# Patient Record
Sex: FEMALE | Race: BLACK OR AFRICAN AMERICAN | NOT HISPANIC OR LATINO | Employment: OTHER | URBAN - METROPOLITAN AREA
[De-identification: names, ages, dates, MRNs, and addresses within clinical notes are randomized per-mention and may not be internally consistent; named-entity substitution may affect disease eponyms.]

---

## 2017-01-03 ENCOUNTER — LAB VISIT (OUTPATIENT)
Dept: LAB | Facility: HOSPITAL | Age: 42
End: 2017-01-03
Attending: INTERNAL MEDICINE
Payer: COMMERCIAL

## 2017-01-03 ENCOUNTER — HOSPITAL ENCOUNTER (OUTPATIENT)
Dept: CARDIOLOGY | Facility: CLINIC | Age: 42
Discharge: HOME | End: 2017-01-03
Attending: INTERNAL MEDICINE
Payer: COMMERCIAL

## 2017-01-03 ENCOUNTER — OFFICE VISIT (OUTPATIENT)
Dept: TRANSPLANT | Facility: CLINIC | Age: 42
End: 2017-01-03
Payer: COMMERCIAL

## 2017-01-03 VITALS
WEIGHT: 239.44 LBS | SYSTOLIC BLOOD PRESSURE: 112 MMHG | HEIGHT: 69 IN | HEART RATE: 86 BPM | DIASTOLIC BLOOD PRESSURE: 59 MMHG | BODY MASS INDEX: 35.46 KG/M2

## 2017-01-03 DIAGNOSIS — D64.9 ANEMIA: ICD-10-CM

## 2017-01-03 DIAGNOSIS — M62.838 MUSCLE SPASM OF BOTH LOWER LEGS: ICD-10-CM

## 2017-01-03 DIAGNOSIS — G89.29 CHRONIC BACK PAIN: ICD-10-CM

## 2017-01-03 DIAGNOSIS — R00.2 PALPITATIONS: ICD-10-CM

## 2017-01-03 DIAGNOSIS — I34.0 SEVERE MITRAL REGURGITATION: Chronic | ICD-10-CM

## 2017-01-03 DIAGNOSIS — D50.9 MICROCYTIC ANEMIA: Primary | Chronic | ICD-10-CM

## 2017-01-03 DIAGNOSIS — I50.9 CONGESTIVE HEART FAILURE, UNSPECIFIED CONGESTIVE HEART FAILURE CHRONICITY, UNSPECIFIED CONGESTIVE HEART FAILURE TYPE: ICD-10-CM

## 2017-01-03 DIAGNOSIS — D50.9 MICROCYTIC ANEMIA: ICD-10-CM

## 2017-01-03 DIAGNOSIS — M54.9 CHRONIC BACK PAIN: ICD-10-CM

## 2017-01-03 DIAGNOSIS — I51.7 CARDIOMEGALY: ICD-10-CM

## 2017-01-03 DIAGNOSIS — I50.22 CHRONIC SYSTOLIC HEART FAILURE: Chronic | ICD-10-CM

## 2017-01-03 LAB
ANION GAP SERPL CALC-SCNC: 7 MMOL/L
BASOPHILS # BLD AUTO: 0.02 K/UL
BASOPHILS NFR BLD: 0.3 %
BNP SERPL-MCNC: 345 PG/ML
BUN SERPL-MCNC: 15 MG/DL
CALCIUM SERPL-MCNC: 8.7 MG/DL
CHLORIDE SERPL-SCNC: 108 MMOL/L
CO2 SERPL-SCNC: 24 MMOL/L
CREAT SERPL-MCNC: 0.7 MG/DL
DIASTOLIC DYSFUNCTION: NO
DIFFERENTIAL METHOD: ABNORMAL
EOSINOPHIL # BLD AUTO: 0.4 K/UL
EOSINOPHIL NFR BLD: 5.5 %
ERYTHROCYTE [DISTWIDTH] IN BLOOD BY AUTOMATED COUNT: 17.6 %
EST. GFR  (AFRICAN AMERICAN): >60 ML/MIN/1.73 M^2
EST. GFR  (NON AFRICAN AMERICAN): >60 ML/MIN/1.73 M^2
GLUCOSE SERPL-MCNC: 96 MG/DL
HCT VFR BLD AUTO: 31.9 %
HGB BLD-MCNC: 9.7 G/DL
LYMPHOCYTES # BLD AUTO: 1.6 K/UL
LYMPHOCYTES NFR BLD: 25.3 %
MCH RBC QN AUTO: 23.7 PG
MCHC RBC AUTO-ENTMCNC: 30.4 %
MCV RBC AUTO: 78 FL
MONOCYTES # BLD AUTO: 0.4 K/UL
MONOCYTES NFR BLD: 5.5 %
NEUTROPHILS # BLD AUTO: 4.1 K/UL
NEUTROPHILS NFR BLD: 63.2 %
PLATELET # BLD AUTO: 300 K/UL
PMV BLD AUTO: 10.7 FL
POTASSIUM SERPL-SCNC: 4 MMOL/L
RBC # BLD AUTO: 4.09 M/UL
SODIUM SERPL-SCNC: 139 MMOL/L
WBC # BLD AUTO: 6.49 K/UL

## 2017-01-03 PROCEDURE — 80048 BASIC METABOLIC PNL TOTAL CA: CPT

## 2017-01-03 PROCEDURE — 83880 ASSAY OF NATRIURETIC PEPTIDE: CPT

## 2017-01-03 PROCEDURE — 1159F MED LIST DOCD IN RCRD: CPT | Mod: S$GLB,,, | Performed by: INTERNAL MEDICINE

## 2017-01-03 PROCEDURE — 94621 CARDIOPULM EXERCISE TESTING: CPT | Mod: S$GLB,,, | Performed by: INTERNAL MEDICINE

## 2017-01-03 PROCEDURE — 85025 COMPLETE CBC W/AUTO DIFF WBC: CPT

## 2017-01-03 PROCEDURE — 36415 COLL VENOUS BLD VENIPUNCTURE: CPT

## 2017-01-03 PROCEDURE — 99999 PR PBB SHADOW E&M-EST. PATIENT-LVL III: CPT | Mod: PBBFAC,,, | Performed by: INTERNAL MEDICINE

## 2017-01-03 PROCEDURE — 99215 OFFICE O/P EST HI 40 MIN: CPT | Mod: S$GLB,,, | Performed by: INTERNAL MEDICINE

## 2017-01-03 RX ORDER — ASPIRIN 81 MG/1
81 TABLET ORAL DAILY
Qty: 30 TABLET | Refills: 12 | Status: SHIPPED | OUTPATIENT
Start: 2017-01-03 | End: 2019-03-22

## 2017-01-03 RX ORDER — DIGOXIN 125 MCG
125 TABLET ORAL DAILY
Qty: 30 TABLET | Refills: 11 | Status: ON HOLD | OUTPATIENT
Start: 2017-01-03 | End: 2017-02-13 | Stop reason: HOSPADM

## 2017-01-03 NOTE — MR AVS SNAPSHOT
[facility]  [address]  [place] [address]  Phone: [phone]                  Mario Mahajan   1/3/2017 10:30 AM   Office Visit    Description:  Female : 1975   Provider:  Nereida Hatch MD   Department:  [facility]           Reason for Visit     Heart Transplant Pre-evaluation           Diagnoses this Visit        Comments    Microcytic anemia    -  Primary     Cardiomegaly         Muscle spasm of both lower legs         Severe mitral regurgitation                To Do List           Future Appointments        Provider Department Dept Phone    3/21/2017 8:30 AM EKG, APPT [facility] - [phone]    3/21/2017 9:00 AM PACEMAKER, ICD [facility] - Arrhythmia [phone]    3/21/2017 9:40 AM Victorino Tay MD [facility] Arrhythmia [phone]      Goals (5 Years of Data)     None      Follow-Up and Disposition     Return in about 2 months (around 3/3/2017).       These Medications        Disp Refills Start End    aspirin (ECOTRIN) 81 MG EC tablet 30 tablet 12 1/3/2017     Take 1 tablet (81 mg total) by mouth once daily. - Oral    Pharmacy: [facility] [address] [place] [address] [address] [facility] Ph #: [phone]       digoxin (LANOXIN) 125 mcg tablet 30 tablet 11 1/3/2017 1/3/2018    Take 1 tablet (125 mcg total) by mouth once daily. - Oral    Pharmacy: [facility] [address] [place] [address] [address] [facility] Ph #: [phone]         [facility]     [facility] -  Assistance  Registered nurses in the [facility] provide clinical advisement, health education, appointment booking, and other advisory services.  Call for this free service at [phone].             Medications           Message regarding Medications     Verify the changes and/or additions to your medication regime listed below are the same as discussed with your  "clinician today.  If any of these changes or additions are incorrect, please notify your healthcare provider.        START taking these NEW medications        Refills    aspirin (ECOTRIN) 81 MG EC tablet 12    Sig: Take 1 tablet (81 mg total) by mouth once daily.    Class: Normal    Route: Oral      STOP taking these medications     MONONESSA, 28, 0.25-35 mg-mcg per tablet Take 1 tablet by mouth once daily.    aspirin 325 MG tablet Take 81 mg by mouth once daily.    aspirin 81 MG Chew Take 1 tablet (81 mg total) by mouth once daily.           Verify that the below list of medications is an accurate representation of the medications you are currently taking.  If none reported, the list may be blank. If incorrect, please contact your healthcare provider. Carry this list with you in case of emergency.           Current Medications     albuterol 90 mcg/actuation inhaler Inhale 1-2 puffs into the lungs every 6 (six) hours as needed for Wheezing.    aspirin (ECOTRIN) 81 MG EC tablet Take 1 tablet (81 mg total) by mouth once daily.    carvedilol (COREG) 25 MG tablet Take 1 tablet (25 mg total) by mouth 2 (two) times daily with meals.    digoxin (LANOXIN) 125 mcg tablet Take 1 tablet (125 mcg total) by mouth once daily.    ferrous sulfate 325 mg (65 mg iron) Tab tablet Take 1 tablet (325 mg total) by mouth 2 (two) times daily.    furosemide (LASIX) 40 MG tablet Take 1 tablet (40 mg total) by mouth once daily.    lorazepam (ATIVAN) 1 MG tablet Take 1 tablet (1 mg total) by mouth 3 (three) times a week.    spironolactone (ALDACTONE) 25 MG tablet Take 1 tablet (25 mg total) by mouth once daily.    valsartan (DIOVAN) 80 MG tablet Take 1 tablet (80 mg total) by mouth after lunch.           Clinical Reference Information           Vital Signs - Last Recorded  Most recent update: 1/3/2017 10:30 AM by Shanice Matrinez MA    BP Pulse Ht Wt BMI    (!) 112/59 86 5' 9" (1.753 m) 108.6 kg (239 lb 6.7 oz) 35.36 kg/m2      Blood Pressure  "         Most Recent Value    BP  (!)  112/59      Allergies as of 1/3/2017     No Known Allergies      Immunizations Administered on Date of Encounter - 1/3/2017     None      Orders Placed During Today's Visit      Normal Orders This Visit    Ambulatory consult to Obstetrics / Gynecology     Future Labs/Procedures Expected by Expires    Basic metabolic panel  3/3/2017 (Approximate) 1/3/2018      Maintenance Dialysis History     Patient has no recorded history of maintenance dialysis.      Transplant Information        Txp Date Organ Coordinator Care Team     Heart Marsha Ruiz RN Referring Physician:  Juanjose Nuñez MD   Corresponding Physician:  Juanjose Nuñez MD         Instructions      Sacubitril, Valsartan Oral tablet  What is this medicine?  SACUBITRIL; VALSARTAN (sak UE bi tril; katia GI tan) is a combination of 2 drugs used to reduce the risk of death and hospitalizations in people with long-lasting heart failure. It is usually used with other medicines to treat heart failure.  This medicine may be used for other purposes; ask your health care provider or pharmacist if you have questions.  What should I tell my health care provider before I take this medicine?  They need to know if you have any of these conditions:  · diabetes and take a medicine that contains aliskiren  · kidney disease  · liver disease  · an unusual or allergic reaction to sacubitril; valsartan, drugs called angiotensin converting enzyme (ACE) inhibitors, angiotensin II receptor blockers (ARBs), other medicines, foods, dyes, or preservatives  · pregnant or trying to get pregnant  · breast-feeding  How should I use this medicine?  Take this medicine by mouth with a glass of water. Follow the directions on the prescription label. You can take it with or  without food. If it upsets your stomach, take it with food. Take your medicine at regular intervals. Do not take it more  often than directed. Do not stop taking except on your doctor's  advice.  Talk to your pediatrician regarding the use of this medicine in children. Special care may be needed.  Overdosage: If you think you have taken too much of this medicine contact a poison control center or emergency room at once.  NOTE: This medicine is only for you. Do not share this medicine with others.  What if I miss a dose?  If you miss a dose, take it as soon as you can. If it is almost time for next dose, take only that dose. Do not take double or extra doses.  What may interact with this medicine?  Do not take this medicine with any of the following medicines:  · aliskiren if you have diabetes  · angiotensin-converting enzyme (ACE) inhibitors, like benazepril, captopril, enalapril, fosinopril, lisinopril, or ramipril  This medicine may also interact with the following medicines:  · angiotensin II receptor blockers (ARBs) like azilsartan, candesartan, eprosartan, irbesartan, losartan, olmesartan, telmisartan, or valsartan  · lithium  · NSAIDS, medicines for pain and inflammation, like ibuprofen or naproxen  · potassium-sparing diuretics like amiloride, spironolactone, and triamterene  · potassium supplements  This list may not describe all possible interactions. Give your health care provider a list of all the medicines, herbs, non-prescription drugs, or dietary supplements you use. Also tell them if you smoke, drink alcohol, or use illegal drugs. Some items may interact with your medicine.  What should I watch for while using this medicine?  Tell your doctor or healthcare professional if your symptoms do not start to get better or if they get worse.  Do not become pregnant while taking this medicine. Women should inform their doctor if they wish to become pregnant or think they might be pregnant. There is a potential for serious side effects to an unborn child. Talk to your health care professional or pharmacist for more information.  You may get dizzy. Do not drive, use machinery, or do anything  that needs mental alertness until you know how this medicine affects you. Do not stand or sit up quickly, especially if you are an older patient. This reduces the risk of dizzy or fainting spells. Avoid alcoholic drinks; they can make you more dizzy.  What side effects may I notice from receiving this medicine?  Side effects that you should report to your doctor or health care professional as soon as possible:  · allergic reactions like skin rash, itching or hives, swelling of the face, lips, or tongue  · signs and symptoms of increased potassium like muscle weakness; chest pain; or fast, irregular heartbeat  · signs and symptoms of kidney injury like trouble passing urine or change in the amount of urine  · signs and symptoms of low blood pressure like feeling dizzy or lightheaded, or if you develop extreme fatigue.  Side effects that usually do not require medical attention (Report these to your doctor or health care professional if they continue or are bothersome.):  · cough  This list may not describe all possible side effects. Call your doctor for medical advice about side effects. You may report side effects to FDA at 0-829-FDA-8447.  Where should I keep my medicine?  Keep out of the reach of children.  Store at room temperature between 15 and 30 degrees C (59 and 86 degrees F). Throw away any unused medicine after the expiration date.  NOTE: This sheet is a summary. It may not cover all possible information. If you have questions about this medicine, talk to your doctor, pharmacist, or health care provider.  NOTE:This sheet is a summary. It may not cover all possible information. If you have questions about this medicine, talk to your doctor, pharmacist, or health care provider. Copyright© 2016 [employer]

## 2017-01-03 NOTE — PATIENT INSTRUCTIONS
Sacubitril, Valsartan Oral tablet  What is this medicine?  SACUBITRIL; VALSARTAN (sak UE bi tril; katia GI tan) is a combination of 2 drugs used to reduce the risk of death and hospitalizations in people with long-lasting heart failure. It is usually used with other medicines to treat heart failure.  This medicine may be used for other purposes; ask your health care provider or pharmacist if you have questions.  What should I tell my health care provider before I take this medicine?  They need to know if you have any of these conditions:  · diabetes and take a medicine that contains aliskiren  · kidney disease  · liver disease  · an unusual or allergic reaction to sacubitril; valsartan, drugs called angiotensin converting enzyme (ACE) inhibitors, angiotensin II receptor blockers (ARBs), other medicines, foods, dyes, or preservatives  · pregnant or trying to get pregnant  · breast-feeding  How should I use this medicine?  Take this medicine by mouth with a glass of water. Follow the directions on the prescription label. You can take it with or  without food. If it upsets your stomach, take it with food. Take your medicine at regular intervals. Do not take it more  often than directed. Do not stop taking except on your doctor's advice.  Talk to your pediatrician regarding the use of this medicine in children. Special care may be needed.  Overdosage: If you think you have taken too much of this medicine contact a poison control center or emergency room at once.  NOTE: This medicine is only for you. Do not share this medicine with others.  What if I miss a dose?  If you miss a dose, take it as soon as you can. If it is almost time for next dose, take only that dose. Do not take double or extra doses.  What may interact with this medicine?  Do not take this medicine with any of the following medicines:  · aliskiren if you have diabetes  · angiotensin-converting enzyme (ACE) inhibitors, like benazepril, captopril,  enalapril, fosinopril, lisinopril, or ramipril  This medicine may also interact with the following medicines:  · angiotensin II receptor blockers (ARBs) like azilsartan, candesartan, eprosartan, irbesartan, losartan, olmesartan, telmisartan, or valsartan  · lithium  · NSAIDS, medicines for pain and inflammation, like ibuprofen or naproxen  · potassium-sparing diuretics like amiloride, spironolactone, and triamterene  · potassium supplements  This list may not describe all possible interactions. Give your health care provider a list of all the medicines, herbs, non-prescription drugs, or dietary supplements you use. Also tell them if you smoke, drink alcohol, or use illegal drugs. Some items may interact with your medicine.  What should I watch for while using this medicine?  Tell your doctor or healthcare professional if your symptoms do not start to get better or if they get worse.  Do not become pregnant while taking this medicine. Women should inform their doctor if they wish to become pregnant or think they might be pregnant. There is a potential for serious side effects to an unborn child. Talk to your health care professional or pharmacist for more information.  You may get dizzy. Do not drive, use machinery, or do anything that needs mental alertness until you know how this medicine affects you. Do not stand or sit up quickly, especially if you are an older patient. This reduces the risk of dizzy or fainting spells. Avoid alcoholic drinks; they can make you more dizzy.  What side effects may I notice from receiving this medicine?  Side effects that you should report to your doctor or health care professional as soon as possible:  · allergic reactions like skin rash, itching or hives, swelling of the face, lips, or tongue  · signs and symptoms of increased potassium like muscle weakness; chest pain; or fast, irregular heartbeat  · signs and symptoms of kidney injury like trouble passing urine or change in the  amount of urine  · signs and symptoms of low blood pressure like feeling dizzy or lightheaded, or if you develop extreme fatigue.  Side effects that usually do not require medical attention (Report these to your doctor or health care professional if they continue or are bothersome.):  · cough  This list may not describe all possible side effects. Call your doctor for medical advice about side effects. You may report side effects to FDA at 9-176-BHE-6514.  Where should I keep my medicine?  Keep out of the reach of children.  Store at room temperature between 15 and 30 degrees C (59 and 86 degrees F). Throw away any unused medicine after the expiration date.  NOTE: This sheet is a summary. It may not cover all possible information. If you have questions about this medicine, talk to your doctor, pharmacist, or health care provider.  NOTE:This sheet is a summary. It may not cover all possible information. If you have questions about this medicine, talk to your doctor, pharmacist, or health care provider. Copyright© 2016 [employer] Standard

## 2017-01-03 NOTE — Clinical Note
Thanks for seeing her next week for heavy menstraul bleeding Seems ok to start with birth control pills if u think that will help

## 2017-01-03 NOTE — PROGRESS NOTES
Subjective: class 2 to 3    Transplant status: active    HPI:  Ms. Mahajan is a 41 y.o. year old Black or  female who has presented to be evaluated as a potential heart transplant recipient.   Nonischemic CHF (LVEF 25-30%, LVEDD 7.3cm,  Moderate to severe MR) who presents for clinic visit.  At her last visit, I asked her to spread out her CHF meds to see if her daytime fatigue would improve.  Today, her fatigue is unchanged.  Can walk two blocks if she takes her time.   Gets sharp nonexertional chest pain at night every two weeks which is relieved by taking coreg early.  Feels better overall when she takes her coreg as she believes it has helped decrease the frequency of her palpations.  Has yet to see GYN for menorrhagia. As noted below, peak v02 (functional capacity) improved compared to prior.  Last admit in 2015 looked like CHF along with anemia requiring transfusion of two units  CPX 2017 The PkVO2 was 14.1 ml/kg/min which is 42% of predicted equating to a functional capacity of 4.0 METS indicating severe functional impairment (2015 PKvo2 12.6 )    Past Medical History   Diagnosis Date    Anemia     Asthma     CHF (congestive heart failure)     Encounter for blood transfusion     Mitral valve regurgitation     Obesity      Past Surgical History   Procedure Laterality Date    Tubal ligation       section      Cardiac defibrillator placement  2015     OB History     No data available        Review of Systems   Constitution: Negative for decreased appetite, weight gain and weight loss.   Cardiovascular: Positive for dyspnea on exertion. Negative for chest pain, leg swelling, near-syncope, orthopnea and palpitations (once a week at night ).   Respiratory: Negative for cough and shortness of breath.    Musculoskeletal: Negative for myalgias.   Gastrointestinal: Negative for jaundice.       Objective:   Physical Exam   Constitutional: She appears well-developed and  "well-nourished. No distress.   BP (!) 112/59  Pulse 86  Ht 5' 9" (1.753 m)  Wt 108.6 kg (239 lb 6.7 oz)  BMI 35.36 kg/m2     HENT:   Head: Normocephalic and atraumatic. Head is without abrasion and without contusion.   Right Ear: External ear normal.   Left Ear: External ear normal.   Nose: Nose normal. No epistaxis.   Mouth/Throat: Oropharynx is clear and moist. Mucous membranes are not cyanotic.   Eyes: Conjunctivae and EOM are normal. Pupils are equal, round, and reactive to light.   Neck: Normal range of motion. Neck supple. No tracheal deviation present.   Cardiovascular: Normal rate, regular rhythm and normal pulses.  Exam reveals gallop and S4.    No murmur heard.  Pulmonary/Chest: Effort normal and breath sounds normal. No stridor. No respiratory distress. She has no wheezes.   Abdominal: Soft. Normal appearance, normal aorta and bowel sounds are normal. She exhibits no distension. There is no tenderness.   Musculoskeletal: She exhibits no edema or tenderness.   Neurological: She is alert. She has normal strength and normal reflexes. She exhibits normal muscle tone.   Skin: Skin is warm. No rash noted. No erythema.   Psychiatric: She has a normal mood and affect. Her speech is normal and behavior is normal. Judgment and thought content normal. Cognition and memory are normal.       Labs:    Chemistry        Component Value Date/Time     01/03/2017 0910    K 4.0 01/03/2017 0910     01/03/2017 0910    CO2 24 01/03/2017 0910    BUN 15 01/03/2017 0910    BUN 12 06/19/2015 0950    CREATININE 0.7 01/03/2017 0910    GLU 96 01/03/2017 0910        Component Value Date/Time    CALCIUM 8.7 01/03/2017 0910    ALKPHOS 50 06/25/2016 2152    AST 17 06/25/2016 2152    ALT 20 06/25/2016 2152    BILITOT 1.0 06/25/2016 2152          Magnesium   Date Value Ref Range Status   04/13/2016 1.9 1.6 - 2.6 mg/dL Final     Lab Results   Component Value Date    WBC 6.49 01/03/2017    HGB 9.7 (L) 01/03/2017    HCT 31.9 (L) " 01/03/2017     01/03/2017     BNP   Date Value Ref Range Status   01/03/2017 345 (H) 0 - 99 pg/mL Final     Comment:     Values of less than 100 pg/ml are consistent with non-CHF populations.   06/02/2016 96 0 - 99 pg/mL Final     Comment:     Values of less than 100 pg/ml are consistent with non-CHF populations.   05/04/2016 132 (H) 0 - 99 pg/mL Final     Comment:     Values of less than 100 pg/ml are consistent with non-CHF populations.       Assessment:      1. Microcytic anemia    2. Cardiomegaly    3. Muscle spasm of both lower legs    4. Severe mitral regurgitation        Plan:   1.  CHF despite large LV, has made process with meds  May try to substitute entresto for valsartan. Alternatively could try to increase coreg / add corlander  2.  OHT / LVAD would like her to see GYN to see if we can help with anemia If symptoms persist once anemia resolves (or if anemia can not be improved) will start discuss for OHT / LVAD  3.  Followup in two months with BMP  4.  Risk of GETA for GYN procedures with reasonable function capacity / stable CHF would consider mild to moderate risk for reasonable urgent required sugery  Last PET stress in June 2015 showed no evidence of a discrete hemodynamically significant coronary stenosis  Patient is now NYHA III  Recommend 2 gram sodium restriction and 1500cc fluid restriction.  Encourage physical activity with graded exercise program.  Requested patient to weigh themselves daily, and to notify us if their weight increases by more than 3 lbs in 1 day or 5 lbs in 1 week.     Listed for transplant: No        Patient did not met with pre-transplant coordinator at the end of this visit.

## 2017-01-03 NOTE — LETTER
January 3, 2017        Juanjose Nuñez  [address]  [facility] [address]  Phone: [phone]  Fax: [phone]             [facility] [address]  [facility] [address]  Phone: [phone]   Patient: Mario Mahajan   MR Number: [id number]   YOB: 1975   Date of Visit: 1/3/2017       Dear Dr. Juanjose Nuñez    Thank you for referring Mario Mahajna to me for evaluation. Attached you will find relevant portions of my assessment and plan of care.    If you have questions, please do not hesitate to call me. I look forward to following Mario Mahajan along with you.    Sincerely,    Nereida Hatch MD    Enclosure    If you would like to receive this communication electronically, please contact [email or web address] or [phone] to request [employer] access.    [employer] is a tool which provides read-only access to select patient information with whom you have a relationship. Its easy to use and provides real time access to review your patients record including encounter summaries, notes, results, and demographic information.    If you feel you have received this communication in error or would no longer like to receive these types of communications, please e-mail [email or web address]

## 2017-01-12 ENCOUNTER — OFFICE VISIT (OUTPATIENT)
Dept: OBSTETRICS AND GYNECOLOGY | Facility: CLINIC | Age: 42
End: 2017-01-12
Payer: COMMERCIAL

## 2017-01-12 VITALS
SYSTOLIC BLOOD PRESSURE: 110 MMHG | HEIGHT: 69 IN | DIASTOLIC BLOOD PRESSURE: 62 MMHG | BODY MASS INDEX: 34.8 KG/M2 | WEIGHT: 235 LBS

## 2017-01-12 DIAGNOSIS — N92.4 EXCESSIVE BLEEDING IN PREMENOPAUSAL PERIOD: ICD-10-CM

## 2017-01-12 DIAGNOSIS — N92.4 EXCESSIVE BLEEDING IN PREMENOPAUSAL PERIOD: Primary | ICD-10-CM

## 2017-01-12 PROCEDURE — 99203 OFFICE O/P NEW LOW 30 MIN: CPT | Mod: 25,S$GLB,, | Performed by: OBSTETRICS & GYNECOLOGY

## 2017-01-12 PROCEDURE — 76830 TRANSVAGINAL US NON-OB: CPT | Mod: S$GLB,,, | Performed by: OBSTETRICS & GYNECOLOGY

## 2017-01-12 PROCEDURE — 99999 PR PBB SHADOW E&M-EST. PATIENT-LVL IV: CPT | Mod: PBBFAC,,, | Performed by: OBSTETRICS & GYNECOLOGY

## 2017-01-12 PROCEDURE — 1159F MED LIST DOCD IN RCRD: CPT | Mod: S$GLB,,, | Performed by: OBSTETRICS & GYNECOLOGY

## 2017-01-12 NOTE — PROGRESS NOTES
Patient presents to the office after referral from her cardiologist.  Patient has menorrhagia to the point of anemia.  She has an enlarged heart with leaky valve and surgery is anticipated for this problem.  This cannot be done until the patient has her bleeding problems under control.  Cardiology suggested endometrial ablation or hysterectomy.  The patient states that she does not wish to proceed with ablation because of the potential for failure or inadequate results to prevent future anemia or bleeding problems.  Patient also has significant cramping and dyspareunia particularly on the right lower quadrant.  She has had previous  sections but no other pelvic surgery.  She is a low transverse scar right above the pubic bone which is mildly retracted.  Bimanual examination shows a normal cervix with mildly hypertrophied uterus, there is tenderness of the uterine fundus on bimanual compression.  There are no significant adnexal masses palpable but there is tenderness particularly in the right lower quadrant between the incision and the vaginal wall.  Patient has had negative Paps on her life and a recent one less than a year ago.  Ultrasound be done to further assess the pelvis.  A case request was placed for abdominal hysterectomy.  Consideration for management of the ovaries at surgery will be addressed at a future visit.  As soon as the patient's hysterectomy was done she can return to cardiology and move forward with her cardiac procedures (valve replacement).

## 2017-01-12 NOTE — MR AVS SNAPSHOT
[facility]  [address]  [address], [address]  [place] [address]  Phone: [phone]                  Mario Mahajan   2017 2:30 PM   Office Visit    Description:  Female : 1975   Provider:  Victorino Rose MD   Department:  [place] [facility]           Reason for Visit     Gynecologic Exam                To Do List           Future Appointments        Provider Department Dept Phone    2017 2:30 PM MD [facility] - [phone]    3/3/2017 12:00 PM LAB, APPOINTMENT [place] [facility] [phone]    3/3/2017 2:00 PM Nereida Hatch MD [facility] [phone]    3/21/2017 8:30 AM EKG, [facility] - [phone]    3/21/2017 9:00 AM PACEMAKER, [facility] - Arrhythmia [phone]      Goals (5 Years of Data)     None      [facility] On Call     [facility] Care Line -  Assistance  Registered nurses in the [facility] provide clinical advisement, health education, appointment booking, and other advisory services.  Call for this free service at [phone].             Medications           Message regarding Medications     Verify the changes and/or additions to your medication regime listed below are the same as discussed with your clinician today.  If any of these changes or additions are incorrect, please notify your healthcare provider.        STOP taking these medications     lorazepam (ATIVAN) 1 MG tablet Take 1 tablet (1 mg total) by mouth 3 (three) times a week.           Verify that the below list of medications is an accurate representation of the medications you are currently taking.  If none reported, the list may be blank. If incorrect, please contact your healthcare provider. Carry this list with you in case of emergency.           Current Medications     aspirin (ECOTRIN) 81 MG EC tablet Take 1 tablet (81 mg total) by mouth once daily.    carvedilol (COREG) 25 MG  "tablet Take 1 tablet (25 mg total) by mouth 2 (two) times daily with meals.    digoxin (LANOXIN) 125 mcg tablet Take 1 tablet (125 mcg total) by mouth once daily.    ferrous sulfate 325 mg (65 mg iron) Tab tablet Take 1 tablet (325 mg total) by mouth 2 (two) times daily.    furosemide (LASIX) 40 MG tablet Take 1 tablet (40 mg total) by mouth once daily.    spironolactone (ALDACTONE) 25 MG tablet Take 1 tablet (25 mg total) by mouth once daily.    valsartan (DIOVAN) 80 MG tablet Take 1 tablet (80 mg total) by mouth after lunch.    albuterol 90 mcg/actuation inhaler Inhale 1-2 puffs into the lungs every 6 (six) hours as needed for Wheezing.           Clinical Reference Information           Vital Signs - Last Recorded  Most recent update: 1/12/2017  2:24 PM by Emani Cazares MA    BP Ht Wt LMP BMI    110/62 5' 9" (1.753 m) 106.6 kg (235 lb 0.2 oz) 12/28/2016 34.71 kg/m2      Blood Pressure          Most Recent Value    BP  110/62      Allergies as of 1/12/2017     No Known Allergies      Immunizations Administered on Date of Encounter - 1/12/2017     None      Maintenance Dialysis History     Patient has no recorded history of maintenance dialysis.      Transplant Information        Txp Date Organ Coordinator Care Team     Heart Marsha Ruiz RN Referring Physician:  Juanjose Nuñez MD   Corresponding Physician:  Juanjose Nuñez MD         [facility] Sign-Up     Activating your [facility] account is as easy as 1-2-3!     1) Visit [email or web address], select Sign Up Now, enter this activation code and your date of birth, then select Next.  Activation code not generated  Current Patient Portal Status: Account disabled      2) Create a username and password to use when you visit [facility] in the future and select a security question in case you lose your password and select Next.    3) Enter your e-mail address and click Sign Up!    Additional Information  If you have questions, please e-mail [email or web address] or call " [phone] to talk to our [facility] staff. Remember, [facility] is NOT to be used for urgent needs. For medical emergencies, dial 911.

## 2017-01-12 NOTE — LETTER
January 12, 2017      Nereida Hatch MD  [address]  [place] [facility] [address]           [place] [facility]  [address]  [address] [facility], [address]  [place] [address]  Phone: [phone]          Patient: Mario Mahajan   MR Number: [id number]   YOB: 1975   Date of Visit: 1/12/2017       Dear Dr. Nereida Hatch:    Thank you for referring Mario Mahajan to me for evaluation. Attached you will find relevant portions of my assessment and plan of care.    If you have questions, please do not hesitate to call me. I look forward to following Mario Mahajan along with you.    Sincerely,    Victorino Rose MD    Enclosure  CC:  No Recipients    If you would like to receive this communication electronically, please contact [email or web address] or [phone] to request more information on [facility] access.    For providers and/or their staff who would like to refer a patient to [facility], please contact us through our one-stop-shop provider referral line, [facility], at [phone].    If you feel you have received this communication in error or would no longer like to receive these types of communications, please e-mail [email or web address]

## 2017-01-20 ENCOUNTER — PATIENT MESSAGE (OUTPATIENT)
Dept: OBSTETRICS AND GYNECOLOGY | Facility: CLINIC | Age: 42
End: 2017-01-20

## 2017-01-20 ENCOUNTER — PATIENT MESSAGE (OUTPATIENT)
Dept: TRANSPLANT | Facility: CLINIC | Age: 42
End: 2017-01-20

## 2017-01-20 NOTE — TELEPHONE ENCOUNTER
Forwarded from patient via [employer]:    Hi , I know my surgery is set for Feb 8, But I was wondering if  could I do it on the next surgery date , Perhaps after the 15th.   Please let me know if that is at all possible

## 2017-01-23 ENCOUNTER — TELEPHONE (OUTPATIENT)
Dept: OBSTETRICS AND GYNECOLOGY | Facility: CLINIC | Age: 42
End: 2017-01-23

## 2017-01-23 ENCOUNTER — PATIENT MESSAGE (OUTPATIENT)
Dept: OBSTETRICS AND GYNECOLOGY | Facility: CLINIC | Age: 42
End: 2017-01-23

## 2017-01-23 NOTE — TELEPHONE ENCOUNTER
----- Message from Caitlin Thao sent at 1/20/2017  3:16 PM CST -----  Contact: self [phone]  Pt awaiting a call back regarding a surgery/Please advise.

## 2017-01-27 ENCOUNTER — PATIENT MESSAGE (OUTPATIENT)
Dept: OBSTETRICS AND GYNECOLOGY | Facility: CLINIC | Age: 42
End: 2017-01-27

## 2017-01-27 NOTE — TELEPHONE ENCOUNTER
Forwarded from patient via [employer]:    Hello , My apologies , I'm sorry for the inconvenience , But my daughter has a procedure on the day of my surgery ,I just realized it , So I was wondering if its ok , Could I just keep my original appt. Which I think  was Feb 8th .2017.Again I apologize for inconvenience  please let me know if this is ok

## 2017-01-30 ENCOUNTER — TELEPHONE (OUTPATIENT)
Dept: TRANSPLANT | Facility: CLINIC | Age: 42
End: 2017-01-30

## 2017-01-30 ENCOUNTER — PATIENT MESSAGE (OUTPATIENT)
Dept: TRANSPLANT | Facility: CLINIC | Age: 42
End: 2017-01-30

## 2017-01-30 NOTE — TELEPHONE ENCOUNTER
Patient called back stating feeling dizziness and HA after taking Carvedilol. Patient has been taking since 8/2016, symptoms just started 2 weeks ago. Patient stated maybe from anemia, scheduled for hysterectomy on 3/1/2017. Instructed Dr Hatch is on vacation and will be back tomorrow to discuss, patient ok with plan. Will call patient back tomorrow with Dr Hatch's advice.

## 2017-01-30 NOTE — TELEPHONE ENCOUNTER
Attempted to reach patient for c/o dizziness and HA when taking Carvedilol, message left to call coord, contact number provided. Will await call back from patient to discuss.

## 2017-01-31 ENCOUNTER — PATIENT MESSAGE (OUTPATIENT)
Dept: TRANSPLANT | Facility: CLINIC | Age: 42
End: 2017-01-31

## 2017-01-31 NOTE — TELEPHONE ENCOUNTER
"Attempted to contact patient to advise per Dr Hatch regarding Carvedilol, voice message left to call back, contact number provided.     Message sent to patient in [facility]:  " I discussed your concern with taking the medication Carvedilol with Dr Hatch. Dr Hatch would like you to continue taking the medication until your surgery, as your symptoms may be related to anemia. If you feel you can not wait until then, please give me a call at [phone]."  "

## 2017-02-01 ENCOUNTER — TELEPHONE (OUTPATIENT)
Dept: TRANSPLANT | Facility: CLINIC | Age: 42
End: 2017-02-01

## 2017-02-01 NOTE — TELEPHONE ENCOUNTER
"Patient called back, stated she will try to continue Carvedilol as prescribed, if still feels "bad" will contact coordinator for further recommendation from HTS provider.   "

## 2017-02-09 ENCOUNTER — HOSPITAL ENCOUNTER (OUTPATIENT)
Facility: HOSPITAL | Age: 42
Discharge: HOME | End: 2017-02-10
Attending: EMERGENCY MEDICINE | Admitting: HOSPITALIST
Payer: COMMERCIAL

## 2017-02-09 DIAGNOSIS — R55 SYNCOPE AND COLLAPSE: ICD-10-CM

## 2017-02-09 DIAGNOSIS — I50.42 CHRONIC COMBINED SYSTOLIC AND DIASTOLIC CONGESTIVE HEART FAILURE: Primary | Chronic | ICD-10-CM

## 2017-02-09 DIAGNOSIS — R55 SYNCOPE: ICD-10-CM

## 2017-02-09 DIAGNOSIS — I50.9 ACUTE ON CHRONIC CONGESTIVE HEART FAILURE, UNSPECIFIED CONGESTIVE HEART FAILURE TYPE: ICD-10-CM

## 2017-02-09 DIAGNOSIS — I50.20 SYSTOLIC CONGESTIVE HEART FAILURE: ICD-10-CM

## 2017-02-09 LAB
ALBUMIN SERPL BCP-MCNC: 4 G/DL
ALP SERPL-CCNC: 41 IU/L
ALT SERPL W/O P-5'-P-CCNC: 21 IU/L
ANION GAP SERPL CALC-SCNC: 10 MMOL/L
AST SERPL-CCNC: 28 IU/L
B-HCG UR QL: NEGATIVE
BASOPHILS # BLD AUTO: 0.03 K/UL
BASOPHILS NFR BLD: 0.3 %
BILIRUB SERPL-MCNC: 0.8 MG/DL
BUN SERPL-MCNC: 15 MG/DL
CALCIUM SERPL-MCNC: 9.1 MG/DL
CHLORIDE SERPL-SCNC: 106 MMOL/L
CO2 SERPL-SCNC: 25 MMOL/L
CREAT SERPL-MCNC: 0.73 MG/DL
DIFFERENTIAL METHOD: ABNORMAL
EOSINOPHIL # BLD AUTO: 0.5 K/UL
EOSINOPHIL NFR BLD: 5.2 %
ERYTHROCYTE [DISTWIDTH] IN BLOOD BY AUTOMATED COUNT: 16.5 %
EST. GFR  (AFRICAN AMERICAN): >60 ML/MIN/1.73 M^2
EST. GFR  (NON AFRICAN AMERICAN): >60 ML/MIN/1.73 M^2
GLUCOSE SERPL-MCNC: 94 MG/DL
HCT VFR BLD AUTO: 31 %
HGB BLD-MCNC: 9.6 G/DL
LYMPHOCYTES # BLD AUTO: 2.6 K/UL
LYMPHOCYTES NFR BLD: 28.2 %
MCH RBC QN AUTO: 24.9 PG
MCHC RBC AUTO-ENTMCNC: 31 %
MCV RBC AUTO: 80 FL
MONOCYTES # BLD AUTO: 0.7 K/UL
MONOCYTES NFR BLD: 7.4 %
NEUTROPHILS # BLD AUTO: 5.4 K/UL
NEUTROPHILS NFR BLD: 58.8 %
PLATELET # BLD AUTO: 238 K/UL
PMV BLD AUTO: 11.8 FL
POTASSIUM SERPL-SCNC: 4.6 MMOL/L
PROT SERPL-MCNC: 7.6 G/DL
RBC # BLD AUTO: 3.86 M/UL
SODIUM SERPL-SCNC: 141 MMOL/L
TROPONIN I SERPL DL<=0.01 NG/ML-MCNC: <0.012 NG/ML
WBC # BLD AUTO: 9.18 K/UL

## 2017-02-09 PROCEDURE — 80053 COMPREHEN METABOLIC PANEL: CPT

## 2017-02-09 PROCEDURE — 99285 EMERGENCY DEPT VISIT HI MDM: CPT | Mod: 25

## 2017-02-09 PROCEDURE — 81025 URINE PREGNANCY TEST: CPT

## 2017-02-09 PROCEDURE — 85025 COMPLETE CBC W/AUTO DIFF WBC: CPT

## 2017-02-09 PROCEDURE — 93005 ELECTROCARDIOGRAM TRACING: CPT

## 2017-02-09 PROCEDURE — 84484 ASSAY OF TROPONIN QUANT: CPT

## 2017-02-09 NOTE — IP AVS SNAPSHOT
[facility]  [address]  [place] [address]  Phone: [phone]           Patient Discharge Instructions     Our goal is to set you up for success. This packet includes information on your condition, medications, and your home care. It will help you to care for yourself so you don't get sicker and need to go back to the hospital.     Please ask your nurse if you have any questions.        There are many details to remember when preparing to leave the hospital. Here is what you will need to do:    1. Take your medicine. If you are prescribed medications, review your Medication List in the following pages. You may have new medications to  at the pharmacy and others that you'll need to stop taking. Review the instructions for how and when to take your medications. Talk with your doctor or nurses if you are unsure of what to do.     2. Go to your follow-up appointments. Specific follow-up information is listed in the following pages. Your may be contacted by a transition nurse or clinical provider about future appointments. Be sure we have all of the phone numbers to reach you, if needed. Please contact your provider's office if you are unable to make an appointment.     3. Watch for warning signs. Your doctor or nurse will give you detailed warning signs to watch for and when to call for assistance. These instructions may also include educational information about your condition. If you experience any of warning signs to your health, call your doctor.               [facility]  Unless otherwise directed by your provider, please contact [facility], our nurse care line that is available for 24/7 assistance.     [phone] (toll-free)    Registered nurses in the [facility] provide clinical advisement, health education, appointment booking, and other advisory services.                    ** Verify the list of medication(s) below is accurate and up to date. Carry this  with you in case of emergency. If your medications have changed, please notify your healthcare provider.             Medication List      CHANGE how you take these medications        Additional Info                      ferrous sulfate 325 mg (65 mg iron) Tab tablet   Quantity:  60 tablet   Refills:  1   Dose:  325 mg   What changed:  when to take this    Instructions:  Take 1 tablet (325 mg total) by mouth 2 (two) times daily.     Begin Date    AM    Noon    PM    Bedtime       spironolactone 25 MG tablet   Commonly known as:  ALDACTONE   Quantity:  30 tablet   Refills:  11   Dose:  25 mg   What changed:  additional instructions    Instructions:  Take 1 tablet (25 mg total) by mouth once daily. HOLD THIS UNTIL YOU SEE YOUR CARDIOLOGIST     Begin Date    AM    Noon    PM    Bedtime       valsartan 80 MG tablet   Commonly known as:  DIOVAN   Quantity:  30 tablet   Refills:  12   Dose:  80 mg   What changed:    - when to take this  - additional instructions    Instructions:  Take 1 tablet (80 mg total) by mouth once daily. HOLD THIS UNTIL YOU SEE YOUR CARDIOLOGIST     Begin Date    AM    Noon    PM    Bedtime         CONTINUE taking these medications        Additional Info                      albuterol 90 mcg/actuation inhaler   Quantity:  1 Inhaler   Refills:  3   Dose:  1-2 puff    Instructions:  Inhale 1-2 puffs into the lungs every 6 (six) hours as needed for Wheezing.     Begin Date    AM    Noon    PM    Bedtime       aspirin 81 MG EC tablet   Commonly known as:  ECOTRIN   Quantity:  30 tablet   Refills:  12   Dose:  81 mg    Last time this was given:  81 mg on 2/10/2017  8:49 AM   Instructions:  Take 1 tablet (81 mg total) by mouth once daily.     Begin Date    AM    Noon    PM    Bedtime       carvedilol 25 MG tablet   Commonly known as:  COREG   Quantity:  60 tablet   Refills:  11   Dose:  25 mg    Last time this was given:  25 mg on 2/10/2017  8:49 AM   Instructions:  Take 1 tablet (25 mg total) by mouth 2  (two) times daily with meals.     Begin Date    AM    Noon    PM    Bedtime       digoxin 125 mcg tablet   Commonly known as:  LANOXIN   Quantity:  30 tablet   Refills:  11   Dose:  125 mcg    Last time this was given:  125 mcg on 2/10/2017  8:55 AM   Instructions:  Take 1 tablet (125 mcg total) by mouth once daily.     Begin Date    AM    Noon    PM    Bedtime       furosemide 40 MG tablet   Commonly known as:  LASIX   Quantity:  30 tablet   Refills:  11   Dose:  40 mg    Last time this was given:  40 mg on 2/10/2017  8:49 AM   Instructions:  Take 1 tablet (40 mg total) by mouth once daily.     Begin Date    AM    Noon    PM    Bedtime            Where to Get Your Medications      Information about where to get these medications is not yet available     ! Ask your nurse or doctor about these medications     spironolactone 25 MG tablet    valsartan 80 MG tablet                  Please bring to all follow up appointments:    1. A copy of your discharge instructions.  2. All medicines you are currently taking in their original bottles.  3. Identification and insurance card.    Please arrive 15 minutes ahead of scheduled appointment time.    Please call 24 hours in advance if you must reschedule your appointment and/or time.        Your Scheduled Appointments     Feb 23, 2017 10:00 AM CST   Established Patient Visit with Victorino Rose MD   [facility]    [address] [facility]  5th Floor [facility], [address]  [facility] [id number] [phone]            Feb 23, 2017 11:00 AM CST   Pre-Admit Testing Visit with PRE-ADMIT ONE[facility] ([facility])    [address] [facility] [address]   [phone]            Mar 03, 2017 12:00 PM CST   Non-Fasting Lab with LAB, APPOINTMENT [place] [facility] ([facility])    [address] [facility] [id number] [phone]            Mar 03, 2017  2:00 PM CST   Established  Patient Visit with Nereida Hatch MD   [facility] )    [address]  [facility] [address] [phone]            Mar 21, 2017  8:30 AM CDT   EKG with EKG, APPT   [facility] ([place] )    [address]  [facility] [address] [phone]              Your Future Surgeries/Procedures     Mar 01, 2017   Surgery with Victorino Rose MD   [facility]")    [address] [place] [id number] [phone]              Follow-up Information     Follow up with Nereida Hatch MD.    Specialties:  Cardiology, Transplant    Contact information:    [address]  [facility] [id number] [phone]          Discharge Instructions     Future Orders    Activity as tolerated     Call MD for:  difficulty breathing or increased cough     Call MD for:  persistent dizziness, light-headedness, or visual disturbances     Diet general     Questions:    Total calories:      Fat restriction, if any:      Protein restriction, if any:      Na restriction, if any:  2gNa    Fluid restriction:  Fluid - 1500mL    Additional restrictions:        Discharge References/Attachments     SYNCOPE, CAUSES OF (ENGLISH)    SYNCOPE, UNK CAUSE (ENGLISH)    HEART FAILURE, DISCHARGE INSTRUCTIONS FOR (ENGLISH)    HEART FAILURE, WHAT IS (ENGLISH)    HEART FAILURE: MAKING CHANGES TO YOUR DIET (ENGLISH)    HEART FAILURE: WARNING SIGNS OF A FLARE-UP (ENGLISH)    HEART FAILURE: TRACKING YOUR WEIGHT (ENGLISH)        Primary Diagnosis     Your primary diagnosis was:  Fainting      Admission Information     Date & Time Provider Department CSN    2/9/2017  8:20 PM Hammad Clinton MD [facility] [id number]      Care Providers     Provider Role Specialty Primary office phone    Hammad Clinton MD Attending Provider Hospitalist [phone]      Your Vitals Were     BP Pulse Temp Resp Height Weight    113/58 (BP Location: Right arm, Patient  "Position: Standing) 86 98.1 °F (36.7 °C) (Oral) 20 5' 9" (1.753 m) 104.5 kg (230 lb 6.1 oz)    Last Period SpO2 BMI          01/24/2017 (Approximate) 99% 34.02 kg/m2        Recent Lab Values     No lab values to display.      Allergies as of 2/10/2017     No Known Allergies      Advance Directives     An advance directive is a document which, in the event you are no longer able to make decisions for yourself, tells your healthcare team what kind of treatment you do or do not want to receive, or who you would like to make those decisions for you.  If you do not currently have an advance directive, [facility] encourages you to create one.  For more information call:  [phone] ([phone]), [phone] ([phone]),  or log on to [email or web address].        Language Assistance Services     ATTENTION: Language assistance services are available, free of charge. Please call [phone].      ATENCIÓN: Si habla español, tiene a peck disposición servicios gratuitos de asistencia lingüística. Llame al [phone].     [facility] Ý: N?u b?n nói Ti?ng Vi?t, có các d?ch v? h? tr? ngôn ng? mi?n phí dành cho b?n. G?i s? [phone].        Heart Failure Education       Heart Failure: Being Active  You have a condition called heart failure. Being active doesnt mean that you have to wear yourself out. Even a little movement each day helps to strengthen your heart. If you cant get out to exercise, you can do simple stretching and strengthening exercises at home. These are good ways to keep you well-conditioned and prevent you and your heart from becoming excessively weak.    Ideas to get you started  · Add a little movement to things you do now. Walk to mail letters. Park your car at the far end of the parking lot and walk to the store. Walk up a flight of stairs instead of taking the elevator.  · Choose activities you enjoy. You might walk, swim, or ride an exercise bike. Things like gardening and washing the car " count, too. Other possibilities include: washing dishes, walking the dog, walking around the mall, and doing aerobic activities with friends.  · Join a group exercise program at a [facility], a senior center, or a community center. Or look into a hospital cardiac rehabilitation program. Ask your doctor if you qualify.  Tips to keep you going  · Get up and get dressed each day. Go to a coffee shop and read a newspaper or go somewhere that you'll be in the presence of other active people. Youll feel more like being active.  · Make a plan. Choose one or more activities that you enjoy and that you can easily do. Then plan to do at least one each day. You might write your plan on a calendar.  · Go with a friend or a group if you like company. This can help you feel supported and stay motivated, too.  · Plan social events that you enjoy. This will keep you mentally engaged as well as physically motivated to do things you find pleasure in.  For your safety  · Talk with your healthcare provider before starting an exercise program.  · Exercise indoors when its too hot or too cold outside, or when the air quality is poor. Try walking at a shopping mall.  · Wear socks and sturdy shoes to maintain your balance and prevent falls.  · Start slowly. Do a few minutes several times a day at first. Increase your time and speed little by little.  · Stop and rest whenever you feel tired or get short of breath.  · Dont push yourself on days when you dont feel well.  Date Last Reviewed: 3/20/2016  © 6813-9706 [employer]. [address] [place] [address], [place] [address]. All rights reserved. This information is not intended as a substitute for professional medical care. Always follow your healthcare professional's instructions.              Heart Failure: Evaluating Your Heart  You have a condition called heart failure. To evaluate your condition, your doctor will examine you, ask questions, and do some tests. Along with  looking for signs of heart failure, the doctor looks for any other health problems that may have led to heart failure. The results of your evaluation will help your doctor form a treatment plan.  Health history and physical exam  Your visit will start with a health history. Tell the doctor about any symptoms youve noticed and about all medicines you take. Then youll have a physical exam. This includes listening to your heartbeat and breathing. Youll also be checked for swelling (edema) in your legs and neck. When you have fluid buildup or fluid in the lungs, it may be called congestive heart failure.  Diagnosing heart failure     During an echocardiogram, sound waves bounce off the heart. These are converted into a picture on the screen.   The following may be done to help your doctor form a diagnosis:  · X-rays show the size and shape of your heart. These pictures can also show fluid in your lungs.  · An electrocardiogram (ECG or EKG) shows the pattern of your heartbeat. Small pads (electrodes) are placed on your chest, arms, and legs. Wires connect the pads to the ECG machine, which records your hearts electrical signals. This can give the doctor information about heart function.  · An echocardiogram uses ultrasound waves to show the structure and movement of your heart muscle. This shows how well the heart pumps. It also shows the thickness of the heart walls, and if the heart is enlarged. It is one of the most useful, non-invasive tests as it provides information about the heart's general function. This helps your doctor make treatment decisions.  · Lab tests evaluate small amounts of blood or urine for signs of problems. A BNP lab test can help diagnose and evaluate heart failure. BNP stands for B-type natriuretic peptide. The ventricles secrete more BNP when heart failure worsens. Lab tests can also provide information about metabolic dysfunction or heart dysfunction.  Your treatment plan  Based on the  results of your evaluation and tests, your doctor will develop a treatment plan. This plan is designed to relieve some of your heart failure symptoms and help make you more comfortable. Your treatment plan may include:  · Medicine to help your heart work better and improve your quality of life  · Changes in what you eat and drink to help prevent fluid from backing up in your body  · Daily monitoring of your weight and heart failure symptoms to see how well your treatment plan is working  · Exercise to help you stay healthy  · Help with quitting smoking  · Emotional and psychological support to help adjust to the changes  · Referrals to other specialists to make sure you are being treated comprehensively  Date Last Reviewed: 3/21/2016  © 9131-5668 [employer]. [address] [place] [address], [place] [address]. All rights reserved. This information is not intended as a substitute for professional medical care. Always follow your healthcare professional's instructions.              Heart Failure: Making Changes to Your Diet  You have a condition called heart failure. When you have heart failure, excess fluid is more likely to build up in your body because your heart isn't working well. This makes the heart work harder to pump blood. Fluid buildup causes symptoms such as shortness of breath and swelling (edema). This is often referred to as congestive heart failure or CHF. Controlling the amount of salt (sodium) you eat may help stop fluid from building up. Your doctor may also tell you to reduce the amount of fluid you drink.  Reading food labels    Your healthcare provider will tell you how much sodium you can eat each day. Read food labels to keep track. Keep in mind that certain foods are high in salt. These include canned, frozen, and processed foods. Check the amount of sodium in each serving. Watch out for high-sodium ingredients. These include MSG (monosodium glutamate), baking soda, and sodium  phosphate.   Eating less salt  Give yourself time to get used to eating less salt. It may take a little while. Here are some tips to help:  · Take the saltshaker off the table. Replace it with salt-free herb mixes and spices.  · Eat fresh or plain frozen vegetables. These have much less salt than canned vegetables.  · Choose low-sodium snacks like sodium-free pretzels, crackers, or air-popped popcorn.  · Dont add salt to your food when youre cooking. Instead, season your foods with pepper, lemon, garlic, or onion.  · When you eat out, ask that your food be cooked without added salt.  · Avoid eating fried foods as these often have a great deal of salt.  If youre told to limit fluids  You may need to limit how much fluid you have to help prevent swelling. This includes anything that is liquid at room temperature, such as ice cream and soup. If your doctor tells you to limit fluid, try these tips:  · Measure drinks in a measuring cup before you drink them. This will help you meet daily goals.  · Chill drinks to make them more refreshing.  · Suck on frozen lemon wedges to quench thirst.  · Only drink when youre thirsty.  · Chew sugarless gum or suck on hard candy to keep your mouth moist.  · Weigh yourself daily to know if your body's fluid content is rising.  My sodium goal  Your healthcare provider may give you a sodium goal to meet each day. This includes sodium found in food as well as salt that you add. My goal is to eat no more than ___________ mg of sodium per day.     When to call your doctor  Call your doctor right away if you have any symptoms of worsening heart failure. These can include:  · Sudden weight gain  · Increased swelling of your legs or ankles  · Trouble breathing when youre resting or at night  · Increase in the number of pillows you have to sleep on  · Chest pain, pressure, discomfort, or pain in the jaw, neck, or back   Date Last Reviewed: 3/21/2016  © 5526-1653 [employer]  [address] [place] [address]. All rights reserved. This information is not intended as a substitute for professional medical care. Always follow your healthcare professional's instructions.              Heart Failure: Medicines to Help Your Heart    You have a condition called heart failure (also known as congestive heart failure, or CHF). Your doctor will likely prescribe medicines for heart failure and any underlying health problems you have. Most heart failure patients take one or more types of medicinen. Your healthcare provider will work to find the combination of medicines that works best for you.  Heart failure medicines  Here are the most common heart failure medicines:  · ACE inhibitors lower blood pressure and decrease strain on the heart. This makes it easier for the heart to pump. Angiotensin receptor blockers have similar effects. These are prescribed for some patients instead of ACE inhibitors.  · Beta-blockers relieve stress on the heart. They also improve symptoms. They may also improve the heart's pumping action over time.  · Diuretics (also called water pills) help rid your body of excess water. This can help rid your body of swelling (edema). Having less fluid to pump means your heart doesnt have to work as hard. Some diuretics make your body lose a mineral called potassium. Your doctor will tell you if you need to take supplements or eat more foods high in potassium.  · Digoxin helps your heart pump with more strength. This helps your heart pump more blood with each beat. So, more oxygen-rich blood travels to the rest of the body.  · Aldosterone antagonists help alter hormones and decrease strain on the heart.  · Hydralazine and nitrates are two separate medicines used together to treat heart failure. They may come in one combination pill. They lower blood pressure and decrease how hard the heart has to pump.  Medicines for related conditions  Controlling other heart problems  helps keep heart failure under control, too. Depending on other heart problems you have, medicines may be prescribed to:  · Lower blood pressure (antihypertensives).  · Lower cholesterol levels (statins).  · Prevent blood clots (anticoagulants or aspirin).  · Keep the heartbeat steady (antiarrhythmics).  Date Last Reviewed: 3/5/2016  © 6858-4746 [employer]. [address] [place] [address], [place] [address]. All rights reserved. This information is not intended as a substitute for professional medical care. Always follow your healthcare professional's instructions.              Heart Failure: Procedures That May Help    The heart is a muscle that pumps oxygen-rich blood to all parts of the body. When you have heart failure, the heart is not able to pump as well as it should. Blood and fluid may back up into the lungs (congestive heart failure), and some parts of the body dont get enough oxygen-rich blood to work normally. These problems lead to the symptoms of heart failure.     Certain procedures may help the heart pump better in some cases of heart failure. Some procedures are done to treat health problems that may have caused the heart failure such as coronary artery disease or heart rhythm problems. For more serious heart failure, other options are available.  Treating artery and valve problems  If you have coronary artery disease or valve disease, procedures may be done to improve blood flow. This helps the heart pump better, which can improve heart failure symptoms. First, your doctor may do a cardiac catheterization to help detect clogged blood vessels or valve damage. During this procedure, a  thin tube (catheter) in inserted into a blood vessel and guided to the heart. There a dye is injected and a special type of X-ray (angiogram) is taken of the blood vessels. Procedures to open a blocked artery or fix damaged valves can also be done using catheterization.  · Angioplasty uses a balloon-tipped  instrument at the end of the catheter. The balloon is inflated to widen the narrowed artery. In many cases, a stent is expanded to further support the narrowed artery. A stent is a metal mesh tube.  · Valve surgery repairs or replacement of faulty valves can also be done during catheterization so blood can flow properly through the chambers of the heart.  Bypass surgery is another option to help treat blocked arteries. It uses a healthy blood vessel from elsewhere in the body. The healthy blood vessel is attached above and below the blocked area so that blood can flow around the blocked artery.  Treating heart rhythm problems  A device may be placed in the chest to help a weak heart maintain a healthy, heartbeat so the heart can pump more effectively:  · Pacemaker. A pacemaker is an implanted device that regulates your heartbeat electronically. It monitors your heart's rhythm and generates a painless electric impulse that helps the heart beat in a regular rhythm. A pacemaker is programmed to meet your specific heart rhythm needs.  · Biventricular pacing/cardiac resynchronization therapy. A type of pacemaker that paces both pumping chambers of the heart at the same time to coordinate contractions and to improve the heart's function. Some people with heart failure are candidates for this therapy.  · Implantable cardioverter defibrillator. A device similar to a pacemaker that senses when the heart is beating too fast and delivers an electrical shock to convert the fast rhythm to a normal rhythm. This can be a life saving device.  In severe cases  In more serious cases of heart failure when other treatments no longer work, other options may include:  · Ventricular assist devices (VADs). These are mechanical devices used to take over the pumping function for one or both of the heart's ventricles, or pumping chambers. A VAD may be necessary when heart failure progresses to the point that medicines and other treatments no  longer help. In some cases, a VAD may be used as a bridge to transplant.  · Heart transplant. This is replacing the diseased heart with a healthy one from a donor. This is an option for a few people who are very sick. A heart transplant is very serious and not an option for all patients. Your doctor can tell you more.  Date Last Reviewed: 3/20/2016  © 8169-6973 [employer]. [address] [place] [address], [place] [address]. All rights reserved. This information is not intended as a substitute for professional medical care. Always follow your healthcare professional's instructions.              Heart Failure: Tracking Your Weight  You have a condition called heart failure. When you have heart failure, a sudden weight gain or a steady rise in weight is a warning sign that your body is retaining too much water and salt. This could mean your heart failure is getting worse. If left untreated, it can cause problems for your lungs and result in shortness of breath. Weighing yourself each day is the best way to know if youre retaining water. If your weight goes up quickly, call your doctor. You will be given instructions on how to get rid of the excess water. You will likely need medicines and to avoid salt. This will help your heart work better.  Call your doctor if you gain more than 2 pounds in 1 day, more than 5 pounds in 1 week, or whatever weight gain you were told to report by your doctor. This is often a sign of worsening heart failure and needs to be evaluated and treated. Your doctor will tell you what to do next.   Tips for weighing yourself    · Weigh yourself at the same time each morning, wearing the same clothes. Weigh yourself after urinating and before eating.  · Use the same scale each day. Make sure the numbers are easy to read. Put the scale on a flat, hard surface -- not on a rug or carpet.  · Do not stop weighing yourself. If you forget one day, weigh again the next morning.  How to use your  weight chart  · Keep your weight chart near the scale. Write your weight on the chart as soon as you get off the scale.  · Fill in the month and the start date on the chart. Then write down your weight each day. Your chart will look like this:    · If you miss a day, leave the space blank. Weigh yourself the next day and write your weight in the next space.  · Take your weight chart with you when you go to see your doctor.  Date Last Reviewed: 3/20/2016  © 6448-8635 [employer]. [address] [place] [address]. All rights reserved. This information is not intended as a substitute for professional medical care. Always follow your healthcare professional's instructions.              Heart Failure: Warning Signs of a Flare-Up  You have a condition called heart failure. Once you have heart failure, flare-ups can happen. Below are signs that can mean your heart failure is getting worse. If you notice any of these warning signs, call your healthcare provider.  Swelling    · Your feet, ankles, or lower legs get puffier.  · You notice skin changes on your lower legs.  · Your shoes feel too tight.  · Your clothes are tighter in the waist.  · You have trouble getting rings on or off your fingers.  Shortness of breath  · You have to breathe harder even when youre doing your normal activities or when youre resting.  · You are short of breath walking up stairs or even short distances.  · You wake up at night short of breath or coughing.  · You need to use more pillows or sit up to sleep.  · You wake up tired or restless.  Other warning signs  · You feel weaker, dizzy, or more tired.  · You have chest pain or changes in your heartbeat.  · You have a cough that wont go away.  · You cant remember things or dont feel like eating.  Tracking your weight  Gaining weight is often the first warning sign that heart failure is getting worse. Gaining even a few pounds can be a sign that your body is retaining  excess water and salt. Weighing yourself each day in the morning after you urinate and before you eat, is the best way to know if you're retaining water. Get a scale that is easy to read and make sure you wear the same clothes and use the same scale every time you weigh. Your healthcare provider will show you how to track your weight. Call your doctor if you gain more than 2 pounds in 1 day, 5 pounds in 1 week, or whatever weight gain you were told to report by your doctor. This is often a sign of worsening heart failure and needs to be evaluated and treated before it compromises your breathing. Your doctor will tell you what to do next.    Date Last Reviewed: 3/15/2016  © 7800-1272 [employer] [address] [place] [address], [place] [address]. All rights reserved. This information is not intended as a substitute for professional medical care. Always follow your healthcare professional's instructions.               [facility] complies with applicable Federal civil rights laws and does not discriminate on the basis of race, color, national origin, age, disability, or sex.

## 2017-02-10 VITALS
TEMPERATURE: 100 F | WEIGHT: 230.38 LBS | HEIGHT: 69 IN | RESPIRATION RATE: 20 BRPM | SYSTOLIC BLOOD PRESSURE: 103 MMHG | HEART RATE: 68 BPM | DIASTOLIC BLOOD PRESSURE: 55 MMHG | OXYGEN SATURATION: 99 % | BODY MASS INDEX: 34.12 KG/M2

## 2017-02-10 PROBLEM — N92.4 MENORRHAGIA, PREMENOPAUSAL: Chronic | Status: ACTIVE | Noted: 2017-02-10

## 2017-02-10 PROBLEM — I95.1 ORTHOSTATIC SYNCOPE: Status: RESOLVED | Noted: 2017-02-10 | Resolved: 2017-02-10

## 2017-02-10 PROBLEM — I95.1 ORTHOSTATIC SYNCOPE: Status: ACTIVE | Noted: 2017-02-10

## 2017-02-10 LAB
ANION GAP SERPL CALC-SCNC: 8 MMOL/L
BASOPHILS # BLD AUTO: 0.02 K/UL
BASOPHILS NFR BLD: 0.3 %
BNP SERPL-MCNC: 257 PG/ML
BUN SERPL-MCNC: 13 MG/DL
CALCIUM SERPL-MCNC: 8.6 MG/DL
CHLORIDE SERPL-SCNC: 109 MMOL/L
CK SERPL-CCNC: 110 U/L
CO2 SERPL-SCNC: 21 MMOL/L
CREAT SERPL-MCNC: 0.6 MG/DL
DIASTOLIC DYSFUNCTION: YES
DIFFERENTIAL METHOD: ABNORMAL
DIGOXIN SERPL-MCNC: <0.1 NG/ML
EOSINOPHIL # BLD AUTO: 0.4 K/UL
EOSINOPHIL NFR BLD: 4.9 %
ERYTHROCYTE [DISTWIDTH] IN BLOOD BY AUTOMATED COUNT: 16.4 %
EST. GFR  (AFRICAN AMERICAN): >60 ML/MIN/1.73 M^2
EST. GFR  (NON AFRICAN AMERICAN): >60 ML/MIN/1.73 M^2
ESTIMATED PA SYSTOLIC PRESSURE: 17.75
GLUCOSE SERPL-MCNC: 98 MG/DL
HCT VFR BLD AUTO: 31 %
HGB BLD-MCNC: 9.4 G/DL
LYMPHOCYTES # BLD AUTO: 2.2 K/UL
LYMPHOCYTES NFR BLD: 27.4 %
MCH RBC QN AUTO: 24 PG
MCHC RBC AUTO-ENTMCNC: 30.3 %
MCV RBC AUTO: 79 FL
MITRAL VALVE MOBILITY: NORMAL
MITRAL VALVE REGURGITATION: ABNORMAL
MONOCYTES # BLD AUTO: 0.6 K/UL
MONOCYTES NFR BLD: 7 %
NEUTROPHILS # BLD AUTO: 4.8 K/UL
NEUTROPHILS NFR BLD: 60.3 %
PLATELET # BLD AUTO: 232 K/UL
PMV BLD AUTO: 11.4 FL
POTASSIUM SERPL-SCNC: 3.5 MMOL/L
RBC # BLD AUTO: 3.92 M/UL
RETIRED EF AND QEF - SEE NOTES: 20 (ref 55–65)
SODIUM SERPL-SCNC: 138 MMOL/L
TRICUSPID VALVE REGURGITATION: ABNORMAL
TROPONIN I SERPL DL<=0.01 NG/ML-MCNC: 0.22 NG/ML
TROPONIN I SERPL DL<=0.01 NG/ML-MCNC: 0.23 NG/ML
WBC # BLD AUTO: 8 K/UL

## 2017-02-10 PROCEDURE — 36415 COLL VENOUS BLD VENIPUNCTURE: CPT

## 2017-02-10 PROCEDURE — 80048 BASIC METABOLIC PNL TOTAL CA: CPT

## 2017-02-10 PROCEDURE — 80162 ASSAY OF DIGOXIN TOTAL: CPT

## 2017-02-10 PROCEDURE — G0378 HOSPITAL OBSERVATION PER HR: HCPCS

## 2017-02-10 PROCEDURE — 93306 TTE W/DOPPLER COMPLETE: CPT | Mod: 26,,, | Performed by: INTERNAL MEDICINE

## 2017-02-10 PROCEDURE — 25000003 PHARM REV CODE 250: Performed by: NURSE PRACTITIONER

## 2017-02-10 PROCEDURE — 94761 N-INVAS EAR/PLS OXIMETRY MLT: CPT

## 2017-02-10 PROCEDURE — 93306 TTE W/DOPPLER COMPLETE: CPT

## 2017-02-10 PROCEDURE — 85025 COMPLETE CBC W/AUTO DIFF WBC: CPT

## 2017-02-10 PROCEDURE — 25000003 PHARM REV CODE 250: Performed by: HOSPITALIST

## 2017-02-10 PROCEDURE — 84484 ASSAY OF TROPONIN QUANT: CPT

## 2017-02-10 PROCEDURE — 83880 ASSAY OF NATRIURETIC PEPTIDE: CPT

## 2017-02-10 PROCEDURE — 82550 ASSAY OF CK (CPK): CPT

## 2017-02-10 RX ORDER — VALSARTAN 80 MG/1
80 TABLET ORAL DAILY
Qty: 30 TABLET | Refills: 12 | Status: ON HOLD
Start: 2017-02-10 | End: 2017-02-13 | Stop reason: HOSPADM

## 2017-02-10 RX ORDER — FUROSEMIDE 40 MG/1
40 TABLET ORAL DAILY
Status: DISCONTINUED | OUTPATIENT
Start: 2017-02-10 | End: 2017-02-10

## 2017-02-10 RX ORDER — SPIRONOLACTONE 25 MG/1
25 TABLET ORAL DAILY
Qty: 30 TABLET | Refills: 11 | Status: ON HOLD
Start: 2017-02-10 | End: 2017-02-13 | Stop reason: HOSPADM

## 2017-02-10 RX ORDER — CARVEDILOL 25 MG/1
25 TABLET ORAL 2 TIMES DAILY WITH MEALS
Status: DISCONTINUED | OUTPATIENT
Start: 2017-02-10 | End: 2017-02-10 | Stop reason: HOSPADM

## 2017-02-10 RX ORDER — ACETAMINOPHEN 325 MG/1
650 TABLET ORAL EVERY 6 HOURS PRN
Status: DISCONTINUED | OUTPATIENT
Start: 2017-02-10 | End: 2017-02-10 | Stop reason: HOSPADM

## 2017-02-10 RX ORDER — ONDANSETRON 8 MG/1
8 TABLET, ORALLY DISINTEGRATING ORAL EVERY 8 HOURS PRN
Status: DISCONTINUED | OUTPATIENT
Start: 2017-02-10 | End: 2017-02-10 | Stop reason: HOSPADM

## 2017-02-10 RX ORDER — DIGOXIN 125 MCG
125 TABLET ORAL DAILY
Status: DISCONTINUED | OUTPATIENT
Start: 2017-02-10 | End: 2017-02-10 | Stop reason: HOSPADM

## 2017-02-10 RX ORDER — ASPIRIN 81 MG/1
81 TABLET ORAL DAILY
Status: DISCONTINUED | OUTPATIENT
Start: 2017-02-10 | End: 2017-02-10 | Stop reason: HOSPADM

## 2017-02-10 RX ORDER — FERROUS SULFATE 325(65) MG
325 TABLET, DELAYED RELEASE (ENTERIC COATED) ORAL DAILY
Status: DISCONTINUED | OUTPATIENT
Start: 2017-02-10 | End: 2017-02-10 | Stop reason: HOSPADM

## 2017-02-10 RX ADMIN — FERROUS SULFATE TAB EC 325 MG (65 MG FE EQUIVALENT) 325 MG: 325 (65 FE) TABLET DELAYED RESPONSE at 08:02

## 2017-02-10 RX ADMIN — DIGOXIN 125 MCG: 0.12 TABLET ORAL at 08:02

## 2017-02-10 RX ADMIN — ASPIRIN 81 MG: 81 TABLET, COATED ORAL at 08:02

## 2017-02-10 RX ADMIN — CARVEDILOL 25 MG: 25 TABLET, FILM COATED ORAL at 08:02

## 2017-02-10 RX ADMIN — SODIUM CHLORIDE 500 ML: 0.9 INJECTION, SOLUTION INTRAVENOUS at 12:02

## 2017-02-10 RX ADMIN — FUROSEMIDE 40 MG: 40 TABLET ORAL at 08:02

## 2017-02-10 NOTE — ASSESSMENT & PLAN NOTE
Dilated cardiomyopathy  Severe mitral regurgitation  Implantable cardioverter-defibrillator  2D echo on 3/21/16 with 25% EF, severe MVR, diastolic dysfunction, and pulmonary hypertension.  Repeat 2D echo.  Daily weights, accurate I&Os. CXR with no signs of fluid overload.  Check BNP (per patient's request).  Resume home dose digoxin, carvedilol, and lasix.  Holding valsartan and aldactone due to orthostatic syncope.  Continue to monitor.  Continue to f/u with heart transplant physician as outpatient.

## 2017-02-10 NOTE — ED PROVIDER NOTES
Encounter Date: 2017       History     Chief Complaint   Patient presents with    Loss of Consciousness     pt reports syncopal episode in yard after feeling dizzy-denies chest pain or SOB     Review of patient's allergies indicates:  No Known Allergies  Patient is a 41 y.o. female presenting with the following complaint: neurologic complaint. The history is provided by the patient.   Neurologic Problem   The primary symptoms include syncope and loss of consciousness. The symptoms began 1 to 2 hours ago. Episode duration: Unknown duration. The symptoms are resolved. The neurological symptoms are diffuse.   The syncopal episode began 3 hours ago. There was loss of consciousness. Before the onset of the syncopal episode there was weakness.   The loss of consciousness was not witnessed.   Additional symptoms include weakness and pain. Additional symptoms do not include nystagmus or irritability. Medical issues do not include seizures.     Past Medical History   Diagnosis Date    Anemia     Asthma     CHF (congestive heart failure)     Encounter for blood transfusion     Mitral valve regurgitation     Obesity      Past Medical History Pertinent Negatives   Diagnosis Date Noted    Hypertension 2016     Past Surgical History   Procedure Laterality Date    Tubal ligation       section      Cardiac defibrillator placement  2015     Family History   Problem Relation Age of Onset    Diabetes Father      Social History   Substance Use Topics    Smoking status: Never Smoker    Smokeless tobacco: Never Used    Alcohol use No     Review of Systems   Constitutional: Negative for irritability.   Cardiovascular: Positive for syncope.   Neurological: Positive for loss of consciousness, syncope and weakness.   All other systems reviewed and are negative.      Physical Exam   Initial Vitals   BP Pulse Resp Temp SpO2   17   122/74 87  22 98.5 °F (36.9 °C) 100 %     Physical Exam    Nursing note and vitals reviewed.  Constitutional: She appears well-developed and well-nourished.   HENT:   Head: Normocephalic.       Eyes: EOM are normal.   Neck: Normal range of motion. Neck supple.   Cardiovascular: Normal rate, regular rhythm, normal heart sounds and intact distal pulses.   Pulmonary/Chest: Breath sounds normal.   Abdominal: Soft.   Musculoskeletal: Normal range of motion.   Neurological: She is alert and oriented to person, place, and time.   Skin: Skin is warm and dry.   Psychiatric: She has a normal mood and affect. Her behavior is normal. Judgment and thought content normal.         ED Course   Procedures  Labs Reviewed   CBC W/ AUTO DIFFERENTIAL - Abnormal; Notable for the following:        Result Value    RBC 3.86 (*)     Hemoglobin 9.6 (*)     Hematocrit 31.0 (*)     MCV 80 (*)     MCH 24.9 (*)     MCHC 31.0 (*)     RDW 16.5 (*)     All other components within normal limits   PREGNANCY TEST, URINE RAPID   COMPREHENSIVE METABOLIC PANEL   TROPONIN I   TROPONIN I   COMPREHENSIVE METABOLIC PANEL     EKG Readings: (Independently Interpreted)   Initial Reading: No STEMI. Rhythm: Normal Sinus Rhythm. Ectopy: No Ectopy. Conduction: Normal. ST Segments: Normal ST Segments.          Medical Decision Making:   Clinical Tests:   Lab Tests: Ordered and Reviewed  Radiological Study: Ordered and Reviewed  Medical Tests: Ordered and Reviewed  Other:   I have discussed this case with another health care provider.       <> Summary of the Discussion: Patient will be admitted to telemetry at [facility]                   ED Course     Clinical Impression:   Diagnoses of Syncope and Syncope and collapse were pertinent to this visit.    Disposition:   Disposition: Admitted  Condition: Stable       Lisset Thomason MD  02/09/17 [id number]

## 2017-02-10 NOTE — PLAN OF CARE
Problem: Patient Care Overview  Goal: Plan of Care Review  Outcome: Ongoing (interventions implemented as appropriate)  Pt's SpO2 100% on RA. No respiratory distress noted. Will continue to monitor SpO2.

## 2017-02-10 NOTE — PLAN OF CARE
Problem: Patient Care Overview  Goal: Plan of Care Review  Outcome: Ongoing (interventions implemented as appropriate)  Plan of care reviewed with patient. Instructed to call for assistance due to syncope episode. Verbalizes understanding. Refused bed alarm since family member in bed. Telemetry sinus. Does have aicd placement. No complaints of chest pains or distress. Side rails up 2.

## 2017-02-10 NOTE — SUBJECTIVE & OBJECTIVE
Past Medical History   Diagnosis Date    Anemia     Asthma     CHF (congestive heart failure)     Encounter for blood transfusion     Mitral valve regurgitation     Obesity        Past Surgical History   Procedure Laterality Date    Tubal ligation       section      Cardiac defibrillator placement  2015       Review of patient's allergies indicates:  No Known Allergies    No current facility-administered medications on file prior to encounter.      Current Outpatient Prescriptions on File Prior to Encounter   Medication Sig    albuterol 90 mcg/actuation inhaler Inhale 1-2 puffs into the lungs every 6 (six) hours as needed for Wheezing.    aspirin (ECOTRIN) 81 MG EC tablet Take 1 tablet (81 mg total) by mouth once daily.    carvedilol (COREG) 25 MG tablet Take 1 tablet (25 mg total) by mouth 2 (two) times daily with meals.    digoxin (LANOXIN) 125 mcg tablet Take 1 tablet (125 mcg total) by mouth once daily.    ferrous sulfate 325 mg (65 mg iron) Tab tablet Take 1 tablet (325 mg total) by mouth 2 (two) times daily. (Patient taking differently: Take 325 mg by mouth once daily. )    furosemide (LASIX) 40 MG tablet Take 1 tablet (40 mg total) by mouth once daily.    spironolactone (ALDACTONE) 25 MG tablet Take 1 tablet (25 mg total) by mouth once daily.    valsartan (DIOVAN) 80 MG tablet Take 1 tablet (80 mg total) by mouth after lunch. (Patient taking differently: Take 80 mg by mouth once daily. )     Family History     Problem Relation (Age of Onset)    Diabetes Father        Social History Main Topics    Smoking status: Never Smoker    Smokeless tobacco: Never Used    Alcohol use No    Drug use: No    Sexual activity: Yes     Partners: Male     Review of Systems   Constitutional: Positive for fatigue. Negative for chills and fever.   HENT: Negative for congestion and sore throat.    Eyes: Negative for photophobia and visual disturbance.   Respiratory: Negative for cough and  shortness of breath.    Cardiovascular: Negative for chest pain and palpitations.   Gastrointestinal: Negative for abdominal pain, nausea and vomiting.   Endocrine: Negative for cold intolerance and heat intolerance.   Genitourinary: Negative for dysuria, frequency and urgency.   Musculoskeletal: Positive for myalgias. Negative for arthralgias.   Skin: Negative for color change and pallor.   Allergic/Immunologic: Negative for immunocompromised state.   Neurological: Positive for dizziness, syncope, weakness and light-headedness. Negative for numbness.   Hematological: Does not bruise/bleed easily.   Psychiatric/Behavioral: Negative for agitation and confusion. The patient is not nervous/anxious.      Objective:     Vital Signs (Most Recent):  Temp: 98.7 °F (37.1 °C) (02/10/17 0213)  Pulse: 71 (02/10/17 0213)  Resp: 18 (02/10/17 0213)  BP: (!) 96/50 (02/10/17 0213)  SpO2: 98 % (02/10/17 0339) Vital Signs (24h Range):  Temp:  [98.5 °F (36.9 °C)-98.7 °F (37.1 °C)] 98.7 °F (37.1 °C)  Pulse:  [71-92] 71  Resp:  [18-22] 18  SpO2:  [96 %-100 %] 98 %  BP: ()/(50-76) 96/50     Weight: 104.5 kg (230 lb 6.1 oz)  Body mass index is 34.02 kg/(m^2).    Physical Exam   Constitutional: She is oriented to person, place, and time. She appears well-developed and well-nourished. No distress.   HENT:   Head: Normocephalic and atraumatic.   Mouth/Throat: Oropharynx is clear and moist.   Eyes: EOM are normal. Pupils are equal, round, and reactive to light. No scleral icterus.   Neck: Normal range of motion. Neck supple.   Cardiovascular: Normal rate, regular rhythm and intact distal pulses.    Left chest AICD   Pulmonary/Chest: Effort normal and breath sounds normal.   Abdominal: Soft. Bowel sounds are normal. She exhibits no distension. There is no tenderness.   Musculoskeletal: She exhibits no edema or tenderness.   Neurological: She is alert and oriented to person, place, and time.   Skin: Skin is warm and dry.   Psychiatric: She  has a normal mood and affect. Her behavior is normal.   Nursing note and vitals reviewed.       Significant Labs:   CBC:   Recent Labs  Lab 02/09/17  2042   WBC 9.18   HGB 9.6*   HCT 31.0*        CMP:   Recent Labs  Lab 02/09/17  2210      K 4.6      CO2 25   GLU 94   BUN 15   CREATININE 0.73   CALCIUM 9.1   PROT 7.6   ALBUMIN 4.0   BILITOT 0.8   ALKPHOS 41   AST 28   ALT 21   ANIONGAP 10   EGFRNONAA >60.0     Troponin:   Recent Labs  Lab 02/09/17  2210   TROPONINI <0.012     All pertinent labs within the past 24 hours have been reviewed.    Significant Imaging: I have reviewed all pertinent imaging results/findings within the past 24 hours.     EKG:   Sinus rhythm with Premature supraventricular complexes  LVH with QRS widening  Abnormal ECG  When compared with ECG of 03-JAN-2017 09:30,    X-Ray Chest PA And Lateral: Findings comparison is 6/25/2016.  No interval change.  Stable cardiomegaly.  Left chest wall AICD with intact lead.  No congestion.  Clear lungs.  Mild dextroscoliosis apex thoracolumbar junction.    CT Head Without Contrast:   - Normal head CT.    - All CT scans at this facility use dose modulation, iterative reconstruction, and/or weight based dosing when appropriate to reduce radiation dose to as low as reasonably achievable.

## 2017-02-10 NOTE — H&P
[facility] Hospital Medicine  [facility]    Patient Name: Mario Mahajan  MRN: [id number]  Admission Date: 2/9/2017  Attending Physician: Hammad Clinton MD   Primary Care Provider: Primary Doctor No         Patient information was obtained from patient, past medical records and ER records.     Subjective:     Principal Problem:Orthostatic syncope    Chief Complaint:   Chief Complaint   Patient presents with    Loss of Consciousness     pt reports syncopal episode in yard after feeling dizzy-denies chest pain or SOB        HPI: Mario Mahajan is a 41 y.o.  female with asthma, obesity, anemia due to menorrhagia (scheduled for hysterectomy on 3/1/17), dilated cardiomyopathy with LVEF 25% (3/21/16) and ICD in place, severe MR, and pulmonary hypertension. 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  She does not have a PCP, she receives the majority of her care from cardiologist Dr. Nereida Hatch (heart transplant).  She is followed by GYN Dr. Victorino Rose.    Presented to [facility] after syncope and collapse of unknown duration.  Patient reports that she went grocery shopping, states that she was feeling her normal self.  Arrived home from grocery store at approximately 6 or 7 pm.  States that she got out of car and walked up 3 steps to front door when she began to feel weak and dizzy.  Patient awakened unknown amount of time later on the ground near steps (she was no longer on her porch); complained of headache and myalgias; could not remember what happened.  Denies chest pain, palpitations, shortness of breath, paresthesias, nausea, vomiting.    Of note: patient reports that she often feels dizzy when changing positions.    Upon arrival to ED patient with troponin < 0.012, stable CXR, BUN/creatinine 15/0.73, CT head negative for acute intracranial abnormalities.    Patient accepted by Hospital Medicine service for observation of syncope; [facility]          Past Medical History   Diagnosis Date    Anemia     Asthma     CHF (congestive heart failure)     Encounter for blood transfusion     Mitral valve regurgitation     Obesity        Past Surgical History   Procedure Laterality Date    Tubal ligation       section      Cardiac defibrillator placement  2015       Review of patient's allergies indicates:  No Known Allergies    No current facility-administered medications on file prior to encounter.      Current Outpatient Prescriptions on File Prior to Encounter   Medication Sig    albuterol 90 mcg/actuation inhaler Inhale 1-2 puffs into the lungs every 6 (six) hours as needed for Wheezing.    aspirin (ECOTRIN) 81 MG EC tablet Take 1 tablet (81 mg total) by mouth once daily.    carvedilol (COREG) 25 MG tablet Take 1 tablet (25 mg total) by mouth 2 (two) times daily with meals.    digoxin (LANOXIN) 125 mcg tablet Take 1 tablet (125 mcg total) by mouth once daily.    ferrous sulfate 325 mg (65 mg iron) Tab tablet Take 1 tablet (325 mg total) by mouth 2 (two) times daily. (Patient taking differently: Take 325 mg by mouth once daily. )    furosemide (LASIX) 40 MG tablet Take 1 tablet (40 mg total) by mouth once daily.    spironolactone (ALDACTONE) 25 MG tablet Take 1 tablet (25 mg total) by mouth once daily.    valsartan (DIOVAN) 80 MG tablet Take 1 tablet (80 mg total) by mouth after lunch. (Patient taking differently: Take 80 mg by mouth once daily. )     Family History     Problem Relation (Age of Onset)    Diabetes Father        Social History Main Topics    Smoking status: Never Smoker    Smokeless tobacco: Never Used    Alcohol use No    Drug use: No    Sexual activity: Yes     Partners: Male     Review of Systems   Constitutional: Positive for fatigue. Negative for chills and fever.   HENT: Negative for congestion and sore throat.    Eyes: Negative for photophobia and visual disturbance.   Respiratory: Negative  for cough and shortness of breath.    Cardiovascular: Negative for chest pain and palpitations.   Gastrointestinal: Negative for abdominal pain, nausea and vomiting.   Endocrine: Negative for cold intolerance and heat intolerance.   Genitourinary: Negative for dysuria, frequency and urgency.   Musculoskeletal: Positive for myalgias. Negative for arthralgias.   Skin: Negative for color change and pallor.   Allergic/Immunologic: Negative for immunocompromised state.   Neurological: Positive for dizziness, syncope, weakness and light-headedness. Negative for numbness.   Hematological: Does not bruise/bleed easily.   Psychiatric/Behavioral: Negative for agitation and confusion. The patient is not nervous/anxious.      Objective:     Vital Signs (Most Recent):  Temp: 98.7 °F (37.1 °C) (02/10/17 0213)  Pulse: 71 (02/10/17 0213)  Resp: 18 (02/10/17 0213)  BP: (!) 96/50 (02/10/17 0213)  SpO2: 98 % (02/10/17 0339) Vital Signs (24h Range):  Temp:  [98.5 °F (36.9 °C)-98.7 °F (37.1 °C)] 98.7 °F (37.1 °C)  Pulse:  [71-92] 71  Resp:  [18-22] 18  SpO2:  [96 %-100 %] 98 %  BP: ()/(50-76) 96/50     Weight: 104.5 kg (230 lb 6.1 oz)  Body mass index is 34.02 kg/(m^2).    Physical Exam   Constitutional: She is oriented to person, place, and time. She appears well-developed and well-nourished. No distress.   HENT:   Head: Normocephalic and atraumatic.   Mouth/Throat: Oropharynx is clear and moist.   Eyes: EOM are normal. Pupils are equal, round, and reactive to light. No scleral icterus.   Neck: Normal range of motion. Neck supple.   Cardiovascular: Normal rate, regular rhythm and intact distal pulses.    Left chest AICD   Pulmonary/Chest: Effort normal and breath sounds normal.   Abdominal: Soft. Bowel sounds are normal. She exhibits no distension. There is no tenderness.   Musculoskeletal: She exhibits no edema or tenderness.   Neurological: She is alert and oriented to person, place, and time.   Skin: Skin is warm and dry.    Psychiatric: She has a normal mood and affect. Her behavior is normal.   Nursing note and vitals reviewed.       Significant Labs:   CBC:   Recent Labs  Lab 02/09/17  2042   WBC 9.18   HGB 9.6*   HCT 31.0*        CMP:   Recent Labs  Lab 02/09/17  2210      K 4.6      CO2 25   GLU 94   BUN 15   CREATININE 0.73   CALCIUM 9.1   PROT 7.6   ALBUMIN 4.0   BILITOT 0.8   ALKPHOS 41   AST 28   ALT 21   ANIONGAP 10   EGFRNONAA >60.0     Troponin:   Recent Labs  Lab 02/09/17  2210   TROPONINI <0.012     All pertinent labs within the past 24 hours have been reviewed.    Significant Imaging: I have reviewed all pertinent imaging results/findings within the past 24 hours.     EKG:   Sinus rhythm with Premature supraventricular complexes  LVH with QRS widening  Abnormal ECG  When compared with ECG of 03-JAN-2017 09:30,    X-Ray Chest PA And Lateral: Findings comparison is 6/25/2016.  No interval change.  Stable cardiomegaly.  Left chest wall AICD with intact lead.  No congestion.  Clear lungs.  Mild dextroscoliosis apex thoracolumbar junction.    CT Head Without Contrast:   - Normal head CT.    - All CT scans at this facility use dose modulation, iterative reconstruction, and/or weight based dosing when appropriate to reduce radiation dose to as low as reasonably achievable.    Assessment/Plan:     * Orthostatic syncope  Patient with complaints of frequent dizziness with changing positions.  Head CT with no acute abnormalities.  Troponin negative,  Monitor on telemetry.  Repeat 2D echo.  Serial troponin.  Orthostatic BP q.shift.  Holding valsartan and aldactone.  Continue to monitor.      Chronic combined systolic and diastolic congestive heart failure  Dilated cardiomyopathy  Severe mitral regurgitation  Implantable cardioverter-defibrillator  2D echo on 3/21/16 with 25% EF, severe MVR, diastolic dysfunction, and pulmonary hypertension.  Repeat 2D echo.  Daily weights, accurate I&Os. CXR with no signs of  fluid overload.  Check BNP (per patient's request).  Resume home dose digoxin, carvedilol, and lasix.  Holding valsartan and aldactone due to orthostatic syncope.  Continue to monitor.  Continue to f/u with heart transplant physician as outpatient.      ICD (implantable cardioverter-defibrillator) in place 12/01/15  ICD recently remotely interrogated on 12/12/16, no issues.      Menorrhagia, premenopausal  Microcytic Anemia  Stable, Hgb/Hct 9.6/31. Resume iron supplement.  Patient scheduled for hysterectomy with GYN on 3/1/17.  Continue to F/u with GYN.      VTE Risk Mitigation         Ordered     Medium Risk of VTE  Once      02/10/17 0200     Place sequential compression device  Until discontinued      02/10/17 0200     Place SHAMAR hose  Until discontinued      02/10/17 0200        Robb Vila NP  Department of Hospital Medicine   [facility]

## 2017-02-10 NOTE — ASSESSMENT & PLAN NOTE
Microcytic Anemia  Stable, Hgb/Hct 9.6/31. Resume iron supplement.  Patient scheduled for hysterectomy with GYN on 3/1/17.  Continue to F/u with GYN.

## 2017-02-10 NOTE — PLAN OF CARE
Patient discharge instructions given and reviewed. Med rec reviewed with Patient. Patient verbalized understanding. Education provided on new medication and diagnosis and follow-up appointments. PIV d/kate tip intact. Pt tolerated well.  Awaiting transportation home.

## 2017-02-10 NOTE — ASSESSMENT & PLAN NOTE
Patient with complaints of frequent dizziness with changing positions.  Head CT with no acute abnormalities.  Troponin negative,  Monitor on telemetry.  Repeat 2D echo.  Serial troponin.  Orthostatic BP q.shift.  Holding valsartan and aldactone.  Continue to monitor.

## 2017-02-10 NOTE — ED TRIAGE NOTES
"Pt presents to ED c/o a syncopal episode that occurred just pta. Pt was walking into house and remember feeling weak/dizzy at the door steps. States she passed out. Unsure how long she was out for but then remembers waking up and screaming for her daughter who came help her inside. Pt has small welp/bruising under left eye with redness. Pt c/o left elbow pain. Pt has small abrasion and mild swelling to left elbow. Pt has a defibrillator to left chest wall x 2 years. She denies chest pain or shortness of breath. Pt has flat affect. States she is just "tired" feeling. Pt is aao at present. States this has happened to her before but she usually does not fully black out. Hx of anemia. Last blood transfusion approx 2 years ago.  "

## 2017-02-10 NOTE — DISCHARGE SUMMARY
[facility]  Discharge Summary      Patient Name: Mario Mahajan  MRN: [id number]  Admission Date: 2/9/2017  Hospital Length of Stay: 0 days  Discharge Date and Time: 2/10/2017  5:50 PM  Attending Physician: Hammad Clinton MD   Discharging Provider: Hammad Clinton MD  Primary Care Provider: Primary Doctor No      HPI:   Mario Mahajan is a 41 y.o.  female with asthma, obesity, anemia due to menorrhagia (scheduled for hysterectomy on 3/01/17), nonischemic dilated cardiomyopathy with LVEF 25% (3/21/16) NYHA class III, ICD placed 12/01/15, mitral regurgitation, and pulmonary hypertension. She works a desk job as a  and has 2 kids. She lives in [place], [place]. She does not have a primary care physician but receives the majority of her care from her cardiologist Dr. Nereida Hatch at [facility]. She is followed by gynecologist Dr. Victorino Rose.    She presented to [facility] ED on 2/09/17 after syncope and collapse of unknown duration. She had been having intermittent lightheadedness for months, mostly with position changes. She went grocery shopping and was feeling her normal self. She arrived home from the grocery store at approximately 6 or 7 pm. She got out of car and walked up 3 steps to front door when she began to feel weak and lightheaded. She could not sit down in time. She awakened an unknown amount of time later on the ground near her steps (she was no longer on her porch). She had headache and myalgias and could not remember what happened. She denied chest pain, palpitations, shortness of breath, paresthesias, nausea, vomiting. She was found to have troponin < 0.012, stable chest x-ray, BUN/creatinine 15/0.73, and CT head negative for acute intracranial abnormalities. Hemoglobin and hematocrit were better than they were in 2016. She was orthostatic. Blood pressure was as low as  96/50. She takes furosemide 40 mg, spironolactone 25 mg, carvedilol 25 mg BID, and valsartan 80 mg daily for her cardiomyopathy. She reported that despite her symptoms, her cardiologist had advised her to continue these due to the severity of her CHF.          Indwelling Lines/Drains at time of discharge: None    Hospital Course:   Valsartan and spironolactone were held. She continued to feel lightheaded the next morning even while supine. Troponin increased to 0.219 then remained stable with a third troponin of 0.229. Echocardiogram showed stable EF and actually mild rather than moderate mitral regurgitation shown before. She was given a 500 mL bolus of saline. Afterward, she was still relatively hypotensive (105/53) supine but blood pressure vicki when standing (115/58), which was an improvement. Heart rate increased from 75 to 86. She was advised that she should decrease her medications to avoid hypotension. She agreed to hold all medications for a day after discharge home, then resume carvedilol and furosemide only until she receives further recommendations from her cardiologist.     Consults: None    Significant Diagnostic Studies:   2D echo with color flow doppler 2/10/17:     1 - Severely depressed left ventricular systolic function (EF 20-25%).     2 - Severe left ventricular enlargement.     3 - Severe left atrial enlargement.     4 - Left ventricular diastolic dysfunction.     5 - The estimated PA systolic pressure is 18 mmHg.     6 - Mild mitral regurgitation.     Final Active Diagnoses:    Diagnosis Date Noted POA    Menorrhagia, premenopausal [N92.4] 02/10/2017 Yes     Chronic    ICD (implantable cardioverter-defibrillator) in place 12/01/15 [Z95.810] 03/03/2016 Yes     Chronic    Nonischemic dilated cardiomyopathy [I42.9] 12/01/2015 Yes     Chronic    Chronic combined systolic and diastolic congestive heart failure [I50.42] 04/28/2015 Yes     Chronic    Mitral regurgitation [I34.0] 03/05/2015 Yes      Chronic    Microcytic anemia [D50.9] 02/09/2015 Yes     Chronic      Problems Resolved During this Admission:    Diagnosis Date Noted Date Resolved POA    PRINCIPAL PROBLEM:  Orthostatic syncope [I95.1] 02/10/2017 02/10/2017 Yes        Discharged Condition: good    Disposition: Home or Self Care    Follow Up:  Follow-up Information     Follow up with Nereida Hatch MD.    Specialties:  Cardiology, Transplant    Contact information:    [address]  [facility] [address] [phone]          Patient Instructions:     Diet general   Order Specific Question Answer Comments   Na restriction, if any: 2gNa    Fluid restriction: Fluid - 1500mL      Activity as tolerated     Call MD for:  difficulty breathing or increased cough     Call MD for:  persistent dizziness, light-headedness, or visual disturbances       Medications:  Reconciled Home Medications:   Current Discharge Medication List      CONTINUE these medications which have CHANGED    Details   spironolactone (ALDACTONE) 25 MG tablet Take 1 tablet (25 mg total) by mouth once daily. HOLD THIS UNTIL YOU SEE YOUR CARDIOLOGIST  Qty: 30 tablet, Refills: 11    Associated Diagnoses: Acute on chronic congestive heart failure, unspecified congestive heart failure type      valsartan (DIOVAN) 80 MG tablet Take 1 tablet (80 mg total) by mouth once daily. HOLD THIS UNTIL YOU SEE YOUR CARDIOLOGIST  Qty: 30 tablet, Refills: 12         CONTINUE these medications which have NOT CHANGED    Details   albuterol 90 mcg/actuation inhaler Inhale 1-2 puffs into the lungs every 6 (six) hours as needed for Wheezing.  Qty: 1 Inhaler, Refills: 3      aspirin (ECOTRIN) 81 MG EC tablet Take 1 tablet (81 mg total) by mouth once daily.  Qty: 30 tablet, Refills: 12      carvedilol (COREG) 25 MG tablet Take 1 tablet (25 mg total) by mouth 2 (two) times daily with meals.  Qty: 60 tablet, Refills: 11    Associated Diagnoses: Chronic systolic congestive heart failure      digoxin  (LANOXIN) 125 mcg tablet Take 1 tablet (125 mcg total) by mouth once daily.  Qty: 30 tablet, Refills: 11    Associated Diagnoses: Cardiomegaly; Muscle spasm of both lower legs; Microcytic anemia      ferrous sulfate 325 mg (65 mg iron) Tab tablet Take 1 tablet (325 mg total) by mouth 2 (two) times daily.  Qty: 60 tablet, Refills: 1      furosemide (LASIX) 40 MG tablet Take 1 tablet (40 mg total) by mouth once daily.  Qty: 30 tablet, Refills: 11    Associated Diagnoses: Chronic systolic congestive heart failure           Time spent on the discharge of patient: 35 minutes    Hammad Clinton MD  Department of Hospital Medicine  [facility]

## 2017-02-10 NOTE — PLAN OF CARE
Re:  Mario Mahajan, WORK / SCHOOL EXCUSE    Date: 02/10/2017           [facility]       Justin Serna MD, [facility]       MD Robb Beltran FNP Lauren Holmes, PA-C Lauren Johns, PA-C Renee Melancon, PA-C  [address] [place] [address]  Office: [phone]  Fax: [phone]     To whom it may concern:    Ms. Mario Mahajan has been hospitalized at the [facility] since 2/9/2017.  Please excuse the patient from duties.  Patient may return on 2/11/17.  No restrictions.     Please contact me if you have any questions.                __________________________  Hammad Clinton MD

## 2017-02-10 NOTE — ED NOTES
Pt has been accepted to telemetry floor in [place] by [facility]. Awaiting bed assignment.

## 2017-02-11 ENCOUNTER — HOSPITAL ENCOUNTER (OUTPATIENT)
Facility: HOSPITAL | Age: 42
Discharge: HOME | End: 2017-02-13
Attending: EMERGENCY MEDICINE | Admitting: INTERNAL MEDICINE
Payer: COMMERCIAL

## 2017-02-11 DIAGNOSIS — Z45.02 ICD (IMPLANTABLE CARDIOVERTER-DEFIBRILLATOR) DISCHARGE: ICD-10-CM

## 2017-02-11 DIAGNOSIS — I34.0 NON-RHEUMATIC MITRAL REGURGITATION: ICD-10-CM

## 2017-02-11 DIAGNOSIS — I50.9 ACUTE ON CHRONIC CONGESTIVE HEART FAILURE, UNSPECIFIED CONGESTIVE HEART FAILURE TYPE: ICD-10-CM

## 2017-02-11 DIAGNOSIS — I34.0 MITRAL VALVE INSUFFICIENCY, UNSPECIFIED ETIOLOGY: Chronic | ICD-10-CM

## 2017-02-11 DIAGNOSIS — I50.42 CHRONIC COMBINED SYSTOLIC AND DIASTOLIC CONGESTIVE HEART FAILURE: ICD-10-CM

## 2017-02-11 DIAGNOSIS — Z95.810 ICD (IMPLANTABLE CARDIOVERTER-DEFIBRILLATOR) IN PLACE: Chronic | ICD-10-CM

## 2017-02-11 DIAGNOSIS — I47.29 VENTRICULAR TACHYCARDIA, POLYMORPHIC: ICD-10-CM

## 2017-02-11 DIAGNOSIS — D50.9 MICROCYTIC ANEMIA: Primary | Chronic | ICD-10-CM

## 2017-02-11 DIAGNOSIS — R53.1 WEAKNESS: ICD-10-CM

## 2017-02-11 DIAGNOSIS — I42.0 NONISCHEMIC DILATED CARDIOMYOPATHY: Chronic | ICD-10-CM

## 2017-02-11 DIAGNOSIS — N92.4 MENORRHAGIA, PREMENOPAUSAL: Chronic | ICD-10-CM

## 2017-02-11 DIAGNOSIS — I50.22 CHRONIC SYSTOLIC CONGESTIVE HEART FAILURE: ICD-10-CM

## 2017-02-11 LAB
ALBUMIN SERPL BCP-MCNC: 3.7 G/DL
ALP SERPL-CCNC: 41 U/L
ALT SERPL W/O P-5'-P-CCNC: 8 U/L
ANION GAP SERPL CALC-SCNC: 7 MMOL/L
AST SERPL-CCNC: 13 U/L
BACTERIA #/AREA URNS AUTO: NORMAL /HPF
BASOPHILS # BLD AUTO: 0.02 K/UL
BASOPHILS NFR BLD: 0.2 %
BILIRUB SERPL-MCNC: 1.1 MG/DL
BILIRUB UR QL STRIP: NEGATIVE
BNP SERPL-MCNC: 158 PG/ML
BUN SERPL-MCNC: 12 MG/DL
CALCIUM SERPL-MCNC: 8.9 MG/DL
CHLORIDE SERPL-SCNC: 107 MMOL/L
CLARITY UR REFRACT.AUTO: ABNORMAL
CO2 SERPL-SCNC: 22 MMOL/L
COLOR UR AUTO: YELLOW
CREAT SERPL-MCNC: 0.9 MG/DL
DIFFERENTIAL METHOD: ABNORMAL
EOSINOPHIL # BLD AUTO: 0.5 K/UL
EOSINOPHIL NFR BLD: 6.2 %
ERYTHROCYTE [DISTWIDTH] IN BLOOD BY AUTOMATED COUNT: 16.3 %
EST. GFR  (AFRICAN AMERICAN): >60 ML/MIN/1.73 M^2
EST. GFR  (NON AFRICAN AMERICAN): >60 ML/MIN/1.73 M^2
GLUCOSE SERPL-MCNC: 93 MG/DL
GLUCOSE UR QL STRIP: NEGATIVE
HCT VFR BLD AUTO: 33.7 %
HGB BLD-MCNC: 10.5 G/DL
HGB UR QL STRIP: NEGATIVE
HYALINE CASTS UR QL AUTO: 0 /LPF
INR PPP: 1
KETONES UR QL STRIP: ABNORMAL
LEUKOCYTE ESTERASE UR QL STRIP: ABNORMAL
LYMPHOCYTES # BLD AUTO: 2.8 K/UL
LYMPHOCYTES NFR BLD: 32.6 %
MAGNESIUM SERPL-MCNC: 1.9 MG/DL
MCH RBC QN AUTO: 24.4 PG
MCHC RBC AUTO-ENTMCNC: 31.2 %
MCV RBC AUTO: 78 FL
MICROSCOPIC COMMENT: NORMAL
MONOCYTES # BLD AUTO: 0.5 K/UL
MONOCYTES NFR BLD: 6 %
NEUTROPHILS # BLD AUTO: 4.8 K/UL
NEUTROPHILS NFR BLD: 54.9 %
NITRITE UR QL STRIP: NEGATIVE
PH UR STRIP: 6 [PH] (ref 5–8)
PLATELET # BLD AUTO: 289 K/UL
PMV BLD AUTO: 11.5 FL
POTASSIUM SERPL-SCNC: 3.7 MMOL/L
PROT SERPL-MCNC: 7.9 G/DL
PROT UR QL STRIP: ABNORMAL
PROTHROMBIN TIME: 10.4 SEC
RBC # BLD AUTO: 4.3 M/UL
RBC #/AREA URNS AUTO: 1 /HPF (ref 0–4)
SODIUM SERPL-SCNC: 136 MMOL/L
SP GR UR STRIP: >=1.03 (ref 1–1.03)
SQUAMOUS #/AREA URNS AUTO: 16 /HPF
TROPONIN I SERPL DL<=0.01 NG/ML-MCNC: 0.23 NG/ML
TSH SERPL DL<=0.005 MIU/L-ACNC: 1.12 UIU/ML
URN SPEC COLLECT METH UR: ABNORMAL
UROBILINOGEN UR STRIP-ACNC: 2 EU/DL
WBC # BLD AUTO: 8.66 K/UL
WBC #/AREA URNS AUTO: 3 /HPF (ref 0–5)

## 2017-02-11 PROCEDURE — 83880 ASSAY OF NATRIURETIC PEPTIDE: CPT

## 2017-02-11 PROCEDURE — 84484 ASSAY OF TROPONIN QUANT: CPT

## 2017-02-11 PROCEDURE — 85025 COMPLETE CBC W/AUTO DIFF WBC: CPT

## 2017-02-11 PROCEDURE — 93005 ELECTROCARDIOGRAM TRACING: CPT

## 2017-02-11 PROCEDURE — G0378 HOSPITAL OBSERVATION PER HR: HCPCS

## 2017-02-11 PROCEDURE — 99285 EMERGENCY DEPT VISIT HI MDM: CPT | Mod: ,,, | Performed by: EMERGENCY MEDICINE

## 2017-02-11 PROCEDURE — 81001 URINALYSIS AUTO W/SCOPE: CPT

## 2017-02-11 PROCEDURE — 93010 ELECTROCARDIOGRAM REPORT: CPT | Mod: ,,, | Performed by: INTERNAL MEDICINE

## 2017-02-11 PROCEDURE — 85610 PROTHROMBIN TIME: CPT

## 2017-02-11 PROCEDURE — 84443 ASSAY THYROID STIM HORMONE: CPT

## 2017-02-11 PROCEDURE — 83735 ASSAY OF MAGNESIUM: CPT

## 2017-02-11 PROCEDURE — 99285 EMERGENCY DEPT VISIT HI MDM: CPT | Mod: 25

## 2017-02-11 PROCEDURE — 80053 COMPREHEN METABOLIC PANEL: CPT

## 2017-02-11 NOTE — IP AVS SNAPSHOT
[facility]  [address]  [facility] [address]  Phone: [phone]           Patient Discharge Instructions     Our goal is to set you up for success. This packet includes information on your condition, medications, and your home care. It will help you to care for yourself so you don't get sicker and need to go back to the hospital.     Please ask your nurse if you have any questions.        There are many details to remember when preparing to leave the hospital. Here is what you will need to do:    1. Take your medicine. If you are prescribed medications, review your Medication List in the following pages. You may have new medications to  at the pharmacy and others that you'll need to stop taking. Review the instructions for how and when to take your medications. Talk with your doctor or nurses if you are unsure of what to do.     2. Go to your follow-up appointments. Specific follow-up information is listed in the following pages. Your may be contacted by a transition nurse or clinical provider about future appointments. Be sure we have all of the phone numbers to reach you, if needed. Please contact your provider's office if you are unable to make an appointment.     3. Watch for warning signs. Your doctor or nurse will give you detailed warning signs to watch for and when to call for assistance. These instructions may also include educational information about your condition. If you experience any of warning signs to your health, call your doctor.               [facility]  Unless otherwise directed by your provider, please contact [facility], our nurse care line that is available for 24/7 assistance.     [phone] (toll-free)    Registered nurses in the [facility] provide clinical advisement, health education, appointment booking, and other advisory services.                    ** Verify the list of medication(s) below is accurate and up  to date. Carry this with you in case of emergency. If your medications have changed, please notify your healthcare provider.             Medication List      START taking these medications        Additional Info                      * amiodarone 400 MG tablet   Commonly known as:  PACERONE   Quantity:  12 tablet   Refills:  0    Last time this was given:  400 mg on 2/13/2017  8:26 AM   Instructions:  Take 1 tablet (400 mg) by mouth two times daily for 2 weeks. Last dose on 2/25/17     Begin Date    AM    Noon    PM    Bedtime       * amiodarone 200 MG Tab   Commonly known as:  PACERONE   Quantity:  30 tablet   Refills:  3    Start Date:  2/26/2017   Last time this was given:  400 mg on 2/13/2017  8:26 AM   Instructions:  Take 1 tablet (200 mg total) by mouth daily. Begin taking this medication after finishing the 400 mg twice daily dose of amiodarone (2/26/17).     Begin Date    AM    Noon    PM    Bedtime       ascorbic acid (vitamin C) 250 MG tablet   Commonly known as:  VITAMIN C   Quantity:  60 tablet   Refills:  3    Last time this was given:  250 mg on 2/13/2017  8:26 AM   Instructions:  Take 1 tablet (250 mg) by mouth twice daily. Take with the iron tablets.     Begin Date    AM    Noon    PM    Bedtime       candesartan 4 MG tablet   Commonly known as:  ATACAND   Quantity:  30 tablet   Refills:  3   Dose:  4 mg    Last time this was given:  4 mg on 2/13/2017  8:26 AM   Instructions:  Take 1 tablet (4 mg total) by mouth once daily.     Begin Date    AM    Noon    PM    Bedtime       ferrous sulfate 325 (65 FE) MG EC tablet   Quantity:  60 tablet   Refills:  3   Replaces:  ferrous sulfate 325 mg (65 mg iron) Tab tablet    Last time this was given:  325 mg on 2/13/2017  8:26 AM   Instructions:  Take 1 tablet (325 mg total) by mouth 2 (two) times daily. Take with vitamin C.     Begin Date    AM    Noon    PM    Bedtime       * Notice:  This list has 2 medication(s) that are the same as other medications  prescribed for you. Read the directions carefully, and ask your doctor or other care provider to review them with you.      CHANGE how you take these medications        Additional Info                      carvedilol 3.125 MG tablet   Commonly known as:  COREG   Quantity:  60 tablet   Refills:  3   Dose:  3.125 mg   What changed:    - medication strength  - how much to take  - when to take this    Last time this was given:  3.125 mg on 2/13/2017  8:26 AM   Instructions:  Take 1 tablet (3.125 mg total) by mouth 2 (two) times daily.     Begin Date    AM    Noon    PM    Bedtime       furosemide 40 MG tablet   Commonly known as:  LASIX   Quantity:  30 tablet   Refills:  3   Dose:  40 mg   What changed:    - when to take this  - reasons to take this    Instructions:  Take 1 tablet (40 mg total) by mouth daily as needed (Leg swelling or weight gain).     Begin Date    AM    Noon    PM    Bedtime       spironolactone 25 MG tablet   Commonly known as:  ALDACTONE   Quantity:  15 tablet   Refills:  3   Dose:  12.5 mg   What changed:    - how much to take  - additional instructions    Last time this was given:  12.5 mg on 2/13/2017  8:26 AM   Instructions:  Take 0.5 tablets (12.5 mg total) by mouth once daily.     Begin Date    AM    Noon    PM    Bedtime         CONTINUE taking these medications        Additional Info                      albuterol 90 mcg/actuation inhaler   Refills:  0   Dose:  1-2 puff    Instructions:  Inhale 1-2 puffs into the lungs every 6 (six) hours as needed for Wheezing. Rescue     Begin Date    AM    Noon    PM    Bedtime       aspirin 81 MG EC tablet   Commonly known as:  ECOTRIN   Quantity:  30 tablet   Refills:  12   Dose:  81 mg    Last time this was given:  81 mg on 2/13/2017  8:26 AM   Instructions:  Take 1 tablet (81 mg total) by mouth once daily.     Begin Date    AM    Noon    PM    Bedtime         STOP taking these medications     digoxin 125 mcg tablet   Commonly known as:  LANOXIN        ferrous sulfate 325 mg (65 mg iron) Tab tablet   Replaced by:  ferrous sulfate 325 (65 FE) MG EC tablet       valsartan 80 MG tablet   Commonly known as:  DIOVAN            Where to Get Your Medications      These medications were sent to [id number] - [place], [place] - [address] [facility] AT [facility] [address]  [address] [facility] [address]    Hours:  24-hours Phone:  [phone]     amiodarone 400 MG tablet    candesartan 4 MG tablet    carvedilol 3.125 MG tablet    furosemide 40 MG tablet    spironolactone 25 MG tablet         You can get these medications from any pharmacy     Bring a paper prescription for each of these medications     amiodarone 200 MG Tab       You don't need a prescription for these medications     ascorbic acid (vitamin C) 250 MG tablet    ferrous sulfate 325 (65 FE) MG EC tablet                  Please bring to all follow up appointments:    1. A copy of your discharge instructions.  2. All medicines you are currently taking in their original bottles.  3. Identification and insurance card.    Please arrive 15 minutes ahead of scheduled appointment time.    Please call 24 hours in advance if you must reschedule your appointment and/or time.        Your Scheduled Appointments     Feb 20, 2017  3:00 PM CST   Hospital Follow Up with PHYSICIAN[facility]   [facility] ([facility])    [address]  [facility] [address]   [phone]            Feb 23, 2017 10:00 AM CST   Established Patient Visit with MD Javier Kennedy - OB/GYN ([place])    [address]  [address], [address]  [place] [id number] [phone]            Feb 23, 2017 11:00 AM CST   Pre-Admit Testing Visit with PRE-ADMIT ONE[facility] ([facility])    [address]  [place] [address]   [phone]            Mar 03, 2017 12:00 PM CST   Non-Fasting Lab with  LAB, APPOINTMENT [place] [facility] ([facility])    [address] [facility] [address] [phone]            Mar 03, 2017  2:00 PM CST   Established Patient Visit with Nereida Hatch MD   [facility]    [address]  [place] [address] [phone]              Your Future Surgeries/Procedures     Mar 01, 2017   Surgery with Victorino Rose MD   [facility])    [address]  [place] [address] [phone]              Follow-up Information     Follow up with [facility]. Go in 1 week.    Specialty:  [facility]    Contact information:    [address] [facility] [address] [phone]    Additional information:    [facility]        Follow up with [facility].    Specialty:  Emergency Medicine    Why:  As needed, If symptoms worsen    Contact information:    [address] [facility] [address] [phone]        Discharge Instructions     Future Orders    Activity as tolerated     Call MD for:  difficulty breathing or increased cough     Call MD for:  persistent dizziness, light-headedness, or visual disturbances     Diet Cardiac         Primary Diagnosis     Your primary diagnosis was:  Heart Failure      Admission Information     Date & Time Provider Department CSN    2/11/2017  8:12 PM Chay Holland MD [facility] [id number]      Care Providers     Provider Role Specialty Primary office phone    Chay Holland MD Attending Provider Hospitalist [phone]    Maria Elena Farley MD Team Attending  Hospitalist [phone]    Torin Jones MD Team Attending  Cardiology [phone]    Victorino Tay MD Team Attending  Electrophysiology [phone]    Isrrael Lilly MD Team Attending  Electrophysiology [phone]     "Brad Gutierrez MD Team Attending  Cardiology [phone]    Dandre Nath MD Team Attending  Electrophysiology [phone]      Your Vitals Were     BP Pulse Temp Resp Height Weight    121/57 (BP Location: Left arm, Patient Position: Lying, BP Method: Automatic) 67 98.4 °F (36.9 °C) (Oral) 17 5' 9" (1.753 m) 99.8 kg (220 lb)    Last Period SpO2 BMI          01/23/2017 97% 32.49 kg/m2        Recent Lab Values     No lab values to display.      Allergies as of 2/13/2017        Reactions    Ace Inhibitors     Cough      Advance Directives     An advance directive is a document which, in the event you are no longer able to make decisions for yourself, tells your healthcare team what kind of treatment you do or do not want to receive, or who you would like to make those decisions for you.  If you do not currently have an advance directive, [facility] encourages you to create one.  For more information call:  [phone]), [phone] ([phone]),  or log on to [email or web address].        Language Assistance Services     ATTENTION: Language assistance services are available, free of charge. Please call [phone].      ATENCIÓN: Si habla español, tiene a peck disposición servicios gratuitos de asistencia lingüística. Llame al [phone].     CHÚ Ý: N?u b?n nói Ti?ng Vi?t, có các d?ch v? h? tr? ngôn ng? mi?n phí dành cho b?n. G?i s? [phone].        Heart Failure Education       Heart Failure: Being Active  You have a condition called heart failure. Being active doesnt mean that you have to wear yourself out. Even a little movement each day helps to strengthen your heart. If you cant get out to exercise, you can do simple stretching and strengthening exercises at home. These are good ways to keep you well-conditioned and prevent you and your heart from becoming excessively weak.    Ideas to get you started  · Add a little movement to things you do now. Walk to mail letters. Park " your car at the far end of the parking lot and walk to the store. Walk up a flight of stairs instead of taking the elevator.  · Choose activities you enjoy. You might walk, swim, or ride an exercise bike. Things like gardening and washing the car count, too. Other possibilities include: washing dishes, walking the dog, walking around the mall, and doing aerobic activities with friends.  · Join a group exercise program at a [facility], a senior center, or a community center. Or look into a hospital cardiac rehabilitation program. Ask your doctor if you qualify.  Tips to keep you going  · Get up and get dressed each day. Go to a coffee shop and read a newspaper or go somewhere that you'll be in the presence of other active people. Youll feel more like being active.  · Make a plan. Choose one or more activities that you enjoy and that you can easily do. Then plan to do at least one each day. You might write your plan on a calendar.  · Go with a friend or a group if you like company. This can help you feel supported and stay motivated, too.  · Plan social events that you enjoy. This will keep you mentally engaged as well as physically motivated to do things you find pleasure in.  For your safety  · Talk with your healthcare provider before starting an exercise program.  · Exercise indoors when its too hot or too cold outside, or when the air quality is poor. Try walking at a shopping mall.  · Wear socks and sturdy shoes to maintain your balance and prevent falls.  · Start slowly. Do a few minutes several times a day at first. Increase your time and speed little by little.  · Stop and rest whenever you feel tired or get short of breath.  · Dont push yourself on days when you dont feel well.  Date Last Reviewed: 3/20/2016  © 8222-8998 [employer]. [address] [place] [address], [place] [address]. All rights reserved. This information is not intended as a substitute for professional medical care. Always follow  your healthcare professional's instructions.              Heart Failure: Evaluating Your Heart  You have a condition called heart failure. To evaluate your condition, your doctor will examine you, ask questions, and do some tests. Along with looking for signs of heart failure, the doctor looks for any other health problems that may have led to heart failure. The results of your evaluation will help your doctor form a treatment plan.  Health history and physical exam  Your visit will start with a health history. Tell the doctor about any symptoms youve noticed and about all medicines you take. Then youll have a physical exam. This includes listening to your heartbeat and breathing. Youll also be checked for swelling (edema) in your legs and neck. When you have fluid buildup or fluid in the lungs, it may be called congestive heart failure.  Diagnosing heart failure     During an echocardiogram, sound waves bounce off the heart. These are converted into a picture on the screen.   The following may be done to help your doctor form a diagnosis:  · X-rays show the size and shape of your heart. These pictures can also show fluid in your lungs.  · An electrocardiogram (ECG or EKG) shows the pattern of your heartbeat. Small pads (electrodes) are placed on your chest, arms, and legs. Wires connect the pads to the ECG machine, which records your hearts electrical signals. This can give the doctor information about heart function.  · An echocardiogram uses ultrasound waves to show the structure and movement of your heart muscle. This shows how well the heart pumps. It also shows the thickness of the heart walls, and if the heart is enlarged. It is one of the most useful, non-invasive tests as it provides information about the heart's general function. This helps your doctor make treatment decisions.  · Lab tests evaluate small amounts of blood or urine for signs of problems. A BNP lab test can help diagnose and evaluate  heart failure. BNP stands for B-type natriuretic peptide. The ventricles secrete more BNP when heart failure worsens. Lab tests can also provide information about metabolic dysfunction or heart dysfunction.  Your treatment plan  Based on the results of your evaluation and tests, your doctor will develop a treatment plan. This plan is designed to relieve some of your heart failure symptoms and help make you more comfortable. Your treatment plan may include:  · Medicine to help your heart work better and improve your quality of life  · Changes in what you eat and drink to help prevent fluid from backing up in your body  · Daily monitoring of your weight and heart failure symptoms to see how well your treatment plan is working  · Exercise to help you stay healthy  · Help with quitting smoking  · Emotional and psychological support to help adjust to the changes  · Referrals to other specialists to make sure you are being treated comprehensively  Date Last Reviewed: 3/21/2016  © 7410-7542 [employer] [address] [place] [address]. All rights reserved. This information is not intended as a substitute for professional medical care. Always follow your healthcare professional's instructions.              Heart Failure: Making Changes to Your Diet  You have a condition called heart failure. When you have heart failure, excess fluid is more likely to build up in your body because your heart isn't working well. This makes the heart work harder to pump blood. Fluid buildup causes symptoms such as shortness of breath and swelling (edema). This is often referred to as congestive heart failure or CHF. Controlling the amount of salt (sodium) you eat may help stop fluid from building up. Your doctor may also tell you to reduce the amount of fluid you drink.  Reading food labels    Your healthcare provider will tell you how much sodium you can eat each day. Read food labels to keep track. Keep in mind that  certain foods are high in salt. These include canned, frozen, and processed foods. Check the amount of sodium in each serving. Watch out for high-sodium ingredients. These include MSG (monosodium glutamate), baking soda, and sodium phosphate.   Eating less salt  Give yourself time to get used to eating less salt. It may take a little while. Here are some tips to help:  · Take the saltshaker off the table. Replace it with salt-free herb mixes and spices.  · Eat fresh or plain frozen vegetables. These have much less salt than canned vegetables.  · Choose low-sodium snacks like sodium-free pretzels, crackers, or air-popped popcorn.  · Dont add salt to your food when youre cooking. Instead, season your foods with pepper, lemon, garlic, or onion.  · When you eat out, ask that your food be cooked without added salt.  · Avoid eating fried foods as these often have a great deal of salt.  If youre told to limit fluids  You may need to limit how much fluid you have to help prevent swelling. This includes anything that is liquid at room temperature, such as ice cream and soup. If your doctor tells you to limit fluid, try these tips:  · Measure drinks in a measuring cup before you drink them. This will help you meet daily goals.  · Chill drinks to make them more refreshing.  · Suck on frozen lemon wedges to quench thirst.  · Only drink when youre thirsty.  · Chew sugarless gum or suck on hard candy to keep your mouth moist.  · Weigh yourself daily to know if your body's fluid content is rising.  My sodium goal  Your healthcare provider may give you a sodium goal to meet each day. This includes sodium found in food as well as salt that you add. My goal is to eat no more than ___________ mg of sodium per day.     When to call your doctor  Call your doctor right away if you have any symptoms of worsening heart failure. These can include:  · Sudden weight gain  · Increased swelling of your legs or ankles  · Trouble breathing  when youre resting or at night  · Increase in the number of pillows you have to sleep on  · Chest pain, pressure, discomfort, or pain in the jaw, neck, or back   Date Last Reviewed: 3/21/2016  © 9153-6254 [employer]. [address] [place] [address]. All rights reserved. This information is not intended as a substitute for professional medical care. Always follow your healthcare professional's instructions.              Heart Failure: Medicines to Help Your Heart    You have a condition called heart failure (also known as congestive heart failure, or CHF). Your doctor will likely prescribe medicines for heart failure and any underlying health problems you have. Most heart failure patients take one or more types of medicinen. Your healthcare provider will work to find the combination of medicines that works best for you.  Heart failure medicines  Here are the most common heart failure medicines:  · ACE inhibitors lower blood pressure and decrease strain on the heart. This makes it easier for the heart to pump. Angiotensin receptor blockers have similar effects. These are prescribed for some patients instead of ACE inhibitors.  · Beta-blockers relieve stress on the heart. They also improve symptoms. They may also improve the heart's pumping action over time.  · Diuretics (also called water pills) help rid your body of excess water. This can help rid your body of swelling (edema). Having less fluid to pump means your heart doesnt have to work as hard. Some diuretics make your body lose a mineral called potassium. Your doctor will tell you if you need to take supplements or eat more foods high in potassium.  · Digoxin helps your heart pump with more strength. This helps your heart pump more blood with each beat. So, more oxygen-rich blood travels to the rest of the body.  · Aldosterone antagonists help alter hormones and decrease strain on the heart.  · Hydralazine and nitrates are two  separate medicines used together to treat heart failure. They may come in one combination pill. They lower blood pressure and decrease how hard the heart has to pump.  Medicines for related conditions  Controlling other heart problems helps keep heart failure under control, too. Depending on other heart problems you have, medicines may be prescribed to:  · Lower blood pressure (antihypertensives).  · Lower cholesterol levels (statins).  · Prevent blood clots (anticoagulants or aspirin).  · Keep the heartbeat steady (antiarrhythmics).  Date Last Reviewed: 3/5/2016  © 2600-6991 [employer]. [address] [place] [address]. All rights reserved. This information is not intended as a substitute for professional medical care. Always follow your healthcare professional's instructions.              Heart Failure: Procedures That May Help    The heart is a muscle that pumps oxygen-rich blood to all parts of the body. When you have heart failure, the heart is not able to pump as well as it should. Blood and fluid may back up into the lungs (congestive heart failure), and some parts of the body dont get enough oxygen-rich blood to work normally. These problems lead to the symptoms of heart failure.     Certain procedures may help the heart pump better in some cases of heart failure. Some procedures are done to treat health problems that may have caused the heart failure such as coronary artery disease or heart rhythm problems. For more serious heart failure, other options are available.  Treating artery and valve problems  If you have coronary artery disease or valve disease, procedures may be done to improve blood flow. This helps the heart pump better, which can improve heart failure symptoms. First, your doctor may do a cardiac catheterization to help detect clogged blood vessels or valve damage. During this procedure, a  thin tube (catheter) in inserted into a blood vessel and guided to the  heart. There a dye is injected and a special type of X-ray (angiogram) is taken of the blood vessels. Procedures to open a blocked artery or fix damaged valves can also be done using catheterization.  · Angioplasty uses a balloon-tipped instrument at the end of the catheter. The balloon is inflated to widen the narrowed artery. In many cases, a stent is expanded to further support the narrowed artery. A stent is a metal mesh tube.  · Valve surgery repairs or replacement of faulty valves can also be done during catheterization so blood can flow properly through the chambers of the heart.  Bypass surgery is another option to help treat blocked arteries. It uses a healthy blood vessel from elsewhere in the body. The healthy blood vessel is attached above and below the blocked area so that blood can flow around the blocked artery.  Treating heart rhythm problems  A device may be placed in the chest to help a weak heart maintain a healthy, heartbeat so the heart can pump more effectively:  · Pacemaker. A pacemaker is an implanted device that regulates your heartbeat electronically. It monitors your heart's rhythm and generates a painless electric impulse that helps the heart beat in a regular rhythm. A pacemaker is programmed to meet your specific heart rhythm needs.  · Biventricular pacing/cardiac resynchronization therapy. A type of pacemaker that paces both pumping chambers of the heart at the same time to coordinate contractions and to improve the heart's function. Some people with heart failure are candidates for this therapy.  · Implantable cardioverter defibrillator. A device similar to a pacemaker that senses when the heart is beating too fast and delivers an electrical shock to convert the fast rhythm to a normal rhythm. This can be a life saving device.  In severe cases  In more serious cases of heart failure when other treatments no longer work, other options may include:  · Ventricular assist devices (VADs).  These are mechanical devices used to take over the pumping function for one or both of the heart's ventricles, or pumping chambers. A VAD may be necessary when heart failure progresses to the point that medicines and other treatments no longer help. In some cases, a VAD may be used as a bridge to transplant.  · Heart transplant. This is replacing the diseased heart with a healthy one from a donor. This is an option for a few people who are very sick. A heart transplant is very serious and not an option for all patients. Your doctor can tell you more.  Date Last Reviewed: 3/20/2016  © 2797-6059 [employer]. [address] [place] [address], [place] [address]. All rights reserved. This information is not intended as a substitute for professional medical care. Always follow your healthcare professional's instructions.              Heart Failure: Tracking Your Weight  You have a condition called heart failure. When you have heart failure, a sudden weight gain or a steady rise in weight is a warning sign that your body is retaining too much water and salt. This could mean your heart failure is getting worse. If left untreated, it can cause problems for your lungs and result in shortness of breath. Weighing yourself each day is the best way to know if youre retaining water. If your weight goes up quickly, call your doctor. You will be given instructions on how to get rid of the excess water. You will likely need medicines and to avoid salt. This will help your heart work better.  Call your doctor if you gain more than 2 pounds in 1 day, more than 5 pounds in 1 week, or whatever weight gain you were told to report by your doctor. This is often a sign of worsening heart failure and needs to be evaluated and treated. Your doctor will tell you what to do next.   Tips for weighing yourself    · Weigh yourself at the same time each morning, wearing the same clothes. Weigh yourself after urinating and before eating.  · Use  the same scale each day. Make sure the numbers are easy to read. Put the scale on a flat, hard surface -- not on a rug or carpet.  · Do not stop weighing yourself. If you forget one day, weigh again the next morning.  How to use your weight chart  · Keep your weight chart near the scale. Write your weight on the chart as soon as you get off the scale.  · Fill in the month and the start date on the chart. Then write down your weight each day. Your chart will look like this:    · If you miss a day, leave the space blank. Weigh yourself the next day and write your weight in the next space.  · Take your weight chart with you when you go to see your doctor.  Date Last Reviewed: 3/20/2016  © 2798-3652 [employer]. [address] [place] [address], [place] [address]. All rights reserved. This information is not intended as a substitute for professional medical care. Always follow your healthcare professional's instructions.              Heart Failure: Warning Signs of a Flare-Up  You have a condition called heart failure. Once you have heart failure, flare-ups can happen. Below are signs that can mean your heart failure is getting worse. If you notice any of these warning signs, call your healthcare provider.  Swelling    · Your feet, ankles, or lower legs get puffier.  · You notice skin changes on your lower legs.  · Your shoes feel too tight.  · Your clothes are tighter in the waist.  · You have trouble getting rings on or off your fingers.  Shortness of breath  · You have to breathe harder even when youre doing your normal activities or when youre resting.  · You are short of breath walking up stairs or even short distances.  · You wake up at night short of breath or coughing.  · You need to use more pillows or sit up to sleep.  · You wake up tired or restless.  Other warning signs  · You feel weaker, dizzy, or more tired.  · You have chest pain or changes in your heartbeat.  · You have a cough that wont go  away.  · You cant remember things or dont feel like eating.  Tracking your weight  Gaining weight is often the first warning sign that heart failure is getting worse. Gaining even a few pounds can be a sign that your body is retaining excess water and salt. Weighing yourself each day in the morning after you urinate and before you eat, is the best way to know if you're retaining water. Get a scale that is easy to read and make sure you wear the same clothes and use the same scale every time you weigh. Your healthcare provider will show you how to track your weight. Call your doctor if you gain more than 2 pounds in 1 day, 5 pounds in 1 week, or whatever weight gain you were told to report by your doctor. This is often a sign of worsening heart failure and needs to be evaluated and treated before it compromises your breathing. Your doctor will tell you what to do next.    Date Last Reviewed: 3/15/2016  © 7490-3782 [employer]. [address] [place] [address], [place] [address]. All rights reserved. This information is not intended as a substitute for professional medical care. Always follow your healthcare professional's instructions.               [facility] complies with applicable Federal civil rights laws and does not discriminate on the basis of race, color, national origin, age, disability, or sex.

## 2017-02-12 PROBLEM — R55 SYNCOPE AND COLLAPSE: Status: RESOLVED | Noted: 2017-02-09 | Resolved: 2017-02-12

## 2017-02-12 LAB
FERRITIN SERPL-MCNC: 9 NG/ML
IRON SERPL-MCNC: 24 UG/DL
SATURATED IRON: 6 %
TOTAL IRON BINDING CAPACITY: 417 UG/DL
TRANSFERRIN SERPL-MCNC: 282 MG/DL

## 2017-02-12 PROCEDURE — G0378 HOSPITAL OBSERVATION PER HR: HCPCS

## 2017-02-12 PROCEDURE — 25000003 PHARM REV CODE 250: Performed by: STUDENT IN AN ORGANIZED HEALTH CARE EDUCATION/TRAINING PROGRAM

## 2017-02-12 PROCEDURE — 93282 PRGRMG EVAL IMPLANTABLE DFB: CPT | Mod: 26,,, | Performed by: INTERNAL MEDICINE

## 2017-02-12 PROCEDURE — 36415 COLL VENOUS BLD VENIPUNCTURE: CPT

## 2017-02-12 PROCEDURE — 25000003 PHARM REV CODE 250: Performed by: HOSPITALIST

## 2017-02-12 PROCEDURE — 25000003 PHARM REV CODE 250: Performed by: INTERNAL MEDICINE

## 2017-02-12 PROCEDURE — 99243 OFF/OP CNSLTJ NEW/EST LOW 30: CPT | Mod: ,,, | Performed by: INTERNAL MEDICINE

## 2017-02-12 PROCEDURE — 93282 PRGRMG EVAL IMPLANTABLE DFB: CPT

## 2017-02-12 PROCEDURE — 83540 ASSAY OF IRON: CPT

## 2017-02-12 PROCEDURE — 82728 ASSAY OF FERRITIN: CPT

## 2017-02-12 PROCEDURE — 99220 PR INITIAL OBSERVATION CARE,LEVL III: CPT | Mod: ,,, | Performed by: INTERNAL MEDICINE

## 2017-02-12 RX ORDER — ASPIRIN 81 MG/1
81 TABLET ORAL DAILY
Status: DISCONTINUED | OUTPATIENT
Start: 2017-02-12 | End: 2017-02-13 | Stop reason: HOSPADM

## 2017-02-12 RX ORDER — DIGOXIN 125 MCG
125 TABLET ORAL DAILY
Status: DISCONTINUED | OUTPATIENT
Start: 2017-02-12 | End: 2017-02-12

## 2017-02-12 RX ORDER — ENOXAPARIN SODIUM 100 MG/ML
40 INJECTION SUBCUTANEOUS EVERY 24 HOURS
Status: DISCONTINUED | OUTPATIENT
Start: 2017-02-12 | End: 2017-02-13 | Stop reason: HOSPADM

## 2017-02-12 RX ORDER — IBUPROFEN 200 MG
24 TABLET ORAL
Status: DISCONTINUED | OUTPATIENT
Start: 2017-02-12 | End: 2017-02-13 | Stop reason: HOSPADM

## 2017-02-12 RX ORDER — IBUPROFEN 200 MG
16 TABLET ORAL
Status: DISCONTINUED | OUTPATIENT
Start: 2017-02-12 | End: 2017-02-13 | Stop reason: HOSPADM

## 2017-02-12 RX ORDER — HEPARIN SODIUM 5000 [USP'U]/ML
5000 INJECTION, SOLUTION INTRAVENOUS; SUBCUTANEOUS EVERY 8 HOURS
Status: DISCONTINUED | OUTPATIENT
Start: 2017-02-12 | End: 2017-02-12

## 2017-02-12 RX ORDER — LISINOPRIL 2.5 MG/1
2.5 TABLET ORAL DAILY
Status: DISCONTINUED | OUTPATIENT
Start: 2017-02-12 | End: 2017-02-12

## 2017-02-12 RX ORDER — CARVEDILOL 12.5 MG/1
25 TABLET ORAL 2 TIMES DAILY WITH MEALS
Status: DISCONTINUED | OUTPATIENT
Start: 2017-02-12 | End: 2017-02-12

## 2017-02-12 RX ORDER — ALBUTEROL SULFATE 90 UG/1
1-2 AEROSOL, METERED RESPIRATORY (INHALATION) EVERY 6 HOURS PRN
COMMUNITY
End: 2017-04-17 | Stop reason: SDUPTHER

## 2017-02-12 RX ORDER — CANDESARTAN 4 MG/1
4 TABLET ORAL DAILY
Status: DISCONTINUED | OUTPATIENT
Start: 2017-02-12 | End: 2017-02-13 | Stop reason: HOSPADM

## 2017-02-12 RX ORDER — IPRATROPIUM BROMIDE AND ALBUTEROL SULFATE 2.5; .5 MG/3ML; MG/3ML
3 SOLUTION RESPIRATORY (INHALATION) EVERY 6 HOURS PRN
Status: DISCONTINUED | OUTPATIENT
Start: 2017-02-12 | End: 2017-02-13 | Stop reason: HOSPADM

## 2017-02-12 RX ORDER — FERROUS SULFATE 325(65) MG
325 TABLET, DELAYED RELEASE (ENTERIC COATED) ORAL 2 TIMES DAILY
Status: DISCONTINUED | OUTPATIENT
Start: 2017-02-12 | End: 2017-02-13 | Stop reason: HOSPADM

## 2017-02-12 RX ORDER — CARVEDILOL 3.12 MG/1
3.12 TABLET ORAL 2 TIMES DAILY
Status: DISCONTINUED | OUTPATIENT
Start: 2017-02-13 | End: 2017-02-13 | Stop reason: HOSPADM

## 2017-02-12 RX ORDER — AMIODARONE HYDROCHLORIDE 200 MG/1
400 TABLET ORAL 2 TIMES DAILY
Status: DISCONTINUED | OUTPATIENT
Start: 2017-02-12 | End: 2017-02-13 | Stop reason: HOSPADM

## 2017-02-12 RX ORDER — ASCORBIC ACID 250 MG
250 TABLET ORAL 2 TIMES DAILY
Status: DISCONTINUED | OUTPATIENT
Start: 2017-02-12 | End: 2017-02-13 | Stop reason: HOSPADM

## 2017-02-12 RX ORDER — GLUCAGON 1 MG
1 KIT INJECTION
Status: DISCONTINUED | OUTPATIENT
Start: 2017-02-12 | End: 2017-02-13 | Stop reason: HOSPADM

## 2017-02-12 RX ORDER — CARVEDILOL 25 MG/1
25 TABLET ORAL 2 TIMES DAILY WITH MEALS
Status: DISCONTINUED | OUTPATIENT
Start: 2017-02-12 | End: 2017-02-12

## 2017-02-12 RX ADMIN — AMIODARONE HYDROCHLORIDE 400 MG: 200 TABLET ORAL at 12:02

## 2017-02-12 RX ADMIN — ASPIRIN 81 MG: 81 TABLET, COATED ORAL at 09:02

## 2017-02-12 RX ADMIN — CANDESARTAN CILEXETIL 4 MG: 4 TABLET ORAL at 11:02

## 2017-02-12 RX ADMIN — AMIODARONE HYDROCHLORIDE 400 MG: 200 TABLET ORAL at 09:02

## 2017-02-12 RX ADMIN — FERROUS SULFATE TAB EC 325 MG (65 MG FE EQUIVALENT) 325 MG: 325 (65 FE) TABLET DELAYED RESPONSE at 09:02

## 2017-02-12 RX ADMIN — ASCORBIC ACID TAB 250 MG 250 MG: 250 TAB at 09:02

## 2017-02-12 RX ADMIN — SPIRONOLACTONE 12.5 MG: 25 TABLET, FILM COATED ORAL at 11:02

## 2017-02-12 NOTE — CONSULTS
Electrophysiology Consult Note  Attending Physician: Chay Holland MD  Reason for Consult: AICD shock, VF     HPI:   41 y.o. woman with PMH of HFrEF 2/2 DCM s/p AICD (SJM single lead implanted 12/1/2015), severe MR, pulmonary HTN, asthma, obesity, who was admitted to [facility] on 2/11/2017 for a recent syncopal event. She had a syncopal event on 2/9/2017 in the evening, she was getting into her house and had LOC, woke up on the ground with bruises on there right side and face. She went to [facility] that evening but was discharged with a diagnosis of OH.    Interrogation of her device this morning showed 3 polymorphic VF episodes over the past week. First was on 2/5/2017, second on 2/10/2017 which required no shocks. She had a VF episode on 2/9/2017 at 7:37 PM requiring a VF shock. This coincided with her syncopal event. Otherwise has had several NSVT episodes since her last interrogation on 12/12/2016 (15 total), as well as a sustained VT episode at 164  bpm on 12/17/2016 requiring no shocks that lasted 117 seconds.    This morning, pateint feeling fine, denies any chest pain, shortness of breath, or palpitations.    ROS:    Constitution: Negative for fever, chills, weight loss or gain.   HENT: Negative for sore throat, rhinorrhea, or headache.  Eyes: Negative for blurred or double vision.   Cardiovascular: See above  Pulmonary: Negative for SOB   Gastrointestinal: Negative for abdominal pain, nausea, vomiting, or diarrhea.   : Negative for dysuria.   Neurological: Negative for focal weakness or sensory changes.  PMH:     Past Medical History   Diagnosis Date    Asthma     Chronic back pain 7/1/2014    Chronic combined systolic and diastolic congestive heart failure 4/28/2015      2-10-17   1 - Severely depressed left ventricular systolic function (EF 20-25%).    2 - Severe left ventricular enlargement.    3 - Severe left atrial enlargement.    4 - Left ventricular diastolic dysfunction.    5 - The estimated PA  systolic pressure is 18 mmHg.    6 - Mild mitral regurgitation.     Encounter for blood transfusion     ICD (implantable cardioverter-defibrillator) in place 12/01/15 3/3/2016    Menorrhagia, premenopausal 2/10/2017    Microcytic anemia 2015    Mitral regurgitation 3/5/2015    Mitral valve regurgitation     Muscle spasm of both lower legs 2014    Nonischemic dilated cardiomyopathy 2015    Obesity     Syncope and collapse 2017     Past Surgical History   Procedure Laterality Date    Tubal ligation       section      Cardiac defibrillator placement  2015     Allergies:   Review of patient's allergies indicates:  No Known Allergies  Medications:     No current facility-administered medications on file prior to encounter.      Current Outpatient Prescriptions on File Prior to Encounter   Medication Sig Dispense Refill    aspirin (ECOTRIN) 81 MG EC tablet Take 1 tablet (81 mg total) by mouth once daily. 30 tablet 12    carvedilol (COREG) 25 MG tablet Take 1 tablet (25 mg total) by mouth 2 (two) times daily with meals. 60 tablet 11    digoxin (LANOXIN) 125 mcg tablet Take 1 tablet (125 mcg total) by mouth once daily. 30 tablet 11    ferrous sulfate 325 mg (65 mg iron) Tab tablet Take 1 tablet (325 mg total) by mouth 2 (two) times daily. (Patient taking differently: Take 325 mg by mouth once daily. ) 60 tablet 1    furosemide (LASIX) 40 MG tablet Take 1 tablet (40 mg total) by mouth once daily. 30 tablet 11    spironolactone (ALDACTONE) 25 MG tablet Take 1 tablet (25 mg total) by mouth once daily. HOLD THIS UNTIL YOU SEE YOUR CARDIOLOGIST 30 tablet 11    valsartan (DIOVAN) 80 MG tablet Take 1 tablet (80 mg total) by mouth once daily. HOLD THIS UNTIL YOU SEE YOUR CARDIOLOGIST 30 tablet 12    albuterol 90 mcg/actuation inhaler Inhale 1-2 puffs into the lungs every 6 (six) hours as needed for Wheezing. 1 Inhaler 3       Inpatient Medications   Continuous Infusions:    Scheduled Meds:   amiodarone  400 mg Oral BID    ascorbic acid (vitamin C)  250 mg Oral BID    aspirin  81 mg Oral Daily    candesartan  4 mg Oral Daily    enoxaparin  40 mg Subcutaneous Daily    ferrous sulfate  325 mg Oral BID    spironolactone  12.5 mg Oral Daily     PRN Meds:albuterol-ipratropium 2.5mg-0.5mg/3mL, dextrose 50%, dextrose 50%, glucagon (human recombinant), glucose, glucose     Social History:     Social History   Substance Use Topics    Smoking status: Never Smoker    Smokeless tobacco: Never Used    Alcohol use Yes      Comment: 1 glass per month     Family History:     Family History   Problem Relation Age of Onset    Diabetes Father      Physical Exam:     Vitals:  Temp:  [97.7 °F (36.5 °C)-98.2 °F (36.8 °C)]   Pulse:  [70-96]   Resp:  [16-18]   BP: (117-131)/(55-71)   SpO2:  [95 %-100 %]  I/O's:    Intake/Output Summary (Last 24 hours) at 02/12/17 1051  Last data filed at 02/12/17 0602   Gross per 24 hour   Intake              240 ml   Output                0 ml   Net              240 ml        Constitutional: NAD, conversant  HEENT: Sclera anicteric, PERRLA, EOMI  Neck: No JVD, no carotid bruits  CV: RRR, no murmur, normal S1/S2  Pulm: CTAB, no wheezes, rales, or ronchi  GI: Abdomen soft, NTND, +BS  Extremities: No LE edema, warm and well perfused  Skin: No ecchymosis, erythema, or ulcers  Psych: AOx3, appropriate affect  Neuro: CNII-XII intact, no focal deficits    Labs:       Recent Labs  Lab 02/09/17  2210 02/10/17  0602 02/11/17 2138    138 136   K 4.6 3.5 3.7    109 107   CO2 25 21* 22*   BUN 15 13 12   CREATININE 0.73 0.6 0.9   ANIONGAP 10 8 7*       Recent Labs  Lab 02/09/17 2210 02/11/17  2138   AST 28 13   ALT 21 8*   ALKPHOS 41 41*   BILITOT 0.8 1.1*   ALBUMIN 4.0 3.7       Recent Labs  Lab 02/10/17  0602 02/10/17  0603 02/10/17  1207 02/11/17  2138   TROPONINI 0.219*  --  0.229* 0.226*   BNP  --  257*  --  158*      Recent Labs  Lab 02/09/17 2042  02/10/17  0602 02/11/17  2138   WBC 9.18 8.00 8.66   HGB 9.6* 9.4* 10.5*   HCT 31.0* 31.0* 33.7*    232 289   GRAN 58.8  5.4 60.3  4.8 54.9  4.8       Recent Labs  Lab 02/11/17 2138   INR 1.0     Lab Results   Component Value Date    CHOL 103 (L) 04/13/2016    HDL 39 (L) 04/13/2016    LDLCALC 54.2 (L) 04/13/2016    TRIG 49 04/13/2016     No results found for: HGBA1C     Micro:  Blood Cultures  No results found for: LABBLOO  Urine Cultures  No results found for: LABURIN    Imaging:   TTE (2/10/2017)  Technical Quality: This is a technically adequate study.     Aorta: The aortic root is normal in size, measuring 2.5 cm at sinotubular junction.     Left Atrium: The left atrial volume index is severely enlarged, measuring 80.20 cc/m2.     Left Ventricle: The left ventricle is severely enlarged, with an end-diastolic diameter of 7.2 cm, and an end-systolic diameter of 6.8 cm. LV wall thickness is normal, with the septum measuring 0.8 cm and the posterior wall measuring 1.2 cm across. Relative wall thickness was normal at 0.33, and the LV mass index was increased at 186.3 g/m2 consistent with eccentric left ventricular hypertrophy. Global left ventricular systolic function appears severely depressed. Visually estimated ejection fraction is 20-25%. The LV Doppler derived stroke volume equals 31.0 ccs. The E/e'(lat) is 21, consistent with significant diastolic dysfunction.     Right Atrium: The right atrium is normal in size, measuring 4.7 cm in length in the apical view.     Right Ventricle: The right ventricle is normal in size measuring 2.1 cm at the base in the apical right ventricle-focused view. Global right ventricular systolic function appears normal. The estimated PA systolic pressure is 18 mmHg.     Aortic Valve:  The aortic valve is normal in structure with normal leaflet mobility. The aortic valve is tri-leaflet in structure.     Mitral Valve:  The mitral valve is normal in structure with normal  leaflet mobility. There is mild mitral regurgitation.     Tricuspid Valve:  The tricuspid valve is normal in structure with normal leaflet mobility. There is trivial tricuspid regurgitation.     Pulmonary Valve:  The pulmonic valve is not well seen.     IVC: IVC is normal in size and collapses > 50% with a sniff, suggesting normal right atrial pressure of 3 mmHg.     Intracavitary: There is no evidence of pericardial effusion, intracavity mass, thrombi, or vegetation.     CONCLUSIONS     1 - Severely depressed left ventricular systolic function (EF 20-25%).     2 - Severe left ventricular enlargement.     3 - Severe left atrial enlargement.     4 - Left ventricular diastolic dysfunction.     5 - The estimated PA systolic pressure is 18 mmHg.     6 - Mild mitral regurgitation.    EF   Date Value Ref Range Status   02/10/2017 20 (A) 55 - 65    03/21/2016 25 (A) 55 - 65    09/30/2015 20 (A) 55 - 65    06/10/2015 20 (A) 55 - 65    02/10/2015 20 (A) 55 - 65        EKG: NSR, LVH, TWI's in lateral leads    Telemetry: normal sinus rhythm, no blocks or conduction defects, no ischemic changes    Assessment:   41 y.o. woman with PMH of HFrEF 2/2 DCM s/p AICD (SJM single lead implanted 12/1/2015), severe MR, pulmonary HTN, asthma, obesity, who was admitted to [facility] on 2/11/2017 for a recent syncopal event.    Interrogation of her device showed 3 polymorphic VF episodes over the past week.   Episode 1 on 2/5/2017 not requiring shock.  Episode 2 on 2/9/2017 at 7:37 PM requiring a VF DF shock.  Episode 3 on 2/10/2017 not requiring shock.  Several NSVT episodes since her last interrogation on 12/12/2016 (15 total).  Episode of SVT at 164  bpm on 12/17/2016 requiring no shocks that lasted 117 seconds.    Syncopal event on 2/9/2017 at 7:37 PM coincides with a VF episode and DF shock.    Plan:   - Will load with amiodarone 400 mg PO BID x 2 weeks, then 200mg PO Daily after  - Patient euvolemic on exam today, restrat Lasix when  necessary  - Would also restart Coreg when B/P allows, as well as digoxin    Patient seen and examined this morning. Thank you for the opportunity to participate in the care of this interesting patient. Discussed with Dr. Nath.    Signed:  Markell Asher MD  Cardiology Fellow, PGY-4  Pager: [phone]  2/12/2017 10:51 AM

## 2017-02-12 NOTE — PROGRESS NOTES
Pt. Slept until recently . Ambulated to Bathroom with staff standing next to her. Complaint of feelihng light headed . bp was checked before and after remain WNl with relatively no change. Mother has remain with patient since she arrived to floor

## 2017-02-12 NOTE — ASSESSMENT & PLAN NOTE
-- Patient does not exhibit orthostatic changes in BP   -- No new symptoms other than palpitations and generalized weakness  -- Will consult cards for ICD interrogation  -- Neurochecks   -- TSH levels WNL  -- CT recent does not have significant findings  -- Will admit to obs for further monitoring overnight   -- Will hold off lasix and aldactone

## 2017-02-12 NOTE — SUBJECTIVE & OBJECTIVE
Past Medical History   Diagnosis Date    Anemia     Asthma     CHF (congestive heart failure)     Encounter for blood transfusion     Mitral valve regurgitation     Obesity        Past Surgical History   Procedure Laterality Date    Tubal ligation       section      Cardiac defibrillator placement  2015       Review of patient's allergies indicates:  No Known Allergies    No current facility-administered medications on file prior to encounter.      Current Outpatient Prescriptions on File Prior to Encounter   Medication Sig    aspirin (ECOTRIN) 81 MG EC tablet Take 1 tablet (81 mg total) by mouth once daily.    carvedilol (COREG) 25 MG tablet Take 1 tablet (25 mg total) by mouth 2 (two) times daily with meals.    digoxin (LANOXIN) 125 mcg tablet Take 1 tablet (125 mcg total) by mouth once daily.    ferrous sulfate 325 mg (65 mg iron) Tab tablet Take 1 tablet (325 mg total) by mouth 2 (two) times daily. (Patient taking differently: Take 325 mg by mouth once daily. )    furosemide (LASIX) 40 MG tablet Take 1 tablet (40 mg total) by mouth once daily.    spironolactone (ALDACTONE) 25 MG tablet Take 1 tablet (25 mg total) by mouth once daily. HOLD THIS UNTIL YOU SEE YOUR CARDIOLOGIST    valsartan (DIOVAN) 80 MG tablet Take 1 tablet (80 mg total) by mouth once daily. HOLD THIS UNTIL YOU SEE YOUR CARDIOLOGIST    albuterol 90 mcg/actuation inhaler Inhale 1-2 puffs into the lungs every 6 (six) hours as needed for Wheezing.     Family History     Problem Relation (Age of Onset)    Diabetes Father        Social History Main Topics    Smoking status: Never Smoker    Smokeless tobacco: Never Used    Alcohol use Yes      Comment: 1 glass per month    Drug use: No    Sexual activity: Yes     Partners: Male     Review of Systems   Constitutional: Positive for fatigue. Negative for chills, diaphoresis, fever and unexpected weight change.   HENT: Negative for congestion, nosebleeds, postnasal drip,  rhinorrhea and sinus pressure.    Eyes: Negative for photophobia, pain and redness.   Respiratory: Positive for cough. Negative for choking, chest tightness, shortness of breath, wheezing and stridor.    Cardiovascular: Negative for chest pain, palpitations and leg swelling.   Gastrointestinal: Negative for abdominal distention, abdominal pain, blood in stool, diarrhea and nausea.   Genitourinary: Negative for dysuria and pelvic pain.   Musculoskeletal: Negative for arthralgias, back pain and gait problem.   Neurological: Negative for seizures, light-headedness, numbness and headaches.   Psychiatric/Behavioral: Negative for agitation, behavioral problems and confusion.     Objective:     Vital Signs (Most Recent):  Temp: 98 °F (36.7 °C) (02/12/17 0024)  Pulse: 79 (02/12/17 0024)  Resp: 16 (02/12/17 0024)  BP: 118/71 (02/12/17 0024)  SpO2: 99 % (02/12/17 0024) Vital Signs (24h Range):  Temp:  [97.7 °F (36.5 °C)-98 °F (36.7 °C)] 98 °F (36.7 °C)  Pulse:  [70-89] 79  Resp:  [16-18] 16  SpO2:  [99 %-100 %] 99 %  BP: (118-131)/(60-71) 118/71     Weight: 99.8 kg (220 lb)  Body mass index is 32.49 kg/(m^2).    Physical Exam   Constitutional: She is oriented to person, place, and time. She appears well-developed and well-nourished.   HENT:   Head: Normocephalic and atraumatic.   Eyes: Conjunctivae and EOM are normal. Pupils are equal, round, and reactive to light.   Neck: Normal range of motion. Neck supple.   Cardiovascular: Normal rate and regular rhythm.    Pulmonary/Chest: Effort normal and breath sounds normal.   Abdominal: Soft. Bowel sounds are normal.   Musculoskeletal: Normal range of motion.   Neurological: She is alert and oriented to person, place, and time. She has normal reflexes.   Skin: Skin is warm and dry.        Significant Labs: All pertinent labs within the past 24 hours have been reviewed.    Significant Imaging: I have reviewed and interpreted all pertinent imaging results/findings within the past 24  hours.

## 2017-02-12 NOTE — EKG INTERPRETATIONS - EMERGENCY DEPT.
ED EKG Interpretations:   (Independently Interpreted by the ED Physician)  Initial Reading: No STEMI.

## 2017-02-12 NOTE — ED TRIAGE NOTES
Patient states she feels weak, light headed and dizzy.  She states she was admitted in [place] for 1 night.  She states test revealed no abnormalities.

## 2017-02-12 NOTE — CONSULTS
Cardiology Consult Note  Attending Physician: Chay Holland MD  Reason for Consult: Weakness    HPI:   41 y.o. woman with history of asthma, obesity, anemia, dilated CM (LVEF 25% on 3/21/16), mitral regurgitation, pulmonary HTN, and ICD placement who presented to [facility] ED after recent discharge from [facility] on Friday. Patient was admitted for orthostatic hypotension at that time. She received IVFs and her medications were changed as it was thought that they were contributing to hypotension. ECHO was performed which showed no changes from past ECHOs. Since discharge patient has noticed continued weakness, lethargy, and then palpitations yesterday morning. Patient denies having chest pain, SOB, or LOC since discharge.     ROS:    Constitution: negative for - fever, chills, weight loss or weight gain  HENT: negative for - sore throat, rhinorrhea, or headache  Eyes: negative for - blurred or double vision  Cardiovascular: positive for - dyspnea on exertion, orthopnea, paroxysmal nocturnal dyspnea and shortness of breath  Pulmonary: positive for - shortness of breath  Gastrointestinal: negative for - abdominal pain, nausea, vomiting, or diarrhea  : negative for - dysuria  Neurological: negative for - focal weakness or sensory changes  PMH:     Past Medical History   Diagnosis Date    Asthma     Chronic back pain 7/1/2014    Chronic combined systolic and diastolic congestive heart failure 4/28/2015      2-10-17   1 - Severely depressed left ventricular systolic function (EF 20-25%).    2 - Severe left ventricular enlargement.    3 - Severe left atrial enlargement.    4 - Left ventricular diastolic dysfunction.    5 - The estimated PA systolic pressure is 18 mmHg.    6 - Mild mitral regurgitation.     Encounter for blood transfusion     ICD (implantable cardioverter-defibrillator) in place 12/01/15 3/3/2016    Menorrhagia, premenopausal 2/10/2017    Microcytic anemia 2/9/2015    Mitral regurgitation  3/5/2015    Mitral valve regurgitation     Muscle spasm of both lower legs 2014    Nonischemic dilated cardiomyopathy 2015    Obesity     Syncope and collapse 2017     Past Surgical History   Procedure Laterality Date    Tubal ligation       section      Cardiac defibrillator placement  2015     Allergies:   Review of patient's allergies indicates:  No Known Allergies  Medications:     No current facility-administered medications on file prior to encounter.      Current Outpatient Prescriptions on File Prior to Encounter   Medication Sig Dispense Refill    aspirin (ECOTRIN) 81 MG EC tablet Take 1 tablet (81 mg total) by mouth once daily. 30 tablet 12    carvedilol (COREG) 25 MG tablet Take 1 tablet (25 mg total) by mouth 2 (two) times daily with meals. 60 tablet 11    digoxin (LANOXIN) 125 mcg tablet Take 1 tablet (125 mcg total) by mouth once daily. 30 tablet 11    ferrous sulfate 325 mg (65 mg iron) Tab tablet Take 1 tablet (325 mg total) by mouth 2 (two) times daily. (Patient taking differently: Take 325 mg by mouth once daily. ) 60 tablet 1    furosemide (LASIX) 40 MG tablet Take 1 tablet (40 mg total) by mouth once daily. 30 tablet 11    spironolactone (ALDACTONE) 25 MG tablet Take 1 tablet (25 mg total) by mouth once daily. HOLD THIS UNTIL YOU SEE YOUR CARDIOLOGIST 30 tablet 11    valsartan (DIOVAN) 80 MG tablet Take 1 tablet (80 mg total) by mouth once daily. HOLD THIS UNTIL YOU SEE YOUR CARDIOLOGIST 30 tablet 12    albuterol 90 mcg/actuation inhaler Inhale 1-2 puffs into the lungs every 6 (six) hours as needed for Wheezing. 1 Inhaler 3       Inpatient Medications   Continuous Infusions:   Scheduled Meds:   ascorbic acid (vitamin C)  250 mg Oral BID    aspirin  81 mg Oral Daily    enoxaparin  40 mg Subcutaneous Daily    ferrous sulfate  325 mg Oral BID    INV lisinopril  2.5 mg Oral Daily    spironolactone  12.5 mg Oral Daily     PRN Meds:albuterol-ipratropium  2.5mg-0.5mg/3mL, dextrose 50%, dextrose 50%, glucagon (human recombinant), glucose, glucose     Social History:     Social History   Substance Use Topics    Smoking status: Never Smoker    Smokeless tobacco: Never Used    Alcohol use Yes      Comment: 1 glass per month     Family History:     Family History   Problem Relation Age of Onset    Diabetes Father      Physical Exam:     Vitals:  Temp:  [97.7 °F (36.5 °C)-98 °F (36.7 °C)]   Pulse:  [70-89]   Resp:  [16-18]   BP: (117-131)/(55-71)   SpO2:  [95 %-100 %]  on RA I/O's:    Intake/Output Summary (Last 24 hours) at 02/12/17 0751  Last data filed at 02/12/17 0602   Gross per 24 hour   Intake              240 ml   Output                0 ml   Net              240 ml        Constitutional: NAD, conversant  HEENT: Sclera anicteric, PERRLA, EOMI  Neck: No JVD, no carotid bruits  CV: RRR, no murmur, normal S1/S2  Pulm: CTAB, no wheezes, rales, or ronchi  GI: Abdomen soft, NTND, +BS  Extremities: 2+ LE edema, warm and well perfused  Skin: No ecchymosis, erythema, or ulcers  Psych: AOx3, appropriate affect  Neuro: CNII-XII intact, no focal deficits    Labs:       Recent Labs  Lab 02/09/17  2210 02/10/17  0602 02/11/17 2138    138 136   K 4.6 3.5 3.7    109 107   CO2 25 21* 22*   BUN 15 13 12   CREATININE 0.73 0.6 0.9   GLU 94 98 93   ANIONGAP 10 8 7*       Recent Labs  Lab 02/10/17  0602 02/10/17  0603 02/10/17  1207 02/11/17  2138   TROPONINI 0.219*  --  0.229* 0.226*   BNP  --  257*  --  158*      Recent Labs  Lab 02/09/17 2042 02/10/17  0602 02/11/17 2138   WBC 9.18 8.00 8.66   HGB 9.6* 9.4* 10.5*   HCT 31.0* 31.0* 33.7*    232 289       Recent Labs  Lab 02/09/17 2210 02/11/17  2138   AST 28 13   ALT 21 8*   ALKPHOS 41 41*   BILITOT 0.8 1.1*   ALBUMIN 4.0 3.7        Imaging:     EF   Date Value Ref Range Status   02/10/2017 20 (A) 55 - 65    03/21/2016 25 (A) 55 - 65    09/30/2015 20 (A) 55 - 65    06/10/2015 20 (A) 55 - 65    02/10/2015 20  (A) 55 - 65          EKG: normal sinus rhythm, no blocks or conduction defects, no ischemic changes   LVH with QRS widening and possible LAE      Assessment:   Ms. Mahajan is a 42 yo woman with hx of dilated CM (EF 20%) that presents with episodes of syncope and weakness. We were consulted for ICD interrogation and evaluation of this patient's syncopal episodes. On ICD interrogation it was found that run of  Ventricular fibrillation occured at the same time as that patient has recent syncopal episode. She was shocked but pt does not remember this.     Plan:   Recommendations:  - EP consult for evaluation of patient's Vfib  - Can begin amiodarone for Vfib prevention  - HTS consult - can be done tomorrow  - Would recommend Entresto instead of candesartan for BP control  - can restart pt's home coreg      Signed:  Brad Jo MD PGY-1

## 2017-02-12 NOTE — ASSESSMENT & PLAN NOTE
-- Was on ASA, Carvedilol, Digoxin, Lasix, Aldactone and Valsartan before initial admission to [facility]  -- As per discharge summary from 2/10, pt agreed to hold all medications for a day after discharge home, then resume carvedilol and furosemide only until she receives further recommendations from her cardiologist  -- Will hold of carvedilol and lasix until Cardiology service sees her in the AM  -- No physical exam findings to support CHF exacb  -- trops were slightly elevated, will f/u with another level in the AM  -- Recent ECHO without significant changes from previous ones

## 2017-02-12 NOTE — H&P
[facility] Hospital Medicine  History & Physical    Patient Name: Mario Mahajan  MRN: [id number]  Admission Date: 2017  Attending Physician: Chay Holland MD   Primary Care Provider: Primary Doctor [facility] Medicine Team: [facility] MED 5 DALE Drummond     Patient information was obtained from patient and ER records.     Subjective:     Principal Problem:Weakness    Chief Complaint:   Chief Complaint   Patient presents with    Weakness     hx of CHF, patient states that she was recently discharged from the hospital         HPI: 41 y.o.  female with asthma, obesity, anemia due to menorrhagia (scheduled for hysterectomy on 3/1/17), dilated cardiomyopathy with LVEF 25% (3/21/16) and ICD in place, severe MR, and pulmonary hypertension who presents to the ED because she wasn't feeling well after being discharged from [facility] [place] on Friday after she was admitted for orthostatic hypotension. The patient was admitted on  and discharged the next day. During her stay she received IVF and except for lasix and carvedilol, all other meds were stopped as they were thought to be contributing to her symptoms. She also had an ECHO which was stable. Patient reports that ever since being discharged, she continued to feel unwell and noticed some palpitation this morning. She denies having any chest pain or SOB currently but had some chest discomfort intermittently in the last few days. Patient denies LOC or syncopal attack since discharge from [facility].     Past Medical History   Diagnosis Date    Anemia     Asthma     CHF (congestive heart failure)     Encounter for blood transfusion     Mitral valve regurgitation     Obesity        Past Surgical History   Procedure Laterality Date    Tubal ligation       section      Cardiac defibrillator placement  2015       Review of patient's allergies indicates:  No Known Allergies    No current facility-administered  medications on file prior to encounter.      Current Outpatient Prescriptions on File Prior to Encounter   Medication Sig    aspirin (ECOTRIN) 81 MG EC tablet Take 1 tablet (81 mg total) by mouth once daily.    carvedilol (COREG) 25 MG tablet Take 1 tablet (25 mg total) by mouth 2 (two) times daily with meals.    digoxin (LANOXIN) 125 mcg tablet Take 1 tablet (125 mcg total) by mouth once daily.    ferrous sulfate 325 mg (65 mg iron) Tab tablet Take 1 tablet (325 mg total) by mouth 2 (two) times daily. (Patient taking differently: Take 325 mg by mouth once daily. )    furosemide (LASIX) 40 MG tablet Take 1 tablet (40 mg total) by mouth once daily.    spironolactone (ALDACTONE) 25 MG tablet Take 1 tablet (25 mg total) by mouth once daily. HOLD THIS UNTIL YOU SEE YOUR CARDIOLOGIST    valsartan (DIOVAN) 80 MG tablet Take 1 tablet (80 mg total) by mouth once daily. HOLD THIS UNTIL YOU SEE YOUR CARDIOLOGIST    albuterol 90 mcg/actuation inhaler Inhale 1-2 puffs into the lungs every 6 (six) hours as needed for Wheezing.     Family History     Problem Relation (Age of Onset)    Diabetes Father        Social History Main Topics    Smoking status: Never Smoker    Smokeless tobacco: Never Used    Alcohol use Yes      Comment: 1 glass per month    Drug use: No    Sexual activity: Yes     Partners: Male     Review of Systems   Constitutional: Positive for fatigue. Negative for chills, diaphoresis, fever and unexpected weight change.   HENT: Negative for congestion, nosebleeds, postnasal drip, rhinorrhea and sinus pressure.    Eyes: Negative for photophobia, pain and redness.   Respiratory: Positive for cough. Negative for choking, chest tightness, shortness of breath, wheezing and stridor.    Cardiovascular: Negative for chest pain, palpitations and leg swelling.   Gastrointestinal: Negative for abdominal distention, abdominal pain, blood in stool, diarrhea and nausea.   Genitourinary: Negative for dysuria and  pelvic pain.   Musculoskeletal: Negative for arthralgias, back pain and gait problem.   Neurological: Negative for seizures, light-headedness, numbness and headaches.   Psychiatric/Behavioral: Negative for agitation, behavioral problems and confusion.     Objective:     Vital Signs (Most Recent):  Temp: 98 °F (36.7 °C) (02/12/17 0024)  Pulse: 79 (02/12/17 0024)  Resp: 16 (02/12/17 0024)  BP: 118/71 (02/12/17 0024)  SpO2: 99 % (02/12/17 0024) Vital Signs (24h Range):  Temp:  [97.7 °F (36.5 °C)-98 °F (36.7 °C)] 98 °F (36.7 °C)  Pulse:  [70-89] 79  Resp:  [16-18] 16  SpO2:  [99 %-100 %] 99 %  BP: (118-131)/(60-71) 118/71     Weight: 99.8 kg (220 lb)  Body mass index is 32.49 kg/(m^2).    Physical Exam   Constitutional: She is oriented to person, place, and time. She appears well-developed and well-nourished.   HENT:   Head: Normocephalic and atraumatic.   Eyes: Conjunctivae and EOM are normal. Pupils are equal, round, and reactive to light.   Neck: Normal range of motion. Neck supple.   Cardiovascular: Normal rate and regular rhythm.    Pulmonary/Chest: Effort normal and breath sounds normal.   Abdominal: Soft. Bowel sounds are normal.   Musculoskeletal: Normal range of motion.   Neurological: She is alert and oriented to person, place, and time. She has normal reflexes.   Skin: Skin is warm and dry.        Significant Labs: All pertinent labs within the past 24 hours have been reviewed.    Significant Imaging: I have reviewed and interpreted all pertinent imaging results/findings within the past 24 hours.    Assessment/Plan:     * Weakness  -- Patient does not exhibit orthostatic changes in BP   -- No new symptoms other than palpitations and generalized weakness  -- Will consult cards for ICD interrogation  -- Neurochecks   -- TSH levels WNL  -- CT recent does not have significant findings  -- Will admit to obs for further monitoring overnight   -- Will hold off lasix and aldactone     Microcytic anemia  No active  bleeding  Will check H/H to check for possible changes from previous hospitalization    Chronic combined systolic and diastolic congestive heart failure  Mitral regurgitation  Nonischemic dilated cardiomyopathy  -- Was on ASA, Carvedilol, Digoxin, Lasix, Aldactone and Valsartan before initial admission to [facility]  -- As per discharge summary from 2/10, pt agreed to hold all medications for a day after discharge home, then resume carvedilol and furosemide only until she receives further recommendations from her cardiologist  -- Will hold of carvedilol and lasix until Cardiology service sees her in the AM  -- No physical exam findings to support CHF exacb  -- trops were slightly elevated, will f/u with another level in the AM  -- Recent ECHO without significant changes from previous ones      VTE Risk Mitigation         Ordered     heparin (porcine) injection 5,000 Units  Every 8 hours     Route:  Subcutaneous        02/12/17 0054     Medium Risk of VTE  Once      02/12/17 0054        DALE Drummond  Department of Hospital Medicine   [facility]

## 2017-02-13 VITALS
HEIGHT: 69 IN | TEMPERATURE: 98 F | DIASTOLIC BLOOD PRESSURE: 57 MMHG | WEIGHT: 220 LBS | BODY MASS INDEX: 32.58 KG/M2 | SYSTOLIC BLOOD PRESSURE: 121 MMHG | OXYGEN SATURATION: 97 % | RESPIRATION RATE: 17 BRPM | HEART RATE: 67 BPM

## 2017-02-13 PROCEDURE — 25000003 PHARM REV CODE 250: Performed by: STUDENT IN AN ORGANIZED HEALTH CARE EDUCATION/TRAINING PROGRAM

## 2017-02-13 PROCEDURE — 25000003 PHARM REV CODE 250: Performed by: INTERNAL MEDICINE

## 2017-02-13 PROCEDURE — 99217 PR OBSERVATION CARE DISCHARGE: CPT | Mod: ,,, | Performed by: INTERNAL MEDICINE

## 2017-02-13 PROCEDURE — 99214 OFFICE O/P EST MOD 30 MIN: CPT | Mod: ,,, | Performed by: INTERNAL MEDICINE

## 2017-02-13 PROCEDURE — 25000003 PHARM REV CODE 250: Performed by: HOSPITALIST

## 2017-02-13 PROCEDURE — 63600175 PHARM REV CODE 636 W HCPCS: Performed by: STUDENT IN AN ORGANIZED HEALTH CARE EDUCATION/TRAINING PROGRAM

## 2017-02-13 PROCEDURE — G0378 HOSPITAL OBSERVATION PER HR: HCPCS

## 2017-02-13 RX ORDER — AMIODARONE HYDROCHLORIDE 400 MG/1
TABLET ORAL
Qty: 12 TABLET | Refills: 0 | Status: SHIPPED | OUTPATIENT
Start: 2017-02-13 | End: 2017-02-13

## 2017-02-13 RX ORDER — FERROUS SULFATE 325(65) MG
TABLET, DELAYED RELEASE (ENTERIC COATED) ORAL
Qty: 60 TABLET | Refills: 3 | COMMUNITY
Start: 2017-02-13 | End: 2023-08-21

## 2017-02-13 RX ORDER — FUROSEMIDE 40 MG/1
40 TABLET ORAL DAILY PRN
Qty: 30 TABLET | Refills: 3 | Status: SHIPPED | OUTPATIENT
Start: 2017-02-13 | End: 2017-02-20 | Stop reason: SDUPTHER

## 2017-02-13 RX ORDER — CANDESARTAN 4 MG/1
4 TABLET ORAL DAILY
Qty: 30 TABLET | Refills: 3 | Status: SHIPPED | OUTPATIENT
Start: 2017-02-13 | End: 2017-02-20 | Stop reason: SDUPTHER

## 2017-02-13 RX ORDER — AMIODARONE HYDROCHLORIDE 200 MG/1
TABLET ORAL
Qty: 30 TABLET | Refills: 3 | OUTPATIENT
Start: 2017-02-26 | End: 2017-02-13

## 2017-02-13 RX ORDER — CARVEDILOL 3.12 MG/1
3.12 TABLET ORAL 2 TIMES DAILY
Qty: 60 TABLET | Refills: 3 | Status: SHIPPED | OUTPATIENT
Start: 2017-02-13 | End: 2017-02-20 | Stop reason: SDUPTHER

## 2017-02-13 RX ORDER — AMIODARONE HYDROCHLORIDE 400 MG/1
TABLET ORAL
Qty: 12 TABLET | Refills: 0 | Status: SHIPPED | OUTPATIENT
Start: 2017-02-13 | End: 2017-02-20 | Stop reason: SDUPTHER

## 2017-02-13 RX ORDER — GUAIFENESIN 100 MG/5ML
200 SOLUTION ORAL ONCE
Status: COMPLETED | OUTPATIENT
Start: 2017-02-13 | End: 2017-02-13

## 2017-02-13 RX ORDER — SPIRONOLACTONE 25 MG/1
12.5 TABLET ORAL DAILY
Qty: 15 TABLET | Refills: 3 | Status: SHIPPED | OUTPATIENT
Start: 2017-02-13 | End: 2017-02-20 | Stop reason: SDUPTHER

## 2017-02-13 RX ORDER — ONDANSETRON 4 MG/1
4 TABLET, FILM COATED ORAL ONCE
Status: COMPLETED | OUTPATIENT
Start: 2017-02-13 | End: 2017-02-13

## 2017-02-13 RX ORDER — AMIODARONE HYDROCHLORIDE 200 MG/1
TABLET ORAL
Qty: 30 TABLET | Refills: 3 | OUTPATIENT
Start: 2017-02-26 | End: 2017-02-20 | Stop reason: SDUPTHER

## 2017-02-13 RX ORDER — ASCORBIC ACID 250 MG
TABLET ORAL
Qty: 60 TABLET | Refills: 3 | COMMUNITY
Start: 2017-02-13 | End: 2023-08-21

## 2017-02-13 RX ADMIN — ONDANSETRON 4 MG: 4 TABLET, FILM COATED ORAL at 12:02

## 2017-02-13 RX ADMIN — ASCORBIC ACID TAB 250 MG 250 MG: 250 TAB at 08:02

## 2017-02-13 RX ADMIN — CANDESARTAN CILEXETIL 4 MG: 4 TABLET ORAL at 08:02

## 2017-02-13 RX ADMIN — ASPIRIN 81 MG: 81 TABLET, COATED ORAL at 08:02

## 2017-02-13 RX ADMIN — GUAIFENESIN 200 MG: 100 SOLUTION ORAL at 12:02

## 2017-02-13 RX ADMIN — CARVEDILOL 3.12 MG: 3.12 TABLET, FILM COATED ORAL at 08:02

## 2017-02-13 RX ADMIN — SODIUM FERRIC GLUCONATE COMPLEX 125 MG: 12.5 INJECTION INTRAVENOUS at 12:02

## 2017-02-13 RX ADMIN — AMIODARONE HYDROCHLORIDE 400 MG: 200 TABLET ORAL at 08:02

## 2017-02-13 RX ADMIN — FERROUS SULFATE TAB EC 325 MG (65 MG FE EQUIVALENT) 325 MG: 325 (65 FE) TABLET DELAYED RESPONSE at 08:02

## 2017-02-13 RX ADMIN — SPIRONOLACTONE 12.5 MG: 25 TABLET, FILM COATED ORAL at 08:02

## 2017-02-13 NOTE — PROGRESS NOTES
[facility] Hospital Medicine  Progress Note    Patient Name: Mario Mahajan  MRN: [id number]  Patient Class: OP- Observation   Admission Date: 2/11/2017  Length of Stay: 0 days  Attending Physician: Chay Holland MD  Primary Care Provider: Primary Doctor [facility] Medicine Team: [facility] HOSP MED 5 Dixon Harris MD    Subjective:     Principal Problem:Chronic combined systolic and diastolic congestive heart failure    HPI:  41 y.o.  female with asthma, obesity, anemia due to menorrhagia (scheduled for hysterectomy on 3/1/17), dilated cardiomyopathy with LVEF 25% (3/21/16) and ICD in place, severe MR, and pulmonary hypertension who presents to the ED because she wasn't feeling well after being discharged from [facility] on Friday after she was admitted for orthostatic hypotension. The patient was admitted on 2/9 and discharged the next day. During her stay she received IVF and except for lasix and carvedilol, all other meds were stopped as they were thought to be contributing to her symptoms. She also had an ECHO which was stable. Patient reports that ever since being discharged, she continued to feel unwell and noticed some palpitation this morning. She denies having any chest pain or SOB currently but had some chest discomfort intermittently in the last few days. Patient denies LOC or syncopal attack since discharge from [facility].     Hospital Course:  Patient had medications held on presentation due to concern they may be contributing to symptoms. Cardiology consulted and they evaluated AICD, discovering patient had some runs of VF and Vtach over the last few days. Amiodarone started for rhythm control. Medications reintroduced slowly at lower dosages.    Interval History: Patient feels somewhat better today. States that she has had improvement in the lightheadedness though she still has stretches where she feels like she will pass out. Denies fevers, chills, chest pain,  vomiting, abdominal pain, or diarrhea. No other complaints.    Review of Systems   Constitutional: Positive for fatigue. Negative for chills, diaphoresis, fever and unexpected weight change.   HENT: Negative for congestion, nosebleeds, postnasal drip, rhinorrhea and sinus pressure.    Eyes: Negative for photophobia, pain and redness.   Respiratory: Positive for cough. Negative for choking, chest tightness, shortness of breath, wheezing and stridor.    Cardiovascular: Negative for chest pain, palpitations and leg swelling.   Gastrointestinal: Negative for abdominal distention, abdominal pain, blood in stool, diarrhea and nausea.   Genitourinary: Negative for dysuria and pelvic pain.   Musculoskeletal: Negative for arthralgias, back pain and gait problem.   Neurological: Negative for seizures, light-headedness, numbness and headaches.   Psychiatric/Behavioral: Negative for agitation, behavioral problems and confusion.     Objective:     Vital Signs (Most Recent):  Temp: 98.4 °F (36.9 °C) (02/13/17 0732)  Pulse: 67 (02/13/17 0732)  Resp: 17 (02/13/17 0732)  BP: (!) 121/57 (02/13/17 0732)  SpO2: 97 % (02/13/17 0732) Vital Signs (24h Range):  Temp:  [97.4 °F (36.3 °C)-98.9 °F (37.2 °C)] 98.4 °F (36.9 °C)  Pulse:  [67-90] 67  Resp:  [16-18] 17  SpO2:  [96 %-100 %] 97 %  BP: (112-129)/(53-67) 121/57     Weight: 99.8 kg (220 lb)  Body mass index is 32.49 kg/(m^2).  No intake or output data in the 24 hours ending 02/13/17 0912   Physical Exam   Constitutional: She is oriented to person, place, and time. She appears well-developed and well-nourished.   HENT:   Head: Normocephalic and atraumatic.   Eyes: Conjunctivae and EOM are normal. Pupils are equal, round, and reactive to light.   Neck: Normal range of motion. Neck supple.   Cardiovascular: Normal rate and regular rhythm.    Pulmonary/Chest: Effort normal and breath sounds normal.   Abdominal: Soft. Bowel sounds are normal.   Musculoskeletal: Normal range of motion.    Neurological: She is alert and oriented to person, place, and time. She has normal reflexes.   Skin: Skin is warm and dry.       Significant Labs:   No results found for this or any previous visit (from the past 24 hour(s)).     Significant Imaging:   Imaging Results         X-Ray Chest AP Portable (Final result) Result time:  02/11/17 21:28:02    Final result by Per Zaragoza MD (02/11/17 21:28:02)    Impression:        Grossly stable chest as above without radiographic acute intrathoracic process seen on this single view.      Electronically signed by: PER ZARAGOZA MD, MD  Date:     02/11/17  Time:    21:28     Narrative:    COMPARISON: Chest regressed 2/9/17    FINDINGS: AP portable view of the chest. Left upper chest single lead cardiac device is stable. Cardiac silhouette remains enlarged similar to prior, without evidence of failure. Mediastinal contours are within normal limits. The lungs are symmetrically normally inflated and clear. No pleural effusion or pneumothorax. No acute osseous process seen.                Assessment/Plan:      * Chronic combined systolic and diastolic congestive heart failure  -- Was on ASA, Carvedilol, Digoxin, Lasix, Aldactone and Valsartan before initial admission to [facility]  -- As per discharge summary from 2/10, pt agreed to hold all medications for a day after discharge home, then resume carvedilol and furosemide only until she receives further recommendations from her cardiologist  -- Will hold of carvedilol and lasix until Cardiology service sees her in the AM  -- No physical exam findings to support CHF exacb  -- trops were slightly elevated, will f/u with another level in the AM  -- Recent ECHO without significant changes from previous ones  -- Cardiology consulted, recommended restarting carvedilol at a lower dose and amiodarone for arrhythmia control    Microcytic anemia  - No active bleeding  - Will check H/H to check for possible changes from previous  hospitalization      Mitral regurgitation  - Documented on most recent ECHO  - Cardiology currently consulted, appreciate recs  - Continue to monitor symptoms       Nonischemic dilated cardiomyopathy  - Cardiology consulted, recommend patient be placed back on home meds as symptoms improve, particularly carvedilol  - Also recommended amiodarone 400 mg bid for 2 weeks, followed by amiodarone 200 mg qd     Menorrhagia, premenopausal  - Scheduled for hysterectomy on 3/1/17  - Outpatient follow-up with OB/Gyn  - Iron supplementation (ferrous gluconate 325 mg bid, vitamin C 250 mg bid)  - One dose of IV iron today      Weakness  -- Patient does not exhibit orthostatic changes in BP   -- No new symptoms other than palpitations and generalized weakness  -- TSH levels WNL  -- CT recent does not have significant findings  -- Cardiology interrogated ICD, saw some evidence of recent V. Fib and V tach, patient started on amiodarone     Ventricular tachycardia, polymorphic  -- Amiodarone per cardiology      VTE Risk Mitigation         Ordered     enoxaparin injection 40 mg  Daily     Route:  Subcutaneous        02/12/17 0648     Medium Risk of VTE  Once      02/12/17 1133     Place SHAMAR hose  Until discontinued      02/12/17 1133     Place sequential compression device  Until discontinued      02/12/17 1133          Dixon Harris MD  Department of Hospital Medicine   [facility]

## 2017-02-13 NOTE — PROGRESS NOTES
Electrophysiology Progress Note  Attending Physician: Chay Holland MD  Hospital Day: 3    Subjective:   Interval History: No events overnight. Denies any chest pain, shortness of breath, or palpitations.    Telemetry with NSR, rates 80's to 90's.    Medications:   Continuous Infusions:     Scheduled Meds:   amiodarone  400 mg Oral BID    ascorbic acid (vitamin C)  250 mg Oral BID    aspirin  81 mg Oral Daily    candesartan  4 mg Oral Daily    carvedilol  3.125 mg Oral BID    enoxaparin  40 mg Subcutaneous Daily    ferrous sulfate  325 mg Oral BID    spironolactone  12.5 mg Oral Daily     PRN Meds:albuterol-ipratropium 2.5mg-0.5mg/3mL, dextrose 50%, dextrose 50%, glucagon (human recombinant), glucose, glucose  Objective:     Vitals:  Temp:  [97.4 °F (36.3 °C)-98.9 °F (37.2 °C)]   Pulse:  [67-96]   Resp:  [16-18]   BP: (112-129)/(53-67)   SpO2:  [96 %-100 %]  I/O's:  No intake or output data in the 24 hours ending 02/13/17 0847     Constitutional: NAD, conversant  HEENT: Sclera anicteric, PERRLA, EOMI  Neck: No JVD, no carotid bruits  CV: RRR, no murmur, normal S1/S2  Pulm: CTAB, no wheezes, rales, or ronchi  GI: Abdomen soft, NTND, +BS  Extremities: No LE edema, warm and well perfused  Skin: No ecchymosis, erythema, or ulcers  Psych: AOx3, appropriate affect  Neuro: CNII-XII intact, no focal deficits    Labs:       Recent Labs  Lab 02/09/17  2210 02/10/17  0602 02/11/17  2138    138 136   K 4.6 3.5 3.7    109 107   CO2 25 21* 22*   BUN 15 13 12   CREATININE 0.73 0.6 0.9   GLU 94 98 93   ANIONGAP 10 8 7*       Recent Labs  Lab 02/09/17 2210 02/11/17  2138   AST 28 13   ALT 21 8*   ALKPHOS 41 41*   BILITOT 0.8 1.1*   ALBUMIN 4.0 3.7        Recent Labs  Lab 02/09/17  2042 02/10/17  0602 02/11/17  2138   WBC 9.18 8.00 8.66   HGB 9.6* 9.4* 10.5*   HCT 31.0* 31.0* 33.7*    232 289   GRAN 58.8  5.4 60.3  4.8 54.9  4.8       Recent Labs  Lab 02/11/17  2138   INR 1.0       Recent Labs  Lab  02/10/17  0602 02/10/17  0603 02/10/17  1207 02/11/17  2138   TROPONINI 0.219*  --  0.229* 0.226*   BNP  --  257*  --  158*      Micro:   Blood Cultures  No results found for: LABBLOO  Urine Cultures  No results found for: LABURIN    Imaging:   TTE (2/10/2017)  Technical Quality: This is a technically adequate study.     Aorta: The aortic root is normal in size, measuring 2.5 cm at sinotubular junction.     Left Atrium: The left atrial volume index is severely enlarged, measuring 80.20 cc/m2.     Left Ventricle: The left ventricle is severely enlarged, with an end-diastolic diameter of 7.2 cm, and an end-systolic diameter of 6.8 cm. LV wall thickness is normal, with the septum measuring 0.8 cm and the posterior wall measuring 1.2 cm across. Relative wall thickness was normal at 0.33, and the LV mass index was increased at 186.3 g/m2 consistent with eccentric left ventricular hypertrophy. Global left ventricular systolic function appears severely depressed. Visually estimated ejection fraction is 20-25%. The LV Doppler derived stroke volume equals 31.0 ccs. The E/e'(lat) is 21, consistent with significant diastolic dysfunction.     Right Atrium: The right atrium is normal in size, measuring 4.7 cm in length in the apical view.     Right Ventricle: The right ventricle is normal in size measuring 2.1 cm at the base in the apical right ventricle-focused view. Global right ventricular systolic function appears normal. The estimated PA systolic pressure is 18 mmHg.     Aortic Valve:  The aortic valve is normal in structure with normal leaflet mobility. The aortic valve is tri-leaflet in structure.     Mitral Valve:  The mitral valve is normal in structure with normal leaflet mobility. There is mild mitral regurgitation.     Tricuspid Valve:  The tricuspid valve is normal in structure with normal leaflet mobility. There is trivial tricuspid regurgitation.     Pulmonary Valve:  The pulmonic valve is not well seen.     IVC:  IVC is normal in size and collapses > 50% with a sniff, suggesting normal right atrial pressure of 3 mmHg.     Intracavitary: There is no evidence of pericardial effusion, intracavity mass, thrombi, or vegetation.     CONCLUSIONS     1 - Severely depressed left ventricular systolic function (EF 20-25%).     2 - Severe left ventricular enlargement.     3 - Severe left atrial enlargement.     4 - Left ventricular diastolic dysfunction.     5 - The estimated PA systolic pressure is 18 mmHg.     6 - Mild mitral regurgitation.    EF   Date Value Ref Range Status   02/10/2017 20 (A) 55 - 65    03/21/2016 25 (A) 55 - 65    09/30/2015 20 (A) 55 - 65    06/10/2015 20 (A) 55 - 65    02/10/2015 20 (A) 55 - 65      EKG: NSR, LVH, TWI's in lateral leads     Telemetry: normal sinus rhythm, no blocks or conduction defects, no ischemic changes    Assessment:   41 y.o. woman with PMH of HFrEF 2/2 DCM s/p AICD (SJM single lead implanted 12/1/2015), severe MR, pulmonary HTN, asthma, obesity, who was admitted to [facility] on 2/11/2017 for a recent syncopal event.     Interrogation of her device showed 3 polymorphic VF episodes over the past week.   Episode 1 on 2/5/2017 not requiring shock.  Episode 2 on 2/9/2017 at 7:37 PM requiring a VF DF shock.  Episode 3 on 2/10/2017 not requiring shock.  Several NSVT episodes since her last interrogation on 12/12/2016 (15 total).  Episode of SVT at 164 bpm on 12/17/2016 requiring no shocks that lasted 117 seconds.     Syncopal event on 2/9/2017 at 7:37 PM coincides with a VF episode and DF shock.    Plan:   - Continue to load with amiodarone 400 mg PO BID x 2 weeks, then 200mg PO Daily after  - Patient euvolemic on exam today  - Would also restart Coreg when B/P allows, as well as digoxin  - Discussed with patient, no driving for 6 months     Patient seen and examined this morning. Thank you for the opportunity to participate in the care of this interesting patient. Discussed with   Jasvir.    Signed:  Markell Asher MD  Cardiology Fellow - PGY4  Pager: [phone]  2/13/2017 8:47 AM

## 2017-02-13 NOTE — PROGRESS NOTES
Dr Ivey came and assess patient .after listening to patient state concerns about how she felt. Md explain to patient right now there appear to be no changes.but will give medicine for nausea and will let her day shift team know about this in the morning.Dr Ivey looked at current telemetry stripts ans well as old one and stated no changes was noted

## 2017-02-13 NOTE — SUBJECTIVE & OBJECTIVE
Interval History: Patient feels somewhat better today. States that she has had improvement in the lightheadedness though she still has stretches where she feels like she will pass out. Denies fevers, chills, chest pain, vomiting, abdominal pain, or diarrhea. No other complaints.    Review of Systems   Constitutional: Positive for fatigue. Negative for chills, diaphoresis, fever and unexpected weight change.   HENT: Negative for congestion, nosebleeds, postnasal drip, rhinorrhea and sinus pressure.    Eyes: Negative for photophobia, pain and redness.   Respiratory: Positive for cough. Negative for choking, chest tightness, shortness of breath, wheezing and stridor.    Cardiovascular: Negative for chest pain, palpitations and leg swelling.   Gastrointestinal: Negative for abdominal distention, abdominal pain, blood in stool, diarrhea and nausea.   Genitourinary: Negative for dysuria and pelvic pain.   Musculoskeletal: Negative for arthralgias, back pain and gait problem.   Neurological: Negative for seizures, light-headedness, numbness and headaches.   Psychiatric/Behavioral: Negative for agitation, behavioral problems and confusion.     Objective:     Vital Signs (Most Recent):  Temp: 98.4 °F (36.9 °C) (02/13/17 0732)  Pulse: 67 (02/13/17 0732)  Resp: 17 (02/13/17 0732)  BP: (!) 121/57 (02/13/17 0732)  SpO2: 97 % (02/13/17 0732) Vital Signs (24h Range):  Temp:  [97.4 °F (36.3 °C)-98.9 °F (37.2 °C)] 98.4 °F (36.9 °C)  Pulse:  [67-90] 67  Resp:  [16-18] 17  SpO2:  [96 %-100 %] 97 %  BP: (112-129)/(53-67) 121/57     Weight: 99.8 kg (220 lb)  Body mass index is 32.49 kg/(m^2).  No intake or output data in the 24 hours ending 02/13/17 0912   Physical Exam   Constitutional: She is oriented to person, place, and time. She appears well-developed and well-nourished.   HENT:   Head: Normocephalic and atraumatic.   Eyes: Conjunctivae and EOM are normal. Pupils are equal, round, and reactive to light.   Neck: Normal range of  motion. Neck supple.   Cardiovascular: Normal rate and regular rhythm.    Pulmonary/Chest: Effort normal and breath sounds normal.   Abdominal: Soft. Bowel sounds are normal.   Musculoskeletal: Normal range of motion.   Neurological: She is alert and oriented to person, place, and time. She has normal reflexes.   Skin: Skin is warm and dry.       Significant Labs:   No results found for this or any previous visit (from the past 24 hour(s)).     Significant Imaging:   Imaging Results         X-Ray Chest AP Portable (Final result) Result time:  02/11/17 21:28:02    Final result by Per Zaragoza MD (02/11/17 21:28:02)    Impression:        Grossly stable chest as above without radiographic acute intrathoracic process seen on this single view.      Electronically signed by: PER ZARAGOZA MD, MD  Date:     02/11/17  Time:    21:28     Narrative:    COMPARISON: Chest regressed 2/9/17    FINDINGS: AP portable view of the chest. Left upper chest single lead cardiac device is stable. Cardiac silhouette remains enlarged similar to prior, without evidence of failure. Mediastinal contours are within normal limits. The lungs are symmetrically normally inflated and clear. No pleural effusion or pneumothorax. No acute osseous process seen.

## 2017-02-13 NOTE — ASSESSMENT & PLAN NOTE
- Cardiology consulted, recommend patient be placed back on home meds as symptoms improve, particularly carvedilol  - Also recommended amiodarone 400 mg bid for 2 weeks, followed by amiodarone 200 mg qd

## 2017-02-13 NOTE — CONSULTS
Admission Medication Reconciliation - Pharmacy Consult Note    The home medication history was taken by Angelina Wisdom.  Based on information gathered and subsequent review by the clinical pharmacist, the items below may need attention.     Potential issues to be addressed PRIOR TO DISCHARGE  Cardiology has suggested patient may benefit from Entresto. Based on her [facility] coverage, the patient will likely have a quanity limit. Would suggest sending Rx downstairs to run on insurance and ok price with patient/clarify the quanity limit, before changing regimen.     Potentially problematic discrepancies with current MAR  o Patient IS taking the following which was not ordered upon admit  o Digoxin 125mcg once daily - held on admission  o Furosemide 40mg once daily - held on admission  o Coreg 25mg twice daily - held on admission   o Patient is taking a DIFFERENT DRUG than that ordered upon admit  o Valsartan 80mg once daily - Patient receiving candesartan currently  o Patient is taking a drug DIFFERENTLY than how ordered upon admit  o Aldactone 25mg once daily - New dose, patient indicates never picked up yet.      Karla Tompkins, PharmD.   [id number]

## 2017-02-13 NOTE — PLAN OF CARE
02/13/17 0935   Discharge Assessment   Assessment Type Discharge Planning Assessment   Confirmed/corrected address and phone number on facesheet? Yes   Assessment information obtained from? Patient   Expected Length of Stay (days) 2   Communicated expected length of stay with patient/caregiver yes   Type of Healthcare Directive Received Durable power of  for health care   Prior to hospitilization cognitive status: Alert/Oriented   Prior to hospitalization functional status: Independent   Current cognitive status: Alert/Oriented   Current Functional Status: Independent   Arrived From home or self-care   Lives With child(jason), dependent   Able to Return to Prior Arrangements yes   Is patient able to care for self after discharge? Yes   How many people do you have in your home that can help with your care after discharge? 1   Who are your caregiver(s) and their phone number(s)? (Mario Wade, mother, [phone])   Patient's perception of discharge disposition home or selfcare   Readmission Within The Last 30 Days no previous admission in last 30 days   Patient currently being followed by outpatient case management? No   Patient currently receives home health services? No   Does the patient currently use HME? No   Patient currently receives private duty nursing? N/A   Patient currently receives any other outside agency services? No   Equipment Currently Used at Home none   Do you have any problems affording any of your prescribed medications? No   Is the patient taking medications as prescribed? yes   Do you have any financial concerns preventing you from receiving the healthcare you need? No   Does the patient have transportation to healthcare appointments? Yes   Transportation Available car;family or friend will provide   On Dialysis? No   Does the patient receive services at the Coumadin Clinic? No   Are there any open cases? No   Discharge Plan A Home   Discharge Plan B Home;Home with family    Patient/Family In Agreement With Plan yes     Primary Doctor No      [id number] - [place] - [address] AT [facility] [address]  [address]  [place] [address]  Phone: [phone] Fax: [phone]      Payor: BLUE CROSS BLUE [facility] / Plan: [place] PPO / Product Type: PPO /

## 2017-02-13 NOTE — PHARMACY MED REC
"MedMined Medication Reconciliation  Template    Patient was admitted on 2/9/2017 for Orthostatic syncope.      Patient's prior to admission medication regimen was as follows:  Prescriptions Prior to Admission   Medication Sig Dispense Refill Last Dose    albuterol 90 mcg/actuation inhaler Inhale 1-2 puffs into the lungs every 6 (six) hours as needed for Wheezing. Rescue       aspirin (ECOTRIN) 81 MG EC tablet Take 1 tablet (81 mg total) by mouth once daily. 30 tablet 12 2/11/2017    [DISCONTINUED] carvedilol (COREG) 25 MG tablet Take 1 tablet (25 mg total) by mouth 2 (two) times daily with meals. 60 tablet 11 2/11/2017    [DISCONTINUED] digoxin (LANOXIN) 125 mcg tablet Take 1 tablet (125 mcg total) by mouth once daily. 30 tablet 11 2/10/2017    [DISCONTINUED] ferrous sulfate 325 mg (65 mg iron) Tab tablet Take 1 tablet (325 mg total) by mouth 2 (two) times daily. (Patient taking differently: Take 325 mg by mouth once daily. ) 60 tablet 1 2/11/2017    [DISCONTINUED] furosemide (LASIX) 40 MG tablet Take 1 tablet (40 mg total) by mouth once daily. 30 tablet 11 2/11/2017    [DISCONTINUED] valsartan (DIOVAN) 80 MG tablet Take 1 tablet (80 mg total) by mouth once daily. HOLD THIS UNTIL YOU SEE YOUR CARDIOLOGIST 30 tablet 12 2/10/2017    [DISCONTINUED] spironolactone (ALDACTONE) 25 MG tablet Take 1 tablet (25 mg total) by mouth once daily. HOLD THIS UNTIL YOU SEE YOUR CARDIOLOGIST 30 tablet 11 Unknown at Unknown time         Please add appropriate    SmartPhrase below:      Admission Medication Reconciliation - Pharmacy Consult Note    The home medication history was taken by Leonor Reynoso CPHT.  Based on information gathered and subsequent review by the clinical pharmacist, the items below may need attention.    You may go to "Admission" then "Reconcile Home Medications" tabs to review and/or act upon these items.        No issues noted with the medication reconciliation.        Potential issues to be " addressed PRIOR TO DISCHARGE  None  Please address this information as you see fit.  Feel free to contact us if you have any questions or require assistance.    Jozef Restrepo  [phone]

## 2017-02-13 NOTE — PLAN OF CARE
02/13/17 1128   Final Note   Assessment Type Final Discharge Note   Discharge Disposition Home   Discharge planning education complete? Yes   Hospital Follow Up  Appt(s) scheduled? Yes  (Ambulatory Referral in for heart failure clinic followup)   Discharge plans and expectations educations in teach back method with documentation complete? Yes   Offered [facility] -- Bedside Delivery? n/a   Schedule Hospital follow up within 4-7 days   Discharge/Hospital Encounter Summary to (non-[facility]) PCP n/a   Referral to Outpatient Case Management complete? n/a   Referral to / orders for Home Health Complete? n/a   30 day supply of medicines given at discharge, if documented non-compliance / non-adherence? n/a   Any social issues identified prior to discharge? No   Did you assess the readiness or willingness of the family or caregiver to support self management of care? Yes     Future Appointments  Date Time Provider Department Center   2/20/2017 3:00 PM PHYSICIAN, PRIORITY CLINIC [facility] IMPRICL [facility]   2/23/2017 10:00 AM Victorino Rose MD [facility] OBGYN [place] [facility]   2/23/2017 11:00 AM PRE-ADMIT [facility] PREADMT [facility]   3/3/2017 12:00 PM LAB, APPOINTMENT [facility] LAB VNP [facility]   3/3/2017 2:00 PM Nereida Hatch MD [facility] HEARTTX [facility]   3/21/2017 8:30 AM EKG, APPT [facility] EKG [facility]   3/21/2017 9:00 AM PACEMAKER, ICD [facility] ARRHYTH [facility]   3/21/2017 9:40 AM Victorino Tay MD [facility] ARRHYTH [facility]

## 2017-02-13 NOTE — ASSESSMENT & PLAN NOTE
-- Was on ASA, Carvedilol, Digoxin, Lasix, Aldactone and Valsartan before initial admission to [facility]  -- As per discharge summary from 2/10, pt agreed to hold all medications for a day after discharge home, then resume carvedilol and furosemide only until she receives further recommendations from her cardiologist  -- Will hold of carvedilol and lasix until Cardiology service sees her in the AM  -- No physical exam findings to support CHF exacb  -- trops were slightly elevated, will f/u with another level in the AM  -- Recent ECHO without significant changes from previous ones  -- Cardiology consulted, recommended restarting carvedilol at a lower dose and amiodarone for arrhythmia control

## 2017-02-13 NOTE — PROGRESS NOTES
Pt slept the rest of shift  after complain earlier of chest tightness. VSS. No more complaint voiced

## 2017-02-13 NOTE — ASSESSMENT & PLAN NOTE
- No active bleeding  - Will check H/H to check for possible changes from previous hospitalization

## 2017-02-13 NOTE — ASSESSMENT & PLAN NOTE
- Documented on most recent ECHO  - Cardiology currently consulted, appreciate recs  - Continue to monitor symptoms

## 2017-02-13 NOTE — ASSESSMENT & PLAN NOTE
-- Was on ASA, Carvedilol, Digoxin, Lasix, Aldactone and Valsartan before initial admission to [facility]  -- As per discharge summary from 2/10, pt agreed to hold all medications for a day after discharge home, then resume carvedilol and furosemide only until she receives further recommendations from her cardiologist  -- Will hold of carvedilol and lasix until Cardiology service sees her in the AM  -- No physical exam findings to support CHF exacb  -- trops were slightly elevated, will f/u with another level in the AM  -- Recent ECHO without significant changes from previous ones  -- Cardiology consulted, recommended restarting carvedilol at a lower dose and amiodarone for arrhythmia control

## 2017-02-13 NOTE — PLAN OF CARE
CM in to see pt this am AAOx4, pleasant.  Dr. Tay, Cardiology, also in to see pt at this time discussing future plan to follow with Heart Transplant/Heart Failure service for possible transplant/LVAD.  Pt tearful but understands future with her medical condition.  Pt with transportation and ambulatory referrals for followup.  Will continue to follow.

## 2017-02-13 NOTE — PROGRESS NOTES
Patient request to have her nurse come to room . Upon entering room pt complain of feeling nauseated, tight chest,shortness of breath. Heart palpitations. SR noted on the monitor rate 77.Audible heart rythym reg noted .VItal signs obtained WNL.She request to see a doctor. After Vital signs was taken immediately call on call MD. I spoke with Dr Ivey who stated he will be here soon to see the patient..

## 2017-02-13 NOTE — DISCHARGE SUMMARY
[facility] Hospital Medicine  Discharge Summary      Patient Name: Mario Mahajan  MRN: [id number]  Admission Date: 2/11/2017  Hospital Length of Stay: 0 days  Discharge Date and Time:  02/13/2017 11:29 AM  Attending Physician: Chay Holland MD   Discharging Provider: Dixon Harris MD  Primary Care Provider: [facility] Medicine Team: [facility] HOSP MED 5 Dixon Harris MD    HPI:   41 y.o.  female with asthma, obesity, anemia due to menorrhagia (scheduled for hysterectomy on 3/1/17), dilated cardiomyopathy with LVEF 25% (3/21/16) and ICD in place, severe MR, and pulmonary hypertension who presents to the ED because she wasn't feeling well after being discharged from [facility] on Friday after she was admitted for orthostatic hypotension. The patient was admitted on 2/9 and discharged the next day. During her stay she received IVF and except for lasix and carvedilol, all other meds were stopped as they were thought to be contributing to her symptoms. She also had an ECHO which was stable. Patient reports that ever since being discharged, she continued to feel unwell and noticed some palpitation this morning. She denies having any chest pain or SOB currently but had some chest discomfort intermittently in the last few days. Patient denies LOC or syncopal attack since discharge from [facility].     * No surgery found *      Indwelling Lines/Drains at time of discharge:   Lines/Drains/Airways          No matching active lines, drains, or airways        Hospital Course:   Patient had medications held on presentation due to concern they may be contributing to symptoms. Cardiology consulted and they evaluated AICD, discovering patient had some runs of VF and Vtach over the last few days. Amiodarone started for rhythm control. Medications reintroduced slowly at lower dosages.     Consults:   Consults         Status Ordering Provider     Inpatient consult to Cardiology  Once      Provider:  (Not yet assigned)    Final result JOMAR WOODALL     Inpatient consult to Electrophysiology  Once     Provider:  (Not yet assigned)    Final result JOMAR FLYNN     Inpatient consult to Heart Transplant  Once     Provider:  (Not yet assigned)    Acknowledged EMELYN CARRIZALES          Significant Diagnostic Studies: Labs:   CMP   Recent Labs  Lab 02/11/17 2138      K 3.7      CO2 22*   GLU 93   BUN 12   CREATININE 0.9   CALCIUM 8.9   PROT 7.9   ALBUMIN 3.7   BILITOT 1.1*   ALKPHOS 41*   AST 13   ALT 8*   ANIONGAP 7*   ESTGFRAFRICA >60.0   EGFRNONAA >60.0    and CBC   Recent Labs  Lab 02/11/17 2138   WBC 8.66   HGB 10.5*   HCT 33.7*          Pending Diagnostic Studies:     None        Final Active Diagnoses:    Diagnosis Date Noted POA    PRINCIPAL PROBLEM:  Chronic combined systolic and diastolic congestive heart failure [I50.42] 04/28/2015 Yes    ICD (implantable cardioverter-defibrillator) discharge [Z45.02]  Not Applicable    Ventricular tachycardia, polymorphic [I47.2]  Yes    Weakness [R53.1] 02/11/2017 Yes    Menorrhagia, premenopausal [N92.4] 02/10/2017 Yes     Chronic    ICD (implantable cardioverter-defibrillator) in place 12/01/15 [Z95.810] 03/03/2016 Yes     Chronic    Nonischemic dilated cardiomyopathy [I42.9] 12/01/2015 Yes     Chronic    Mitral regurgitation [I34.0] 03/05/2015 Yes     Chronic    Microcytic anemia [D50.9] 02/09/2015 Yes     Chronic      Problems Resolved During this Admission:    Diagnosis Date Noted Date Resolved POA      * Chronic combined systolic and diastolic congestive heart failure  -- Was on ASA, Carvedilol, Digoxin, Lasix, Aldactone and Valsartan before initial admission to [facility]  -- As per discharge summary from 2/10, pt agreed to hold all medications for a day after discharge home, then resume carvedilol and furosemide only until she receives further recommendations from her cardiologist  -- Will hold of carvedilol and lasix  until Cardiology service sees her in the AM  -- No physical exam findings to support CHF exacb  -- trops were slightly elevated, will f/u with another level in the AM  -- Recent ECHO without significant changes from previous ones  -- Cardiology consulted, recommended restarting carvedilol at a lower dose and amiodarone for arrhythmia control    Microcytic anemia  - No active bleeding  - Will check H/H to check for possible changes from previous hospitalization  - 1 time dose of IV iron  - Ferrous gluconate 325 mg bid  - Vitamin C 250 mg bid with iron      Mitral regurgitation  - Documented on most recent ECHO  - Cardiology currently consulted, appreciate recs  - Continue to monitor symptoms       Nonischemic dilated cardiomyopathy  - Cardiology consulted, recommend patient be placed back on home meds as symptoms improve, particularly carvedilol  - Also recommended amiodarone 400 mg bid for 2 weeks, followed by amiodarone 200 mg qd     Menorrhagia, premenopausal  - Scheduled for hysterectomy on 3/1/17  - Outpatient follow-up with OB/Gyn  - Iron supplementation (ferrous gluconate 325 mg bid, vitamin C 250 mg bid)  - One dose of IV iron today      Weakness  -- Patient does not exhibit orthostatic changes in BP   -- No new symptoms other than palpitations and generalized weakness  -- TSH levels WNL  -- CT recent does not have significant findings  -- Cardiology interrogated ICD, saw some evidence of recent V. Fib and V tach, patient started on amiodarone     Ventricular tachycardia, polymorphic  -- Amiodarone per cardiology        Discharged Condition: stable    Disposition: Home or Self Care    Follow Up:  Follow-up Information     Follow up with [facility]. Go in 1 week.    Specialty:  Priority Care    Contact information:    [address]  [facility] [address] [phone]    Additional information:    [facility]        [facility]    Specialty:  Emergency Medicine    Why:  As needed, If symptoms worsen    Contact information:    [address]  [facility] [address] [phone]        Patient Instructions:     Diet Cardiac     Activity as tolerated     Call MD for:  persistent dizziness, light-headedness, or visual disturbances     Call MD for:  difficulty breathing or increased cough       Medications:  Reconciled Home Medications:   Current Discharge Medication List      START taking these medications    Details   !! amiodarone (PACERONE) 200 MG Tab Take 1 tablet (325 mg total) by mouth 2 (two) times daily. Begin taking this medication after finishing the 400 mg twice daily dose of amiodarone (2/26/17).  Qty: 30 tablet, Refills: 3      !! amiodarone (PACERONE) 400 MG tablet Take 1 tablet (400 mg) by mouth two times daily for 2 weeks. Last dose on 2/25/17  Qty: 12 tablet, Refills: 0      ascorbic acid, vitamin C, (VITAMIN C) 250 MG tablet Take 1 tablet (250 mg) by mouth twice daily. Take with the iron tablets.  Qty: 60 tablet, Refills: 3      candesartan (ATACAND) 4 MG tablet Take 1 tablet (4 mg total) by mouth once daily.  Qty: 30 tablet, Refills: 3      ferrous sulfate 325 (65 FE) MG EC tablet Take 1 tablet (325 mg total) by mouth 2 (two) times daily. Take with vitamin C.  Qty: 60 tablet, Refills: 3       !! - Potential duplicate medications found. Please discuss with provider.      CONTINUE these medications which have CHANGED    Details   carvedilol (COREG) 3.125 MG tablet Take 1 tablet (3.125 mg total) by mouth 2 (two) times daily.  Qty: 60 tablet, Refills: 3      furosemide (LASIX) 40 MG tablet Take 1 tablet (40 mg total) by mouth daily as needed (Leg swelling or weight gain).  Qty: 30 tablet, Refills: 3    Associated Diagnoses: Chronic systolic congestive heart failure      spironolactone (ALDACTONE) 25 MG tablet Take 0.5 tablets (12.5 mg total) by mouth once daily.  Qty: 15  tablet, Refills: 3         CONTINUE these medications which have NOT CHANGED    Details   albuterol 90 mcg/actuation inhaler Inhale 1-2 puffs into the lungs every 6 (six) hours as needed for Wheezing. Rescue      aspirin (ECOTRIN) 81 MG EC tablet Take 1 tablet (81 mg total) by mouth once daily.  Qty: 30 tablet, Refills: 12         STOP taking these medications       digoxin (LANOXIN) 125 mcg tablet Comments:   Reason for Stopping:         ferrous sulfate 325 mg (65 mg iron) Tab tablet Comments:   Reason for Stopping:         valsartan (DIOVAN) 80 MG tablet Comments:   Reason for Stopping:             Time spent on the discharge of patient: 30 minutes    Dixon Harris MD  Department of Hospital Medicine  [facility]

## 2017-02-13 NOTE — ASSESSMENT & PLAN NOTE
- Scheduled for hysterectomy on 3/1/17  - Outpatient follow-up with OB/Gyn  - Iron supplementation (ferrous gluconate 325 mg bid, vitamin C 250 mg bid)  - One dose of IV iron today

## 2017-02-13 NOTE — PROGRESS NOTES
Patient asleep. Resting quietly in bed. After cardiac complaint earlier and taking zofran and cough med that Dr Ivey ordered

## 2017-02-13 NOTE — ASSESSMENT & PLAN NOTE
- No active bleeding  - Will check H/H to check for possible changes from previous hospitalization  - 1 time dose of IV iron  - Ferrous gluconate 325 mg bid  - Vitamin C 250 mg bid with iron

## 2017-02-13 NOTE — ASSESSMENT & PLAN NOTE
-- Patient does not exhibit orthostatic changes in BP   -- No new symptoms other than palpitations and generalized weakness  -- TSH levels WNL  -- CT recent does not have significant findings  -- Cardiology interrogated ICD, saw some evidence of recent V. Fib and V tach, patient started on amiodarone

## 2017-02-14 ENCOUNTER — TELEPHONE (OUTPATIENT)
Dept: TRANSPLANT | Facility: CLINIC | Age: 42
End: 2017-02-14

## 2017-02-14 NOTE — TELEPHONE ENCOUNTER
Attempted to contact patient for FU call on recent admission to [facility]. Voice message left to call back, contact number provided.

## 2017-02-16 ENCOUNTER — TELEPHONE (OUTPATIENT)
Dept: TRANSPLANT | Facility: CLINIC | Age: 42
End: 2017-02-16

## 2017-02-16 NOTE — TELEPHONE ENCOUNTER
"FU call received, patient states still feels weak but better, also stated when admitted to [facility] changed "heart medicines," patient stated she is presently not taking Valsartan. Patient agreed to have FU clinic appt with Dr Hatch with labs on 2/23/17.   "

## 2017-02-16 NOTE — TELEPHONE ENCOUNTER
Attempted second time to contact patient for FU regarding recent admission to [place]. Voice message left with contact number to call back.

## 2017-02-19 ENCOUNTER — PATIENT MESSAGE (OUTPATIENT)
Dept: TRANSPLANT | Facility: CLINIC | Age: 42
End: 2017-02-19

## 2017-02-20 ENCOUNTER — OFFICE VISIT (OUTPATIENT)
Dept: PRIMARY CARE CLINIC | Facility: CLINIC | Age: 42
End: 2017-02-20
Payer: COMMERCIAL

## 2017-02-20 VITALS
SYSTOLIC BLOOD PRESSURE: 100 MMHG | DIASTOLIC BLOOD PRESSURE: 68 MMHG | WEIGHT: 233 LBS | OXYGEN SATURATION: 98 % | HEART RATE: 75 BPM | HEIGHT: 69 IN | BODY MASS INDEX: 34.51 KG/M2

## 2017-02-20 DIAGNOSIS — I50.42 CHRONIC COMBINED SYSTOLIC AND DIASTOLIC CONGESTIVE HEART FAILURE: Primary | ICD-10-CM

## 2017-02-20 DIAGNOSIS — Z95.810 ICD (IMPLANTABLE CARDIOVERTER-DEFIBRILLATOR) IN PLACE: Chronic | ICD-10-CM

## 2017-02-20 DIAGNOSIS — I34.0 NON-RHEUMATIC MITRAL REGURGITATION: Chronic | ICD-10-CM

## 2017-02-20 DIAGNOSIS — N92.4 MENORRHAGIA, PREMENOPAUSAL: Chronic | ICD-10-CM

## 2017-02-20 DIAGNOSIS — I42.0 NONISCHEMIC DILATED CARDIOMYOPATHY: Chronic | ICD-10-CM

## 2017-02-20 DIAGNOSIS — I47.29 VENTRICULAR TACHYCARDIA, POLYMORPHIC: ICD-10-CM

## 2017-02-20 DIAGNOSIS — D50.9 MICROCYTIC ANEMIA: Chronic | ICD-10-CM

## 2017-02-20 PROBLEM — R53.1 WEAKNESS: Status: RESOLVED | Noted: 2017-02-11 | Resolved: 2017-02-20

## 2017-02-20 PROCEDURE — 99215 OFFICE O/P EST HI 40 MIN: CPT | Mod: S$GLB,,, | Performed by: INTERNAL MEDICINE

## 2017-02-20 PROCEDURE — 99999 PR PBB SHADOW E&M-EST. PATIENT-LVL III: CPT | Mod: PBBFAC,,,

## 2017-02-20 RX ORDER — AMIODARONE HYDROCHLORIDE 200 MG/1
200 TABLET ORAL DAILY
Qty: 90 TABLET | Refills: 4 | Status: SHIPPED | OUTPATIENT
Start: 2017-02-20 | End: 2017-02-23 | Stop reason: DRUGHIGH

## 2017-02-20 RX ORDER — CARVEDILOL 3.12 MG/1
3.12 TABLET ORAL 2 TIMES DAILY
Qty: 180 TABLET | Refills: 4 | Status: SHIPPED | OUTPATIENT
Start: 2017-02-20 | End: 2018-04-16 | Stop reason: SDUPTHER

## 2017-02-20 RX ORDER — CANDESARTAN 4 MG/1
4 TABLET ORAL DAILY
Qty: 90 TABLET | Refills: 4 | Status: SHIPPED | OUTPATIENT
Start: 2017-02-20 | End: 2017-10-16 | Stop reason: ALTCHOICE

## 2017-02-20 RX ORDER — SPIRONOLACTONE 25 MG/1
12.5 TABLET ORAL DAILY
Qty: 60 TABLET | Refills: 4 | Status: SHIPPED | OUTPATIENT
Start: 2017-02-20 | End: 2017-08-28 | Stop reason: SDUPTHER

## 2017-02-20 RX ORDER — FUROSEMIDE 40 MG/1
40 TABLET ORAL DAILY PRN
Qty: 90 TABLET | Refills: 4 | Status: SHIPPED | OUTPATIENT
Start: 2017-02-20 | End: 2018-04-09 | Stop reason: SDUPTHER

## 2017-02-20 RX ORDER — AMIODARONE HYDROCHLORIDE 400 MG/1
400 TABLET ORAL 2 TIMES DAILY
Qty: 14 TABLET | Refills: 0 | Status: SHIPPED | OUTPATIENT
Start: 2017-02-20 | End: 2017-03-21

## 2017-02-20 NOTE — MR AVS SNAPSHOT
[facility]  [address]  [facility] [address]  Phone: [phone]                  Mario Mahajan   2017 3:00 PM   Office Visit    Description:  Female : 1975   Provider:  [facility]YSICIAN, [facility]   Department:  [facility]           Reason for Visit     Hospital Follow Up           Diagnoses this Visit        Comments    Chronic combined systolic and diastolic congestive heart failure    -  Primary     Nonischemic dilated cardiomyopathy         Menorrhagia, premenopausal         Microcytic anemia         Non-rheumatic mitral regurgitation         ICD (implantable cardioverter-defibrillator) in place         Ventricular tachycardia, polymorphic                To Do List           Future Appointments        Provider Department Dept Phone    2017 9:30 AM SAME DAY LAB, [facility] [phone]    2017 10:00 AM Victorino Rose MD [facility] - [phone]    2017 11:00 AM PRE-ADMIT ONE, [facility] [phone]    2017 4:30 PM Nereida Hatch MD [facility] [phone]    3/21/2017 8:30 AM EKG, APPT [facility] - [phone]      Your Future Surgeries/Procedures     Mar 01, 2017   Surgery with Victorino Rose MD   [facility] ([facility])    [address] [place] [id number] [phone]              Goals (5 Years of Data)     None       These Medications        Disp Refills Start End    amiodarone (PACERONE) 400 MG tablet 14 tablet 0 2017    Take 1 tablet (400 mg total) by mouth 2 (two) times daily. - Oral    Pharmacy: [facility] [address] [facility] [address] [facility] Ph #: [phone]       amiodarone (PACERONE) 200 MG Tab 90 tablet 4 2017     Take 1 tablet (200 mg total) by mouth once daily. - Oral    Pharmacy: [facility]  [address] [place] [address] AT [facility] Ph #: [phone]       candesartan (ATACAND) 4 MG tablet 90 tablet 4 2/20/2017 2/20/2018    Take 1 tablet (4 mg total) by mouth once daily. - Oral    Pharmacy: [address] AT [facility] Ph #: [phone]       carvedilol (COREG) 3.125 MG tablet 180 tablet 4 2/20/2017 2/20/2018    Take 1 tablet (3.125 mg total) by mouth 2 (two) times daily. - Oral    Pharmacy: [address] AT [facility] Ph #: [phone]       spironolactone (ALDACTONE) 25 MG tablet 60 tablet 4 2/20/2017     Take 0.5 tablets (12.5 mg total) by mouth once daily. - Oral    Pharmacy: [address] AT [facility] Ph #: [phone]       furosemide (LASIX) 40 MG tablet 90 tablet 4 2/20/2017 2/20/2018    Take 1 tablet (40 mg total) by mouth daily as needed (Leg swelling or weight gain). - Oral    Pharmacy: [address] AT [facility] Ph #: [phone]         [facility]     [facility] On Call Nurse Care Line - 24/7 Assistance  Registered nurses in the [facility] provide clinical advisement, health education, appointment booking, and other advisory services.  Call for this free service at [phone].             Medications           Message regarding Medications     Verify the changes and/or additions to your medication regime listed below are the same as discussed with your clinician today.  If any of these changes or additions are incorrect, please notify your healthcare provider.        CHANGE how you are taking these medications     Start Taking Instead of    amiodarone (PACERONE) 400 MG tablet amiodarone (PACERONE) 400 MG tablet    Dosage:  Take 1 tablet (400 mg total) by mouth 2 (two)  times daily. Dosage:  Take 1 tablet (400 mg) by mouth two times daily for 2 weeks. Last dose on 2/25/17    Reason for Change:  Reorder     amiodarone (PACERONE) 200 MG Tab amiodarone (PACERONE) 200 MG Tab    Dosage:  Take 1 tablet (200 mg total) by mouth once daily. Dosage:  Take 1 tablet (200 mg total) by mouth daily. Begin taking this medication after finishing the 400 mg twice daily dose of amiodarone (2/26/17).    Reason for Change:  Reorder            Verify that the below list of medications is an accurate representation of the medications you are currently taking.  If none reported, the list may be blank. If incorrect, please contact your healthcare provider. Carry this list with you in case of emergency.           Current Medications     albuterol 90 mcg/actuation inhaler Inhale 1-2 puffs into the lungs every 6 (six) hours as needed for Wheezing. Rescue    amiodarone (PACERONE) 200 MG Tab Take 1 tablet (200 mg total) by mouth once daily.    amiodarone (PACERONE) 400 MG tablet Take 1 tablet (400 mg total) by mouth 2 (two) times daily.    ascorbic acid, vitamin C, (VITAMIN C) 250 MG tablet Take 1 tablet (250 mg) by mouth twice daily. Take with the iron tablets.    aspirin (ECOTRIN) 81 MG EC tablet Take 1 tablet (81 mg total) by mouth once daily.    candesartan (ATACAND) 4 MG tablet Take 1 tablet (4 mg total) by mouth once daily.    carvedilol (COREG) 3.125 MG tablet Take 1 tablet (3.125 mg total) by mouth 2 (two) times daily.    ferrous sulfate 325 (65 FE) MG EC tablet Take 1 tablet (325 mg total) by mouth 2 (two) times daily. Take with vitamin C.    furosemide (LASIX) 40 MG tablet Take 1 tablet (40 mg total) by mouth daily as needed (Leg swelling or weight gain).    spironolactone (ALDACTONE) 25 MG tablet Take 0.5 tablets (12.5 mg total) by mouth once daily.           Clinical Reference Information           Your Vitals Were     BP Pulse Height Weight Last Period SpO2    100/68 (BP Location: Right arm,  "Patient Position: Sitting) 75 5' 9" (1.753 m) 105.7 kg (233 lb 0.4 oz) 01/23/2017 98%    BMI                34.41 kg/m2          Blood Pressure          Most Recent Value    BP  100/68      Allergies as of 2/20/2017     Ace Inhibitors      Immunizations Administered on Date of Encounter - 2/20/2017     None      Maintenance Dialysis History     Patient has no recorded history of maintenance dialysis.      Transplant Information        Txp Date Organ Coordinator Care Team     Heart Marsha Ruiz RN Referring Physician:  Juanjose Nuñez MD   Corresponding Physician:  Juanjose Nuñez MD         Language Assistance Services     ATTENTION: Language assistance services are available, free of charge. Please call [phone].      ATENCIÓN: Si habla español, tiene a peck disposición servicios gratuitos de asistencia lingüística. Llame al [phone].     CHÚ Ý: N?u b?n nói Ti?ng Vi?t, có các d?ch v? h? tr? ngôn ng? mi?n phí dành cho b?n. G?i s? [phone].         [facility] complies with applicable Federal civil rights laws and does not discriminate on the basis of race, color, national origin, age, disability, or sex.        "

## 2017-02-20 NOTE — PROGRESS NOTES
[facility]  New Visit Progress Note   Recent Hospital Discharge     PRESENTING HISTORY     Chief Complaint/Reason for Visit:  Follow up Hospital Discharge   Chief Complaint   Patient presents with    Hospital Follow Up     PCP: Gabriella primary care provider on file.    History of Present Illness: Ms. Mario Mahajan is a 41 y.o. female who was recently admitted to the hospital.    Admission Date: 2/11/2017  Discharge Date and Time:  02/13/2017 11:29 AM  Attending Physician: Chay Holland MD   Discharging Provider: Dixon Harris MD  Primary Care Provider: Primary Doctor [facility] Medicine Team: [facility] MED 5 Dixon Harris MD     HPI:   41 y.o.  female with asthma, obesity, anemia due to menorrhagia (scheduled for hysterectomy on 3/1/17), dilated cardiomyopathy with LVEF 25% (3/21/16) and ICD in place, severe MR, and pulmonary hypertension who presents to the ED because she wasn't feeling well after being discharged from [facility] on Friday after she was admitted for orthostatic hypotension. The patient was admitted on 2/9 and discharged the next day. During her stay she received IVF and except for lasix and carvedilol, all other meds were stopped as they were thought to be contributing to her symptoms. She also had an ECHO which was stable. Patient reports that ever since being discharged, she continued to feel unwell and noticed some palpitation this morning. She denies having any chest pain or SOB currently but had some chest discomfort intermittently in the last few days. Patient denies LOC or syncopal attack since discharge from [facility] [place].      Hospital Course:   Patient had medications held on presentation due to concern they may be contributing to symptoms. Cardiology consulted and they evaluated AICD, discovering patient had some runs of VF and Vtach over the last few days. Amiodarone started for rhythm control. Medications reintroduced slowly at lower dosages.      Consults:   Consults            Status Ordering Provider       Inpatient consult to Cardiology Once    Provider: (Not yet assigned)     Final result JOMAR WOODALL       Inpatient consult to Electrophysiology Once    Provider: (Not yet assigned)     Final result JOMAR FLYNN       Inpatient consult to Heart Transplant Once    Provider: (Not yet assigned)     Acknowledged EMELYN CARRIZALES                 Final Active Diagnoses:     Diagnosis Date Noted POA    PRINCIPAL PROBLEM: Chronic combined systolic and diastolic congestive heart failure [I50.42] 04/28/2015 Yes    ICD (implantable cardioverter-defibrillator) discharge [Z45.02]   Not Applicable    Ventricular tachycardia, polymorphic [I47.2]   Yes    Weakness [R53.1] 02/11/2017 Yes    Menorrhagia, premenopausal [N92.4] 02/10/2017 Yes       Chronic    ICD (implantable cardioverter-defibrillator) in place 12/01/15 [Z95.810] 03/03/2016 Yes       Chronic    Nonischemic dilated cardiomyopathy [I42.9] 12/01/2015 Yes       Chronic    Mitral regurgitation [I34.0] 03/05/2015 Yes       Chronic    Microcytic anemia [D50.9] 02/09/2015 Yes       Chronic       Problems Resolved During this Admission:     Diagnosis Date Noted Date Resolved POA      * Chronic combined systolic and diastolic congestive heart failure  -- Was on ASA, Carvedilol, Digoxin, Lasix, Aldactone and Valsartan before initial admission to [facility]  -- As per discharge summary from 2/10, pt agreed to hold all medications for a day after discharge home, then resume carvedilol and furosemide only until she receives further recommendations from her cardiologist  -- No physical exam findings to support CHF exacb  -- trops were slightly elevated, will f/u with another level in the AM  -- Recent ECHO without significant changes from previous ones  -- Cardiology consulted, recommended restarting carvedilol at a lower dose and amiodarone for arrhythmia control     Microcytic anemia  - No active bleeding  - Will check H/H to  check for possible changes from previous hospitalization  - 1 time dose of IV iron  - Ferrous gluconate 325 mg bid  - Vitamin C 250 mg bid with iron     Mitral regurgitation  - Documented on most recent ECHO  - Cardiology currently consulted, appreciate recs  - Continue to monitor symptoms      Nonischemic dilated cardiomyopathy  - Cardiology consulted, recommend patient be placed back on home meds as symptoms improve, particularly carvedilol  - Also recommended amiodarone 400 mg bid for 2 weeks, followed by amiodarone 200 mg qd      Menorrhagia, premenopausal  - Scheduled for hysterectomy on 3/1/17  - Outpatient follow-up with OB/Gyn  - Iron supplementation (ferrous gluconate 325 mg bid, vitamin C 250 mg bid)  - One dose of IV iron today     Weakness  -- Patient does not exhibit orthostatic changes in BP   -- No new symptoms other than palpitations and generalized weakness  -- TSH levels WNL  -- CT recent does not have significant findings  -- Cardiology interrogated ICD, saw some evidence of recent V. Fib and V tach, patient started on amiodarone      Ventricular tachycardia, polymorphic  -- Amiodarone per cardiology    ___________________________________________________________________    Today:  Still some weakness but improved.   No chest pain.   No weight gain.    Review of Systems:  Review of Systems   Constitutional: Negative for chills and fever.   Respiratory: Negative for cough.    Cardiovascular: Negative for chest pain, palpitations and leg swelling.   Gastrointestinal: Negative for nausea and vomiting.   Skin: Negative for rash.   Neurological: Positive for dizziness. Negative for focal weakness and loss of consciousness.       PAST HISTORY:     Past Medical History   Diagnosis Date    Asthma     Chronic back pain 7/1/2014    Chronic combined systolic and diastolic congestive heart failure 4/28/2015      2-10-17   1 - Severely depressed left ventricular systolic function (EF 20-25%).    2 - Severe  left ventricular enlargement.    3 - Severe left atrial enlargement.    4 - Left ventricular diastolic dysfunction.    5 - The estimated PA systolic pressure is 18 mmHg.    6 - Mild mitral regurgitation.     Encounter for blood transfusion     ICD (implantable cardioverter-defibrillator) in place 12/01/15 3/3/2016    Menorrhagia, premenopausal 2/10/2017    Microcytic anemia 2015    Non-rheumatic mitral regurgitation 3/5/2015    Nonischemic dilated cardiomyopathy 2015    Syncope and collapse 2017    Ventricular tachycardia, polymorphic        Past Surgical History   Procedure Laterality Date    Tubal ligation       section      Cardiac defibrillator placement  2015       Family History   Problem Relation Age of Onset    Diabetes Father        Social History     Social History    Marital status: Single     Spouse name: N/A    Number of children: 2    Years of education: N/A     Occupational History     [employer]     Social History Main Topics    Smoking status: Never Smoker    Smokeless tobacco: Never Used    Alcohol use Yes      Comment: 1 glass per month    Drug use: No    Sexual activity: Yes     Partners: Male     Other Topics Concern    Not on file     Social History Narrative       MEDICATIONS & ALLERGIES:     Current Outpatient Prescriptions on File Prior to Visit   Medication Sig Dispense Refill    albuterol 90 mcg/actuation inhaler Inhale 1-2 puffs into the lungs every 6 (six) hours as needed for Wheezing. Rescue      ascorbic acid, vitamin C, (VITAMIN C) 250 MG tablet Take 1 tablet (250 mg) by mouth twice daily. Take with the iron tablets. 60 tablet 3    aspirin (ECOTRIN) 81 MG EC tablet Take 1 tablet (81 mg total) by mouth once daily. 30 tablet 12    ferrous sulfate 325 (65 FE) MG EC tablet Take 1 tablet (325 mg total) by mouth 2 (two) times daily. Take with vitamin C. 60 tablet 3    amiodarone (PACERONE) 200 MG Tab Take 1 tablet (200 mg total)  by mouth daily. Begin taking this medication after finishing the 400 mg twice daily dose of amiodarone (2/26/17). 30 tablet 3    amiodarone (PACERONE) 400 MG tablet Take 1 tablet (400 mg) by mouth two times daily for 2 weeks.  12 tablet 0    candesartan (ATACAND) 4 MG tablet Take 1 tablet (4 mg total) by mouth once daily. 30 tablet 3     carvedilol (COREG) 3.125 MG tablet Take 1 tablet (3.125 mg total) by mouth 2 (two) times daily. 60 tablet 3     furosemide (LASIX) 40 MG tablet Take 1 tablet (40 mg total) by mouth daily as needed (Leg swelling or weight gain). 30 tablet 3    spironolactone (ALDACTONE) 25 MG tablet Take 0.5 tablets (12.5 mg total) by mouth once daily. 15 tablet 3     No current facility-administered medications on file prior to visit.         Review of patient's allergies indicates:   Allergen Reactions    Ace inhibitors      Cough       OBJECTIVE:     Vital Signs:  Vitals:    02/20/17 1539   BP: 100/68   Pulse: 75     Wt Readings from Last 1 Encounters:   02/20/17 1539 105.7 kg (233 lb 0.4 oz)     Body mass index is 34.41 kg/(m^2).     Physical Exam:  General: Well developed, well nourished. No distress.  HEENT: Head is normocephalic, atraumatic; ears are normal.    Eyes: Clear conjunctiva.  Neck: Supple, symmetrical neck; trachea midline.  Lungs: Clear to auscultation bilaterally and normal respiratory effort.  Cardiovascular: Heart with regular rate and rhythm.    Extremities: No LE edema.    Abdomen: Abdomen is soft, non-tender non-distended with normal bowel sounds.  Skin: Skin color, texture, turgor normal. No rashes.  Musculoskeletal: Normal gait.   Psychiatric: Not depressed.      Laboratory  Lab Results   Component Value Date    WBC 8.66 02/11/2017    HGB 10.5 (L) 02/11/2017    HCT 33.7 (L) 02/11/2017    MCV 78 (L) 02/11/2017     02/11/2017     BMP  Lab Results   Component Value Date     02/11/2017    K 3.7 02/11/2017     02/11/2017    CO2 22 (L) 02/11/2017    BUN  12 02/11/2017    CREATININE 0.9 02/11/2017    CALCIUM 8.9 02/11/2017    ANIONGAP 7 (L) 02/11/2017    ESTGFRAFRICA >60.0 02/11/2017    EGFRNONAA >60.0 02/11/2017     Lab Results   Component Value Date    ALT 8 (L) 02/11/2017    AST 13 02/11/2017    ALKPHOS 41 (L) 02/11/2017    BILITOT 1.1 (H) 02/11/2017     Lab Results   Component Value Date    INR 1.0 02/11/2017    INR 1.0 01/05/2016    INR 1.0 11/27/2015       TRANSITION OF CARE:     Transition of Care Visit:     I have reviewed and updated the history and problem list.  I have reconciled the medication list.  I have discussed the hospitalization and current medical issues, prognosis and plans with the patient/family.  I  spent more than 50% of time discussing the care with the patient/family.  Total Encounter in the [facility]: 60 minutes.    Medications Reconciliation:   I have reconciled the patient's home medications and discharge medications with the patient/family. I have updated all changes.  Refer to After-Visit Medication List.    ASSESSMENT & PLAN:     Chronic combined systolic and diastolic congestive heart failure  Nonischemic dilated cardiomyopathy  Non-rheumatic mitral regurgitation  ICD (implantable cardioverter-defibrillator) in place 12/01/15  Ventricular tachycardia, polymorphic  - Compensated currently. Euvolemic. BP in welcome.    Will finish 14 days of Amiodarone at 400 mg twice daily then 200 mg daily.    Continue Coreg, Atacand and Aldactone at current dose.  -     amiodarone (PACERONE) 400 MG tablet; Take 1 tablet (400 mg total) by mouth 2 (two) times daily.  Dispense: 14 tablet; Refill: 0  -     amiodarone (PACERONE) 200 MG Tab; Take 1 tablet (200 mg total) by mouth once daily.  Dispense: 90 tablet; Refill: 4  -     candesartan (ATACAND) 4 MG tablet; Take 1 tablet (4 mg total) by mouth once daily.  Dispense: 90 tablet; Refill: 4  -     carvedilol (COREG) 3.125 MG tablet; Take 1 tablet (3.125 mg total) by mouth 2 (two) times daily.   Dispense: 180 tablet; Refill: 4  -     spironolactone (ALDACTONE) 25 MG tablet; Take 0.5 tablets (12.5 mg total) by mouth once daily.  Dispense: 60 tablet; Refill: 4  -     furosemide (LASIX) 40 MG tablet; Take 1 tablet (40 mg total) by mouth daily as needed (Leg swelling or weight gain).  Dispense: 90 tablet; Refill: 4    Menorrhagia, premenopausal  Microcytic anemia  - On iron supplement. Will have surgery next month.    Scheduled Follow-up :    To establish PCP with Dr. Loera in April.    Future Appointments  Date Time Provider Department Center   2/23/2017 9:30 AM SAME DAY LAB, [facility] LAB [facility]   2/23/2017 10:00 AM Victorino Rose MD [facility] OBGYN [facility]   2/23/2017 11:00 AM PRE-ADMIT ONE, [facility] PREADMT [facility]   2/23/2017 4:30 PM Nereida Hatch MD [facility] HEARTTX [facility]   3/21/2017 8:30 AM EKG, APPT [facility] EKG [facility]   3/21/2017 9:00 AM PACEMAKER, ICD [facility] ARRHYTH [facility]   3/21/2017 9:40 AM Victorino Tay MD [facility] ARRHYTH [facility]   4/20/2017 10:20 AM Daxa Loera MD [facility] IM [facility] PCW     After Visit Medication List :     Medication List          This list is accurate as of: 2/20/17  4:10 PM.  Always use your most recent med list.                     albuterol 90 mcg/actuation inhaler       * amiodarone 400 MG tablet   Commonly known as:  PACERONE   Take 1 tablet (400 mg total) by mouth 2 (two) times daily.       * amiodarone 200 MG Tab   Commonly known as:  PACERONE   Take 1 tablet (200 mg total) by mouth once daily.       ascorbic acid (vitamin C) 250 MG tablet   Commonly known as:  VITAMIN C   Take 1 tablet (250 mg) by mouth twice daily. Take with the iron tablets.       aspirin 81 MG EC tablet   Commonly known as:  ECOTRIN   Take 1 tablet (81 mg total) by mouth once daily.       candesartan 4 MG tablet   Commonly known as:  ATACAND   Take 1 tablet (4 mg total) by mouth once daily.       carvedilol 3.125 MG tablet   Commonly known as:  COREG   Take 1  tablet (3.125 mg total) by mouth 2 (two) times daily.       ferrous sulfate 325 (65 FE) MG EC tablet   Take 1 tablet (325 mg total) by mouth 2 (two) times daily. Take with vitamin C.       furosemide 40 MG tablet   Commonly known as:  LASIX   Take 1 tablet (40 mg total) by mouth daily as needed (Leg swelling or weight gain).       spironolactone 25 MG tablet   Commonly known as:  ALDACTONE   Take 0.5 tablets (12.5 mg total) by mouth once daily.       * Notice:  This list has 2 medication(s) that are the same as other medications prescribed for you. Read the directions carefully, and ask your doctor or other care provider to review them with you.         Where to Get Your Medications      These medications were sent to [facility] [address] [facility] [address]  [address] [facility] [address]    Hours:  24-hours Phone:  [phone]     amiodarone 200 MG Tab    amiodarone 400 MG tablet    candesartan 4 MG tablet    carvedilol 3.125 MG tablet    furosemide 40 MG tablet    spironolactone 25 MG tablet               Signing Physician:  Chay Holland MD

## 2017-02-21 ENCOUNTER — PATIENT MESSAGE (OUTPATIENT)
Dept: TRANSPLANT | Facility: CLINIC | Age: 42
End: 2017-02-21

## 2017-02-22 ENCOUNTER — TELEPHONE (OUTPATIENT)
Dept: TRANSPLANT | Facility: CLINIC | Age: 42
End: 2017-02-22

## 2017-02-22 ENCOUNTER — TELEPHONE (OUTPATIENT)
Dept: ADMINISTRATIVE | Facility: OTHER | Age: 42
End: 2017-02-22

## 2017-02-22 ENCOUNTER — PATIENT MESSAGE (OUTPATIENT)
Dept: TRANSPLANT | Facility: CLINIC | Age: 42
End: 2017-02-22

## 2017-02-22 NOTE — TELEPHONE ENCOUNTER
----- Message from Victorino Rose MD sent at 2/22/2017 11:55 AM CST -----  We will not go to OR until cardiology clearance. Currently on schedule for 3/1/2017 but we can postpone until April if no further VT  Thanks  ----- Message -----     From: Nereida Hatch MD     Sent: 2/22/2017   5:00 AM       To: Salty Hatch MD, Victorino Rose MD, #    Thanks for seeing her and scheduling her for surgery   Wanted to update u that she has an three episodes of polymorphic VT in early Feb 2017 (see list below)  Interrogation of her device showed 3 polymorphic VF episodes  Episode 1 on 2/5/2017 not requiring shock.  Episode 2 on 2/9/2017 at 7:37 PM requiring a VF DF shock.  Episode 3 on 2/10/2017 not requiring shock.    I would like to wait for six weeks after last shock to consider surgery if ok with   Also needs either a stress test or cath (my preference is the later with Willi Hatch) I will talk to her this week  thanks

## 2017-02-22 NOTE — TELEPHONE ENCOUNTER
Called patient back per request on my chart, voice message left with contact number.    4:00:  Patient called back, stated surgery was canceled by GYN on advice of Dr Hatch. Advised patient to keep appt with HTS so Dr Hatch can discuss plan of care following past admission to [facility]. Voiced understanding.

## 2017-02-23 ENCOUNTER — DOCUMENTATION ONLY (OUTPATIENT)
Dept: TRANSPLANT | Facility: CLINIC | Age: 42
End: 2017-02-23

## 2017-02-23 ENCOUNTER — PATIENT MESSAGE (OUTPATIENT)
Dept: TRANSPLANT | Facility: CLINIC | Age: 42
End: 2017-02-23

## 2017-02-23 ENCOUNTER — OFFICE VISIT (OUTPATIENT)
Dept: TRANSPLANT | Facility: CLINIC | Age: 42
End: 2017-02-23
Payer: COMMERCIAL

## 2017-02-23 VITALS
DIASTOLIC BLOOD PRESSURE: 64 MMHG | WEIGHT: 237 LBS | SYSTOLIC BLOOD PRESSURE: 125 MMHG | BODY MASS INDEX: 35.1 KG/M2 | HEIGHT: 69 IN | HEART RATE: 76 BPM

## 2017-02-23 DIAGNOSIS — D50.9 MICROCYTIC ANEMIA: Chronic | ICD-10-CM

## 2017-02-23 DIAGNOSIS — Z95.810 ICD (IMPLANTABLE CARDIOVERTER-DEFIBRILLATOR) IN PLACE: Chronic | ICD-10-CM

## 2017-02-23 DIAGNOSIS — I42.0 NONISCHEMIC DILATED CARDIOMYOPATHY: Primary | Chronic | ICD-10-CM

## 2017-02-23 DIAGNOSIS — N92.4 MENORRHAGIA, PREMENOPAUSAL: Chronic | ICD-10-CM

## 2017-02-23 DIAGNOSIS — I47.29 VENTRICULAR TACHYCARDIA, POLYMORPHIC: ICD-10-CM

## 2017-02-23 PROCEDURE — 99215 OFFICE O/P EST HI 40 MIN: CPT | Mod: S$GLB,,, | Performed by: INTERNAL MEDICINE

## 2017-02-23 PROCEDURE — 99999 PR PBB SHADOW E&M-EST. PATIENT-LVL III: CPT | Mod: PBBFAC,,, | Performed by: INTERNAL MEDICINE

## 2017-02-23 PROCEDURE — 1160F RVW MEDS BY RX/DR IN RCRD: CPT | Mod: S$GLB,,, | Performed by: INTERNAL MEDICINE

## 2017-02-23 NOTE — MR AVS SNAPSHOT
[facility]  [address]  [facility] [address]  Phone: [phone]                  Mario Mahajan   2017 2:00 PM   Office Visit    Description:  Female : 1975   Provider:  Nereida Hatch MD   Department:  [facility]           Reason for Visit     Heart Transplant Pre-evaluation           Diagnoses this Visit        Comments    Nonischemic dilated cardiomyopathy    -  Primary            To Do List           Future Appointments        Provider Department Dept Phone    3/21/2017 8:30 AM EKG, APPT [facility] [phone]    3/21/2017 9:00 AM PACEMAKER, ICD [facility] Arrhythmia [phone]    3/21/2017 9:40 AM Victorino Tay MD [facility] Arrhythmia [phone]    2017 10:20 AM Daxa Loera MD [facility] - Internal Medicine [phone]      Goals (5 Years of Data)     None      Follow-Up and Disposition     Return in about 6 weeks (around 2017).      [facility]     [facility] -  Assistance  Registered nurses in the [facility] provide clinical advisement, health education, appointment booking, and other advisory services.  Call for this free service at [phone].             Medications           Message regarding Medications     Verify the changes and/or additions to your medication regime listed below are the same as discussed with your clinician today.  If any of these changes or additions are incorrect, please notify your healthcare provider.             Verify that the below list of medications is an accurate representation of the medications you are currently taking.  If none reported, the list may be blank. If incorrect, please contact your healthcare provider. Carry this list with you in case of emergency.           Current Medications     albuterol 90 mcg/actuation inhaler Inhale 1-2 puffs into the lungs every 6 (six) hours as needed for Wheezing. Rescue    amiodarone (PACERONE) 400  "MG tablet Take 1 tablet (400 mg total) by mouth 2 (two) times daily.    ascorbic acid, vitamin C, (VITAMIN C) 250 MG tablet Take 1 tablet (250 mg) by mouth twice daily. Take with the iron tablets.    aspirin (ECOTRIN) 81 MG EC tablet Take 1 tablet (81 mg total) by mouth once daily.    candesartan (ATACAND) 4 MG tablet Take 1 tablet (4 mg total) by mouth once daily.    carvedilol (COREG) 3.125 MG tablet Take 1 tablet (3.125 mg total) by mouth 2 (two) times daily.    ferrous sulfate 325 (65 FE) MG EC tablet Take 1 tablet (325 mg total) by mouth 2 (two) times daily. Take with vitamin C.    furosemide (LASIX) 40 MG tablet Take 1 tablet (40 mg total) by mouth daily as needed (Leg swelling or weight gain).    spironolactone (ALDACTONE) 25 MG tablet Take 0.5 tablets (12.5 mg total) by mouth once daily.           Clinical Reference Information           Your Vitals Were     BP Pulse Height Weight Last Period BMI    125/64 76 5' 9" (1.753 m) 107.5 kg (236 lb 15.9 oz) 01/23/2017 35 kg/m2      Blood Pressure          Most Recent Value    BP  125/64      Allergies as of 2/23/2017     Ace Inhibitors      Immunizations Administered on Date of Encounter - 2/23/2017     None      Orders Placed During Today's Visit      Normal Orders This Visit    Ambulatory Referral to Interventional Cardiology     Future Labs/Procedures Expected by Expires    Comprehensive metabolic panel  4/6/2017 (Approximate) 4/24/2018      Maintenance Dialysis History     Patient has no recorded history of maintenance dialysis.      Transplant Information        Txp Date Organ Coordinator Care Team     Heart Marsha Ruiz RN Referring Physician:  Juanjose Nuñez MD   Corresponding Physician:  Juanjose Nuñez MD         Language Assistance Services     ATTENTION: Language assistance services are available, free of charge. Please call [phone].      ATENCIÓN: Si habla martha, tiene a peck disposición servicios gratuitos de asistencia lingüística. Llame al " [phone].     SAMREEN Ý: N?u b?n nói Ti?ng Vi?t, có các d?ch v? h? tr? ngôn ng? mi?n phí dành cho b?n. G?i s? [phone].         [facility] complies with applicable Federal civil rights laws and does not discriminate on the basis of race, color, national origin, age, disability, or sex.

## 2017-02-23 NOTE — PROGRESS NOTES
Subjective:   Initial evaluation of heart transplant candidacy.    HPI:  Ms. Mahajan is a 41 y.o. year old Black or  female who has presents to be considered for advanced surgical options (LVAD/OHT).    Nonischemic CHF (LVEF 20%, LVEDD 7.2 cm mild MR Feb 2017) who had three episodes of VF in early Feb 2017, one of which required defibrillation from ICD.   Since that time, she continues to have fatigue / dizziness despite decreasing her coreg to 3.125 BID (was 25 BID) and atacand 4 QD (valsartan 80 qd)  Previously I had related her symptoms of fatigue to her iron deficiency anemia for which she was going to have hysterectomy followed by evaluation for advanced options if her symptoms pesisted.  No edema today with no change in clinic weight.       CPX (Jan 2017)  1) The PkVO2 was 14.1 ml/kg/min which is 42% of predicted equating to a functional capacity of 4.0 METS indicating severe functional impairment.   Compared to Sept 2016 Vo2 of 12.6, this is an improvement.   2 ) The VE/VCO2 decreased by -25% from rest to AT. The VE/VCO2 [place] was 28.3.  The Resting PetCO2 was 30.8.    C Sept 2015  AOPRES: 136/74 (95)  AOSAT: 100  FICKCI: 1.99  FICKCO: 4.35  PAPRES: 47/21 (32)  PASAT: 61  PVR: 2.3  PWPRES: 22/23 (21)  RAPRES: 14/14 (12)  RVPRES: 48/5, 13    Past Medical History   Diagnosis Date    Asthma     Chronic back pain 7/1/2014    Chronic combined systolic and diastolic congestive heart failure 4/28/2015      2-10-17   1 - Severely depressed left ventricular systolic function (EF 20-25%).    2 - Severe left ventricular enlargement.    3 - Severe left atrial enlargement.    4 - Left ventricular diastolic dysfunction.    5 - The estimated PA systolic pressure is 18 mmHg.    6 - Mild mitral regurgitation.     Encounter for blood transfusion     ICD (implantable cardioverter-defibrillator) in place 12/01/15 3/3/2016    Menorrhagia, premenopausal 2/10/2017    Microcytic anemia 2/9/2015     "Non-rheumatic mitral regurgitation 3/5/2015    Nonischemic dilated cardiomyopathy 2015    Syncope and collapse 2017    Ventricular tachycardia, polymorphic      Past Surgical History   Procedure Laterality Date    Tubal ligation       section      Cardiac defibrillator placement  2015       Family History   Problem Relation Age of Onset    Diabetes Father        Review of Systems   Constitution: Negative for decreased appetite, weight gain and weight loss.   Cardiovascular: Negative for chest pain, dyspnea on exertion, leg swelling, near-syncope, orthopnea and palpitations.   Respiratory: Negative for cough and shortness of breath.    Musculoskeletal: Negative for myalgias.   Gastrointestinal: Negative for jaundice.       Objective:     Physical Exam   Constitutional: She appears well-developed and well-nourished. No distress.   /64  Pulse 76  Ht 5' 9" (1.753 m)  Wt 107.5 kg (236 lb 15.9 oz)  LMP 2017  BMI 35 kg/m2     HENT:   Head: Normocephalic and atraumatic. Head is without abrasion and without contusion.   Right Ear: External ear normal.   Left Ear: External ear normal.   Nose: Nose normal. No epistaxis.   Mouth/Throat: Oropharynx is clear and moist. Mucous membranes are not cyanotic.   Eyes: Conjunctivae and EOM are normal. Pupils are equal, round, and reactive to light.   Neck: Normal range of motion. Neck supple. No tracheal deviation present.   Cardiovascular: Normal rate, regular rhythm, normal heart sounds and normal pulses.  Exam reveals no gallop.    No murmur heard.  No MR murmur to my ear   Pulmonary/Chest: Effort normal and breath sounds normal. No stridor. No respiratory distress. She has no wheezes.   Abdominal: Soft. Normal appearance, normal aorta and bowel sounds are normal. She exhibits no distension. There is no tenderness.   Musculoskeletal: She exhibits no edema or tenderness.   Neurological: She is alert. She has normal strength and normal reflexes. " She exhibits normal muscle tone.   Skin: Skin is warm. No rash noted. No erythema.   Psychiatric: She has a normal mood and affect. Her speech is normal and behavior is normal. Judgment and thought content normal. Cognition and memory are normal.       Labs:      Chemistry        Component Value Date/Time     02/11/2017 2138    K 3.7 02/11/2017 2138     02/11/2017 2138    CO2 22 (L) 02/11/2017 2138    BUN 12 02/11/2017 2138    BUN 12 06/19/2015 0950    CREATININE 0.9 02/11/2017 2138    GLU 93 02/11/2017 2138        Component Value Date/Time    CALCIUM 8.9 02/11/2017 2138    ALKPHOS 41 (L) 02/11/2017 2138    AST 13 02/11/2017 2138    ALT 8 (L) 02/11/2017 2138    BILITOT 1.1 (H) 02/11/2017 2138          Magnesium   Date Value Ref Range Status   02/11/2017 1.9 1.6 - 2.6 mg/dL Final     Lab Results   Component Value Date    WBC 8.66 02/11/2017    HGB 10.5 (L) 02/11/2017    HCT 33.7 (L) 02/11/2017    MCV 78 (L) 02/11/2017     02/11/2017     BNP   Date Value Ref Range Status   02/11/2017 158 (H) 0 - 99 pg/mL Final     Comment:     Values of less than 100 pg/ml are consistent with non-CHF populations.   02/10/2017 257 (H) 0 - 99 pg/mL Final     Comment:     Values of less than 100 pg/ml are consistent with non-CHF populations.   01/03/2017 345 (H) 0 - 99 pg/mL Final     Comment:     Values of less than 100 pg/ml are consistent with non-CHF populations.       Assessment:      1. Nonischemic dilated cardiomyopathy        Plan:   1. With recent VF, would like left / right heart cath  Had negative PET stress June 2015   2. Followup in six weeks with CMP to see how far she is along with workup (see below) Would like to consider LVAD as BTT but worried about anemia / menorrhagia  Patient is now NYHA IV ACC stage D  Recommend 2 gram sodium restriction and 1500cc fluid restriction.  Encourage physical activity with graded exercise program.  Requested patient to weigh themselves daily, and to notify us if their  weight increases by more than 3 lbs in 1 day or 5 lbs in 1 week.     Transplant Candidacy: Patient is a 41 y.o. year old female with heart failure is being seen for possible LVAD and OHT. In my opinion, she is  a suitable LVAD and OHT candidate. Patient did meet with  pre-transplant coordinator at the end of this visit for workup. she is scheduled for LVAD OHT work up once she sings consent   Hope that MCS meets next time as she seem overwhelmed by process today    Nereida Hatch MD

## 2017-02-23 NOTE — PROGRESS NOTES
Met with pt and mom in clinic along with Dr Hatch.  Pt tearful when discussion advanced options. Pt is agreeable to reading educational packet and meeting with coordinator same day as Interventional Cardiology appt to review packet but is not yet agreeable to full evaluation.    PRE-EDUCATION BOOKLET NOTE:    Met with Mario Mahajan and had a brief discussion on the heart transplant evaluation process.    Heart Transplant Educational Booklet given to patient, which included the following handouts:  · Cardiomyopathy and Heart Transplantation  · Pre-transplant Evaluation Process  · Ventricular Assist Devices  · Wellness Contract  · Heart Transplant Information Outline  · Recipient Informed Consent  · Discharge Instructions for Patients with Heart Failure  · Advanced Directives  · Suggested web sites  · Multiple listing protocol and [facility] toll free numbers    Contact information provided.  All questions answered to patient's satisfaction.  Educational session to be arranged per .  Patient instructed to bring heart transplant educational booklet to the education session.

## 2017-02-23 NOTE — LETTER
February 23, 2017        Juanjose Nuñez  [address]  [facility] [address]  Phone: [phone]  Fax: [phone]             [facility] [address]  [facility] [address]  Phone: [phone]   Patient: Mario Mahajan   MR Number: [id number]   YOB: 1975   Date of Visit: 2/23/2017       Dear Dr. Juanjose Nuñez    Thank you for referring Mario Mahajan to me for evaluation. Attached you will find relevant portions of my assessment and plan of care.    If you have questions, please do not hesitate to call me. I look forward to following Mario Mahajan along with you.    Sincerely,    Nereida Hatch MD    Enclosure    If you would like to receive this communication electronically, please contact [email or web address] or [phone] to request [employer] access.    [employer] is a tool which provides read-only access to select patient information with whom you have a relationship. Its easy to use and provides real time access to review your patients record including encounter summaries, notes, results, and demographic information.    If you feel you have received this communication in error or would no longer like to receive these types of communications, please e-mail [email or web address]

## 2017-02-24 ENCOUNTER — TELEPHONE (OUTPATIENT)
Dept: TRANSPLANT | Facility: CLINIC | Age: 42
End: 2017-02-24

## 2017-02-24 NOTE — TELEPHONE ENCOUNTER
Confirmed follow up appt on 3/2/17 with Pre heart Transplant Coord to discuss eval process. Instructed to bring eval folder, voiced understanding.

## 2017-03-02 ENCOUNTER — DOCUMENTATION ONLY (OUTPATIENT)
Dept: CARDIOLOGY | Facility: CLINIC | Age: 42
End: 2017-03-02

## 2017-03-02 ENCOUNTER — INITIAL CONSULT (OUTPATIENT)
Dept: CARDIOLOGY | Facility: CLINIC | Age: 42
End: 2017-03-02
Payer: COMMERCIAL

## 2017-03-02 ENCOUNTER — EDUCATION (OUTPATIENT)
Dept: TRANSPLANT | Facility: CLINIC | Age: 42
End: 2017-03-02

## 2017-03-02 ENCOUNTER — CLINICAL SUPPORT (OUTPATIENT)
Dept: TRANSPLANT | Facility: CLINIC | Age: 42
End: 2017-03-02
Payer: COMMERCIAL

## 2017-03-02 VITALS
HEIGHT: 69 IN | WEIGHT: 242.31 LBS | SYSTOLIC BLOOD PRESSURE: 125 MMHG | DIASTOLIC BLOOD PRESSURE: 57 MMHG | BODY MASS INDEX: 35.89 KG/M2 | HEART RATE: 65 BPM

## 2017-03-02 DIAGNOSIS — I42.0 DCM (DILATED CARDIOMYOPATHY): ICD-10-CM

## 2017-03-02 DIAGNOSIS — I50.42 CHRONIC COMBINED SYSTOLIC AND DIASTOLIC CONGESTIVE HEART FAILURE: ICD-10-CM

## 2017-03-02 DIAGNOSIS — Z95.810 ICD (IMPLANTABLE CARDIOVERTER-DEFIBRILLATOR) IN PLACE: ICD-10-CM

## 2017-03-02 DIAGNOSIS — I47.29 POLYMORPHIC VENTRICULAR TACHYCARDIA: Primary | ICD-10-CM

## 2017-03-02 DIAGNOSIS — I47.29 VENTRICULAR TACHYCARDIA, POLYMORPHIC: ICD-10-CM

## 2017-03-02 DIAGNOSIS — I42.0 NONISCHEMIC DILATED CARDIOMYOPATHY: Chronic | ICD-10-CM

## 2017-03-02 PROCEDURE — 99999 PR PBB SHADOW E&M-EST. PATIENT-LVL III: CPT | Mod: PBBFAC,,, | Performed by: INTERNAL MEDICINE

## 2017-03-02 PROCEDURE — 99203 OFFICE O/P NEW LOW 30 MIN: CPT | Mod: S$GLB,,, | Performed by: INTERNAL MEDICINE

## 2017-03-02 PROCEDURE — 1160F RVW MEDS BY RX/DR IN RCRD: CPT | Mod: S$GLB,,, | Performed by: INTERNAL MEDICINE

## 2017-03-02 PROCEDURE — 99999 PR PBB SHADOW E&M-EST. PATIENT-LVL I: CPT | Mod: PBBFAC,,,

## 2017-03-02 RX ORDER — SODIUM CHLORIDE 9 MG/ML
3 INJECTION, SOLUTION INTRAVENOUS CONTINUOUS
Status: CANCELLED | OUTPATIENT
Start: 2017-03-02 | End: 2017-03-02

## 2017-03-02 RX ORDER — DIPHENHYDRAMINE HCL 25 MG
50 CAPSULE ORAL ONCE
Status: CANCELLED | OUTPATIENT
Start: 2017-03-02 | End: 2017-03-02

## 2017-03-02 NOTE — PROGRESS NOTES
OUTPATIENT CATHETERIZATION INSTRUCTIONS    You have been scheduled for a procedure in the catheterization lab on Wednesday March 8, 2017.     Please report to the Cardiology Waiting Area on the Third floor of the hospital and check in at 8 AM.   You will then be taken to the SSCU (Short Stay Cardiac Unit) and prepared for your procedure. Please be aware that this is not the time of your procedure but the time you are to arrive. The procedures are scheduled on an hourly basis; however, emergency cases take precedence over all other cases.       You may not have anything to eat or drink after midnight the night before your test. You may take your regular morning medications with water. If there are any medications that you should not take you will be instructed to hold them that morning. If you are diabetic and on Metformin (Glucophage) do not take it the day before, the day of, and for 2 days after your procedure.      The procedure will take 1-2 hours to perform. After the procedure, you will return to SSCU on the third floor of the hospital. You will need to lie still (or keep your arm still) for the next 4 to 6 hours to minimize bleeding from the puncture site. Your family may remain in the room with you during this time.       You may be able to be discharged home that same afternoon if there is someone to drive you home and there were no complications. If you have one of the balloon, stent, or device procedures you may spend the night in the hospital. Your doctor will determine, based on your progress, the date and time of your discharge. The results of your procedure will be discussed with you before you are discharged. Any further testing or procedures will be scheduled for you either before you leave or you will be called with these appointments.       If you should have any questions, concerns, or need to change the date of your procedure, please call  ARCHANA David @ [phone]    Special  Instructions:  Drink plenty of water the day before procedure        THE ABOVE INSTRUCTIONS WERE GIVEN TO THE PATIENT VERBALLY AND THEY VERBALIZED UNDERSTANDING.  THEY DO NOT REQUIRE ANY SPECIAL NEEDS AND DO NOT HAVE ANY LEARNING BARRIERS.          Directions for Reporting to Cardiology Waiting Area in the Hospital  If you park in the Parking Garage:  Take elevators to the1st floor of the parking garage.  Continue past the gift shop, coffee shop, and piano.  Take a right and go to the gold elevators. (Elevator B)  Take the elevator to the 3rd floor.  Follow the arrow on the sign on the wall that says Cath Lab Registration/EP Lab Registration.  Follow the long hallway all the way around until you come to a big open area.  This is the registration area.  Check in at Reception Desk.    OR    If family is dropping you off:  Have them drop you off at the front of the Hospital under the green overhang.  Enter through the doors and take a right.  Take the E elevators to the 3rd floor Cardiology Waiting Area.  Check in at the Reception Desk in the waiting room.

## 2017-03-02 NOTE — PROGRESS NOTES
EDUCATION NOTE:    Mario Mahajan was seen today for pre-heart transplant education.  Patient signed wellness contract and informed consent to undergo heart transplant evaluation work-up.  Thorough pre-transplant education conducted.      Information presented included:  · Evaluation process  · Members of the transplant team  · Selection committee members and role of the committee  · Listing process for transplant  · Different listing designations, including status 7  · 1-year graft survival statistics  · LVAD as bridge to transplant or DT  · Need to reach patient within 15 minutes of donor offer  · CDC high risk donors  · Blood transfusions  · Process for matching donor with recipient  · Need for weight loss and how it relates to the wait time  · Post-transplant immunosuppression for life with need to be able to afford post-transplant medications  · Need for a caregiver to be with them at all times beginning with discharge from ICU, through at least the first 6 weeks post-transplant  · Need to find local housing for the first 6 weeks post-transplant  · How to reach team members at any time  · [employer] website with written instructions regarding how to look up information specific to [facility] transplant program    Patient was accompanied by mother.  Patient asked pertinent questions, which were answered to their satisfaction.  Patient was also given a copy of the wellness contract and informed consent to undergo evaluation work-up.

## 2017-03-02 NOTE — PROGRESS NOTES
Cardiology Clinic Note  Reason for Visit: PMVT  Referring MD: Dr Nereida Hatch    HPI:   Ms. Mario Mahajan is a 41 y.o. year old lady who is referred for evaluation of polymorphic VT by Dr Nereida Hatch.    She has a h/o of dilated CMP with chronic systolic and diastolic HF and is being evalu for advanced surgical options (LVAD/OHT). (LVEF 20%, LVEDD 7.2 cm mild MR Feb 2017). She had three episodes of VF in early Feb 2017, one of which required defibrillation from ICD. Since that time, she continues to have fatigue / dizziness despite decreasing her coreg to 3.125 BID (was 25 BID) and atacand 4 QD (valsartan 80 qd).      CPX (Jan 2017)  1) The PkVO2 was 14.1 ml/kg/min which is 42% of predicted equating to a functional capacity of 4.0 METS indicating severe functional impairment. Compared to Sept 2016 Vo2 of 12.6, this is an improvement.   2 ) The VE/VCO2 decreased by -25% from rest to AT. The VE/VCO2 [place] was 28.3.  The Resting PetCO2 was 30.8.     [facility] Sept 2015  AOPRES: 136/74 (95)  AOSAT: 100  FICKCI: 1.99  FICKCO: 4.35  PAPRES: 47/21 (32)  PASAT: 61  PVR: 2.3  PWPRES: 22/23 (21)  RAPRES: 14/14 (12)  RVPRES: 48/5, 13    She reports 10-20 min episodes of palpitations once or twice every 2 weeks    She reports she can walk 1/4-1/2 mile on a good day before stopping for dyspnea and fatigue  +2 pillow orthopnea and occasional PND  No leg swelling - recent syncopal event in the setting of PMVT with ICD discharge  No CP    ROS:    Constitution: Negative for fever or chills. Negative for weight loss or gain.   HENT: Negative for sore throat or headaches. Negative for rhinorrhea.  Eyes: Negative for blurred or double vision.   Cardiovascular: See above  Pulmonary: Negative for SOB. Negative for cough.   Gastrointestinal: Negative for abdominal pain. Negative for nausea/ vomiting. Negative for diarrhea.   : Negative for dysuria.   Neurological: Negative for focal weakness or sensory changes.  PMH:     Past Medical History:    Diagnosis Date    Asthma     Chronic back pain 2014    Chronic combined systolic and diastolic congestive heart failure 2015-10-17   1 - Severely depressed left ventricular systolic function (EF 20-25%).    2 - Severe left ventricular enlargement.    3 - Severe left atrial enlargement.    4 - Left ventricular diastolic dysfunction.    5 - The estimated PA systolic pressure is 18 mmHg.    6 - Mild mitral regurgitation.     Encounter for blood transfusion     ICD (implantable cardioverter-defibrillator) in place 12/01/15 3/3/2016    Menorrhagia, premenopausal 2/10/2017    Microcytic anemia 2015    Non-rheumatic mitral regurgitation 3/5/2015    Nonischemic dilated cardiomyopathy 2015    Syncope and collapse 2017    Ventricular tachycardia, polymorphic      Past Surgical History:   Procedure Laterality Date    CARDIAC DEFIBRILLATOR PLACEMENT  2015     SECTION      TUBAL LIGATION       Allergies:     Review of patient's allergies indicates:   Allergen Reactions    Ace inhibitors      Cough     Medications:     Current Outpatient Prescriptions on File Prior to Visit   Medication Sig Dispense Refill    albuterol 90 mcg/actuation inhaler Inhale 1-2 puffs into the lungs every 6 (six) hours as needed for Wheezing. Rescue      amiodarone (PACERONE) 400 MG tablet Take 1 tablet (400 mg total) by mouth 2 (two) times daily. (Patient taking differently: Take 200 mg by mouth 2 (two) times daily. ) 14 tablet 0    ascorbic acid, vitamin C, (VITAMIN C) 250 MG tablet Take 1 tablet (250 mg) by mouth twice daily. Take with the iron tablets. 60 tablet 3    aspirin (ECOTRIN) 81 MG EC tablet Take 1 tablet (81 mg total) by mouth once daily. 30 tablet 12    candesartan (ATACAND) 4 MG tablet Take 1 tablet (4 mg total) by mouth once daily. 90 tablet 4    carvedilol (COREG) 3.125 MG tablet Take 1 tablet (3.125 mg total) by mouth 2 (two) times daily. 180 tablet 4    ferrous sulfate  "325 (65 FE) MG EC tablet Take 1 tablet (325 mg total) by mouth 2 (two) times daily. Take with vitamin C. (Patient taking differently: 325 mg once daily. Take 1 tablet (325 mg total) by mouth 2 (two) times daily. Take with vitamin C.) 60 tablet 3    furosemide (LASIX) 40 MG tablet Take 1 tablet (40 mg total) by mouth daily as needed (Leg swelling or weight gain). 90 tablet 4    spironolactone (ALDACTONE) 25 MG tablet Take 0.5 tablets (12.5 mg total) by mouth once daily. 60 tablet 4     No current facility-administered medications on file prior to visit.      Social History:     Social History   Substance Use Topics    Smoking status: Never Smoker    Smokeless tobacco: Never Used    Alcohol use Yes      Comment: 1 glass per month     Family History:     Family History   Problem Relation Age of Onset    Diabetes Father      Physical Exam:   BP (!) 125/57 Comment: LT  Pulse 65  Ht 5' 9" (1.753 m)  Wt 109.9 kg (242 lb 4.6 oz)  LMP 01/23/2017  BMI 35.78 kg/m2     Constitutional: No acute distress, conversant  HEENT: Sclera anicteric, Pupils equal, round and reactive to light, extraocular motions intact, Oropharynx clear  Neck: JVP 8 cms, no carotid bruits  Cardiovascular: regular rate and rhythm, no murmur, rubs or gallops, normal S1/S2  Pulmonary: Clear to auscultation bilaterally  Abdominal: Abdomen soft, nontender, nondistended, positive bowel sounds  Extremities: No lower extremity edema,   Pulses: 2+ BL Radial, 2+ BL carotid, 2+ BL DP, 2+ BL PT, normal Allens Test BL  Skin: No ecchymosis, erythema, or ulcers  Psych: Alert and oriented x 3, appropriate affect  Neuro: CNII-XII intact, no focal deficits    Labs:     Lab Results   Component Value Date     02/11/2017    K 3.7 02/11/2017     02/11/2017    CO2 22 (L) 02/11/2017    BUN 12 02/11/2017    BUN 12 06/19/2015    CREATININE 0.9 02/11/2017    ANIONGAP 7 (L) 02/11/2017     Lab Results   Component Value Date    AST 13 02/11/2017    ALT 8 (L) " 02/11/2017    ALKPHOS 41 (L) 02/11/2017    BILITOT 1.1 (H) 02/11/2017    ALBUMIN 3.7 02/11/2017     Lab Results   Component Value Date    CALCIUM 8.9 02/11/2017    MG 1.9 02/11/2017    PHOS 3.5 04/13/2016     Lab Results   Component Value Date     (H) 02/11/2017     (H) 02/10/2017     (H) 01/03/2017    Lab Results   Component Value Date    WBC 8.66 02/11/2017    HGB 10.5 (L) 02/11/2017    HCT 33.7 (L) 02/11/2017     02/11/2017    GRAN 4.8 02/11/2017    GRAN 54.9 02/11/2017     Lab Results   Component Value Date    INR 1.0 02/11/2017     Lab Results   Component Value Date    CHOL 103 (L) 04/13/2016    HDL 39 (L) 04/13/2016    LDLCALC 54.2 (L) 04/13/2016    TRIG 49 04/13/2016     No results found for: HGBA1C  Lab Results   Component Value Date    TSH 1.118 02/11/2017    [id number] 5.5 08/31/2015          Imaging:       EF   Date Value Ref Range Status   02/10/2017 20 (A) 55 - 65    03/21/2016 25 (A) 55 - 65    09/30/2015 20 (A) 55 - 65          Assessment:     Plan:     40 yo lady with niCMP with recent PMVT with ICD discharge    1) PMVT in the setting of CMP  RHC to elevate for right and left filling pressures  coronary angiogram to assess for ischemic etiology for PMVT  Continue asa - no load with plavix  S/p ICD    2) iCMP with chr systolic and diastolic HF  - clinically euvolemic  - no additional diuresis for now  - RHC as above  - continue coreg and atacand    The risks, benefits, and alternatives of coronary vascular angiography and possible intervention were discussed with pt. All questions were answered and informed consent was obtained. I had a detailed discussion with the patient regarding risk of stroke, MI, bleeding access site complications, renal failure, emergent need for heart surgery, acute limb complications including ischemia and loss, contrast allergy and death. Pt understands the risks and benefits of the procedure and wishes to proceed. If stents are needed and there is  preference for STEW, pt understands that would necessitate aspirin for life with plavix for at least 1 year. Additionally, pt is aware that non compliance is likely to result in stent clotting with heart attack, heart failure, and/or death.    RRA ( 5F sheath)and R ACV access (7 F sheath)    3) iron def anemia  - increase iron supplementation to twice daily      Signed:  Elizabeth Salazar MD  Interventional Cardiology Fellow  [phone]  3/2/2017 3:06 PM    I have personally taken the history and examined this patient and agree with the resident's note as stated above.  All of the patient's questions were answered.

## 2017-03-02 NOTE — LETTER
March 4, 2017      Nereida Hatch MD  [address]  [facility] [address]           [facility]  [address]  [facility] [address]  Phone: [phone]          Patient: Mario Mahajan   MR Number: [id number]   YOB: 1975   Date of Visit: 3/2/2017       Dear Dr. Nereida Hatch:    Thank you for referring Mario Mahajan to me for evaluation. Attached you will find relevant portions of my assessment and plan of care.    If you have questions, please do not hesitate to call me. I look forward to following Mario Mahajan along with you.    Sincerely,    Salty Hatch MD    Enclosure  CC:  No Recipients    If you would like to receive this communication electronically, please contact [email or web address] or [phone] to request more information on [employer] access.    For providers and/or their staff who would like to refer a patient to [facility], please contact us through our one-stop-shop provider referral line, [facility], at [phone].    If you feel you have received this communication in error or would no longer like to receive these types of communications, please e-mail [email or web address]

## 2017-03-03 ENCOUNTER — TELEPHONE (OUTPATIENT)
Dept: TRANSPLANT | Facility: CLINIC | Age: 42
End: 2017-03-03

## 2017-03-03 NOTE — TELEPHONE ENCOUNTER
Financial clearance for outpatient eval requested.  Letter of medical necessity and clinicals given to .

## 2017-03-03 NOTE — LETTER
March 3, 2017    RE: Mario Mahajan         MRN [id number]           79             To Whom It May Concern:    Ms. Mario Mahajan is a 41 y.o. year-old female patient at [facility] and was seen for consideration for cardiac transplantation.  She has a diagnosis of Nonischemic Cardiomyopathy with three recent episodes of VF.  She is impaired in her exercise tolerance with a NYHA Functional Class IV  and a Karnofsky score of 40. Disabled; requires special care and assistance, an ejection fraction of 20%, and PkVO2 14.1 ml/kg/min.    We feel that her functional impairment and lack of advanced options make her quality of life poor.  We feel that her only option at this time, after thorough review of all of her findings, is cardiac transplantation.  Therefore, we request you review of this case and approval for a cardiac transplant evaluation.    If we can be of any further assistance, please do not hesitate to contact us at the above address.    Sincerely,          Nereida Hatch MD, [facility]  Medical Director, Pre-Transplant Program

## 2017-03-06 ENCOUNTER — TELEPHONE (OUTPATIENT)
Dept: TRANSPLANT | Facility: CLINIC | Age: 42
End: 2017-03-06

## 2017-03-06 ENCOUNTER — DOCUMENTATION ONLY (OUTPATIENT)
Dept: TRANSPLANT | Facility: CLINIC | Age: 42
End: 2017-03-06

## 2017-03-06 DIAGNOSIS — I27.89 OTHER CHRONIC PULMONARY HEART DISEASES: ICD-10-CM

## 2017-03-06 DIAGNOSIS — R06.02 SHORTNESS OF BREATH: ICD-10-CM

## 2017-03-06 DIAGNOSIS — Z79.899 ENCOUNTER FOR LONG-TERM (CURRENT) USE OF OTHER MEDICATIONS: ICD-10-CM

## 2017-03-06 DIAGNOSIS — N94.9 FEMALE GENITAL SYMPTOMS: ICD-10-CM

## 2017-03-06 DIAGNOSIS — Z72.0 NICOTINE ABUSE: ICD-10-CM

## 2017-03-06 DIAGNOSIS — I73.9 PERIPHERAL VASCULAR DISEASE: ICD-10-CM

## 2017-03-06 DIAGNOSIS — Z76.82 ORGAN TRANSPLANT CANDIDATE: Primary | ICD-10-CM

## 2017-03-06 DIAGNOSIS — I50.9 CONGESTIVE HEART FAILURE, UNSPECIFIED CONGESTIVE HEART FAILURE CHRONICITY, UNSPECIFIED CONGESTIVE HEART FAILURE TYPE: ICD-10-CM

## 2017-03-06 DIAGNOSIS — Z12.31 OTHER SCREENING MAMMOGRAM: ICD-10-CM

## 2017-03-06 DIAGNOSIS — I50.22 CHRONIC SYSTOLIC HEART FAILURE: ICD-10-CM

## 2017-03-06 NOTE — TELEPHONE ENCOUNTER
Patient notified financial clearance for OP eval received and eval testing to be scheduled. Voiced understanding.

## 2017-03-08 ENCOUNTER — HOSPITAL ENCOUNTER (OUTPATIENT)
Facility: HOSPITAL | Age: 42
Discharge: HOME | End: 2017-03-08
Attending: INTERNAL MEDICINE | Admitting: INTERNAL MEDICINE
Payer: COMMERCIAL

## 2017-03-08 VITALS
RESPIRATION RATE: 18 BRPM | SYSTOLIC BLOOD PRESSURE: 131 MMHG | WEIGHT: 232 LBS | HEART RATE: 78 BPM | BODY MASS INDEX: 34.36 KG/M2 | HEIGHT: 69 IN | DIASTOLIC BLOOD PRESSURE: 57 MMHG | OXYGEN SATURATION: 99 % | TEMPERATURE: 98 F

## 2017-03-08 DIAGNOSIS — I47.29 POLYMORPHIC VENTRICULAR TACHYCARDIA: ICD-10-CM

## 2017-03-08 DIAGNOSIS — D50.9 IRON DEFICIENCY ANEMIA: ICD-10-CM

## 2017-03-08 LAB
ABO + RH BLD: NORMAL
ANION GAP SERPL CALC-SCNC: 6 MMOL/L
BASOPHILS # BLD AUTO: 0.01 K/UL
BASOPHILS NFR BLD: 0.1 %
BLD GP AB SCN CELLS X3 SERPL QL: NORMAL
BUN SERPL-MCNC: 13 MG/DL
CALCIUM SERPL-MCNC: 8.8 MG/DL
CHLORIDE SERPL-SCNC: 107 MMOL/L
CO2 SERPL-SCNC: 23 MMOL/L
CREAT SERPL-MCNC: 0.8 MG/DL
DIFFERENTIAL METHOD: ABNORMAL
EOSINOPHIL # BLD AUTO: 0.4 K/UL
EOSINOPHIL NFR BLD: 5.1 %
ERYTHROCYTE [DISTWIDTH] IN BLOOD BY AUTOMATED COUNT: 20.4 %
EST. GFR  (AFRICAN AMERICAN): >60 ML/MIN/1.73 M^2
EST. GFR  (NON AFRICAN AMERICAN): >60 ML/MIN/1.73 M^2
GLUCOSE SERPL-MCNC: 84 MG/DL
HCT VFR BLD AUTO: 35.6 %
HGB BLD-MCNC: 11.2 G/DL
LYMPHOCYTES # BLD AUTO: 1.9 K/UL
LYMPHOCYTES NFR BLD: 27 %
MCH RBC QN AUTO: 27.2 PG
MCHC RBC AUTO-ENTMCNC: 31.5 %
MCV RBC AUTO: 86 FL
MONOCYTES # BLD AUTO: 0.4 K/UL
MONOCYTES NFR BLD: 5.7 %
NEUTROPHILS # BLD AUTO: 4.3 K/UL
NEUTROPHILS NFR BLD: 61.8 %
PLATELET # BLD AUTO: 320 K/UL
PMV BLD AUTO: 11.8 FL
POTASSIUM SERPL-SCNC: 4.5 MMOL/L
RBC # BLD AUTO: 4.12 M/UL
SODIUM SERPL-SCNC: 136 MMOL/L
WBC # BLD AUTO: 7.03 K/UL

## 2017-03-08 PROCEDURE — 25000003 PHARM REV CODE 250: Performed by: INTERNAL MEDICINE

## 2017-03-08 PROCEDURE — 99152 MOD SED SAME PHYS/QHP 5/>YRS: CPT | Mod: ,,, | Performed by: INTERNAL MEDICINE

## 2017-03-08 PROCEDURE — 25000003 PHARM REV CODE 250

## 2017-03-08 PROCEDURE — 85025 COMPLETE CBC W/AUTO DIFF WBC: CPT

## 2017-03-08 PROCEDURE — 86900 BLOOD TYPING SEROLOGIC ABO: CPT

## 2017-03-08 PROCEDURE — 93010 ELECTROCARDIOGRAM REPORT: CPT | Mod: ,,, | Performed by: INTERNAL MEDICINE

## 2017-03-08 PROCEDURE — 86850 RBC ANTIBODY SCREEN: CPT

## 2017-03-08 PROCEDURE — 80048 BASIC METABOLIC PNL TOTAL CA: CPT

## 2017-03-08 PROCEDURE — C1894 INTRO/SHEATH, NON-LASER: HCPCS

## 2017-03-08 PROCEDURE — 93005 ELECTROCARDIOGRAM TRACING: CPT

## 2017-03-08 PROCEDURE — 63600175 PHARM REV CODE 636 W HCPCS

## 2017-03-08 PROCEDURE — 93460 R&L HRT ART/VENTRICLE ANGIO: CPT | Mod: 26,,, | Performed by: INTERNAL MEDICINE

## 2017-03-08 RX ORDER — ALBUTEROL SULFATE 90 UG/1
2 AEROSOL, METERED RESPIRATORY (INHALATION) EVERY 6 HOURS PRN
Status: DISCONTINUED | OUTPATIENT
Start: 2017-03-08 | End: 2017-03-09 | Stop reason: HOSPADM

## 2017-03-08 RX ORDER — SODIUM CHLORIDE 9 MG/ML
3 INJECTION, SOLUTION INTRAVENOUS CONTINUOUS
Status: ACTIVE | OUTPATIENT
Start: 2017-03-08 | End: 2017-03-08

## 2017-03-08 RX ORDER — DIPHENHYDRAMINE HCL 50 MG
50 CAPSULE ORAL ONCE
Status: COMPLETED | OUTPATIENT
Start: 2017-03-08 | End: 2017-03-08

## 2017-03-08 RX ORDER — AMIODARONE HYDROCHLORIDE 200 MG/1
400 TABLET ORAL 2 TIMES DAILY
Status: DISCONTINUED | OUTPATIENT
Start: 2017-03-08 | End: 2017-03-09 | Stop reason: HOSPADM

## 2017-03-08 RX ORDER — CANDESARTAN 4 MG/1
4 TABLET ORAL DAILY
Status: DISCONTINUED | OUTPATIENT
Start: 2017-03-09 | End: 2017-03-09 | Stop reason: HOSPADM

## 2017-03-08 RX ORDER — CARVEDILOL 3.12 MG/1
3.12 TABLET ORAL 2 TIMES DAILY
Status: DISCONTINUED | OUTPATIENT
Start: 2017-03-08 | End: 2017-03-09 | Stop reason: HOSPADM

## 2017-03-08 RX ORDER — ASPIRIN 81 MG/1
81 TABLET ORAL DAILY
Status: DISCONTINUED | OUTPATIENT
Start: 2017-03-09 | End: 2017-03-09 | Stop reason: HOSPADM

## 2017-03-08 RX ADMIN — SODIUM CHLORIDE 3 ML/KG/HR: 0.9 INJECTION, SOLUTION INTRAVENOUS at 11:03

## 2017-03-08 RX ADMIN — DIPHENHYDRAMINE HYDROCHLORIDE 50 MG: 50 CAPSULE ORAL at 11:03

## 2017-03-08 NOTE — DISCHARGE SUMMARY
Cardiology Discharge Summary      Admit Date: 3/8/2017    Discharge Date:  3/8/2017    Attending Physician: Salty Hatch MD    Discharge Physician:   Dr. Erick Poole    Principal Diagnoses:   PMVT    Indication for Admission:   LHC/RHC    Discharged Condition:   Good    Hospital Course:   Ms Mahajan presented for LHC/RHC to evaluate etiology of PMVT and dyspnea on exertion. LHC shows normal coronary arteries. RHC shows RA 14, PW 25, mPA 37.     Outpatient Plan:  Follow up with Dr Nereida Hatch    Notable Studies:  LHC/RHC     Disposition:   Home or Self Care    Discharge Medications:      Medication List      ASK your doctor about these medications          albuterol 90 mcg/actuation inhaler       amiodarone 400 MG tablet   Commonly known as:  PACERONE   Take 1 tablet (400 mg total) by mouth 2 (two) times daily.       ascorbic acid (vitamin C) 250 MG tablet   Commonly known as:  VITAMIN C   Take 1 tablet (250 mg) by mouth twice daily. Take with the iron tablets.       aspirin 81 MG EC tablet   Commonly known as:  ECOTRIN   Take 1 tablet (81 mg total) by mouth once daily.       candesartan 4 MG tablet   Commonly known as:  ATACAND   Take 1 tablet (4 mg total) by mouth once daily.       carvedilol 3.125 MG tablet   Commonly known as:  COREG   Take 1 tablet (3.125 mg total) by mouth 2 (two) times daily.       ferrous sulfate 325 (65 FE) MG EC tablet   Take 1 tablet (325 mg total) by mouth 2 (two) times daily. Take with vitamin C.       furosemide 40 MG tablet   Commonly known as:  LASIX   Take 1 tablet (40 mg total) by mouth daily as needed (Leg swelling or weight gain).       spironolactone 25 MG tablet   Commonly known as:  ALDACTONE   Take 0.5 tablets (12.5 mg total) by mouth once daily.           Diet: heart healthy  Follow-up with Dr. KOBE Hatch in 1 week

## 2017-03-08 NOTE — PLAN OF CARE
Admit assessment done. Labs sent. IV placed x 2. Plan of care and safety prec initiated. Will continue to monitor.

## 2017-03-08 NOTE — PROGRESS NOTES
I  N  T  E  R  V  E  N  T  I  O  N  A  L       C  A  R  D  I  O  L  O  G  Y    POST PROCEDURE NOTE    03/08/2017 4:41 PM   41 y.o. female with HFrEF s/p ICD, who presents for RHC/LHC to evaluate dyspnea on exertion and PMVT.    Procedure:  RHC/LHC  Referring MD:  Dr Hatch    Indication:  PMVT   Access:  R AC vein for RHC, unsuccessfully attempted R radial artery then changed to R femoral artery for LHC    Findings:  Normal coronary arteries  mPA 37, PW 25  RV 61/5 (13)  RA 14  Ao 117/51 (77)  LVEDP 24  LVEDP:  24mmHg    Intervention:  None. Mynx used for closure.    Patient tolerated the procedure well, no complications    Post Cath Exam:  Vitals:    03/08/17 1122   BP: 123/60   Pulse:    Resp:    Temp:      No unusual pain, hematoma or thrill at vascular access site.    Distal pulse present without signs of ischemia.    Recommendation:  - discontinue fluids with PW 25  - bedrest for 4 hours following procedure  - f/u with Dr Nereida Poole MD  Cardiology Fellow, PGY-5  Pager: [phone]  3/8/2017 4:41 PM

## 2017-03-08 NOTE — H&P (VIEW-ONLY)
Cardiology Clinic Note  Reason for Visit: PMVT  Referring MD: Dr Nereida Hatch    HPI:   Ms. Mario Mahajan is a 41 y.o. year old lady who is referred for evaluation of polymorphic VT by Dr Nereida Hatch.    She has a h/o of dilated CMP with chronic systolic and diastolic HF and is being evalu for advanced surgical options (LVAD/OHT). (LVEF 20%, LVEDD 7.2 cm mild MR Feb 2017). She had three episodes of VF in early Feb 2017, one of which required defibrillation from ICD. Since that time, she continues to have fatigue / dizziness despite decreasing her coreg to 3.125 BID (was 25 BID) and atacand 4 QD (valsartan 80 qd).      CPX (Jan 2017)  1) The PkVO2 was 14.1 ml/kg/min which is 42% of predicted equating to a functional capacity of 4.0 METS indicating severe functional impairment. Compared to Sept 2016 Vo2 of 12.6, this is an improvement.   2 ) The VE/VCO2 decreased by -25% from rest to AT. The VE/VCO2 Piatt was 28.3.  The Resting PetCO2 was 30.8.     [facility] Sept 2015  AOPRES: 136/74 (95)  AOSAT: 100  FICKCI: 1.99  FICKCO: 4.35  PAPRES: 47/21 (32)  PASAT: 61  PVR: 2.3  PWPRES: 22/23 (21)  RAPRES: 14/14 (12)  RVPRES: 48/5, 13    She reports 10-20 min episodes of palpitations once or twice every 2 weeks    She reports she can walk 1/4-1/2 mile on a good day before stopping for dyspnea and fatigue  +2 pillow orthopnea and occasional PND  No leg swelling - recent syncopal event in the setting of PMVT with ICD discharge  No CP    ROS:    Constitution: Negative for fever or chills. Negative for weight loss or gain.   HENT: Negative for sore throat or headaches. Negative for rhinorrhea.  Eyes: Negative for blurred or double vision.   Cardiovascular: See above  Pulmonary: Negative for SOB. Negative for cough.   Gastrointestinal: Negative for abdominal pain. Negative for nausea/ vomiting. Negative for diarrhea.   : Negative for dysuria.   Neurological: Negative for focal weakness or sensory changes.  PMH:     Past Medical History:    Diagnosis Date    Asthma     Chronic back pain 2014    Chronic combined systolic and diastolic congestive heart failure 2015-10-17   1 - Severely depressed left ventricular systolic function (EF 20-25%).    2 - Severe left ventricular enlargement.    3 - Severe left atrial enlargement.    4 - Left ventricular diastolic dysfunction.    5 - The estimated PA systolic pressure is 18 mmHg.    6 - Mild mitral regurgitation.     Encounter for blood transfusion     ICD (implantable cardioverter-defibrillator) in place 12/01/15 3/3/2016    Menorrhagia, premenopausal 2/10/2017    Microcytic anemia 2015    Non-rheumatic mitral regurgitation 3/5/2015    Nonischemic dilated cardiomyopathy 2015    Syncope and collapse 2017    Ventricular tachycardia, polymorphic      Past Surgical History:   Procedure Laterality Date    CARDIAC DEFIBRILLATOR PLACEMENT  2015     SECTION      TUBAL LIGATION       Allergies:     Review of patient's allergies indicates:   Allergen Reactions    Ace inhibitors      Cough     Medications:     Current Outpatient Prescriptions on File Prior to Visit   Medication Sig Dispense Refill    albuterol 90 mcg/actuation inhaler Inhale 1-2 puffs into the lungs every 6 (six) hours as needed for Wheezing. Rescue      amiodarone (PACERONE) 400 MG tablet Take 1 tablet (400 mg total) by mouth 2 (two) times daily. (Patient taking differently: Take 200 mg by mouth 2 (two) times daily. ) 14 tablet 0    ascorbic acid, vitamin C, (VITAMIN C) 250 MG tablet Take 1 tablet (250 mg) by mouth twice daily. Take with the iron tablets. 60 tablet 3    aspirin (ECOTRIN) 81 MG EC tablet Take 1 tablet (81 mg total) by mouth once daily. 30 tablet 12    candesartan (ATACAND) 4 MG tablet Take 1 tablet (4 mg total) by mouth once daily. 90 tablet 4    carvedilol (COREG) 3.125 MG tablet Take 1 tablet (3.125 mg total) by mouth 2 (two) times daily. 180 tablet 4    ferrous sulfate  "325 (65 FE) MG EC tablet Take 1 tablet (325 mg total) by mouth 2 (two) times daily. Take with vitamin C. (Patient taking differently: 325 mg once daily. Take 1 tablet (325 mg total) by mouth 2 (two) times daily. Take with vitamin C.) 60 tablet 3    furosemide (LASIX) 40 MG tablet Take 1 tablet (40 mg total) by mouth daily as needed (Leg swelling or weight gain). 90 tablet 4    spironolactone (ALDACTONE) 25 MG tablet Take 0.5 tablets (12.5 mg total) by mouth once daily. 60 tablet 4     No current facility-administered medications on file prior to visit.      Social History:     Social History   Substance Use Topics    Smoking status: Never Smoker    Smokeless tobacco: Never Used    Alcohol use Yes      Comment: 1 glass per month     Family History:     Family History   Problem Relation Age of Onset    Diabetes Father      Physical Exam:   BP (!) 125/57 Comment: LT  Pulse 65  Ht 5' 9" (1.753 m)  Wt 109.9 kg (242 lb 4.6 oz)  LMP 01/23/2017  BMI 35.78 kg/m2     Constitutional: No acute distress, conversant  HEENT: Sclera anicteric, Pupils equal, round and reactive to light, extraocular motions intact, Oropharynx clear  Neck: JVP 8 cms, no carotid bruits  Cardiovascular: regular rate and rhythm, no murmur, rubs or gallops, normal S1/S2  Pulmonary: Clear to auscultation bilaterally  Abdominal: Abdomen soft, nontender, nondistended, positive bowel sounds  Extremities: No lower extremity edema,   Pulses: 2+ BL Radial, 2+ BL carotid, 2+ BL DP, 2+ BL PT, normal Allens Test BL  Skin: No ecchymosis, erythema, or ulcers  Psych: Alert and oriented x 3, appropriate affect  Neuro: CNII-XII intact, no focal deficits    Labs:     Lab Results   Component Value Date     02/11/2017    K 3.7 02/11/2017     02/11/2017    CO2 22 (L) 02/11/2017    BUN 12 02/11/2017    BUN 12 06/19/2015    CREATININE 0.9 02/11/2017    ANIONGAP 7 (L) 02/11/2017     Lab Results   Component Value Date    AST 13 02/11/2017    ALT 8 (L) " 02/11/2017    ALKPHOS 41 (L) 02/11/2017    BILITOT 1.1 (H) 02/11/2017    ALBUMIN 3.7 02/11/2017     Lab Results   Component Value Date    CALCIUM 8.9 02/11/2017    MG 1.9 02/11/2017    PHOS 3.5 04/13/2016     Lab Results   Component Value Date     (H) 02/11/2017     (H) 02/10/2017     (H) 01/03/2017    Lab Results   Component Value Date    WBC 8.66 02/11/2017    HGB 10.5 (L) 02/11/2017    HCT 33.7 (L) 02/11/2017     02/11/2017    GRAN 4.8 02/11/2017    GRAN 54.9 02/11/2017     Lab Results   Component Value Date    INR 1.0 02/11/2017     Lab Results   Component Value Date    CHOL 103 (L) 04/13/2016    HDL 39 (L) 04/13/2016    LDLCALC 54.2 (L) 04/13/2016    TRIG 49 04/13/2016     No results found for: HGBA1C  Lab Results   Component Value Date    TSH 1.118 02/11/2017    A3ZKOON 5.5 08/31/2015          Imaging:       EF   Date Value Ref Range Status   02/10/2017 20 (A) 55 - 65    03/21/2016 25 (A) 55 - 65    09/30/2015 20 (A) 55 - 65          Assessment:     Plan:     42 yo lady with niCMP with recent PMVT with ICD discharge    1) PMVT in the setting of CMP  RHC to elevate for right and left filling pressures  coronary angiogram to assess for ischemic etiology for PMVT  Continue asa - no load with plavix  S/p ICD    2) iCMP with chr systolic and diastolic HF  - clinically euvolemic  - no additional diuresis for now  - RHC as above  - continue coreg and atacand    The risks, benefits, and alternatives of coronary vascular angiography and possible intervention were discussed with pt. All questions were answered and informed consent was obtained. I had a detailed discussion with the patient regarding risk of stroke, MI, bleeding access site complications, renal failure, emergent need for heart surgery, acute limb complications including ischemia and loss, contrast allergy and death. Pt understands the risks and benefits of the procedure and wishes to proceed. If stents are needed and there is  preference for STEW, pt understands that would necessitate aspirin for life with plavix for at least 1 year. Additionally, pt is aware that non compliance is likely to result in stent clotting with heart attack, heart failure, and/or death.    RRA ( 5F sheath)and R ACV access (7 F sheath)    3) iron def anemia  - increase iron supplementation to twice daily      Signed:  Elizabeth Salazar MD  Interventional Cardiology Fellow  [phone]  3/2/2017 3:06 PM    I have personally taken the history and examined this patient and agree with the resident's note as stated above.  All of the patient's questions were answered.

## 2017-03-08 NOTE — IP AVS SNAPSHOT
[facility]  [address]  [facility] [address]  Phone: [phone]           Patient Discharge Instructions     Our goal is to set you up for success. This packet includes information on your condition, medications, and your home care. It will help you to care for yourself so you don't get sicker and need to go back to the hospital.     Please ask your nurse if you have any questions.        There are many details to remember when preparing to leave the hospital. Here is what you will need to do:    1. Take your medicine. If you are prescribed medications, review your Medication List in the following pages. You may have new medications to  at the pharmacy and others that you'll need to stop taking. Review the instructions for how and when to take your medications. Talk with your doctor or nurses if you are unsure of what to do.     2. Go to your follow-up appointments. Specific follow-up information is listed in the following pages. Your may be contacted by a transition nurse or clinical provider about future appointments. Be sure we have all of the phone numbers to reach you, if needed. Please contact your provider's office if you are unable to make an appointment.     3. Watch for warning signs. Your doctor or nurse will give you detailed warning signs to watch for and when to call for assistance. These instructions may also include educational information about your condition. If you experience any of warning signs to your health, call your doctor.               [facility]  Unless otherwise directed by your provider, please contact [facility], our nurse care line that is available for 24/7 assistance.     [phone] (toll-free)    Registered nurses in the [facility] provide clinical advisement, health education, appointment booking, and other advisory services.                    ** Verify the list of medication(s) below is accurate and up  to date. Carry this with you in case of emergency. If your medications have changed, please notify your healthcare provider.             Medication List      CHANGE how you take these medications        Additional Info                      amiodarone 400 MG tablet   Commonly known as:  PACERONE   Quantity:  14 tablet   Refills:  0   Dose:  400 mg   What changed:  how much to take    Instructions:  Take 1 tablet (400 mg total) by mouth 2 (two) times daily.     Begin Date    AM    Noon    PM    Bedtime       ferrous sulfate 325 (65 FE) MG EC tablet   Quantity:  60 tablet   Refills:  3   What changed:    - how much to take  - when to take this  - additional instructions    Instructions:  Take 1 tablet (325 mg total) by mouth 2 (two) times daily. Take with vitamin C.     Begin Date    AM    Noon    PM    Bedtime         CONTINUE taking these medications        Additional Info                      albuterol 90 mcg/actuation inhaler   Refills:  0   Dose:  1-2 puff    Instructions:  Inhale 1-2 puffs into the lungs every 6 (six) hours as needed for Wheezing. Rescue     Begin Date    AM    Noon    PM    Bedtime       ascorbic acid (vitamin C) 250 MG tablet   Commonly known as:  VITAMIN C   Quantity:  60 tablet   Refills:  3    Instructions:  Take 1 tablet (250 mg) by mouth twice daily. Take with the iron tablets.     Begin Date    AM    Noon    PM    Bedtime       aspirin 81 MG EC tablet   Commonly known as:  ECOTRIN   Quantity:  30 tablet   Refills:  12   Dose:  81 mg    Instructions:  Take 1 tablet (81 mg total) by mouth once daily.     Begin Date    AM    Noon    PM    Bedtime       candesartan 4 MG tablet   Commonly known as:  ATACAND   Quantity:  90 tablet   Refills:  4   Dose:  4 mg    Instructions:  Take 1 tablet (4 mg total) by mouth once daily.     Begin Date    AM    Noon    PM    Bedtime       carvedilol 3.125 MG tablet   Commonly known as:  COREG   Quantity:  180 tablet   Refills:  4   Dose:  3.125 mg     Instructions:  Take 1 tablet (3.125 mg total) by mouth 2 (two) times daily.     Begin Date    AM    Noon    PM    Bedtime       furosemide 40 MG tablet   Commonly known as:  LASIX   Quantity:  90 tablet   Refills:  4   Dose:  40 mg    Instructions:  Take 1 tablet (40 mg total) by mouth daily as needed (Leg swelling or weight gain).     Begin Date    AM    Noon    PM    Bedtime       spironolactone 25 MG tablet   Commonly known as:  ALDACTONE   Quantity:  60 tablet   Refills:  4   Dose:  12.5 mg    Instructions:  Take 0.5 tablets (12.5 mg total) by mouth once daily.     Begin Date    AM    Noon    PM    Bedtime                  Please bring to all follow up appointments:    1. A copy of your discharge instructions.  2. All medicines you are currently taking in their original bottles.  3. Identification and insurance card.    Please arrive 15 minutes ahead of scheduled appointment time.    Please call 24 hours in advance if you must reschedule your appointment and/or time.        Your Scheduled Appointments     Mar 21, 2017  7:30 AM CDT   Fasting Lab with LAB, APPOINT[place] [facility] ([facility])    [address] [facility] [id number] [phone]            Mar 21, 2017  7:40 AM CDT   TB Gold with LAB, APPOINTMENT [place] [facility] ([facility])    [address] [facility] [address]   [phone]            Mar 21, 2017  8:30 AM CDT   EKG with EKG, APPT   [facility] - EKG ([facility] )    [address]  [place] [address]   [phone]            Mar 21, 2017  9:00 AM CDT   Pacemaker Tune Up with PACEMAKER, ICD   [facility] )    [address]  [place] [id number] [phone]            Mar 21, 2017  9:40 AM CDT   Established Patient Visit with MD Myles Rodriguez - Arrhythmia ([facility] )    [address] [facility]  "[id number] [phone]              Follow-up Information     Follow up with Nereida Hatch MD In 2 weeks.    Specialties:  Cardiology, Transplant    Contact information:    [address]  [place] [address]  [phone]            Admission Information     Date & Time Provider Department CSN    3/8/2017 10:48 AM Salty Hatch MD [facility] [id number]      Care Providers     Provider Role Specialty Primary office phone    Salty Hatch MD Attending Provider Cardiology [phone]      Your Vitals Were     BP Pulse Temp Resp Height Weight    123/58 66 98.4 °F (36.9 °C) (Oral) 18 5' 9" (1.753 m) 105.2 kg (232 lb)    Last Period SpO2 BMI          02/22/2017 97% 34.26 kg/m2        Recent Lab Values     No lab values to display.      Allergies as of 3/8/2017        Reactions    Ace Inhibitors     Cough      Advance Directives     An advance directive is a document which, in the event you are no longer able to make decisions for yourself, tells your healthcare team what kind of treatment you do or do not want to receive, or who you would like to make those decisions for you.  If you do not currently have an advance directive, [facility] encourages you to create one.  For more information call:  [phone]), [phone] ([phone]),  or log on to [email or web address].        Language Assistance Services     ATTENTION: Language assistance services are available, free of charge. Please call [phone].      ATENCIÓN: Si habla español, tiene a peck disposición servicios gratuitos de asistencia lingüística. Llame al [phone].     CHÚ Ý: N?u b?n nói Ti?ng Vi?t, có các d?ch v? h? tr? ngôn ng? mi?n phí dành cho b?n. G?i s? [phone].        Heart Failure Education       Heart Failure: Being Active  You have a condition called heart failure. Being active doesnt mean that you have to wear yourself out. Even a little movement each day helps to " strengthen your heart. If you cant get out to exercise, you can do simple stretching and strengthening exercises at home. These are good ways to keep you well-conditioned and prevent you and your heart from becoming excessively weak.    Ideas to get you started  · Add a little movement to things you do now. Walk to mail letters. Park your car at the far end of the parking lot and walk to the store. Walk up a flight of stairs instead of taking the elevator.  · Choose activities you enjoy. You might walk, swim, or ride an exercise bike. Things like gardening and washing the car count, too. Other possibilities include: washing dishes, walking the dog, walking around the mall, and doing aerobic activities with friends.  · Join a group exercise program at a [facility] or [facility], a senior center, or a community center. Or look into a hospital cardiac rehabilitation program. Ask your doctor if you qualify.  Tips to keep you going  · Get up and get dressed each day. Go to a coffee shop and read a newspaper or go somewhere that you'll be in the presence of other active people. Youll feel more like being active.  · Make a plan. Choose one or more activities that you enjoy and that you can easily do. Then plan to do at least one each day. You might write your plan on a calendar.  · Go with a friend or a group if you like company. This can help you feel supported and stay motivated, too.  · Plan social events that you enjoy. This will keep you mentally engaged as well as physically motivated to do things you find pleasure in.  For your safety  · Talk with your healthcare provider before starting an exercise program.  · Exercise indoors when its too hot or too cold outside, or when the air quality is poor. Try walking at a shopping mall.  · Wear socks and sturdy shoes to maintain your balance and prevent falls.  · Start slowly. Do a few minutes several times a day at first. Increase your time and speed little by little.  · Stop and  rest whenever you feel tired or get short of breath.  · Dont push yourself on days when you dont feel well.  Date Last Reviewed: 3/20/2016  © 2781-0619 [employer]. [address] [place] [address], [place] [address]. All rights reserved. This information is not intended as a substitute for professional medical care. Always follow your healthcare professional's instructions.              Heart Failure: Evaluating Your Heart  You have a condition called heart failure. To evaluate your condition, your doctor will examine you, ask questions, and do some tests. Along with looking for signs of heart failure, the doctor looks for any other health problems that may have led to heart failure. The results of your evaluation will help your doctor form a treatment plan.  Health history and physical exam  Your visit will start with a health history. Tell the doctor about any symptoms youve noticed and about all medicines you take. Then youll have a physical exam. This includes listening to your heartbeat and breathing. Youll also be checked for swelling (edema) in your legs and neck. When you have fluid buildup or fluid in the lungs, it may be called congestive heart failure.  Diagnosing heart failure     During an echocardiogram, sound waves bounce off the heart. These are converted into a picture on the screen.   The following may be done to help your doctor form a diagnosis:  · X-rays show the size and shape of your heart. These pictures can also show fluid in your lungs.  · An electrocardiogram (ECG or EKG) shows the pattern of your heartbeat. Small pads (electrodes) are placed on your chest, arms, and legs. Wires connect the pads to the ECG machine, which records your hearts electrical signals. This can give the doctor information about heart function.  · An echocardiogram uses ultrasound waves to show the structure and movement of your heart muscle. This shows how well the heart pumps. It also shows the  thickness of the heart walls, and if the heart is enlarged. It is one of the most useful, non-invasive tests as it provides information about the heart's general function. This helps your doctor make treatment decisions.  · Lab tests evaluate small amounts of blood or urine for signs of problems. A BNP lab test can help diagnose and evaluate heart failure. BNP stands for B-type natriuretic peptide. The ventricles secrete more BNP when heart failure worsens. Lab tests can also provide information about metabolic dysfunction or heart dysfunction.  Your treatment plan  Based on the results of your evaluation and tests, your doctor will develop a treatment plan. This plan is designed to relieve some of your heart failure symptoms and help make you more comfortable. Your treatment plan may include:  · Medicine to help your heart work better and improve your quality of life  · Changes in what you eat and drink to help prevent fluid from backing up in your body  · Daily monitoring of your weight and heart failure symptoms to see how well your treatment plan is working  · Exercise to help you stay healthy  · Help with quitting smoking  · Emotional and psychological support to help adjust to the changes  · Referrals to other specialists to make sure you are being treated comprehensively  Date Last Reviewed: 3/21/2016  © 6203-5507 [employer]. [address] [place] [address], [place] [address]. All rights reserved. This information is not intended as a substitute for professional medical care. Always follow your healthcare professional's instructions.              Heart Failure: Making Changes to Your Diet  You have a condition called heart failure. When you have heart failure, excess fluid is more likely to build up in your body because your heart isn't working well. This makes the heart work harder to pump blood. Fluid buildup causes symptoms such as shortness of breath and swelling (edema). This is often referred to  as congestive heart failure or CHF. Controlling the amount of salt (sodium) you eat may help stop fluid from building up. Your doctor may also tell you to reduce the amount of fluid you drink.  Reading food labels    Your healthcare provider will tell you how much sodium you can eat each day. Read food labels to keep track. Keep in mind that certain foods are high in salt. These include canned, frozen, and processed foods. Check the amount of sodium in each serving. Watch out for high-sodium ingredients. These include MSG (monosodium glutamate), baking soda, and sodium phosphate.   Eating less salt  Give yourself time to get used to eating less salt. It may take a little while. Here are some tips to help:  · Take the saltshaker off the table. Replace it with salt-free herb mixes and spices.  · Eat fresh or plain frozen vegetables. These have much less salt than canned vegetables.  · Choose low-sodium snacks like sodium-free pretzels, crackers, or air-popped popcorn.  · Dont add salt to your food when youre cooking. Instead, season your foods with pepper, lemon, garlic, or onion.  · When you eat out, ask that your food be cooked without added salt.  · Avoid eating fried foods as these often have a great deal of salt.  If youre told to limit fluids  You may need to limit how much fluid you have to help prevent swelling. This includes anything that is liquid at room temperature, such as ice cream and soup. If your doctor tells you to limit fluid, try these tips:  · Measure drinks in a measuring cup before you drink them. This will help you meet daily goals.  · Chill drinks to make them more refreshing.  · Suck on frozen lemon wedges to quench thirst.  · Only drink when youre thirsty.  · Chew sugarless gum or suck on hard candy to keep your mouth moist.  · Weigh yourself daily to know if your body's fluid content is rising.  My sodium goal  Your healthcare provider may give you a sodium goal to meet each day. This  includes sodium found in food as well as salt that you add. My goal is to eat no more than ___________ mg of sodium per day.     When to call your doctor  Call your doctor right away if you have any symptoms of worsening heart failure. These can include:  · Sudden weight gain  · Increased swelling of your legs or ankles  · Trouble breathing when youre resting or at night  · Increase in the number of pillows you have to sleep on  · Chest pain, pressure, discomfort, or pain in the jaw, neck, or back   Date Last Reviewed: 3/21/2016  © 0324-1215 [employer]. [address] [place] [address], [place] [address]. All rights reserved. This information is not intended as a substitute for professional medical care. Always follow your healthcare professional's instructions.              Heart Failure: Medicines to Help Your Heart    You have a condition called heart failure (also known as congestive heart failure, or CHF). Your doctor will likely prescribe medicines for heart failure and any underlying health problems you have. Most heart failure patients take one or more types of medicinen. Your healthcare provider will work to find the combination of medicines that works best for you.  Heart failure medicines  Here are the most common heart failure medicines:  · ACE inhibitors lower blood pressure and decrease strain on the heart. This makes it easier for the heart to pump. Angiotensin receptor blockers have similar effects. These are prescribed for some patients instead of ACE inhibitors.  · Beta-blockers relieve stress on the heart. They also improve symptoms. They may also improve the heart's pumping action over time.  · Diuretics (also called water pills) help rid your body of excess water. This can help rid your body of swelling (edema). Having less fluid to pump means your heart doesnt have to work as hard. Some diuretics make your body lose a mineral called potassium. Your doctor will tell you if you need to  take supplements or eat more foods high in potassium.  · Digoxin helps your heart pump with more strength. This helps your heart pump more blood with each beat. So, more oxygen-rich blood travels to the rest of the body.  · Aldosterone antagonists help alter hormones and decrease strain on the heart.  · Hydralazine and nitrates are two separate medicines used together to treat heart failure. They may come in one combination pill. They lower blood pressure and decrease how hard the heart has to pump.  Medicines for related conditions  Controlling other heart problems helps keep heart failure under control, too. Depending on other heart problems you have, medicines may be prescribed to:  · Lower blood pressure (antihypertensives).  · Lower cholesterol levels (statins).  · Prevent blood clots (anticoagulants or aspirin).  · Keep the heartbeat steady (antiarrhythmics).  Date Last Reviewed: 3/5/2016  © 8930-1278 [employer]. [address] [place] [address]. All rights reserved. This information is not intended as a substitute for professional medical care. Always follow your healthcare professional's instructions.              Heart Failure: Procedures That May Help    The heart is a muscle that pumps oxygen-rich blood to all parts of the body. When you have heart failure, the heart is not able to pump as well as it should. Blood and fluid may back up into the lungs (congestive heart failure), and some parts of the body dont get enough oxygen-rich blood to work normally. These problems lead to the symptoms of heart failure.     Certain procedures may help the heart pump better in some cases of heart failure. Some procedures are done to treat health problems that may have caused the heart failure such as coronary artery disease or heart rhythm problems. For more serious heart failure, other options are available.  Treating artery and valve problems  If you have coronary artery disease or valve  disease, procedures may be done to improve blood flow. This helps the heart pump better, which can improve heart failure symptoms. First, your doctor may do a cardiac catheterization to help detect clogged blood vessels or valve damage. During this procedure, a  thin tube (catheter) in inserted into a blood vessel and guided to the heart. There a dye is injected and a special type of X-ray (angiogram) is taken of the blood vessels. Procedures to open a blocked artery or fix damaged valves can also be done using catheterization.  · Angioplasty uses a balloon-tipped instrument at the end of the catheter. The balloon is inflated to widen the narrowed artery. In many cases, a stent is expanded to further support the narrowed artery. A stent is a metal mesh tube.  · Valve surgery repairs or replacement of faulty valves can also be done during catheterization so blood can flow properly through the chambers of the heart.  Bypass surgery is another option to help treat blocked arteries. It uses a healthy blood vessel from elsewhere in the body. The healthy blood vessel is attached above and below the blocked area so that blood can flow around the blocked artery.  Treating heart rhythm problems  A device may be placed in the chest to help a weak heart maintain a healthy, heartbeat so the heart can pump more effectively:  · Pacemaker. A pacemaker is an implanted device that regulates your heartbeat electronically. It monitors your heart's rhythm and generates a painless electric impulse that helps the heart beat in a regular rhythm. A pacemaker is programmed to meet your specific heart rhythm needs.  · Biventricular pacing/cardiac resynchronization therapy. A type of pacemaker that paces both pumping chambers of the heart at the same time to coordinate contractions and to improve the heart's function. Some people with heart failure are candidates for this therapy.  · Implantable cardioverter defibrillator. A device similar to  a pacemaker that senses when the heart is beating too fast and delivers an electrical shock to convert the fast rhythm to a normal rhythm. This can be a life saving device.  In severe cases  In more serious cases of heart failure when other treatments no longer work, other options may include:  · Ventricular assist devices (VADs). These are mechanical devices used to take over the pumping function for one or both of the heart's ventricles, or pumping chambers. A VAD may be necessary when heart failure progresses to the point that medicines and other treatments no longer help. In some cases, a VAD may be used as a bridge to transplant.  · Heart transplant. This is replacing the diseased heart with a healthy one from a donor. This is an option for a few people who are very sick. A heart transplant is very serious and not an option for all patients. Your doctor can tell you more.  Date Last Reviewed: 3/20/2016  © 3615-5230 [employer]. [address] [place] [address]. All rights reserved. This information is not intended as a substitute for professional medical care. Always follow your healthcare professional's instructions.              Heart Failure: Tracking Your Weight  You have a condition called heart failure. When you have heart failure, a sudden weight gain or a steady rise in weight is a warning sign that your body is retaining too much water and salt. This could mean your heart failure is getting worse. If left untreated, it can cause problems for your lungs and result in shortness of breath. Weighing yourself each day is the best way to know if youre retaining water. If your weight goes up quickly, call your doctor. You will be given instructions on how to get rid of the excess water. You will likely need medicines and to avoid salt. This will help your heart work better.  Call your doctor if you gain more than 2 pounds in 1 day, more than 5 pounds in 1 week, or whatever weight gain  you were told to report by your doctor. This is often a sign of worsening heart failure and needs to be evaluated and treated. Your doctor will tell you what to do next.   Tips for weighing yourself    · Weigh yourself at the same time each morning, wearing the same clothes. Weigh yourself after urinating and before eating.  · Use the same scale each day. Make sure the numbers are easy to read. Put the scale on a flat, hard surface -- not on a rug or carpet.  · Do not stop weighing yourself. If you forget one day, weigh again the next morning.  How to use your weight chart  · Keep your weight chart near the scale. Write your weight on the chart as soon as you get off the scale.  · Fill in the month and the start date on the chart. Then write down your weight each day. Your chart will look like this:    · If you miss a day, leave the space blank. Weigh yourself the next day and write your weight in the next space.  · Take your weight chart with you when you go to see your doctor.  Date Last Reviewed: 3/20/2016  © 0323-1769 [employer]. [address] [place] [address]. All rights reserved. This information is not intended as a substitute for professional medical care. Always follow your healthcare professional's instructions.              Heart Failure: Warning Signs of a Flare-Up  You have a condition called heart failure. Once you have heart failure, flare-ups can happen. Below are signs that can mean your heart failure is getting worse. If you notice any of these warning signs, call your healthcare provider.  Swelling    · Your feet, ankles, or lower legs get puffier.  · You notice skin changes on your lower legs.  · Your shoes feel too tight.  · Your clothes are tighter in the waist.  · You have trouble getting rings on or off your fingers.  Shortness of breath  · You have to breathe harder even when youre doing your normal activities or when youre resting.  · You are short of breath  walking up stairs or even short distances.  · You wake up at night short of breath or coughing.  · You need to use more pillows or sit up to sleep.  · You wake up tired or restless.  Other warning signs  · You feel weaker, dizzy, or more tired.  · You have chest pain or changes in your heartbeat.  · You have a cough that wont go away.  · You cant remember things or dont feel like eating.  Tracking your weight  Gaining weight is often the first warning sign that heart failure is getting worse. Gaining even a few pounds can be a sign that your body is retaining excess water and salt. Weighing yourself each day in the morning after you urinate and before you eat, is the best way to know if you're retaining water. Get a scale that is easy to read and make sure you wear the same clothes and use the same scale every time you weigh. Your healthcare provider will show you how to track your weight. Call your doctor if you gain more than 2 pounds in 1 day, 5 pounds in 1 week, or whatever weight gain you were told to report by your doctor. This is often a sign of worsening heart failure and needs to be evaluated and treated before it compromises your breathing. Your doctor will tell you what to do next.    Date Last Reviewed: 3/15/2016  © 4424-7105 [employer] [address] [place] [address], [place] [address]. All rights reserved. This information is not intended as a substitute for professional medical care. Always follow your healthcare professional's instructions.               [facility] complies with applicable Federal civil rights laws and does not discriminate on the basis of race, color, national origin, age, disability, or sex.

## 2017-03-08 NOTE — INTERVAL H&P NOTE
The patient has been examined and the H&P has been reviewed:    I concur with the findings and no changes have occurred since H&P was written.    Anesthesia/Surgery risks, benefits and alternative options discussed and understood by patient/family.    Ms Mahajan presents for LHC/RHC to evaluate dyspnea on exertion and recurrent PMVT episodes.     Plan for LHC via R radial artery access with 5 Fr sheath and RHC via R AC vein with 7 Fr sheath.      Active Hospital Problems    Diagnosis  POA    Polymorphic ventricular tachycardia [I47.2]  Yes      Resolved Hospital Problems    Diagnosis Date Resolved POA   No resolved problems to display.

## 2017-03-09 NOTE — NURSING
Discharge instructions and medlist given and patient verbalized understanding.  Mom will drive patient home.  Instructed patient not to drive or operate heavy machinery for 24 hours.

## 2017-03-09 NOTE — PROGRESS NOTES
Off unit via wheelchair with JIMENA Nesbitt to front of hospital where mother is waiting with car.

## 2017-03-13 ENCOUNTER — HOSPITAL ENCOUNTER (EMERGENCY)
Facility: HOSPITAL | Age: 42
Discharge: HOME | End: 2017-03-13
Attending: EMERGENCY MEDICINE
Payer: COMMERCIAL

## 2017-03-13 ENCOUNTER — TELEPHONE (OUTPATIENT)
Dept: ADMINISTRATIVE | Facility: HOSPITAL | Age: 42
End: 2017-03-13

## 2017-03-13 VITALS
RESPIRATION RATE: 14 BRPM | HEIGHT: 69 IN | TEMPERATURE: 98 F | HEART RATE: 70 BPM | OXYGEN SATURATION: 99 % | BODY MASS INDEX: 34.07 KG/M2 | WEIGHT: 230 LBS | SYSTOLIC BLOOD PRESSURE: 121 MMHG | DIASTOLIC BLOOD PRESSURE: 60 MMHG

## 2017-03-13 DIAGNOSIS — R07.9 CHEST PAIN: ICD-10-CM

## 2017-03-13 DIAGNOSIS — R07.9 CHEST PAIN, UNSPECIFIED TYPE: Primary | ICD-10-CM

## 2017-03-13 LAB
ALBUMIN SERPL BCP-MCNC: 4.2 G/DL
ALP SERPL-CCNC: 46 IU/L
ALT SERPL W/O P-5'-P-CCNC: 20 IU/L
ANION GAP SERPL CALC-SCNC: 11 MMOL/L
APTT BLDCRRT: 26.4 SEC
AST SERPL-CCNC: 16 IU/L
BASOPHILS # BLD AUTO: 0.05 K/UL
BASOPHILS NFR BLD: 0.5 %
BILIRUB SERPL-MCNC: 0.8 MG/DL
BUN SERPL-MCNC: 16 MG/DL
CALCIUM SERPL-MCNC: 9.3 MG/DL
CHLORIDE SERPL-SCNC: 104 MMOL/L
CO2 SERPL-SCNC: 27 MMOL/L
CREAT SERPL-MCNC: 0.83 MG/DL
DIFFERENTIAL METHOD: ABNORMAL
EOSINOPHIL # BLD AUTO: 0.6 K/UL
EOSINOPHIL NFR BLD: 6.7 %
ERYTHROCYTE [DISTWIDTH] IN BLOOD BY AUTOMATED COUNT: 20.3 %
EST. GFR  (AFRICAN AMERICAN): >60 ML/MIN/1.73 M^2
EST. GFR  (NON AFRICAN AMERICAN): >60 ML/MIN/1.73 M^2
GLUCOSE SERPL-MCNC: 104 MG/DL
HCT VFR BLD AUTO: 36.4 %
HGB BLD-MCNC: 11.7 G/DL
INR PPP: 1.2
LYMPHOCYTES # BLD AUTO: 2.5 K/UL
LYMPHOCYTES NFR BLD: 26.8 %
MCH RBC QN AUTO: 28.1 PG
MCHC RBC AUTO-ENTMCNC: 32.1 %
MCV RBC AUTO: 88 FL
MONOCYTES # BLD AUTO: 0.8 K/UL
MONOCYTES NFR BLD: 8.1 %
NEUTROPHILS # BLD AUTO: 5.4 K/UL
NEUTROPHILS NFR BLD: 57.7 %
NT-PROBNP: 217 PG/ML
PLATELET # BLD AUTO: 276 K/UL
PMV BLD AUTO: 11.3 FL
POTASSIUM SERPL-SCNC: 3.8 MMOL/L
PROT SERPL-MCNC: 8.2 G/DL
PROTHROMBIN TIME: 13.8 SEC
RBC # BLD AUTO: 4.16 M/UL
SODIUM SERPL-SCNC: 142 MMOL/L
TROPONIN I SERPL DL<=0.01 NG/ML-MCNC: <0.012 NG/ML
TROPONIN I SERPL DL<=0.01 NG/ML-MCNC: <0.012 NG/ML
WBC # BLD AUTO: 9.35 K/UL

## 2017-03-13 PROCEDURE — 85025 COMPLETE CBC W/AUTO DIFF WBC: CPT

## 2017-03-13 PROCEDURE — 99284 EMERGENCY DEPT VISIT MOD MDM: CPT | Mod: 25

## 2017-03-13 PROCEDURE — 83880 ASSAY OF NATRIURETIC PEPTIDE: CPT

## 2017-03-13 PROCEDURE — 93005 ELECTROCARDIOGRAM TRACING: CPT

## 2017-03-13 PROCEDURE — 80053 COMPREHEN METABOLIC PANEL: CPT

## 2017-03-13 PROCEDURE — 63600175 PHARM REV CODE 636 W HCPCS: Performed by: EMERGENCY MEDICINE

## 2017-03-13 PROCEDURE — 96374 THER/PROPH/DIAG INJ IV PUSH: CPT

## 2017-03-13 PROCEDURE — 85610 PROTHROMBIN TIME: CPT

## 2017-03-13 PROCEDURE — 84484 ASSAY OF TROPONIN QUANT: CPT

## 2017-03-13 PROCEDURE — 94640 AIRWAY INHALATION TREATMENT: CPT

## 2017-03-13 PROCEDURE — 25000242 PHARM REV CODE 250 ALT 637 W/ HCPCS: Performed by: EMERGENCY MEDICINE

## 2017-03-13 PROCEDURE — 85730 THROMBOPLASTIN TIME PARTIAL: CPT

## 2017-03-13 RX ORDER — IPRATROPIUM BROMIDE AND ALBUTEROL SULFATE 2.5; .5 MG/3ML; MG/3ML
3 SOLUTION RESPIRATORY (INHALATION)
Status: COMPLETED | OUTPATIENT
Start: 2017-03-13 | End: 2017-03-13

## 2017-03-13 RX ORDER — MORPHINE SULFATE 2 MG/ML
2 INJECTION, SOLUTION INTRAMUSCULAR; INTRAVENOUS
Status: COMPLETED | OUTPATIENT
Start: 2017-03-13 | End: 2017-03-13

## 2017-03-13 RX ADMIN — IPRATROPIUM BROMIDE AND ALBUTEROL SULFATE 3 ML: .5; 3 SOLUTION RESPIRATORY (INHALATION) at 01:03

## 2017-03-13 RX ADMIN — MORPHINE SULFATE 2 MG: 2 INJECTION, SOLUTION INTRAMUSCULAR; INTRAVENOUS at 04:03

## 2017-03-13 NOTE — ED NOTES
2nd Troponin collected and sent to lab.  Patient remains pain free, no other c/o at this time.  CM showing SR no ectopy, NAD noted, respirations even/unlabored.

## 2017-03-13 NOTE — DISCHARGE INSTRUCTIONS

## 2017-03-13 NOTE — ED NOTES
Patient c/o sudden onset sharp, stabbing mid sternal non radiating chest pain 7/10.  CM showing SR no ectopy, 2nd troponin resulted as negative.  Dr. Thomason notified, order for Morphine 2 mg IVP given.

## 2017-03-13 NOTE — ED AVS SNAPSHOT
[facility]  [address]  [place] [address]               Mario Mahajan   3/13/2017 12:06 AM   ED    Description:  Female : 1975   Department:  [facility]           Your Care was Coordinated By:     Provider Role From To    Lisset Thomason MD Attending Provider 17 0005 --      Reason for Visit     Chest Pain           Diagnoses this Visit        Comments    Chest pain, unspecified type    -  Primary     Chest pain           ED Disposition     ED Disposition Condition Comment    Discharge             To Do List           Follow-up Information     Follow up with Primary Doctor No In 1 week(s).      [facility]     [facility] -  Assistance  Registered nurses in the [facility] provide clinical advisement, health education, appointment booking, and other advisory services.  Call for this free service at [phone].             Medications           Message regarding Medications     Verify the changes and/or additions to your medication regime listed below are the same as discussed with your clinician today.  If any of these changes or additions are incorrect, please notify your healthcare provider.        These medications were administered today        Dose Freq    albuterol-ipratropium 2.5mg-0.5mg/3mL nebulizer solution 3 mL 3 mL ED 1 Time    Sig: Take 3 mLs by nebulization ED 1 Time.    Class: Normal    Route: Nebulization    morphine injection 2 mg 2 mg ED 1 Time    Sig: Inject 1 mL (2 mg total) into the vein ED 1 Time.    Class: Normal    Route: Intravenous           Verify that the below list of medications is an accurate representation of the medications you are currently taking.  If none reported, the list may be blank. If incorrect, please contact your healthcare provider. Carry this list with you in case of emergency.           Current Medications     ascorbic acid, vitamin C, (VITAMIN C) 250  "MG tablet Take 1 tablet (250 mg) by mouth twice daily. Take with the iron tablets.    aspirin (ECOTRIN) 81 MG EC tablet Take 1 tablet (81 mg total) by mouth once daily.    candesartan (ATACAND) 4 MG tablet Take 1 tablet (4 mg total) by mouth once daily.    carvedilol (COREG) 3.125 MG tablet Take 1 tablet (3.125 mg total) by mouth 2 (two) times daily.    ferrous sulfate 325 (65 FE) MG EC tablet Take 1 tablet (325 mg total) by mouth 2 (two) times daily. Take with vitamin C.    furosemide (LASIX) 40 MG tablet Take 1 tablet (40 mg total) by mouth daily as needed (Leg swelling or weight gain).    spironolactone (ALDACTONE) 25 MG tablet Take 0.5 tablets (12.5 mg total) by mouth once daily.    albuterol 90 mcg/actuation inhaler Inhale 1-2 puffs into the lungs every 6 (six) hours as needed for Wheezing. Rescue    amiodarone (PACERONE) 400 MG tablet Take 1 tablet (400 mg total) by mouth 2 (two) times daily.           Clinical Reference Information           Your Vitals Were     BP Pulse Temp Resp Height Weight    113/57 (BP Location: Right arm, Patient Position: Sitting, BP Method: Automatic) 75 97.9 °F (36.6 °C) (Oral) 18 5' 9" (1.753 m) 104.3 kg (230 lb)    Last Period SpO2 BMI          02/22/2017 99% 33.97 kg/m2        Allergies as of 3/13/2017        Reactions    Ace Inhibitors     Cough      Immunizations Administered on Date of Encounter - 3/13/2017     None      ED Micro, Lab, POCT     Start Ordered       Status Ordering Provider    03/13/17 0330 03/13/17 0220  Troponin I  Once      Final result     03/13/17 0155 03/13/17 0154  Brain natriuretic peptide  Add-on      Completed     03/13/17 0023 03/13/17 0022  CBC auto differential  STAT      Final result     03/13/17 0023 03/13/17 0022  Comprehensive metabolic panel  STAT      Final result     03/13/17 0023 03/13/17 0022  Troponin I  STAT      Final result     03/13/17 0023 03/13/17 0022  APTT  Once      Final result     03/13/17 0023 03/13/17 0022  Protime-INR  STAT   "    Final result     03/13/17 0022 03/13/17 0022  NT-Pro Natriuretic Peptide  Once      Final result       ED Imaging Orders     Start Ordered       Status Ordering Provider    03/13/17 0120 03/13/17 0120  X-Ray Chest PA And Lateral  1 time imaging      In process         Discharge Instructions           Uncertain Causes of Chest Pain    Chest pain can happen for a number of reasons. Sometimes the cause can't be determined. If your condition does not seem serious, and your pain does not appear to be coming from your heart, your healthcare provider may recommend watching it closely. Sometimes the signs of a serious problem take more time to appear. Many problems not related to your heart can cause chest pain.These include:  · Musculoskeletal. Costochondritis, an inflammation of the tissues around the ribs that can occur from trauma or overuse injuries  · Respiratory. Pneumonia, pneumothorax, or pneumonitis (inflammation of the lining of the chest and lungs)  · Gastrointestinal. Esophageal reflux, heartburn, or gallbladder disease  · Anxiety and panic disorders  · Nerve compression and neuritis  · Miscellaneous problems such as aortic aneurysm or pulmonary embolism (a blood clot in the lungs)  Home care  After your visit, follow these recommendations:  · Rest today and avoid strenuous activity.  · Take any prescribed medicine as directed.  · Be aware of any recurrent chest pain and notice any changes  Follow-up care  Follow up with your healthcare provider if you do not start to feel better within 24 hours, or as advised.  Call 911  Call 911 if any of these occur:  · A change in the type of pain: if it feels different, becomes more severe, lasts longer, or begins to spread into your shoulder, arm, neck, jaw or back  · Shortness of breath or increased pain with breathing  · Weakness, dizziness, or fainting  · Rapid heart beat  · Crushing sensation in your chest  When to seek medical advice  Call your healthcare provider  right away if any of the following occur:  · Cough with dark colored sputum (phlegm) or blood  · Fever of 100.4ºF (38ºC) or higher, or as directed by your healthcare provider  · Swelling, pain or redness in one leg  · Shortness of breath  Date Last Reviewed: 12/30/2015  © 3005-2763 [employer] [address] [place] [address]. All rights reserved. This information is not intended as a substitute for professional medical care. Always follow your healthcare professional's instructions.          Your Scheduled Appointments     Mar 21, 2017  7:30 AM CDT   Fasting Lab with LAB, APPOINTMENT [place] [facility] ([facility])    [address] [facility] [address]   [phone]            Mar 21, 2017  7:40 AM CDT   TB Gold with LAB, APPOINTMENT [place] [facility] ([facility])    [address] [facility] [address]   [phone]            Mar 21, 2017  8:30 AM CDT   EKG with EKG, APPT   [facility] EKG ([facility] )    [address]  [place] [address]   [phone]            Mar 21, 2017  9:00 AM CDT   Pacemaker Tune Up with PACEMAKER, ICD   [facility] - Arrhythmia ([facility] )    [address]  [place] [address]   [phone]            Mar 21, 2017  9:40 AM CDT   Established Patient Visit with Victorino Tay MD   [facility] Arrhythmia ([facility] )    [address]  [place] [address]   [phone]               [facility] complies with applicable Federal civil rights laws and does not discriminate on the basis of race, color, national origin, age, disability, or sex.        Language Assistance Services     ATTENTION: Language assistance services are available, free of charge. Please call [phone].      ATENCIÓN: Si habla español, tiene a peck disposición servicios gratuitos de asistencia lingüística. Llame al  [phone].     SAMREEN Ý: N?u b?n nói Ti?ng Vi?t, có các d?ch v? h? tr? ngôn ng? mi?n phí dành cho b?n. G?i s? [phone].

## 2017-03-13 NOTE — ED NOTES
Patient states that she is feeling much better, now chest pain free.  Given d/c instructions and scripts, patient stated understanding.  D/C'ed home with friend as , all questions answered.  Gait steady to exit.

## 2017-03-13 NOTE — ED NOTES
Patient remains pain free at this time, CM showing SR< no ectopy noted.  Nebulizer treatments have finished, patient stating she feels better.  Awaiting further orders, will continue to closely monitor.

## 2017-03-13 NOTE — ED PROVIDER NOTES
Encounter Date: 3/13/2017       History     Chief Complaint   Patient presents with    Chest Pain     Sharp, non radiating left sided chest pain x20 minutes PTA; no SOB or BYRON, states feeling slightly lightheaded.  Patient released [facility] one week ago after having syncopal episode with defibrillator firing x1.       Review of patient's allergies indicates:   Allergen Reactions    Ace inhibitors      Cough     Patient is a 41 y.o. female presenting with the following complaint: chest pain. The history is provided by the patient.   Chest Pain   The current episode started several hours ago. Duration of episode(s) is 6 hours. Chest pain occurs intermittently. The chest pain is unchanged. At its most intense, the chest pain is at 6/10. The chest pain is currently at 0/10. The quality of the pain is described as sharp. The pain does not radiate. Chest pain is worsened by deep breathing. Pertinent negatives for primary symptoms include no fever, no fatigue, no syncope, no shortness of breath, no cough, no wheezing, no palpitations, no abdominal pain, no nausea, no vomiting, no dizziness and no altered mental status.   Pertinent negatives for associated symptoms include no claudication, no diaphoresis and no near-syncope. She tried nothing for the symptoms. Risk factors include obesity and sedentary lifestyle.   Her past medical history is significant for arrhythmia, CHF, hypertension and pacemaker.   Pertinent negatives for past medical history include no CAD, no cancer, no COPD, no diabetes, no hyperlipidemia, no Marfan's syndrome, no MI and no mitral valve prolapse.     Past Medical History:   Diagnosis Date    Asthma     Chronic back pain 7/1/2014    Chronic combined systolic and diastolic congestive heart failure 4/28/2015     2-10-17   1 - Severely depressed left ventricular systolic function (EF 20-25%).    2 - Severe left ventricular enlargement.    3 - Severe left atrial enlargement.    4 - Left  ventricular diastolic dysfunction.    5 - The estimated PA systolic pressure is 18 mmHg.    6 - Mild mitral regurgitation.     Encounter for blood transfusion     ICD (implantable cardioverter-defibrillator) in place 12/01/15 3/3/2016    Menorrhagia, premenopausal 2/10/2017    Microcytic anemia 2015    Non-rheumatic mitral regurgitation 3/5/2015    Nonischemic dilated cardiomyopathy 2015    Syncope and collapse 2017    Ventricular tachycardia, polymorphic      Past Surgical History:   Procedure Laterality Date    CARDIAC DEFIBRILLATOR PLACEMENT  2015     SECTION      TUBAL LIGATION       Family History   Problem Relation Age of Onset    Diabetes Father      Social History   Substance Use Topics    Smoking status: Never Smoker    Smokeless tobacco: Never Used    Alcohol use Yes      Comment: 1 glass per month     Review of Systems   Constitutional: Negative for diaphoresis, fatigue and fever.   Respiratory: Negative for cough, shortness of breath and wheezing.    Cardiovascular: Positive for chest pain. Negative for palpitations, claudication, syncope and near-syncope.   Gastrointestinal: Negative for abdominal pain, nausea and vomiting.   Neurological: Negative for dizziness.       Physical Exam   Initial Vitals   BP Pulse Resp Temp SpO2   17 0015 17 0015 17 0015 17 0015 --   149/84 79 20 97.9 °F (36.6 °C)      Physical Exam    ED Course   Procedures  Labs Reviewed   CBC W/ AUTO DIFFERENTIAL - Abnormal; Notable for the following:        Result Value    Hemoglobin 11.7 (*)     Hematocrit 36.4 (*)     RDW 20.3 (*)     Eos # 0.6 (*)     All other components within normal limits   PROTIME-INR - Abnormal; Notable for the following:     Prothrombin Time 13.8 (*)     All other components within normal limits   COMPREHENSIVE METABOLIC PANEL   TROPONIN I   APTT     EKG Readings: (Independently Interpreted)   Initial Reading: No STEMI. Rhythm: Normal Sinus  Rhythm. Heart Rate: 77. Ectopy: No Ectopy. Conduction: Normal. ST Segments: Normal ST Segments. T Waves: Normal.       X-Rays:   Independently Interpreted Readings:   Chest X-Ray: Normal heart size.  No infiltrates.  No acute abnormalities.     Medical Decision Making:   Clinical Tests:   Lab Tests: Ordered and Reviewed  Radiological Study: Ordered and Reviewed  Medical Tests: Ordered and Reviewed                   ED Course     Clinical Impression:   The primary encounter diagnosis was Chest pain, unspecified type. A diagnosis of Chest pain was also pertinent to this visit.    Disposition:   Disposition: Discharged  Condition: Stable       Lisset Thomason MD  03/13/17 0427

## 2017-03-14 ENCOUNTER — LAB VISIT (OUTPATIENT)
Dept: LAB | Facility: HOSPITAL | Age: 42
End: 2017-03-14
Attending: INTERNAL MEDICINE
Payer: COMMERCIAL

## 2017-03-14 ENCOUNTER — TELEPHONE (OUTPATIENT)
Dept: TRANSPLANT | Facility: CLINIC | Age: 42
End: 2017-03-14

## 2017-03-14 DIAGNOSIS — I51.7 CARDIOMEGALY: ICD-10-CM

## 2017-03-14 DIAGNOSIS — D50.9 MICROCYTIC ANEMIA: ICD-10-CM

## 2017-03-14 DIAGNOSIS — G89.29 CHRONIC BACK PAIN: ICD-10-CM

## 2017-03-14 DIAGNOSIS — D64.9 ANEMIA: ICD-10-CM

## 2017-03-14 DIAGNOSIS — M62.838 MUSCLE SPASM OF BOTH LOWER LEGS: ICD-10-CM

## 2017-03-14 DIAGNOSIS — M54.9 CHRONIC BACK PAIN: ICD-10-CM

## 2017-03-14 DIAGNOSIS — I50.22 CHRONIC SYSTOLIC CONGESTIVE HEART FAILURE: ICD-10-CM

## 2017-03-14 LAB
ANION GAP SERPL CALC-SCNC: 8 MMOL/L
BUN SERPL-MCNC: 13 MG/DL
CALCIUM SERPL-MCNC: 8.9 MG/DL
CHLORIDE SERPL-SCNC: 107 MMOL/L
CO2 SERPL-SCNC: 24 MMOL/L
CREAT SERPL-MCNC: 0.8 MG/DL
EST. GFR  (AFRICAN AMERICAN): >60 ML/MIN/1.73 M^2
EST. GFR  (NON AFRICAN AMERICAN): >60 ML/MIN/1.73 M^2
GLUCOSE SERPL-MCNC: 75 MG/DL
POTASSIUM SERPL-SCNC: 3.9 MMOL/L
SODIUM SERPL-SCNC: 139 MMOL/L

## 2017-03-14 PROCEDURE — 36415 COLL VENOUS BLD VENIPUNCTURE: CPT

## 2017-03-14 PROCEDURE — 80048 BASIC METABOLIC PNL TOTAL CA: CPT

## 2017-03-14 NOTE — TELEPHONE ENCOUNTER
Discussed with Dr. KOBE Hatch who advised that she is ok to f/u at previously scheduled appt but would like lab today to f/u [facility] w/dye on 3/8.  Lab scheduled for this afternoon as she advised that she was on her way here already.  Understanding verbalized. Discussed with primary coordinator.

## 2017-03-14 NOTE — TELEPHONE ENCOUNTER
----- Message from Margi Umana RN sent at 3/14/2017  9:33 AM CDT -----      ----- Message -----     From: Diana Huang RN     Sent: 3/9/2017   8:25 AM       To: Margi Umana, RN, Jazmin Rivera LPN, #    Please see below. We cathed her yesterday. Please let me know when this is set up. Thanks.   ----- Message -----     From: Salty Hatch MD     Sent: 3/8/2017  11:40 PM       To: Diana Huang, RN    Please arrange follow-up with this patient with Dr. KOBE Hatch in 1 week

## 2017-03-15 ENCOUNTER — DOCUMENTATION ONLY (OUTPATIENT)
Dept: TRANSPLANT | Facility: CLINIC | Age: 42
End: 2017-03-15

## 2017-03-17 ENCOUNTER — OFFICE VISIT (OUTPATIENT)
Dept: TRANSPLANT | Facility: CLINIC | Age: 42
End: 2017-03-17
Payer: COMMERCIAL

## 2017-03-17 ENCOUNTER — HOSPITAL ENCOUNTER (OUTPATIENT)
Dept: RADIOLOGY | Facility: HOSPITAL | Age: 42
Discharge: HOME | End: 2017-03-17
Attending: INTERNAL MEDICINE
Payer: COMMERCIAL

## 2017-03-17 ENCOUNTER — TELEPHONE (OUTPATIENT)
Dept: TRANSPLANT | Facility: CLINIC | Age: 42
End: 2017-03-17

## 2017-03-17 ENCOUNTER — CLINICAL SUPPORT (OUTPATIENT)
Dept: ELECTROPHYSIOLOGY | Facility: CLINIC | Age: 42
End: 2017-03-17
Payer: COMMERCIAL

## 2017-03-17 ENCOUNTER — SOCIAL WORK (OUTPATIENT)
Dept: TRANSPLANT | Facility: CLINIC | Age: 42
End: 2017-03-17
Payer: COMMERCIAL

## 2017-03-17 VITALS
DIASTOLIC BLOOD PRESSURE: 55 MMHG | HEART RATE: 56 BPM | HEIGHT: 69 IN | WEIGHT: 241.19 LBS | SYSTOLIC BLOOD PRESSURE: 116 MMHG | BODY MASS INDEX: 35.72 KG/M2

## 2017-03-17 DIAGNOSIS — I50.42 CHRONIC COMBINED SYSTOLIC AND DIASTOLIC CONGESTIVE HEART FAILURE: ICD-10-CM

## 2017-03-17 DIAGNOSIS — I50.9 CONGESTIVE HEART FAILURE, UNSPECIFIED CONGESTIVE HEART FAILURE CHRONICITY, UNSPECIFIED CONGESTIVE HEART FAILURE TYPE: ICD-10-CM

## 2017-03-17 DIAGNOSIS — I34.0 NON-RHEUMATIC MITRAL REGURGITATION: Chronic | ICD-10-CM

## 2017-03-17 DIAGNOSIS — I47.29 VENTRICULAR TACHYCARDIA, POLYMORPHIC: ICD-10-CM

## 2017-03-17 DIAGNOSIS — I50.22 CHRONIC SYSTOLIC HEART FAILURE: ICD-10-CM

## 2017-03-17 DIAGNOSIS — Z76.82 ORGAN TRANSPLANT CANDIDATE: ICD-10-CM

## 2017-03-17 DIAGNOSIS — I42.0 NONISCHEMIC DILATED CARDIOMYOPATHY: Chronic | ICD-10-CM

## 2017-03-17 DIAGNOSIS — R42 DIZZINESS: ICD-10-CM

## 2017-03-17 DIAGNOSIS — Z76.82 ORGAN TRANSPLANT CANDIDATE: Primary | ICD-10-CM

## 2017-03-17 DIAGNOSIS — Z95.810 ICD (IMPLANTABLE CARDIOVERTER-DEFIBRILLATOR) IN PLACE: Chronic | ICD-10-CM

## 2017-03-17 DIAGNOSIS — I47.29 VENTRICULAR TACHYCARDIA, POLYMORPHIC: Primary | ICD-10-CM

## 2017-03-17 PROCEDURE — 74176 CT ABD & PELVIS W/O CONTRAST: CPT | Mod: TC

## 2017-03-17 PROCEDURE — 71250 CT THORAX DX C-: CPT | Mod: 26,,, | Performed by: RADIOLOGY

## 2017-03-17 PROCEDURE — 70450 CT HEAD/BRAIN W/O DYE: CPT | Mod: 26,,, | Performed by: RADIOLOGY

## 2017-03-17 PROCEDURE — 70450 CT HEAD/BRAIN W/O DYE: CPT | Mod: TC

## 2017-03-17 PROCEDURE — 1160F RVW MEDS BY RX/DR IN RCRD: CPT | Mod: S$GLB,,, | Performed by: INTERNAL MEDICINE

## 2017-03-17 PROCEDURE — 71250 CT THORAX DX C-: CPT | Mod: TC

## 2017-03-17 PROCEDURE — 99999 PR PBB SHADOW E&M-EST. PATIENT-LVL III: CPT | Mod: PBBFAC,,, | Performed by: INTERNAL MEDICINE

## 2017-03-17 PROCEDURE — 74176 CT ABD & PELVIS W/O CONTRAST: CPT | Mod: 26,,, | Performed by: RADIOLOGY

## 2017-03-17 PROCEDURE — 99214 OFFICE O/P EST MOD 30 MIN: CPT | Mod: S$GLB,,, | Performed by: INTERNAL MEDICINE

## 2017-03-17 PROCEDURE — 93282 PRGRMG EVAL IMPLANTABLE DFB: CPT | Mod: S$GLB,,, | Performed by: INTERNAL MEDICINE

## 2017-03-17 NOTE — PROGRESS NOTES
Left Ventricular Assist Device (LVAD) and Transplant Recipient Adult Psychosocial Assessment    Mario Mahajan  [address] [facility] [address]    Telephone Information:   Mobile [phone]   Home  No landline  Work  There is no work phone number on file.  E-mail  [email or web address]    Sex: female  YOB: 1975  Age: 41 y.o.    Encounter Date: 3/17/2017  [place] Citizen: yes  Primary Language: English   Needed: no    Emergency Contact:  Name: Mario Wade   Relationship: mother  Address: [place], [place]  Phone Numbers:  [phone] (mobile)    Family/Social Support:   Number of dependents/: Pt reports 2 children, Lesa age 13 who still lives with pt and Michael age 22.  Marital history: Pt reports one previous marriage and  for 2 years. Pt reports currently in a serious relationship.  Other family dynamics: Pt reports father is . Pt reports good relationship with mother. Pt reprots 2 brothers, one twin and one older. Pt reports older brother can assist with dtr, Lesa, when pt is in hospital.     Household Composition:  Name: Lesa  Age: 13  Relationship: daughter  Does person drive? no    Do you and your caregivers have access to reliable transportation? yes.   PRIMARY CAREGIVER: Mario Wade, pt's mother, will be primary caregiver, phone number [phone].      provided in-depth information to Patient and Caregiver regarding  regarding pre- and post-LVAD and pre- and post-transplant caregiver role.   strongly encourages Patient and Caregiver to have concrete plan regarding post-transplant care giving, including back-up caregiver(s) to ensure care giving needs are met as needed.    Patient and Caregiver states understanding all aspects of caregiver role/commitment and is able/willing/committed to being caregiver to the fullest extent necessary. .      Patient and Caregiver verbalizes understanding of the education provided today and caregiver  "responsibilities.       remains available. Patient and Caregiver agree to contact  in a timely manner if concerns arise.      Able to take time off work without financial concerns: yes. Pt's mother reports working 3 days a week and taking time off of work will be "no problem."    Additional Significant Others who will Assist with LVAD/Transplant: Pt reports unable to identify backup caregiver at thi time. Pt reports will discuss with family and notify SW when back up caregiver is identified.    Living Will: no  Healthcare Power of : yes. Pt reports mother is HCPA.  Advance Directives on file: <<no information> per medical record.  Verbally reviewed LW/HCPA information.   provided patient with copy of LW/HCPA documents and provided education on completion of forms    Highest Education Level: High School (9-12) or GED Pt reports completed 11th grade.  Reading Ability: 12th grade  Reports difficulty with: N/A  Learns Best By:  Multiple methods     Status: no  VA Benefits: no     Working for Income: No  If no, reason not working: Demands of Treatment. Pt reports stopped working as a  last month.   Spouse/Significant Other Employment: N/A    Disabled: no. Pt reports stopped working last month. Pt reports applied for STD at work and is waiting on claim to be processed. Pt reports not sure if she has LTD benefits. RALF advised pt to follow up with human resources department at work to find out about LTD benefits. RALF also provided information on local  office to apply for disability.     Monthly Income:  Pt reports as of last month income is $0. Pt reports in the process of applying for food stamps.   Able to afford all costs now and if transplanted or receives LVAD, including medications: no.   Pt reports secure power source? yes  Pt reports ability to afford monthly electric bill? Pt reports was able to afford electric bill until last month when pt " "stopped working. Pt reports finances were "tight" prior to losing income, and pt is not sure of how she will afford bills at this time.    Pt reports ability to afford LVAD dressing supplies? no. Pt reports cannot afford any extra expenses at this time.  Patient and Caregiver verbalizes understanding of personal responsibilities related to LVAD and transplant costs and the importance of having a financial plan to ensure that patients LVAD and transplant costs are fully covered.       provided fundraising information/education.  Patient and Caregiver verbalizes understanding.   remains available.    Insurance:   Payor/Plan Subscr  Sex Relation Sub. Ins. ID Effective Group Num   1. BLUE CROSS BL* ERICA WALLS ALL* 1975 Female  [id number]                                   [address] [address]     Primary Insurance (for UNOS reporting): Private Insurance  Secondary Insurance (for UNOS reporting): None     Pt still needs to meet with  to determine insurance coverage and costs associated with medical care.    Patient and Caregiver verbalizes clear understanding that patient may experience difficulty obtaining and/or be denied insurance coverage post-surgery. This includes and is not limited to disability insurance, life insurance, health insurance, burial insurance, long term care insurance, and other insurances.      Patient and Caregiver also reports understanding that future health concerns related to or unrelated to LVAD or transplantation may not be covered by patient's insurance.  Resources and information provided and reviewed.      Patient and Caregiver provides verbal permission to release any necessary information to outside resources for patient care and discharge planning.  Resources and information provided are reviewed.      Infusion Service: patient utilizing? no  Home Health: patient utilizing? no  DME: no  Pulmonary/Cardiac Rehab: no "   ADLS:  Pt reports independent with ADLs. Pt reports stopped driving in February due to severity of medical condition.    Adherence:   Pt reports high level of adherence to health care regimen.  Adherence education and counseling provided.     Per History Section:  Past Medical History:   Diagnosis Date    Asthma     Chronic back pain 7/1/2014    Chronic combined systolic and diastolic congestive heart failure 4/28/2015     2-10-17   1 - Severely depressed left ventricular systolic function (EF 20-25%).    2 - Severe left ventricular enlargement.    3 - Severe left atrial enlargement.    4 - Left ventricular diastolic dysfunction.    5 - The estimated PA systolic pressure is 18 mmHg.    6 - Mild mitral regurgitation.     Encounter for blood transfusion     ICD (implantable cardioverter-defibrillator) in place 12/01/15 3/3/2016    Menorrhagia, premenopausal 2/10/2017    Microcytic anemia 2/9/2015    Non-rheumatic mitral regurgitation 3/5/2015    Nonischemic dilated cardiomyopathy 12/1/2015    Syncope and collapse 2/9/2017    Ventricular tachycardia, polymorphic      Social History   Substance Use Topics    Smoking status: Never Smoker    Smokeless tobacco: Never Used    Alcohol use Yes      Comment: 1 glass per month     History   Drug Use No     History   Sexual Activity    Sexual activity: Yes    Partners: Male       Per Today's Psychosocial:  Tobacco: none, patient denies any use.  Alcohol: Pt reports drinking approximately 1 glass of wine per month.  Illicit Drugs/Non-prescribed Medications: none, patient denies any use.    Patient and Caregiver states clear understanding of the potential impact of substance use as it relates to LVAD and transplant candidacy and is aware of possible random substance screening.  Substance abstinence/cessation counseling, education and resources provided and reviewed.     Arrests/DWI/Treatment/Rehab: patient denies    Psychiatric History:    Mental Health:  depression and anxiety  Psychiatrist/Counselor: pt denies. Pt reports interest in visiting with psychiatrist for medication managemnet and is also open to meeting with a counselor. Pt reports concern about being able to afford mental health care. Pt asked SW to assist in making an appointment with [facility] department.   Medications:  Pt reports taking Atavan for approximately 1 month last year.  Suicide/Homicide Issues: pt denies  Safety at home: pt reprots safe at home.    Knowledge: Patient and Caregiver states having clear understanding and realistic expectations regarding the potential risks and potential benefits LVAD implantation and organ transplantation and organ donation and agrees to discuss with health care team members and support system members, as well as to utilize available resources and express questions and/or concerns in order to further facilitate the pt informed decision-making.  Resources and information provided and reviewed.     Patient and Caregiver is aware of [facility]'s affiliation and/or partnership with agencies in home health care, LTAC, SNF, [facility] and other hospitals and clinics.    Understanding: Patient and Caregiver reports having a clear understanding of the many lifetime commitments involved with being an LVAD and transplant recipient, including costs, compliance, medications, lab work, procedures, appointments, concrete and financial planning, preparedness, timely and appropriate communication of concerns, abstinence (ETOH, tobacco, illicit non-prescribed drugs), adherence to all health care team recommendations, support system and caregiver involvement, appropriate and timely resource utilization and follow-through, mental health counseling as needed/recommended, and patient and caregiver responsibilities.  Social Service Handbook, resources and detailed educational information provided and reviewed.  Educational information provided.    Patient and Caregiver also  reports current and expected compliance with health care regime and states having a clear understanding of the importance of compliance.       Patient and Caregiver reports a clear understanding that risks and benefits may be involved with LVAD heart failure treatment and organ transplantation and with organ donation.     Patient and Caregiver also reports clear understanding that psychosocial risk factors may affect patient, and include but are not limited to feelings of depression, generalized anxiety, anxiety regarding dependence on others, post traumatic stress disorder, feelings of guilt and other emotional and/or mental concerns, and/or exacerbation of existing mental health concerns.  Detailed resources provided and discussed.      Patient and Caregiver agrees to access appropriate resources in a timely manner as needed and/or as recommended, and to communicate concerns appropriately.     Patient and Caregiver also reports a clear understanding of treatment options available.      Feelings or Concerns: Pt reports concerns about having either LVAD surgery or transplant.     Coping: Pt reports some difficulty coping at this time. Pt reports feelings of anxiety and depression. Pt reports interest in mental health follow up at [facility]. SW made referral to [facility] psychiat[facility] and is awaiting confirmation of appointment. SW providing extensive counseling and emotional support.     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      Interview Behavior: Patient and Caregiver presents as alert and oriented x 4, pleasant, well groomed, asking and answering questions appropriately, and tearful at times.           Transplant Social Work - Candidacy  Assessment/Plan:     Psychosocial Suitability: Patient presents as NOT  a suitable candidate for LVAD or heart transplant at this time. Based on psychosocial risk factors, patient presents as high risk, due to no income and no back up caregiver.      Recommendations/Additional Comments: Pt recently stopped working and does not have any disability payments at this time. Pt reports unsure of how she will pay her bills, and reports inability to afford any new expenses at this time. SW recommending pt apply for disability at the  office (information provided). SW also recommending pt speak to HR department at work to determine if she qualifies  for LTD or STD payments. SW also recommending pt begin fundraising, fundraising information provided. SW recommending pt follow up with [facility],  to determine costs associated with transplant. SW also recommending pt identify a back up caregiver, care giving information provided to pt and mother.     Tabitha Garner, JANETT

## 2017-03-17 NOTE — LETTER
March 21, 2017        Juanjose Nuñez  [address]  [facility] [address]  Phone: [phone]  Fax: [phone]             [facility] [address]  [facility] [address]  Phone: [phone]   Patient: Mario Mahajan   MR Number: [id number]   YOB: 1975   Date of Visit: 3/17/2017       Dear Dr. Juanjose Nuñez    Thank you for referring Mario Mahajan to me for evaluation. Attached you will find relevant portions of my assessment and plan of care.    If you have questions, please do not hesitate to call me. I look forward to following Mario Mahajan along with you.    Sincerely,    Jeimy Srivastava MD    Enclosure    If you would like to receive this communication electronically, please contact [email or web address] or [phone] to request [employer] Link access.    [employer] Link is a tool which provides read-only access to select patient information with whom you have a relationship. Its easy to use and provides real time access to review your patients record including encounter summaries, notes, results, and demographic information.    If you feel you have received this communication in error or would no longer like to receive these types of communications, please e-mail [email or web address]

## 2017-03-20 ENCOUNTER — TELEPHONE (OUTPATIENT)
Dept: TRANSPLANT | Facility: CLINIC | Age: 42
End: 2017-03-20

## 2017-03-20 DIAGNOSIS — Z76.82 ORGAN TRANSPLANT CANDIDATE: Primary | ICD-10-CM

## 2017-03-21 ENCOUNTER — OFFICE VISIT (OUTPATIENT)
Dept: ELECTROPHYSIOLOGY | Facility: CLINIC | Age: 42
End: 2017-03-21
Payer: COMMERCIAL

## 2017-03-21 ENCOUNTER — HOSPITAL ENCOUNTER (OUTPATIENT)
Dept: CARDIOLOGY | Facility: CLINIC | Age: 42
Discharge: HOME | End: 2017-03-21
Payer: COMMERCIAL

## 2017-03-21 ENCOUNTER — CLINICAL SUPPORT (OUTPATIENT)
Dept: ELECTROPHYSIOLOGY | Facility: CLINIC | Age: 42
End: 2017-03-21
Payer: COMMERCIAL

## 2017-03-21 ENCOUNTER — HOSPITAL ENCOUNTER (OUTPATIENT)
Dept: PULMONOLOGY | Facility: CLINIC | Age: 42
Discharge: HOME | End: 2017-03-21
Payer: COMMERCIAL

## 2017-03-21 ENCOUNTER — CLINICAL SUPPORT (OUTPATIENT)
Dept: CARDIOLOGY | Facility: CLINIC | Age: 42
End: 2017-03-21
Payer: COMMERCIAL

## 2017-03-21 ENCOUNTER — HOSPITAL ENCOUNTER (OUTPATIENT)
Dept: RADIOLOGY | Facility: HOSPITAL | Age: 42
Discharge: HOME | End: 2017-03-21
Attending: INTERNAL MEDICINE
Payer: COMMERCIAL

## 2017-03-21 VITALS
DIASTOLIC BLOOD PRESSURE: 63 MMHG | HEIGHT: 69 IN | WEIGHT: 239.63 LBS | HEART RATE: 58 BPM | BODY MASS INDEX: 35.49 KG/M2 | SYSTOLIC BLOOD PRESSURE: 108 MMHG

## 2017-03-21 VITALS — WEIGHT: 240.31 LBS | HEIGHT: 66 IN | BODY MASS INDEX: 38.62 KG/M2

## 2017-03-21 DIAGNOSIS — R42 DIZZINESS: ICD-10-CM

## 2017-03-21 DIAGNOSIS — I42.0 DCM (DILATED CARDIOMYOPATHY): ICD-10-CM

## 2017-03-21 DIAGNOSIS — I42.9 CARDIOMYOPATHY, UNSPECIFIED TYPE: ICD-10-CM

## 2017-03-21 DIAGNOSIS — Z76.82 ORGAN TRANSPLANT CANDIDATE: ICD-10-CM

## 2017-03-21 DIAGNOSIS — I50.22 CHRONIC SYSTOLIC HEART FAILURE: ICD-10-CM

## 2017-03-21 DIAGNOSIS — I42.9 CARDIOMYOPATHY, UNSPECIFIED TYPE: Primary | ICD-10-CM

## 2017-03-21 DIAGNOSIS — Z95.810 ICD (IMPLANTABLE CARDIOVERTER-DEFIBRILLATOR) IN PLACE: ICD-10-CM

## 2017-03-21 DIAGNOSIS — I42.0 NONISCHEMIC DILATED CARDIOMYOPATHY: Chronic | ICD-10-CM

## 2017-03-21 DIAGNOSIS — Z95.810 ICD (IMPLANTABLE CARDIOVERTER-DEFIBRILLATOR) IN PLACE: Chronic | ICD-10-CM

## 2017-03-21 DIAGNOSIS — I50.42 CHRONIC COMBINED SYSTOLIC AND DIASTOLIC CONGESTIVE HEART FAILURE: Primary | ICD-10-CM

## 2017-03-21 DIAGNOSIS — I47.29 POLYMORPHIC VENTRICULAR TACHYCARDIA: ICD-10-CM

## 2017-03-21 DIAGNOSIS — I50.9 CONGESTIVE HEART FAILURE, UNSPECIFIED CONGESTIVE HEART FAILURE CHRONICITY, UNSPECIFIED CONGESTIVE HEART FAILURE TYPE: ICD-10-CM

## 2017-03-21 LAB
INTERNAL CAROTID STENOSIS: ABNORMAL
PRE FEV1 FVC: 71
PRE FEV1: 1.95
PRE FVC: 2.75
PREDICTED FEV1 FVC: 83
PREDICTED FEV1: 3
PREDICTED FVC: 3.6
VASCULAR ANKLE BRACHIAL INDEX (ABI) LEFT: 1.1 (ref 0.9–1.2)

## 2017-03-21 PROCEDURE — 94010 BREATHING CAPACITY TEST: CPT | Mod: 59,S$GLB,, | Performed by: INTERNAL MEDICINE

## 2017-03-21 PROCEDURE — 1160F RVW MEDS BY RX/DR IN RCRD: CPT | Mod: S$GLB,,, | Performed by: INTERNAL MEDICINE

## 2017-03-21 PROCEDURE — 99999 PR PBB SHADOW E&M-EST. PATIENT-LVL III: CPT | Mod: PBBFAC,,, | Performed by: INTERNAL MEDICINE

## 2017-03-21 PROCEDURE — 76536 US EXAM OF HEAD AND NECK: CPT | Mod: TC

## 2017-03-21 PROCEDURE — 94620 PR PULMONARY STRESS TESTING,SIMPLE: CPT | Mod: S$GLB,,, | Performed by: INTERNAL MEDICINE

## 2017-03-21 PROCEDURE — 36600 WITHDRAWAL OF ARTERIAL BLOOD: CPT | Mod: 59,S$GLB,, | Performed by: INTERNAL MEDICINE

## 2017-03-21 PROCEDURE — 82803 BLOOD GASES ANY COMBINATION: CPT | Mod: S$GLB,,, | Performed by: INTERNAL MEDICINE

## 2017-03-21 PROCEDURE — 76536 US EXAM OF HEAD AND NECK: CPT | Mod: 26,,, | Performed by: RADIOLOGY

## 2017-03-21 PROCEDURE — 93224 XTRNL ECG REC UP TO 48 HRS: CPT | Mod: S$GLB,,, | Performed by: INTERNAL MEDICINE

## 2017-03-21 PROCEDURE — 93922 UPR/L XTREMITY ART 2 LEVELS: CPT | Mod: S$GLB,,, | Performed by: INTERNAL MEDICINE

## 2017-03-21 PROCEDURE — 94727 GAS DIL/WSHOT DETER LNG VOL: CPT | Mod: S$GLB,,, | Performed by: INTERNAL MEDICINE

## 2017-03-21 PROCEDURE — 93000 ELECTROCARDIOGRAM COMPLETE: CPT | Mod: S$GLB,,, | Performed by: INTERNAL MEDICINE

## 2017-03-21 PROCEDURE — 94729 DIFFUSING CAPACITY: CPT | Mod: S$GLB,,, | Performed by: INTERNAL MEDICINE

## 2017-03-21 PROCEDURE — 99214 OFFICE O/P EST MOD 30 MIN: CPT | Mod: S$GLB,,, | Performed by: INTERNAL MEDICINE

## 2017-03-21 PROCEDURE — 93880 EXTRACRANIAL BILAT STUDY: CPT | Mod: S$GLB,,, | Performed by: INTERNAL MEDICINE

## 2017-03-21 NOTE — Clinical Note
Jeimy,  No arrhythmias since we started amiodarone. She has significant PTSD from the shocks. Do you have any particular anti-anxiety agent that prefer for HF patients or would you defer to psychiatry.   Thanks, Cuate

## 2017-03-21 NOTE — PROGRESS NOTES
Subjective:    Patient ID:  Mario Mahajan is a 41 y.o. female who presents for follow-up of ICD      HPI Comments: 41 yoF NICM, EF 20%, NYHA Class II-III here for ICD consideration. She was diagnosed with NICM early 2015 and was started on goal directed medical therapy. .She had persistent LV dysfunction on echo last month despite her medical treatment. A PET stress test 6/15  revealed no ischemia. Her EF was 20% on today's echo. She is here for ICD consideration. She has a brother and father with history of ICD implantation. No known SCD in the family. She denies history of syncope, near syncope, palpitations. She has dyspnea on exertion.      Cardiologist: Dr Srivastava    3/16: ICD implanted 12/1/15. Course complicated by inappropriate shock due to lead displacement. She underwent lead revision 1/5/16. Per her request, tachytherapies were turned off until 6 weeks post revision. Last week, 2/24/16, her tachytherapies were turned on. Her device interrogation showed normal SC ICD function, no arrhythmias. She feels fatigued and is sluggish at times. She has developed cough that has been chronic.     Interval history: She presented 2/17 with syncope and was admitted for further evaluation after VT/VF events were detected on her ICD. First was on 2/5/2017, second on 2/10/2017 which required no shocks. She had a VF episode on 2/9/2017 at 7:37 PM requiring a VF shock. This coincided with her syncopal event. Otherwise has had several NSVT episodes since her last interrogation on 12/12/2016 (15 total), as well as a sustained VT episode at 164  bpm on 12/17/2016 requiring no shocks that lasted 117 seconds. Amiodarone 400 mg once a day was added. Amiodarone dropped to 200 mg once a day. She still has palpitations, no syncope, near syncope. ICD check 3/17/17 showed no VT, VF or NSVT events. She has dizziness and lightheadedness but no recurrent syncope.       Echo 2015:  CONCLUSIONS   1 - Severe globular left ventricular  enlargement.   2 - Severely depressed left ventricular systolic function (EF 20-25%).   3 - Normal right ventricular systolic function .   4 - Biatrial enlargement.   5 - Severe mitral regurgitation.   6 - Mildly elevated central venous pressure.     Past Medical History:  No date: Asthma  2014: Chronic back pain  2015: Chronic combined systolic and diastolic conges*      Comment:  2-10-17   1 - Severely depressed left                ventricular systolic function (EF 20-25%).    2               - Severe left ventricular enlargement.    3 -                Severe left atrial enlargement.    4 - Left                ventricular diastolic dysfunction.    5 - The                estimated PA systolic pressure is 18 mmHg.    6               - Mild mitral regurgitation.   No date: Encounter for blood transfusion  3/3/2016: ICD (implantable cardioverter-defibrillator) i*  2/10/2017: Menorrhagia, premenopausal  2015: Microcytic anemia  3/5/2015: Non-rheumatic mitral regurgitation  2015: Nonischemic dilated cardiomyopathy  2017: Syncope and collapse  No date: Ventricular tachycardia, polymorphic    Past Surgical History:  2015: CARDIAC DEFIBRILLATOR PLACEMENT  No date:  SECTION  No date: TUBAL LIGATION    Social History    Marital status: Single              Spouse name:                       Years of education:                 Number of children: 2             Occupational History  Occupation          Employer            Comment                                   [employer]*     Social History Main Topics    Smoking status: Never Smoker                                                                Smokeless status: Never Used                        Alcohol use: Yes                Comment: 1 glass per month    Drug use: No              Sexual activity: Yes               Partners with: Male    Other Topics            Concern    None on file    Social History Narrative    None on  file    Review of patient's family history indicates:    Diabetes                       Father                        Review of Systems   Constitution: Positive for weakness and malaise/fatigue.   HENT: Negative.    Eyes: Negative.    Cardiovascular: Positive for dyspnea on exertion. Negative for chest pain, irregular heartbeat, near-syncope, palpitations and syncope.   Respiratory: Negative.    Endocrine: Negative.    Hematologic/Lymphatic: Negative.    Skin: Negative.    Musculoskeletal: Negative.    Gastrointestinal: Negative.    Genitourinary: Negative.    Psychiatric/Behavioral: Negative.    Allergic/Immunologic: Negative.         Objective:    Physical Exam   Constitutional: She is oriented to person, place, and time. She appears well-developed and well-nourished. No distress.   HENT:   Head: Normocephalic and atraumatic.   Mouth/Throat: No oropharyngeal exudate.   Eyes: Conjunctivae and EOM are normal. Pupils are equal, round, and reactive to light. Right eye exhibits no discharge. Left eye exhibits no discharge.   Neck: Normal range of motion. Neck supple. No JVD present. No thyromegaly present.   Cardiovascular: Normal rate, regular rhythm and normal heart sounds.    No murmur heard.  Pulmonary/Chest: Effort normal and breath sounds normal. No respiratory distress. She has no wheezes.   Abdominal: Soft. Bowel sounds are normal. She exhibits no distension. There is no tenderness.   Musculoskeletal: Normal range of motion. She exhibits no edema.   Neurological: She is alert and oriented to person, place, and time. No cranial nerve deficit.   Skin: Skin is warm and dry. No rash noted. She is not diaphoretic. No erythema.   Psychiatric: She has a normal mood and affect. Her behavior is normal. Judgment and thought content normal.   Vitals reviewed.    ECG: NSR nl MN, QRS, QTc        Assessment:       1. Chronic combined systolic and diastolic congestive heart failure    2. ICD (implantable  cardioverter-defibrillator) in place 12/01/15    3. Nonischemic dilated cardiomyopathy    4. Polymorphic ventricular tachycardia         Plan:       41 yoF NICM, s/p ICD, PMVT with appropriate shock here for post hospitalization visit. No recurrent events on amiodarone 200 mg qd. Will continue current therapy. RTC 3m with ICD check. She had many questions regarding LVAD and transplant. Also asked for anxiety medications. Will discuss with Dr Srivastava.

## 2017-03-21 NOTE — MR AVS SNAPSHOT
[facility]  [address]  [facility] [address]  Phone: [phone]  Fax: [phone]                  Mario Mahajan   3/21/2017 9:40 AM   Office Visit    Description:  Female : 1975   Provider:  Victorino Tay MD   Department:  [facility]           Reason for Visit     ICD           Diagnoses this Visit        Comments    Chronic combined systolic and diastolic congestive heart failure    -  Primary     ICD (implantable cardioverter-defibrillator) in place         Nonischemic dilated cardiomyopathy         Polymorphic ventricular tachycardia                To Do List           Future Appointments        Provider Department Dept Phone    3/21/2017 11:00 AM VASCULAR, CARDIOLOGY [facility] Vascular Cardiology [phone]    3/21/2017 1:00 PM VASCULAR, CARDIOLOGY [facility] Vascular Cardiology [phone]    3/21/2017 2:30 PM PULMONARY FUNCTION [facility] Pulmonary Lab [phone]    3/21/2017 3:40 PM SIX, MINUTE WALK [facility] Pulmonary Lab [phone]    3/21/2017 4:15 PM [facility] 11 ALL [facility] [phone]      Goals (5 Years of Data)     None      Follow-Up and Disposition     Return in about 3 months (around 2017).      [facility]     [facility] Care [facility] - 24/7 Assistance  Registered nurses in the [facility] provide clinical advisement, health education, appointment booking, and other advisory services.  Call for this free service at [phone].             Medications           Message regarding Medications     Verify the changes and/or additions to your medication regime listed below are the same as discussed with your clinician today.  If any of these changes or additions are incorrect, please notify your healthcare provider.             Verify that the below list of medications is an accurate representation of the medications you are currently taking.  If none reported, the list may be  "blank. If incorrect, please contact your healthcare provider. Carry this list with you in case of emergency.           Current Medications     albuterol 90 mcg/actuation inhaler Inhale 1-2 puffs into the lungs every 6 (six) hours as needed for Wheezing. Rescue    amiodarone (PACERONE) 400 MG tablet Take 1 tablet (400 mg total) by mouth 2 (two) times daily.    ascorbic acid, vitamin C, (VITAMIN C) 250 MG tablet Take 1 tablet (250 mg) by mouth twice daily. Take with the iron tablets.    aspirin (ECOTRIN) 81 MG EC tablet Take 1 tablet (81 mg total) by mouth once daily.    candesartan (ATACAND) 4 MG tablet Take 1 tablet (4 mg total) by mouth once daily.    carvedilol (COREG) 3.125 MG tablet Take 1 tablet (3.125 mg total) by mouth 2 (two) times daily.    ferrous sulfate 325 (65 FE) MG EC tablet Take 1 tablet (325 mg total) by mouth 2 (two) times daily. Take with vitamin C.    furosemide (LASIX) 40 MG tablet Take 1 tablet (40 mg total) by mouth daily as needed (Leg swelling or weight gain).    spironolactone (ALDACTONE) 25 MG tablet Take 0.5 tablets (12.5 mg total) by mouth once daily.           Clinical Reference Information           Your Vitals Were     BP Pulse Height Weight Last Period BMI    108/63 58 5' 9" (1.753 m) 108.7 kg (239 lb 10.2 oz) 02/22/2017 35.39 kg/m2      Blood Pressure          Most Recent Value    BP  108/63      Allergies as of 3/21/2017     Ace Inhibitors      Immunizations Administered on Date of Encounter - 3/21/2017     None      Maintenance Dialysis History     Patient has no recorded history of maintenance dialysis.      Transplant Information        Txp Date Organ Coordinator Care Team     Heart Marsha Ruiz RN Referring Physician:  Juanjose Nuñez MD   Corresponding Physician:  Juanjose Nuñez MD         Language Assistance Services     ATTENTION: Language assistance services are available, free of charge. Please call [phone].      ATENCIÓN: Si enzo stroud a peck disposición " servicios gratuitos de asistencia lingüística. Siobhan monsivais [phone].     SAMREEN Ý: N?u b?n nói Ti?ng Vi?t, có các d?ch v? h? tr? ngôn ng? mi?n phí dành cho b?n. G?i s? [phone].         [facility] complies with applicable Federal civil rights laws and does not discriminate on the basis of race, color, national origin, age, disability, or sex.

## 2017-03-22 ENCOUNTER — HOSPITAL ENCOUNTER (OUTPATIENT)
Dept: RADIOLOGY | Facility: HOSPITAL | Age: 42
Discharge: HOME | End: 2017-03-22
Attending: INTERNAL MEDICINE
Payer: COMMERCIAL

## 2017-03-22 ENCOUNTER — NUTRITION (OUTPATIENT)
Dept: NUTRITION | Facility: CLINIC | Age: 42
End: 2017-03-22
Payer: COMMERCIAL

## 2017-03-22 VITALS — HEIGHT: 66 IN | WEIGHT: 239.44 LBS | BODY MASS INDEX: 38.48 KG/M2

## 2017-03-22 DIAGNOSIS — I50.22 CHRONIC SYSTOLIC HEART FAILURE: ICD-10-CM

## 2017-03-22 DIAGNOSIS — Z76.82 AWAITING ORGAN TRANSPLANT STATUS: ICD-10-CM

## 2017-03-22 DIAGNOSIS — Z76.82 ORGAN TRANSPLANT CANDIDATE: ICD-10-CM

## 2017-03-22 DIAGNOSIS — I50.22 CHRONIC SYSTOLIC HEART FAILURE: Primary | ICD-10-CM

## 2017-03-22 DIAGNOSIS — E66.9 OBESITY (BMI 30-39.9): ICD-10-CM

## 2017-03-22 DIAGNOSIS — Z00.8 NUTRITIONAL ASSESSMENT: ICD-10-CM

## 2017-03-22 PROCEDURE — 77067 SCR MAMMO BI INCL CAD: CPT | Mod: TC

## 2017-03-22 PROCEDURE — 99999 PR PBB SHADOW E&M-EST. PATIENT-LVL III: CPT | Mod: PBBFAC,,,

## 2017-03-22 PROCEDURE — 77067 SCR MAMMO BI INCL CAD: CPT | Mod: 26,,, | Performed by: RADIOLOGY

## 2017-03-22 PROCEDURE — 97802 MEDICAL NUTRITION INDIV IN: CPT | Mod: S$GLB,,, | Performed by: DIETITIAN, REGISTERED

## 2017-03-22 NOTE — PROGRESS NOTES
Subjective:   Initial evaluation of heart transplant candidacy.    HPI:  Ms. Mahajan is a 41 y.o. year old Black or  female who has presents to be considered for advanced surgical options (LVAD/OHT).    Nonischemic CHF (LVEF 20%, LVEDD 7.2 cm mild MR Feb 2017) who had three episodes of VF in early Feb 2017, one of which required defibrillation from ICD. Since then, she continues to be symptomatic from heart failure standpoint with significant fatigue, dizziness/lightheadedness despite dropping coreg dose, and atacand dose. Dr. Hatch had previously talked to her about her iron deficiency anemia being an etiology of her symptoms, and per the note states she was going to have a hysterectomy followed by eval for advanced options if her symptoms persisted. I have not seen this patient in clinic in over a year, but it appears she has declined functionally quite a bit since my last visit with her. Has had most recently an admission for VF 02/17 as mentioned earlier, with VF 02/09/17, coinciding with a syncopal event. Since then, she has had significant difficulty sleeping, worried she will get shocked or worse die in her sleep, worried about how she is doing and what the next steps are, admits to PTSD from the ICD shocks (had inappropriate shock back in 2016 soon after implant, requiring lead revision). Very tearful in clinic, difficult for her to get through the visit. When asked about proceeding with advanced options, she was noncomittal about proceeding, then admitted she was ready to proceed with evaluation. Is also interested in seeing psych or psychology.     CPX (Jan 2017)  1) The PkVO2 was 14.1 ml/kg/min which is 42% of predicted equating to a functional capacity of 4.0 METS indicating severe functional impairment.   Compared to Sept 2016 Vo2 of 12.6, this is an improvement.   2 ) The VE/VCO2 decreased by -25% from rest to AT. The VE/VCO2 [place] was 28.3.  The Resting PetCO2 was 30.8.    LHC/RHC  17:  Normal coronary arteries  LVEDP (Pre): 25 mmHg  PA: 54/30 (40)  PW:  (25)  RV: 54  RA:  (17)  PA_SAT: 66  AO_SAT: 92  AO: 117/51 (77)  LVEDP: 25    CONDITION 1:  FICKCI: 3.3600  FICKCO: 7.3900    Past Medical History:   Diagnosis Date    Asthma     Chronic back pain 2014    Chronic combined systolic and diastolic congestive heart failure 2015     2-10-17   1 - Severely depressed left ventricular systolic function (EF 20-25%).    2 - Severe left ventricular enlargement.    3 - Severe left atrial enlargement.    4 - Left ventricular diastolic dysfunction.    5 - The estimated PA systolic pressure is 18 mmHg.    6 - Mild mitral regurgitation.     Encounter for blood transfusion     ICD (implantable cardioverter-defibrillator) in place 12/01/15 3/3/2016    Menorrhagia, premenopausal 2/10/2017    Microcytic anemia 2015    Non-rheumatic mitral regurgitation 3/5/2015    Nonischemic dilated cardiomyopathy 2015    Syncope and collapse 2017    Ventricular tachycardia, polymorphic      Past Surgical History:   Procedure Laterality Date    CARDIAC DEFIBRILLATOR PLACEMENT  2015     SECTION      TUBAL LIGATION         Family History   Problem Relation Age of Onset    Diabetes Father        Review of Systems   Constitution: Positive for weight gain. Negative for chills, decreased appetite, diaphoresis, fever, weakness and weight loss.   HENT: Negative for headaches.    Eyes: Negative for visual disturbance.   Cardiovascular: Positive for dyspnea on exertion. Negative for chest pain, cyanosis, irregular heartbeat, leg swelling, near-syncope, orthopnea, palpitations, paroxysmal nocturnal dyspnea and syncope.   Respiratory: Negative for cough, shortness of breath, sleep disturbances due to breathing, snoring, sputum production and wheezing.    Hematologic/Lymphatic: Negative for adenopathy and bleeding problem. Does not bruise/bleed easily.   Skin: Negative for color  "change, poor wound healing, rash, skin cancer and suspicious lesions.   Musculoskeletal: Negative for back pain, falls, gout, joint pain, muscle weakness and myalgias.   Gastrointestinal: Negative for bloating, abdominal pain, anorexia, constipation, diarrhea, heartburn, hematemesis, hematochezia, hemorrhoids, jaundice, melena, nausea and vomiting.   Genitourinary: Negative for nocturia.   Neurological: Negative for excessive daytime sleepiness, dizziness, focal weakness, light-headedness, paresthesias and tremors.   Psychiatric/Behavioral: Positive for depression. Negative for memory loss. The patient does not have insomnia and is not nervous/anxious.        Objective:     Physical Exam   Constitutional: She appears well-developed and well-nourished. No distress.   /64  Pulse 76  Ht 5' 9" (1.753 m)  Wt 107.5 kg (236 lb 15.9 oz)  LMP 01/23/2017  BMI 35 kg/m2     HENT:   Head: Normocephalic and atraumatic. Head is without abrasion and without contusion.   Right Ear: External ear normal.   Left Ear: External ear normal.   Nose: Nose normal. No epistaxis.   Mouth/Throat: Oropharynx is clear and moist. Mucous membranes are not cyanotic.   Eyes: Conjunctivae and EOM are normal. Pupils are equal, round, and reactive to light.   Neck: Normal range of motion. Neck supple. No tracheal deviation present.   Cardiovascular: Normal rate, regular rhythm, normal heart sounds and normal pulses.  Exam reveals no gallop.    No murmur heard.  Pulmonary/Chest: Effort normal and breath sounds normal. No stridor. No respiratory distress. She has no wheezes.   Abdominal: Soft. Normal appearance, normal aorta and bowel sounds are normal. She exhibits no distension. There is no tenderness.   Musculoskeletal: She exhibits no edema or tenderness.   Neurological: She is alert. She has normal strength and normal reflexes. She exhibits normal muscle tone.   Skin: Skin is warm. No rash noted. No erythema.   Psychiatric: She has a normal " mood and affect. Her speech is normal and behavior is normal. Judgment and thought content normal. Cognition and memory are normal.       Labs:      Chemistry        Component Value Date/Time     03/14/2017 1410    K 3.9 03/14/2017 1410     03/14/2017 1410    CO2 24 03/14/2017 1410    BUN 13 03/14/2017 1410    BUN 12 06/19/2015 0950    CREATININE 0.8 03/14/2017 1410    GLU 75 03/14/2017 1410        Component Value Date/Time    CALCIUM 8.9 03/14/2017 1410    ALKPHOS 46 03/13/2017 0037    AST 16 03/13/2017 0037    ALT 20 03/13/2017 0037    BILITOT 1.0 03/21/2017 0820          Magnesium   Date Value Ref Range Status   02/11/2017 1.9 1.6 - 2.6 mg/dL Final     Lab Results   Component Value Date    WBC 9.35 03/13/2017    HGB 11.7 (L) 03/13/2017    HCT 36.4 (L) 03/13/2017    MCV 88 03/13/2017     03/13/2017     BNP   Date Value Ref Range Status   02/11/2017 158 (H) 0 - 99 pg/mL Final     Comment:     Values of less than 100 pg/ml are consistent with non-CHF populations.   02/10/2017 257 (H) 0 - 99 pg/mL Final     Comment:     Values of less than 100 pg/ml are consistent with non-CHF populations.   01/03/2017 345 (H) 0 - 99 pg/mL Final     Comment:     Values of less than 100 pg/ml are consistent with non-CHF populations.       Assessment:      1. Ventricular tachycardia, polymorphic    2. ICD (implantable cardioverter-defibrillator) in place 12/01/15    3. Chronic combined systolic and diastolic congestive heart failure    4. Nonischemic dilated cardiomyopathy    5. Non-rheumatic mitral regurgitation        Plan:   Very concerned about her VF shock (patient describes more than what we have recorded I think) and how it comes into play with MCS.   Would like to send over for pacemaker interrogation since she feels her palpitations more often, additionally would like to place Holter if nothing seen on device.  Had transplant coordinator meet with her to discuss beginning evaluation, which needs to start  soon. Patient is ready to go ahead with it. Please ask for financial clearance as an outpatient.   Discussed at length her difficulty sleeping and essentially PTSD from her device shocks, would like to refer her to Dr. Stanton, but also will ask our transplant team or defer to Dr. Stanton about preference of drug for PTSD/anxiety before starting.   Patient is now NYHA IV ACC stage D  Recommend 2 gram sodium restriction and 1500cc fluid restriction.  Encourage physical activity with graded exercise program.  Requested patient to weigh themselves daily, and to notify us if their weight increases by more than 3 lbs in 1 day or 5 lbs in 1 week.     Transplant Candidacy: Patient is a 41 y.o. year old female with heart failure is being seen for possible LVAD and OHT. In my opinion, she is  a suitable LVAD and OHT candidate. Patient did meet with  pre-transplant coordinator at the end of this visit for workup. she is scheduled for LVAD OHT work up once she sings consent   Hope that MCS meets next time as she seem overwhelmed by process today    Jeimy Srivastava MD

## 2017-03-22 NOTE — PROGRESS NOTES
"Referring Physician:Nereida Hatch MD     Reason for visit:  Chief Complaint   Patient presents with    Heart Transplant Pre-evaluation    Obesity    Congestive Heart Failure    Nutrition Counseling        :1975     Allergies Reviewed  Meds Reviewed    Anthropometrics  Weight:108.6 kg (239 lb 6.7 oz)  Height:5' 6" (1.676 m)  BMI:Body mass index is 38.64 kg/(m^2).   IBW:   59.0 kg    Meds:  Outpatient Medications Prior to Visit   Medication Sig Dispense Refill    albuterol 90 mcg/actuation inhaler Inhale 1-2 puffs into the lungs every 6 (six) hours as needed for Wheezing. Rescue      amiodarone (PACERONE) 400 MG tablet Take 1 tablet (400 mg total) by mouth 2 (two) times daily. (Patient taking differently: Take 200 mg by mouth 2 (two) times daily. ) 14 tablet 0    ascorbic acid, vitamin C, (VITAMIN C) 250 MG tablet Take 1 tablet (250 mg) by mouth twice daily. Take with the iron tablets. 60 tablet 3    aspirin (ECOTRIN) 81 MG EC tablet Take 1 tablet (81 mg total) by mouth once daily. 30 tablet 12    candesartan (ATACAND) 4 MG tablet Take 1 tablet (4 mg total) by mouth once daily. 90 tablet 4    carvedilol (COREG) 3.125 MG tablet Take 1 tablet (3.125 mg total) by mouth 2 (two) times daily. 180 tablet 4    ferrous sulfate 325 (65 FE) MG EC tablet Take 1 tablet (325 mg total) by mouth 2 (two) times daily. Take with vitamin C. (Patient taking differently: 325 mg once daily. Take 1 tablet (325 mg total) by mouth 2 (two) times daily. Take with vitamin C.) 60 tablet 3    furosemide (LASIX) 40 MG tablet Take 1 tablet (40 mg total) by mouth daily as needed (Leg swelling or weight gain). 90 tablet 4    spironolactone (ALDACTONE) 25 MG tablet Take 0.5 tablets (12.5 mg total) by mouth once daily. 60 tablet 4     No facility-administered medications prior to visit.          Labs:   Prealb  23   Chol  121   HDL  45   LDL Chol  66.8   TG  46     Estimated Nutrition Needs:   1770 Kcals/day( 30 kcal/kg IBW),    " 47 g protein( 0.8 g/kg IBW)     Diet Hx:   Pt follows low sodium diet; is not presently on fluid restriction.  Does her own grocery shopping and cooking; reads food labels.  Snacks:  Microwave popcorn; fruit; chocolate     Breakfast:   Turkey bishop and eggs or cereal.  Water.  Lunch:   Leftovers or sandwich:  Turkey or chicken with light bird.  May also have fruit or Light-Salt Pringles.  Sweet green tea/grape juice.  Dinner:   Last night went to the [employer], and had movie popcorn for dinner.    Assessment:   Pt attentive and asked relevant questions about weight management diet regimen; foods recommended & to avoid; salt-free seasonings; reading food labels.  All questions answered, and pt verbalized understanding of information.  Continued diet adherence expected.    Nutrition Diagnosis:   None at this time.    Recommendations:   2 gram sodium diet.  Handouts provided & reviewed:  Heart Failure Nutrition Therapy; Heart Healthy Cooking Tips; Heart Healthy Label Reading Tips; Heart Healthy Shopping Tips; Sodium:  Making Sense of Salt; Acceptable Salt-Free Seasoning Blends; Fluid Restriction & You; Low Sodium Recipes; My Plate Planner    NOTE:  Pt not on coumadin; these handouts not provided today: Diet and Warfarin (Coumadin) Therapy - [facility];  Vitamin K and Medications; Vitamin K Content of Foods; List of Foods High in Vitamin K         Consultation Time:20 minutes.    Follow Up: Pt provided with dietitian contact number and advised to call with questions or make future appointment if further intervention needed.  Pt will be followed according to heart transplant protocol.

## 2017-03-22 NOTE — PROCEDURES
Mario Mahajan is a 41 y.o.  female patient, who presents for a 6 minute walk test ordered by MD Holden.  The diagnosis is Congestive Heart Failure.  The patient's BMI is 38.9 kg/m2.  Predicted distance (lower limit of normal) is 407.89 meters.      Test Results:    The test was completed without stopping.   The total time walked was 360 seconds.  During walking, the patient reported:  Lightheadedness. The patient used no assistive devices during testing.     03/21/2017---------Distance: 426.72 meters (1400 feet)     O2 Sat % Supplemental Oxygen Heart Rate Blood Pressure Carolyn Scale   Pre-exercise  (Resting) 97 % Room Air 71 bpm 121/61 mmHg 3   During Exercise 98 % Room Air 94 bpm 139/70 mmHg 3   Post-exercise  (Recovery) 98 % Room Air  83 bpm   mmHg       Recovery Time: 44 seconds    Performing nurse/tech: MALCOLM Magana      PREVIOUS STUDY:   The patient had a previous study.  09/30/2016---------Distance: 441.96 meters (1450 feet)       O2 Sat % Supplemental Oxygen Heart Rate Blood Pressure Carolyn Scale   Pre-exercise  (Resting) 100 % Room Air 84 bpm 111/58 mmHg 1   During Exercise 97 % Room Air 98 bpm 129/60 mmHg 3   Post-exercise  (Recovery) 99 % Room Air  91 bpm             CLINICAL INTERPRETATION:  Six minute walk distance is 426.72 meters (1400 feet) with moderate dyspnea.  During exercise, there was no significant desaturation while breathing room air.  Both blood pressure and heart rate remained stable with walking.  The patient reported non-pulmonary symptoms during exercise.  Since the previous study in September 2016, exercise capacity is unchanged.  Based upon age and body mass index, exercise capacity is normal.

## 2017-03-23 ENCOUNTER — TELEPHONE (OUTPATIENT)
Dept: TRANSPLANT | Facility: CLINIC | Age: 42
End: 2017-03-23

## 2017-03-23 DIAGNOSIS — Z76.82 ORGAN TRANSPLANT CANDIDATE: Primary | ICD-10-CM

## 2017-03-23 NOTE — TELEPHONE ENCOUNTER
Requested patient obtain latest PAP smear from outside OB/GYN and have office FAX to [facility] coord. Advised patient to see [facility] coord at next clinic visit on 4/4/17. Confirmed appt for ID consult, patient voiced understanding. Patient stated will check [facility] for future appts.

## 2017-03-24 ENCOUNTER — PATIENT MESSAGE (OUTPATIENT)
Dept: TRANSPLANT | Facility: CLINIC | Age: 42
End: 2017-03-24

## 2017-03-27 ENCOUNTER — PATIENT MESSAGE (OUTPATIENT)
Dept: TRANSPLANT | Facility: CLINIC | Age: 42
End: 2017-03-27

## 2017-03-27 ENCOUNTER — TELEPHONE (OUTPATIENT)
Dept: TRANSPLANT | Facility: CLINIC | Age: 42
End: 2017-03-27

## 2017-03-27 NOTE — TELEPHONE ENCOUNTER
Contacted patient for message stating she was having palpitations, stated she feels much better now, pt thought it may be related to anxiety because she had just received a phone call from her employer that they had laid her off. Stated is working on getting her disability and medicaid. Asked patient if she would like to speak to the [facility], stated she was okay for now. Pt has future appt with [facility] in April. Encouraged to call coord for any questions or concerns, voiced understanding.

## 2017-03-28 ENCOUNTER — CONFERENCE (OUTPATIENT)
Dept: TRANSPLANT | Facility: CLINIC | Age: 42
End: 2017-03-28
Payer: COMMERCIAL

## 2017-03-28 ENCOUNTER — TELEPHONE (OUTPATIENT)
Dept: TRANSPLANT | Facility: CLINIC | Age: 42
End: 2017-03-28

## 2017-03-28 RX ORDER — SERTRALINE HYDROCHLORIDE 50 MG/1
50 TABLET, FILM COATED ORAL DAILY
Qty: 30 TABLET | Refills: 11 | Status: SHIPPED | OUTPATIENT
Start: 2017-03-28 | End: 2017-05-22

## 2017-03-28 NOTE — TELEPHONE ENCOUNTER
Discussed starting an SSRI for patient's significant anxiety and what seems almost like PTSD from her multiple ICD shocks, would like to start with zoloft 50mg po daily and see how she does with this.   She will be seeing Dr. Stanton in clinic beginning of April.

## 2017-03-28 NOTE — TELEPHONE ENCOUNTER
Attempted to contact patient to notify her of new prescription for Zoloft, voice message left with contact number.     Patient called back, notified of new prescription per Dr Srivastava: Zoloft 50 mg daily, e-scripted to [facility] in [place]. Patient stated will  today.    Patient also asking for assistance with disability, recommended she speak to , patient transferred to social workers office, instructed to leave message if no answer. Voiced understanding.

## 2017-03-29 ENCOUNTER — SOCIAL WORK (OUTPATIENT)
Dept: TRANSPLANT | Facility: CLINIC | Age: 42
End: 2017-03-29

## 2017-03-29 NOTE — PROGRESS NOTES
"Phone Call/ Psychosocial Follw up:    SW received call from pt. Pt aaox4, calm, and pleasant. Pt reports was "let go" from her job. Pt reports applied for short term disability and unemployment. Pt reports also applied for food stamps and medicaid. Pt reports unsure of amount of unemployment and short term disability she will receive. Pt reports should still be able to afford bills. Pt reports owns her home and does not have a mortgage. Pt reports aware of need to identify back up caregiver, and has not been able to identify one yet. Pt reports will notify SW when backup caregiver is identified. Pt reports no other questions or concerns at this time. SW providing psychosocial counseling and support, education, assistance, and resources as indicated. SW remains available.     "

## 2017-04-04 ENCOUNTER — TELEPHONE (OUTPATIENT)
Dept: PSYCHIATRY | Facility: CLINIC | Age: 42
End: 2017-04-04

## 2017-04-04 NOTE — TELEPHONE ENCOUNTER
The patient no-showed for her initial intake evaluation with this provider on 04/04/2017.  A voicemail message was left for the patient asking whether she would like to reschedule her appointment.

## 2017-04-17 ENCOUNTER — TELEPHONE (OUTPATIENT)
Dept: TRANSPLANT | Facility: CLINIC | Age: 42
End: 2017-04-17

## 2017-04-17 ENCOUNTER — EDUCATION (OUTPATIENT)
Dept: TRANSPLANT | Facility: CLINIC | Age: 42
End: 2017-04-17

## 2017-04-17 ENCOUNTER — OFFICE VISIT (OUTPATIENT)
Dept: CARDIOTHORACIC SURGERY | Facility: CLINIC | Age: 42
End: 2017-04-17
Payer: MEDICAID

## 2017-04-17 ENCOUNTER — OFFICE VISIT (OUTPATIENT)
Dept: TRANSPLANT | Facility: CLINIC | Age: 42
End: 2017-04-17
Payer: MEDICAID

## 2017-04-17 VITALS
DIASTOLIC BLOOD PRESSURE: 75 MMHG | OXYGEN SATURATION: 100 % | WEIGHT: 245.38 LBS | TEMPERATURE: 98 F | BODY MASS INDEX: 36.34 KG/M2 | HEART RATE: 64 BPM | SYSTOLIC BLOOD PRESSURE: 141 MMHG | HEIGHT: 69 IN

## 2017-04-17 VITALS
DIASTOLIC BLOOD PRESSURE: 75 MMHG | HEART RATE: 61 BPM | HEIGHT: 69 IN | SYSTOLIC BLOOD PRESSURE: 130 MMHG | BODY MASS INDEX: 36.37 KG/M2 | WEIGHT: 245.56 LBS

## 2017-04-17 DIAGNOSIS — I47.29 VENTRICULAR TACHYCARDIA, POLYMORPHIC: ICD-10-CM

## 2017-04-17 DIAGNOSIS — Z95.810 ICD (IMPLANTABLE CARDIOVERTER-DEFIBRILLATOR) IN PLACE: Chronic | ICD-10-CM

## 2017-04-17 DIAGNOSIS — I42.0 NONISCHEMIC DILATED CARDIOMYOPATHY: Primary | Chronic | ICD-10-CM

## 2017-04-17 DIAGNOSIS — I50.42 CHRONIC COMBINED SYSTOLIC AND DIASTOLIC CONGESTIVE HEART FAILURE: Primary | ICD-10-CM

## 2017-04-17 DIAGNOSIS — Z76.82 ORGAN TRANSPLANT CANDIDATE: ICD-10-CM

## 2017-04-17 DIAGNOSIS — I34.0 NON-RHEUMATIC MITRAL REGURGITATION: Chronic | ICD-10-CM

## 2017-04-17 PROCEDURE — 1160F RVW MEDS BY RX/DR IN RCRD: CPT | Mod: S$GLB,TXP,, | Performed by: THORACIC SURGERY (CARDIOTHORACIC VASCULAR SURGERY)

## 2017-04-17 PROCEDURE — 99215 OFFICE O/P EST HI 40 MIN: CPT | Mod: S$PBB,TXP,, | Performed by: INTERNAL MEDICINE

## 2017-04-17 PROCEDURE — 99205 OFFICE O/P NEW HI 60 MIN: CPT | Mod: S$GLB,TXP,, | Performed by: THORACIC SURGERY (CARDIOTHORACIC VASCULAR SURGERY)

## 2017-04-17 PROCEDURE — 99999 PR PBB SHADOW E&M-EST. PATIENT-LVL III: CPT | Mod: PBBFAC,TXP,, | Performed by: THORACIC SURGERY (CARDIOTHORACIC VASCULAR SURGERY)

## 2017-04-17 PROCEDURE — 99213 OFFICE O/P EST LOW 20 MIN: CPT | Mod: PBBFAC,27,TXP | Performed by: THORACIC SURGERY (CARDIOTHORACIC VASCULAR SURGERY)

## 2017-04-17 PROCEDURE — 99999 PR PBB SHADOW E&M-EST. PATIENT-LVL III: CPT | Mod: PBBFAC,TXP,, | Performed by: INTERNAL MEDICINE

## 2017-04-17 RX ORDER — ALBUTEROL SULFATE 90 UG/1
1-2 AEROSOL, METERED RESPIRATORY (INHALATION) EVERY 6 HOURS PRN
Qty: 18 G | Refills: 3 | Status: SHIPPED | OUTPATIENT
Start: 2017-04-17 | End: 2017-09-05 | Stop reason: SDUPTHER

## 2017-04-17 RX ORDER — AMIODARONE HYDROCHLORIDE 200 MG/1
TABLET ORAL
Refills: 2 | COMMUNITY
Start: 2017-03-26 | End: 2017-06-28 | Stop reason: SDUPTHER

## 2017-04-17 RX ORDER — FLUOROMETHOLONE 1 MG/ML
SUSPENSION/ DROPS OPHTHALMIC
Refills: 0 | COMMUNITY
Start: 2017-04-11 | End: 2017-05-22

## 2017-04-17 NOTE — TELEPHONE ENCOUNTER
Attemted to contact pt to discuss needed appts for completion of OHT eval, message left with contact number.    5:15  Noted patient scheduled appt with psyc. Will await return call to discuss outstanding appts and importance to complete.

## 2017-04-17 NOTE — MR AVS SNAPSHOT
[facility]  [address]  [facility] [address]  Phone: [phone]                  Mario Mahajan   2017 8:00 AM   Appointment    Description:  Female : 1975   Provider:  Oliver De Jesus MD   Department:  [facility]                To Do List           Future Appointments        Provider Department Dept Phone    2017 8:00 AM Oliver De Jesus MD [facility] [phone]    2017 10:00 AM MD Myles Ramesh [facility] - Cardiovascular Surg [phone]    2017 9:00 AM DAVID,  [facility] [phone]    2017 10:30 AM Kirill Christensen [facility] [phone]    2017 11:00 AM LISA [facility] - Infectious Diseases [phone]      Goals (5 Years of Data)     None      [facility]     [facility] Nurse Care Line -  Assistance  Unless otherwise directed by your provider, please contact [facility], our nurse care line that is available for  assistance.     Registered nurses in the [facility] provide: appointment scheduling, clinical advisement, health education, and other advisory services.  Call: [phone] (toll free)               Medications           Message regarding Medications     Verify the changes and/or additions to your medication regime listed below are the same as discussed with your clinician today.  If any of these changes or additions are incorrect, please notify your healthcare provider.             Verify that the below list of medications is an accurate representation of the medications you are currently taking.  If none reported, the list may be blank. If incorrect, please contact your healthcare provider. Carry this list with you in case of emergency.           Current Medications     albuterol 90 mcg/actuation inhaler Inhale 1-2 puffs into the lungs every 6 (six) hours as needed for Wheezing. Rescue    amiodarone  (PACERONE) 400 MG tablet Take 1 tablet (400 mg total) by mouth 2 (two) times daily.    ascorbic acid, vitamin C, (VITAMIN C) 250 MG tablet Take 1 tablet (250 mg) by mouth twice daily. Take with the iron tablets.    aspirin (ECOTRIN) 81 MG EC tablet Take 1 tablet (81 mg total) by mouth once daily.    candesartan (ATACAND) 4 MG tablet Take 1 tablet (4 mg total) by mouth once daily.    carvedilol (COREG) 3.125 MG tablet Take 1 tablet (3.125 mg total) by mouth 2 (two) times daily.    ferrous sulfate 325 (65 FE) MG EC tablet Take 1 tablet (325 mg total) by mouth 2 (two) times daily. Take with vitamin C.    furosemide (LASIX) 40 MG tablet Take 1 tablet (40 mg total) by mouth daily as needed (Leg swelling or weight gain).    sertraline (ZOLOFT) 50 MG tablet Take 1 tablet (50 mg total) by mouth once daily.    spironolactone (ALDACTONE) 25 MG tablet Take 0.5 tablets (12.5 mg total) by mouth once daily.           Clinical Reference Information           Allergies as of 4/17/2017     Ace Inhibitors      Immunizations Administered on Date of Encounter - 4/17/2017     None      Maintenance Dialysis History     Patient has no recorded history of maintenance dialysis.      Transplant Information        Txp Date Organ Coordinator Care Team     Heart Marsha Ruiz RN Referring Physician:  Juanjose Nuñez MD   Corresponding Physician:  Juanjose Nuñez MD         Language Assistance Services     ATTENTION: Language assistance services are available, free of charge. Please call [phone].      ATENCIÓN: Si margueritela martha, tiene a peck disposición servicios gratuitos de asistencia lingüística. Llame al [phone].     CHÚ Ý: N?u b?n nói Ti?ng Vi?t, có các d?ch v? h? tr? ngôn ng? mi?n phí dành cho b?n. G?i s? [phone].         [facility] complies with applicable Federal civil rights laws and does not discriminate on the basis of race, color, national origin, age, disability, or sex.

## 2017-04-17 NOTE — PROGRESS NOTES
"Met with patient this am at the conclusion of her clinic appt with Dr. De Jesus.  Reviewed outstanding components of AHF options evaluation.  Pt advised that she did not see the psychiatrist as scheduled on 4/4/17 and has requested the phone number so she can reschedule.  Pt advised to call asap as there is possiblitiy that evaluation will not be considered complete until this has taken place; phone number provided. She advised, "I didn't realize that this was part of it."  Reiterated importance of rescheduling and completing appt to assure completeness of eval in a timely fashion; understanding verbalized.  Primary coordinator notified.  "

## 2017-04-17 NOTE — LETTER
April 17, 2017        Juanjose Nuñez  [address]  [facility] [address]  Phone: [phone]  Fax: [phone]             [facility] [address]  [facility] [address]  Phone: [phone]   Patient: Mario Mahajan   MR Number: [id number]   YOB: 1975   Date of Visit: 4/17/2017       Dear Dr. Juanjose Nuñez    Thank you for referring Mario Mahajan to me for evaluation. Attached you will find relevant portions of my assessment and plan of care.    If you have questions, please do not hesitate to call me. I look forward to following Mario Mahajan along with you.    Sincerely,    Oliver De Jesus MD    Enclosure    If you would like to receive this communication electronically, please contact [email or web address] or [phone] to request [employer] access.    [employer] is a tool which provides read-only access to select patient information with whom you have a relationship. Its easy to use and provides real time access to review your patients record including encounter summaries, notes, results, and demographic information.    If you feel you have received this communication in error or would no longer like to receive these types of communications, please e-mail [email or web address]

## 2017-04-17 NOTE — PROGRESS NOTES
Subjective:   Initial evaluation of heart transplant candidacy.    HPI:  Ms. Mahajan is a 41 y.o. year old Black or  female who has presents to be considered for advanced surgical options (LVAD/OHT).    Nonischemic CHF (LVEF 20%, LVEDD 7.2 cm mild MR 2017) who had three episodes of VF in early 2017, one of which required defibrillation from ICD. Doing better she says. No further ICD shocks. PND in las few months.      CPX (2017)  1) The PkVO2 was 14.1 ml/kg/min which is 42% of predicted equating to a functional capacity of 4.0 METS indicating severe functional impairment.   Compared to 2016 Vo2 of 12.6, this is an improvement.   2 ) The VE/VCO2 decreased by -25% from rest to AT. The VE/VCO2 [place] was 28.3.  The Resting PetCO2 was 30.8.    LHC/RHC 17:  Normal coronary arteries  LVEDP (Pre): 25 mmHg  PA: 54/30 (40)  PW:  (25)  RV: 54  RA:  (17)  PA_SAT: 66  AO_SAT: 92  AO: 117/51 (77)  LVEDP: 25    CONDITION 1:  FICKCI: 3.3600  FICKCO: 7.3900    Past Medical History:   Diagnosis Date    Asthma     Chronic back pain 2014    Chronic combined systolic and diastolic congestive heart failure 2015     2-10-17   1 - Severely depressed left ventricular systolic function (EF 20-25%).    2 - Severe left ventricular enlargement.    3 - Severe left atrial enlargement.    4 - Left ventricular diastolic dysfunction.    5 - The estimated PA systolic pressure is 18 mmHg.    6 - Mild mitral regurgitation.     Encounter for blood transfusion     ICD (implantable cardioverter-defibrillator) in place 12/01/15 3/3/2016    Menorrhagia, premenopausal 2/10/2017    Microcytic anemia 2015    Non-rheumatic mitral regurgitation 3/5/2015    Nonischemic dilated cardiomyopathy 2015    Syncope and collapse 2017    Ventricular tachycardia, polymorphic      Past Surgical History:   Procedure Laterality Date    CARDIAC DEFIBRILLATOR PLACEMENT  2015     SECTION       TUBAL LIGATION         Family History   Problem Relation Age of Onset    Diabetes Father        Review of Systems   Constitution: Positive for weight gain. Negative for chills, decreased appetite, diaphoresis, fever, weakness and weight loss.   HENT: Negative for headaches.    Eyes: Negative for visual disturbance.   Cardiovascular: Positive for dyspnea on exertion. Negative for chest pain, cyanosis, irregular heartbeat, leg swelling, near-syncope, orthopnea, palpitations, paroxysmal nocturnal dyspnea and syncope.   Respiratory: Negative for cough, shortness of breath, sleep disturbances due to breathing, snoring, sputum production and wheezing.    Hematologic/Lymphatic: Negative for adenopathy and bleeding problem. Does not bruise/bleed easily.   Skin: Negative for color change, poor wound healing, rash, skin cancer and suspicious lesions.   Musculoskeletal: Negative for back pain, falls, gout, joint pain, muscle weakness and myalgias.   Gastrointestinal: Negative for bloating, abdominal pain, anorexia, constipation, diarrhea, heartburn, hematemesis, hematochezia, hemorrhoids, jaundice, melena, nausea and vomiting.   Genitourinary: Negative for nocturia.   Neurological: Negative for excessive daytime sleepiness, dizziness, focal weakness, light-headedness, paresthesias and tremors.   Psychiatric/Behavioral: Positive for depression. Negative for memory loss. The patient does not have insomnia and is not nervous/anxious.        Objective:     Physical Exam   Constitutional: She appears well-developed and well-nourished. No distress.        HENT:   Head: Normocephalic and atraumatic. Head is without abrasion and without contusion.   Right Ear: External ear normal.   Left Ear: External ear normal.   Nose: Nose normal. No epistaxis.   Mouth/Throat: Oropharynx is clear and moist. Mucous membranes are not cyanotic.   Eyes: Conjunctivae and EOM are normal. Pupils are equal, round, and reactive to light.   Neck: Normal  range of motion. Neck supple. Hepatojugular reflux and JVD (!5 cmH2O) present. No tracheal deviation present.   Cardiovascular: Normal rate, regular rhythm, normal heart sounds and normal pulses.  PMI is displaced.  Exam reveals no gallop.    No murmur heard.  Pulmonary/Chest: Effort normal and breath sounds normal. No stridor. No respiratory distress. She has no wheezes.   Abdominal: Soft. Normal appearance, normal aorta and bowel sounds are normal. She exhibits no distension. There is no tenderness.   Musculoskeletal: She exhibits edema (+2). She exhibits no tenderness.   Neurological: She is alert. She has normal strength and normal reflexes. She exhibits normal muscle tone.   Skin: Skin is warm. No rash noted. No erythema.   Psychiatric: She has a normal mood and affect. Her speech is normal and behavior is normal. Judgment and thought content normal. Cognition and memory are normal.       Labs:      Chemistry        Component Value Date/Time     03/22/2017 0947    K 3.9 03/22/2017 0947     03/22/2017 0947    CO2 22 (L) 03/22/2017 0947    BUN 11 03/22/2017 0947    BUN 12 06/19/2015 0950    CREATININE 0.8 03/22/2017 0947    GLU 96 03/22/2017 0947        Component Value Date/Time    CALCIUM 8.5 (L) 03/22/2017 0947    ALKPHOS 41 (L) 03/22/2017 0947    AST 13 03/22/2017 0947    ALT 9 (L) 03/22/2017 0947    BILITOT 0.9 03/22/2017 0947          Magnesium   Date Value Ref Range Status   02/11/2017 1.9 1.6 - 2.6 mg/dL Final     Lab Results   Component Value Date    WBC 9.35 03/13/2017    HGB 11.7 (L) 03/13/2017    HCT 36.4 (L) 03/13/2017    MCV 88 03/13/2017     03/13/2017     BNP   Date Value Ref Range Status   02/11/2017 158 (H) 0 - 99 pg/mL Final     Comment:     Values of less than 100 pg/ml are consistent with non-CHF populations.   02/10/2017 257 (H) 0 - 99 pg/mL Final     Comment:     Values of less than 100 pg/ml are consistent with non-CHF populations.   01/03/2017 345 (H) 0 - 99 pg/mL Final      Comment:     Values of less than 100 pg/ml are consistent with non-CHF populations.       Assessment:      1. Chronic combined systolic and diastolic congestive heart failure    2. Ventricular tachycardia, polymorphic    3. Non-rheumatic mitral regurgitation    4. Organ transplant candidate    5. ICD (implantable cardioverter-defibrillator) in place 12/01/15        Plan:     Lasix 40 mg daily  On work up. Stable. We will check how much longer is needed to finish work up    Patient is now NYHA IV ACC stage D  Recommend 2 gram sodium restriction and 1500cc fluid restriction.  Encourage physical activity with graded exercise program.  Requested patient to weigh themselves daily, and to notify us if their weight increases by more than 3 lbs in 1 day or 5 lbs in 1 week.     Transplant Candidacy: Patient is a 41 y.o. year old female with heart failure is being seen for possible LVAD and OHT. In my opinion, she is  a suitable LVAD and OHT candidate. Patient did meet with  pre-transplant coordinator at the end of this visit for workup. she is scheduled for LVAD OHT work up once she sings consent   Hope that MCS meets next time as she seem overwhelmed by process today    Oliver De Jesus MD

## 2017-04-17 NOTE — LETTER
April 19, 2017      Nereida Hatch MD  [address]  [facility] [address]           [facility]  [address]  [facility] [address]  Phone: [phone]          Patient: Mario Mahajan   MR Number: [id number]   YOB: 1975   Date of Visit: 4/17/2017       Dear Dr. Nereida Hatch:    Thank you for referring Mario Mahajan to me for evaluation. Attached you will find relevant portions of my assessment and plan of care.    If you have questions, please do not hesitate to call me. I look forward to following Mario Mahajan along with you.    Sincerely,    Meek Barajas MD    Enclosure  CC:  No Recipients    If you would like to receive this communication electronically, please contact [email or web address] or [phone] to request more information on [employer] Link access.    For providers and/or their staff who would like to refer a patient to [facility], please contact us through our one-stop-shop provider referral line, [facility], at [phone].    If you feel you have received this communication in error or would no longer like to receive these types of communications, please e-mail [email or web address]

## 2017-04-17 NOTE — MR AVS SNAPSHOT
[facility]  [address]  [facility] [address]  Phone: [phone]                  Mario Mahajan   2017 10:00 AM   Appointment    Description:  Female : 1975   Provider:  Meek Barajas MD   Department:  [facility] - Cardiovascular Surg                To Do List           Future Appointments        Provider Department Dept Phone    2017 7:30 AM LAB, APPOINTMENT [place] [facility] [phone]    2017 8:00 AM Oliver De Jesus MD [facility] [phone]    2017 10:00 AM MD [facility] [phone]    2017 9:00 AM DAVID,  [facility] [phone]    2017 10:30 AM Kirill Christensen [facility] [phone]      Goals (5 Years of Data)     None      [facility]     [facility] Nurse Care Line -  Assistance  Unless otherwise directed by your provider, please contact [facility], our nurse care line that is available for  assistance.     Registered nurses in the [facility] provide: appointment scheduling, clinical advisement, health education, and other advisory services.  Call: [phone] (toll free)               Medications           Message regarding Medications     Verify the changes and/or additions to your medication regime listed below are the same as discussed with your clinician today.  If any of these changes or additions are incorrect, please notify your healthcare provider.             Verify that the below list of medications is an accurate representation of the medications you are currently taking.  If none reported, the list may be blank. If incorrect, please contact your healthcare provider. Carry this list with you in case of emergency.           Current Medications     albuterol 90 mcg/actuation inhaler Inhale 1-2 puffs into the lungs every 6 (six) hours as needed for Wheezing. Rescue     amiodarone (PACERONE) 400 MG tablet Take 1 tablet (400 mg total) by mouth 2 (two) times daily.    ascorbic acid, vitamin C, (VITAMIN C) 250 MG tablet Take 1 tablet (250 mg) by mouth twice daily. Take with the iron tablets.    aspirin (ECOTRIN) 81 MG EC tablet Take 1 tablet (81 mg total) by mouth once daily.    candesartan (ATACAND) 4 MG tablet Take 1 tablet (4 mg total) by mouth once daily.    carvedilol (COREG) 3.125 MG tablet Take 1 tablet (3.125 mg total) by mouth 2 (two) times daily.    ferrous sulfate 325 (65 FE) MG EC tablet Take 1 tablet (325 mg total) by mouth 2 (two) times daily. Take with vitamin C.    furosemide (LASIX) 40 MG tablet Take 1 tablet (40 mg total) by mouth daily as needed (Leg swelling or weight gain).    sertraline (ZOLOFT) 50 MG tablet Take 1 tablet (50 mg total) by mouth once daily.    spironolactone (ALDACTONE) 25 MG tablet Take 0.5 tablets (12.5 mg total) by mouth once daily.           Clinical Reference Information           Allergies as of 4/17/2017     Ace Inhibitors      Immunizations Administered on Date of Encounter - 4/17/2017     None      Maintenance Dialysis History     Patient has no recorded history of maintenance dialysis.      Transplant Information        Txp Date Organ Coordinator Care Team     Heart Marsha Ruiz RN Referring Physician:  Juanjose Nuñez MD   Corresponding Physician:  Juanjose Nuñez MD         Language Assistance Services     ATTENTION: Language assistance services are available, free of charge. Please call [phone].      ATENCIÓN: Si katiuska thomas, tiene a peck disposición servicios gratuitos de asistencia lingüística. Llame al [phone].     [facility] Ý: N?u b?n nói Ti?ng Vi?t, có các d?ch v? h? tr? ngôn ng? mi?n phí dành cho b?n. G?i s? [phone].         [facility] complies with applicable Federal civil rights laws and does not discriminate on the basis of race, color, national origin, age, disability, or sex.

## 2017-04-17 NOTE — PROGRESS NOTES
History & Physical    SUBJECTIVE:     History of Present Illness:  Patient is a 41 y.o. female presents to be considered for advanced surgical options (LVAD/OHT).  Nonischemic CHF (LVEF 20%, LVEDD 7.2 cm mild MR Feb 2017) who had three episodes of VF in early Feb 2017, one of which required defibrillation from ICD. Doing better she says. No further ICD shocks. PND in las few months.      No chief complaint on file.      Review of patient's allergies indicates:   Allergen Reactions    Ace inhibitors      Cough       Current Outpatient Prescriptions   Medication Sig Dispense Refill    albuterol 90 mcg/actuation inhaler Inhale 1-2 puffs into the lungs every 6 (six) hours as needed for Wheezing. Rescue 18 g 3    amiodarone (PACERONE) 200 MG Tab TAKE 1 TABLET PO ONCE A DAY. START WHEN AMIODARONE 400 MG REGIMEN IS OVER. AS DIRECTED  2    ascorbic acid, vitamin C, (VITAMIN C) 250 MG tablet Take 1 tablet (250 mg) by mouth twice daily. Take with the iron tablets. (Patient taking differently: once daily. ) 60 tablet 3    aspirin (ECOTRIN) 81 MG EC tablet Take 1 tablet (81 mg total) by mouth once daily. 30 tablet 12    candesartan (ATACAND) 4 MG tablet Take 1 tablet (4 mg total) by mouth once daily. 90 tablet 4    carvedilol (COREG) 3.125 MG tablet Take 1 tablet (3.125 mg total) by mouth 2 (two) times daily. 180 tablet 4    ferrous sulfate 325 (65 FE) MG EC tablet Take 1 tablet (325 mg total) by mouth 2 (two) times daily. Take with vitamin C. (Patient taking differently: 325 mg once daily. Take 1 tablet (325 mg total) by mouth 2 (two) times daily. Take with vitamin C.) 60 tablet 3    fluorometholone 0.1% (FML) 0.1 % DrpS INT 1 GTT INTO OS QID  0    furosemide (LASIX) 40 MG tablet Take 1 tablet (40 mg total) by mouth daily as needed (Leg swelling or weight gain). 90 tablet 4    sertraline (ZOLOFT) 50 MG tablet Take 1 tablet (50 mg total) by mouth once daily. 30 tablet 11    spironolactone (ALDACTONE) 25 MG tablet  "Take 0.5 tablets (12.5 mg total) by mouth once daily. 60 tablet 4     No current facility-administered medications for this visit.        Past Medical History:   Diagnosis Date    Asthma     Chronic back pain 2014    Chronic combined systolic and diastolic congestive heart failure 2015     2-10-17   1 - Severely depressed left ventricular systolic function (EF 20-25%).    2 - Severe left ventricular enlargement.    3 - Severe left atrial enlargement.    4 - Left ventricular diastolic dysfunction.    5 - The estimated PA systolic pressure is 18 mmHg.    6 - Mild mitral regurgitation.     Encounter for blood transfusion     ICD (implantable cardioverter-defibrillator) in place 12/01/15 3/3/2016    Menorrhagia, premenopausal 2/10/2017    Microcytic anemia 2015    Non-rheumatic mitral regurgitation 3/5/2015    Nonischemic dilated cardiomyopathy 2015    Syncope and collapse 2017    Ventricular tachycardia, polymorphic      Past Surgical History:   Procedure Laterality Date    CARDIAC DEFIBRILLATOR PLACEMENT  2015     SECTION      TUBAL LIGATION       Family History   Problem Relation Age of Onset    Diabetes Father      Social History   Substance Use Topics    Smoking status: Never Smoker    Smokeless tobacco: Never Used    Alcohol use Yes      Comment: 1 glass per month        Review of Systems:  Review of Systems    OBJECTIVE:     Vital Signs (Most Recent)     5' 9" (1.753 m)  111.3 kg (245 lb 6.4 oz)     Physical Exam:  Physical Exam    Diagnostic Results:  RHC Hemodynamic Results     LVEDP (Pre): 25 mmHg  PA: 54/30 (40)  PW:  (25)  RV: 54  RA:  (17)  PA_SAT: 66  AO_SAT: 92  AO: 117/51 (77)  LVEDP: 25    CONDITION 1:  FICKCI: 3.3600  FICKCO: 7.3900    [facility] Angiographic Results Diagnostic:      - Left Main Coronary Artery:             The LM is normal. There is ZOEY 3 flow.     - Left Anterior Descending Artery:             The LAD is normal. There is ZOEY 3 flow.     " - D1:             The D1 is normal. There is ZOEY 3 flow.     - Left Circumflex Artery:             The LCX is normal. There is ZOEY 3 flow.     - OM1:             The OM1 is normal. There is ZOEY 3 flow.     - OM2:             The OM2 is normal. There is ZOEY 3 flow.     - OM3:             The OM3 is normal. There is ZOEY 3 flow.     - Right Coronary Artery:             The RCA is normal. There is ZOEY 3 flow.     - Posterior Lateral Branch:             The PLB is normal. There is ZOEY 3 flow.     - Posterior Descending Artery:             The PDA is normal. There is ZOEY 3 flow.    Blood group O+  ASSESSMENT/PLAN:   Patient is a 41 y.o. female presents to be considered for advanced surgical options (LVAD/OHT).  Nonischemic CHF (LVEF 20%, LVEDD 7.2 cm mild MR Feb 2017) who had three episodes of VF in early Feb 2017 with ICD placement. Pt has had 3 admission for HR since 2015.     PLAN: Continue with LVAD/OHT work up and CT does not preclude from VAD/OHT. Pt would be a good candidate for MCS due to blood type. Concerns with right sided function with higher PA pressure on RHC then ECHO also TPG is 15. Would repeat Echo RV dedicated. Pt would like to speak with others that have had VADs placed.

## 2017-04-18 ENCOUNTER — TELEPHONE (OUTPATIENT)
Dept: TRANSPLANT | Facility: CLINIC | Age: 42
End: 2017-04-18

## 2017-04-18 DIAGNOSIS — Z76.82 ORGAN TRANSPLANT CANDIDATE: Primary | ICD-10-CM

## 2017-04-18 DIAGNOSIS — I50.9 CONGESTIVE HEART FAILURE, UNSPECIFIED CONGESTIVE HEART FAILURE CHRONICITY, UNSPECIFIED CONGESTIVE HEART FAILURE TYPE: ICD-10-CM

## 2017-04-18 NOTE — TELEPHONE ENCOUNTER
Future appts to complete eval confirmed with patient, voiced understanding. Plan on presenting after sees psychiatry on 5/5/17.

## 2017-04-19 ENCOUNTER — SOCIAL WORK (OUTPATIENT)
Dept: TRANSPLANT | Facility: CLINIC | Age: 42
End: 2017-04-19
Payer: MEDICAID

## 2017-04-19 ENCOUNTER — OFFICE VISIT (OUTPATIENT)
Dept: INFECTIOUS DISEASES | Facility: CLINIC | Age: 42
End: 2017-04-19
Payer: MEDICAID

## 2017-04-19 VITALS
SYSTOLIC BLOOD PRESSURE: 128 MMHG | DIASTOLIC BLOOD PRESSURE: 70 MMHG | WEIGHT: 245 LBS | TEMPERATURE: 98 F | BODY MASS INDEX: 36.29 KG/M2 | HEART RATE: 65 BPM | HEIGHT: 69 IN

## 2017-04-19 DIAGNOSIS — I50.42 CHRONIC COMBINED SYSTOLIC AND DIASTOLIC CONGESTIVE HEART FAILURE: Primary | ICD-10-CM

## 2017-04-19 DIAGNOSIS — Z76.82 ORGAN TRANSPLANT CANDIDATE: ICD-10-CM

## 2017-04-19 DIAGNOSIS — Z01.818 ENCOUNTER FOR PRE-TRANSPLANT EVALUATION FOR HEART TRANSPLANT: ICD-10-CM

## 2017-04-19 PROCEDURE — 99214 OFFICE O/P EST MOD 30 MIN: CPT | Mod: PBBFAC,TXP | Performed by: PHYSICIAN ASSISTANT

## 2017-04-19 PROCEDURE — 90715 TDAP VACCINE 7 YRS/> IM: CPT | Mod: PBBFAC,TXP | Performed by: PHYSICIAN ASSISTANT

## 2017-04-19 PROCEDURE — 99999 PR PBB SHADOW E&M-EST. PATIENT-LVL IV: CPT | Mod: PBBFAC,TXP,, | Performed by: PHYSICIAN ASSISTANT

## 2017-04-19 PROCEDURE — 90632 HEPA VACCINE ADULT IM: CPT | Mod: PBBFAC,TXP | Performed by: PHYSICIAN ASSISTANT

## 2017-04-19 PROCEDURE — 90670 PCV13 VACCINE IM: CPT | Mod: PBBFAC,TXP | Performed by: PHYSICIAN ASSISTANT

## 2017-04-19 PROCEDURE — 99204 OFFICE O/P NEW MOD 45 MIN: CPT | Mod: S$PBB,TXP,, | Performed by: PHYSICIAN ASSISTANT

## 2017-04-19 PROCEDURE — 90472 IMMUNIZATION ADMIN EACH ADD: CPT | Mod: PBBFAC,TXP | Performed by: PHYSICIAN ASSISTANT

## 2017-04-19 NOTE — PROGRESS NOTES
Pre Transplant Infectious Diseases Consult  Heart Transplant Recipient Evaluation    Requesting Physician: Juanjose Nuñez MD    Reason for Visit:    Chief Complaint   Patient presents with    Heart Transplant Evaluation     History of Present Illness  Mario Mahajan is a 41 y.o. year old Black or  female with advanced Heart disease currently being evaluated for Heart transplant.  Patient denies any recent fever, chills, or infective illnesses.      1) Do you have a history of:   YES NO   Diabetes      [] [x]     Wound/ Foot Infection/Bone Infection   []        [x]     Surgical Removal of Spleen   []  [x]                  2) Have you had recurrent infections involving:             YES NO  Sinus infections  []         [x]   Sore Throat   []         [x]                 Prostate Infections  []         []              Bladder Infections  []         [x]                     Kidney Infections  []         [x]                               Intestinal Infections  []         [x]      Skin Infections   []         [x]       Reproductive Infections          []  [x]   Periodontal Disease  []         [x]        3)Have you ever had: YES     NO UNKNOWN      Chicken Pox   [x]         []  []   Shingles   []         [x]  []   Orolabial Herpes             []  [x]  []   Genital Herpes  []         [x]  []   Cytomegalovirus  []         [x]  []   Jennifer-Barr Virus  []         [x]  []   Genital Warts   []         [x]  []   Hepatitis A   []         [x]  []   Hepatitis B   []         [x]  []   Hepatitis C   []         [x]  []   Syphilis   []         [x]  []   Gonorrhea   []         [x]  []   Pelvic Inflammatory   Disease   []         [x]  []   Chlamydia Infection  []         [x]  []   Intestinal parasites   or worms   []         [x]  []   Fungal Infections  []         [x]  []   Blood Infections  []         [x]  []       Comment:       4) Have you ever been exposed   YES NO  To someone with tuberculosis?  []   [x]   If yes, what  treatment did you receive:     5) What states have you lived in? [place], [place]    6) What countries have you visited for more than 2 weeks? [place]                        YES NO  7) Did you have any associated infections?  []  [x]       8) Are you planning to travel outside the    [x]  []   [place] after your transplant?    9) Household                   YES NO  Do you have pets living in your house?    []         [x]   If yes, describe:     Do you spend time or live on a farm or     []         [x]   have livestock or other farm animals?  If yes, which ones:    Do you have a fish tank?          []  [x]       Do you have a litter box?      []         [x]     Do you fish or hunt?       []         [x]     Do you clean or skin fish or animals?    []         [x]     Do you consume raw or undercooked    []         [x]   meat, fish, or shellfish?      10) What occupations have you had?     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    []         [x]   13)Do you hike, camp, or spend     time in wooded areas?   []         [x]        14) The patient's immunization history was reviewed.    Have you ever received:  YES NO UNKNOWN DATES   Routine Childhood vaccines  [x]         []  []      Influenza vaccine   [x]  []  [] 2016/17   Pneumovax    []  [x]  [] 3-4 yrs ago    Tetanus-diptheria   []         [x]  []    Hepatitis A vaccine series       []  [x]  []    Hepatitis B vaccine series         []  [x]  []    Meningitis vaccine   []         [x]  []    Varicella vaccine   []         [x]  []        Based on the patients immunization history and serologies, immunizations were ordered:         Ordered  Not Ordered  Influenza Vaccine     []    [x]   Hepatitis A series at 0,  6 months   [x]    []   Hepatitis B seriesat 0, 1, and 6 months  [x]    []   Hepatitis B High Dose 0,1, and 6 months  []    [x]   Prevnar      [x]    []   Pneumovax      []    [x]     TDap       [x]    []    Zoster       []    [x]   Menactra      []    [x]            The patient was encouraged to contact us about any problems that may develop after immunization and possible side effects were reviewed.      Previous Transplant: no    Etiology of Heart Disease: CHF    Allergies: Ace inhibitors    There is no immunization history on file for this patient.  Past Medical History:   Diagnosis Date    Asthma     Chronic back pain 2014    Chronic combined systolic and diastolic congestive heart failure 2015     2-10-17   1 - Severely depressed left ventricular systolic function (EF 20-25%).    2 - Severe left ventricular enlargement.    3 - Severe left atrial enlargement.    4 - Left ventricular diastolic dysfunction.    5 - The estimated PA systolic pressure is 18 mmHg.    6 - Mild mitral regurgitation.     Encounter for blood transfusion     ICD (implantable cardioverter-defibrillator) in place 12/01/15 3/3/2016    Menorrhagia, premenopausal 2/10/2017    Microcytic anemia 2015    Non-rheumatic mitral regurgitation 3/5/2015    Nonischemic dilated cardiomyopathy 2015    Syncope and collapse 2017    Ventricular tachycardia, polymorphic      Past Surgical History:   Procedure Laterality Date    CARDIAC DEFIBRILLATOR PLACEMENT  2015     SECTION      TUBAL LIGATION        Social History     Social History    Marital status: Single     Spouse name: N/A    Number of children: 2    Years of education: N/A     Occupational History     [employer]     Social History Main Topics    Smoking status: Never Smoker    Smokeless tobacco: Never Used    Alcohol use Yes      Comment: 1 glass per month    Drug use: No    Sexual activity: Yes     Partners: Male     Other Topics Concern    Not on file     Social History Narrative       Review of Systems   Constitution: Negative for chills, decreased appetite, fever, weakness, malaise/fatigue, night sweats,  weight gain and weight loss.   HENT: Negative for congestion, ear pain, headaches, hearing loss, hoarse voice, sore throat and tinnitus.    Eyes: Negative for blurred vision, redness and visual disturbance.   Cardiovascular: Negative for chest pain, leg swelling and palpitations.   Respiratory: Negative for cough, hemoptysis, shortness of breath, sputum production and wheezing.    Endocrine: Negative for cold intolerance and heat intolerance.   Hematologic/Lymphatic: Negative for adenopathy. Does not bruise/bleed easily.   Skin: Negative for dry skin, itching, rash and suspicious lesions.   Musculoskeletal: Positive for back pain. Negative for joint pain, myalgias and neck pain.   Gastrointestinal: Negative for abdominal pain, constipation, diarrhea, heartburn, nausea and vomiting.   Genitourinary: Negative for dysuria, flank pain, frequency, hematuria, hesitancy and urgency.   Neurological: Positive for dizziness. Negative for numbness and paresthesias.   Psychiatric/Behavioral: Negative for depression and memory loss. The patient does not have insomnia and is not nervous/anxious.    Allergic/Immunologic: Negative for environmental allergies, HIV exposure, hives and persistent infections.     Physical Exam   Constitutional: She is oriented to person, place, and time. She appears well-developed and well-nourished. No distress.       HENT:   Head: Normocephalic and atraumatic.   Mouth/Throat: Uvula is midline, oropharynx is clear and moist and mucous membranes are normal. She does not have dentures. No oral lesions. Abnormal dentition (multiple missing teeth). Dental caries (multiple cavitary and eroded teeth to gumline) present. No dental abscesses or lacerations.   Eyes: Conjunctivae and EOM are normal. Pupils are equal, round, and reactive to light. No scleral icterus.   Neck: Normal range of motion.   Cardiovascular: Normal rate, regular rhythm and normal heart sounds.  Exam reveals no gallop and no friction rub.     No murmur heard.  Pulmonary/Chest: Effort normal and breath sounds normal. No respiratory distress. She has no wheezes. She has no rales.   Abdominal: Soft. Bowel sounds are normal. She exhibits no distension and no mass. There is no hepatosplenomegaly. There is no tenderness. There is no rebound and no guarding.   Musculoskeletal: She exhibits no edema.   Lymphadenopathy:        Head (right side): No submental, no submandibular, no tonsillar, no preauricular, no posterior auricular and no occipital adenopathy present.        Head (left side): No submental, no submandibular, no tonsillar, no preauricular, no posterior auricular and no occipital adenopathy present.     She has no cervical adenopathy.     She has no axillary adenopathy.        Right: No inguinal, no supraclavicular and no epitrochlear adenopathy present.        Left: No inguinal, no supraclavicular and no epitrochlear adenopathy present.   Neurological: She is alert and oriented to person, place, and time.   Skin: Skin is warm, dry and intact. No rash noted.   Psychiatric: She has a normal mood and affect.     Diagnostics:   RPR   Date Value Ref Range Status   03/21/2017 Non-reactive Non-reactive Final     No results found for: CMVANTIBODIE  No results found for: HIV1X2  No results found for: HTLVIIIANTIB  No results found for: HEPBSAG  Hep B Core Total Ab   Date Value Ref Range Status   03/21/2017 Negative  Final     Hepatitis C Ab   Date Value Ref Range Status   03/21/2017 Negative  Final     Toxoplasma gondii IGG   Date Value Ref Range Status   03/21/2017 <5.0 0.0 - 6.4 IU/mL Final     No components found for: TOXOIGGINTER  HSV 1 IgG   Date Value Ref Range Status   03/21/2017 Negative Negative Final     HSV 2 IgG   Date Value Ref Range Status   03/21/2017 Positive (A) Negative Final     Varicella IgG   Date Value Ref Range Status   03/21/2017 2.93 (H) 0.00 - 0.90 ISR Final     Varicella Interpretation   Date Value Ref Range Status   03/21/2017  Positive (A) Negative Final     Comment:     <or=0.90     Negative        No detectable IgG antibody to Varicella zoster  by the TAYO test. Such individuals are presumed to be   uninfected with Varicella zoster and to be susceptible to   primary infection.  0.91-1.09    Equivocal  >or=1.10     Positive        Indicates presence of detectable IgG antibody to Varicella   zoster by the TAYO test. Indicative of previous or current   infection.       No results found for: STRONGANTIGG  Jennifer-Barr Virus IgG (VCA)   Date Value Ref Range Status   2017 Positive (A) Negative Final     Hep B S Ab   Date Value Ref Range Status   2017 Negative  Final     No results found for: QUANTIFERON  No results found for: HEPAIGM  No results found for: PPD  No results found for this or any previous visit.     Ref. Range 3/21/2017 08:20   Hep B Core Total Ab Unknown Negative   Hep B S Ab Unknown Negative   Hepatitis C Ab Unknown Negative   Mitogen - Nil Latest Ref Range: See text IU/mL >10.00   NIL Latest Ref Range: See text IU/mL 0.21   TB Antigen Latest Ref Range: See text IU/mL 0.26   TB Antigen - Nil Latest Ref Range: See text IU/mL 0.05   TB Gold Unknown Negative   HIV 1/2 Ag/Ab Latest Ref Range: Negative  Negative   RPR Latest Ref Range: Non-reactive  Non-reactive   CMV IgG Interpretation Unknown Reactive (A)   Jennifer-Barr Virus IgG (VCA) Latest Ref Range: Negative  Positive (A)   HSV 1 IgG Latest Ref Range: Negative  Negative   HSV 2 IgG Latest Ref Range: Negative  Positive (A)   Interpretation Unknown This HPRA test ha...   Varicella IgG Latest Ref Range: 0.00 - 0.90 ISR 2.93 (H)   Varicella Interpretation Latest Ref Range: Negative  Positive (A)      Ref. Range 3/21/2017 08:20   Hepatitis A Antibody IgG Unknown Negative      Ref. Range 3/21/2017 08:20   Toxoplasma IGG Interpretation Unknown Non-reactive     ImaginD echo with color flow doppler   Status:  Final result   Visible to patient:  Yes (Patient Portal)  Next appt:  04/20/2017 at 10:20 AM in Internal Medicine (Daxa Loera MD) Dx:  Systolic congestive heart failure Order: [id number]           Ref Range & Units 2mo ago  (2/10/17) 3mo ago  (1/3/17) 1yr ago  (3/21/16) 1yr ago  (9/30/15)    EF 55 - 65 20 (A)  25 (A) 20 (A)    Mitral Valve Regurgitation  MILD  MODERATE TO SEVERE (A) MODERATE TO SEVERE (A)    Diastolic Dysfunction  Yes (A) No Yes (A) Yes (A)    Est. PA Systolic Pressure  17.75  44.22 (A) 22    Mitral Valve Mobility  NORMAL       Tricuspid Valve Regurgitation  TRIVIAL  TRIVIAL TO MILD TRIVIAL   Resulting Agency  [facility]   Narrative      Date of Procedure: 02/10/2017        TEST DESCRIPTION   Technical Quality: This is a technically adequate study.     Aorta: The aortic root is normal in size, measuring 2.5 cm at sinotubular junction.     Left Atrium: The left atrial volume index is severely enlarged, measuring 80.20 cc/m2.     Left Ventricle: The left ventricle is severely enlarged, with an end-diastolic diameter of 7.2 cm, and an end-systolic diameter of 6.8 cm. LV wall thickness is normal, with the septum measuring 0.8 cm and the posterior wall measuring 1.2 cm across.   Relative wall thickness was normal at 0.33, and the LV mass index was increased at 186.3 g/m2 consistent with eccentric left ventricular hypertrophy. Global left ventricular systolic function appears severely depressed. Visually estimated ejection   fraction is 20-25%. The LV Doppler derived stroke volume equals 31.0 ccs.   The E/e'(lat) is 21, consistent with significant diastolic dysfunction.     Right Atrium: The right atrium is normal in size, measuring 4.7 cm in length in the apical view.     Right Ventricle: The right ventricle is normal in size measuring 2.1 cm at the base in the apical right ventricle-focused view. Global right ventricular systolic function appears normal. The estimated PA systolic pressure is 18 mmHg.     Aortic Valve:  The aortic valve is normal  in structure with normal leaflet mobility. The aortic valve is tri-leaflet in structure.     Mitral Valve:  The mitral valve is normal in structure with normal leaflet mobility. There is mild mitral regurgitation.     Tricuspid Valve:  The tricuspid valve is normal in structure with normal leaflet mobility. There is trivial tricuspid regurgitation.     Pulmonary Valve:  The pulmonic valve is not well seen.     IVC: IVC is normal in size and collapses > 50% with a sniff, suggesting normal right atrial pressure of 3 mmHg.     Intracavitary: There is no evidence of pericardial effusion, intracavity mass, thrombi, or vegetation.         CONCLUSIONS     1 - Severely depressed left ventricular systolic function (EF 20-25%).     2 - Severe left ventricular enlargement.     3 - Severe left atrial enlargement.     4 - Left ventricular diastolic dysfunction.     5 - The estimated PA systolic pressure is 18 mmHg.     6 - Mild mitral regurgitation.                   CT Abdomen Pelvis  Without Contrast    Status: Final result         MyChart Results Release      MyChart Status: Active Results Release       Signed by      Signed Credentials Date/Time    Phone Pager     VALENTE DUNHAM MD 3/17/2017 14:58 [phone]       PACS Images      Show images for CT Abdomen Pelvis Without Contrast       Reviewed By Valentino Ruiz RN on 3/20/2017  9:49 AM     Nereida Hatch MD on 3/17/2017  3:32 PM       External Result Report      External Result Report       Narrative      Technique: CT guided the chest, abdomen, and pelvis was acquired from the thoracic inlet to the proximal femurs without the use of intravenous or oral contrast.  5 mm axial, coronal, and sagittal multiplanar reconstructions were provided.    Comparison: CTA chest non coronary 2/9/15.    Findings:    There is a 1.9 cm hypodense thyroid nodule within the left lower pole.  The structures at the base of the neck are otherwise  unremarkable.    There is a left-sided aortic arch with 3 branch vessels.  The thoracic aorta maintains normal caliber and course.  No significant coronary or aortic atherosclerosis is identified.    There is a left-sided cardiac pacing device within the anterior chest wall with single lead extending into the right ventricle.    The visualized heart is unremarkable.  No pericardial fluid is identified.    There is no axillary or mediastinal lymphadenopathy.  The hilar contours are unremarkable on this noncontrast examination.    The lungs are symmetrically expanded and clear without evidence of significant consolidation, mass, nodule, or pneumothorax.  No significant pleural fluid or pleural disease is identified.    The esophagus maintains a normal caliber and course.    The liver is mildly enlarged with elongated right hepatic lobe measuring up to 19.5 cm without focal abnormality.    The gallbladder demonstrates layering dependent hyperdense material which may represent sludge or small gallstones.  No evidence of cholecystitis.    The bile ducts, pancreas, spleen, adrenal glands, kidneys, ureters, urinary bladder, uterus, and adnexa are unremarkable.    The stomach and duodenum are within normal limits.  The small bowel normal in caliber without evidence of obstruction or inflammation.  A normal appendix is identified.  The colon and rectum are unremarkable.    There is no evidence of ascites.  No masses or lymphadenopathy are identified within the abdomen or pelvis.  There are no fluid collections.    The osseous structures demonstrate multilevel degenerative change of the thoracic spine with multilevel loss of disc space height and anterior osteophytosis most prominent within the lower thoracic spine.  No aggressive osseous lesions or displaced fractures are identified.    The superficial soft tissues demonstrate mild fat stranding within the right inguinal region and overlying the femoral vessels compatible  with postprocedural changes from angiogram procedure.  The superficial soft tissues are otherwise unremarkable.       Impression          1. No acute CT abnormality within the chest, abdomen, or pelvis.    2.  1.9 cm hypodense left thyroid nodule, for which followup with ultrasound is recommended.    3.  Mild hepatomegaly.      4.  Postprocedural changes consistent with angiogram procedure within the right inguinal region.    This report has been flagged in EPIC .          CT Head Without Contrast    Status: Final result         MyChart Results Release      MyChart Status: Active Results Release       Signed by      Signed Credentials Date/Time    Phone Pager     DENNIS SKINNER MD 3/17/2017 13:41 [phone]        PACS Images      Show images for CT Head Without Contrast       Reviewed By Valentino Hatch MD on 3/17/2017  3:32 PM     Marsha Ruiz RN on 3/17/2017  2:22 PM       External Result Report      External Result Report       Narrative      CT head without contrast    03/17/17 11:15:00    Accession# [id number]    CLINICAL INDICATION: 41 year old F with  pre heart transplant workup    TECHNIQUE: Axial CT images obtained throughout the region of the head without the use of intravenous contrast. Axial, sagittal and coronal reconstructions were performed.    COMPARISON: CT head 02/09/2017    FINDINGS:    The ventricles are normal in size without evidence of hydrocephalus.    The brain appears within normal limits. No parenchymal mass, hemorrhage, edema or major vascular distribution infarct.    No extra-axial blood or fluid collections.    The cranium appears intact. Mastoid air cells and paranasal sinuses are essentially clear.       Impression           Noncontrast CT demonstrates no significant intracranial abnormality.  ______________________________________     Electronically signed by resident: ABIGAIL JUNG MD  Date: 03/17/17  Time: 13:26                 Transplant Infectious Diseases -  Candidacy    Assessment/Plan:     Transplant Candidacy: Based on available information, there are no identified significant barriers to LVAD and transplantation from an infectious disease standpoint pending a negative Strongyloides IgG which needs to be ordered prior to transplant.  It is recommended the patient have their teeth treated prior to transplant.  Final determination of transplant candidacy will be made once evaluation is complete and reviewed by the Transplant Selection Committee.    LISA Lin  Vaccine Needs:  1. Hep A today and 6 months  2. Hep B (High Dose) today, 1 month and 6 months  3. Tdap  4. Prevnar-13       Counseling:   It is recommended the patient have their teeth treated prior to transplant.  Patient is HSV-2 positive.  She had a possbile break out many years ago but none since.  She was counseled on risk of breakouts, transmissability and shedding of virus, safe sex practise and possible increased risk of breakouts post transplant.  If, after transplant, she is having breakouts, she was counseled to notify primary team for suppressive therapy.    I discussed with WESLEYja the risk for increased susceptibility to infections following transplantation including increased risk for infection right after transplant and if rejection should occur.  The patients has been counseled on the importance of vaccinations including but not limited to a yearly flu vaccine.  Specific guidance has been provided to the patient regarding the patients occupation, hobbies and activities to avoid future infectious complications including but not limited to avoiding undercooked meats and seafood, proper hygiene, and contact with animals.

## 2017-04-19 NOTE — LETTER
April 19, 2017      Jeimy Srivastava MD  [address]  [facility] [address]           [facility]  [address]  [facility] [address]  Phone: [phone]  Fax: [phone]          Patient: Mario Mahajan   MR Number: [id number]   YOB: 1975   Date of Visit: 4/19/2017       Dear Dr. Jeimy Srivastava:    Thank you for referring Mario Mahajan to me for evaluation. Attached you will find relevant portions of my assessment and plan of care.    If you have questions, please do not hesitate to call me. I look forward to following Mario Mahajan along with you.    Sincerely,    Austen Lopez Jr., PA    Enclosure  CC:  No Recipients    If you would like to receive this communication electronically, please contact [email or web address] or [phone] to request more information on [employer] Link access.    For providers and/or their staff who would like to refer a patient to [facility], please contact us through our one-stop-shop provider referral line, [facility], at [phone].    If you feel you have received this communication in error or would no longer like to receive these types of communications, please e-mail [email or web address]

## 2017-04-19 NOTE — PROGRESS NOTES
Psychosocial Follow up:    SW and Kirill Christensen, , met with pt for follow up to psychosocial completed on 3/17/17. Pt aaox4, calm, and pleasant. Pt did follow up on all of SW's recommendations.  Pt reports coping better at this time and feeling less depression than last visit with SW. Pt reports sister in law, Kathryn Wade ([phone]) will be backup caregiver for transplant and LVAD. Pt reports Luiza cotto, lives in [place], and is 44 years old. Pt gave SW verbal permission to call Luiza with Caregiver education. SW called Luiza and left voicemail, awaiting reply. Pt reports applied for Medicaid and received confirmation of Medicaid benefits beginning on  March 28. Pt reports is receiving short term disability in the amount of $575 biweekly. Pt reports does have long term disability benefits as well, and was told by representative in HR department pt can apply for Long Term Disability once Short Term Disability ends. Pt reports does not know stop date of Short Term Disability at this time. SW requesting pt contact HR to clarify stop date of Short Term Disability. Pt reports applied for disability through the  office at the beginning of march and is awaiting a reply. AZALIA Christensen explained possibility of losing Medicaid if approved for disability and payment is too high. AZALIA Christensen explained process for shopping for an insurance plan on the healthcare marketplace if pt loses medicaid. AZALIA Christensen requested pt contact her if approved for disability and pt voiced understanding. Pt reports now receiving $300 a month in food stamps. Pt reports also completed an application for unemployment, and is awaiting a decision. Pt reports missed counseling appointment with LATESHA Stanton due to not feeling well on the day of the appointment, and has rescheduled for May 3. Pt reports no other questions or concerns at this time. SW providing ongoing psychosocial counseling and support, education, assistance,  resources, and discharge planning as indicated. SW continuing to follow and remains available.       Transplant Social Work - Candidacy  Assessment/Plan:      Psychosocial Suitability: Patient presents as a suitable candidate for LVAD or heart transplant at this time, pending confirmation of backup caregiver. Based on psychosocial risk factors, patient presents as high risk, due to low income, expected changes in income and insurance, mental health difficulty, and limited social support.     Recommendations/Additional Comments: SW left voicemail for backup caregiver Luiza Wade and is awaiting response. Caregiver education needs to be provided, and Luiza will need to commit to backup caregiver role prior to pt being listed for transplant or receiving LVAD.

## 2017-04-21 ENCOUNTER — SOCIAL WORK (OUTPATIENT)
Dept: TRANSPLANT | Facility: CLINIC | Age: 42
End: 2017-04-21

## 2017-04-21 ENCOUNTER — OFFICE VISIT (OUTPATIENT)
Dept: INTERNAL MEDICINE | Facility: CLINIC | Age: 42
End: 2017-04-21
Payer: MEDICAID

## 2017-04-21 VITALS
SYSTOLIC BLOOD PRESSURE: 136 MMHG | WEIGHT: 242.94 LBS | BODY MASS INDEX: 35.98 KG/M2 | OXYGEN SATURATION: 99 % | HEART RATE: 73 BPM | TEMPERATURE: 98 F | DIASTOLIC BLOOD PRESSURE: 74 MMHG | HEIGHT: 69 IN

## 2017-04-21 DIAGNOSIS — E66.9 OBESITY, UNSPECIFIED OBESITY SEVERITY, UNSPECIFIED OBESITY TYPE: ICD-10-CM

## 2017-04-21 DIAGNOSIS — G47.30 SLEEP APNEA, UNSPECIFIED TYPE: Primary | ICD-10-CM

## 2017-04-21 PROCEDURE — 99203 OFFICE O/P NEW LOW 30 MIN: CPT | Mod: S$PBB,,, | Performed by: HOSPITALIST

## 2017-04-21 PROCEDURE — 99999 PR PBB SHADOW E&M-EST. PATIENT-LVL III: CPT | Mod: PBBFAC,,, | Performed by: HOSPITALIST

## 2017-04-21 PROCEDURE — 99213 OFFICE O/P EST LOW 20 MIN: CPT | Mod: PBBFAC | Performed by: HOSPITALIST

## 2017-04-21 NOTE — PATIENT INSTRUCTIONS
We have ordered a sleep study for you.    Continue your efforts to maintain a healthy diet and exercise to help with your weight and breathing problems at night.    It would be important to address with your cardiologist and OB to determine when you can get your hysterectomy.

## 2017-04-21 NOTE — PROGRESS NOTES
"Psychosocial Follow up:      SW called pt's sister in law, Luiza Wade ([phone])  to provide caregiver education. Pt's sister in law reports understanding all aspects of caregiver role. Pt's sister in law reports willing and able to fulfill role of backup caregiver. Luiza reports, "I'll do anything for my sister in law, I love her to bits and pieces." Luiza also reports familiar with sterile technique and confident she will learn dressing change quickly.    Transplant Social Work - Candidacy  Assessment/Plan:       Psychosocial Suitability: Patient presents as a suitable candidate for LVAD or heart transplant at this time. Based on psychosocial risk factors, patient presents as medium risk, due to low income and reported difficulty coping.     Recommendations:    SW recommending pt follow up with counselor for anxiety, depression, and coping skills. Pt reports having a scheduled appointment with LATESHA Stanton on 5/3/17.     SW providing psychosocial counseling and support, education, assistance, and resources as indicated. SW remains available.               "

## 2017-04-21 NOTE — MR AVS SNAPSHOT
[facility]  [address]  [facility] [address]  Phone: [phone]  Fax: [phone]                  Mario Mahajan   2017 3:15 PM   Office Visit    Description:  Female : 1975   Provider:  Yessi Ramos MD   Department:  [facility] Internal Medicine           Reason for Visit     Annual Exam           Diagnoses this Visit        Comments    Sleep apnea, unspecified type    -  Primary     Obesity, unspecified obesity severity, unspecified obesity type                To Do List           Future Appointments        Provider Department Dept Phone    5/3/2017 2:30 PM ECHO, [facility] - Echo/Stress Lab [phone]    2017 11:00 AM INJECTION, INFECTIOUS DISEASES [facility] ID Injection Room [phone]    2017 8:00 AM HOME MONITOR DEVICE CHECK, [facility] - Arrhythmia [phone]    2017 12:30 PM EKG, [facility] - [phone]    2017 1:00 PM [facility] [phone]      Goals (5 Years of Data)     None      Follow-Up and Disposition     Return in about 1 year (around 2018).      [facility]     [facility] Care [facility] - 24/ Assistance  Unless otherwise directed by your provider, please contact [facility], our nurse care line that is available for / assistance.     Registered nurses in the [facility] provide: appointment scheduling, clinical advisement, health education, and other advisory services.  Call: [phone] (toll free)               Medications           Message regarding Medications     Verify the changes and/or additions to your medication regime listed below are the same as discussed with your clinician today.  If any of these changes or additions are incorrect, please notify your healthcare provider.             Verify that the below list of medications is an accurate representation of the medications you are currently taking.  If  "none reported, the list may be blank. If incorrect, please contact your healthcare provider. Carry this list with you in case of emergency.           Current Medications     albuterol 90 mcg/actuation inhaler Inhale 1-2 puffs into the lungs every 6 (six) hours as needed for Wheezing. Rescue    amiodarone (PACERONE) 200 MG Tab TAKE 1 TABLET PO ONCE A DAY. START WHEN AMIODARONE 400 MG REGIMEN IS OVER. AS DIRECTED    ascorbic acid, vitamin C, (VITAMIN C) 250 MG tablet Take 1 tablet (250 mg) by mouth twice daily. Take with the iron tablets.    aspirin (ECOTRIN) 81 MG EC tablet Take 1 tablet (81 mg total) by mouth once daily.    candesartan (ATACAND) 4 MG tablet Take 1 tablet (4 mg total) by mouth once daily.    carvedilol (COREG) 3.125 MG tablet Take 1 tablet (3.125 mg total) by mouth 2 (two) times daily.    ferrous sulfate 325 (65 FE) MG EC tablet Take 1 tablet (325 mg total) by mouth 2 (two) times daily. Take with vitamin C.    fluorometholone 0.1% (FML) 0.1 % DrpS INT 1 GTT INTO OS QID    furosemide (LASIX) 40 MG tablet Take 1 tablet (40 mg total) by mouth daily as needed (Leg swelling or weight gain).    sertraline (ZOLOFT) 50 MG tablet Take 1 tablet (50 mg total) by mouth once daily.    spironolactone (ALDACTONE) 25 MG tablet Take 0.5 tablets (12.5 mg total) by mouth once daily.           Clinical Reference Information           Your Vitals Were     BP Pulse Temp Height Weight Last Period    136/74 (BP Location: Left arm, Patient Position: Sitting, BP Method: Manual) 73 98.1 °F (36.7 °C) 5' 9" (1.753 m) 110.2 kg (242 lb 15.2 oz) (Within Weeks)    SpO2 BMI             99% 35.88 kg/m2         Blood Pressure          Most Recent Value    BP  136/74      Allergies as of 4/21/2017     Ace Inhibitors      Immunizations Administered on Date of Encounter - 4/21/2017     None      Orders Placed During Today's Visit     Future Labs/Procedures Expected by Expires    Polysomnography 4 or more parameters with CPAP  As directed " 4/21/2018      Maintenance Dialysis History     Patient has no recorded history of maintenance dialysis.      Transplant Information        Txp Date Organ Coordinator Care Team     Heart Marsha Ruiz RN Referring Physician:  Juanjose Nuñez MD   Corresponding Physician:  Juanjose Nuñez MD         Instructions    We have ordered a sleep study for you.    Continue your efforts to maintain a healthy diet and exercise to help with your weight and breathing problems at night.    It would be important to address with your cardiologist and OB to determine when you can get your hysterectomy.         Language Assistance Services     ATTENTION: Language assistance services are available, free of charge. Please call [phone].      ATENCIÓN: Si habla español, tiene a peck disposición servicios gratuitos de asistencia lingüística. Llame al [phone].     CHÚ Ý: N?u b?n nói Ti?ng Vi?t, có các d?ch v? h? tr? ngôn ng? mi?n phí dành cho b?n. G?i s? [phone].         [facility] complies with applicable Federal civil rights laws and does not discriminate on the basis of race, color, national origin, age, disability, or sex.

## 2017-04-21 NOTE — PROGRESS NOTES
Subjective:       Patient ID: Mario Mahajan is a 41 y.o. female.      Chief Complaint: establish care      HPI  Mario Mahajan is a 41 y.o. female with PMH of NICM, EF 20%, history of VF s/p ICD, being worked up for LVAD vs transplant by cardiology, asthma, glaucoma presents to establish care.        Has history of asthma.  Uses albuterol inhaler PRN.  Uses it about twice a week.  Has no wheezing or SOB.  Has gone to the ER for it about a year ago and subsequently went home after a breathing treatment.      She is concerned about her risk of pancreatic cancer given her dad had pancreatic cancer.  She has no abdominal pain, nausea, vomiting, diarrhea, weight loss, fatigue.  She is not a smoker, does not drink alcohol other than one drink every 3 months.    She was getting evaluated for a hysterectomy by Dr. Rose for menorrhagia.  Per Dr Rose's note they were going to schedule her for surgery.  However, patient says cardiology delayed the surgery as part of her work up for her LVAD vs transplant.  She reports her periods are still heavy but improved.  She last passed out in February after which she was admitted to the hospital.  She was told it was due to low blood pressure and did not require a blood transfusion.  She has not passed out since then.  She experiences some lightheadedness.  She denies chest pain and shortness of breath.      She wakes up from sleep because due to apnea.  She has never gotten a sleep study before.  She has been told before that she needs one but never underwent one.      Family history of cancer:  Dad had pancreatic cancer at 65.  MGM had lung cancer in her 60s - was a smoker  Tobacco: never smoker  Alcohol:  Drinks 1 drink every three months.   Last pap smear:  About a year and a half ago - was negative.  Exercise:  Walks every day for about 20 minutes.    Diet:  Eats carbs, eats salads, chicken, fish.  Doesn't eat fried foods.  She has seen a nutritionist.          Review of  "Systems   Constitutional: Negative for chills, fever and malaise/fatigue.   HENT: Negative for hearing loss and sore throat.    Eyes: Negative for blurred vision, pain and discharge.   Respiratory: Negative for cough, shortness of breath and wheezing.    Cardiovascular: Positive for palpitations. Negative for chest pain and leg swelling.   Gastrointestinal: Negative for abdominal pain, blood in stool, constipation, diarrhea, nausea and vomiting.   Genitourinary: Negative for dysuria, frequency and hematuria.   Musculoskeletal: Negative for back pain and myalgias.   Skin: Negative for itching and rash.   Neurological: Negative for dizziness, loss of consciousness, weakness and headaches.   Endo/Heme/Allergies: Negative for polydipsia.           Objective:     Vitals:    04/21/17 1457   BP: 136/74   Pulse: 73   Temp: 98.1 °F (36.7 °C)     Estimated body mass index is 35.88 kg/(m^2) as calculated from the following:    Height as of this encounter: 5' 9" (1.753 m).    Weight as of this encounter: 110.2 kg (242 lb 15.2 oz).    Physical Exam   Constitutional: She is oriented to person, place, and time and well-developed, well-nourished, and in no distress.   HENT:   Head: Normocephalic and atraumatic.   Eyes: Conjunctivae and EOM are normal. Pupils are equal, round, and reactive to light.   Neck: Neck supple. No tracheal deviation present. No thyromegaly present.   Cardiovascular: Normal rate, regular rhythm, normal heart sounds and intact distal pulses.    No murmur heard.  Pulmonary/Chest: Effort normal and breath sounds normal. She has no wheezes. She has no rales.   Abdominal: Soft. Bowel sounds are normal. She exhibits no distension. There is no tenderness.   Increased abdominal girth   Neurological: She is alert and oriented to person, place, and time. GCS score is 15.   Skin: Skin is warm and dry.         Assessment/Plan:       Health Maintenance  --Encouraged healthy diet, physical activity for at least 30 minutes " 3x/week  --no alcohol counseling, smoking/tobacco cessation counseling, illicit drug use cessation counseling needed at this time.  --Safety: Seatbelt use, sunscreen use, working smoke detectors at home. No fall risk. Feels safe at home.  --has tested negative for HIV in the past.   --needs to get a pap smear around a year from this appointment  --she wants to defer STD testing    Obesity  --discussed healthy diet and exercise  --control of weight will help with sleep apnea at night    Sleep apnea  --sleep study ordered  --sleep apnea often occurs in those with heart failure    Menorrhagia  --I have sent a message to cardiology to request they coordinate with OB about when she can get her hysterectomy done  --anemia and heart failure and not a good combination  --last Hemoglobin was 11.7 last month on 3/13      Medication List with Changes/Refills   Current Medications    ALBUTEROL 90 MCG/ACTUATION INHALER    Inhale 1-2 puffs into the lungs every 6 (six) hours as needed for Wheezing. Rescue    AMIODARONE (PACERONE) 200 MG TAB    TAKE 1 TABLET PO ONCE A DAY. START WHEN AMIODARONE 400 MG REGIMEN IS OVER. AS DIRECTED    ASCORBIC ACID, VITAMIN C, (VITAMIN C) 250 MG TABLET    Take 1 tablet (250 mg) by mouth twice daily. Take with the iron tablets.    ASPIRIN (ECOTRIN) 81 MG EC TABLET    Take 1 tablet (81 mg total) by mouth once daily.    CANDESARTAN (ATACAND) 4 MG TABLET    Take 1 tablet (4 mg total) by mouth once daily.    CARVEDILOL (COREG) 3.125 MG TABLET    Take 1 tablet (3.125 mg total) by mouth 2 (two) times daily.    FERROUS SULFATE 325 (65 FE) MG EC TABLET    Take 1 tablet (325 mg total) by mouth 2 (two) times daily. Take with vitamin C.    FLUOROMETHOLONE 0.1% (FML) 0.1 % DRPS    INT 1 GTT INTO OS QID    FUROSEMIDE (LASIX) 40 MG TABLET    Take 1 tablet (40 mg total) by mouth daily as needed (Leg swelling or weight gain).    SERTRALINE (ZOLOFT) 50 MG TABLET    Take 1 tablet (50 mg total) by mouth once daily.     SPIRONOLACTONE (ALDACTONE) 25 MG TABLET    Take 0.5 tablets (12.5 mg total) by mouth once daily.       RTC one year for yearly annual exam, she will likely need a repeat pap smear around that time.      This case was discussed with Dr. Radha Ramos, PGY-1

## 2017-04-28 DIAGNOSIS — G47.30 SLEEP APNEA, UNSPECIFIED TYPE: Primary | ICD-10-CM

## 2017-04-29 ENCOUNTER — PATIENT MESSAGE (OUTPATIENT)
Dept: TRANSPLANT | Facility: CLINIC | Age: 42
End: 2017-04-29

## 2017-05-02 ENCOUNTER — TELEPHONE (OUTPATIENT)
Dept: SLEEP MEDICINE | Facility: OTHER | Age: 42
End: 2017-05-02

## 2017-05-03 ENCOUNTER — HOSPITAL ENCOUNTER (OUTPATIENT)
Dept: CARDIOLOGY | Facility: CLINIC | Age: 42
Discharge: HOME | End: 2017-05-03
Payer: COMMERCIAL

## 2017-05-03 ENCOUNTER — OFFICE VISIT (OUTPATIENT)
Dept: PSYCHIATRY | Facility: CLINIC | Age: 42
End: 2017-05-03
Payer: COMMERCIAL

## 2017-05-03 DIAGNOSIS — Z76.82 ORGAN TRANSPLANT CANDIDATE: ICD-10-CM

## 2017-05-03 DIAGNOSIS — I50.9 CONGESTIVE HEART FAILURE, UNSPECIFIED CONGESTIVE HEART FAILURE CHRONICITY, UNSPECIFIED CONGESTIVE HEART FAILURE TYPE: ICD-10-CM

## 2017-05-03 DIAGNOSIS — F43.22 ADJUSTMENT DISORDER WITH ANXIETY: Primary | ICD-10-CM

## 2017-05-03 LAB
ESTIMATED PA SYSTOLIC PRESSURE: 44.24
GLOBAL PERICARDIAL EFFUSION: ABNORMAL
MITRAL VALVE MOBILITY: NORMAL
MITRAL VALVE REGURGITATION: ABNORMAL
RETIRED EF AND QEF - SEE NOTES: 15 (ref 55–65)
TRICUSPID VALVE REGURGITATION: ABNORMAL

## 2017-05-03 PROCEDURE — 90791 PSYCH DIAGNOSTIC EVALUATION: CPT | Mod: S$GLB,TXP,, | Performed by: PSYCHOLOGIST

## 2017-05-03 PROCEDURE — 93306 TTE W/DOPPLER COMPLETE: CPT | Mod: 26,S$PBB,TXP, | Performed by: INTERNAL MEDICINE

## 2017-05-03 NOTE — PROGRESS NOTES
Psychiatry Initial Visit (PhD/LCSW)    CPT Code: 35686    Patient Name:  Mario Mahajan    Date:  04/04/2017    Site:  [facility]    Referral source:  Tabitha Garner LCSW    Chief complaint/reason for encounter:  Psychological Evaluation    Clinical status of patient:  Outpatient    Met with:  Patient    History of present illness:  Ms. Mario Mahajan is a 41-year-old Black female who is pursuing psychotherapy to improve symptoms of anxiety.  She reports that she has been experiencing significant medical problems since passing out in February 2017.  When her psychiatric symptoms were worse, she endorsed crying spells, difficulty sleeping (with fear of not waking up), anxiety about future problems, and some withdrawal.  However, she notes that she is doing a lot better than I was.  She was prescribed Zoloft for anxiety from Dr. Srivastava; however, she only took it a few times and felt that it did not improve her symptoms.  She is nervous about a potential heart transplant due to the severity and risks associated with the surgery. She is worried about passing out again and has been unable to drive for six months.  She has been working on tolerating uncertainty and believes her kirill and acceptance have helped her.  She enjoys reading, walking, cooking, and doing things with her sister-in-law.  She is not interested in psychotherapy at this time, but would like to consider treatment in the future if she experiences issues with LVAD or heart transplant.    Ms. Mahajan denied past psychiatric treatment, hospitalizations, and psychotropic medications.    Pain scale:  4/10 (shoulder)    Symptoms:  Mood:  Depression:  She denied depressed mood, anhedonia, lack of motivation, lethargy, feelings of guilt or worthlessness, appetite changes, and suicidal ideation.    Cate/Hypomania:  Denied.  Anxiety:  She experiences racing thoughts and worries every now and then about twice per month.  During those times she  endorses physical hyperarousal including muscle tension and heart palpitations.  She occasionally worries about passing out again, having surgery, and paying for bills.  Her anxiety appears to be related to medical problems and stressors and do not currently meet criteria for an anxiety disorder.  Sleep:  Denied problems.  Suicidal ideation:  Denied.  Non-suicidal self-injury:  Denied.  Substance abuse: Denied.    Psychiatric history:  Denied.    Trauma history:  Denied.    Medical history:  Chronic systolic heart failure; Non-rheumatic mitral regurgitation; Nonischemic dilated cardiomyopathy; Chronic combined systolic and diastolic congestive heart failure    Family history of psychiatric illness:  None reported.    Social history (marriage, employment, etc.):  Ms. Mahajan was born and raised in [place], [place] by her biological mother and father along with a twin brother and an older brother.  She described fine relationships with her parents and siblings.  She described her childhood as fine.  She denied childhood trauma and abuse.  She did fine in school and earned her high school diploma.  She is currently not working.  She is on short-term disability from her job as a .  She is attempting to receive long-term disability.  Finances are sometimes tough for her.  She has been dating her boyfriend for four years.  She has a 22-year-old son and 13-year-old daughter.  She currently lives by herself and her daughter.  She reports that her children work on my nerves, like theyre supposed to do.  Pentecostalism is important to her and she identifies as non-Adventism.    Current psychosocial stressors:  Bills, Surgery, Possibly not able to go on birthday cruise in November    Report of coping skills:  [place], Talk to others    Support system:  Mother, Friend    Substance use:  Alcohol:  She consumes alcohol very seldom, like once a month.  Drugs:  Denied.  Tobacco:  Denied.  Caffeine:   Denied.    Strengths and Liabilities:  Strength: Patient is intelligent., Strength: Patient has positive support network., Strength: Patient has reasonable judgment., Strength: Patient is stable., Liability: Patient has poor health.    Mental Status Exam:  General appearance:  appears stated age, neatly dressed, well groomed  Speech:  normal rate, normal tone, normal pitch, normal volume  Level of cooperation:  cooperative  Thought processes:  logical, goal-directed  Mood:  euthymic  Thought content:  no illusions, no visual hallucinations, no auditory hallucinations, no delusions, no active or passive homicidal thoughts, no active or passive suicidal ideation, no obsessions, no compulsions, no violence  Affect:  appropriate  Orientation:  oriented to person, place, and date  Memory:  Recent memory:  3 of 3 objects after brief delay.    Remote memory - intact  Attention span and concentration:  spelled HOUSE forward and backwards  Fund of general knowledge: 3 of 4 recent presidents  Abstract reasoning:  similarities: abstract.  Proverbs: abstract.  Judgment and insight: fair  Language:  intact    Diagnostic impression:    ICD-10-CM ICD-9-CM   1. Adjustment disorder with anxiety F43.22 309.24       Plan:  Ms. Pepper will continue in psychotherapy PRN with Joslyn Stanton, Ph.D. to address issues related to anxiety.  She was provided with emotion regulation handouts today with the ABC PLEASE skills to reduce vulnerability to distress.      Length of Session:  40 minutes

## 2017-05-08 ENCOUNTER — TELEPHONE (OUTPATIENT)
Dept: SLEEP MEDICINE | Facility: OTHER | Age: 42
End: 2017-05-08

## 2017-05-09 ENCOUNTER — DOCUMENTATION ONLY (OUTPATIENT)
Dept: TRANSFUSION MEDICINE | Facility: HOSPITAL | Age: 42
End: 2017-05-09

## 2017-05-09 NOTE — PROGRESS NOTES
[facility] TRANSFUSION MEDICINE  Section of Transfusion Medicine and Histocompatibility  HLA Note    Case Details   Diagnosis:  No primary diagnosis found.  Blood Type: O POS  HLA Type:   Lab Results   Component Value Date    YJKP7AX 23 03/21/2017    ACBB3XJ 68 03/21/2017    AHMV8TS 72 03/21/2017    IGQG1XZ 45 03/21/2017    BFMAK5IV 2 03/21/2017    SXVOL4ZZ 16 03/21/2017     Lab Results   Component Value Date    PQFGGJ88PN 17 03/21/2017    BRYJRW92BK 7 03/21/2017    [id number] 52 03/21/2017    [id number] 53 03/21/2017     Lab Results   Component Value Date    RHYYX9GT 2 03/21/2017    TPNWA9GU XX 03/21/2017     Lab Results   Component Value Date    SVQSV6TO 6 03/21/2017    YKEKI6BL XX 03/21/2017     Recent Antibody Screen/ID Results:   Lab Results   Component Value Date    CIIAB DQA1*05:05,DQA1*06:01,DQA1*05:03 03/21/2017    ABCMT  03/21/2017     DQA1*05:05(8179), DQA1*06:01(7774), DQA1*05:03(6648)     Auto T Cell Crossmatch Results:  No results found for: XMTCELLRES  Auto B Cell Crossmatch Results:  No results found for: BCELLRES  Assessment     Interpretation: Interp limited by lack of pending autocrossmatch results. Class I antibody screen is negative. DQ6(DQA*01:03) is only reactive in 50% of beads and may represent an allele specific reactivity or a false positive reaction. Flow specificity to follow may clarify significance. We suggest the two following listing options be considered (we favor the second to maximize offers):    Option 1: List DQ7 and DQ6, virtual crossmatch will be negative  Option 2: List DQ7 and perform a prospective crossmatch ONLY if donor is DQ6 POSITIVE, a virtual crossmatch would be indicated (and negative) if the donor is DQ6 NEGATIVE      Please call the HLA Lab [phone] with any concerns or questions.    Anita Holbrook MD , PhD  ORLANDO Barclay MD, EDMUNDO  Section of Transfusion Medicine & Histocompatibility  Department of Pathology and Laboratory Medicine  [facility]  05/09/2017

## 2017-05-10 ENCOUNTER — TELEPHONE (OUTPATIENT)
Dept: TRANSPLANT | Facility: CLINIC | Age: 42
End: 2017-05-10

## 2017-05-10 ENCOUNTER — COMMITTEE REVIEW (OUTPATIENT)
Dept: TRANSPLANT | Facility: CLINIC | Age: 42
End: 2017-05-10

## 2017-05-10 ENCOUNTER — PATIENT MESSAGE (OUTPATIENT)
Dept: TRANSPLANT | Facility: CLINIC | Age: 42
End: 2017-05-10

## 2017-05-10 NOTE — TELEPHONE ENCOUNTER
Patient notified deferred for advanced options pending adequate insurance coverage. Patient stated she called Medicaid and should be covered, encouraged pt to speak to financial coord., call transferred to [facility], financial coord. Will continue to follow.

## 2017-05-10 NOTE — COMMITTEE REVIEW
Native Organ Dx: Nonischemic cardiomyopathy    Deferred for advanced options,Pending confirmation of adequate insurance coverage.     Note was written by Marsha Ruiz.    ==========================================================    I was present at the meeting and attest to the decision of the committee  Referring notfiied by mail

## 2017-05-11 NOTE — TELEPHONE ENCOUNTER
Notified per financial pa Roy., patient medicaid is in effect and should pose no problem to proceed with OHT listing pending financial approval. Dr Hatch notified - will discuss at next committee selection meeting on 5/17/17.

## 2017-05-16 ENCOUNTER — PATIENT MESSAGE (OUTPATIENT)
Dept: TRANSPLANT | Facility: CLINIC | Age: 42
End: 2017-05-16

## 2017-05-17 ENCOUNTER — DOCUMENTATION ONLY (OUTPATIENT)
Dept: PHARMACY | Facility: HOSPITAL | Age: 42
End: 2017-05-17

## 2017-05-17 ENCOUNTER — COMMITTEE REVIEW (OUTPATIENT)
Dept: TRANSPLANT | Facility: CLINIC | Age: 42
End: 2017-05-17

## 2017-05-17 ENCOUNTER — TELEPHONE (OUTPATIENT)
Dept: TRANSPLANT | Facility: CLINIC | Age: 42
End: 2017-05-17

## 2017-05-17 NOTE — COMMITTEE REVIEW
Native Organ Dx: Nonischemic Cardiomyopathy    Approved  Mario Mahajan's case was presented to the heart transplant selection committee on 5/17/17 .  Patient has been accepted as a candidate for heart transplantation due to Nonischemic Cardiomyopathy with an NYHA Functional Class 40% - Disabled: Requires special care and assistance.  Patient to be listed through [facility] pending financial clearance as a Status 2 at donor weight 20%.    Approved for Ventricular Assist Device.     Note was written by Marsha Ruiz.    ==========================================================    I was present at the meeting and attest to the decision of the committee  Referring notfiied by mail

## 2017-05-17 NOTE — PROGRESS NOTES
This patient's medication therapy was evaluated and any issues identified were discussed with the Selection Committee at the time the patient was presented as a candidate for heart transplantation.  Patient is an  female who is currently <40 years old, and had a history of prior pregnancy.  Therefore the patient qualifies for thymoglobulin induction.  This will have to be re-evaluated at the time of transplantation based on HLA mismatch, new age, latest cPRA, and crossmatch results with the donor.  Will continue to follow as part of the inpatient team.

## 2017-05-17 NOTE — TELEPHONE ENCOUNTER
Attempted to reach patient to notify decision of committee for listing, voice message left with contact number.

## 2017-05-22 ENCOUNTER — TELEPHONE (OUTPATIENT)
Dept: TRANSPLANT | Facility: CLINIC | Age: 42
End: 2017-05-22

## 2017-05-22 ENCOUNTER — OFFICE VISIT (OUTPATIENT)
Dept: OBSTETRICS AND GYNECOLOGY | Facility: CLINIC | Age: 42
End: 2017-05-22
Payer: MEDICAID

## 2017-05-22 VITALS
SYSTOLIC BLOOD PRESSURE: 108 MMHG | WEIGHT: 238.31 LBS | DIASTOLIC BLOOD PRESSURE: 70 MMHG | BODY MASS INDEX: 35.3 KG/M2 | HEIGHT: 69 IN

## 2017-05-22 DIAGNOSIS — N92.4 MENORRHAGIA, PREMENOPAUSAL: Chronic | ICD-10-CM

## 2017-05-22 DIAGNOSIS — N94.10 FEMALE DYSPAREUNIA: ICD-10-CM

## 2017-05-22 DIAGNOSIS — Z01.419 WELL WOMAN EXAM WITH ROUTINE GYNECOLOGICAL EXAM: Primary | ICD-10-CM

## 2017-05-22 PROCEDURE — 99213 OFFICE O/P EST LOW 20 MIN: CPT | Mod: PBBFAC,PO | Performed by: OBSTETRICS & GYNECOLOGY

## 2017-05-22 PROCEDURE — 88175 CYTOPATH C/V AUTO FLUID REDO: CPT

## 2017-05-22 PROCEDURE — 99999 PR PBB SHADOW E&M-EST. PATIENT-LVL III: CPT | Mod: PBBFAC,,, | Performed by: OBSTETRICS & GYNECOLOGY

## 2017-05-22 PROCEDURE — 99395 PREV VISIT EST AGE 18-39: CPT | Mod: S$PBB,,, | Performed by: OBSTETRICS & GYNECOLOGY

## 2017-05-22 RX ORDER — HYDROCODONE BITARTRATE AND ACETAMINOPHEN 5; 325 MG/1; MG/1
TABLET ORAL
Refills: 0 | COMMUNITY
Start: 2017-05-16 | End: 2018-01-12

## 2017-05-22 RX ORDER — AMOXICILLIN 500 MG/1
CAPSULE ORAL
Refills: 0 | COMMUNITY
Start: 2017-05-16 | End: 2017-06-28

## 2017-05-22 NOTE — PROGRESS NOTES
Subjective:       Patient ID: Mairo Mahajan is a 41 y.o. female.    Chief Complaint: Well Woman    Patient has history of menorrhagia and also has dyspareunia with right lower quadrant discomfort.  On previous ultrasound those diminished mobility of the cervix and uterus and there is tenderness on cervical movement on bimanual exam which re-creates the discomfort she complains of.  Patient has cardiac problems which has caused GYN surgery to be deferred until cardiology clearance.  Easiest procedure would be endometrial ablation for menorrhagia but this would not address her pelvic discomfort probably due to adhesions from previous  section.  An abdominal hysterectomy would be the most effective approach for both bleeding and discomfort but presents significantly greater risk to the patient.  Laparoscopic procedure is not the best option due to her previous pelvic surgeries and present findings on GYN exam.  Patient was counseled regarding these 2 GYN procedures and understands that cardiology clearance is required.  She states her anemia is a little better on iron therapy.  Another option presented to the patient is to have her GYN procedures done at [facility] which would keep her close to her cardiologist.  Transfer of GYN care can be easily arranged.     HPI  Review of Systems   Gastrointestinal: Negative for abdominal distention, abdominal pain, constipation and nausea.   Genitourinary: Negative for dyspareunia, dysuria, genital sores, pelvic pain, vaginal bleeding and vaginal discharge.       Objective:      Physical Exam   Constitutional: She appears well-developed and well-nourished.   Pulmonary/Chest: Right breast exhibits no mass, no nipple discharge, no skin change and no tenderness. Left breast exhibits no mass, no nipple discharge, no skin change and no tenderness.   Abdominal: Soft. Bowel sounds are normal. She exhibits no distension and no mass. There is no tenderness. There is no  rebound and no guarding. Hernia confirmed negative in the right inguinal area and confirmed negative in the left inguinal area.   Genitourinary: Rectum normal, vagina normal and uterus normal. No breast tenderness or discharge. There is no lesion on the right labia. There is no lesion on the left labia. Uterus is not fixed and not tender. Cervix exhibits no motion tenderness, no discharge and no friability. Right adnexum displays no mass, no tenderness and no fullness. Left adnexum displays no mass, no tenderness and no fullness. No tenderness in the vagina. No vaginal discharge found.   Lymphadenopathy:        Right: No inguinal adenopathy present.        Left: No inguinal adenopathy present.       Assessment:       1. Well woman exam with routine gynecological exam    2. Menorrhagia, premenopausal    3. Female dyspareunia        Plan:         annual GYN visit with Pap smear.  No GYN medications.  Consideration for surgical procedure for menorrhagia and pelvic pain pending cardiology clearance.  See note above.

## 2017-05-22 NOTE — TELEPHONE ENCOUNTER
Patient notified committee approval OHT/VAD pending final PAP smear result and financial approval. FU appt made 6/28/17. Will continue to follow.

## 2017-05-25 ENCOUNTER — HOSPITAL ENCOUNTER (OUTPATIENT)
Dept: SLEEP MEDICINE | Facility: HOSPITAL | Age: 42
Discharge: HOME | End: 2017-05-25
Attending: HOSPITALIST
Payer: MEDICAID

## 2017-05-25 DIAGNOSIS — G47.30 SLEEP APNEA, UNSPECIFIED TYPE: ICD-10-CM

## 2017-05-25 DIAGNOSIS — G47.20 SLEEP STAGE DYSFUNCTION: ICD-10-CM

## 2017-05-25 PROCEDURE — 95810 POLYSOM 6/> YRS 4/> PARAM: CPT | Mod: 26,NTX,, | Performed by: PSYCHIATRY & NEUROLOGY

## 2017-05-25 PROCEDURE — 95810 POLYSOM 6/> YRS 4/> PARAM: CPT | Mod: TXP

## 2017-05-25 PROCEDURE — 95810 PR POLYSOMNOGRAPHY, 4 OR MORE: ICD-10-PCS | Mod: 26,NTX,, | Performed by: PSYCHIATRY & NEUROLOGY

## 2017-05-26 ENCOUNTER — TELEPHONE (OUTPATIENT)
Dept: OBSTETRICS AND GYNECOLOGY | Facility: CLINIC | Age: 42
End: 2017-05-26

## 2017-05-26 ENCOUNTER — DOCUMENTATION ONLY (OUTPATIENT)
Dept: TRANSPLANT | Facility: CLINIC | Age: 42
End: 2017-05-26

## 2017-05-26 NOTE — PROGRESS NOTES
[facility] [place]s provided to financial coord this danni

## 2017-05-31 ENCOUNTER — TELEPHONE (OUTPATIENT)
Dept: ADMINISTRATIVE | Facility: HOSPITAL | Age: 42
End: 2017-05-31

## 2017-06-01 ENCOUNTER — TELEPHONE (OUTPATIENT)
Dept: TRANSPLANT | Facility: CLINIC | Age: 42
End: 2017-06-01

## 2017-06-01 ENCOUNTER — PATIENT MESSAGE (OUTPATIENT)
Dept: TRANSPLANT | Facility: CLINIC | Age: 42
End: 2017-06-01

## 2017-06-01 DIAGNOSIS — I50.9 CONGESTIVE HEART FAILURE, UNSPECIFIED CONGESTIVE HEART FAILURE CHRONICITY, UNSPECIFIED CONGESTIVE HEART FAILURE TYPE: ICD-10-CM

## 2017-06-01 DIAGNOSIS — Z76.82 ORGAN TRANSPLANT CANDIDATE: Primary | ICD-10-CM

## 2017-06-01 NOTE — TELEPHONE ENCOUNTER
"LISTING NOTE:    Mario Mahajan was presented to selection committee on   and the decision was made to list the patient as a Status 2 pending financial clearance.    Spoke to Edis Bardales, , and confirmed that patient was listed today as a Status 2.       Diagnosis: NICM  NYHA Class: 40% - Disabled: Requires special care and assistance  EF: 15%  PKV02: 14.1    ABO: O POS   CPRA: O%, does not need prospective crossmatch. Unacceptable antigens DQ6, DQ7.    Ht Readings from Last 1 Encounters:   05/22/17 5' 9" (1.753 m)     Wt Readings from Last 1 Encounters:   05/22/17 108.1 kg (238 lb 5.1 oz)     BMI: Estimated body mass index is 35.19 kg/m² as calculated from the following:    Height as of 5/22/17: 5' 9" (1.753 m).    Weight as of 5/22/17: 108.1 kg (238 lb 5.1 oz).    Donor weight: 20% down  RHC: 3/8/17  RA: 17  PA: 54/30   LEELA: 40  PCW: 25  CO: 7.4  CI: 3.4  LVAD: none    Inotropes: none    Patient contacted and informed that she is being listed today.  Contact information and Social Security Number verified.  Patient reminded that she should not travel further away than her home and if she does, she must call to let someone know (on-call MD or on-call transplant coordinator).  Patient also reminded that she must notify someone if she is hospitalized somewhere other than here or if she becomes ill.  Patient informed that she must be able to be reached by telephone within 15 minutes of a donor offer.  Patient instructed to be sure to keep her cell phone on and charged.  Also instructed patient to ask the same of her back-up family members and friends regarding their phones.  Patient instructed to call during regular office hours if she has non-urgent/non-symptom based questions regarding any part of being listed.     Patient verbalized understanding of all points discussed.  Patient expressed her satisfaction and relief regarding being listed.    ID acceptance criteria is verified and correct  "

## 2017-06-12 ENCOUNTER — PATIENT MESSAGE (OUTPATIENT)
Dept: TRANSPLANT | Facility: CLINIC | Age: 42
End: 2017-06-12

## 2017-06-13 ENCOUNTER — PATIENT MESSAGE (OUTPATIENT)
Dept: ELECTROPHYSIOLOGY | Facility: CLINIC | Age: 42
End: 2017-06-13

## 2017-06-17 ENCOUNTER — TELEPHONE (OUTPATIENT)
Dept: HEPATOLOGY | Facility: HOSPITAL | Age: 42
End: 2017-06-17

## 2017-06-17 NOTE — TELEPHONE ENCOUNTER
Spoke with Ms Mahajan to convey to her the results of her sleep study.  She does not have sleep apnea but may have upper airway resistance.  Also conveyed that she has significant limb movements during sleep.  I offered ENT referral vs sleep clinic referral vs trying methods such as side-sleeping, nasal saline sprays.  She opted for trying conservative measures first prior to considering referrals.  I asked her to please give us a call back if her sleep quality worsens or does not improve so that we may consider other options.  Answered all questions.

## 2017-06-20 ENCOUNTER — CLINICAL SUPPORT (OUTPATIENT)
Dept: ELECTROPHYSIOLOGY | Facility: CLINIC | Age: 42
End: 2017-06-20
Payer: MEDICAID

## 2017-06-20 ENCOUNTER — PATIENT MESSAGE (OUTPATIENT)
Dept: TRANSPLANT | Facility: CLINIC | Age: 42
End: 2017-06-20

## 2017-06-20 DIAGNOSIS — I42.0 DCM (DILATED CARDIOMYOPATHY): ICD-10-CM

## 2017-06-20 DIAGNOSIS — Z95.810 ICD (IMPLANTABLE CARDIOVERTER-DEFIBRILLATOR) IN PLACE: ICD-10-CM

## 2017-06-20 PROCEDURE — 93295 DEV INTERROG REMOTE 1/2/MLT: CPT | Mod: NTX,,, | Performed by: INTERNAL MEDICINE

## 2017-06-20 PROCEDURE — 93296 REM INTERROG EVL PM/IDS: CPT | Mod: PBBFAC,NTX | Performed by: INTERNAL MEDICINE

## 2017-06-23 DIAGNOSIS — Z95.810 AUTOMATIC IMPLANTABLE CARDIAC DEFIBRILLATOR IN SITU: Primary | ICD-10-CM

## 2017-06-23 DIAGNOSIS — I42.8 NICM (NONISCHEMIC CARDIOMYOPATHY): ICD-10-CM

## 2017-06-28 ENCOUNTER — OFFICE VISIT (OUTPATIENT)
Dept: ELECTROPHYSIOLOGY | Facility: CLINIC | Age: 42
End: 2017-06-28
Payer: MEDICAID

## 2017-06-28 ENCOUNTER — HOSPITAL ENCOUNTER (OUTPATIENT)
Dept: CARDIOLOGY | Facility: CLINIC | Age: 42
Discharge: HOME | End: 2017-06-28
Payer: COMMERCIAL

## 2017-06-28 ENCOUNTER — OFFICE VISIT (OUTPATIENT)
Dept: TRANSPLANT | Facility: CLINIC | Age: 42
End: 2017-06-28
Payer: MEDICAID

## 2017-06-28 VITALS
HEIGHT: 69 IN | WEIGHT: 240.5 LBS | DIASTOLIC BLOOD PRESSURE: 57 MMHG | SYSTOLIC BLOOD PRESSURE: 113 MMHG | HEIGHT: 69 IN | WEIGHT: 240.31 LBS | HEART RATE: 68 BPM | SYSTOLIC BLOOD PRESSURE: 108 MMHG | BODY MASS INDEX: 35.62 KG/M2 | DIASTOLIC BLOOD PRESSURE: 80 MMHG | HEART RATE: 58 BPM | BODY MASS INDEX: 35.59 KG/M2

## 2017-06-28 DIAGNOSIS — I50.42 CHRONIC COMBINED SYSTOLIC AND DIASTOLIC CONGESTIVE HEART FAILURE: Primary | ICD-10-CM

## 2017-06-28 DIAGNOSIS — I34.0 NON-RHEUMATIC MITRAL REGURGITATION: Chronic | ICD-10-CM

## 2017-06-28 DIAGNOSIS — Z79.899 ON AMIODARONE THERAPY: ICD-10-CM

## 2017-06-28 DIAGNOSIS — Z95.810 ICD (IMPLANTABLE CARDIOVERTER-DEFIBRILLATOR) IN PLACE: Chronic | ICD-10-CM

## 2017-06-28 DIAGNOSIS — I42.0 NONISCHEMIC DILATED CARDIOMYOPATHY: Primary | Chronic | ICD-10-CM

## 2017-06-28 DIAGNOSIS — I42.9 CARDIOMYOPATHY, UNSPECIFIED TYPE: ICD-10-CM

## 2017-06-28 DIAGNOSIS — I47.29 VENTRICULAR TACHYCARDIA, POLYMORPHIC: ICD-10-CM

## 2017-06-28 DIAGNOSIS — Z76.82 ORGAN TRANSPLANT CANDIDATE: ICD-10-CM

## 2017-06-28 DIAGNOSIS — I47.29 POLYMORPHIC VENTRICULAR TACHYCARDIA: ICD-10-CM

## 2017-06-28 DIAGNOSIS — I50.42 CHRONIC COMBINED SYSTOLIC AND DIASTOLIC CONGESTIVE HEART FAILURE: ICD-10-CM

## 2017-06-28 PROCEDURE — 99999 PR PBB SHADOW E&M-EST. PATIENT-LVL IV: CPT | Mod: PBBFAC,TXP,, | Performed by: PHYSICIAN ASSISTANT

## 2017-06-28 PROCEDURE — 99999 PR PBB SHADOW E&M-EST. PATIENT-LVL III: CPT | Mod: PBBFAC,TXP,, | Performed by: NURSE PRACTITIONER

## 2017-06-28 PROCEDURE — 99214 OFFICE O/P EST MOD 30 MIN: CPT | Mod: S$PBB,NTX,, | Performed by: NURSE PRACTITIONER

## 2017-06-28 PROCEDURE — 93000 ELECTROCARDIOGRAM COMPLETE: CPT | Mod: S$GLB,TXP,, | Performed by: INTERNAL MEDICINE

## 2017-06-28 PROCEDURE — 99214 OFFICE O/P EST MOD 30 MIN: CPT | Mod: S$PBB,TXP,, | Performed by: PHYSICIAN ASSISTANT

## 2017-06-28 RX ORDER — AMIODARONE HYDROCHLORIDE 200 MG/1
200 TABLET ORAL DAILY
Qty: 90 TABLET | Refills: 1 | Status: SHIPPED | OUTPATIENT
Start: 2017-06-28 | End: 2018-05-30 | Stop reason: SDUPTHER

## 2017-06-28 RX ORDER — LATANOPROST 50 UG/ML
SOLUTION/ DROPS OPHTHALMIC
Refills: 3 | COMMUNITY
Start: 2017-06-13 | End: 2020-04-24

## 2017-06-28 NOTE — PROGRESS NOTES
Subjective:    Patient ID:  Mario Mahajan is a 41 y.o. female who presents for follow-up of VT.     Mario Mahajan is a patient of Dr. Tay.   Primary Cardiologist: Dr. Srivastava    HPI     41 yoF NICM,  NYHA Class II-III here for ICD Check. She was diagnosed with NICM early 2015 and was started on goal-directed medical therapy. .She had persistent LV dysfunction despite her medical treatment. A PET Stress Test at the time (06/2015)  revealed no ischemia. Her EF at the time was 20%. At her initial [facility] EP office visit, Ms. Mahajan reported having a brother and father with history of ICD implantation; no known SCD in the family. She denied a history of syncope, near syncope, or palpitations. She reported a hx of DELGADO.    Ms. Mahajan underwent successful ICD placement (12/01/15). Her course was complicated however, by inappropriate shock 2/2 to subsequent lead displacement. She underwent a successful lead revision (01/05/16). Per her request, tachytherapies were turned off until 6-weeks post-revision. At the 6-week point (02/24/16), her tachytherapies were turned on. Her device interrogation at the time, revealed normal SC ICD function and no arrhythmias. She feels fatigued and is sluggish at times. She has developed cough that has been chronic.   In February of 2017 Ms Mahajan presented to [facility] ED following an episode of syncope and was admitted for further evaluation after VT/VF events had been detected on her ICD; two VT episodes 02/05/17 and 02/10/17, neither of which required shocks and a VF episode 02/09/17 corresponding with her syncopal event, and requiring a shock. Amiodarone 400 mg QD was initiated, and was later decreased to 200 mg once a day.     Since her last office visit, Ms. Mahajan reports feeling well with occasional palpitations (baseline); she denies chest pain, SOB/DELGADO, dizziness, or syncope.     Recent cardiac studies:  Device Interrogation (06/20/17) reveals stable lead and device  function. No ventricular arrhythmias or treated episodes noted. She paces 0% in the RV. Estimated battery longevity 7 years V.       Review of Systems   Constitution: Positive for malaise/fatigue. Negative for diaphoresis.   HENT: Negative for headaches and nosebleeds.    Eyes: Negative for double vision.   Cardiovascular: Positive for palpitations. Negative for chest pain, dyspnea on exertion, irregular heartbeat, near-syncope and syncope.   Respiratory: Negative for shortness of breath.    Skin: Negative.    Musculoskeletal: Negative.    Gastrointestinal: Negative for abdominal pain, hematemesis and hematochezia.   Genitourinary: Negative for hematuria.   Neurological: Negative for dizziness and light-headedness.   Psychiatric/Behavioral: Negative for altered mental status.        Objective:    Physical Exam   Constitutional: She is oriented to person, place, and time. She appears well-developed and well-nourished.   HENT:   Head: Normocephalic and atraumatic.   Eyes: Pupils are equal, round, and reactive to light.   Cardiovascular: Normal rate, regular rhythm, normal heart sounds and intact distal pulses.    Pulmonary/Chest: Effort normal and breath sounds normal.   Musculoskeletal: Normal range of motion.   Neurological: She is alert and oriented to person, place, and time.   Skin: She is not diaphoretic.   Vitals reviewed.        Assessment:       1. Nonischemic dilated cardiomyopathy    2. Chronic combined systolic and diastolic congestive heart failure    3. Polymorphic ventricular tachycardia    4. ICD (implantable cardioverter-defibrillator) in place 12/01/15    5. Non-rheumatic mitral regurgitation         Plan:       In summary, Ms. Mahajan is a 41 y.o. female with NICM, HFrEF (EF 15-20%), PMVT s/p SC ICD, and nonrheumatic MR. Ms. Mahajan is doing well from a device/rhythm perspective with stable lead and device function without ventricular arrhythmia noted.     Baseline PFTs given amiodarone use;  recent LFTs and TSH WNL.  Follow up in device clinic as scheduled.   Follow up in EP clinic in 6 months, sooner as needed.       Luisa Still, MN, APRN, FNP-C       (A copy of today's note was sent to Dr. Tay.)

## 2017-06-28 NOTE — LETTER
June 28, 2017        Juanjose Nuñez  [address]  [facility] [address]  Phone: [phone]  Fax: [phone]             [facility] [address]  [facility] [address]  Phone: [phone]   Patient: Mario Mahajan   MR Number: [id number]   YOB: 1975   Date of Visit: 6/28/2017       Dear Dr. Juanjose Nuñez    Thank you for referring Mario Mahajan to me for evaluation. Attached you will find relevant portions of my assessment and plan of care.    If you have questions, please do not hesitate to call me. I look forward to following Mario Mahajan along with you.    Sincerely,    Damaris Yeung PA-C    Enclosure    If you would like to receive this communication electronically, please contact [email or web address] or [phone] to request Mobile2Me Link access.    Mobile[employer]Me Link is a tool which provides read-only access to select patient information with whom you have a relationship. Its easy to use and provides real time access to review your patients record including encounter summaries, notes, results, and demographic information.    If you feel you have received this communication in error or would no longer like to receive these types of communications, please e-mail [email or web address]

## 2017-06-28 NOTE — PROGRESS NOTES
Subjective:     HPI:  Ms. Mahajan is a 41 y.o. year old Black or  female who has presents to be considered for advanced surgical options (LVAD/OHT).    Nonischemic CHF (LVEF 20%, LVEDD 7.2 cm mild MR Feb 2017) who had three episodes of VF in early Feb 2017, one of which required defibrillation from ICD. No further ICD shocks. PND in las few months.  States she feel good (better than last visit) w/ no orthopnea, DELGADO, SOB, chest pain.  Currently listed status two now that she has financial clearance. Had sleep study, negative for sleep apnea. Saw EP today, no recent ICD shocks (per ICD interrogations and patient none since February). Will send message to her OBGYN regarding ICD (he was hesitant to treat menorrhagia via hysterectomy due to prior shocks). Takes lasix PRN, states she rarely needs it.     CPX (Jan 2017)  1) The PkVO2 was 14.1 ml/kg/min which is 42% of predicted equating to a functional capacity of 4.0 METS indicating severe functional impairment.   Compared to Sept 2016 Vo2 of 12.6, this is an improvement.   2 ) The VE/VCO2 decreased by -25% from rest to AT. The VE/VCO2 Louisa was 28.3.  The Resting PetCO2 was 30.8.    LHC/RHC 03/08/17:  Normal coronary arteries  LVEDP (Pre): 25 mmHg  PA: 54/30 (40)  PW:  (25)  RV: 54  RA:  (17)  PA_SAT: 66  AO_SAT: 92  AO: 117/51 (77)  LVEDP: 25    CONDITION 1:  FICKCI: 3.3600  FICKCO: 7.3900    Past Medical History:   Diagnosis Date    Asthma     Chronic back pain 7/1/2014    Chronic combined systolic and diastolic congestive heart failure 4/28/2015     2-10-17   1 - Severely depressed left ventricular systolic function (EF 20-25%).    2 - Severe left ventricular enlargement.    3 - Severe left atrial enlargement.    4 - Left ventricular diastolic dysfunction.    5 - The estimated PA systolic pressure is 18 mmHg.    6 - Mild mitral regurgitation.     Encounter for blood transfusion     ICD (implantable cardioverter-defibrillator) in place 12/01/15  3/3/2016    Menorrhagia, premenopausal 2/10/2017    Microcytic anemia 2015    Non-rheumatic mitral regurgitation 3/5/2015    Nonischemic dilated cardiomyopathy 2015    Sleep apnea     Syncope and collapse 2017    Ventricular tachycardia, polymorphic      Past Surgical History:   Procedure Laterality Date    CARDIAC DEFIBRILLATOR PLACEMENT  2015     SECTION      TUBAL LIGATION       Family History   Problem Relation Age of Onset    Diabetes Father     Pancreatic cancer Father     Heart failure Father     Heart failure Brother     Lung cancer Paternal Grandmother      Review of Systems   Constitution: Negative for chills, decreased appetite, diaphoresis, weakness, weight gain and weight loss.   HENT: Negative for headaches.    Eyes: Negative for visual disturbance.   Cardiovascular: Negative for chest pain, dyspnea on exertion, irregular heartbeat, leg swelling, orthopnea, palpitations, paroxysmal nocturnal dyspnea and syncope.   Respiratory: Negative for cough, shortness of breath and wheezing.    Hematologic/Lymphatic: Negative for adenopathy and bleeding problem. Does not bruise/bleed easily.   Skin: Negative for rash.   Musculoskeletal: Negative for back pain, falls, gout, joint pain, muscle weakness and myalgias.   Gastrointestinal: Negative for abdominal pain, constipation, diarrhea, heartburn, nausea and vomiting.   Genitourinary: Negative for nocturia.   Neurological: Negative for excessive daytime sleepiness, dizziness, focal weakness, light-headedness, paresthesias and tremors.   Psychiatric/Behavioral: Positive for depression. Negative for memory loss. The patient does not have insomnia and is not nervous/anxious.      Objective:     Physical Exam   Constitutional: She appears well-developed and well-nourished. No distress.        HENT:   Head: Normocephalic and atraumatic. Head is without abrasion and without contusion.   Nose: Nose normal. No epistaxis.    Mouth/Throat: Oropharynx is clear and moist. Mucous membranes are not cyanotic.   Eyes: Conjunctivae and EOM are normal. Pupils are equal, round, and reactive to light.   Neck: Normal range of motion. Neck supple. Hepatojugular reflux present. No JVD (!5 cmH2O) present. No tracheal deviation present.   Cardiovascular: Normal rate, regular rhythm, normal heart sounds and normal pulses.  PMI is displaced.  Exam reveals no gallop.    No murmur heard.  Pulmonary/Chest: Effort normal and breath sounds normal. No stridor. No respiratory distress. She has no wheezes.   Abdominal: Soft. Normal appearance, normal aorta and bowel sounds are normal. She exhibits no distension. There is no tenderness.   Musculoskeletal: She exhibits no edema (+2) or tenderness.   Neurological: She is alert. She has normal strength and normal reflexes. She exhibits normal muscle tone.   Skin: Skin is warm. No rash noted. No erythema.   Psychiatric: She has a normal mood and affect. Her speech is normal and behavior is normal. Judgment and thought content normal. Cognition and memory are normal.     Labs:    Chemistry        Component Value Date/Time     06/28/2017 1209    K 4.1 06/28/2017 1209     06/28/2017 1209    CO2 22 (L) 06/28/2017 1209    BUN 10 06/28/2017 1209    BUN 12 06/19/2015 0950    CREATININE 0.8 06/28/2017 1209    GLU 97 06/28/2017 1209        Component Value Date/Time    CALCIUM 9.0 06/28/2017 1209    ALKPHOS 40 (L) 06/28/2017 1209    AST 13 06/28/2017 1209    ALT 12 06/28/2017 1209    BILITOT 1.5 (H) 06/28/2017 1209        Magnesium   Date Value Ref Range Status   02/11/2017 1.9 1.6 - 2.6 mg/dL Final     Lab Results   Component Value Date    WBC 9.35 03/13/2017    HGB 11.7 (L) 03/13/2017    HCT 36.4 (L) 03/13/2017    MCV 88 03/13/2017     03/13/2017     BNP   Date Value Ref Range Status   06/28/2017 124 (H) 0 - 99 pg/mL Final     Comment:     Values of less than 100 pg/ml are consistent with non-CHF  populations.   02/11/2017 158 (H) 0 - 99 pg/mL Final     Comment:     Values of less than 100 pg/ml are consistent with non-CHF populations.   02/10/2017 257 (H) 0 - 99 pg/mL Final     Comment:     Values of less than 100 pg/ml are consistent with non-CHF populations.     Assessment:      1. Chronic combined systolic and diastolic congestive heart failure    2. Ventricular tachycardia, polymorphic    3. Non-rheumatic mitral regurgitation    4. Organ transplant candidate    5. ICD (implantable cardioverter-defibrillator) in place 12/01/15      Plan:     - Continue current GDMT with carvedilol and aldactone  - T bili elevated in setting of downtrending BNP, normal Cr, and euvolemic, will repeat CMP next week @ lab in [facility]  - Follow up in two months, PRN with labs  Patient is now NYHA IV ACC stage D  Recommend 2 gram sodium restriction and 1500cc fluid restriction.  Encourage physical activity with graded exercise program.  Requested patient to weigh themselves daily, and to notify us if their weight increases by more than 3 lbs in 1 day or 5 lbs in 1 week.     Transplant Candidacy: Status 2    Damaris Yeung PA-C

## 2017-08-10 ENCOUNTER — PATIENT MESSAGE (OUTPATIENT)
Dept: OBSTETRICS AND GYNECOLOGY | Facility: CLINIC | Age: 42
End: 2017-08-10

## 2017-08-11 ENCOUNTER — PATIENT MESSAGE (OUTPATIENT)
Dept: OBSTETRICS AND GYNECOLOGY | Facility: CLINIC | Age: 42
End: 2017-08-11

## 2017-08-11 DIAGNOSIS — N92.1 MENORRHAGIA WITH IRREGULAR CYCLE: Primary | ICD-10-CM

## 2017-08-14 NOTE — TELEPHONE ENCOUNTER
Patient is complaining of prolong vaginal bleeding  She is not currently of birth control.  Please advise

## 2017-08-15 ENCOUNTER — TELEPHONE (OUTPATIENT)
Dept: OBSTETRICS AND GYNECOLOGY | Facility: CLINIC | Age: 42
End: 2017-08-15

## 2017-08-28 ENCOUNTER — PATIENT MESSAGE (OUTPATIENT)
Dept: TRANSPLANT | Facility: CLINIC | Age: 42
End: 2017-08-28

## 2017-08-28 ENCOUNTER — OFFICE VISIT (OUTPATIENT)
Dept: TRANSPLANT | Facility: CLINIC | Age: 42
End: 2017-08-28
Payer: MEDICAID

## 2017-08-28 ENCOUNTER — CLINICAL SUPPORT (OUTPATIENT)
Dept: TRANSPLANT | Facility: CLINIC | Age: 42
End: 2017-08-28
Payer: MEDICAID

## 2017-08-28 ENCOUNTER — LAB VISIT (OUTPATIENT)
Dept: LAB | Facility: HOSPITAL | Age: 42
End: 2017-08-28
Attending: INTERNAL MEDICINE
Payer: MEDICAID

## 2017-08-28 ENCOUNTER — DOCUMENTATION ONLY (OUTPATIENT)
Dept: TRANSPLANT | Facility: CLINIC | Age: 42
End: 2017-08-28

## 2017-08-28 VITALS
DIASTOLIC BLOOD PRESSURE: 72 MMHG | BODY MASS INDEX: 37 KG/M2 | HEIGHT: 69 IN | WEIGHT: 249.81 LBS | HEART RATE: 61 BPM | SYSTOLIC BLOOD PRESSURE: 134 MMHG

## 2017-08-28 DIAGNOSIS — I50.22 CHRONIC SYSTOLIC HEART FAILURE: ICD-10-CM

## 2017-08-28 DIAGNOSIS — I42.0 DCM (DILATED CARDIOMYOPATHY): ICD-10-CM

## 2017-08-28 DIAGNOSIS — I42.0 NONISCHEMIC DILATED CARDIOMYOPATHY: Chronic | ICD-10-CM

## 2017-08-28 DIAGNOSIS — I34.0 SEVERE MITRAL REGURGITATION: ICD-10-CM

## 2017-08-28 DIAGNOSIS — Z76.82 ORGAN TRANSPLANT CANDIDATE: ICD-10-CM

## 2017-08-28 DIAGNOSIS — I47.29 VENTRICULAR TACHYCARDIA, POLYMORPHIC: ICD-10-CM

## 2017-08-28 DIAGNOSIS — D50.9 MICROCYTIC ANEMIA: Chronic | ICD-10-CM

## 2017-08-28 DIAGNOSIS — I50.9 CONGESTIVE HEART FAILURE, UNSPECIFIED CONGESTIVE HEART FAILURE CHRONICITY, UNSPECIFIED CONGESTIVE HEART FAILURE TYPE: ICD-10-CM

## 2017-08-28 DIAGNOSIS — D64.9 ANEMIA: ICD-10-CM

## 2017-08-28 DIAGNOSIS — I50.42 CHRONIC COMBINED SYSTOLIC AND DIASTOLIC CONGESTIVE HEART FAILURE: Primary | ICD-10-CM

## 2017-08-28 DIAGNOSIS — I51.7 CARDIOMEGALY: ICD-10-CM

## 2017-08-28 LAB
ANION GAP SERPL CALC-SCNC: 8 MMOL/L
BNP SERPL-MCNC: 438 PG/ML
BUN SERPL-MCNC: 16 MG/DL
CALCIUM SERPL-MCNC: 8.6 MG/DL
CHLORIDE SERPL-SCNC: 112 MMOL/L
CO2 SERPL-SCNC: 24 MMOL/L
CREAT SERPL-MCNC: 0.8 MG/DL
EST. GFR  (AFRICAN AMERICAN): >60 ML/MIN/1.73 M^2
EST. GFR  (NON AFRICAN AMERICAN): >60 ML/MIN/1.73 M^2
GLUCOSE SERPL-MCNC: 61 MG/DL
POTASSIUM SERPL-SCNC: 4.1 MMOL/L
SODIUM SERPL-SCNC: 144 MMOL/L

## 2017-08-28 PROCEDURE — 99999 PR PBB SHADOW E&M-EST. PATIENT-LVL III: CPT | Mod: PBBFAC,TXP,, | Performed by: INTERNAL MEDICINE

## 2017-08-28 PROCEDURE — 99213 OFFICE O/P EST LOW 20 MIN: CPT | Mod: PBBFAC,TXP | Performed by: INTERNAL MEDICINE

## 2017-08-28 PROCEDURE — 86833 HLA CLASS II HIGH DEFIN QUAL: CPT | Mod: PO,TXP

## 2017-08-28 PROCEDURE — 99214 OFFICE O/P EST MOD 30 MIN: CPT | Mod: S$PBB,TXP,, | Performed by: INTERNAL MEDICINE

## 2017-08-28 PROCEDURE — 86977 RBC SERUM PRETX INCUBJ/INHIB: CPT | Mod: PO,TXP

## 2017-08-28 PROCEDURE — 3008F BODY MASS INDEX DOCD: CPT | Mod: TXP,,, | Performed by: INTERNAL MEDICINE

## 2017-08-28 PROCEDURE — 86832 HLA CLASS I HIGH DEFIN QUAL: CPT | Mod: PO,TXP

## 2017-08-28 RX ORDER — SPIRONOLACTONE 25 MG/1
25 TABLET ORAL DAILY
Qty: 60 TABLET | Refills: 4 | Status: SHIPPED | OUTPATIENT
Start: 2017-08-28 | End: 2018-04-04 | Stop reason: SDUPTHER

## 2017-08-28 RX ORDER — AMOXICILLIN 500 MG/1
500 CAPSULE ORAL DAILY
COMMUNITY
Start: 2017-08-25 | End: 2017-10-16 | Stop reason: ALTCHOICE

## 2017-08-28 NOTE — PROGRESS NOTES
Met with patient, reviewed status 2 listing. Patient appears happy and looking forward to cruise with brother in November, NAD noted. Will continue to follow.

## 2017-08-28 NOTE — PROGRESS NOTES
Subjective:   Ms. Mahajan is a 41 y.o. Black or  female who presents as follow up. She is currently listed for heart transplant status 2.      Chief Complaint: DELGADO with moderate exertion    HPI:  She is a 41 y.o. AAF w/Nonischemic CHF (LVEF 20%, LVEDD 7.2 cm mild MR Feb 2017) who had three episodes of VF in early Feb 2017, one of which required defibrillation from ICD. She has not had any ICD shocks since 2/2017. She has had a sleep study that was negative for sleep apnea. She was last seen 6/2017 when she was doing well.     Today, she states she feels good w/ no orthopnea, DELGADO, SOB, chest pain. She is walking 1-1.5miles daily without symptoms. She denies f/c, no recent hospitalizations; She does endorse poor diet compliance and wt gain as a result. No other complaints. Still takes lasix PRN, states she rarely needs it.     CPX (Jan 2017)  1) The PkVO2 was 14.1 ml/kg/min which is 42% of predicted equating to a functional capacity of 4.0 METS indicating severe functional impairment.   Compared to Sept 2016 Vo2 of 12.6, this is an improvement.   2 ) The VE/VCO2 decreased by -25% from rest to AT. The VE/VCO2 [place] was 28.3.  The Resting PetCO2 was 30.8.    C/RHC 03/08/17:  Normal coronary arteries  LVEDP (Pre): 25 mmHg  PA: 54/30 (40)  PW:  (25)  RV: 54  RA:  (17)  PA_SAT: 66  AO_SAT: 92  AO: 117/51 (77)  LVEDP: 25    CONDITION 1:  FICKCI: 3.3600  FICKCO: 7.3900    Echo 5/3/17:  1 - Left ventricular enlargement.     2 - Eccentric hypertrophy.     3 - Severely depressed left ventricular systolic function (EF 15-20%).     4 - Severe mitral regurgitation.     5 - The LV has marked globular geometry..     6 - Severe left atrial enlargement.     7 - Pulmonary hypertension. The estimated PA systolic pressure is 44 mmHg.     8 - Mild tricuspid regurgitation.     9 - Intermediate central venous pressure.     10 - Trivial pericardial effusion.    Past Medical History:   Diagnosis Date    Asthma     Chronic  back pain 2014    Chronic combined systolic and diastolic congestive heart failure 2015-10-17   1 - Severely depressed left ventricular systolic function (EF 20-25%).    2 - Severe left ventricular enlargement.    3 - Severe left atrial enlargement.    4 - Left ventricular diastolic dysfunction.    5 - The estimated PA systolic pressure is 18 mmHg.    6 - Mild mitral regurgitation.     Encounter for blood transfusion     ICD (implantable cardioverter-defibrillator) in place 12/01/15 3/3/2016    Menorrhagia, premenopausal 2/10/2017    Microcytic anemia 2015    Non-rheumatic mitral regurgitation 3/5/2015    Nonischemic dilated cardiomyopathy 2015    Sleep apnea     Syncope and collapse 2017    Ventricular tachycardia, polymorphic      Past Surgical History:   Procedure Laterality Date    CARDIAC DEFIBRILLATOR PLACEMENT  2015     SECTION      TUBAL LIGATION       Family History   Problem Relation Age of Onset    Diabetes Father     Pancreatic cancer Father     Heart failure Father     Heart failure Brother     Lung cancer Paternal Grandmother      Review of Systems   Constitution: Negative for chills, decreased appetite, diaphoresis, weakness, weight gain and weight loss.   HENT: Negative for headaches.    Eyes: Negative for visual disturbance.   Cardiovascular: Negative for chest pain, dyspnea on exertion, irregular heartbeat, leg swelling, orthopnea, palpitations, paroxysmal nocturnal dyspnea and syncope.   Respiratory: Negative for cough, shortness of breath and wheezing.    Hematologic/Lymphatic: Negative for adenopathy and bleeding problem. Does not bruise/bleed easily.   Skin: Negative for rash.   Musculoskeletal: Negative for back pain, falls, gout, joint pain, muscle weakness and myalgias.   Gastrointestinal: Negative for abdominal pain, constipation, diarrhea, heartburn, nausea and vomiting.   Genitourinary: Negative for nocturia.   Neurological:  "Negative for excessive daytime sleepiness, dizziness, focal weakness, light-headedness, paresthesias and tremors.   Psychiatric/Behavioral: Positive for depression. Negative for memory loss. The patient does not have insomnia and is not nervous/anxious.      Objective:   /72   Pulse 61   Ht 5' 9" (1.753 m)   Wt 113.3 kg (249 lb 12.5 oz)   BMI 36.89 kg/m²   Physical Exam   Constitutional: She appears well-developed and well-nourished. No distress.   HENT:   Head: Normocephalic and atraumatic. Head is without abrasion and without contusion.   Nose: Nose normal. No epistaxis.   Mouth/Throat: Oropharynx is clear and moist. Mucous membranes are not cyanotic.   Eyes: Conjunctivae and EOM are normal. Pupils are equal, round, and reactive to light.   Neck: Normal range of motion. Neck supple. Hepatojugular reflux present. No JVD (~5 cmH2O) present. No tracheal deviation present.   Cardiovascular: Normal rate, regular rhythm, normal heart sounds and normal pulses.  PMI is displaced.  Exam reveals no gallop.    No murmur heard.  Pulmonary/Chest: Effort normal and breath sounds normal. No stridor. No respiratory distress. She has no wheezes.   Abdominal: Soft. Normal appearance, normal aorta and bowel sounds are normal. She exhibits no distension. There is no tenderness.   Musculoskeletal: She exhibits no edema (+2) or tenderness.   Neurological: She is alert. She has normal strength and normal reflexes. She exhibits normal muscle tone.   Skin: Skin is warm. No rash noted. No erythema.   Psychiatric: She has a normal mood and affect. Her speech is normal and behavior is normal. Judgment and thought content normal. Cognition and memory are normal.     Labs:    Chemistry        Component Value Date/Time     08/28/2017 1005    K 4.1 08/28/2017 1005     (H) 08/28/2017 1005    CO2 24 08/28/2017 1005    BUN 16 08/28/2017 1005    BUN 12 06/19/2015 0950    CREATININE 0.8 08/28/2017 1005    GLU 61 (L) 08/28/2017 " 1005        Component Value Date/Time    CALCIUM 8.6 (L) 08/28/2017 1005    ALKPHOS 40 (L) 06/28/2017 1209    AST 13 06/28/2017 1209    ALT 12 06/28/2017 1209    BILITOT 1.5 (H) 06/28/2017 1209        Magnesium   Date Value Ref Range Status   02/11/2017 1.9 1.6 - 2.6 mg/dL Final     Lab Results   Component Value Date    WBC 9.35 03/13/2017    HGB 11.7 (L) 03/13/2017    HCT 36.4 (L) 03/13/2017    MCV 88 03/13/2017     03/13/2017     BNP   Date Value Ref Range Status   06/28/2017 124 (H) 0 - 99 pg/mL Final     Comment:     Values of less than 100 pg/ml are consistent with non-CHF populations.   02/11/2017 158 (H) 0 - 99 pg/mL Final     Comment:     Values of less than 100 pg/ml are consistent with non-CHF populations.   02/10/2017 257 (H) 0 - 99 pg/mL Final     Comment:     Values of less than 100 pg/ml are consistent with non-CHF populations.     Assessment:      1. Chronic combined systolic and diastolic congestive heart failure    2. Chronic systolic heart failure    3. Nonischemic dilated cardiomyopathy    4. Ventricular tachycardia, polymorphic    5. Organ transplant candidate      Plan:   - Doing well today and euvolemic on exam  - Continue current GDMT with carvedilol and aldactone but will increase to 25mg daily--BMP Friday   - Follow up in two months, with CPX and labs; VAD coordinator to meet with today    Patient is now NYHA III ACC stage D  Recommend 2 gram sodium restriction and 1500cc fluid restriction.  Encourage physical activity with graded exercise program.  Requested patient to weigh themselves daily, and to notify us if their weight increases by more than 3 lbs in 1 day or 5 lbs in 1 week.     Transplant Candidacy: Status 2    Giovani Fleming MD

## 2017-08-28 NOTE — LETTER
August 28, 2017        Juanjose Nuñez  [address]  [facility] [address]  Phone: [phone]  Fax: [phone]             [facility] [address]  [facility] [address]  Phone: [phone]   Patient: Mario Mahajan   MR Number: [id number]   YOB: 1975   Date of Visit: 8/28/2017       Dear Dr. Juanjose Nuñez    Thank you for referring Mario Mahajan to me for evaluation. Attached you will find relevant portions of my assessment and plan of care.    If you have questions, please do not hesitate to call me. I look forward to following Mario Mahajan along with you.    Sincerely,    Giovani Fleming MD    Enclosure    If you would like to receive this communication electronically, please contact [email or web address] or [phone] to request [employer] access.    [employer] is a tool which provides read-only access to select patient information with whom you have a relationship. Its easy to use and provides real time access to review your patients record including encounter summaries, notes, results, and demographic information.    If you feel you have received this communication in error or would no longer like to receive these types of communications, please e-mail [email or web address]

## 2017-08-28 NOTE — PROGRESS NOTES
Patient had previously received VAD information. Introduced self and reason for visit. Provided written VAD mini packet. Included in mini packet is the following: VAD education, wellness contract, acknowledgement of being evaluated for VAD, support group flier, and picture of VAD. Additionally, patient was provided with business cards for all VAD coordinators. Explained that we use 3 different types of pumps here and information on both pumps. I explained the work up process as well.     Explained to look over the entire contents and read the wellness contract, caregiver agreement and acknowledgement form.  Also explained they should bring this packet with them to all clinic visits and if they are admitted to the hospital so we can continue education as needed. Should there be any questions, please write them down and bring with you or feel free to call and we can talk on the phone. All questions answered to satisfaction as evidence by verbal acknowledgement.

## 2017-08-29 LAB — HPRA INTERPRETATION: NORMAL

## 2017-09-01 LAB
CLASS I ANTIBODIES - LUMINEX: NEGATIVE
CLASS II ANTIBODIES - LUMINEX: NORMAL
CLASS II ANTIBODY COMMENTS - LUMINEX: NORMAL
CPRA %: 0
SERUM COLLECTION DT - LUMINEX CLASS I: NORMAL
SERUM COLLECTION DT - LUMINEX CLASS II: NORMAL
SPCL1 TESTING DATE: NORMAL
SPCL2 TESTING DATE: NORMAL

## 2017-09-05 DIAGNOSIS — I50.9 CONGESTIVE HEART FAILURE, UNSPECIFIED CONGESTIVE HEART FAILURE CHRONICITY, UNSPECIFIED CONGESTIVE HEART FAILURE TYPE: Primary | ICD-10-CM

## 2017-09-05 RX ORDER — ALBUTEROL SULFATE 90 UG/1
1-2 AEROSOL, METERED RESPIRATORY (INHALATION) EVERY 6 HOURS PRN
Qty: 18 G | Refills: 3 | Status: SHIPPED | OUTPATIENT
Start: 2017-09-05 | End: 2017-09-28 | Stop reason: SDUPTHER

## 2017-09-08 ENCOUNTER — LAB VISIT (OUTPATIENT)
Dept: LAB | Facility: HOSPITAL | Age: 42
End: 2017-09-08
Attending: INTERNAL MEDICINE
Payer: MEDICAID

## 2017-09-08 DIAGNOSIS — I50.42 CHRONIC COMBINED SYSTOLIC AND DIASTOLIC CONGESTIVE HEART FAILURE: ICD-10-CM

## 2017-09-08 LAB
ANION GAP SERPL CALC-SCNC: 7 MMOL/L
BUN SERPL-MCNC: 15 MG/DL
CALCIUM SERPL-MCNC: 9.2 MG/DL
CHLORIDE SERPL-SCNC: 109 MMOL/L
CO2 SERPL-SCNC: 26 MMOL/L
CREAT SERPL-MCNC: 0.84 MG/DL
EST. GFR  (AFRICAN AMERICAN): >60 ML/MIN/1.73 M^2
EST. GFR  (NON AFRICAN AMERICAN): >60 ML/MIN/1.73 M^2
GLUCOSE SERPL-MCNC: 89 MG/DL
POTASSIUM SERPL-SCNC: 4.7 MMOL/L
SODIUM SERPL-SCNC: 142 MMOL/L

## 2017-09-08 PROCEDURE — 80048 BASIC METABOLIC PNL TOTAL CA: CPT | Mod: PO,TXP

## 2017-09-08 PROCEDURE — 36415 COLL VENOUS BLD VENIPUNCTURE: CPT | Mod: PO,TXP

## 2017-09-11 ENCOUNTER — PATIENT MESSAGE (OUTPATIENT)
Dept: ADMINISTRATIVE | Facility: OTHER | Age: 42
End: 2017-09-11

## 2017-09-19 ENCOUNTER — TELEPHONE (OUTPATIENT)
Dept: ELECTROPHYSIOLOGY | Facility: CLINIC | Age: 42
End: 2017-09-19

## 2017-09-19 NOTE — TELEPHONE ENCOUNTER
Contacted patient in relation to patient's remote ICD home monitor as to it is not connecting to [facility], no answer, left voicemail

## 2017-09-26 ENCOUNTER — PATIENT MESSAGE (OUTPATIENT)
Dept: ELECTROPHYSIOLOGY | Facility: CLINIC | Age: 42
End: 2017-09-26

## 2017-09-27 ENCOUNTER — PATIENT MESSAGE (OUTPATIENT)
Dept: TRANSPLANT | Facility: CLINIC | Age: 42
End: 2017-09-27

## 2017-09-28 DIAGNOSIS — I50.9 CONGESTIVE HEART FAILURE, UNSPECIFIED CONGESTIVE HEART FAILURE CHRONICITY, UNSPECIFIED CONGESTIVE HEART FAILURE TYPE: ICD-10-CM

## 2017-09-28 RX ORDER — ALBUTEROL SULFATE 90 UG/1
1-2 AEROSOL, METERED RESPIRATORY (INHALATION) EVERY 6 HOURS PRN
Qty: 18 G | Refills: 3 | Status: SHIPPED | OUTPATIENT
Start: 2017-09-28 | End: 2020-03-13

## 2017-10-10 ENCOUNTER — DOCUMENTATION ONLY (OUTPATIENT)
Dept: ELECTROPHYSIOLOGY | Facility: CLINIC | Age: 42
End: 2017-10-10

## 2017-10-10 NOTE — PROGRESS NOTES
Per patient request, released from [email or web address] as to she is followed by her local Cardiologist in [place].       This was entered in error.  This patient has been re-activated in [facility].  However, this patient has not responded to voice mails or message sent via patient portal in relation to her remote monitor being disconnected.  Will attempt to contact the patient again.

## 2017-10-11 ENCOUNTER — PATIENT MESSAGE (OUTPATIENT)
Dept: ELECTROPHYSIOLOGY | Facility: CLINIC | Age: 42
End: 2017-10-11

## 2017-10-13 ENCOUNTER — TELEPHONE (OUTPATIENT)
Dept: ELECTROPHYSIOLOGY | Facility: CLINIC | Age: 42
End: 2017-10-13

## 2017-10-13 NOTE — TELEPHONE ENCOUNTER
Spoke to pt regarding home monitor being disconnected and she states the cell adapter is remaining red in color. We tried to troubleshoot over the phone, but the light remained red. I had her call [facility] and ask them to trouble shoot the issue and also asked her to ask them to walk her through a manual transmission.

## 2017-10-16 ENCOUNTER — HOSPITAL ENCOUNTER (OUTPATIENT)
Dept: CARDIOLOGY | Facility: CLINIC | Age: 42
Discharge: HOME | End: 2017-10-16
Payer: MEDICAID

## 2017-10-16 ENCOUNTER — DOCUMENTATION ONLY (OUTPATIENT)
Dept: TRANSPLANT | Facility: CLINIC | Age: 42
End: 2017-10-16

## 2017-10-16 ENCOUNTER — OFFICE VISIT (OUTPATIENT)
Dept: TRANSPLANT | Facility: CLINIC | Age: 42
End: 2017-10-16
Payer: MEDICAID

## 2017-10-16 VITALS
HEIGHT: 69 IN | DIASTOLIC BLOOD PRESSURE: 54 MMHG | BODY MASS INDEX: 36.77 KG/M2 | SYSTOLIC BLOOD PRESSURE: 124 MMHG | WEIGHT: 248.25 LBS | HEART RATE: 64 BPM

## 2017-10-16 DIAGNOSIS — I50.42 CHRONIC COMBINED SYSTOLIC AND DIASTOLIC CONGESTIVE HEART FAILURE: ICD-10-CM

## 2017-10-16 DIAGNOSIS — I42.0 NONISCHEMIC DILATED CARDIOMYOPATHY: Chronic | ICD-10-CM

## 2017-10-16 DIAGNOSIS — I50.42 CHRONIC COMBINED SYSTOLIC AND DIASTOLIC CONGESTIVE HEART FAILURE: Primary | ICD-10-CM

## 2017-10-16 DIAGNOSIS — Z76.82 ORGAN TRANSPLANT CANDIDATE: ICD-10-CM

## 2017-10-16 DIAGNOSIS — I47.29 VENTRICULAR TACHYCARDIA, POLYMORPHIC: ICD-10-CM

## 2017-10-16 DIAGNOSIS — D50.8 OTHER IRON DEFICIENCY ANEMIA: ICD-10-CM

## 2017-10-16 LAB — DIASTOLIC DYSFUNCTION: NO

## 2017-10-16 PROCEDURE — 99213 OFFICE O/P EST LOW 20 MIN: CPT | Mod: PBBFAC,TXP | Performed by: INTERNAL MEDICINE

## 2017-10-16 PROCEDURE — 94621 CARDIOPULM EXERCISE TESTING: CPT | Mod: S$GLB,TXP,, | Performed by: INTERNAL MEDICINE

## 2017-10-16 PROCEDURE — 99999 PR PBB SHADOW E&M-EST. PATIENT-LVL III: CPT | Mod: PBBFAC,TXP,, | Performed by: INTERNAL MEDICINE

## 2017-10-16 PROCEDURE — 99214 OFFICE O/P EST MOD 30 MIN: CPT | Mod: S$GLB,TXP,, | Performed by: INTERNAL MEDICINE

## 2017-10-16 NOTE — LETTER
October 16, 2017        Juanjose Nuñez  [address]  [facility] [address]  Phone: [phone]  Fax: [phone]             [facility] [address]  [facility] [address]  Phone: [phone]   Patient: Mario Mahajan   MR Number: [id number]   YOB: 1975   Date of Visit: 10/16/2017       Dear Dr. Juanjoes Nuñez    Thank you for referring Mario Mahajan to me for evaluation. Attached you will find relevant portions of my assessment and plan of care.    If you have questions, please do not hesitate to call me. I look forward to following Mario Mahajan along with you.    Sincerely,    Giovani Fleming MD    Enclosure    If you would like to receive this communication electronically, please contact [email or web address] or [phone] to request [employer] access.    [employer] is a tool which provides read-only access to select patient information with whom you have a relationship. Its easy to use and provides real time access to review your patients record including encounter summaries, notes, results, and demographic information.    If you feel you have received this communication in error or would no longer like to receive these types of communications, please e-mail [email or web address]

## 2017-10-16 NOTE — PROGRESS NOTES
Subjective:   Ms. Mahajan is a 41 y.o. Black or  female who presents as follow up. She is currently listed for heart transplant status 2.      Chief Complaint: DELGADO with moderate exertion    HPI:  She is a 41 y.o. AAF w/Nonischemic CHF (LVEF 20%, LVEDD 7.2 cm mild MR Feb 2017) who had three episodes of VF in early Feb 2017, one of which required defibrillation from ICD. She has not had any ICD shocks since 2/2017. She has had a sleep study that was negative for sleep apnea. She was last seen 8/2017 when she was doing well and her spironolactone was increased to 25mg po daily.     Today, she states she has been doing well other than occasional fatigue. She denies orthopnea, SOB, chest pain; She does endorse DELGADO with moderate-severe exertion especially when hot. She is still walking 1-1.5miles daily without symptoms. She denies f/c, no recent hospitalizations; No other complaints. Still takes lasix PRN, states she rarely needs it.     CPX Today:  1)  Resting spirometry reveals an FVC = 2.4L which is 62% of predicted, an FEV1 of 1.8L, which is 55% of predicted and an FEV1/FVC ratio of 73%. The MVV = 81 L/min, which is 71% of predicted.  2) The respiratory exchange ratio (RER) was 1.07, suggesting an adequate effort.  3) The breathing reserve is calculated at 44%, which is normal. Oxygen saturation with exercise became reduced reaching a jake of 88% from a baseline value of 95%.   4) The PkVO2 was 12.3 ml/kg/min which is 37% of predicted equating to a functional capacity of 3.5 METS indicating severe functional impairment.   5) The anaerobic threshold (AT), which occurred at a heart rate of 112bpm, was 11.0 ml/kg/min, which is 33% of the predicted VO2 and is reduced.   6) The PkVO2 Lean was 17.30 ml/kg of lean body weight/min indicating a poor prognosis in heart failure.   7) The VE/VCO2 decreased by -41% from rest to AT. The VE/VCO2 [place] was 28.3.  The Resting PetCO2 was 35.9.      CONCLUSIONS    -Severe functional impairment associated with a normal breathing reserve, reduced oxygen saturation with exercise, an adequate effort, and a reduced AT. These findings are indicative of functional impairment secondary to circulatory insufficiency and   deconditioning.     LHC/RHC 17:  Normal coronary arteries  LVEDP (Pre): 25 mmHg  PA: 54/30 (40)  PW:  (25)  RV: 54  RA:  (17)  PA_SAT: 66  AO_SAT: 92  AO: 117/51 (77)  LVEDP: 25    CONDITION 1:  FICKCI: 3.3600  FICKCO: 7.3900    Echo 5/3/17:  1 - Left ventricular enlargement.     2 - Eccentric hypertrophy.     3 - Severely depressed left ventricular systolic function (EF 15-20%).     4 - Severe mitral regurgitation.     5 - The LV has marked globular geometry..     6 - Severe left atrial enlargement.     7 - Pulmonary hypertension. The estimated PA systolic pressure is 44 mmHg.     8 - Mild tricuspid regurgitation.     9 - Intermediate central venous pressure.     10 - Trivial pericardial effusion.    Past Medical History:   Diagnosis Date    Asthma     Chronic back pain 2014    Chronic combined systolic and diastolic congestive heart failure 2015     2-10-17   1 - Severely depressed left ventricular systolic function (EF 20-25%).    2 - Severe left ventricular enlargement.    3 - Severe left atrial enlargement.    4 - Left ventricular diastolic dysfunction.    5 - The estimated PA systolic pressure is 18 mmHg.    6 - Mild mitral regurgitation.     Encounter for blood transfusion     ICD (implantable cardioverter-defibrillator) in place 12/01/15 3/3/2016    Menorrhagia, premenopausal 2/10/2017    Microcytic anemia 2015    Non-rheumatic mitral regurgitation 3/5/2015    Nonischemic dilated cardiomyopathy 2015    Sleep apnea     Syncope and collapse 2017    Ventricular tachycardia, polymorphic      Past Surgical History:   Procedure Laterality Date    CARDIAC DEFIBRILLATOR PLACEMENT  2015     SECTION      TUBAL  "LIGATION       Family History   Problem Relation Age of Onset    Diabetes Father     Pancreatic cancer Father     Heart failure Father     Heart failure Brother     Lung cancer Paternal Grandmother      Review of Systems   Constitution: Negative for chills, decreased appetite, diaphoresis, weakness, weight gain and weight loss.   Eyes: Negative for visual disturbance.   Cardiovascular: Negative for chest pain, dyspnea on exertion, irregular heartbeat, leg swelling, orthopnea, palpitations, paroxysmal nocturnal dyspnea and syncope.   Respiratory: Negative for cough, shortness of breath and wheezing.    Hematologic/Lymphatic: Negative for adenopathy and bleeding problem. Does not bruise/bleed easily.   Skin: Negative for rash.   Musculoskeletal: Negative for back pain, falls, gout, joint pain, muscle weakness and myalgias.   Gastrointestinal: Negative for abdominal pain, constipation, diarrhea, heartburn, nausea and vomiting.   Genitourinary: Negative for nocturia.   Neurological: Negative for excessive daytime sleepiness, dizziness, focal weakness, headaches, light-headedness, paresthesias and tremors.   Psychiatric/Behavioral: Positive for depression. Negative for memory loss. The patient does not have insomnia and is not nervous/anxious.      Objective:   BP (!) 124/54   Pulse 64   Ht 5' 9" (1.753 m)   Wt 112.6 kg (248 lb 3.8 oz)   BMI 36.66 kg/m²   Physical Exam   Constitutional: She appears well-developed and well-nourished. No distress.   HENT:   Head: Normocephalic and atraumatic. Head is without abrasion and without contusion.   Nose: Nose normal. No epistaxis.   Mouth/Throat: Oropharynx is clear and moist. Mucous membranes are not cyanotic.   Eyes: Conjunctivae and EOM are normal. Pupils are equal, round, and reactive to light.   Neck: Normal range of motion. Neck supple. Hepatojugular reflux present. No JVD (~6 cmH2O) present. No tracheal deviation present.   Cardiovascular: Normal rate, regular " rhythm, normal heart sounds and normal pulses.  PMI is displaced.  Exam reveals no gallop.    No murmur heard.  Pulmonary/Chest: Effort normal and breath sounds normal. No stridor. No respiratory distress. She has no wheezes.   Abdominal: Soft. Normal appearance, normal aorta and bowel sounds are normal. She exhibits no distension. There is no tenderness.   Musculoskeletal: She exhibits no edema (+2) or tenderness.   Neurological: She is alert. She has normal strength and normal reflexes. She exhibits normal muscle tone.   Skin: Skin is warm. No rash noted. No erythema.   Psychiatric: She has a normal mood and affect. Her speech is normal and behavior is normal. Judgment and thought content normal. Cognition and memory are normal.     Labs:    Chemistry        Component Value Date/Time     10/16/2017 1145    K 4.1 10/16/2017 1145     10/16/2017 1145    CO2 26 10/16/2017 1145    BUN 12 10/16/2017 1145    BUN 12 06/19/2015 0950    CREATININE 0.8 10/16/2017 1145    GLU 89 10/16/2017 1145        Component Value Date/Time    CALCIUM 9.4 10/16/2017 1145    ALKPHOS 38 (L) 10/16/2017 1145    AST 14 10/16/2017 1145    ALT 11 10/16/2017 1145    BILITOT 1.8 (H) 10/16/2017 1145        Magnesium   Date Value Ref Range Status   02/11/2017 1.9 1.6 - 2.6 mg/dL Final     Lab Results   Component Value Date    WBC 6.65 10/16/2017    HGB 12.5 10/16/2017    HCT 37.6 10/16/2017    MCV 95 10/16/2017     10/16/2017     BNP   Date Value Ref Range Status   08/28/2017 438 (H) 0 - 99 pg/mL Final     Comment:     Values of less than 100 pg/ml are consistent with non-CHF populations.   06/28/2017 124 (H) 0 - 99 pg/mL Final     Comment:     Values of less than 100 pg/ml are consistent with non-CHF populations.   02/11/2017 158 (H) 0 - 99 pg/mL Final     Comment:     Values of less than 100 pg/ml are consistent with non-CHF populations.     Assessment:      1. Chronic combined systolic and diastolic congestive heart failure     2. Nonischemic dilated cardiomyopathy    3. Ventricular tachycardia, polymorphic    4. Organ transplant candidate    5. Other iron deficiency anemia      Plan:   - Doing well today and euvolemic on exam  - Continue current GDMT with carvedilol and aldactone but will change Candesartan to Entresto 24/25  - CMP in 1 week to ensure toleration and check T. Bili and direct bili; T bili elevated today but wt is down and she feels well; CPX slightly worse today but no change in symptoms today. Elevated bili may be from hemolysis (has mild G6PD by lab screen)  - Follow up in 5 weeks, with CPX and labs; she met again with Marsha Ruiz her pre-txp coordinator    Patient is now NYHA III ACC stage D  Recommend 2 gram sodium restriction and 1500cc fluid restriction.  Encourage physical activity with graded exercise program.  Requested patient to weigh themselves daily, and to notify us if their weight increases by more than 3 lbs in 1 day or 5 lbs in 1 week.     Transplant Candidacy: Status 2    Giovani Fleming MD

## 2017-10-17 NOTE — PROGRESS NOTES
Met with patient, states she is doing fine and looking forward to going on a cruise next month with family. Reminded of listing status 2, voiced understanding. Will call with exact dates of cruise and will notify coord when arrives back. Patient appears happy, voiced no complaints or needs. Will continue to follow.

## 2017-10-23 ENCOUNTER — LAB VISIT (OUTPATIENT)
Dept: LAB | Facility: HOSPITAL | Age: 42
End: 2017-10-23
Attending: INTERNAL MEDICINE
Payer: MEDICAID

## 2017-10-23 DIAGNOSIS — D50.8 OTHER IRON DEFICIENCY ANEMIA: ICD-10-CM

## 2017-10-23 DIAGNOSIS — I50.42 CHRONIC COMBINED SYSTOLIC AND DIASTOLIC CONGESTIVE HEART FAILURE: ICD-10-CM

## 2017-10-23 LAB
ALBUMIN SERPL BCP-MCNC: 3.9 G/DL
ALP SERPL-CCNC: 33 U/L
ALT SERPL W/O P-5'-P-CCNC: 20 U/L
ANION GAP SERPL CALC-SCNC: 10 MMOL/L
AST SERPL-CCNC: 18 U/L
BILIRUB SERPL-MCNC: 1.7 MG/DL
BUN SERPL-MCNC: 16 MG/DL
CALCIUM SERPL-MCNC: 8.9 MG/DL
CHLORIDE SERPL-SCNC: 104 MMOL/L
CO2 SERPL-SCNC: 26 MMOL/L
CREAT SERPL-MCNC: 0.86 MG/DL
EST. GFR  (AFRICAN AMERICAN): >60 ML/MIN/1.73 M^2
EST. GFR  (NON AFRICAN AMERICAN): >60 ML/MIN/1.73 M^2
GLUCOSE SERPL-MCNC: 58 MG/DL
POTASSIUM SERPL-SCNC: 3.7 MMOL/L
PROT SERPL-MCNC: 7.3 G/DL
SODIUM SERPL-SCNC: 140 MMOL/L

## 2017-10-23 PROCEDURE — 36415 COLL VENOUS BLD VENIPUNCTURE: CPT | Mod: PO,TXP

## 2017-10-23 PROCEDURE — 80053 COMPREHEN METABOLIC PANEL: CPT | Mod: PO,TXP

## 2017-10-24 ENCOUNTER — PATIENT MESSAGE (OUTPATIENT)
Dept: TRANSPLANT | Facility: CLINIC | Age: 42
End: 2017-10-24

## 2017-10-25 ENCOUNTER — TELEPHONE (OUTPATIENT)
Dept: TRANSPLANT | Facility: CLINIC | Age: 42
End: 2017-10-25

## 2017-10-25 DIAGNOSIS — I50.9 CONGESTIVE HEART FAILURE, UNSPECIFIED CONGESTIVE HEART FAILURE CHRONICITY, UNSPECIFIED CONGESTIVE HEART FAILURE TYPE: Primary | ICD-10-CM

## 2017-10-25 NOTE — TELEPHONE ENCOUNTER
Discussed with Dr Fleimng, pt only takes Lasix PRN. Order to instruct pt to take Lasix daily starting today.    Pt notified to take Lasix 40 mg p.o. daily as above and will repeat CMP on 11/8/17 before leaves for cruise, pt voiced understanding.

## 2017-11-08 ENCOUNTER — LAB VISIT (OUTPATIENT)
Dept: LAB | Facility: HOSPITAL | Age: 42
End: 2017-11-08
Attending: INTERNAL MEDICINE
Payer: MEDICAID

## 2017-11-08 DIAGNOSIS — I50.9 CONGESTIVE HEART FAILURE, UNSPECIFIED CONGESTIVE HEART FAILURE CHRONICITY, UNSPECIFIED CONGESTIVE HEART FAILURE TYPE: ICD-10-CM

## 2017-11-08 LAB
ALBUMIN SERPL BCP-MCNC: 3.9 G/DL
ALP SERPL-CCNC: 33 U/L
ALT SERPL W/O P-5'-P-CCNC: 22 U/L
ANION GAP SERPL CALC-SCNC: 9 MMOL/L
AST SERPL-CCNC: 20 U/L
BILIRUB SERPL-MCNC: 1.6 MG/DL
BUN SERPL-MCNC: 10 MG/DL
CALCIUM SERPL-MCNC: 8.8 MG/DL
CHLORIDE SERPL-SCNC: 106 MMOL/L
CO2 SERPL-SCNC: 27 MMOL/L
CREAT SERPL-MCNC: 0.83 MG/DL
EST. GFR  (AFRICAN AMERICAN): >60 ML/MIN/1.73 M^2
EST. GFR  (NON AFRICAN AMERICAN): >60 ML/MIN/1.73 M^2
GLUCOSE SERPL-MCNC: 93 MG/DL
NT-PROBNP: 352 PG/ML
POTASSIUM SERPL-SCNC: 4 MMOL/L
PROT SERPL-MCNC: 7.2 G/DL
SODIUM SERPL-SCNC: 142 MMOL/L

## 2017-11-08 PROCEDURE — 36415 COLL VENOUS BLD VENIPUNCTURE: CPT | Mod: PO,TXP

## 2017-11-08 PROCEDURE — 80053 COMPREHEN METABOLIC PANEL: CPT | Mod: PO,TXP

## 2017-11-08 PROCEDURE — 83880 ASSAY OF NATRIURETIC PEPTIDE: CPT | Mod: PO,TXP

## 2017-11-20 ENCOUNTER — OFFICE VISIT (OUTPATIENT)
Dept: TRANSPLANT | Facility: CLINIC | Age: 42
End: 2017-11-20
Payer: MEDICAID

## 2017-11-20 ENCOUNTER — LAB VISIT (OUTPATIENT)
Dept: LAB | Facility: HOSPITAL | Age: 42
End: 2017-11-20
Attending: INTERNAL MEDICINE
Payer: MEDICAID

## 2017-11-20 VITALS
SYSTOLIC BLOOD PRESSURE: 122 MMHG | WEIGHT: 252.19 LBS | BODY MASS INDEX: 37.35 KG/M2 | HEIGHT: 69 IN | HEART RATE: 60 BPM | DIASTOLIC BLOOD PRESSURE: 59 MMHG

## 2017-11-20 DIAGNOSIS — I50.42 CHRONIC COMBINED SYSTOLIC AND DIASTOLIC CONGESTIVE HEART FAILURE: Primary | ICD-10-CM

## 2017-11-20 DIAGNOSIS — D75.A G6PD DEFICIENCY: ICD-10-CM

## 2017-11-20 DIAGNOSIS — I50.42 CHRONIC COMBINED SYSTOLIC AND DIASTOLIC CONGESTIVE HEART FAILURE: ICD-10-CM

## 2017-11-20 DIAGNOSIS — Z76.82 ORGAN TRANSPLANT CANDIDATE: ICD-10-CM

## 2017-11-20 DIAGNOSIS — I50.9 CONGESTIVE HEART FAILURE, UNSPECIFIED CONGESTIVE HEART FAILURE CHRONICITY, UNSPECIFIED CONGESTIVE HEART FAILURE TYPE: ICD-10-CM

## 2017-11-20 DIAGNOSIS — I42.0 NONISCHEMIC DILATED CARDIOMYOPATHY: Chronic | ICD-10-CM

## 2017-11-20 DIAGNOSIS — D50.8 OTHER IRON DEFICIENCY ANEMIA: ICD-10-CM

## 2017-11-20 LAB
ALBUMIN SERPL BCP-MCNC: 3.2 G/DL
ALP SERPL-CCNC: 32 U/L
ALT SERPL W/O P-5'-P-CCNC: 17 U/L
ANION GAP SERPL CALC-SCNC: 7 MMOL/L
AST SERPL-CCNC: 18 U/L
BILIRUB DIRECT SERPL-MCNC: 0.6 MG/DL
BILIRUB SERPL-MCNC: 1.5 MG/DL
BNP SERPL-MCNC: 194 PG/ML
BUN SERPL-MCNC: 13 MG/DL
CALCIUM SERPL-MCNC: 8.8 MG/DL
CHLORIDE SERPL-SCNC: 108 MMOL/L
CO2 SERPL-SCNC: 24 MMOL/L
CREAT SERPL-MCNC: 0.8 MG/DL
EST. GFR  (AFRICAN AMERICAN): >60 ML/MIN/1.73 M^2
EST. GFR  (NON AFRICAN AMERICAN): >60 ML/MIN/1.73 M^2
FERRITIN SERPL-MCNC: 33 NG/ML
GLUCOSE SERPL-MCNC: 77 MG/DL
IRON SERPL-MCNC: 96 UG/DL
POTASSIUM SERPL-SCNC: 3.9 MMOL/L
PROT SERPL-MCNC: 6.7 G/DL
SATURATED IRON: 28 %
SODIUM SERPL-SCNC: 139 MMOL/L
TOTAL IRON BINDING CAPACITY: 349 UG/DL
TRANSFERRIN SERPL-MCNC: 236 MG/DL

## 2017-11-20 PROCEDURE — 83540 ASSAY OF IRON: CPT | Mod: TXP

## 2017-11-20 PROCEDURE — 99999 PR PBB SHADOW E&M-EST. PATIENT-LVL III: CPT | Mod: PBBFAC,TXP,, | Performed by: INTERNAL MEDICINE

## 2017-11-20 PROCEDURE — 36415 COLL VENOUS BLD VENIPUNCTURE: CPT | Mod: TXP

## 2017-11-20 PROCEDURE — 82728 ASSAY OF FERRITIN: CPT | Mod: TXP

## 2017-11-20 PROCEDURE — 86833 HLA CLASS II HIGH DEFIN QUAL: CPT | Mod: PO,TXP

## 2017-11-20 PROCEDURE — 83880 ASSAY OF NATRIURETIC PEPTIDE: CPT | Mod: TXP

## 2017-11-20 PROCEDURE — 86832 HLA CLASS I HIGH DEFIN QUAL: CPT | Mod: PO,TXP

## 2017-11-20 PROCEDURE — 86977 RBC SERUM PRETX INCUBJ/INHIB: CPT | Mod: PO,TXP

## 2017-11-20 PROCEDURE — 82248 BILIRUBIN DIRECT: CPT | Mod: TXP

## 2017-11-20 PROCEDURE — 99214 OFFICE O/P EST MOD 30 MIN: CPT | Mod: S$GLB,TXP,, | Performed by: INTERNAL MEDICINE

## 2017-11-20 PROCEDURE — 80053 COMPREHEN METABOLIC PANEL: CPT | Mod: TXP

## 2017-11-20 PROCEDURE — 99213 OFFICE O/P EST LOW 20 MIN: CPT | Mod: PBBFAC,TXP | Performed by: INTERNAL MEDICINE

## 2017-11-20 NOTE — LETTER
November 20, 2017        Juanjose Nuñez  [address]  [facility] [address]  Phone: [phone]  Fax: [phone]             [facility] [address]  [facility] [address]  Phone: [phone]   Patient: Mario Mahajan   MR Number: [id number]   YOB: 1975   Date of Visit: 11/20/2017       Dear Dr. Juanjose Nuñez    Thank you for referring Mario Mahajan to me for evaluation. Attached you will find relevant portions of my assessment and plan of care.    If you have questions, please do not hesitate to call me. I look forward to following Mario Mahajan along with you.    Sincerely,    Giovani Fleming MD    Enclosure    If you would like to receive this communication electronically, please contact [email or web address] or [phone] to request [employer] access.    [employer] is a tool which provides read-only access to select patient information with whom you have a relationship. Its easy to use and provides real time access to review your patients record including encounter summaries, notes, results, and demographic information.    If you feel you have received this communication in error or would no longer like to receive these types of communications, please e-mail [email or web address]

## 2017-11-20 NOTE — PROGRESS NOTES
Subjective:   Ms. Mahajan is a 42 y.o. Black or  female who presents as follow up. She is currently listed for heart transplant status 2.      Chief Complaint: DELGADO with moderate exertion    HPI:  She is a 42 y.o. AAF w/Nonischemic CHF (LVEF 20%, LVEDD 7.2 cm mild MR Feb 2017) who had three episodes of VF in early Feb 2017, one of which required defibrillation from ICD. She has not had any ICD shocks since 2/2017. She has had a sleep study that was negative for sleep apnea. She was last seen 10/16/2017 when she was doing well and I changed candesartan to Entresto 24/26.      Today, she states she has been doing well. She feels somewhat better than last visit since starting Entresto. She just recently went on a 5-day cruise and did well without any limitations or complaints. She was careful not to overdue it with activity or eating. She denies orthopnea, SOB, chest pain; She does endorse DELGADO with moderate-severe exertion especially when hot. She quit walking daily over last 2 weeks and she has gained ~3-4lbs since last visit. She denies f/c, no recent hospitalizations; No other complaints. Still takes lasix PRN, states she rarely needs it.     CPX 10/16/17:  1)  Resting spirometry reveals an FVC = 2.4L which is 62% of predicted, an FEV1 of 1.8L, which is 55% of predicted and an FEV1/FVC ratio of 73%. The MVV = 81 L/min, which is 71% of predicted.  2) The respiratory exchange ratio (RER) was 1.07, suggesting an adequate effort.  3) The breathing reserve is calculated at 44%, which is normal. Oxygen saturation with exercise became reduced reaching a jake of 88% from a baseline value of 95%.   4) The PkVO2 was 12.3 ml/kg/min which is 37% of predicted equating to a functional capacity of 3.5 METS indicating severe functional impairment.   5) The anaerobic threshold (AT), which occurred at a heart rate of 112bpm, was 11.0 ml/kg/min, which is 33% of the predicted VO2 and is reduced.   6) The PkVO2 Lean was  17.30 ml/kg of lean body weight/min indicating a poor prognosis in heart failure.   7) The VE/VCO2 decreased by -41% from rest to AT. The VE/VCO2 [place] was 28.3.  The Resting PetCO2 was 35.9.      CONCLUSIONS   -Severe functional impairment associated with a normal breathing reserve, reduced oxygen saturation with exercise, an adequate effort, and a reduced AT. These findings are indicative of functional impairment secondary to circulatory insufficiency and deconditioning.     LHC/RHC 03/08/17:  Normal coronary arteries  LVEDP (Pre): 25 mmHg  PA: 54/30 (40)  PW:  (25)  RV: 54  RA:  (17)  PA_SAT: 66  AO_SAT: 92  AO: 117/51 (77)  LVEDP: 25    CONDITION 1:  FICKCI: 3.3600  FICKCO: 7.3900    Echo 5/3/17:  1 - Left ventricular enlargement.     2 - Eccentric hypertrophy.     3 - Severely depressed left ventricular systolic function (EF 15-20%).     4 - Severe mitral regurgitation.     5 - The LV has marked globular geometry..     6 - Severe left atrial enlargement.     7 - Pulmonary hypertension. The estimated PA systolic pressure is 44 mmHg.     8 - Mild tricuspid regurgitation.     9 - Intermediate central venous pressure.     10 - Trivial pericardial effusion.    Review of Systems   Constitution: Negative for chills, decreased appetite, diaphoresis, weakness, weight gain and weight loss.   Eyes: Negative for visual disturbance.   Cardiovascular: Negative for chest pain, dyspnea on exertion, irregular heartbeat, leg swelling, orthopnea, palpitations, paroxysmal nocturnal dyspnea and syncope.   Respiratory: Negative for cough, shortness of breath and wheezing.    Hematologic/Lymphatic: Negative for adenopathy and bleeding problem. Does not bruise/bleed easily.   Skin: Negative for rash.   Musculoskeletal: Negative for back pain, falls, gout, joint pain, muscle weakness and myalgias.   Gastrointestinal: Negative for abdominal pain, constipation, diarrhea, heartburn, nausea and vomiting.   Genitourinary: Negative for  "nocturia.   Neurological: Negative for excessive daytime sleepiness, dizziness, focal weakness, headaches, light-headedness, paresthesias and tremors.   Psychiatric/Behavioral: Positive for depression. Negative for memory loss. The patient does not have insomnia and is not nervous/anxious.      Objective:   BP (!) 122/59   Pulse 60   Ht 5' 9" (1.753 m)   Wt 114.4 kg (252 lb 3.3 oz)   BMI 37.24 kg/m²   Physical Exam   Constitutional: She appears well-developed and well-nourished. No distress.   HENT:   Head: Normocephalic and atraumatic. Head is without abrasion and without contusion.   Nose: Nose normal. No epistaxis.   Mouth/Throat: Oropharynx is clear and moist. Mucous membranes are not cyanotic.   Eyes: Conjunctivae and EOM are normal. Pupils are equal, round, and reactive to light.   Neck: Normal range of motion. Neck supple. Hepatojugular reflux present. No JVD (~6 cmH2O) present. No tracheal deviation present.   Cardiovascular: Normal rate, regular rhythm, normal heart sounds and normal pulses.  PMI is displaced.  Exam reveals no gallop.    No murmur heard.  Pulmonary/Chest: Effort normal and breath sounds normal. No stridor. No respiratory distress. She has no wheezes.   Abdominal: Soft. Normal appearance, normal aorta and bowel sounds are normal. She exhibits no distension. There is no tenderness.   Musculoskeletal: She exhibits no edema (+2) or tenderness.   Neurological: She is alert. She has normal strength and normal reflexes. She exhibits normal muscle tone.   Skin: Skin is warm. No rash noted. No erythema.   Psychiatric: She has a normal mood and affect. Her speech is normal and behavior is normal. Judgment and thought content normal. Cognition and memory are normal.     Labs:    Chemistry        Component Value Date/Time     11/20/2017 1235    K 3.9 11/20/2017 1235     11/20/2017 1235    CO2 24 11/20/2017 1235    BUN 13 11/20/2017 1235    BUN 12 06/19/2015 0950    CREATININE 0.8 " 11/20/2017 1235    GLU 77 11/20/2017 1235        Component Value Date/Time    CALCIUM 8.8 11/20/2017 1235    ALKPHOS 32 (L) 11/20/2017 1235    AST 18 11/20/2017 1235    ALT 17 11/20/2017 1235    BILITOT 1.5 (H) 11/20/2017 1235        Magnesium   Date Value Ref Range Status   02/11/2017 1.9 1.6 - 2.6 mg/dL Final     Lab Results   Component Value Date    WBC 6.65 10/16/2017    HGB 12.5 10/16/2017    HCT 37.6 10/16/2017    MCV 95 10/16/2017     10/16/2017     BNP   Date Value Ref Range Status   08/28/2017 438 (H) 0 - 99 pg/mL Final     Comment:     Values of less than 100 pg/ml are consistent with non-CHF populations.   06/28/2017 124 (H) 0 - 99 pg/mL Final     Comment:     Values of less than 100 pg/ml are consistent with non-CHF populations.   02/11/2017 158 (H) 0 - 99 pg/mL Final     Comment:     Values of less than 100 pg/ml are consistent with non-CHF populations.     Assessment:      1. Chronic combined systolic and diastolic congestive heart failure    2. Nonischemic dilated cardiomyopathy    3. Organ transplant candidate      Plan:   Chronic combined systolic and diastolic congestive heart failure  - Doing well today and euvolemic on exam  - Continue current GDMT with carvedilol and aldactone; but will increase Entresto to medium dose--49/50 BID  - labs today stable/improving; T. Bili down to 1.5 with a direct of 0.6; will get LDH and Haptoglobin next visit to assess for hemolysis given elevated indirect bili and concern for mild G6PD; educated on foods to avoid  - had long talk with patient about diet/exercise and wt loss to remain eligible for transplant. She voiced understanding and said she would work on it    Patient is now NYHA III ACC stage D  Recommend 2 gram sodium restriction and 1500cc fluid restriction.  Encourage physical activity with graded exercise program.  Requested patient to weigh themselves daily, and to notify us if their weight increases by more than 3 lbs in 1 day or 5 lbs in 1  week.     Transplant Candidacy: Status 2    Giovani Fleming MD

## 2017-11-21 LAB
CLASS I ANTIBODY COMMENTS - LUMINEX: NORMAL
CLASS II ANTIBODIES - LUMINEX: NORMAL
CLASS II ANTIBODY COMMENTS - LUMINEX: NORMAL
CPRA %: 39
HPRA INTERPRETATION: NORMAL
SERUM COLLECTION DT - LUMINEX CLASS I: NORMAL
SERUM COLLECTION DT - LUMINEX CLASS II: NORMAL
SPCL1 TESTING DATE: NORMAL
SPCL2 TESTING DATE: NORMAL
SPLUA TESTING DATE: NORMAL

## 2017-11-24 ENCOUNTER — PATIENT MESSAGE (OUTPATIENT)
Dept: CARDIOTHORACIC SURGERY | Facility: CLINIC | Age: 42
End: 2017-11-24

## 2017-11-27 ENCOUNTER — PATIENT MESSAGE (OUTPATIENT)
Dept: INTERNAL MEDICINE | Facility: CLINIC | Age: 42
End: 2017-11-27

## 2017-11-30 ENCOUNTER — PATIENT MESSAGE (OUTPATIENT)
Dept: TRANSPLANT | Facility: CLINIC | Age: 42
End: 2017-11-30

## 2017-12-05 ENCOUNTER — TELEPHONE (OUTPATIENT)
Dept: TRANSPLANT | Facility: CLINIC | Age: 42
End: 2017-12-05

## 2017-12-05 NOTE — TELEPHONE ENCOUNTER
Prior auth for Entresto 24-26 mg (2 tabs) BID called, [phone]. Pt ID [id number]. Requested for approval. Reference # [id number]. Will receive FAX within 24 hours for approval/denial.

## 2017-12-06 ENCOUNTER — PATIENT MESSAGE (OUTPATIENT)
Dept: TRANSPLANT | Facility: CLINIC | Age: 42
End: 2017-12-06

## 2017-12-06 ENCOUNTER — DOCUMENTATION ONLY (OUTPATIENT)
Dept: TRANSPLANT | Facility: CLINIC | Age: 42
End: 2017-12-06

## 2017-12-06 NOTE — PROGRESS NOTES
Entresto PA denied, appeal filled out, signed per Dr Fleming. Faxed to [facility], [phone]. Contact number [phone]. Transmission confirmation received. Patient notified via Smartbill - [employer] Backofficener.

## 2017-12-08 DIAGNOSIS — I50.42 CHRONIC COMBINED SYSTOLIC AND DIASTOLIC CONGESTIVE HEART FAILURE: ICD-10-CM

## 2017-12-08 DIAGNOSIS — I42.0 NONISCHEMIC DILATED CARDIOMYOPATHY: Chronic | ICD-10-CM

## 2017-12-08 NOTE — TELEPHONE ENCOUNTER
[facility]  notified of approval for Entresto. Approval Faxed to [facility], [phone]. Transmission confirmation received.    Pt notified of the above. Reinforced dosage 24-26 mg 2 tbs po BID. Pt voiced understanding

## 2018-01-09 ENCOUNTER — TELEPHONE (OUTPATIENT)
Dept: ELECTROPHYSIOLOGY | Facility: CLINIC | Age: 43
End: 2018-01-09

## 2018-01-09 DIAGNOSIS — I42.8 NICM (NONISCHEMIC CARDIOMYOPATHY): ICD-10-CM

## 2018-01-09 DIAGNOSIS — Z95.810 ICD (IMPLANTABLE CARDIOVERTER-DEFIBRILLATOR) IN PLACE: Primary | ICD-10-CM

## 2018-01-09 PROCEDURE — 93295 DEV INTERROG REMOTE 1/2/MLT: CPT | Mod: NTX,,, | Performed by: INTERNAL MEDICINE

## 2018-01-09 NOTE — TELEPHONE ENCOUNTER
"Called pt to confirm appt for this afternoon (no showed her last scheduled visit).  Pt stated she was having "car trouble" and would not be able to make the appt today.  We worked together to reconnect her Merlin remote monitor.  After a manual transmission, the report was still not received, despite noting a transmitter update for today.  Discussed with PATRICK/Merlin technical assistance who stated the monitor merely did a reconnect and not an actual transmission, pt would have to press the white button again.  I returned a call to her home to inform of my findings but she was not home.  Pt was asked to conduct another manual send upon returning home.  Will check for the transmission in the am and get back to her if it is not successful.    "

## 2018-01-10 ENCOUNTER — CLINICAL SUPPORT (OUTPATIENT)
Dept: ELECTROPHYSIOLOGY | Facility: CLINIC | Age: 43
End: 2018-01-10
Payer: MEDICAID

## 2018-01-10 DIAGNOSIS — I42.8 NICM (NONISCHEMIC CARDIOMYOPATHY): ICD-10-CM

## 2018-01-10 DIAGNOSIS — Z95.810 ICD (IMPLANTABLE CARDIOVERTER-DEFIBRILLATOR) IN PLACE: ICD-10-CM

## 2018-01-10 PROCEDURE — 93296 REM INTERROG EVL PM/IDS: CPT | Mod: PBBFAC,NTX | Performed by: INTERNAL MEDICINE

## 2018-01-12 ENCOUNTER — OFFICE VISIT (OUTPATIENT)
Dept: TRANSPLANT | Facility: CLINIC | Age: 43
End: 2018-01-12
Payer: MEDICAID

## 2018-01-12 ENCOUNTER — LAB VISIT (OUTPATIENT)
Dept: LAB | Facility: HOSPITAL | Age: 43
End: 2018-01-12
Attending: INTERNAL MEDICINE
Payer: MEDICAID

## 2018-01-12 VITALS
DIASTOLIC BLOOD PRESSURE: 58 MMHG | WEIGHT: 250 LBS | HEIGHT: 69 IN | SYSTOLIC BLOOD PRESSURE: 122 MMHG | BODY MASS INDEX: 37.03 KG/M2 | HEART RATE: 64 BPM

## 2018-01-12 DIAGNOSIS — I50.42 CHRONIC COMBINED SYSTOLIC AND DIASTOLIC CONGESTIVE HEART FAILURE: ICD-10-CM

## 2018-01-12 DIAGNOSIS — Z76.82 ORGAN TRANSPLANT CANDIDATE: ICD-10-CM

## 2018-01-12 DIAGNOSIS — I50.9 CONGESTIVE HEART FAILURE, UNSPECIFIED CONGESTIVE HEART FAILURE CHRONICITY, UNSPECIFIED CONGESTIVE HEART FAILURE TYPE: ICD-10-CM

## 2018-01-12 DIAGNOSIS — Z95.810 ICD (IMPLANTABLE CARDIOVERTER-DEFIBRILLATOR) IN PLACE: Chronic | ICD-10-CM

## 2018-01-12 DIAGNOSIS — I50.42 CHRONIC COMBINED SYSTOLIC AND DIASTOLIC CONGESTIVE HEART FAILURE: Primary | ICD-10-CM

## 2018-01-12 DIAGNOSIS — D75.A G6PD DEFICIENCY: ICD-10-CM

## 2018-01-12 LAB
ALBUMIN SERPL BCP-MCNC: 3.5 G/DL
ALP SERPL-CCNC: 36 U/L
ALT SERPL W/O P-5'-P-CCNC: 9 U/L
ANION GAP SERPL CALC-SCNC: 8 MMOL/L
AST SERPL-CCNC: 13 U/L
BASOPHILS # BLD AUTO: 0.02 K/UL
BASOPHILS NFR BLD: 0.3 %
BILIRUB DIRECT SERPL-MCNC: 0.7 MG/DL
BILIRUB SERPL-MCNC: 1.6 MG/DL
BUN SERPL-MCNC: 12 MG/DL
CALCIUM SERPL-MCNC: 9.6 MG/DL
CHLORIDE SERPL-SCNC: 107 MMOL/L
CO2 SERPL-SCNC: 26 MMOL/L
CREAT SERPL-MCNC: 0.8 MG/DL
DIFFERENTIAL METHOD: ABNORMAL
EOSINOPHIL # BLD AUTO: 0.4 K/UL
EOSINOPHIL NFR BLD: 6.1 %
ERYTHROCYTE [DISTWIDTH] IN BLOOD BY AUTOMATED COUNT: 11.8 %
EST. GFR  (AFRICAN AMERICAN): >60 ML/MIN/1.73 M^2
EST. GFR  (NON AFRICAN AMERICAN): >60 ML/MIN/1.73 M^2
GLUCOSE SERPL-MCNC: 69 MG/DL
HAPTOGLOB SERPL-MCNC: 21 MG/DL
HCT VFR BLD AUTO: 39.6 %
HGB BLD-MCNC: 13 G/DL
IMM GRANULOCYTES # BLD AUTO: 0.01 K/UL
IMM GRANULOCYTES NFR BLD AUTO: 0.2 %
LDH SERPL L TO P-CCNC: 180 U/L
LYMPHOCYTES # BLD AUTO: 1.7 K/UL
LYMPHOCYTES NFR BLD: 25.9 %
MCH RBC QN AUTO: 31.5 PG
MCHC RBC AUTO-ENTMCNC: 32.8 G/DL
MCV RBC AUTO: 96 FL
MONOCYTES # BLD AUTO: 0.4 K/UL
MONOCYTES NFR BLD: 6.8 %
NEUTROPHILS # BLD AUTO: 3.9 K/UL
NEUTROPHILS NFR BLD: 60.7 %
NRBC BLD-RTO: 0 /100 WBC
PLATELET # BLD AUTO: 245 K/UL
PMV BLD AUTO: 12 FL
POTASSIUM SERPL-SCNC: 3.8 MMOL/L
PROT SERPL-MCNC: 7.5 G/DL
RBC # BLD AUTO: 4.13 M/UL
SODIUM SERPL-SCNC: 141 MMOL/L
WBC # BLD AUTO: 6.44 K/UL

## 2018-01-12 PROCEDURE — 36415 COLL VENOUS BLD VENIPUNCTURE: CPT | Mod: TXP

## 2018-01-12 PROCEDURE — 99214 OFFICE O/P EST MOD 30 MIN: CPT | Mod: S$GLB,,, | Performed by: INTERNAL MEDICINE

## 2018-01-12 PROCEDURE — 82248 BILIRUBIN DIRECT: CPT | Mod: TXP

## 2018-01-12 PROCEDURE — 86833 HLA CLASS II HIGH DEFIN QUAL: CPT | Mod: PO,TXP

## 2018-01-12 PROCEDURE — 99213 OFFICE O/P EST LOW 20 MIN: CPT | Mod: PBBFAC,25,TXP | Performed by: INTERNAL MEDICINE

## 2018-01-12 PROCEDURE — 80053 COMPREHEN METABOLIC PANEL: CPT | Mod: TXP

## 2018-01-12 PROCEDURE — 86977 RBC SERUM PRETX INCUBJ/INHIB: CPT | Mod: PO,TXP

## 2018-01-12 PROCEDURE — 86832 HLA CLASS I HIGH DEFIN QUAL: CPT | Mod: PO,TXP

## 2018-01-12 PROCEDURE — 85025 COMPLETE CBC W/AUTO DIFF WBC: CPT | Mod: TXP

## 2018-01-12 PROCEDURE — 83010 ASSAY OF HAPTOGLOBIN QUANT: CPT | Mod: TXP

## 2018-01-12 PROCEDURE — 83615 LACTATE (LD) (LDH) ENZYME: CPT | Mod: TXP

## 2018-01-12 PROCEDURE — 99999 PR PBB SHADOW E&M-EST. PATIENT-LVL III: CPT | Mod: PBBFAC,TXP,, | Performed by: INTERNAL MEDICINE

## 2018-01-12 NOTE — LETTER
January 17, 2018        Juanjose Nuñez  [address]  [facility] [address]  Phone: [phone]  Fax: [phone]             [facility] [address]  [facility] [address]  Phone: [phone]   Patient: Mario Mahajan   MR Number: [id number]   YOB: 1975   Date of Visit: 1/12/2018       Dear Dr. Juanjose Nuñez    Thank you for referring Mario Mahajan to me for evaluation. Attached you will find relevant portions of my assessment and plan of care.    If you have questions, please do not hesitate to call me. I look forward to following Mario Mahajan along with you.    Sincerely,    Jeimy Srivastava MD    Enclosure    If you would like to receive this communication electronically, please contact [email or web address] or [phone] to request [employer] access.    [employer] is a tool which provides read-only access to select patient information with whom you have a relationship. Its easy to use and provides real time access to review your patients record including encounter summaries, notes, results, and demographic information.    If you feel you have received this communication in error or would no longer like to receive these types of communications, please e-mail [email or web address]

## 2018-01-14 ENCOUNTER — PATIENT MESSAGE (OUTPATIENT)
Dept: TRANSPLANT | Facility: CLINIC | Age: 43
End: 2018-01-14

## 2018-01-15 DIAGNOSIS — I50.9 HEART FAILURE, UNSPECIFIED HEART FAILURE CHRONICITY, UNSPECIFIED HEART FAILURE TYPE: Primary | ICD-10-CM

## 2018-01-15 DIAGNOSIS — Z76.82 ORGAN TRANSPLANT CANDIDATE: ICD-10-CM

## 2018-01-15 LAB
CLASS I ANTIBODIES - LUMINEX: NEGATIVE
CLASS II ANTIBODIES - LUMINEX: NORMAL
CLASS II ANTIBODY COMMENTS - LUMINEX: NORMAL
CPRA %: 39
HPRA INTERPRETATION: NORMAL
SERUM COLLECTION DT - LUMINEX CLASS I: NORMAL
SERUM COLLECTION DT - LUMINEX CLASS II: NORMAL
SPCL1 TESTING DATE: NORMAL
SPCL2 TESTING DATE: NORMAL
SPLUA TESTING DATE: NORMAL

## 2018-01-16 ENCOUNTER — PATIENT MESSAGE (OUTPATIENT)
Dept: INTERNAL MEDICINE | Facility: CLINIC | Age: 43
End: 2018-01-16

## 2018-01-21 NOTE — ASSESSMENT & PLAN NOTE
Doing well, stable from volume standpoint.   Would like to increase entresto to next highest dose, however do not have labs back from clinic visit. If renal function and potassium okay, will call to increase and repeat labs in 1 week.   Listed for OHT, repeat CPX and echo to see where she is functionally.

## 2018-01-21 NOTE — PROGRESS NOTES
Subjective:   Ms. Mahajan is a 42 y.o. Black or  female who presents as follow up. She is currently listed for heart transplant status 2.      Chief Complaint: DELGADO with moderate exertion    HPI:  She is a 42 y.o. AAF w/Nonischemic CHF (LVEF 20%, LVEDD 7.2 cm mild MR Feb 2017) who had three episodes of VF in early Feb 2017, one of which required defibrillation from ICD. She has not had any ICD shocks since 2/2017. She has had a sleep study that was negative for sleep apnea.     Patient states she is doing well today, despite CPX from October not being great, feels she has improved with increased entresto dose. Denies N/V/F/C, lightheadedness, dizziness, PNd, orthopnea, LE edema. States she has gotten back to walking and can walk without significant DELGADO unless she is exerting significantly. Has not been able to lose weight,  But feels she is watching what she eats a little closer. States she is rarely using lasix anymore, hasn't taken it in a long time.     CPX 10/16/17:  1)  Resting spirometry reveals an FVC = 2.4L which is 62% of predicted, an FEV1 of 1.8L, which is 55% of predicted and an FEV1/FVC ratio of 73%. The MVV = 81 L/min, which is 71% of predicted.  2) The respiratory exchange ratio (RER) was 1.07, suggesting an adequate effort.  3) The breathing reserve is calculated at 44%, which is normal. Oxygen saturation with exercise became reduced reaching a jake of 88% from a baseline value of 95%.   4) The PkVO2 was 12.3 ml/kg/min which is 37% of predicted equating to a functional capacity of 3.5 METS indicating severe functional impairment.   5) The anaerobic threshold (AT), which occurred at a heart rate of 112bpm, was 11.0 ml/kg/min, which is 33% of the predicted VO2 and is reduced.   6) The PkVO2 Lean was 17.30 ml/kg of lean body weight/min indicating a poor prognosis in heart failure.   7) The VE/VCO2 decreased by -41% from rest to AT. The VE/VCO2 [place] was 28.3.  The Resting PetCO2 was  35.9.      CONCLUSIONS   -Severe functional impairment associated with a normal breathing reserve, reduced oxygen saturation with exercise, an adequate effort, and a reduced AT. These findings are indicative of functional impairment secondary to circulatory insufficiency and deconditioning.     LHC/RHC 03/08/17:  Normal coronary arteries  LVEDP (Pre): 25 mmHg  PA: 54/30 (40)  PW:  (25)  RV: 54  RA:  (17)  PA_SAT: 66  AO_SAT: 92  AO: 117/51 (77)  LVEDP: 25    CONDITION 1:  FICKCI: 3.3600  FICKCO: 7.3900    Echo 5/3/17:  1 - Left ventricular enlargement.     2 - Eccentric hypertrophy.     3 - Severely depressed left ventricular systolic function (EF 15-20%).     4 - Severe mitral regurgitation.     5 - The LV has marked globular geometry..     6 - Severe left atrial enlargement.     7 - Pulmonary hypertension. The estimated PA systolic pressure is 44 mmHg.     8 - Mild tricuspid regurgitation.     9 - Intermediate central venous pressure.     10 - Trivial pericardial effusion.    Review of Systems   Constitution: Positive for malaise/fatigue. Negative for chills, decreased appetite, diaphoresis, weakness, weight gain and weight loss.   Eyes: Negative for visual disturbance.   Cardiovascular: Negative for chest pain, dyspnea on exertion (with greater than moderate exertion), irregular heartbeat, leg swelling, orthopnea, palpitations, paroxysmal nocturnal dyspnea and syncope.   Respiratory: Negative for cough, shortness of breath and wheezing.    Hematologic/Lymphatic: Negative for adenopathy and bleeding problem. Does not bruise/bleed easily.   Skin: Negative for rash.   Musculoskeletal: Negative for back pain, falls, gout, joint pain, muscle weakness and myalgias.   Gastrointestinal: Negative for abdominal pain, constipation, diarrhea, heartburn, nausea and vomiting.   Genitourinary: Negative for nocturia.   Neurological: Negative for excessive daytime sleepiness, dizziness, focal weakness, headaches,  "light-headedness, paresthesias and tremors.   Psychiatric/Behavioral: Positive for depression. Negative for memory loss. The patient does not have insomnia and is not nervous/anxious.    All other systems reviewed and are negative.    Objective:   BP (!) 122/58 (BP Location: Right arm, Patient Position: Sitting, BP Method: Large (Automatic))   Pulse 64   Ht 5' 9" (1.753 m)   Wt 113.4 kg (250 lb)   BMI 36.92 kg/m²   Physical Exam   Constitutional: She is oriented to person, place, and time. She appears well-developed and well-nourished. No distress.   HENT:   Head: Normocephalic and atraumatic. Head is without abrasion and without contusion.   Nose: Nose normal. No epistaxis.   Mouth/Throat: Oropharynx is clear and moist. Mucous membranes are not cyanotic. No oropharyngeal exudate.   Eyes: Conjunctivae and EOM are normal. Pupils are equal, round, and reactive to light. No scleral icterus.   Neck: Normal range of motion. Neck supple. No hepatojugular reflux and no JVD (below clavicle at 90 degrees, no HJR) present. No tracheal deviation present.   Cardiovascular: Normal rate, regular rhythm, normal heart sounds, intact distal pulses and normal pulses.  PMI is displaced.  Exam reveals no gallop and no friction rub.    No murmur heard.  Pulmonary/Chest: Effort normal and breath sounds normal. No stridor. No respiratory distress. She has no wheezes. She has no rales. She exhibits no tenderness.   Abdominal: Soft. Normal appearance, normal aorta and bowel sounds are normal. She exhibits no distension and no mass. There is no tenderness. There is no rebound and no guarding.   Musculoskeletal: Normal range of motion. She exhibits no edema (+2) or tenderness.   Neurological: She is alert and oriented to person, place, and time. She has normal strength and normal reflexes. She exhibits normal muscle tone.   Skin: Skin is warm and dry. No rash noted. She is not diaphoretic. No erythema. No pallor.   Psychiatric: She has a " normal mood and affect. Her speech is normal and behavior is normal. Judgment and thought content normal. Cognition and memory are normal.   Nursing note and vitals reviewed.    Labs:    Chemistry        Component Value Date/Time     01/12/2018 1228    K 3.8 01/12/2018 1228     01/12/2018 1228    CO2 26 01/12/2018 1228    BUN 12 01/12/2018 1228    BUN 12 06/19/2015 0950    CREATININE 0.8 01/12/2018 1228    GLU 69 (L) 01/12/2018 1228        Component Value Date/Time    CALCIUM 9.6 01/12/2018 1228    ALKPHOS 36 (L) 01/12/2018 1228    AST 13 01/12/2018 1228    ALT 9 (L) 01/12/2018 1228    BILITOT 1.6 (H) 01/12/2018 1228        Magnesium   Date Value Ref Range Status   02/11/2017 1.9 1.6 - 2.6 mg/dL Final     Lab Results   Component Value Date    WBC 6.44 01/12/2018    HGB 13.0 01/12/2018    HCT 39.6 01/12/2018    MCV 96 01/12/2018     01/12/2018     BNP   Date Value Ref Range Status   11/20/2017 194 (H) 0 - 99 pg/mL Final     Comment:     Values of less than 100 pg/ml are consistent with non-CHF populations.   08/28/2017 438 (H) 0 - 99 pg/mL Final     Comment:     Values of less than 100 pg/ml are consistent with non-CHF populations.   06/28/2017 124 (H) 0 - 99 pg/mL Final     Comment:     Values of less than 100 pg/ml are consistent with non-CHF populations.     Assessment:      1. Chronic combined systolic and diastolic congestive heart failure    2. Organ transplant candidate    3. ICD (implantable cardioverter-defibrillator) in place 12/01/15      Plan:     Chronic combined systolic and diastolic congestive heart failure  Doing well, stable from volume standpoint.   Would like to increase entresto to next highest dose, however do not have labs back from clinic visit. If renal function and potassium okay, will call to increase and repeat labs in 1 week.   Listed for OHT, repeat CPX and echo to see where she is functionally.     Organ transplant candidate  Listed OHT status 2, stable. Will need  repeat RHC in the near future for listing purposes.     ICD (implantable cardioverter-defibrillator) in place 12/01/15  No ICd firings in a long time, interrogated as needed from EP.       Patient is now NYHA III ACC stage D  Recommend 2 gram sodium restriction and 1500cc fluid restriction.  Encourage physical activity with graded exercise program.  Requested patient to weigh themselves daily, and to notify us if their weight increases by more than 3 lbs in 1 day or 5 lbs in 1 week.     Patient currently listed status 2 for OHT.     Jeimy Srivastava MD

## 2018-01-23 ENCOUNTER — PATIENT MESSAGE (OUTPATIENT)
Dept: TRANSPLANT | Facility: CLINIC | Age: 43
End: 2018-01-23

## 2018-01-23 ENCOUNTER — OFFICE VISIT (OUTPATIENT)
Dept: INTERNAL MEDICINE | Facility: CLINIC | Age: 43
End: 2018-01-23
Payer: MEDICAID

## 2018-01-23 ENCOUNTER — HOSPITAL ENCOUNTER (OUTPATIENT)
Dept: RADIOLOGY | Facility: HOSPITAL | Age: 43
Discharge: HOME | End: 2018-01-23
Attending: STUDENT IN AN ORGANIZED HEALTH CARE EDUCATION/TRAINING PROGRAM
Payer: MEDICAID

## 2018-01-23 VITALS
HEIGHT: 69 IN | SYSTOLIC BLOOD PRESSURE: 120 MMHG | DIASTOLIC BLOOD PRESSURE: 70 MMHG | HEART RATE: 73 BPM | BODY MASS INDEX: 37.03 KG/M2 | WEIGHT: 250 LBS

## 2018-01-23 DIAGNOSIS — Z79.899 ON AMIODARONE THERAPY: ICD-10-CM

## 2018-01-23 DIAGNOSIS — E66.9 OBESITY (BMI 35.0-39.9 WITHOUT COMORBIDITY): ICD-10-CM

## 2018-01-23 DIAGNOSIS — J45.20 MILD INTERMITTENT ASTHMA WITHOUT COMPLICATION: ICD-10-CM

## 2018-01-23 DIAGNOSIS — M17.12 OSTEOARTHRITIS OF LEFT KNEE, UNSPECIFIED OSTEOARTHRITIS TYPE: ICD-10-CM

## 2018-01-23 DIAGNOSIS — M25.562 CHRONIC PAIN OF LEFT KNEE: ICD-10-CM

## 2018-01-23 DIAGNOSIS — Z00.8 ENCOUNTER FOR OTHER GENERAL EXAMINATION: Primary | ICD-10-CM

## 2018-01-23 DIAGNOSIS — I50.42 CHRONIC COMBINED SYSTOLIC AND DIASTOLIC CONGESTIVE HEART FAILURE: ICD-10-CM

## 2018-01-23 DIAGNOSIS — G89.29 CHRONIC PAIN OF LEFT KNEE: ICD-10-CM

## 2018-01-23 PROBLEM — M25.512 LEFT SHOULDER PAIN: Status: ACTIVE | Noted: 2018-01-23

## 2018-01-23 PROCEDURE — 99213 OFFICE O/P EST LOW 20 MIN: CPT | Mod: S$PBB,,, | Performed by: STUDENT IN AN ORGANIZED HEALTH CARE EDUCATION/TRAINING PROGRAM

## 2018-01-23 PROCEDURE — 99214 OFFICE O/P EST MOD 30 MIN: CPT | Mod: PBBFAC,25 | Performed by: STUDENT IN AN ORGANIZED HEALTH CARE EDUCATION/TRAINING PROGRAM

## 2018-01-23 PROCEDURE — 99999 PR PBB SHADOW E&M-EST. PATIENT-LVL IV: CPT | Mod: PBBFAC,,, | Performed by: STUDENT IN AN ORGANIZED HEALTH CARE EDUCATION/TRAINING PROGRAM

## 2018-01-23 PROCEDURE — 73560 X-RAY EXAM OF KNEE 1 OR 2: CPT | Mod: TC,50,NTX

## 2018-01-23 PROCEDURE — 73560 X-RAY EXAM OF KNEE 1 OR 2: CPT | Mod: 26,50,, | Performed by: RADIOLOGY

## 2018-01-23 NOTE — PROGRESS NOTES
"Subjective:       Patient ID: Mario Mahajan is a 42 y.o. female.    Chief Complaint: Establish Care and Knee Pain    Patient is a 42 year old woman with medical history significant for chronic combined systolic/diastolic CHF with severe MR and ICD in place, hx polymorphic vtach, obesity, and menorrhagia who presents to establish care. Patient states she does not have primary care provider and sees her heart specialists for most of her healthcare needs. She states they have told her she needs to have an established PCP in place for health maintenance needs. Patient's last visit in resident clinic was with Dr. Yessi Ramos 3/2017. Specifically, patient states that for the past 6 months she has endorsed pain in her left knee and feeling that it is "cracking" every time she bends it. The pain is worse at night. Otherwise, patient states she has chronic back pain and occasional tingling pain that shoots to her left arm.     In regards to her heart failure, she has not been told what is the cause. I specifically asked about arrhythmias, known heart failure during her pregnancy, and history of ischemic heart disease all of which she denies. She sees heart failure specialists regularly (last on 1/21 with Dr. Srivastava). She is an OHT Status 2 for transplant. Otherwise, she has done well from the heart failure standpoint, stating she can walk up to a mile before she gets winded. She states she walks a mile 3 times per week. She has furosemide 40 mg she takes by mouth when needed, estimating she only takes it twice per week for her dyspnea on exertion. Denies peripheral edema or ever suffering from it. She has not any episodes of syncope since February of last year (when she was shocked by her ICD for polymorphic vtach). Patient is tolerating all her medication.       Review of Systems   Constitutional: Negative for activity change, fatigue and fever.   HENT: Negative for congestion and sore throat.    Respiratory: Positive " "for shortness of breath. Negative for choking, chest tightness and wheezing.    Cardiovascular: Negative for chest pain, palpitations and leg swelling.   Gastrointestinal: Negative for abdominal pain, diarrhea, nausea and vomiting.   Genitourinary: Negative for dysuria and hematuria.   Musculoskeletal: Positive for arthralgias and back pain.   Neurological: Negative for dizziness and facial asymmetry.       Objective:       /70 (BP Location: Right arm, Patient Position: Sitting, BP Method: Medium (Manual))   Pulse 73   Ht 5' 9" (1.753 m)   Wt 113.4 kg (250 lb)   BMI 36.92 kg/m²     Physical Exam   Constitutional: She is oriented to person, place, and time. She appears well-developed and well-nourished. No distress.   Obese female, in no acute distress. AAOX4   HENT:   Head: Normocephalic and atraumatic.   Eyes: EOM are normal. Pupils are equal, round, and reactive to light.   Neck: Normal range of motion. Neck supple. JVD (Appreciated mid-neck) present. No thyromegaly present.   Cardiovascular: Normal rate, regular rhythm and normal heart sounds.    No murmur heard.  Pulmonary/Chest: Effort normal and breath sounds normal. No respiratory distress. She has no wheezes.   Abdominal: Soft. Bowel sounds are normal. She exhibits no distension. There is no tenderness. There is no guarding.   Musculoskeletal: Normal range of motion. She exhibits tenderness (Left patella, crepitus palpated on extension and flexion of knee. No effusion palpated. FROM).   Neurological: She is alert and oriented to person, place, and time.   Skin: She is not diaphoretic.   Vitals reviewed.      Assessment:       1. Encounter for other general examination    2. Obesity (BMI 35.0-39.9 without comorbidity)    3. Osteoarthritis of left knee, unspecified osteoarthritis type    4. On amiodarone therapy    5. Mild intermittent asthma without complication    6. Chronic combined systolic and diastolic congestive heart failure        Plan:     "   1. Encounter for other general examination  - Up to date with her health care screenings.    - STI screening on 3/2017 - all negative.   - Mammogram and Pap in 3/2017 and 5/2017 respectively.    - Sure she has received her influenza vaccination although not recorded; refuses today.    2. Obesity (BMI 35.0-39.9 without comorbidity)  - Emphasized calorie counting and diet modification with increased aerobic exercises.  - On salt restricted and fluid restricted diet per HF.    3. Osteoarthritis of left knee, unspecified osteoarthritis type  - X-ray AP Standing Knees with Left Lateral; Future   - Demonstrable for mild narrowing of the medial tibiofemoral joint spaces bilaterally  - Crepitus on physical examination consistent on degenerative disease.  - Again focused on weight loss.  - Ambulatory consult to Physical Therapy    4. On amiodarone therapy  - With history of polymorphic vtach.   - Monitor annual thyroid considering amiodarone use.   - TSH; Future    5. Mild intermittent asthma without complication  - Albuterol PRN (estimates only uses once or twice per month)    6. Chronic combined systolic and diastolic congestive heart failure  - Per HF clinicians. On heart transplant list.   - RHC 3/2017 with PA presure of 54/30 (40). Echo in 5/2017 with sever MR and EF of 15%.   - With ICD in place.   - On furosemide 40 mg PO daily prn and uses about twice per week.     Disposition: RTC 1 year.     Ulises Keys MD  PGY-2 Internal Medicine  [phone]

## 2018-01-23 NOTE — PATIENT INSTRUCTIONS
I have placed a referral to you for physical therapy for the pain in your left knee, which is likely arthritis considering the crepitus in the joint. I have also ordered an X-ray of that left knee as well.    Otherwise, please go down the hallway to get the X-ray and get your thyroid checked - a blood test (need routine testing considering your amiodarone therapy).

## 2018-01-24 ENCOUNTER — TELEPHONE (OUTPATIENT)
Dept: TRANSPLANT | Facility: CLINIC | Age: 43
End: 2018-01-24

## 2018-01-24 NOTE — TELEPHONE ENCOUNTER
Attempted to contact pt for additional fu testing and discuss Entresto dose, voice message left with contact number.     Schedulers talked to brother, pt unavailable as sibling passed away unexpectedly. Appt made for fu CPX, echo and RHC for 2/7/18.    Will await return call from pt to discuss Entresto.

## 2018-01-26 NOTE — PROGRESS NOTES
I have reviewed the notes, assessments, and/or procedures performed by Dr. Keys, I concur with his documentation of Mario Mahajan.

## 2018-01-30 ENCOUNTER — DOCUMENTATION ONLY (OUTPATIENT)
Dept: TRANSFUSION MEDICINE | Facility: HOSPITAL | Age: 43
End: 2018-01-30
Payer: MEDICAID

## 2018-01-30 DIAGNOSIS — I42.0 NONISCHEMIC DILATED CARDIOMYOPATHY: Primary | Chronic | ICD-10-CM

## 2018-01-30 PROCEDURE — 86077 PHYS BLOOD BANK SERV XMATCH: CPT | Mod: TXP,,, | Performed by: PATHOLOGY

## 2018-01-30 NOTE — CONSULTS
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]  Blood Type: O POS  HLA Type:   Class I:  Lab Results   Component Value Date    WGYK2HL 23 03/21/2017    RAVG9LJ 68 03/21/2017    JFZC3ZK 72 03/21/2017    ZVNL9NL 45 03/21/2017    ZIDJH2SJ 6 03/21/2017    LIEZH2MU XX 03/21/2017    QLKEX9XN 2 03/21/2017    RALTL5ST 16 03/21/2017     Class II:  Lab Results   Component Value Date    CRRDFI74CG 17 03/21/2017    ZYRDSP96BS 7 03/21/2017    OOBHIL878FJ 52 03/21/2017    SBYTXF2117 53 03/21/2017    WCCYV1OY 2 03/21/2017    EWOYL9FR XX 03/21/2017     Recent Antibody Screen/ID Results:   Lab Results   Component Value Date    SG9KLNZ Negative 01/12/2018    CIABCLM WEAK B82(9885) 11/20/2017    CIIAB DQ7 01/12/2018    ABCMT  01/12/2018     DQ7(4610), DQB1*06:01(3747)-- WEAK DQB1*06:09(2145)     Auto T Cell Crossmatch Results:  No results found for: XMTCELLRES  Auto B Cell Crossmatch Results:  No results found for: BCELLRES  Assessment     Interpretation: Given cut-off changes to 5K MFI, unacceptable antigens have been changed in UNOS as below.    Strongly Recommended Unacceptable Antigens: DQ7    Crossmatch Expectations: A virtual crossmatch is recommended.    Please call the HLA Lab [phone] with any concerns or questions.    Anita Holbrook MD , PhD  ORLANDO Barclay MD, EDMUNDO  Section of Transfusion Medicine & Histocompatibility  Department of Pathology and Laboratory Medicine  [facility]  01/30/2018

## 2018-02-01 DIAGNOSIS — Z76.82 ORGAN TRANSPLANT CANDIDATE: ICD-10-CM

## 2018-02-05 ENCOUNTER — PATIENT MESSAGE (OUTPATIENT)
Dept: ADMINISTRATIVE | Facility: OTHER | Age: 43
End: 2018-02-05

## 2018-02-06 ENCOUNTER — PATIENT MESSAGE (OUTPATIENT)
Dept: ADMINISTRATIVE | Facility: OTHER | Age: 43
End: 2018-02-06

## 2018-02-06 DIAGNOSIS — D50.9 IRON DEFICIENCY ANEMIA: ICD-10-CM

## 2018-02-06 DIAGNOSIS — I50.42 CHRONIC COMBINED SYSTOLIC AND DIASTOLIC CHF (CONGESTIVE HEART FAILURE): Primary | ICD-10-CM

## 2018-03-06 ENCOUNTER — PATIENT MESSAGE (OUTPATIENT)
Dept: ADMINISTRATIVE | Facility: OTHER | Age: 43
End: 2018-03-06

## 2018-03-09 ENCOUNTER — TELEPHONE (OUTPATIENT)
Dept: TRANSPLANT | Facility: CLINIC | Age: 43
End: 2018-03-09

## 2018-03-09 ENCOUNTER — PATIENT MESSAGE (OUTPATIENT)
Dept: TRANSPLANT | Facility: CLINIC | Age: 43
End: 2018-03-09

## 2018-03-09 ENCOUNTER — OFFICE VISIT (OUTPATIENT)
Dept: TRANSPLANT | Facility: CLINIC | Age: 43
End: 2018-03-09
Payer: MEDICAID

## 2018-03-09 ENCOUNTER — LAB VISIT (OUTPATIENT)
Dept: LAB | Facility: HOSPITAL | Age: 43
End: 2018-03-09
Attending: INTERNAL MEDICINE
Payer: MEDICAID

## 2018-03-09 VITALS
DIASTOLIC BLOOD PRESSURE: 60 MMHG | HEIGHT: 69 IN | SYSTOLIC BLOOD PRESSURE: 116 MMHG | WEIGHT: 252 LBS | BODY MASS INDEX: 37.33 KG/M2 | HEART RATE: 75 BPM

## 2018-03-09 DIAGNOSIS — I50.42 CHRONIC COMBINED SYSTOLIC AND DIASTOLIC CONGESTIVE HEART FAILURE: ICD-10-CM

## 2018-03-09 DIAGNOSIS — I50.9 CONGESTIVE HEART FAILURE, UNSPECIFIED CONGESTIVE HEART FAILURE CHRONICITY, UNSPECIFIED CONGESTIVE HEART FAILURE TYPE: ICD-10-CM

## 2018-03-09 DIAGNOSIS — I50.9 HEART FAILURE, UNSPECIFIED HEART FAILURE CHRONICITY, UNSPECIFIED HEART FAILURE TYPE: ICD-10-CM

## 2018-03-09 DIAGNOSIS — Z76.82 ORGAN TRANSPLANT CANDIDATE: ICD-10-CM

## 2018-03-09 DIAGNOSIS — Z76.82 ORGAN TRANSPLANT CANDIDATE: Primary | ICD-10-CM

## 2018-03-09 DIAGNOSIS — I42.0 NONISCHEMIC DILATED CARDIOMYOPATHY: Primary | Chronic | ICD-10-CM

## 2018-03-09 LAB
ALBUMIN SERPL BCP-MCNC: 3.9 G/DL
ALP SERPL-CCNC: 38 U/L
ALT SERPL W/O P-5'-P-CCNC: 11 U/L
ANION GAP SERPL CALC-SCNC: 9 MMOL/L
AST SERPL-CCNC: 15 U/L
BILIRUB SERPL-MCNC: 1.6 MG/DL
BNP SERPL-MCNC: 158 PG/ML
BUN SERPL-MCNC: 14 MG/DL
CALCIUM SERPL-MCNC: 9.3 MG/DL
CHLORIDE SERPL-SCNC: 104 MMOL/L
CO2 SERPL-SCNC: 26 MMOL/L
CREAT SERPL-MCNC: 0.9 MG/DL
EST. GFR  (AFRICAN AMERICAN): >60 ML/MIN/1.73 M^2
EST. GFR  (NON AFRICAN AMERICAN): >60 ML/MIN/1.73 M^2
GLUCOSE SERPL-MCNC: 87 MG/DL
POTASSIUM SERPL-SCNC: 4.1 MMOL/L
PROT SERPL-MCNC: 7.7 G/DL
SODIUM SERPL-SCNC: 139 MMOL/L
TSH SERPL DL<=0.005 MIU/L-ACNC: 1.69 UIU/ML

## 2018-03-09 PROCEDURE — 86977 RBC SERUM PRETX INCUBJ/INHIB: CPT | Mod: 91,PO,TXP

## 2018-03-09 PROCEDURE — 36415 COLL VENOUS BLD VENIPUNCTURE: CPT | Mod: TXP

## 2018-03-09 PROCEDURE — 99213 OFFICE O/P EST LOW 20 MIN: CPT | Mod: PBBFAC,25,TXP | Performed by: INTERNAL MEDICINE

## 2018-03-09 PROCEDURE — 99999 PR PBB SHADOW E&M-EST. PATIENT-LVL III: CPT | Mod: PBBFAC,TXP,, | Performed by: INTERNAL MEDICINE

## 2018-03-09 PROCEDURE — 80053 COMPREHEN METABOLIC PANEL: CPT | Mod: TXP

## 2018-03-09 PROCEDURE — 84443 ASSAY THYROID STIM HORMONE: CPT | Mod: TXP

## 2018-03-09 PROCEDURE — 86833 HLA CLASS II HIGH DEFIN QUAL: CPT | Mod: PO,TXP

## 2018-03-09 PROCEDURE — 99214 OFFICE O/P EST MOD 30 MIN: CPT | Mod: S$GLB,,, | Performed by: INTERNAL MEDICINE

## 2018-03-09 PROCEDURE — 83880 ASSAY OF NATRIURETIC PEPTIDE: CPT | Mod: TXP

## 2018-03-09 PROCEDURE — 86832 HLA CLASS I HIGH DEFIN QUAL: CPT | Mod: PO,TXP

## 2018-03-09 RX ORDER — IBUPROFEN 800 MG/1
1 TABLET ORAL 2 TIMES DAILY PRN
Refills: 1 | COMMUNITY
Start: 2017-12-28 | End: 2018-05-30

## 2018-03-09 NOTE — PROGRESS NOTES
Subjective:   Ms. Mahajan is a 42 y.o. Black or  female who presents as follow up. She is currently listed for heart transplant status 2.      HPI:  She is a 42 y.o. AAF w/Nonischemic CHF (LVEF 20%, LVEDD 7.2 cm mild MR Feb 2017) who had three episodes of VF in early Feb 2017, one of which required defibrillation from ICD. She has not had any ICD shocks since 2/2017. She has had a sleep study that was negative for sleep apnea.     Patient has been very emotional in clinic visit today, her brother passed away a few weeks ago, of heart failure, had other things related to his body status, however she states she has not been able to sleep well, very sad, scared that she could pass away in her sleep or suddenly as well. Doing well physically however, able to walk nearly 1 1/2 to 2 miles without any problems. Wants to work on losing weight, admits this has been difficult for her. Denies N/V/F/C, lightheadedness, dizziness, PNd, orthopnea, LE edema. Still not using lasix very much. NYHA FC II.     CPX 10/16/17:  1)  Resting spirometry reveals an FVC = 2.4L which is 62% of predicted, an FEV1 of 1.8L, which is 55% of predicted and an FEV1/FVC ratio of 73%. The MVV = 81 L/min, which is 71% of predicted.  2) The respiratory exchange ratio (RER) was 1.07, suggesting an adequate effort.  3) The breathing reserve is calculated at 44%, which is normal. Oxygen saturation with exercise became reduced reaching a jake of 88% from a baseline value of 95%.   4) The PkVO2 was 12.3 ml/kg/min which is 37% of predicted equating to a functional capacity of 3.5 METS indicating severe functional impairment.   5) The anaerobic threshold (AT), which occurred at a heart rate of 112bpm, was 11.0 ml/kg/min, which is 33% of the predicted VO2 and is reduced.   6) The PkVO2 Lean was 17.30 ml/kg of lean body weight/min indicating a poor prognosis in heart failure.   7) The VE/VCO2 decreased by -41% from rest to AT. The VE/VCO2 [place]  was 28.3.  The Resting PetCO2 was 35.9.      CONCLUSIONS   -Severe functional impairment associated with a normal breathing reserve, reduced oxygen saturation with exercise, an adequate effort, and a reduced AT. These findings are indicative of functional impairment secondary to circulatory insufficiency and deconditioning.     LHC/RHC 03/08/17:  Normal coronary arteries  LVEDP (Pre): 25 mmHg  PA: 54/30 (40)  PW:  (25)  RV: 54  RA:  (17)  PA_SAT: 66  AO_SAT: 92  AO: 117/51 (77)  LVEDP: 25    CONDITION 1:  FICKCI: 3.3600  FICKCO: 7.3900    Echo 5/3/17:  1 - Left ventricular enlargement.     2 - Eccentric hypertrophy.     3 - Severely depressed left ventricular systolic function (EF 15-20%).     4 - Severe mitral regurgitation.     5 - The LV has marked globular geometry..     6 - Severe left atrial enlargement.     7 - Pulmonary hypertension. The estimated PA systolic pressure is 44 mmHg.     8 - Mild tricuspid regurgitation.     9 - Intermediate central venous pressure.     10 - Trivial pericardial effusion.    Review of Systems   Constitution: Positive for malaise/fatigue. Negative for chills, decreased appetite, diaphoresis, weakness, weight gain and weight loss.   Eyes: Negative for visual disturbance.   Cardiovascular: Negative for chest pain, dyspnea on exertion (with greater than moderate exertion), irregular heartbeat, leg swelling, orthopnea, palpitations, paroxysmal nocturnal dyspnea and syncope.   Respiratory: Negative for cough, shortness of breath and wheezing.    Hematologic/Lymphatic: Negative for adenopathy and bleeding problem. Does not bruise/bleed easily.   Skin: Negative for rash.   Musculoskeletal: Negative for back pain, falls, gout, joint pain, muscle weakness and myalgias.   Gastrointestinal: Negative for abdominal pain, constipation, diarrhea, heartburn, nausea and vomiting.   Genitourinary: Negative for nocturia.   Neurological: Negative for excessive daytime sleepiness, dizziness, focal  "weakness, headaches, light-headedness, paresthesias and tremors.   Psychiatric/Behavioral: Positive for depression. Negative for memory loss. The patient does not have insomnia and is not nervous/anxious.    All other systems reviewed and are negative.    Objective:   /60   Pulse 75   Ht 5' 9" (1.753 m)   Wt 114.3 kg (251 lb 15.8 oz)   BMI 37.21 kg/m²   Physical Exam   Constitutional: She is oriented to person, place, and time. She appears well-developed and well-nourished. No distress.   HENT:   Head: Normocephalic and atraumatic. Head is without abrasion and without contusion.   Nose: Nose normal. No epistaxis.   Mouth/Throat: Oropharynx is clear and moist. Mucous membranes are not cyanotic. No oropharyngeal exudate.   Eyes: Conjunctivae and EOM are normal. Pupils are equal, round, and reactive to light. No scleral icterus.   Neck: Normal range of motion. Neck supple. No hepatojugular reflux and no JVD (below clavicle at 90 degrees, no HJR) present. No tracheal deviation present.   Cardiovascular: Normal rate, regular rhythm, normal heart sounds, intact distal pulses and normal pulses.  PMI is displaced.  Exam reveals no gallop and no friction rub.    No murmur heard.  Pulmonary/Chest: Effort normal and breath sounds normal. No stridor. No respiratory distress. She has no wheezes. She has no rales. She exhibits no tenderness.   Abdominal: Soft. Normal appearance, normal aorta and bowel sounds are normal. She exhibits no distension and no mass. There is no tenderness. There is no rebound and no guarding.   Musculoskeletal: Normal range of motion. She exhibits no edema (+2) or tenderness.   Neurological: She is alert and oriented to person, place, and time. She has normal strength and normal reflexes. She exhibits normal muscle tone.   Skin: Skin is warm and dry. No rash noted. She is not diaphoretic. No erythema. No pallor.   Psychiatric: She has a normal mood and affect. Her speech is normal and behavior " is normal. Judgment and thought content normal. Cognition and memory are normal.   Nursing note and vitals reviewed.    Labs:    Chemistry        Component Value Date/Time     03/09/2018 1050    K 4.1 03/09/2018 1050     03/09/2018 1050    CO2 26 03/09/2018 1050    BUN 14 03/09/2018 1050    BUN 12 06/19/2015 0950    CREATININE 0.9 03/09/2018 1050    GLU 87 03/09/2018 1050        Component Value Date/Time    CALCIUM 9.3 03/09/2018 1050    ALKPHOS 38 (L) 03/09/2018 1050    AST 15 03/09/2018 1050    ALT 11 03/09/2018 1050    BILITOT 1.6 (H) 03/09/2018 1050        Magnesium   Date Value Ref Range Status   02/11/2017 1.9 1.6 - 2.6 mg/dL Final     Lab Results   Component Value Date    WBC 6.44 01/12/2018    HGB 13.0 01/12/2018    HCT 39.6 01/12/2018    MCV 96 01/12/2018     01/12/2018     BNP   Date Value Ref Range Status   03/09/2018 158 (H) 0 - 99 pg/mL Final     Comment:     Values of less than 100 pg/ml are consistent with non-CHF populations.   11/20/2017 194 (H) 0 - 99 pg/mL Final     Comment:     Values of less than 100 pg/ml are consistent with non-CHF populations.   08/28/2017 438 (H) 0 - 99 pg/mL Final     Comment:     Values of less than 100 pg/ml are consistent with non-CHF populations.     Assessment:      1. Nonischemic dilated cardiomyopathy    2. Heart failure, unspecified heart failure chronicity, unspecified heart failure type    3. Organ transplant candidate    4. Chronic combined systolic and diastolic congestive heart failure      Plan:   Hemodynamically is doing very well however suffering from the recent death of her brother from what sounds like cardiogenic shock and multi-organ failure while in [place].   Repeat CPX and echo next month when she returns.   Have to wonder with her level of activity if remaining on the list as a status 2 is appropriate, will see what repeat testing demonstrates first.   Recommend 2 gram sodium restriction and 1500cc fluid restriction.  Encourage  physical activity with graded exercise program.  Requested patient to weigh themselves daily, and to notify us if their weight increases by more than 3 lbs in 1 day or 5 lbs in 1 week.    Patient is now NYHA FC IIACC stage C  Recommend 2 gram sodium restriction and 1500cc fluid restriction.  Encourage physical activity with graded exercise program.  Requested patient to weigh themselves daily, and to notify us if their weight increases by more than 3 lbs in 1 day or 5 lbs in 1 week.     UNOS Patient Status  Functional Status: Karnofsky Score: 90%  Physical Capacity: not limited  Working for Income: disability  Patient currently listed status 2 for OHT.     Jeimy Srivastava MD

## 2018-03-09 NOTE — LETTER
March 20, 2018        Juanjose Nuñez  [address]  [place] [address]  Phone: [phone]  Fax: [phone]             [facility] [address]  [facility] [address]  Phone: [phone]   Patient: Mario Mahajan   MR Number: [id number]   YOB: 1975   Date of Visit: 3/9/2018       Dear Dr. Juanjose Nuñez    Thank you for referring Mario Mahajan to me for evaluation. Attached you will find relevant portions of my assessment and plan of care.    If you have questions, please do not hesitate to call me. I look forward to following Mario Mahajan along with you.    Sincerely,    Jeimy Srivastava MD    Enclosure    If you would like to receive this communication electronically, please contact [email or web address] or [phone] to request [employer] Link access.    [employer] Link is a tool which provides read-only access to select patient information with whom you have a relationship. Its easy to use and provides real time access to review your patients record including encounter summaries, notes, results, and demographic information.    If you feel you have received this communication in error or would no longer like to receive these types of communications, please e-mail [email or web address]

## 2018-03-09 NOTE — TELEPHONE ENCOUNTER
Met with pt in clinic, tearful - discussed brother's unexpected death on 1/24/18. Empathy provided. Advised pt need to have RHC, CPX and echo as part of listing status, voiced understanding. Encouraged pt to call for any questions or concerns. Pt to have labs drawn when leaves clinic, will continue to monitor.

## 2018-03-12 LAB
CLASS I ANTIBODIES - LUMINEX: NEGATIVE
CLASS II ANTIBODY COMMENTS - LUMINEX: NORMAL
CPRA %: 0
HPRA INTERPRETATION: NORMAL
SERUM COLLECTION DT - LUMINEX CLASS I: NORMAL
SERUM COLLECTION DT - LUMINEX CLASS II: NORMAL
SPCL1 TESTING DATE: NORMAL
SPCL2 TESTING DATE: NORMAL
SPLUA TESTING DATE: NORMAL

## 2018-03-23 DIAGNOSIS — I50.42 CHRONIC COMBINED SYSTOLIC AND DIASTOLIC CHF (CONGESTIVE HEART FAILURE): Primary | ICD-10-CM

## 2018-03-26 ENCOUNTER — SURGERY (OUTPATIENT)
Age: 43
End: 2018-03-26

## 2018-03-26 ENCOUNTER — HOSPITAL ENCOUNTER (OUTPATIENT)
Facility: HOSPITAL | Age: 43
Discharge: HOME | End: 2018-03-26
Attending: INTERNAL MEDICINE | Admitting: INTERNAL MEDICINE
Payer: COMMERCIAL

## 2018-03-26 ENCOUNTER — DOCUMENTATION ONLY (OUTPATIENT)
Dept: TRANSPLANT | Facility: CLINIC | Age: 43
End: 2018-03-26

## 2018-03-26 ENCOUNTER — LAB VISIT (OUTPATIENT)
Dept: LAB | Facility: HOSPITAL | Age: 43
End: 2018-03-26
Attending: INTERNAL MEDICINE
Payer: MEDICAID

## 2018-03-26 DIAGNOSIS — I50.42 CHRONIC COMBINED SYSTOLIC AND DIASTOLIC CONGESTIVE HEART FAILURE: Primary | ICD-10-CM

## 2018-03-26 DIAGNOSIS — I50.9 HEART FAILURE, UNSPECIFIED HEART FAILURE CHRONICITY, UNSPECIFIED HEART FAILURE TYPE: ICD-10-CM

## 2018-03-26 DIAGNOSIS — Z76.82 ORGAN TRANSPLANT CANDIDATE: ICD-10-CM

## 2018-03-26 DIAGNOSIS — I50.9 CONGESTIVE HEART FAILURE, UNSPECIFIED CONGESTIVE HEART FAILURE CHRONICITY, UNSPECIFIED CONGESTIVE HEART FAILURE TYPE: ICD-10-CM

## 2018-03-26 DIAGNOSIS — I42.0 NONISCHEMIC DILATED CARDIOMYOPATHY: Chronic | ICD-10-CM

## 2018-03-26 LAB
ALBUMIN SERPL BCP-MCNC: 3.5 G/DL
ALP SERPL-CCNC: 32 U/L
ALT SERPL W/O P-5'-P-CCNC: 8 U/L
ANION GAP SERPL CALC-SCNC: 5 MMOL/L
AST SERPL-CCNC: 12 U/L
BASOPHILS # BLD AUTO: 0.02 K/UL
BASOPHILS NFR BLD: 0.4 %
BILIRUB SERPL-MCNC: 1.4 MG/DL
BNP SERPL-MCNC: 177 PG/ML
BUN SERPL-MCNC: 15 MG/DL
CALCIUM SERPL-MCNC: 8.8 MG/DL
CHLORIDE SERPL-SCNC: 109 MMOL/L
CO2 SERPL-SCNC: 26 MMOL/L
CREAT SERPL-MCNC: 0.9 MG/DL
DIFFERENTIAL METHOD: ABNORMAL
EOSINOPHIL # BLD AUTO: 0.3 K/UL
EOSINOPHIL NFR BLD: 6.1 %
ERYTHROCYTE [DISTWIDTH] IN BLOOD BY AUTOMATED COUNT: 11.8 %
EST. GFR  (AFRICAN AMERICAN): >60 ML/MIN/1.73 M^2
EST. GFR  (NON AFRICAN AMERICAN): >60 ML/MIN/1.73 M^2
GLUCOSE SERPL-MCNC: 93 MG/DL
HCT VFR BLD AUTO: 35.7 %
HGB BLD-MCNC: 11.6 G/DL
IMM GRANULOCYTES # BLD AUTO: 0.01 K/UL
IMM GRANULOCYTES NFR BLD AUTO: 0.2 %
LYMPHOCYTES # BLD AUTO: 1.3 K/UL
LYMPHOCYTES NFR BLD: 26.9 %
MCH RBC QN AUTO: 32 PG
MCHC RBC AUTO-ENTMCNC: 32.5 G/DL
MCV RBC AUTO: 98 FL
MONOCYTES # BLD AUTO: 0.4 K/UL
MONOCYTES NFR BLD: 7.1 %
NEUTROPHILS # BLD AUTO: 2.9 K/UL
NEUTROPHILS NFR BLD: 59.3 %
NRBC BLD-RTO: 0 /100 WBC
PLATELET # BLD AUTO: 210 K/UL
PMV BLD AUTO: 10.9 FL
POTASSIUM SERPL-SCNC: 4.1 MMOL/L
PROT SERPL-MCNC: 6.9 G/DL
RBC # BLD AUTO: 3.63 M/UL
SODIUM SERPL-SCNC: 140 MMOL/L
WBC # BLD AUTO: 4.91 K/UL

## 2018-03-26 PROCEDURE — 80053 COMPREHEN METABOLIC PANEL: CPT | Mod: TXP

## 2018-03-26 PROCEDURE — 36415 COLL VENOUS BLD VENIPUNCTURE: CPT | Mod: TXP

## 2018-03-26 PROCEDURE — 85025 COMPLETE CBC W/AUTO DIFF WBC: CPT | Mod: TXP

## 2018-03-26 PROCEDURE — C1894 INTRO/SHEATH, NON-LASER: HCPCS | Mod: TXP

## 2018-03-26 PROCEDURE — 93451 RIGHT HEART CATH: CPT | Mod: 26,TXP,, | Performed by: INTERNAL MEDICINE

## 2018-03-26 PROCEDURE — 25000003 PHARM REV CODE 250: Mod: TXP

## 2018-03-26 PROCEDURE — 83880 ASSAY OF NATRIURETIC PEPTIDE: CPT | Mod: TXP

## 2018-03-26 NOTE — DISCHARGE INSTRUCTIONS
STOP TAKING CURRENT DOSE OF ENTRESTO (49-51).    START NEW DOSE OF ENTRESTO AT . WE WILL REPEAT LABS IN 1 WEEK OR SO AFTER STARTING NEW ENTRESTO DOSE. WE WILL CHECK LABS AT [facility].      AFTER THE PROCEDURE:  -You may remove the bandage in 24 hours and wash with soap and water.  -You may shower, but do not soak in a tub for three days.   PRECAUTIONS FOR THE NEXT 24 HOURS:  -If you need to cough, sneeze, have a bowel movement, or bear down, hold pressure over your bandage.  -Do not  anything heavier than a gallon of milk(about 5 pounds)  -Avoid excessive bending over.  SYMPTOMS TO WATCH FOR AND REPORT TO YOUR DOCTOR:  -BLEEDING: hold pressure over the site until bleeding stops. Proceed to Emergency Room by ambulance (do not drive yourself) if unable to stop bleeding. Notify your doctor.  -HEMATOMA(hard bruise under the skin): Abelardo around the bruise if one develops. Call your doctor if it increases in size or if you have difficulty talking, swallowing, breathing or anything unusual.  SIGNS OF INFECTION:Fever (temperature over 100.5 F), pus or redness  -RASH  -CHEST PAIN OR SHORTNESS OF BREATH    You may call you coordinator in the Heart Failure/Heart Transplant/Pulmonary Hypertension Clinic at [phone] during normal business hours(Monday through Friday from 8 A.M. to 5 P.M.) After hours, call the Heart Transplant Service doctor on call at [phone].

## 2018-03-26 NOTE — H&P
HTS Pre-Procedure H&P   HTS Fellow         Subjective:        She is a 42 y.o. AAF w/Nonischemic CHF (LVEF 20%, LVEDD 7.2 cm mild MR 2017) who had three episodes of VF in early 2017, one of which required defibrillation from ICD here for risk stratifcationa. Past Medical History:   Diagnosis Date    Asthma     Chronic back pain 2014    Chronic combined systolic and diastolic congestive heart failure 2015     2-10-17   1 - Severely depressed left ventricular systolic function (EF 20-25%).    2 - Severe left ventricular enlargement.    3 - Severe left atrial enlargement.    4 - Left ventricular diastolic dysfunction.    5 - The estimated PA systolic pressure is 18 mmHg.    6 - Mild mitral regurgitation.     Encounter for blood transfusion     ICD (implantable cardioverter-defibrillator) in place 12/01/15 3/3/2016    Menorrhagia, premenopausal 2/10/2017    Microcytic anemia 2015    Non-rheumatic mitral regurgitation 3/5/2015    Nonischemic dilated cardiomyopathy 2015    Sleep apnea     Syncope and collapse 2017    Ventricular tachycardia, polymorphic      Past Surgical History:   Procedure Laterality Date    CARDIAC DEFIBRILLATOR PLACEMENT  2015     SECTION      TUBAL LIGATION       Social History     Social History    Marital status: Single     Spouse name: N/A    Number of children: 2    Years of education: N/A     Occupational History    Unemployed [employer]     Social History Main Topics    Smoking status: Never Smoker    Smokeless tobacco: Never Used    Alcohol use Yes      Comment: 1 glass per 3 months    Drug use: No    Sexual activity: Yes     Partners: Male      Comment: one male partner (boyfriend)     Other Topics Concern    Not on file     Social History Narrative    No narrative on file     Family History   Problem Relation Age of Onset    Diabetes Father     Pancreatic cancer Father     Heart failure Father     Heart  failure Brother     Lung cancer Paternal Grandmother            Other significant clinical information:  Review of patient's allergies indicates:   Allergen Reactions    Ace inhibitors      Cough     No current facility-administered medications on file prior to encounter.      Current Outpatient Prescriptions on File Prior to Encounter   Medication Sig    albuterol 90 mcg/actuation inhaler Inhale 1-2 puffs into the lungs every 6 (six) hours as needed for Wheezing. Rescue    amiodarone (PACERONE) 200 MG Tab Take 1 tablet (200 mg total) by mouth once daily.    ascorbic acid, vitamin C, (VITAMIN C) 250 MG tablet Take 1 tablet (250 mg) by mouth twice daily. Take with the iron tablets. (Patient taking differently: once daily. )    aspirin (ECOTRIN) 81 MG EC tablet Take 1 tablet (81 mg total) by mouth once daily.    ferrous sulfate 325 (65 FE) MG EC tablet Take 1 tablet (325 mg total) by mouth 2 (two) times daily. Take with vitamin C. (Patient taking differently: 325 mg once daily. Take 1 tablet (325 mg total) by mouth 2 (two) times daily. Take with vitamin C.)    sacubitril-valsartan (ENTRESTO) 49-51 mg per tablet Take 1 tablet by mouth 2 (two) times daily.    carvedilol (COREG) 3.125 MG tablet Take 1 tablet (3.125 mg total) by mouth 2 (two) times daily.    furosemide (LASIX) 40 MG tablet Take 1 tablet (40 mg total) by mouth daily as needed (Leg swelling or weight gain).    ibuprofen (ADVIL,MOTRIN) 800 MG tablet Take 1 tablet by mouth 2 (two) times daily as needed.    latanoprost 0.005 % ophthalmic solution INT 1 GTT INTO OU QHS    spironolactone (ALDACTONE) 25 MG tablet Take 1 tablet (25 mg total) by mouth once daily.            Objective:      Physical Exam  General : NAD   Chest : CTAB no w/r/r  Cardiac : RRR normal S1 and S2 no S3 or S4   Ext : warm and well perfused.       Lab Review   Lab Results   Component Value Date    WBC 4.91 03/26/2018    HGB 11.6 (L) 03/26/2018    HCT 35.7 (L) 03/26/2018    MCV  98 03/26/2018     03/26/2018     Lab Results   Component Value Date    INR 1.2 03/13/2017    INR 1.0 02/11/2017    INR 1.0 01/05/2016           Assessment:   She is a 42 y.o. AAF w/Nonischemic CHF (LVEF 20%, LVEDD 7.2 cm mild MR Feb 2017) who had three episodes of VF in early Feb 2017, one of which required defibrillation from ICD here for risk stratifcation    Plan:       I have explained the risks, benefits, and alternatives of the procedure in detail.  The patient expresses understanding and all questions have been answered.  The patient agrees to the proceed as planned.  RHC / echo biopsy via RIJ   Micropuncture access needle will be used to minimize bleeding risk.      Gomez Roman DO   HTS Fellow

## 2018-03-26 NOTE — PROGRESS NOTES
Per Dr. Srivastava, pt needs labs on Mon/Tues of next week due to increase of Entresto dose following RHC today.  Primary coordinator notified.

## 2018-03-26 NOTE — INTERVAL H&P NOTE
No significant interval change in H&P from 03/09/18. Patient doing well, presents for RHC for listing purposes. I have explained the risks, benefits, and alternatives of the procedure in detail.  The patient voices understanding and all questions have been answered.  The patient agrees to proceed as planned.

## 2018-03-26 NOTE — DISCHARGE SUMMARY
Patient tolerated the procedure well. Please see complete RHC procedure note for full details and results. Stable to return from cath lab to their home.  Procedure: Right heart catheterization   Procedure date: 03/26/18    Discharge date: 03/26/18  Estimated blood loss: less than 10 cc  Complications: none  Condition at discharge: stable  Discharge instructions: no heavy lifting greater than 5 lbs and no bearing down/coughing without holding pressure over incision site.  Activity: as tolerated after 24 hours.

## 2018-03-26 NOTE — H&P (VIEW-ONLY)
Subjective:   Ms. Mahajan is a 42 y.o. Black or  female who presents as follow up. She is currently listed for heart transplant status 2.      HPI:  She is a 42 y.o. AAF w/Nonischemic CHF (LVEF 20%, LVEDD 7.2 cm mild MR Feb 2017) who had three episodes of VF in early Feb 2017, one of which required defibrillation from ICD. She has not had any ICD shocks since 2/2017. She has had a sleep study that was negative for sleep apnea.     Patient has been very emotional in clinic visit today, her brother passed away a few weeks ago, of heart failure, had other things related to his body status, however she states she has not been able to sleep well, very sad, scared that she could pass away in her sleep or suddenly as well. Doing well physically however, able to walk nearly 1 1/2 to 2 miles without any problems. Wants to work on losing weight, admits this has been difficult for her. Denies N/V/F/C, lightheadedness, dizziness, PNd, orthopnea, LE edema. Still not using lasix very much. NYHA FC II.     CPX 10/16/17:  1)  Resting spirometry reveals an FVC = 2.4L which is 62% of predicted, an FEV1 of 1.8L, which is 55% of predicted and an FEV1/FVC ratio of 73%. The MVV = 81 L/min, which is 71% of predicted.  2) The respiratory exchange ratio (RER) was 1.07, suggesting an adequate effort.  3) The breathing reserve is calculated at 44%, which is normal. Oxygen saturation with exercise became reduced reaching a jake of 88% from a baseline value of 95%.   4) The PkVO2 was 12.3 ml/kg/min which is 37% of predicted equating to a functional capacity of 3.5 METS indicating severe functional impairment.   5) The anaerobic threshold (AT), which occurred at a heart rate of 112bpm, was 11.0 ml/kg/min, which is 33% of the predicted VO2 and is reduced.   6) The PkVO2 Lean was 17.30 ml/kg of lean body weight/min indicating a poor prognosis in heart failure.   7) The VE/VCO2 decreased by -41% from rest to AT. The VE/VCO2 [place]  was 28.3.  The Resting PetCO2 was 35.9.      CONCLUSIONS   -Severe functional impairment associated with a normal breathing reserve, reduced oxygen saturation with exercise, an adequate effort, and a reduced AT. These findings are indicative of functional impairment secondary to circulatory insufficiency and deconditioning.     LHC/RHC 03/08/17:  Normal coronary arteries  LVEDP (Pre): 25 mmHg  PA: 54/30 (40)  PW:  (25)  RV: 54  RA:  (17)  PA_SAT: 66  AO_SAT: 92  AO: 117/51 (77)  LVEDP: 25    CONDITION 1:  FICKCI: 3.3600  FICKCO: 7.3900    Echo 5/3/17:  1 - Left ventricular enlargement.     2 - Eccentric hypertrophy.     3 - Severely depressed left ventricular systolic function (EF 15-20%).     4 - Severe mitral regurgitation.     5 - The LV has marked globular geometry..     6 - Severe left atrial enlargement.     7 - Pulmonary hypertension. The estimated PA systolic pressure is 44 mmHg.     8 - Mild tricuspid regurgitation.     9 - Intermediate central venous pressure.     10 - Trivial pericardial effusion.    Review of Systems   Constitution: Positive for malaise/fatigue. Negative for chills, decreased appetite, diaphoresis, weakness, weight gain and weight loss.   Eyes: Negative for visual disturbance.   Cardiovascular: Negative for chest pain, dyspnea on exertion (with greater than moderate exertion), irregular heartbeat, leg swelling, orthopnea, palpitations, paroxysmal nocturnal dyspnea and syncope.   Respiratory: Negative for cough, shortness of breath and wheezing.    Hematologic/Lymphatic: Negative for adenopathy and bleeding problem. Does not bruise/bleed easily.   Skin: Negative for rash.   Musculoskeletal: Negative for back pain, falls, gout, joint pain, muscle weakness and myalgias.   Gastrointestinal: Negative for abdominal pain, constipation, diarrhea, heartburn, nausea and vomiting.   Genitourinary: Negative for nocturia.   Neurological: Negative for excessive daytime sleepiness, dizziness, focal  "weakness, headaches, light-headedness, paresthesias and tremors.   Psychiatric/Behavioral: Positive for depression. Negative for memory loss. The patient does not have insomnia and is not nervous/anxious.    All other systems reviewed and are negative.    Objective:   /60   Pulse 75   Ht 5' 9" (1.753 m)   Wt 114.3 kg (251 lb 15.8 oz)   BMI 37.21 kg/m²   Physical Exam   Constitutional: She is oriented to person, place, and time. She appears well-developed and well-nourished. No distress.   HENT:   Head: Normocephalic and atraumatic. Head is without abrasion and without contusion.   Nose: Nose normal. No epistaxis.   Mouth/Throat: Oropharynx is clear and moist. Mucous membranes are not cyanotic. No oropharyngeal exudate.   Eyes: Conjunctivae and EOM are normal. Pupils are equal, round, and reactive to light. No scleral icterus.   Neck: Normal range of motion. Neck supple. No hepatojugular reflux and no JVD (below clavicle at 90 degrees, no HJR) present. No tracheal deviation present.   Cardiovascular: Normal rate, regular rhythm, normal heart sounds, intact distal pulses and normal pulses.  PMI is displaced.  Exam reveals no gallop and no friction rub.    No murmur heard.  Pulmonary/Chest: Effort normal and breath sounds normal. No stridor. No respiratory distress. She has no wheezes. She has no rales. She exhibits no tenderness.   Abdominal: Soft. Normal appearance, normal aorta and bowel sounds are normal. She exhibits no distension and no mass. There is no tenderness. There is no rebound and no guarding.   Musculoskeletal: Normal range of motion. She exhibits no edema (+2) or tenderness.   Neurological: She is alert and oriented to person, place, and time. She has normal strength and normal reflexes. She exhibits normal muscle tone.   Skin: Skin is warm and dry. No rash noted. She is not diaphoretic. No erythema. No pallor.   Psychiatric: She has a normal mood and affect. Her speech is normal and behavior " is normal. Judgment and thought content normal. Cognition and memory are normal.   Nursing note and vitals reviewed.    Labs:    Chemistry        Component Value Date/Time     03/09/2018 1050    K 4.1 03/09/2018 1050     03/09/2018 1050    CO2 26 03/09/2018 1050    BUN 14 03/09/2018 1050    BUN 12 06/19/2015 0950    CREATININE 0.9 03/09/2018 1050    GLU 87 03/09/2018 1050        Component Value Date/Time    CALCIUM 9.3 03/09/2018 1050    ALKPHOS 38 (L) 03/09/2018 1050    AST 15 03/09/2018 1050    ALT 11 03/09/2018 1050    BILITOT 1.6 (H) 03/09/2018 1050        Magnesium   Date Value Ref Range Status   02/11/2017 1.9 1.6 - 2.6 mg/dL Final     Lab Results   Component Value Date    WBC 6.44 01/12/2018    HGB 13.0 01/12/2018    HCT 39.6 01/12/2018    MCV 96 01/12/2018     01/12/2018     BNP   Date Value Ref Range Status   03/09/2018 158 (H) 0 - 99 pg/mL Final     Comment:     Values of less than 100 pg/ml are consistent with non-CHF populations.   11/20/2017 194 (H) 0 - 99 pg/mL Final     Comment:     Values of less than 100 pg/ml are consistent with non-CHF populations.   08/28/2017 438 (H) 0 - 99 pg/mL Final     Comment:     Values of less than 100 pg/ml are consistent with non-CHF populations.     Assessment:      1. Nonischemic dilated cardiomyopathy    2. Heart failure, unspecified heart failure chronicity, unspecified heart failure type    3. Organ transplant candidate    4. Chronic combined systolic and diastolic congestive heart failure      Plan:   Hemodynamically is doing very well however suffering from the recent death of her brother from what sounds like cardiogenic shock and multi-organ failure while in [place].   Repeat CPX and echo next month when she returns.   Have to wonder with her level of activity if remaining on the list as a status 2 is appropriate, will see what repeat testing demonstrates first.   Recommend 2 gram sodium restriction and 1500cc fluid restriction.  Encourage  physical activity with graded exercise program.  Requested patient to weigh themselves daily, and to notify us if their weight increases by more than 3 lbs in 1 day or 5 lbs in 1 week.    Patient is now NYHA FC IIACC stage C  Recommend 2 gram sodium restriction and 1500cc fluid restriction.  Encourage physical activity with graded exercise program.  Requested patient to weigh themselves daily, and to notify us if their weight increases by more than 3 lbs in 1 day or 5 lbs in 1 week.     UNOS Patient Status  Functional Status: Karnofsky Score: 90%  Physical Capacity: not limited  Working for Income: disability  Patient currently listed status 2 for OHT.     Jeimy Srivastava MD

## 2018-03-26 NOTE — BRIEF OP NOTE
Patient tolerated the procedure well. Please see complete RHC procedure note for full details and results. Stable to return from cath lab to their home.  Procedure: Right heart catheterization   Procedure date: 03/26/18    Discharge date: 03/26/18  Estimated blood loss: less than 10 cc  Complications: none  Condition at discharge: stable  Discharge instructions: no heavy lifting greater than 5 lbs and no bearing down/coughing without holding pressure over incision site.  Activity: as tolerated after 24 hours  Cardiac diet.

## 2018-03-29 NOTE — BRIEF OP NOTE
Patient tolerated the procedure well. Please see complete RHC procedure note for full details and results. Stable to return from cath lab to their home.  Procedure: Right heart catheterization   Procedure date: 03/26/18    Discharge date: 03/26/18  Estimated blood loss: less than 10 cc  Complications: none  Condition at discharge: stable  Discharge instructions: no heavy lifting greater than 5 lbs and no bearing down/coughing without holding pressure over incision site.  Activity: as tolerated.

## 2018-04-02 ENCOUNTER — LAB VISIT (OUTPATIENT)
Dept: LAB | Facility: HOSPITAL | Age: 43
End: 2018-04-02
Attending: INTERNAL MEDICINE
Payer: MEDICAID

## 2018-04-02 ENCOUNTER — DOCUMENTATION ONLY (OUTPATIENT)
Dept: TRANSPLANT | Facility: CLINIC | Age: 43
End: 2018-04-02

## 2018-04-02 DIAGNOSIS — I42.0 NONISCHEMIC DILATED CARDIOMYOPATHY: Chronic | ICD-10-CM

## 2018-04-02 DIAGNOSIS — I50.42 CHRONIC COMBINED SYSTOLIC AND DIASTOLIC CONGESTIVE HEART FAILURE: ICD-10-CM

## 2018-04-02 LAB
ANION GAP SERPL CALC-SCNC: 9 MMOL/L
BUN SERPL-MCNC: 14 MG/DL
CALCIUM SERPL-MCNC: 8.9 MG/DL
CHLORIDE SERPL-SCNC: 107 MMOL/L
CO2 SERPL-SCNC: 27 MMOL/L
CREAT SERPL-MCNC: 0.83 MG/DL
EST. GFR  (AFRICAN AMERICAN): >60 ML/MIN/1.73 M^2
EST. GFR  (NON AFRICAN AMERICAN): >60 ML/MIN/1.73 M^2
GLUCOSE SERPL-MCNC: 88 MG/DL
POTASSIUM SERPL-SCNC: 4.4 MMOL/L
SODIUM SERPL-SCNC: 143 MMOL/L

## 2018-04-02 PROCEDURE — 80048 BASIC METABOLIC PNL TOTAL CA: CPT | Mod: PO,TXP

## 2018-04-02 PROCEDURE — 36415 COLL VENOUS BLD VENIPUNCTURE: CPT | Mod: PO,NTX

## 2018-04-04 DIAGNOSIS — I50.42 CHRONIC COMBINED SYSTOLIC AND DIASTOLIC CONGESTIVE HEART FAILURE: ICD-10-CM

## 2018-04-04 RX ORDER — SPIRONOLACTONE 25 MG/1
25 TABLET ORAL DAILY
Qty: 30 TABLET | Refills: 6 | Status: SHIPPED | OUTPATIENT
Start: 2018-04-04 | End: 2018-04-09 | Stop reason: SDUPTHER

## 2018-04-07 ENCOUNTER — PATIENT MESSAGE (OUTPATIENT)
Dept: PULMONOLOGY | Facility: CLINIC | Age: 43
End: 2018-04-07

## 2018-04-09 ENCOUNTER — PATIENT MESSAGE (OUTPATIENT)
Dept: TRANSPLANT | Facility: CLINIC | Age: 43
End: 2018-04-09

## 2018-04-09 ENCOUNTER — PATIENT MESSAGE (OUTPATIENT)
Dept: OBSTETRICS AND GYNECOLOGY | Facility: CLINIC | Age: 43
End: 2018-04-09

## 2018-04-09 ENCOUNTER — PATIENT MESSAGE (OUTPATIENT)
Dept: INFECTIOUS DISEASES | Facility: CLINIC | Age: 43
End: 2018-04-09

## 2018-04-09 DIAGNOSIS — I50.42 CHRONIC COMBINED SYSTOLIC AND DIASTOLIC CONGESTIVE HEART FAILURE: ICD-10-CM

## 2018-04-10 ENCOUNTER — HOSPITAL ENCOUNTER (OUTPATIENT)
Dept: CARDIOLOGY | Facility: CLINIC | Age: 43
Discharge: HOME | End: 2018-04-10
Attending: INTERNAL MEDICINE
Payer: COMMERCIAL

## 2018-04-10 ENCOUNTER — CLINICAL SUPPORT (OUTPATIENT)
Dept: ELECTROPHYSIOLOGY | Facility: CLINIC | Age: 43
End: 2018-04-10
Attending: INTERNAL MEDICINE
Payer: COMMERCIAL

## 2018-04-10 DIAGNOSIS — I42.8 NICM (NONISCHEMIC CARDIOMYOPATHY): ICD-10-CM

## 2018-04-10 DIAGNOSIS — Z95.810 IMPLANTABLE CARDIOVERTER-DEFIBRILLATOR (ICD) IN SITU: ICD-10-CM

## 2018-04-10 DIAGNOSIS — I50.42 CHRONIC COMBINED SYSTOLIC AND DIASTOLIC CONGESTIVE HEART FAILURE: ICD-10-CM

## 2018-04-10 LAB
DIASTOLIC DYSFUNCTION: NO
ESTIMATED PA SYSTOLIC PRESSURE: 25.66
MITRAL VALVE REGURGITATION: ABNORMAL
RETIRED EF AND QEF - SEE NOTES: 18 (ref 55–65)
TRICUSPID VALVE REGURGITATION: ABNORMAL

## 2018-04-10 PROCEDURE — 94621 CARDIOPULM EXERCISE TESTING: CPT | Mod: PBBFAC,NTX | Performed by: INTERNAL MEDICINE

## 2018-04-10 PROCEDURE — 93306 TTE W/DOPPLER COMPLETE: CPT | Mod: S$GLB,,, | Performed by: INTERNAL MEDICINE

## 2018-04-10 PROCEDURE — 93282 PRGRMG EVAL IMPLANTABLE DFB: CPT | Mod: PBBFAC,NTX | Performed by: INTERNAL MEDICINE

## 2018-04-10 RX ORDER — SPIRONOLACTONE 25 MG/1
25 TABLET ORAL DAILY
Qty: 30 TABLET | Refills: 6 | Status: SHIPPED | OUTPATIENT
Start: 2018-04-10 | End: 2019-05-09 | Stop reason: SDUPTHER

## 2018-04-10 RX ORDER — FUROSEMIDE 40 MG/1
40 TABLET ORAL DAILY PRN
Qty: 90 TABLET | Refills: 4 | Status: SHIPPED | OUTPATIENT
Start: 2018-04-10 | End: 2020-04-24 | Stop reason: SDUPTHER

## 2018-04-11 ENCOUNTER — TELEPHONE (OUTPATIENT)
Dept: TRANSPLANT | Facility: CLINIC | Age: 43
End: 2018-04-11

## 2018-04-11 NOTE — TELEPHONE ENCOUNTER
Contacted pt, stated rx were not ready for  last night, will  today, states she feels ok. Encouraged pt to  prescriptions ASAP, voiced understanding. Pt encouraged to call if has any questions or concerns.

## 2018-04-16 ENCOUNTER — PATIENT MESSAGE (OUTPATIENT)
Dept: TRANSPLANT | Facility: CLINIC | Age: 43
End: 2018-04-16

## 2018-04-16 DIAGNOSIS — I50.42 CHRONIC COMBINED SYSTOLIC AND DIASTOLIC CONGESTIVE HEART FAILURE: ICD-10-CM

## 2018-04-18 RX ORDER — CARVEDILOL 3.12 MG/1
3.12 TABLET ORAL 2 TIMES DAILY
Qty: 180 TABLET | Refills: 3 | Status: SHIPPED | OUTPATIENT
Start: 2018-04-18 | End: 2018-05-30 | Stop reason: SDUPTHER

## 2018-04-19 ENCOUNTER — OFFICE VISIT (OUTPATIENT)
Dept: OBSTETRICS AND GYNECOLOGY | Facility: CLINIC | Age: 43
End: 2018-04-19
Payer: MEDICAID

## 2018-04-19 VITALS
WEIGHT: 250.88 LBS | DIASTOLIC BLOOD PRESSURE: 66 MMHG | BODY MASS INDEX: 37.16 KG/M2 | HEIGHT: 69 IN | SYSTOLIC BLOOD PRESSURE: 110 MMHG

## 2018-04-19 DIAGNOSIS — N92.0 MENORRHAGIA WITH REGULAR CYCLE: Primary | ICD-10-CM

## 2018-04-19 DIAGNOSIS — R10.2 PELVIC PAIN IN FEMALE: ICD-10-CM

## 2018-04-19 PROCEDURE — 99213 OFFICE O/P EST LOW 20 MIN: CPT | Mod: S$PBB,,, | Performed by: OBSTETRICS & GYNECOLOGY

## 2018-04-19 PROCEDURE — 99999 PR PBB SHADOW E&M-EST. PATIENT-LVL III: CPT | Mod: PBBFAC,,, | Performed by: OBSTETRICS & GYNECOLOGY

## 2018-04-19 PROCEDURE — 99213 OFFICE O/P EST LOW 20 MIN: CPT | Mod: PBBFAC,PO | Performed by: OBSTETRICS & GYNECOLOGY

## 2018-04-25 ENCOUNTER — PROCEDURE VISIT (OUTPATIENT)
Dept: OBSTETRICS AND GYNECOLOGY | Facility: CLINIC | Age: 43
End: 2018-04-25
Payer: MEDICAID

## 2018-04-25 DIAGNOSIS — N92.0 MENORRHAGIA WITH REGULAR CYCLE: ICD-10-CM

## 2018-04-25 DIAGNOSIS — R10.2 PELVIC PAIN IN FEMALE: ICD-10-CM

## 2018-04-25 PROCEDURE — 76830 TRANSVAGINAL US NON-OB: CPT | Mod: PBBFAC,PO

## 2018-04-25 PROCEDURE — 76830 TRANSVAGINAL US NON-OB: CPT | Mod: 26,S$PBB,, | Performed by: OBSTETRICS & GYNECOLOGY

## 2018-04-29 ENCOUNTER — PATIENT MESSAGE (OUTPATIENT)
Dept: OBSTETRICS AND GYNECOLOGY | Facility: CLINIC | Age: 43
End: 2018-04-29

## 2018-05-29 ENCOUNTER — PATIENT MESSAGE (OUTPATIENT)
Dept: TRANSPLANT | Facility: CLINIC | Age: 43
End: 2018-05-29

## 2018-05-29 DIAGNOSIS — I47.29 POLYMORPHIC VENTRICULAR TACHYCARDIA: ICD-10-CM

## 2018-05-29 DIAGNOSIS — I42.0 NONISCHEMIC DILATED CARDIOMYOPATHY: Chronic | ICD-10-CM

## 2018-05-29 DIAGNOSIS — I50.42 CHRONIC COMBINED SYSTOLIC AND DIASTOLIC CONGESTIVE HEART FAILURE: ICD-10-CM

## 2018-05-29 RX ORDER — AMIODARONE HYDROCHLORIDE 200 MG/1
200 TABLET ORAL DAILY
Qty: 90 TABLET | Refills: 1 | Status: CANCELLED | OUTPATIENT
Start: 2018-05-29

## 2018-05-30 ENCOUNTER — LAB VISIT (OUTPATIENT)
Dept: LAB | Facility: HOSPITAL | Age: 43
End: 2018-05-30
Attending: INTERNAL MEDICINE
Payer: MEDICAID

## 2018-05-30 ENCOUNTER — DOCUMENTATION ONLY (OUTPATIENT)
Dept: TRANSPLANT | Facility: CLINIC | Age: 43
End: 2018-05-30

## 2018-05-30 ENCOUNTER — OFFICE VISIT (OUTPATIENT)
Dept: TRANSPLANT | Facility: CLINIC | Age: 43
End: 2018-05-30
Payer: MEDICAID

## 2018-05-30 VITALS
BODY MASS INDEX: 37.39 KG/M2 | DIASTOLIC BLOOD PRESSURE: 62 MMHG | HEIGHT: 69 IN | WEIGHT: 252.44 LBS | SYSTOLIC BLOOD PRESSURE: 117 MMHG | HEART RATE: 75 BPM

## 2018-05-30 DIAGNOSIS — Z76.82 ORGAN TRANSPLANT CANDIDATE: ICD-10-CM

## 2018-05-30 DIAGNOSIS — E66.9 OBESITY (BMI 35.0-39.9 WITHOUT COMORBIDITY): Primary | ICD-10-CM

## 2018-05-30 DIAGNOSIS — I47.29 POLYMORPHIC VENTRICULAR TACHYCARDIA: ICD-10-CM

## 2018-05-30 DIAGNOSIS — I50.42 CHRONIC COMBINED SYSTOLIC AND DIASTOLIC CONGESTIVE HEART FAILURE: ICD-10-CM

## 2018-05-30 DIAGNOSIS — I42.0 NONISCHEMIC DILATED CARDIOMYOPATHY: Chronic | ICD-10-CM

## 2018-05-30 DIAGNOSIS — I50.9 CONGESTIVE HEART FAILURE: ICD-10-CM

## 2018-05-30 DIAGNOSIS — N92.4 MENORRHAGIA, PREMENOPAUSAL: Chronic | ICD-10-CM

## 2018-05-30 DIAGNOSIS — Z79.899 ON AMIODARONE THERAPY: ICD-10-CM

## 2018-05-30 LAB
ALBUMIN SERPL BCP-MCNC: 3.4 G/DL
ALP SERPL-CCNC: 31 U/L
ALT SERPL W/O P-5'-P-CCNC: 9 U/L
ANION GAP SERPL CALC-SCNC: 4 MMOL/L
AST SERPL-CCNC: 12 U/L
BILIRUB SERPL-MCNC: 1.5 MG/DL
BNP SERPL-MCNC: 109 PG/ML
BUN SERPL-MCNC: 12 MG/DL
CALCIUM SERPL-MCNC: 8.9 MG/DL
CHLORIDE SERPL-SCNC: 108 MMOL/L
CO2 SERPL-SCNC: 26 MMOL/L
CREAT SERPL-MCNC: 0.8 MG/DL
EST. GFR  (AFRICAN AMERICAN): >60 ML/MIN/1.73 M^2
EST. GFR  (NON AFRICAN AMERICAN): >60 ML/MIN/1.73 M^2
GLUCOSE SERPL-MCNC: 87 MG/DL
POTASSIUM SERPL-SCNC: 3.9 MMOL/L
PROT SERPL-MCNC: 6.8 G/DL
SODIUM SERPL-SCNC: 138 MMOL/L

## 2018-05-30 PROCEDURE — 86833 HLA CLASS II HIGH DEFIN QUAL: CPT | Mod: PO,TXP

## 2018-05-30 PROCEDURE — 99214 OFFICE O/P EST MOD 30 MIN: CPT | Mod: S$PBB,TXP,, | Performed by: INTERNAL MEDICINE

## 2018-05-30 PROCEDURE — 86832 HLA CLASS I HIGH DEFIN QUAL: CPT | Mod: PO,TXP

## 2018-05-30 PROCEDURE — 86977 RBC SERUM PRETX INCUBJ/INHIB: CPT | Mod: PO,TXP

## 2018-05-30 PROCEDURE — 99999 PR PBB SHADOW E&M-EST. PATIENT-LVL III: CPT | Mod: PBBFAC,TXP,, | Performed by: INTERNAL MEDICINE

## 2018-05-30 PROCEDURE — 80053 COMPREHEN METABOLIC PANEL: CPT | Mod: TXP

## 2018-05-30 PROCEDURE — 99213 OFFICE O/P EST LOW 20 MIN: CPT | Mod: PBBFAC,TXP | Performed by: INTERNAL MEDICINE

## 2018-05-30 PROCEDURE — 83880 ASSAY OF NATRIURETIC PEPTIDE: CPT | Mod: TXP

## 2018-05-30 PROCEDURE — 36415 COLL VENOUS BLD VENIPUNCTURE: CPT | Mod: TXP

## 2018-05-30 RX ORDER — CARVEDILOL 6.25 MG/1
6.25 TABLET ORAL 2 TIMES DAILY
Qty: 180 TABLET | Refills: 3 | Status: SHIPPED | OUTPATIENT
Start: 2018-05-30 | End: 2018-10-08 | Stop reason: SDUPTHER

## 2018-05-30 RX ORDER — AMIODARONE HYDROCHLORIDE 200 MG/1
200 TABLET ORAL DAILY
Qty: 90 TABLET | Refills: 3 | Status: SHIPPED | OUTPATIENT
Start: 2018-05-30 | End: 2018-06-04 | Stop reason: SDUPTHER

## 2018-05-30 NOTE — PROGRESS NOTES
Met pt in clinic with Dr Hatch, pt doing well without episodes of VT. Discussed plan of care with pt to present to committee to delist for medical stability. Pt presently listed as status 2. Pt tearful, stated she is happy but fearful. Comfort provided to pt, encouraged pt that she is doing well, health care will continue to be monitored in HTS and if health begins to decline will re-look at OHT options if needed. Pt happy with plan and voiced understanding. Placed on agenda for discussion at selection meeting 6/6/18.

## 2018-05-30 NOTE — LETTER
May 31, 2018        Juanjose Nuñez  [address]  [facility] [address]  Phone: [phone]  Fax: [phone]             [facility] [address]  [facility] [address]  Phone: [phone]   Patient: Mario Mahajan   MR Number: [id number]   YOB: 1975   Date of Visit: 5/30/2018       Dear Dr. Juanjose Nuñez    Thank you for referring Mario Mahajan to me for evaluation. Attached you will find relevant portions of my assessment and plan of care.    If you have questions, please do not hesitate to call me. I look forward to following Mario Mahajan along with you.    Sincerely,    Nereida Hatch MD    Enclosure    If you would like to receive this communication electronically, please contact [email or web address] or [phone] to request [facility] access.    [facility] is a tool which provides read-only access to select patient information with whom you have a relationship. Its easy to use and provides real time access to review your patients record including encounter summaries, notes, results, and demographic information.    If you feel you have received this communication in error or would no longer like to receive these types of communications, please e-mail [email or web address]

## 2018-05-30 NOTE — PROGRESS NOTES
Subjective: class 3A - 2 B   Transplant status: active    HPI:  Ms. Mahajan is a 42 y.o. year old Black or  female who has presented to be evaluated as a potential heart transplant recipient.  Nonischemic CHF (LVEF 20%, LVEDD 7.2 cm mild MR 2017) who had three episodes of VF in early 2017, one of which required defibrillation from ICD. She has not had any ICD shocks since 2017. She has had a sleep study that was negative for sleep apnea.     Today, feels well Can walk more than two blocks  No edema with stable clinic weight.   Still dealing with lose of brother from what sounds like CHF.   CPX 2018  PkVO2 was 14.1 ml/kg/min which is 43% of predicted equating to a functional capacity of 4.0 METS   PkVO2 Lean was 19.58 ml/kg of lean body weight/min indicating a good prognosis in heart failure.  VE/VCO2 [place] was 28.4.   Vo2 improved compared to Oct 2017 result of 12.3     Past Medical History:   Diagnosis Date    Asthma     Chronic back pain 2014    Chronic combined systolic and diastolic congestive heart failure 2015-10-17   1 - Severely depressed left ventricular systolic function (EF 20-25%).    2 - Severe left ventricular enlargement.    3 - Severe left atrial enlargement.    4 - Left ventricular diastolic dysfunction.    5 - The estimated PA systolic pressure is 18 mmHg.    6 - Mild mitral regurgitation.     Encounter for blood transfusion     ICD (implantable cardioverter-defibrillator) in place 12/01/15 3/3/2016    Menorrhagia, premenopausal 2/10/2017    Microcytic anemia 2015    Non-rheumatic mitral regurgitation 3/5/2015    Nonischemic dilated cardiomyopathy 2015    Sleep apnea     Syncope and collapse 2017    Ventricular tachycardia, polymorphic      Past Surgical History:   Procedure Laterality Date    CARDIAC DEFIBRILLATOR PLACEMENT  2015     SECTION      TUBAL LIGATION       OB History      Para Term  AB  "Living    2 2 2     2    SAB TAB Ectopic Multiple Live Births            2        Review of Systems   Constitution: Negative for decreased appetite, weight gain and weight loss.   Cardiovascular: Negative for chest pain, dyspnea on exertion, leg swelling, near-syncope, orthopnea and palpitations.   Respiratory: Negative for cough and shortness of breath.    Musculoskeletal: Negative for myalgias.   Gastrointestinal: Negative for jaundice.       Objective:   Physical Exam   Constitutional: She appears well-developed and well-nourished. No distress.   /62 (BP Location: Left arm, Patient Position: Sitting, BP Method: Large (Automatic))   Pulse 75   Ht 5' 9" (1.753 m)   Wt 114.5 kg (252 lb 6.8 oz)   BMI 37.28 kg/m²      HENT:   Head: Normocephalic and atraumatic. Head is without abrasion and without contusion.   Right Ear: External ear normal.   Left Ear: External ear normal.   Nose: Nose normal. No epistaxis.   Mouth/Throat: Oropharynx is clear and moist. Mucous membranes are not cyanotic.   Eyes: Conjunctivae and EOM are normal. Pupils are equal, round, and reactive to light.   Neck: Normal range of motion. Neck supple. No tracheal deviation present.   Cardiovascular: Normal rate, regular rhythm, normal heart sounds and normal pulses.  Exam reveals no gallop.    No murmur heard.  Pulmonary/Chest: Effort normal and breath sounds normal. No stridor. No respiratory distress. She has no wheezes.   Abdominal: Soft. Normal appearance, normal aorta and bowel sounds are normal. She exhibits no distension. There is no tenderness.   Musculoskeletal: She exhibits no edema or tenderness.   Neurological: She is alert. She has normal strength and normal reflexes. She exhibits normal muscle tone.   Skin: Skin is warm. No rash noted. No erythema.   Psychiatric: She has a normal mood and affect. Her speech is normal and behavior is normal. Judgment and thought content normal. Cognition and memory are normal.       Labs:    " "Chemistry        Component Value Date/Time     05/30/2018 0931    K 3.9 05/30/2018 0931     05/30/2018 0931    CO2 26 05/30/2018 0931    BUN 12 05/30/2018 0931    BUN 12 06/19/2015 0950    CREATININE 0.8 05/30/2018 0931    GLU 87 05/30/2018 0931        Component Value Date/Time    CALCIUM 8.9 05/30/2018 0931    ALKPHOS 31 (L) 05/30/2018 0931    AST 12 05/30/2018 0931    ALT 9 (L) 05/30/2018 0931    BILITOT 1.5 (H) 05/30/2018 0931    ESTGFRAFRICA >60.0 05/30/2018 0931    EGFRNONAA >60.0 05/30/2018 0931          Magnesium   Date Value Ref Range Status   02/11/2017 1.9 1.6 - 2.6 mg/dL Final     Lab Results   Component Value Date    WBC 4.91 03/26/2018    HGB 11.6 (L) 03/26/2018    HCT 35.7 (L) 03/26/2018     03/26/2018     BNP   Date Value Ref Range Status   05/30/2018 109 (H) 0 - 99 pg/mL Final     Comment:     Values of less than 100 pg/ml are consistent with non-CHF populations.   03/26/2018 177 (H) 0 - 99 pg/mL Final     Comment:     Values of less than 100 pg/ml are consistent with non-CHF populations.   03/09/2018 158 (H) 0 - 99 pg/mL Final     Comment:     Values of less than 100 pg/ml are consistent with non-CHF populations.       Assessment:      1. Obesity (BMI 35.0-39.9 without comorbidity)    2. Nonischemic dilated cardiomyopathy    3. Chronic combined systolic and diastolic congestive heart failure    4. Polymorphic ventricular tachycardia    5. Organ transplant candidate    6. Menorrhagia, premenopausal    7. On amiodarone therapy        Plan:   Organ transplant candidate  Would discuss at next selection   I would recommend delisting as she is functionally doing well     Menorrhagia, premenopausal  Sounds like OB may need to consider surgery for above problem (see below for copy of plan from earlier this year) Would like to medical optimize in case this is needed   "at this time the best plan seems to be hysteroscopy with polypectomy, D&C, and endometrial ablation.  This would be the " "safest considering her medical problems.  If this does not take care of problems and a future hysterectomy would be considered,  but this would involve more risk for the patient."    On amiodarone therapy  Got PFT's ordered by EP in 2017 but not done  Will refill amiodarone with plans to get PFT's by next visit     Obesity (BMI 35.0-39.9 without comorbidity)  Will consult bariatric medicine for topamax      Patient is now NYHA II  Recommend 2 gram sodium restriction and 1500cc fluid restriction.  Encourage physical activity with graded exercise program.  Requested patient to weigh themselves daily, and to notify us if their weight increases by more than 3 lbs in 1 day or 5 lbs in 1 week.     Listed for transplant: Yes, 2    [facility] Patient Status  Functional Status: 80% - Normal activity with effort: some symptoms of disease  Physical Capacity: No Limitations  Working for Income: Unknown    Transplant candidacy:  Patient is a 42 y.o. year old female with heart failure is being seen for possible LVAD and OHT. she is scheduled for medical management only. The patient will follow up with pre-transplant for now.  Will transition to CHF side eventually if delisted      Patient met with pre-transplant coordinator at the end of this visit.        "

## 2018-05-31 ENCOUNTER — PATIENT MESSAGE (OUTPATIENT)
Dept: TRANSPLANT | Facility: CLINIC | Age: 43
End: 2018-05-31

## 2018-05-31 DIAGNOSIS — I50.9 CONGESTIVE HEART FAILURE, UNSPECIFIED HF CHRONICITY, UNSPECIFIED HEART FAILURE TYPE: Primary | ICD-10-CM

## 2018-05-31 NOTE — ASSESSMENT & PLAN NOTE
"Sounds like OB may need to consider surgery for above problem (see below for copy of plan from earlier this year) Would like to medical optimize in case this is needed   "at this time the best plan seems to be hysteroscopy with polypectomy, D&C, and endometrial ablation.  This would be the safest considering her medical problems.  If this does not take care of problems and a future hysterectomy would be considered,  but this would involve more risk for the patient."  "

## 2018-05-31 NOTE — ASSESSMENT & PLAN NOTE
Got PFT's ordered by EP in 2017 but not done  Will refill amiodarone with plans to get PFT's by next visit

## 2018-06-04 DIAGNOSIS — I42.0 NONISCHEMIC DILATED CARDIOMYOPATHY: Chronic | ICD-10-CM

## 2018-06-04 DIAGNOSIS — I50.42 CHRONIC COMBINED SYSTOLIC AND DIASTOLIC CONGESTIVE HEART FAILURE: ICD-10-CM

## 2018-06-04 DIAGNOSIS — I47.29 POLYMORPHIC VENTRICULAR TACHYCARDIA: ICD-10-CM

## 2018-06-04 RX ORDER — AMIODARONE HYDROCHLORIDE 200 MG/1
200 TABLET ORAL DAILY
Qty: 90 TABLET | Refills: 6 | Status: SHIPPED | OUTPATIENT
Start: 2018-06-04 | End: 2019-05-29 | Stop reason: SDUPTHER

## 2018-06-05 ENCOUNTER — CONFERENCE (OUTPATIENT)
Dept: TRANSPLANT | Facility: CLINIC | Age: 43
End: 2018-06-05

## 2018-06-05 NOTE — TELEPHONE ENCOUNTER
Discussed de-listing patient due to medical stability, agreement with Dr Hatch And Dr Srivastava to delist patient. Patient notified via phone, pt voiced understanding.     Removed OHT listing from [facility] per [employer], Procurement. Verified and correct.

## 2018-07-12 ENCOUNTER — CLINICAL SUPPORT (OUTPATIENT)
Dept: ELECTROPHYSIOLOGY | Facility: CLINIC | Age: 43
End: 2018-07-12
Attending: INTERNAL MEDICINE
Payer: MEDICAID

## 2018-07-12 DIAGNOSIS — I42.8 NICM (NONISCHEMIC CARDIOMYOPATHY): ICD-10-CM

## 2018-07-12 DIAGNOSIS — Z95.810 ICD (IMPLANTABLE CARDIOVERTER-DEFIBRILLATOR) IN PLACE: ICD-10-CM

## 2018-07-12 PROCEDURE — 93296 REM INTERROG EVL PM/IDS: CPT | Mod: PBBFAC | Performed by: INTERNAL MEDICINE

## 2018-07-12 PROCEDURE — 93295 DEV INTERROG REMOTE 1/2/MLT: CPT | Mod: ,,, | Performed by: INTERNAL MEDICINE

## 2018-07-26 NOTE — NURSING
Patient removed from VAD Potential membership list due to declined for stablility. VAD episode closed. Potential Folder Discarded and all signed paperwork scanned into EMR. Will initiate VAD education when or if indicated by provider team.

## 2018-08-13 ENCOUNTER — TELEPHONE (OUTPATIENT)
Dept: TRANSPLANT | Facility: CLINIC | Age: 43
End: 2018-08-13

## 2018-08-13 NOTE — TELEPHONE ENCOUNTER
Pt had a tire blowout this morning, called to have appts rescheduled. Appts rescheduled for 10/8/18.

## 2018-08-21 ENCOUNTER — PATIENT MESSAGE (OUTPATIENT)
Dept: TRANSPLANT | Facility: CLINIC | Age: 43
End: 2018-08-21

## 2018-09-04 ENCOUNTER — PATIENT MESSAGE (OUTPATIENT)
Dept: ELECTROPHYSIOLOGY | Facility: CLINIC | Age: 43
End: 2018-09-04

## 2018-09-04 ENCOUNTER — TELEPHONE (OUTPATIENT)
Dept: ELECTROPHYSIOLOGY | Facility: CLINIC | Age: 43
End: 2018-09-04

## 2018-09-04 NOTE — TELEPHONE ENCOUNTER
LM for Ms Mahajan. Advised they should be fine if a tens unit is not being used, or any MRI. LM on VM

## 2018-09-25 ENCOUNTER — TELEPHONE (OUTPATIENT)
Dept: OBSTETRICS AND GYNECOLOGY | Facility: CLINIC | Age: 43
End: 2018-09-25

## 2018-09-25 ENCOUNTER — PATIENT MESSAGE (OUTPATIENT)
Dept: OBSTETRICS AND GYNECOLOGY | Facility: CLINIC | Age: 43
End: 2018-09-25

## 2018-09-25 NOTE — TELEPHONE ENCOUNTER
Patient is having frequent urination and she can't hold her urine.  Her urine smells like ammonia.  Could you call something in for her or put in a culture for her.  Please advise.

## 2018-09-26 RX ORDER — NITROFURANTOIN 25; 75 MG/1; MG/1
100 CAPSULE ORAL 2 TIMES DAILY
Qty: 14 CAPSULE | Refills: 0 | Status: SHIPPED | OUTPATIENT
Start: 2018-09-26 | End: 2018-10-03

## 2018-10-08 ENCOUNTER — HOSPITAL ENCOUNTER (OUTPATIENT)
Dept: CARDIOLOGY | Facility: CLINIC | Age: 43
Discharge: HOME | End: 2018-10-08
Attending: INTERNAL MEDICINE
Payer: MEDICAID

## 2018-10-08 ENCOUNTER — OFFICE VISIT (OUTPATIENT)
Dept: TRANSPLANT | Facility: CLINIC | Age: 43
End: 2018-10-08
Payer: MEDICAID

## 2018-10-08 ENCOUNTER — HOSPITAL ENCOUNTER (OUTPATIENT)
Dept: PULMONOLOGY | Facility: CLINIC | Age: 43
Discharge: HOME | End: 2018-10-08
Payer: MEDICAID

## 2018-10-08 VITALS
HEART RATE: 79 BPM | BODY MASS INDEX: 37.33 KG/M2 | DIASTOLIC BLOOD PRESSURE: 61 MMHG | SYSTOLIC BLOOD PRESSURE: 130 MMHG | HEIGHT: 69 IN | WEIGHT: 252 LBS

## 2018-10-08 DIAGNOSIS — I42.0 NONISCHEMIC DILATED CARDIOMYOPATHY: Chronic | ICD-10-CM

## 2018-10-08 DIAGNOSIS — I34.0 NON-RHEUMATIC MITRAL REGURGITATION: Chronic | ICD-10-CM

## 2018-10-08 DIAGNOSIS — I50.9 CONGESTIVE HEART FAILURE, UNSPECIFIED HF CHRONICITY, UNSPECIFIED HEART FAILURE TYPE: ICD-10-CM

## 2018-10-08 DIAGNOSIS — Z95.810 ICD (IMPLANTABLE CARDIOVERTER-DEFIBRILLATOR) IN PLACE: Primary | Chronic | ICD-10-CM

## 2018-10-08 DIAGNOSIS — Z76.82 ORGAN TRANSPLANT CANDIDATE: ICD-10-CM

## 2018-10-08 DIAGNOSIS — I47.29 POLYMORPHIC VENTRICULAR TACHYCARDIA: ICD-10-CM

## 2018-10-08 DIAGNOSIS — I50.42 CHRONIC COMBINED SYSTOLIC AND DIASTOLIC CONGESTIVE HEART FAILURE: ICD-10-CM

## 2018-10-08 DIAGNOSIS — Z79.899 ON AMIODARONE THERAPY: ICD-10-CM

## 2018-10-08 DIAGNOSIS — E66.9 OBESITY (BMI 35.0-39.9 WITHOUT COMORBIDITY): ICD-10-CM

## 2018-10-08 LAB
DIASTOLIC DYSFUNCTION: NO
PRE FEV1 FVC: 73
PRE FEV1: 2.03
PRE FVC: 2.77
PREDICTED FEV1 FVC: 83
PREDICTED FEV1: 2.98
PREDICTED FVC: 3.58

## 2018-10-08 PROCEDURE — 99999 PR PBB SHADOW E&M-EST. PATIENT-LVL III: CPT | Mod: PBBFAC,,, | Performed by: INTERNAL MEDICINE

## 2018-10-08 PROCEDURE — 94010 BREATHING CAPACITY TEST: CPT | Mod: 26,S$PBB,, | Performed by: INTERNAL MEDICINE

## 2018-10-08 PROCEDURE — 94727 GAS DIL/WSHOT DETER LNG VOL: CPT | Mod: PBBFAC | Performed by: INTERNAL MEDICINE

## 2018-10-08 PROCEDURE — 99214 OFFICE O/P EST MOD 30 MIN: CPT | Mod: S$PBB,,, | Performed by: INTERNAL MEDICINE

## 2018-10-08 PROCEDURE — 94621 CARDIOPULM EXERCISE TESTING: CPT | Mod: PBBFAC | Performed by: INTERNAL MEDICINE

## 2018-10-08 PROCEDURE — 99213 OFFICE O/P EST LOW 20 MIN: CPT | Mod: PBBFAC,25 | Performed by: INTERNAL MEDICINE

## 2018-10-08 PROCEDURE — 94729 DIFFUSING CAPACITY: CPT | Mod: 26,S$PBB,, | Performed by: INTERNAL MEDICINE

## 2018-10-08 PROCEDURE — 94010 BREATHING CAPACITY TEST: CPT | Mod: PBBFAC | Performed by: INTERNAL MEDICINE

## 2018-10-08 PROCEDURE — 94760 N-INVAS EAR/PLS OXIMETRY 1: CPT | Mod: PBBFAC | Performed by: INTERNAL MEDICINE

## 2018-10-08 PROCEDURE — 94729 DIFFUSING CAPACITY: CPT | Mod: PBBFAC | Performed by: INTERNAL MEDICINE

## 2018-10-08 PROCEDURE — 94200 LUNG FUNCTION TEST (MBC/MVV): CPT | Mod: PBBFAC | Performed by: INTERNAL MEDICINE

## 2018-10-08 PROCEDURE — 94727 GAS DIL/WSHOT DETER LNG VOL: CPT | Mod: 26,S$PBB,, | Performed by: INTERNAL MEDICINE

## 2018-10-08 RX ORDER — CARVEDILOL 6.25 MG/1
6.25 TABLET ORAL 2 TIMES DAILY
Qty: 180 TABLET | Refills: 3 | Status: SHIPPED | OUTPATIENT
Start: 2018-10-08 | End: 2019-06-14

## 2018-10-08 NOTE — PROGRESS NOTES
Subjective: class 3A - 2 B   Transplant status: removed    HPI:  Ms. Mahajan is a 42 y.o. year old Black or  female who has presented to be evaluated as a potential heart transplant recipient.    Nonischemic CHF (LVEF 20%, LVEDD 7.2 cm mild MR Feb 2017) who had three episodes of VF in early Feb 2017, one of which required defibrillation from ICD. She has not had any ICD shocks since 2/2017. She has had a sleep study that was negative for sleep apnea.       Since last visit she is doing well walk more than two blocks  No edema with stable clinic weight. Upset that she can not lose weight        CPX April 2018    PkVO2 was 14.1 ml/kg/min which is 43% of predicted equating to a functional capacity of 4.0 METS   PkVO2 Lean was 19.58 ml/kg of lean body weight/min indicating a good prognosis in heart failure.  VE/VCO2 [place] was 28.4. Vo2 improved compared to Oct 2017 result of 12.3     Metabolic Findings:    1)  Resting spirometry reveals an FVC = 2.4L which is 62% of predicted, an FEV1 of 1.7L, which is 52% of predicted and an FEV1/FVC ratio of 69%. The MVV = 87 L/min, which is 77% of predicted.    2) The respiratory exchange ratio (RER) was 1.27, suggesting an excellent effort.    3) The breathing reserve is calculated at 29%, which is borderline reduced. Oxygen saturation with exercise remained normal.     4) The PkVO2 was 12.4 ml/kg/min which is 38% of predicted equating to a functional capacity of 3.5 METS indicating severe functional impairment.     5) The anaerobic threshold (AT), which occurred at a heart rate of 125bpm, was 11.0 ml/kg/min, which is 33% of the predicted VO2 and is reduced.     6) The PkVO2 Lean was 16.64 ml/kg of lean body weight/min indicating a poor prognosis in heart failure.     7) The VE/VCO2 decreased by -31% from rest to AT. The VE/VCO2 [place] was 30.8.  The Resting PetCO2 was 30.2.        Past Medical History:   Diagnosis Date    Asthma     Chronic back pain 7/1/2014     Chronic combined systolic and diastolic congestive heart failure 2015     2-10-17   1 - Severely depressed left ventricular systolic function (EF 20-25%).    2 - Severe left ventricular enlargement.    3 - Severe left atrial enlargement.    4 - Left ventricular diastolic dysfunction.    5 - The estimated PA systolic pressure is 18 mmHg.    6 - Mild mitral regurgitation.     Encounter for blood transfusion     ICD (implantable cardioverter-defibrillator) in place 12/01/15 3/3/2016    Menorrhagia, premenopausal 2/10/2017    Microcytic anemia 2015    Non-rheumatic mitral regurgitation 3/5/2015    Nonischemic dilated cardiomyopathy 2015    Sleep apnea     Syncope and collapse 2017    Ventricular tachycardia, polymorphic      Past Surgical History:   Procedure Laterality Date    CARDIAC DEFIBRILLATOR PLACEMENT  2015     SECTION      HEART CATH-RIGHT Right 3/26/2018    Performed by [facility]imy [facility]riv[facility]ava [facility] at [facility] CATH LAB    INSERTION-ICD-SINGLE N/A 2015    Performed by Victorino Tay [facility] at [facility] CATH LAB    REVISION-LEAD-ICD N/A 2016    Performed by [facility]ino [facility] at [facility] CATH LAB    TUBAL LIGATION       OB History      Para Term  AB Living    2 2 2     2    SAB TAB Ectopic Multiple Live Births            2        Review of Systems   Constitution: Negative for decreased appetite, weight gain and weight loss.   Cardiovascular: Negative for chest pain, dyspnea on exertion, leg swelling, near-syncope, orthopnea and palpitations.   Respiratory: Negative for cough and shortness of breath.    Musculoskeletal: Negative for myalgias.   Gastrointestinal: Negative for jaundice.       Objective:   Physical Exam   Constitutional: She appears well-developed and well-nourished. No distress.        HENT:   Head: Normocephalic and atraumatic. Head is without abrasion and without contusion.   Right Ear: External ear normal.   Left Ear: External ear normal.    Nose: Nose normal. No epistaxis.   Mouth/Throat: Oropharynx is clear and moist. Mucous membranes are not cyanotic.   Eyes: Conjunctivae and EOM are normal. Pupils are equal, round, and reactive to light.   Neck: Normal range of motion. Neck supple. No tracheal deviation present.   Cardiovascular: Normal rate, regular rhythm, normal heart sounds and normal pulses. Exam reveals no gallop.   No murmur heard.  Pulmonary/Chest: Effort normal and breath sounds normal. No stridor. No respiratory distress. She has no wheezes.   Abdominal: Soft. Normal appearance, normal aorta and bowel sounds are normal. She exhibits no distension. There is no tenderness.   Musculoskeletal: She exhibits no edema or tenderness.   Neurological: She is alert. She has normal strength and normal reflexes. She exhibits normal muscle tone.   Skin: Skin is warm. No rash noted. No erythema.   Psychiatric: She has a normal mood and affect. Her speech is normal and behavior is normal. Judgment and thought content normal. Cognition and memory are normal.       Labs:    Chemistry        Component Value Date/Time     05/30/2018 0931    K 3.9 05/30/2018 0931     05/30/2018 0931    CO2 26 05/30/2018 0931    BUN 12 05/30/2018 0931    BUN 12 06/19/2015 0950    CREATININE 0.8 05/30/2018 0931    GLU 87 05/30/2018 0931        Component Value Date/Time    CALCIUM 8.9 05/30/2018 0931    ALKPHOS 31 (L) 05/30/2018 0931    AST 12 05/30/2018 0931    ALT 9 (L) 05/30/2018 0931    BILITOT 1.5 (H) 05/30/2018 0931    ESTGFRAFRICA >60.0 05/30/2018 0931    EGFRNONAA >60.0 05/30/2018 0931          Magnesium   Date Value Ref Range Status   02/11/2017 1.9 1.6 - 2.6 mg/dL Final     Lab Results   Component Value Date    WBC 4.91 03/26/2018    HGB 11.6 (L) 03/26/2018    HCT 35.7 (L) 03/26/2018     03/26/2018     BNP   Date Value Ref Range Status   05/30/2018 109 (H) 0 - 99 pg/mL Final     Comment:     Values of less than 100 pg/ml are consistent with non-CHF  populations.   03/26/2018 177 (H) 0 - 99 pg/mL Final     Comment:     Values of less than 100 pg/ml are consistent with non-CHF populations.   03/09/2018 158 (H) 0 - 99 pg/mL Final     Comment:     Values of less than 100 pg/ml are consistent with non-CHF populations.       Assessment:      1. ICD (implantable cardioverter-defibrillator) in place 12/01/15    2. Chronic combined systolic and diastolic congestive heart failure    3. Nonischemic dilated cardiomyopathy    4. Non-rheumatic mitral regurgitation    5. On amiodarone therapy    6. Polymorphic ventricular tachycardia    7. Obesity (BMI 35.0-39.9 without comorbidity)    8. Organ transplant candidate        Plan:      Stable from heart failure standpoint. Peak VO2 12 again however does not correlate with symptoms (watch)    Consider to enroll in [facility]     Bariatric surgery??    Patient is now NYHA II  Recommend 2 gram sodium restriction and 1500cc fluid restriction.  Encourage physical activity with graded exercise program.  Requested patient to weigh themselves daily, and to notify us if their weight increases by more than 3 lbs in 1 day or 5 lbs in 1 week.     Listed for transplant: Yes, 2    [facility] Patient Status  Functional Status: 80% - Normal activity with effort: some symptoms of disease  Physical Capacity: No Limitations  Working for Income: Unknown    Transplant candidacy:  Patient is a 42 y.o. year old female with heart failure is being seen for possible LVAD and OHT. she is scheduled for medical management only. The patient will follow up with pre-transplant for now.         .

## 2018-10-08 NOTE — LETTER
October 8, 2018        Juanjose Nuñez  [address]  [facility] [address]  Phone: [phone]  Fax: [phone]             [facility] [address]  [facility] [address]  Phone: [phone]   Patient: Mario Mahajan   MR Number: [id number]   YOB: 1975   Date of Visit: 10/8/2018       Dear Dr. Juanjose Nuñez    Thank you for referring Mario Mahajan to me for evaluation. Attached you will find relevant portions of my assessment and plan of care.    If you have questions, please do not hesitate to call me. I look forward to following Mario Mahajan along with you.    Sincerely,    Oliver De Jesus MD    Enclosure    If you would like to receive this communication electronically, please contact [email or web address] or [phone] to request [facility] access.    [facility] is a tool which provides read-only access to select patient information with whom you have a relationship. Its easy to use and provides real time access to review your patients record including encounter summaries, notes, results, and demographic information.    If you feel you have received this communication in error or would no longer like to receive these types of communications, please e-mail [email or web address]

## 2018-10-17 ENCOUNTER — CLINICAL SUPPORT (OUTPATIENT)
Dept: ELECTROPHYSIOLOGY | Facility: CLINIC | Age: 43
End: 2018-10-17
Payer: MEDICAID

## 2018-10-17 DIAGNOSIS — Z95.810 ICD (IMPLANTABLE CARDIOVERTER-DEFIBRILLATOR) IN PLACE: ICD-10-CM

## 2018-10-17 DIAGNOSIS — I42.8 NICM (NONISCHEMIC CARDIOMYOPATHY): ICD-10-CM

## 2018-10-17 PROCEDURE — 93295 DEV INTERROG REMOTE 1/2/MLT: CPT | Mod: ,,, | Performed by: INTERNAL MEDICINE

## 2018-10-17 PROCEDURE — 93296 REM INTERROG EVL PM/IDS: CPT | Mod: PBBFAC | Performed by: INTERNAL MEDICINE

## 2018-10-18 DIAGNOSIS — Z95.810 CARDIAC DEFIBRILLATOR IN SITU: Primary | ICD-10-CM

## 2019-01-09 ENCOUNTER — PATIENT MESSAGE (OUTPATIENT)
Dept: OBSTETRICS AND GYNECOLOGY | Facility: CLINIC | Age: 44
End: 2019-01-09

## 2019-01-09 ENCOUNTER — TELEPHONE (OUTPATIENT)
Dept: OBSTETRICS AND GYNECOLOGY | Facility: CLINIC | Age: 44
End: 2019-01-09

## 2019-01-10 ENCOUNTER — TELEPHONE (OUTPATIENT)
Dept: OBSTETRICS AND GYNECOLOGY | Facility: CLINIC | Age: 44
End: 2019-01-10

## 2019-01-10 NOTE — TELEPHONE ENCOUNTER
----- Message from Milady Wetzel sent at 1/10/2019  9:38 AM CST -----  Contact: self, [phone] (M)  Patient called in returning your call. Please advise.

## 2019-01-15 ENCOUNTER — CLINICAL SUPPORT (OUTPATIENT)
Dept: CARDIOLOGY | Facility: HOSPITAL | Age: 44
End: 2019-01-15
Attending: INTERNAL MEDICINE
Payer: MEDICAID

## 2019-01-15 DIAGNOSIS — Z95.810 ICD (IMPLANTABLE CARDIOVERTER-DEFIBRILLATOR) IN PLACE: ICD-10-CM

## 2019-01-15 DIAGNOSIS — I42.8 NICM (NONISCHEMIC CARDIOMYOPATHY): ICD-10-CM

## 2019-01-15 PROCEDURE — 93296 REM INTERROG EVL PM/IDS: CPT

## 2019-01-22 DIAGNOSIS — I42.8 NONISCHEMIC CARDIOMYOPATHY: ICD-10-CM

## 2019-01-22 DIAGNOSIS — Z95.810 PRESENCE OF AUTOMATIC CARDIOVERTER/DEFIBRILLATOR (AICD): Primary | ICD-10-CM

## 2019-01-25 DIAGNOSIS — Z95.810 CARDIAC DEFIBRILLATOR IN PLACE: Primary | ICD-10-CM

## 2019-02-04 ENCOUNTER — OFFICE VISIT (OUTPATIENT)
Dept: OBSTETRICS AND GYNECOLOGY | Facility: CLINIC | Age: 44
End: 2019-02-04
Payer: MEDICAID

## 2019-02-04 VITALS
HEIGHT: 69 IN | WEIGHT: 258.19 LBS | DIASTOLIC BLOOD PRESSURE: 72 MMHG | BODY MASS INDEX: 38.24 KG/M2 | SYSTOLIC BLOOD PRESSURE: 120 MMHG

## 2019-02-04 DIAGNOSIS — N92.4 EXCESSIVE BLEEDING IN PREMENOPAUSAL PERIOD: Primary | ICD-10-CM

## 2019-02-04 DIAGNOSIS — N84.1 CERVICAL POLYP: ICD-10-CM

## 2019-02-04 DIAGNOSIS — N84.0 ENDOMETRIAL POLYP: ICD-10-CM

## 2019-02-04 PROCEDURE — 99999 PR PBB SHADOW E&M-EST. PATIENT-LVL V: ICD-10-PCS | Mod: PBBFAC,,, | Performed by: OBSTETRICS & GYNECOLOGY

## 2019-02-04 PROCEDURE — 99213 OFFICE O/P EST LOW 20 MIN: CPT | Mod: S$PBB,,, | Performed by: OBSTETRICS & GYNECOLOGY

## 2019-02-04 PROCEDURE — 99999 PR PBB SHADOW E&M-EST. PATIENT-LVL V: CPT | Mod: PBBFAC,,, | Performed by: OBSTETRICS & GYNECOLOGY

## 2019-02-04 PROCEDURE — 99213 PR OFFICE/OUTPT VISIT, EST, LEVL III, 20-29 MIN: ICD-10-PCS | Mod: S$PBB,,, | Performed by: OBSTETRICS & GYNECOLOGY

## 2019-02-04 PROCEDURE — 99215 OFFICE O/P EST HI 40 MIN: CPT | Mod: PBBFAC,PO | Performed by: OBSTETRICS & GYNECOLOGY

## 2019-02-04 RX ORDER — LORATADINE 10 MG/1
TABLET ORAL
Refills: 0 | COMMUNITY
Start: 2018-11-29 | End: 2020-04-24

## 2019-02-04 RX ORDER — DEXAMETHASONE 4 MG/1
TABLET ORAL
Refills: 2 | COMMUNITY
Start: 2019-01-29 | End: 2020-03-13 | Stop reason: SDUPTHER

## 2019-02-04 RX ORDER — SACUBITRIL AND VALSARTAN 49; 51 MG/1; MG/1
TABLET, FILM COATED ORAL
Refills: 6 | Status: ON HOLD | COMMUNITY
Start: 2018-12-09 | End: 2019-03-28 | Stop reason: HOSPADM

## 2019-02-04 RX ORDER — IBUPROFEN 600 MG/1
TABLET ORAL
Refills: 0 | Status: ON HOLD | COMMUNITY
Start: 2018-11-29 | End: 2019-03-28 | Stop reason: SDUPTHER

## 2019-02-04 RX ORDER — TOPIRAMATE 50 MG/1
TABLET, FILM COATED ORAL
Refills: 2 | COMMUNITY
Start: 2019-01-29 | End: 2020-03-13 | Stop reason: SDUPTHER

## 2019-02-04 NOTE — PROGRESS NOTES
Patient returns to the office for management plan for persistent menorrhagia.  Ultrasound shows endometrial and endocervical polyps.  The patient was given the management options of hysteroscopy with ablation and polypectomy or hysterectomy.  She has a significant cardiac history with a defibrillator in place.  She has received cardiac clearance for hysterectomy in the past and will let her cardiologist now that she is moving towards her surgical date.  Patient wishes to be amenorrheic.  She has had a previous  section.  The risks of this procedure primarily related to her cardiac status so she will receive an anesthesia and hospitalist preop visit.  Patient states she is also contemplating a gastric sleeve surgery and was given the option for proceeding to that 1st followed by the hysterectomy but she wants to proceed with hysterectomy 1st.  Case request has been placed and will begin to move towards definite date for her procedure.

## 2019-02-07 NOTE — ED PROVIDER NOTES
Encounter Date: 2017    SCRIBE #1 NOTE: I, Kathryn Diaz, am scribing for, and in the presence of, Dr. Dozier.       History     Chief Complaint   Patient presents with    Weakness     hx of CHF, patient states that she was recently discharged from the hospital      Review of patient's allergies indicates:  No Known Allergies  HPI Comments: Time seen by provider: 8:50 PM    This is a 41 y.o. female with a PMHx of severe CHF and pulmonary hypertension who presents with complaint of weakness. The patient was recently discharged from [facility] yesterday after admission for syncope which they determined was caused by blood pressure medications causing hypotension. The patient describes she is still having presyncopal episodes with dizziness when she gets up and sometimes when she is just laying down not doing anything. She also has generalized weakness, despite following Cardiology's instructions of not taking all of her blood pressure medications today (she held Diovan and aldactone). She also states that in addition to the presyncope she is having occasional chest pressure/ heaviness, cough, as well as shortness of breath which started today. She denies any chest pain or leg swelling or change in appetite. She states she has not had a change in her Lasix dose recently, no edema     The history is provided by the patient.     Past Medical History   Diagnosis Date    Anemia     Asthma     CHF (congestive heart failure)     Encounter for blood transfusion     Mitral valve regurgitation     Obesity      Past Medical History Pertinent Negatives   Diagnosis Date Noted    Hypertension 2016     Past Surgical History   Procedure Laterality Date    Tubal ligation       section      Cardiac defibrillator placement  2015     Family History   Problem Relation Age of Onset    Diabetes Father      Social History   Substance Use Topics    Smoking status: Never Smoker    Smokeless  tobacco: Never Used    Alcohol use Yes      Comment: 1 glass per month     Review of Systems   Constitutional: Positive for fatigue. Negative for appetite change and fever.   HENT: Negative for drooling, facial swelling and nosebleeds.    Eyes: Negative for visual disturbance.   Respiratory: Positive for cough and shortness of breath.    Cardiovascular: Negative for leg swelling.        Chest pressure/ heaviness   Gastrointestinal: Negative for abdominal distention, abdominal pain, constipation, diarrhea, nausea and vomiting.   Genitourinary: Negative for difficulty urinating, dysuria, frequency and hematuria.   Musculoskeletal: Negative for neck pain and neck stiffness.   Skin: Negative for rash.   Neurological: Positive for weakness and light-headedness.       Physical Exam   Initial Vitals   BP Pulse Resp Temp SpO2   02/11/17 1855 02/11/17 1855 02/11/17 1855 02/11/17 1855 02/11/17 1855   131/60 78 18 97.7 °F (36.5 °C) 100 %     Physical Exam    Nursing note and vitals reviewed.  Constitutional: No distress.   HENT:   Mouth/Throat: Oropharynx is clear and moist. No oropharyngeal exudate.   Neck: Normal range of motion. Neck supple.   Cardiovascular: Normal rate and regular rhythm. Exam reveals gallop and S3.    Murmur heard.   Systolic murmur is present with a grade of 2/6   Pulmonary/Chest: Breath sounds normal. She has no wheezes. She has no rhonchi. She has no rales.   Abdominal: Soft. There is no tenderness. There is no rebound and no guarding.   Musculoskeletal: She exhibits no edema.   Neurological: She is alert and oriented to person, place, and time. She has normal strength. No cranial nerve deficit.   Skin: No rash noted.         ED Course   Procedures  Labs Reviewed   CBC W/ AUTO DIFFERENTIAL - Abnormal; Notable for the following:        Result Value    Hemoglobin 10.5 (*)     Hematocrit 33.7 (*)     MCV 78 (*)     MCH 24.4 (*)     MCHC 31.2 (*)     RDW 16.3 (*)     All other components within normal  limits   COMPREHENSIVE METABOLIC PANEL - Abnormal; Notable for the following:     CO2 22 (*)     Total Bilirubin 1.1 (*)     Alkaline Phosphatase 41 (*)     ALT 8 (*)     Anion Gap 7 (*)     All other components within normal limits   B-TYPE NATRIURETIC PEPTIDE - Abnormal; Notable for the following:      (*)     All other components within normal limits   TROPONIN I - Abnormal; Notable for the following:     Troponin I 0.226 (*)     All other components within normal limits   URINALYSIS - Abnormal; Notable for the following:     Appearance, UA Hazy (*)     Specific Gravity, UA >=1.030 (*)     Protein, UA 1+ (*)     Ketones, UA Trace (*)     Leukocytes, UA 2+ (*)     All other components within normal limits   TSH   MAGNESIUM   PROTIME-INR   URINALYSIS MICROSCOPIC   COMPREHENSIVE METABOLIC PANEL   MAGNESIUM   PHOSPHORUS   CBC W/ AUTO DIFFERENTIAL   TROPONIN I          X-Rays:   Independently Interpreted Readings:   Chest X-Ray: Cardiomegaly present. No signs of CHF or pneumonia      Medical Decision Making:   History:   Old Medical Records: I decided to obtain old medical records.  Initial Assessment:       Patient with a history of severe CHF with recent EF of 20% and recent admission 2 days ago for syncope attributed to orthostatic hypotension presents with persistent generalized weakness and near syncope. She was advised to hold 2 blood pressure medications when discharged yesterday and did so today with no hypotension on arrival. Possible orthostatic component to near syncope, will check these vitals as well as basic labs. As per chart review she also had similar generalized weakness on last cardiology visit 1 month ago and was advised to space out medications throughout the day which did not improve symptoms either. She also c/o some cough and SOB, but no sign of CHF exacerbation on exam.    Update:  Workup with no acute findings including no signs of CHF, chronically elevated troponin and negative  orthostatics. Patient still symptomatic with generalized weakness and near syncope with ambulation. Given unclear etiology and risk factors, will admit to medicine to observation and cardiology evaluation.     Independently Interpreted Test(s):   I have ordered and independently interpreted X-rays - see prior notes.  Clinical Tests:   Lab Tests: Reviewed and Ordered  Radiological Study: Reviewed and Ordered  Medical Tests: Reviewed and Ordered  Other:   I have discussed this case with another health care provider.            Scribe Attestation:   Scribe #1: I performed the above scribed service and the documentation accurately describes the services I performed. I attest to the accuracy of the note.    Attending Attestation:           Physician Attestation for Scribe:  Physician Attestation Statement for Scribe #1: I, Dr. Dozier, reviewed documentation, as scribed by Kathryn Diaz in my presence, and it is both accurate and complete.                 ED Course     Clinical Impression:   The encounter diagnosis was Weakness.    Disposition:   Disposition: Placed in Observation       Magnus Dozier MD  02/12/17 0210     no

## 2019-02-28 ENCOUNTER — TELEPHONE (OUTPATIENT)
Dept: OBSTETRICS AND GYNECOLOGY | Facility: CLINIC | Age: 44
End: 2019-02-28

## 2019-02-28 NOTE — TELEPHONE ENCOUNTER
----- Message from Nilda Olson sent at 2/28/2019  8:54 AM CST -----  Contact: [facility] [phone]  Caller advised need to speak with the nurse about the clinical information received for the prior auth. Please call.

## 2019-02-28 NOTE — TELEPHONE ENCOUNTER
Spoke to Skyler Reno with [facility]. More information was given for patient's PA for surgery. All questions answered.

## 2019-03-03 DIAGNOSIS — I50.9 HEART FAILURE: ICD-10-CM

## 2019-03-03 DIAGNOSIS — Z76.82 ORGAN TRANSPLANT CANDIDATE: ICD-10-CM

## 2019-03-04 RX ORDER — SACUBITRIL AND VALSARTAN 49; 51 MG/1; MG/1
TABLET, FILM COATED ORAL
Qty: 60 TABLET | Refills: 0 | OUTPATIENT
Start: 2019-03-04

## 2019-03-21 ENCOUNTER — PATIENT MESSAGE (OUTPATIENT)
Dept: TRANSPLANT | Facility: CLINIC | Age: 44
End: 2019-03-21

## 2019-03-21 ENCOUNTER — OFFICE VISIT (OUTPATIENT)
Dept: OBSTETRICS AND GYNECOLOGY | Facility: CLINIC | Age: 44
End: 2019-03-21
Payer: MEDICAID

## 2019-03-21 ENCOUNTER — ANESTHESIA EVENT (OUTPATIENT)
Dept: SURGERY | Facility: HOSPITAL | Age: 44
DRG: 742 | End: 2019-03-21
Payer: MEDICAID

## 2019-03-21 ENCOUNTER — HOSPITAL ENCOUNTER (OUTPATIENT)
Dept: PREADMISSION TESTING | Facility: HOSPITAL | Age: 44
Discharge: HOME | End: 2019-03-21
Attending: OBSTETRICS & GYNECOLOGY
Payer: MEDICAID

## 2019-03-21 ENCOUNTER — TELEPHONE (OUTPATIENT)
Dept: TRANSPLANT | Facility: CLINIC | Age: 44
End: 2019-03-21

## 2019-03-21 VITALS
SYSTOLIC BLOOD PRESSURE: 118 MMHG | TEMPERATURE: 98 F | DIASTOLIC BLOOD PRESSURE: 61 MMHG | OXYGEN SATURATION: 98 % | HEART RATE: 66 BPM | RESPIRATION RATE: 18 BRPM

## 2019-03-21 VITALS
DIASTOLIC BLOOD PRESSURE: 66 MMHG | HEIGHT: 69 IN | WEIGHT: 257.94 LBS | SYSTOLIC BLOOD PRESSURE: 110 MMHG | BODY MASS INDEX: 38.2 KG/M2

## 2019-03-21 DIAGNOSIS — Z01.818 PREOPERATIVE EXAM FOR GYNECOLOGIC SURGERY: Primary | ICD-10-CM

## 2019-03-21 DIAGNOSIS — D50.9 MICROCYTIC ANEMIA: Primary | Chronic | ICD-10-CM

## 2019-03-21 DIAGNOSIS — I42.0 NONISCHEMIC DILATED CARDIOMYOPATHY: Primary | Chronic | ICD-10-CM

## 2019-03-21 PROCEDURE — 99499 UNLISTED E&M SERVICE: CPT | Mod: S$PBB,,, | Performed by: OBSTETRICS & GYNECOLOGY

## 2019-03-21 PROCEDURE — 99999 PR PBB SHADOW E&M-EST. PATIENT-LVL III: CPT | Mod: PBBFAC,,, | Performed by: OBSTETRICS & GYNECOLOGY

## 2019-03-21 PROCEDURE — 99213 OFFICE O/P EST LOW 20 MIN: CPT | Mod: PBBFAC,PO | Performed by: OBSTETRICS & GYNECOLOGY

## 2019-03-21 PROCEDURE — 99499 NO LOS: ICD-10-PCS | Mod: S$PBB,,, | Performed by: OBSTETRICS & GYNECOLOGY

## 2019-03-21 PROCEDURE — 99999 PR PBB SHADOW E&M-EST. PATIENT-LVL III: ICD-10-PCS | Mod: PBBFAC,,, | Performed by: OBSTETRICS & GYNECOLOGY

## 2019-03-21 RX ORDER — LIDOCAINE HYDROCHLORIDE 10 MG/ML
1 INJECTION, SOLUTION EPIDURAL; INFILTRATION; INTRACAUDAL; PERINEURAL ONCE
Status: CANCELLED | OUTPATIENT
Start: 2019-03-21 | End: 2019-03-21

## 2019-03-21 RX ORDER — SODIUM CHLORIDE, SODIUM LACTATE, POTASSIUM CHLORIDE, CALCIUM CHLORIDE 600; 310; 30; 20 MG/100ML; MG/100ML; MG/100ML; MG/100ML
INJECTION, SOLUTION INTRAVENOUS CONTINUOUS
Status: CANCELLED | OUTPATIENT
Start: 2019-03-21

## 2019-03-21 NOTE — PRE-PROCEDURE INSTRUCTIONS
- Mario - [phone]    Allergies, medical, surgical, family and psychosocial histories reviewed with patient. Periop plan of care reviewed. Preop instructions given, including medications to take and to hold. Hibiclens soap and instructions on use given. Time allotted for questions to be addressed.  Patient verbalized understanding.

## 2019-03-21 NOTE — DISCHARGE INSTRUCTIONS
Your surgery is scheduled for 3/26/19.    Please report to Outpatient Surgery Intake Office on the 2nd FLOOR at 11:00 a.m.          INSTRUCTIONS IMPORTANT!!!  ¨ Do not eat or drink after 12 midnight-including water. OK to brush teeth, no   gum, candy or mints!    ¨ Take only these medicines with a small swallow of water-morning of surgery: amioderone, carvedilol and Entresto        ____  Proceed to [facility] on 3/21/19 for additional blood test.        ____  Do not wear makeup, including mascara.  ____  No powder, lotions or creams to surgical area.  ____  Please remove all jewelry, including piercings and leave at home.  ____  No money or valuables needed. Please leave at home.  ____  Please bring any documents given by your doctor.  ____  If going home the same day, arrange for a ride home. You will not be able to             drive if Anesthesia was used.  ____  Wear loose fitting clothing. Allow for dressings, bandages.  ____  Stop Aspirin, Ibuprofen, Motrin and Aleve at least 3-5 days before surgery, unless otherwise instructed by your doctor, or the nurse.   You MAY use Tylenol/acetaminophen until day of surgery.  ____  Wash the surgical area with Hibiclens the night before surgery, and again the             morning of surgery.  Be sure to rinse hibiclens off completely (if instructed by   nurse).  ____  If you take diabetic medication, do not take am of surgery unless instructed by Doctor.  ____  Call MD for temperature above 101 degrees or any other signs of infection such as Urinary (bladder) infection, Upper respiratory infection, skin boils, etc.  ____ Stop taking any Fish Oil supplement or any Vitamins that contain Vitamin E at least 5 days prior to surgery.  ____ Do Not wear your contact lenses the day of your procedure.  You may wear your glasses.      ____Do not shave surgical site for 3 days prior to surgery.  ____ Practice Good hand washing before, during, and after procedure.      I  have read or had read and explained to me, and understand the above information.  Additional comments or instructions:  For additional questions call [phone]      ANESTHESIA SIDE EFFECTS  -For the first 24 hours after surgery:  Do not drive, use heavy equipment, make important decisions, or drink alcohol  -It is normal to feel sleepy for several hours.  Rest until you are more awake.  -Have someone stay with you, if needed.  They can watch for problems and help keep you safe.  -Some possible post anesthesia side effects include: nausea and vomiting, sore throat and hoarseness, sleepiness, and dizziness.        Pre-Op Bathing Instructions    Before surgery, you can play an important role in your own health.    Because skin is not sterile, we need to be sure that your skin is as free of germs as possible. By following the instructions below, you can reduce the number of germs on your skin before surgery.    IMPORTANT: You will need to shower with a special soap called Hibiclens*, available at any pharmacy.  If you are allergic to Chlorhexidine (the antiseptic in Hibiclens), use an antibacterial soap such as Dial Soap for your preoperative shower.  You will shower with Hibiclens both the night before your surgery and the morning of your surgery.  Do not use Hibiclens on the head, face or genitals to avoid injury to those areas.    STEP #1: THE NIGHT BEFORE YOUR SURGERY     1. Do not shave the area of your body where your surgery will be performed.  2. Shower and wash your hair and body as usual with your normal soap and shampoo.  3. Rinse your hair and body thoroughly after you shower to remove all soap residue.  4. With your hand, apply one packet of Hibiclens soap to the surgical site.   5. Wash the site gently for five (5) minutes. Do not scrub your skin too hard.   6. Do not wash with your regular soap after Hibiclens is used.  7. Rinse your body thoroughly.  8. Pat yourself dry with a clean, soft towel.  9. Do  not use lotion, cream, or powder.  10. Wear clean clothes.    STEP #2: THE MORNING OF YOUR SURGERY     1. Repeat Step #1.    * Not to be used by people allergic to Chlorhexidine.        [facility] Pre-Op Instructions (Hysterectomy)  Getting Ready for Surgery: What You Need to Know!  You are scheduled for a (surgical procedure) on (date).    Please arrive at (time)  The Day Before Surgery   Have someone drive you to the hospital or surgery center  o You cannot drive for at least 24 hours.  Please arrange for someone to drive you home after surgery.     Look over your medication with your doctor  o Certain medications such as Aspirin, blood thinners, and herbal medications, such as [place]s Wort must be stopped up to 7 days before surgery.  Make sure your doctors are aware of ALL medications that you take including over-the-counter and herbal medications.  Some medications will need to be taken the morning of surgery with a sip of water.     Remove nail polish and/or artificial nails before the surgery  o During surgery, we will need to track your vitals with a monitor that will be placed on your finger. The monitor will not work properly if you are wearing nail polish or artificial nails   Do not eat AFTER 9 pm the day before your surgery  o In general, you can eat your normal foods the day before surgery.  However, you should not eat or drink after 9 pm the day before your surgery unless otherwise instructed by your doctor.     Do your part to prevent a wound infection  o Do not shave the area of your body where surgery will be performed 5 DAYS prior to your surgery.  When you shave, tiny cuts can happen and allow bacteria to enter into the cuts  o Do shower the evening before and morning of procedure with antibacterial soap.  o Bring clean clothing to wear home after your surgery.  o Stop smoking.  Smoking cessation can prevent wound infections.  If you currently smoke and are interested in quitting, we can  provide resources for you.  The Morning of Surgery...  o Do not use lotions, creams or deodorant.  o Do not wear jewelry, makeup, hair clips or contact lenses.  Remember to remove all piercings.  If you wear glasses, dentures, or hearing aids, please bring a container to place them in during your surgery and give to a family member for safekeeping.    If you have questions prior to surgery, please contact your doctors office or the pre-op clinic at [phone].

## 2019-03-21 NOTE — PRE-PROCEDURE INSTRUCTIONS
Pt instructed to obtain current cardiac clearance and anesthesia recommendations by Andi, NP, pt verbalized understanding.  Imani at Dr. Rose's office notified of above

## 2019-03-21 NOTE — TELEPHONE ENCOUNTER
Received message from pt stating she is scheduled for CLARISSA on 3/26 and needs ASAP cardiac clearance letter for anesthesia.  Pt last seen by Dr De Jesus in 10/18 with cpx showing Pk VO2 12.4, slope 30.8.  Last echo was 4/18 w/ EF 15-20%.        Discussed with anesthesia and Dr Hatch.  Recommendation made for repeat echo and HTS clinic for clearance.   Urgent appts scheduled for tomorrow for clearance. Pt informed.  Anesthesia informed.

## 2019-03-21 NOTE — PROGRESS NOTES
.Patient presents today for pre-op consultation and consents. The planned procedure was discussed and risks, benefits, pre-op and post-op preparations reviewed.   Orders and instructions were given and consents reviewed, signed and witnessed.  Medical and surgical history and physical exam appropriate to the procedure were reviewed and previous records were reviewed.  Patient will call with any other questions or concerns, or any need to change the present plan.    [facility] 3/26/2019

## 2019-03-21 NOTE — ANESTHESIA PREPROCEDURE EVALUATION
"                                                                                                             2019  Mario Mahajan is a 43 y.o., female with history of nonischemic CHF (LVEF 28%) with ICD for history of VF scheduled for hysterectomy on 3/26/19.    Cardiac risk stratification in Epic, 3/22/19.  "With stable class 2 symptoms on good medical therapy seems moderate risk for low to moderate risk surgery.   Will take lasix prior to surgery."    ICD, Single  [employer] [id number] ICD FORTIFY DEXTER VR [id number]    Past Medical History:   Diagnosis Date    Asthma     Chronic back pain 2014    Chronic combined systolic and diastolic congestive heart failure 2015     2-10-17   1 - Severely depressed left ventricular systolic function (EF 20-25%).    2 - Severe left ventricular enlargement.    3 - Severe left atrial enlargement.    4 - Left ventricular diastolic dysfunction.    5 - The estimated PA systolic pressure is 18 mmHg.    6 - Mild mitral regurgitation.     Encounter for blood transfusion     ICD (implantable cardioverter-defibrillator) in place 12/01/15 3/3/2016    Menorrhagia, premenopausal 2/10/2017    Microcytic anemia 2015    Non-rheumatic mitral regurgitation 3/5/2015    Nonischemic dilated cardiomyopathy 2015    Sleep apnea     Syncope and collapse 2017    Ventricular tachycardia, polymorphic      Past Surgical History:   Procedure Laterality Date    CARDIAC DEFIBRILLATOR PLACEMENT  2015     SECTION      HEART CATH-RIGHT Right 3/26/2018    Per[facility] C[facility] LAB    INSERTION-ICD-SINGLE N/A 2015    Performed by Victorino Tay [facility] at [facility] CATH LAB    REVISION-LEAD-ICD N/A 2016    Performed by [facility] at [facility] CATH LAB    TUBAL LIGATION         Anesthesia Evaluation    I have reviewed the Patient Summary Reports.     I have reviewed the Medications.     Review " of Systems  Anesthesia Hx:  No problems with previous Anesthesia    Social:  Non-Smoker, Social Alcohol Use    Hematology/Oncology:  Hematology Normal        Cardiovascular:    Denies Angina.         ECG has been reviewed.  Functional Capacity able to walk 15 minutes on treadmill   Valvular Heart Disease: Mitral Regurgitation (MR), severe, Tricuspid Regurgitation (TR), moderate   Congestive Heart Failure (CHF) , LV Systolic HF, LV Diastolic HF Disorder of Cardiac Rhythm, Ventricular Fibrillation, controlled with ICD/Pacemaker  Other Cardiac Conditions, Pulmonary Hypertension   Pulmonary:  Asthma:   Inhaler use is rescue inhaler PRN. Current breathing status is optimal, free of wheezing.    Renal/:  Renal/ Normal     Hepatic/GI:  Hepatic/GI Normal  Denies GERD. Denies Liver Disease.    Neurological:  Neurology Normal Denies TIA.  Denies CVA. Denies Seizures.    Endocrine:  Endocrine Normal      Echo 3/22/19:  · Severely decreased left ventricular systolic function. The estimated ejection fraction is 20%, 28% by quantitiative LVEF.  · Severe left ventricular enlargement.  · Eccentric left ventricular hypertrophy.  · Grade II (moderate) left ventricular diastolic dysfunction consistent with pseudonormalization.  · Elevated left atrial pressure.  · Severe left atrial enlargement.  · Severe mitral regurgitation.  · Mild to moderate tricuspid regurgitation.  · Severe right atrial enlargement.  · Pulmonary hypertension present.  · The estimated PA systolic pressure is 42 mm Hg  · Intermediate central venous pressure (8 mm Hg).  · Normal right ventricular systolic function.      Physical Exam  General:  Obesity    Airway/Jaw/Neck:  Airway Findings: Mouth Opening: Normal Tongue: Normal  General Airway Assessment: Adult  Mallampati: II  TM Distance: Normal, at least 6 cm         Dental:  DENTAL FINDINGS: Normal   Chest/Lungs:  Chest/Lungs Findings: Clear to auscultation, Normal Respiratory Rate     Heart/Vascular:  Heart  Findings: Rate: Normal  Rhythm: Regular Rhythm  Sounds: Normal  Heart murmur: negative       Mental Status:  Mental Status Findings:  Cooperative, Alert and Oriented         Anesthesia Plan  Type of Anesthesia, risks & benefits discussed:  Anesthesia Type:  general  Patient's Preference:   Intra-op Monitoring Plan:   Intra-op Monitoring Plan Comments:   Post Op Pain Control Plan:   Post Op Pain Control Plan Comments:   Induction:   IV and Inhalation  Beta Blocker:         Informed Consent: Patient understands risks and agrees with Anesthesia plan.  Questions answered.   ASA Score: 4     Day of Surgery Review of History & Physical: I have interviewed and examined the patient. I have reviewed the patient's H&P dated:        Anesthesia Plan Notes: Anesthesia consent to be signed prior to procedure on 3/26/19  Cardiac risk stratification in [facility], 3/22/19.        Ready For Surgery From Anesthesia Perspective.

## 2019-03-22 ENCOUNTER — HOSPITAL ENCOUNTER (OUTPATIENT)
Dept: CARDIOLOGY | Facility: CLINIC | Age: 44
Discharge: HOME | End: 2019-03-22
Attending: INTERNAL MEDICINE
Payer: MEDICAID

## 2019-03-22 ENCOUNTER — OFFICE VISIT (OUTPATIENT)
Dept: TRANSPLANT | Facility: CLINIC | Age: 44
End: 2019-03-22
Payer: MEDICAID

## 2019-03-22 ENCOUNTER — HOSPITAL ENCOUNTER (OUTPATIENT)
Dept: PULMONOLOGY | Facility: CLINIC | Age: 44
Discharge: HOME | End: 2019-03-22
Payer: MEDICAID

## 2019-03-22 VITALS
HEIGHT: 69 IN | BODY MASS INDEX: 38.59 KG/M2 | WEIGHT: 260.56 LBS | SYSTOLIC BLOOD PRESSURE: 122 MMHG | HEART RATE: 88 BPM | BODY MASS INDEX: 35.55 KG/M2 | DIASTOLIC BLOOD PRESSURE: 61 MMHG | DIASTOLIC BLOOD PRESSURE: 70 MMHG | SYSTOLIC BLOOD PRESSURE: 112 MMHG | HEIGHT: 69 IN | HEART RATE: 71 BPM | WEIGHT: 240 LBS

## 2019-03-22 VITALS — WEIGHT: 257.94 LBS | BODY MASS INDEX: 38.2 KG/M2 | HEIGHT: 69 IN

## 2019-03-22 DIAGNOSIS — Z01.818 PRE-OP EVALUATION: ICD-10-CM

## 2019-03-22 DIAGNOSIS — I42.0 NONISCHEMIC DILATED CARDIOMYOPATHY: Primary | Chronic | ICD-10-CM

## 2019-03-22 DIAGNOSIS — I42.0 NONISCHEMIC DILATED CARDIOMYOPATHY: Chronic | ICD-10-CM

## 2019-03-22 DIAGNOSIS — I34.0 NON-RHEUMATIC MITRAL REGURGITATION: Chronic | ICD-10-CM

## 2019-03-22 DIAGNOSIS — I42.0 NONISCHEMIC DILATED CARDIOMYOPATHY: ICD-10-CM

## 2019-03-22 DIAGNOSIS — I50.42 CHRONIC COMBINED SYSTOLIC AND DIASTOLIC CONGESTIVE HEART FAILURE: Primary | ICD-10-CM

## 2019-03-22 DIAGNOSIS — Z76.82 ORGAN TRANSPLANT CANDIDATE: ICD-10-CM

## 2019-03-22 LAB
ASCENDING AORTA: 2.52 CM
AV INDEX (PROSTH): 0.45
AV MEAN GRADIENT: 5.51 MMHG
AV PEAK GRADIENT: 8.88 MMHG
AV VALVE AREA: 2.02 CM2
AV VELOCITY RATIO: 0.42
BSA FOR ECHO PROCEDURE: 2.3 M2
CV ECHO LV RWT: 0.2 CM
DOP CALC AO PEAK VEL: 1.49 M/S
DOP CALC AO VTI: 29.75 CM
DOP CALC LVOT AREA: 4.52 CM2
DOP CALC LVOT DIAMETER: 2.4 CM
DOP CALC LVOT PEAK VEL: 0.62 M/S
DOP CALC LVOT STROKE VOLUME: 60.18 CM3
DOP CALCLVOT PEAK VEL VTI: 13.31 CM
E WAVE DECELERATION TIME: 202.73 MSEC
E/A RATIO: 1.08
E/E' RATIO: 15.63
ECHO LV POSTERIOR WALL: 0.81 CM (ref 0.6–1.1)
FRACTIONAL SHORTENING: 9 % (ref 28–44)
INTERVENTRICULAR SEPTUM: 0.48 CM (ref 0.6–1.1)
IVRT: 0.1 MSEC
LA MAJOR: 6.44 CM
LA MINOR: 6.26 CM
LA WIDTH: 6.15 CM
LEFT ATRIUM SIZE: 5.42 CM
LEFT ATRIUM VOLUME INDEX: 77.8 ML/M2
LEFT ATRIUM VOLUME: 179.88 CM3
LEFT INTERNAL DIMENSION IN SYSTOLE: 7.3 CM (ref 2.1–4)
LEFT VENTRICLE DIASTOLIC VOLUME INDEX: 163.53 ML/M2
LEFT VENTRICLE DIASTOLIC VOLUME: 378 ML
LEFT VENTRICLE MASS INDEX: 104.6 G/M2
LEFT VENTRICLE SYSTOLIC VOLUME INDEX: 121.5 ML/M2
LEFT VENTRICLE SYSTOLIC VOLUME: 280.84 ML
LEFT VENTRICULAR INTERNAL DIMENSION IN DIASTOLE: 8 CM (ref 3.5–6)
LEFT VENTRICULAR MASS: 241.68 G
LV LATERAL E/E' RATIO: 12.5
LV SEPTAL E/E' RATIO: 20.83
MV PEAK A VEL: 1.16 M/S
MV PEAK E VEL: 1.25 M/S
PISA TR MAX VEL: 2.93 M/S
PULM VEIN S/D RATIO: 0.91
PV PEAK D VEL: 0.54 M/S
PV PEAK S VEL: 0.49 M/S
RA MAJOR: 4.46 CM
RA PRESSURE: 8 MMHG
RA WIDTH: 4.11 CM
RETIRED EF AND QEF - SEE NOTES: 28 %
RIGHT VENTRICULAR END-DIASTOLIC DIMENSION: 4.08 CM
RV TISSUE DOPPLER FREE WALL SYSTOLIC VELOCITY 1 (APICAL 4 CHAMBER VIEW): 13.01 M/S
SINUS: 2.61 CM
STJ: 2.32 CM
TDI LATERAL: 0.1
TDI SEPTAL: 0.06
TDI: 0.08
TR MAX PG: 34.34 MMHG
TV REST PULMONARY ARTERY PRESSURE: 42 MMHG

## 2019-03-22 PROCEDURE — 99999 PR PBB SHADOW E&M-EST. PATIENT-LVL III: CPT | Mod: PBBFAC,,, | Performed by: INTERNAL MEDICINE

## 2019-03-22 PROCEDURE — 99999 PR PBB SHADOW E&M-EST. PATIENT-LVL III: ICD-10-PCS | Mod: PBBFAC,,, | Performed by: INTERNAL MEDICINE

## 2019-03-22 PROCEDURE — 94618 PULMONARY STRESS TESTING: CPT | Mod: 26,S$PBB,, | Performed by: INTERNAL MEDICINE

## 2019-03-22 PROCEDURE — 94618 PULMONARY STRESS TESTING: ICD-10-PCS | Mod: 26,S$PBB,, | Performed by: INTERNAL MEDICINE

## 2019-03-22 PROCEDURE — 99213 OFFICE O/P EST LOW 20 MIN: CPT | Mod: PBBFAC,25 | Performed by: INTERNAL MEDICINE

## 2019-03-22 PROCEDURE — 94618 PULMONARY STRESS TESTING: CPT | Mod: PBBFAC | Performed by: INTERNAL MEDICINE

## 2019-03-22 PROCEDURE — 99214 PR OFFICE/OUTPT VISIT, EST, LEVL IV, 30-39 MIN: ICD-10-PCS | Mod: S$PBB,,, | Performed by: INTERNAL MEDICINE

## 2019-03-22 PROCEDURE — 99214 OFFICE O/P EST MOD 30 MIN: CPT | Mod: S$PBB,,, | Performed by: INTERNAL MEDICINE

## 2019-03-22 PROCEDURE — 93306 TRANSTHORACIC ECHO (TTE) COMPLETE (CUPID ONLY): ICD-10-PCS | Mod: 26,S$PBB,, | Performed by: INTERNAL MEDICINE

## 2019-03-22 PROCEDURE — 93306 TTE W/DOPPLER COMPLETE: CPT | Mod: PBBFAC | Performed by: INTERNAL MEDICINE

## 2019-03-22 NOTE — PATIENT INSTRUCTIONS
Take lasix 40 mg daily in addition to spirolactone til Monday   Go to CPX and valve clinic   OK to hold aspirin prior to surgery

## 2019-03-22 NOTE — PROGRESS NOTES
Message received from dr jackson requesting 6mwt today in addition to echo.  Order entered and scheduled. Pt informed.

## 2019-03-22 NOTE — PROGRESS NOTES
Subjective: class 2B     Patient ID:  Mario Mahajan is a 43 y.o. female who presents for follow-up of Heart Transplant Pre-evaluation      HPI Nonischemic CHF (LVEF 28%, LVEDD 8 cm severe MR March 2019) who had three episodes of VF in early Feb 2017, one of which required defibrillation from ICD. She has not had any ICD shocks since 2/2017 who needs to get risk assessment for hysterectomy / BSO in late March 2019.          Since last visit in Oct 2018 with DREW De Jesus, she has been stable Able to walk more than two blocks No edema despite four kg increase. No exertional chest pain or active CHF symptoms (see ROS)     Echo 3/22  · Severely decreased left ventricular systolic function. The estimated ejection fraction is 20%, 28% by quantitiative LVEF.  · Severe left ventricular enlargement.  · Eccentric left ventricular hypertrophy.  · Grade II (moderate) left ventricular diastolic dysfunction consistent with pseudonormalization.  · Elevated left atrial pressure.  · Severe left atrial enlargement.  · Severe mitral regurgitation.  · Mild to moderate tricuspid regurgitation.  · Severe right atrial enlargement.  · Pulmonary hypertension present.  · The estimated PA systolic pressure is 42 mm Hg  · Intermediate central venous pressure (8 mm Hg).  · Normal right ventricular systolic function.    Six minute walk 03/22/2019---------Distance: 396.24 meters (1300 feet) Since the previous study in March 2017, exercise capacity is   unchanged.    (Resting) 100 % Room Air 70 bpm 117/56 mmHg 0   During Exercise 99 % Room Air 120 bpm 133/61 mmHg 1   Post-exercise  (Recovery) 99 % Room Air  102 bpm       Review of Systems   Constitution: Negative for decreased appetite, weight gain and weight loss.   Cardiovascular: Negative for chest pain, dyspnea on exertion, leg swelling, near-syncope, orthopnea and palpitations.   Respiratory: Negative for cough and shortness of breath.    Musculoskeletal: Negative for myalgias.  "  Gastrointestinal: Negative for jaundice.        Objective:    Physical Exam   Constitutional: She appears well-developed and well-nourished. No distress.   /61 (BP Location: Right arm, Patient Position: Sitting, BP Method: Medium (Automatic))   Pulse 71   Ht 5' 9" (1.753 m)   Wt 118.2 kg (260 lb 9.3 oz)   LMP 03/01/2019   BMI 38.48 kg/m²      HENT:   Head: Normocephalic and atraumatic. Head is without abrasion and without contusion.   Right Ear: External ear normal.   Left Ear: External ear normal.   Nose: Nose normal. No epistaxis.   Mouth/Throat: Oropharynx is clear and moist. Mucous membranes are not cyanotic.   Eyes: Conjunctivae and EOM are normal. Pupils are equal, round, and reactive to light.   Neck: Normal range of motion. Neck supple. No tracheal deviation present.   Cardiovascular: Normal rate, regular rhythm, normal heart sounds and normal pulses. Exam reveals no gallop.   No murmur heard.  Pulmonary/Chest: Effort normal and breath sounds normal. No stridor. No respiratory distress. She has no wheezes.   Abdominal: Soft. Normal appearance, normal aorta and bowel sounds are normal. She exhibits no distension. There is no tenderness.   Musculoskeletal: She exhibits no edema or tenderness.   Neurological: She is alert. She has normal strength and normal reflexes. She exhibits normal muscle tone.   Skin: Skin is warm. No rash noted. No erythema.   Psychiatric: She has a normal mood and affect. Her speech is normal and behavior is normal. Judgment and thought content normal. Cognition and memory are normal.         Assessment:       1. Chronic combined systolic and diastolic congestive heart failure    2. Pre-op evaluation    3. Non-rheumatic mitral regurgitation    4. Nonischemic dilated cardiomyopathy    5. Organ transplant candidate         Plan:       Pre-op evaluation  With stable class 2 symptoms on good medical therapy seems moderate risk for low to moderate risk surgery  Will take lasix " prior to surgery   OK to stop aspirin (normal coronaries on march 2017 cath)     Non-rheumatic mitral regurgitation  Will refer to valve clinic for severe MR     Nonischemic dilated cardiomyopathy  Will increase entresto to 97 / 103 after hysterectomy (ordered labs one week after surgery)     Organ transplant candidate  Not currently listed - will follow in CHF clinic   Will get repeat CPX after gyn surgery       Follow-up in about 3 months (around 6/22/2019).    Orders Placed This Encounter   Procedures    Basic metabolic panel    Brain natriuretic peptide    Ambulatory Referral to Interventional Cardiology

## 2019-03-22 NOTE — Clinical Note
Ok to proceed with surgery Will inform nelli mirza that she is having surgery in case there are issues

## 2019-03-22 NOTE — ASSESSMENT & PLAN NOTE
With stable class 2 symptoms on good medical therapy seems moderate risk for low to moderate risk surgery  Will take lasix prior to surgery   OK to stop aspirin (normal coronaries on march 2017 cath)

## 2019-03-22 NOTE — PROCEDURES
Mario Mahajan is a 43 y.o.  female patient, who presents for a 6 minute walk test ordered by Nereida Hatch MD.  The diagnosis is Congestive Heart Failure; Cardiomyopathy.  The patient's BMI is 38.2 kg/m2.  Predicted distance (lower limit of normal) is 400.6 meters.      Test Results:    The test was completed without stopping.  The total time walked was 360 seconds.  During walking, the patient reported:  Chest pain, Dyspnea.  The patient used no assistive devices during testing.     03/22/2019---------Distance: 396.24 meters (1300 feet)     O2 Sat % Supplemental Oxygen Heart Rate Blood Pressure Carolyn Scale   Pre-exercise  (Resting) 100 % Room Air 70 bpm 117/56 mmHg 0   During Exercise 99 % Room Air 120 bpm 133/61 mmHg 1   Post-exercise  (Recovery) 99 % Room Air  102 bpm       Recovery Time:  82 seconds    Performing nurse/tech:  MARJORIE Magana RRT      PREVIOUS STUDY:   03/21/2017---------Distance: 426.72 meters (1400 feet)       O2 Sat % Supplemental Oxygen Heart Rate Blood Pressure Carolyn Scale   Pre-exercise  (Resting) 97 % Room Air 71 bpm 121/61 mmHg 3   During Exercise 98 % Room Air 94 bpm 139/70 mmHg 3   Post-exercise  (Recovery) 98 % Room Air  83 bpm   mmHg          CLINICAL INTERPRETATION:  Six minute walk distance is 396.24 meters (1300 feet) with very light dyspnea.  During exercise, there was no significant desaturation while breathing room air.  Blood pressure remained stable and Heart rate increased significantly with walking.  The patient reported non-pulmonary symptoms during exercise.  Since the previous study in March 2017, exercise capacity is unchanged.  Based upon age and body mass index, exercise capacity is less than predicted.

## 2019-03-25 ENCOUNTER — TELEPHONE (OUTPATIENT)
Dept: TRANSPLANT | Facility: CLINIC | Age: 44
End: 2019-03-25

## 2019-03-25 DIAGNOSIS — I42.0 NONISCHEMIC DILATED CARDIOMYOPATHY: Primary | ICD-10-CM

## 2019-03-25 NOTE — TELEPHONE ENCOUNTER
Care of patient is being transferred to CHF section from Preht section, per Dr. Hatch.    Dx: CHF  Reason: Transfer of Care/Medical Stability  Pt is/is not on home inotrope therapy.  Med/Dose: n/a  Infusion Company: n/a   agency: n/a  Phone: n/a  Fax: n/a  Lab Schedule: n/a  Labs ordered: n/a  Lab/phone/fax: n/a  Outstanding orders scheduled: CMP/BNP for 4/3/19 prior to up-titrating Entresto    Pt having gyn sx tomorrow.

## 2019-03-25 NOTE — TELEPHONE ENCOUNTER
Spoke with pt this morning at the request of Dr. KOBE Hatch. MD asked me to inform pt that the mitral valve clinic would not be able to see the pt until she has medicare. Pt verbalized understanding. Discussed with pt up-titirating Entresto dose to  following surgery. Pt verified that Md had spoke with her about this. MD requesting lab draw 1 week s/p gyn surgery prior to med change. Pt verbalized understanding and requested lab draw to be done at [facility]. Pt concerned about being released by MD for physical activity following gyn surg. Asked pt to call if the MD has restrictions placed on her limiting her ablilty to make the lab appointment. Pt verbalized understanding. Will make lab appt for next Wed in [place].

## 2019-03-26 ENCOUNTER — HOSPITAL ENCOUNTER (INPATIENT)
Facility: HOSPITAL | Age: 44
LOS: 2 days | Discharge: HOME | DRG: 742 | End: 2019-03-28
Attending: OBSTETRICS & GYNECOLOGY | Admitting: OBSTETRICS & GYNECOLOGY
Payer: MEDICAID

## 2019-03-26 ENCOUNTER — ANESTHESIA (OUTPATIENT)
Dept: SURGERY | Facility: HOSPITAL | Age: 44
DRG: 742 | End: 2019-03-26
Payer: MEDICAID

## 2019-03-26 DIAGNOSIS — Z01.818 PREOPERATIVE EXAM FOR GYNECOLOGIC SURGERY: ICD-10-CM

## 2019-03-26 LAB — POCT GLUCOSE: 99 MG/DL (ref 70–110)

## 2019-03-26 PROCEDURE — 88307 TISSUE EXAM BY PATHOLOGIST: CPT | Mod: 26,,, | Performed by: PATHOLOGY

## 2019-03-26 PROCEDURE — 63600175 PHARM REV CODE 636 W HCPCS: Performed by: FAMILY MEDICINE

## 2019-03-26 PROCEDURE — 37000009 HC ANESTHESIA EA ADD 15 MINS: Performed by: OBSTETRICS & GYNECOLOGY

## 2019-03-26 PROCEDURE — 36000709 HC OR TIME LEV III EA ADD 15 MIN: Performed by: OBSTETRICS & GYNECOLOGY

## 2019-03-26 PROCEDURE — 58150 TOTAL HYSTERECTOMY: CPT | Mod: ,,, | Performed by: OBSTETRICS & GYNECOLOGY

## 2019-03-26 PROCEDURE — 11000001 HC ACUTE MED/SURG PRIVATE ROOM

## 2019-03-26 PROCEDURE — 63600175 PHARM REV CODE 636 W HCPCS: Performed by: NURSE ANESTHETIST, CERTIFIED REGISTERED

## 2019-03-26 PROCEDURE — 58150 PR TOTAL ABDOM HYSTERECTOMY: ICD-10-PCS | Mod: ,,, | Performed by: OBSTETRICS & GYNECOLOGY

## 2019-03-26 PROCEDURE — 25000003 PHARM REV CODE 250: Performed by: OBSTETRICS & GYNECOLOGY

## 2019-03-26 PROCEDURE — 27200665 HC NERVE BLOCK NEEDLE/ CATHETER: Performed by: FAMILY MEDICINE

## 2019-03-26 PROCEDURE — 99232 PR SUBSEQUENT HOSPITAL CARE,LEVL II: ICD-10-PCS | Mod: ,,, | Performed by: NURSE PRACTITIONER

## 2019-03-26 PROCEDURE — 71000039 HC RECOVERY, EACH ADD'L HOUR: Performed by: OBSTETRICS & GYNECOLOGY

## 2019-03-26 PROCEDURE — 63600175 PHARM REV CODE 636 W HCPCS: Performed by: ANESTHESIOLOGY

## 2019-03-26 PROCEDURE — 25000003 PHARM REV CODE 250: Performed by: ANESTHESIOLOGY

## 2019-03-26 PROCEDURE — C9290 INJ, BUPIVACAINE LIPOSOME: HCPCS | Performed by: FAMILY MEDICINE

## 2019-03-26 PROCEDURE — 63600175 PHARM REV CODE 636 W HCPCS: Performed by: OBSTETRICS & GYNECOLOGY

## 2019-03-26 PROCEDURE — 25000003 PHARM REV CODE 250: Performed by: FAMILY MEDICINE

## 2019-03-26 PROCEDURE — 27201423 OPTIME MED/SURG SUP & DEVICES STERILE SUPPLY: Performed by: OBSTETRICS & GYNECOLOGY

## 2019-03-26 PROCEDURE — 71000033 HC RECOVERY, INTIAL HOUR: Performed by: OBSTETRICS & GYNECOLOGY

## 2019-03-26 PROCEDURE — 25000242 PHARM REV CODE 250 ALT 637 W/ HCPCS: Performed by: NURSE ANESTHETIST, CERTIFIED REGISTERED

## 2019-03-26 PROCEDURE — 88307 TISSUE SPECIMEN TO PATHOLOGY - SURGERY: ICD-10-PCS | Mod: 26,,, | Performed by: PATHOLOGY

## 2019-03-26 PROCEDURE — 37000008 HC ANESTHESIA 1ST 15 MINUTES: Performed by: OBSTETRICS & GYNECOLOGY

## 2019-03-26 PROCEDURE — 25000003 PHARM REV CODE 250: Performed by: NURSE ANESTHETIST, CERTIFIED REGISTERED

## 2019-03-26 PROCEDURE — 36000708 HC OR TIME LEV III 1ST 15 MIN: Performed by: OBSTETRICS & GYNECOLOGY

## 2019-03-26 PROCEDURE — 99232 SBSQ HOSP IP/OBS MODERATE 35: CPT | Mod: ,,, | Performed by: NURSE PRACTITIONER

## 2019-03-26 PROCEDURE — 64488 TAP BLOCK BI INJECTION: CPT | Performed by: ANESTHESIOLOGY

## 2019-03-26 PROCEDURE — 88307 TISSUE EXAM BY PATHOLOGIST: CPT | Performed by: PATHOLOGY

## 2019-03-26 RX ORDER — LIDOCAINE HCL/PF 100 MG/5ML
SYRINGE (ML) INTRAVENOUS
Status: DISCONTINUED | OUTPATIENT
Start: 2019-03-26 | End: 2019-03-26

## 2019-03-26 RX ORDER — DIPHENHYDRAMINE HYDROCHLORIDE 50 MG/ML
25 INJECTION INTRAMUSCULAR; INTRAVENOUS EVERY 6 HOURS PRN
Status: DISCONTINUED | OUTPATIENT
Start: 2019-03-26 | End: 2019-03-26 | Stop reason: HOSPADM

## 2019-03-26 RX ORDER — ONDANSETRON HYDROCHLORIDE 2 MG/ML
INJECTION, SOLUTION INTRAMUSCULAR; INTRAVENOUS
Status: DISCONTINUED | OUTPATIENT
Start: 2019-03-26 | End: 2019-03-26

## 2019-03-26 RX ORDER — SODIUM CHLORIDE, SODIUM LACTATE, POTASSIUM CHLORIDE, CALCIUM CHLORIDE 600; 310; 30; 20 MG/100ML; MG/100ML; MG/100ML; MG/100ML
INJECTION, SOLUTION INTRAVENOUS CONTINUOUS
Status: DISCONTINUED | OUTPATIENT
Start: 2019-03-26 | End: 2019-03-28 | Stop reason: HOSPADM

## 2019-03-26 RX ORDER — GLYCOPYRROLATE 0.2 MG/ML
INJECTION INTRAMUSCULAR; INTRAVENOUS
Status: DISCONTINUED | OUTPATIENT
Start: 2019-03-26 | End: 2019-03-26

## 2019-03-26 RX ORDER — EPHEDRINE SULFATE 50 MG/ML
INJECTION, SOLUTION INTRAVENOUS
Status: DISCONTINUED | OUTPATIENT
Start: 2019-03-26 | End: 2019-03-26

## 2019-03-26 RX ORDER — BISACODYL 10 MG
10 SUPPOSITORY, RECTAL RECTAL DAILY PRN
Status: DISCONTINUED | OUTPATIENT
Start: 2019-03-26 | End: 2019-03-28 | Stop reason: HOSPADM

## 2019-03-26 RX ORDER — IBUPROFEN 600 MG/1
600 TABLET ORAL EVERY 6 HOURS
Status: DISCONTINUED | OUTPATIENT
Start: 2019-03-27 | End: 2019-03-28 | Stop reason: HOSPADM

## 2019-03-26 RX ORDER — ALBUTEROL SULFATE 90 UG/1
AEROSOL, METERED RESPIRATORY (INHALATION)
Status: DISCONTINUED | OUTPATIENT
Start: 2019-03-26 | End: 2019-03-26

## 2019-03-26 RX ORDER — SUCCINYLCHOLINE CHLORIDE 20 MG/ML
INJECTION INTRAMUSCULAR; INTRAVENOUS
Status: DISCONTINUED | OUTPATIENT
Start: 2019-03-26 | End: 2019-03-26

## 2019-03-26 RX ORDER — DIPHENHYDRAMINE HYDROCHLORIDE 50 MG/ML
25 INJECTION INTRAMUSCULAR; INTRAVENOUS EVERY 4 HOURS PRN
Status: DISCONTINUED | OUTPATIENT
Start: 2019-03-26 | End: 2019-03-28 | Stop reason: HOSPADM

## 2019-03-26 RX ORDER — NALOXONE HCL 0.4 MG/ML
0.02 VIAL (ML) INJECTION
Status: DISCONTINUED | OUTPATIENT
Start: 2019-03-26 | End: 2019-03-28 | Stop reason: HOSPADM

## 2019-03-26 RX ORDER — ROCURONIUM BROMIDE 10 MG/ML
INJECTION, SOLUTION INTRAVENOUS
Status: DISCONTINUED | OUTPATIENT
Start: 2019-03-26 | End: 2019-03-26

## 2019-03-26 RX ORDER — NEOSTIGMINE METHYLSULFATE 1 MG/ML
INJECTION, SOLUTION INTRAVENOUS
Status: DISCONTINUED | OUTPATIENT
Start: 2019-03-26 | End: 2019-03-26

## 2019-03-26 RX ORDER — KETOROLAC TROMETHAMINE 30 MG/ML
30 INJECTION, SOLUTION INTRAMUSCULAR; INTRAVENOUS EVERY 6 HOURS
Status: DISPENSED | OUTPATIENT
Start: 2019-03-26 | End: 2019-03-27

## 2019-03-26 RX ORDER — LIDOCAINE HYDROCHLORIDE 10 MG/ML
1 INJECTION, SOLUTION EPIDURAL; INFILTRATION; INTRACAUDAL; PERINEURAL ONCE
Status: DISCONTINUED | OUTPATIENT
Start: 2019-03-26 | End: 2019-03-26 | Stop reason: HOSPADM

## 2019-03-26 RX ORDER — HYDROMORPHONE HYDROCHLORIDE 2 MG/ML
0.5 INJECTION, SOLUTION INTRAMUSCULAR; INTRAVENOUS; SUBCUTANEOUS EVERY 5 MIN PRN
Status: DISPENSED | OUTPATIENT
Start: 2019-03-26 | End: 2019-03-26

## 2019-03-26 RX ORDER — MIDAZOLAM HYDROCHLORIDE 1 MG/ML
INJECTION INTRAMUSCULAR; INTRAVENOUS
Status: DISCONTINUED | OUTPATIENT
Start: 2019-03-26 | End: 2019-03-26

## 2019-03-26 RX ORDER — BUPIVACAINE HYDROCHLORIDE 2.5 MG/ML
INJECTION, SOLUTION EPIDURAL; INFILTRATION; INTRACAUDAL
Status: DISCONTINUED | OUTPATIENT
Start: 2019-03-26 | End: 2019-03-26

## 2019-03-26 RX ORDER — OXYCODONE AND ACETAMINOPHEN 5; 325 MG/1; MG/1
1 TABLET ORAL EVERY 4 HOURS PRN
Status: DISCONTINUED | OUTPATIENT
Start: 2019-03-26 | End: 2019-03-28 | Stop reason: HOSPADM

## 2019-03-26 RX ORDER — OXYCODONE AND ACETAMINOPHEN 10; 325 MG/1; MG/1
1 TABLET ORAL EVERY 4 HOURS PRN
Status: DISCONTINUED | OUTPATIENT
Start: 2019-03-26 | End: 2019-03-28 | Stop reason: HOSPADM

## 2019-03-26 RX ORDER — DIPHENHYDRAMINE HCL 25 MG
25 CAPSULE ORAL EVERY 4 HOURS PRN
Status: DISCONTINUED | OUTPATIENT
Start: 2019-03-26 | End: 2019-03-28 | Stop reason: HOSPADM

## 2019-03-26 RX ORDER — HYDROMORPHONE HYDROCHLORIDE 2 MG/ML
0.2 INJECTION, SOLUTION INTRAMUSCULAR; INTRAVENOUS; SUBCUTANEOUS EVERY 5 MIN PRN
Status: ACTIVE | OUTPATIENT
Start: 2019-03-26 | End: 2019-03-26

## 2019-03-26 RX ORDER — ONDANSETRON 8 MG/1
8 TABLET, ORALLY DISINTEGRATING ORAL EVERY 8 HOURS PRN
Status: DISCONTINUED | OUTPATIENT
Start: 2019-03-26 | End: 2019-03-28 | Stop reason: HOSPADM

## 2019-03-26 RX ORDER — ACETAMINOPHEN 10 MG/ML
INJECTION, SOLUTION INTRAVENOUS
Status: DISCONTINUED | OUTPATIENT
Start: 2019-03-26 | End: 2019-03-26

## 2019-03-26 RX ORDER — FENTANYL CITRATE 50 UG/ML
INJECTION, SOLUTION INTRAMUSCULAR; INTRAVENOUS
Status: DISCONTINUED | OUTPATIENT
Start: 2019-03-26 | End: 2019-03-26

## 2019-03-26 RX ORDER — METHYLPREDNISOLONE ACETATE 40 MG/ML
INJECTION, SUSPENSION INTRA-ARTICULAR; INTRALESIONAL; INTRAMUSCULAR; SOFT TISSUE
Status: DISCONTINUED | OUTPATIENT
Start: 2019-03-26 | End: 2019-03-26

## 2019-03-26 RX ORDER — FUROSEMIDE 40 MG/1
40 TABLET ORAL ONCE
Status: COMPLETED | OUTPATIENT
Start: 2019-03-26 | End: 2019-03-26

## 2019-03-26 RX ORDER — SODIUM CHLORIDE 0.9 % (FLUSH) 0.9 %
3 SYRINGE (ML) INJECTION
Status: DISCONTINUED | OUTPATIENT
Start: 2019-03-26 | End: 2019-03-28 | Stop reason: HOSPADM

## 2019-03-26 RX ORDER — HYDROMORPHONE HCL IN 0.9% NACL 6 MG/30 ML
PATIENT CONTROLLED ANALGESIA SYRINGE INTRAVENOUS CONTINUOUS
Status: DISCONTINUED | OUTPATIENT
Start: 2019-03-26 | End: 2019-03-28 | Stop reason: HOSPADM

## 2019-03-26 RX ORDER — ETOMIDATE 2 MG/ML
INJECTION INTRAVENOUS
Status: DISCONTINUED | OUTPATIENT
Start: 2019-03-26 | End: 2019-03-26

## 2019-03-26 RX ORDER — MUPIROCIN 20 MG/G
1 OINTMENT TOPICAL 2 TIMES DAILY
Status: DISCONTINUED | OUTPATIENT
Start: 2019-03-26 | End: 2019-03-28 | Stop reason: HOSPADM

## 2019-03-26 RX ORDER — SODIUM CHLORIDE 0.9 % (FLUSH) 0.9 %
3 SYRINGE (ML) INJECTION EVERY 8 HOURS
Status: DISCONTINUED | OUTPATIENT
Start: 2019-03-26 | End: 2019-03-26 | Stop reason: HOSPADM

## 2019-03-26 RX ORDER — ONDANSETRON 2 MG/ML
4 INJECTION INTRAMUSCULAR; INTRAVENOUS DAILY PRN
Status: DISCONTINUED | OUTPATIENT
Start: 2019-03-26 | End: 2019-03-26 | Stop reason: HOSPADM

## 2019-03-26 RX ADMIN — SODIUM CHLORIDE, SODIUM LACTATE, POTASSIUM CHLORIDE, AND CALCIUM CHLORIDE: .6; .31; .03; .02 INJECTION, SOLUTION INTRAVENOUS at 12:03

## 2019-03-26 RX ADMIN — ONDANSETRON 8 MG: 8 TABLET, ORALLY DISINTEGRATING ORAL at 09:03

## 2019-03-26 RX ADMIN — NEOSTIGMINE METHYLSULFATE 3 MG: 1 INJECTION INTRAVENOUS at 02:03

## 2019-03-26 RX ADMIN — LIDOCAINE HYDROCHLORIDE 80 MG: 20 INJECTION, SOLUTION INTRAVENOUS at 12:03

## 2019-03-26 RX ADMIN — GLYCOPYRROLATE 0.2 MG: 0.2 INJECTION, SOLUTION INTRAMUSCULAR; INTRAVENOUS at 12:03

## 2019-03-26 RX ADMIN — GLYCOPYRROLATE 0.4 MG: 0.2 INJECTION, SOLUTION INTRAMUSCULAR; INTRAVENOUS at 02:03

## 2019-03-26 RX ADMIN — METHYLPREDNISOLONE ACETATE 40 MG: 40 INJECTION, SUSPENSION INTRA-ARTICULAR; INTRALESIONAL; INTRAMUSCULAR; SOFT TISSUE at 01:03

## 2019-03-26 RX ADMIN — SODIUM CHLORIDE, SODIUM LACTATE, POTASSIUM CHLORIDE, AND CALCIUM CHLORIDE: .6; .31; .03; .02 INJECTION, SOLUTION INTRAVENOUS at 01:03

## 2019-03-26 RX ADMIN — MIDAZOLAM HYDROCHLORIDE 1 MG: 1 INJECTION, SOLUTION INTRAMUSCULAR; INTRAVENOUS at 12:03

## 2019-03-26 RX ADMIN — FENTANYL CITRATE 50 MCG: 50 INJECTION, SOLUTION INTRAMUSCULAR; INTRAVENOUS at 01:03

## 2019-03-26 RX ADMIN — ROCURONIUM BROMIDE 5 MG: 10 INJECTION, SOLUTION INTRAVENOUS at 12:03

## 2019-03-26 RX ADMIN — DEXTROSE 3 G: 50 INJECTION, SOLUTION INTRAVENOUS at 12:03

## 2019-03-26 RX ADMIN — ROCURONIUM BROMIDE 15 MG: 10 INJECTION, SOLUTION INTRAVENOUS at 12:03

## 2019-03-26 RX ADMIN — ETOMIDATE 20 MG: 2 INJECTION, SOLUTION INTRAVENOUS at 12:03

## 2019-03-26 RX ADMIN — EPHEDRINE SULFATE 5 MG: 50 INJECTION, SOLUTION INTRAMUSCULAR; INTRAVENOUS; SUBCUTANEOUS at 01:03

## 2019-03-26 RX ADMIN — ACETAMINOPHEN 1000 MG: 10 INJECTION, SOLUTION INTRAVENOUS at 01:03

## 2019-03-26 RX ADMIN — BUPIVACAINE HYDROCHLORIDE 40 ML: 2.5 INJECTION, SOLUTION EPIDURAL; INFILTRATION; INTRACAUDAL; PERINEURAL at 03:03

## 2019-03-26 RX ADMIN — ONDANSETRON 4 MG: 2 INJECTION, SOLUTION INTRAMUSCULAR; INTRAVENOUS at 01:03

## 2019-03-26 RX ADMIN — FENTANYL CITRATE 50 MCG: 50 INJECTION, SOLUTION INTRAMUSCULAR; INTRAVENOUS at 12:03

## 2019-03-26 RX ADMIN — SODIUM CHLORIDE, SODIUM LACTATE, POTASSIUM CHLORIDE, AND CALCIUM CHLORIDE: .6; .31; .03; .02 INJECTION, SOLUTION INTRAVENOUS at 05:03

## 2019-03-26 RX ADMIN — EPHEDRINE SULFATE 5 MG: 50 INJECTION, SOLUTION INTRAMUSCULAR; INTRAVENOUS; SUBCUTANEOUS at 12:03

## 2019-03-26 RX ADMIN — HYDROMORPHONE HYDROCHLORIDE 0.5 MG: 2 INJECTION, SOLUTION INTRAMUSCULAR; INTRAVENOUS; SUBCUTANEOUS at 03:03

## 2019-03-26 RX ADMIN — FENTANYL CITRATE 100 MCG: 50 INJECTION, SOLUTION INTRAMUSCULAR; INTRAVENOUS at 01:03

## 2019-03-26 RX ADMIN — SUCCINYLCHOLINE CHLORIDE 120 MG: 20 INJECTION, SOLUTION INTRAMUSCULAR; INTRAVENOUS at 12:03

## 2019-03-26 RX ADMIN — BUPIVACAINE 20 ML: 13.3 INJECTION, SUSPENSION, LIPOSOMAL INFILTRATION at 03:03

## 2019-03-26 RX ADMIN — ROCURONIUM BROMIDE 5 MG: 10 INJECTION, SOLUTION INTRAVENOUS at 01:03

## 2019-03-26 RX ADMIN — Medication: at 03:03

## 2019-03-26 RX ADMIN — FUROSEMIDE 40 MG: 40 TABLET ORAL at 12:03

## 2019-03-26 RX ADMIN — ALBUTEROL SULFATE 2 PUFF: 90 AEROSOL, METERED RESPIRATORY (INHALATION) at 02:03

## 2019-03-26 NOTE — HPI
42yo female with history of NICM EF 20% s/p AICD, polymorphic VT on Amiodarone, MR & TR who was admitted for elective CLARISSA. Ms. Mahajan is followed by [facility] HF/HTS since 2015 due to PPCM with EF 15-20%. She underwent AICD placement and has had episodes of VT in 2017 with no recurrence since then. She was seen in the recovery room after her surgery and after receiving IV pain medications. She is lethargic but arousable but is unable to fully answer questions therefore limited HPI was performed. Her HR and BP were stable. She was lying flat with no visible SOB and no JVD or rales were noted on exam. Cardiology was consulted due to her extensive cardiac history. Her labs from 3/21 were reviewed with Hgb 10.6 Hct 34.2 and BMP with K+ 4.0 BUN 14 creatinine .98. Her last echo on 3/22/2019 showed severely depressed LV function with EF 20%, severe LV enlargement, grade II diastolic dysfunction, elevated LAP, severe LAE, severe MR, mild to moderate TR, severe SAWYER and PA pressure 42mmHg. She was seen by Dr. Hatch on 3/22/2019 for clearance prior to her CLARISSA with recommendation to continue her current dose of Entresto and Amiodarone with plans to up titrate her Entresto after her CLARISSA was completed

## 2019-03-26 NOTE — BRIEF OP NOTE
CLARISSA Funk MD  VSvenkat,asst  EBL 200cc  Counts correct  Precautions for internal cardiac defibrillator per anesthesia  Counts correct  Layered closure, subQ skin

## 2019-03-26 NOTE — H&P
Preoperative history and physical:    Chief complaint:  Menorrhagia  History of present illness:  Patient has worsening menstrual bleeding and is ready for definitive surgical management.  Workup included pelvic ultrasound which shows vascular endocervical polyps as well as fundal polyps and a thickened endometrial lining.  Patient's uterus is approximately 170 g volume by ultrasound measurement.  Past medical history:  Patient has significant medical history for ventricular tachycardia and valvular disease.  She has also cardiomyopathy with congestive heart failure in the past.  She has a defibrillator implantation device in her heart.  She has had medical clearance for this surgery on 2 occasions once when previously scheduled and rescheduled and again recently prior to this admission.  Patient also has a history of asthma.  Her surgical history is positive for the defibrillator implantation procedure as well as for 2  sections and a tubal ligation.  She is a  2 para 2.  Patient is allergic to ACE inhibitors.  She is a nonsmoker and occasional alcohol user.  Patient's current medications include albuterol inhaler p.r.n., amiodarone 200 mg once daily, carvedilol 6.25 mg b.i.d., interest 0 1 tablet b.i.d. and Aldactone 25 mg daily.  Patient also has other medication she uses on a p.r.n. basis.  Review of systems:  Patient is review of systems is essentially negative with the exception of very heavy vaginal bleeding which is regular and cyclicity.  Physical examination:  Patient's blood pressure is 110/66, BMI 38, height 5 ft 9 weight 258 lb.  Patient is afebrile.  HEENT exam:  Normal  Chest:  Clear to auscultation  Heart:  Normal sinus rhythm (preoperative cardiology clearance obtained)  Abdomen:  Soft without organomegaly or rebound tenderness. Scar tissue from 2  sections.  Extremities:  Normal without significant edema or hyperreflexia on  Pelvic exam normal external female genitalia,  normal vaginal canal with cervix freely movable and nontender.  Uterus is mildly hypertrophied there are no significant adnexal masses palpable.  Impression:  Menorrhagia secondary to uterine and cervical polyps with thickened endometrial stripe  Plan:  Total abdominal hysterectomy (ovarian preservation unless pathology discovered at time of surgery)

## 2019-03-26 NOTE — OP NOTE
Preoperative diagnosis:  Menorrhagia secondary to endometrial and endocervical polyp with thickened endometrial stripe.  Postop diagnosis:  Same pending pathology report for probable cervical and uterine leiomyomata with thickened endometrial stripe  Procedure:  Total abdominal hysterectomy; adhesiolysis  Surgeon:  Victorino Rose MD  Assistant:  Emily Butler  Estimated blood loss 200 cc  Counts reported as correct  General anesthesia  Procedure in detail:  Patient is placed on the operating table in supine position with a Landa catheter placed and prep and draping done.  Prior to procedure the anesthesia department turned off patient's defibrillator so that it would not malfunction and fired during procedure. A time out was done.  It is date incision was made through previous  section scar in the lower abdomen just above the pubis.  The incision was taken down to the subcutaneous tissue was ruptured cautery dissection.  The scar tissue was very dense and fascia was transected as sharp was trocar technique.  The peritoneum was noted to be open after this part of the dissection but was extended to allow visualization of the pelvis.  The areas of omentum attached to the posterior peritoneum were removed by sharp dissection and hemostasis maintained.  The O'Mayte O'Velasco retractor was placed incision bowel was packed away.  The uterus normal in appearance with the exception of very dense anterior uterovesical scarring.  Due to previous  x2.  The tubes showed evidence of previous tubal ligation the ovaries appeared normal so were left in situ.  The fundus was grasped with a Kushal tenaculum and the ligature device was used for cauterization cutting and sealing all structures from the superior fundus down to the uterine vasculature both sides.  All dissection done without difficulty.  The bladder anterior to the lower segment the uterus was taken down with sharp and of her cautery technique and  advanced blunt technique to free the bladder up from its uterine attachment.  The pedicle line was advanced using Nilay clamps 1.  Suture material to the uterosacral ligaments and the cuff was entered on right side.  The cuff was circumscribed with Angelito scissors and the edges were demarcating with [facility] clamps.  The specimen was removed operative field and passed off to be sent to pathology for analysis.  The vaginal cuff was closed with a running interlocked 1 suture.  Hemostasis good and the cuff was dusted with Ian then oversewn with a chromic suture for the peritoneum the midportion of cuff.  At this point hemostasis assessed and noted to be excellent.  Instrumentation was as well as lap sponges counted.  Correct count obtained, the abdominal wall was closed layers with absorbable suture material peritoneum and rectus musculature fascia and then a Monocryl suture was the skin.  Pale chin tolerated very well went to the recovery area in stable satisfactory condition with no apparent complications from the anesthesia or the surgery.  The patient's internal debris was turned on had pain for procedure prior to leaving the operating room.

## 2019-03-26 NOTE — PROGRESS NOTES
No interval changes in H&P since last encounter.  Preoperative cardiology clearance obtained  Plan is for total abdominal hysterectomy through previous  section scars and ovarian preservation unless pathology found surgery.

## 2019-03-26 NOTE — PLAN OF CARE
Pt states name and . Verified to armband. Pt verifies procedure to be done. Verified to consent x2 and EPIC chart. Placed on cardiac monitor x3, and pulse ox. Time out done.      1502: TAP block started. VSS. Cont to monitor.     1508: TAP block complete. VSS. Cont to monitor.

## 2019-03-26 NOTE — ANESTHESIA PROCEDURE NOTES
Peripheral Block    Patient location during procedure: pre-op   Block not for primary anesthetic.  Reason for block: at surgeon's request and post-op pain management   Post-op Pain Location: abdominal wall pain  Start time: 3/26/2019 3:02 PM  Timeout: 3/26/2019 3:00 PM   End time: 3/26/2019 3:08 PM  Staffing  Anesthesiologist: Farhad Neri MD  Resident/CRNA: Patrick Peace MD  Preanesthetic Checklist  Completed: patient identified, site marked, surgical consent, pre-op evaluation, timeout performed, IV checked, risks and benefits discussed and monitors and equipment checked  Peripheral Block  Patient position: supine  Prep: ChloraPrep  Patient monitoring: cardiac monitor, heart rate, continuous pulse ox, continuous capnometry and frequent blood pressure checks  Block type: TAP  Laterality: bilateral  Injection technique: single shot  Needle  Needle type: Stimuplex   Needle gauge: 21 G  Needle length: 4 in  Needle localization: ultrasound guidance   -ultrasound image captured on disc.  Assessment  Injection assessment: negative aspiration, negative parasthesia and local visualized surrounding nerve  Paresthesia pain: none  Heart rate change: no  Slow fractionated injection: yes  Additional Notes  VSS.  [facility] RN monitoring vitals throughout procedure.  Patient tolerated procedure well.    Bupivacaine 0.25% 20 ml and Exparel 1.3% 10 ml in each side

## 2019-03-26 NOTE — ASSESSMENT & PLAN NOTE
-history of VT with AICD firing last episode in 2017  -AICD monitored via home device; last office interrogation in 2018 with no evidence of NSVT; VT zone 160-200 monitor; VF zone 200-ATP and shock  -recommend continuation of oral Amiodarone at home dose  -follow up with EP/device clinic as recommended

## 2019-03-26 NOTE — PLAN OF CARE
VSS. Denies pain or discomfort @ this time. Dr Arias notified of consult. No orders rec'd. Cont to monitor.

## 2019-03-26 NOTE — SUBJECTIVE & OBJECTIVE
Past Medical History:   Diagnosis Date    Asthma     Chronic back pain 2014    Chronic combined systolic and diastolic congestive heart failure 2015     2-10-17   1 - Severely depressed left ventricular systolic function (EF 20-25%).    2 - Severe left ventricular enlargement.    3 - Severe left atrial enlargement.    4 - Left ventricular diastolic dysfunction.    5 - The estimated PA systolic pressure is 18 mmHg.    6 - Mild mitral regurgitation.     Encounter for blood transfusion     ICD (implantable cardioverter-defibrillator) in place 12/01/15 3/3/2016    Menorrhagia, premenopausal 2/10/2017    Microcytic anemia 2015    Non-rheumatic mitral regurgitation 3/5/2015    Nonischemic dilated cardiomyopathy 2015    Sleep apnea     Syncope and collapse 2017    Ventricular tachycardia, polymorphic        Past Surgical History:   Procedure Laterality Date    CARDIAC DEFIBRILLATOR PLACEMENT  2015     SECTION      HEART CATH-RIGHT Right 3/26/2018    Performed by Jeimy Srivastava MD at [facility] CATH LAB    INSERTION-ICD-SINGLE N/A 2015    Performed by Victorino Tay MD at [facility] CATH LAB    REVISION-LEAD-ICD N/A 2016    Performed by Victorino Tay MD at [facility] CATH LAB    TUBAL LIGATION         Review of patient's allergies indicates:   Allergen Reactions    Ace inhibitors      Cough       No current facility-administered medications on file prior to encounter.      Current Outpatient Medications on File Prior to Encounter   Medication Sig    amiodarone (PACERONE) 200 MG Tab Take 1 tablet (200 mg total) by mouth once daily.    ascorbic acid, vitamin C, (VITAMIN C) 250 MG tablet Take 1 tablet (250 mg) by mouth twice daily. Take with the iron tablets. (Patient taking differently: once daily. )    carvedilol (COREG) 6.25 MG tablet Take 1 tablet (6.25 mg total) by mouth 2 (two) times daily.    ENTRESTO 49-51 mg per tablet TK 1 T PO BID    ferrous sulfate 325  (65 FE) MG EC tablet Take 1 tablet (325 mg total) by mouth 2 (two) times daily. Take with vitamin C. (Patient taking differently: 325 mg once daily. Take 1 tablet (325 mg total) by mouth 2 (two) times daily. Take with vitamin C.)    FLOVENT  mcg/actuation inhaler INL 2 PFS PO TWICE DAILY. RM AFTER U    furosemide (LASIX) 40 MG tablet Take 1 tablet (40 mg total) by mouth daily as needed (Leg swelling or weight gain).    ibuprofen (ADVIL,MOTRIN) 600 MG tablet TK 1 T PO Q 6 TO 8 H WITH FOOD PRN FOR PAIN OR FEVER    latanoprost 0.005 % ophthalmic solution INT 1 GTT INTO OU QHS    loratadine (CLARITIN) 10 mg tablet TK 1 T PO QD    sacubitril-valsartan (ENTRESTO)  mg per tablet Take 1 tablet by mouth 2 (two) times daily.    spironolactone (ALDACTONE) 25 MG tablet Take 1 tablet (25 mg total) by mouth once daily.    topiramate (TOPAMAX) 50 MG tablet     albuterol 90 mcg/actuation inhaler Inhale 1-2 puffs into the lungs every 6 (six) hours as needed for Wheezing. Rescue     Family History     Problem Relation (Age of Onset)    Diabetes Father    Heart failure Father, Brother    Lung cancer Paternal Grandmother    Pancreatic cancer Father        Tobacco Use    Smoking status: Never Smoker    Smokeless tobacco: Never Used   Substance and Sexual Activity    Alcohol use: Yes     Comment: 1 glass per 3 months    Drug use: No    Sexual activity: Yes     Partners: Male     Comment: one male partner (boyfriend)     Review of Systems   Unable to perform ROS: other     Objective:     Vital Signs (Most Recent):  Temp: 97.6 °F (36.4 °C) (03/26/19 1430)  Pulse: (!) 54 (03/26/19 1505)  Resp: 13 (03/26/19 1505)  BP: 125/66 (03/26/19 1505)  SpO2: 100 % (03/26/19 1505) Vital Signs (24h Range):  Temp:  [97.6 °F (36.4 °C)-97.9 °F (36.6 °C)] 97.6 °F (36.4 °C)  Pulse:  [50-76] 54  Resp:  [12-18] 13  SpO2:  [98 %-100 %] 100 %  BP: (118-125)/(56-66) 125/66     Weight: 116.6 kg (257 lb)  Body mass index is 37.95  kg/m².    SpO2: 100 %  O2 Device (Oxygen Therapy): Simple Face Mask      Intake/Output Summary (Last 24 hours) at 3/26/2019 1531  Last data filed at 3/26/2019 1426  Gross per 24 hour   Intake 1200 ml   Output 260 ml   Net 940 ml       Lines/Drains/Airways     Drain                 Urethral Catheter 03/26/19 1245 Non-latex;Straight-tip 16 Fr. less than 1 day          Peripheral Intravenous Line                 Peripheral IV - Single Lumen 03/26/19 1020 Right Wrist less than 1 day                Physical Exam   Constitutional: She appears well-developed and well-nourished. No distress.   Cardiovascular: Normal rate and regular rhythm. Exam reveals no gallop.   No murmur heard.  Pulmonary/Chest: Effort normal and breath sounds normal. No respiratory distress. She has no wheezes.   Abdominal: Soft. Bowel sounds are normal. She exhibits no distension. There is no tenderness.   Neurological:   Lethargic yesterday arousable after pain medication    Skin: Skin is warm and dry.       Significant Labs:     CBC and BMP reviewed from 3/21/2019 with no acute abnormalities      Significant Imaging:     TTE 3/22/2019    · Severely decreased left ventricular systolic function. The estimated ejection fraction is 20%, 28% by quantitiative LVEF.  · Severe left ventricular enlargement.  · Eccentric left ventricular hypertrophy.  · Grade II (moderate) left ventricular diastolic dysfunction consistent with pseudonormalization.  · Elevated left atrial pressure.  · Severe left atrial enlargement.  · Severe mitral regurgitation.  · Mild to moderate tricuspid regurgitation.  · Severe right atrial enlargement.  · Pulmonary hypertension present.  · The estimated PA systolic pressure is 42 mm Hg  · Intermediate central venous pressure (8 mm Hg).  · Normal right ventricular systolic function.

## 2019-03-26 NOTE — PLAN OF CARE
"VSS. Pt resting, easily arousable. C/o some pain 2/10. "Ok to release to room", per Dr Burns. Report called to pt's nurse Odalis, with time allotted for questions.   "

## 2019-03-26 NOTE — ANESTHESIA POSTPROCEDURE EVALUATION
Anesthesia Post Evaluation    Patient: Mario Mahajan    Procedure(s) Performed: Procedure(s) (LRB):  HYSTERECTOMY, TOTAL, ABDOMINAL (N/A)    Final Anesthesia Type: general  Patient location during evaluation: PACU  Level of consciousness: awake  Post-procedure vital signs: reviewed and stable  Pain management: adequate  Airway patency: patent    Anesthetic complications: no      Cardiovascular status: stable  Respiratory status: spontaneous ventilation  Hydration status: euvolemic            Vitals Value Taken Time   /67 3/26/2019  5:10 PM   Temp 36.3 °C (97.4 °F) 3/26/2019  5:10 PM   Pulse 66 3/26/2019  5:10 PM   Resp 18 3/26/2019  5:10 PM   SpO2 98 % 3/26/2019  5:10 PM         Event Time     Out of Recovery 17:00:00          Pain/[place] Score: Pain Rating Prior to Med Admin: 4 (3/26/2019  3:31 PM)  [place] Score: 8 (3/26/2019  3:05 PM)

## 2019-03-26 NOTE — ASSESSMENT & PLAN NOTE
-long standing history of NICM with EF 20% on most recent echo  -followed by [facility] HF/HTS with last office visit on 3/22/2018  -on good medication regimen with Entresto 49/51mg po BID, Coreg 6.25mg po BID, Aldactone 25mg po daily and Lasix 40mg po daily prn  -lethargic after pain medications; resting comfortably on PE with no acute respiratory distress  -recommend resumption of Entresto, Coreg and Aldactone when fully tolerating po diet  -follow up with [facility] HF/HTS in 2-3 weeks after discharge

## 2019-03-27 PROCEDURE — 25000003 PHARM REV CODE 250: Performed by: OBSTETRICS & GYNECOLOGY

## 2019-03-27 PROCEDURE — 11000001 HC ACUTE MED/SURG PRIVATE ROOM

## 2019-03-27 PROCEDURE — 94761 N-INVAS EAR/PLS OXIMETRY MLT: CPT

## 2019-03-27 PROCEDURE — 63600175 PHARM REV CODE 636 W HCPCS: Performed by: OBSTETRICS & GYNECOLOGY

## 2019-03-27 RX ORDER — SPIRONOLACTONE 25 MG/1
25 TABLET ORAL DAILY
Status: DISCONTINUED | OUTPATIENT
Start: 2019-03-27 | End: 2019-03-28 | Stop reason: HOSPADM

## 2019-03-27 RX ORDER — ASCORBIC ACID 250 MG
250 TABLET ORAL DAILY
Status: DISCONTINUED | OUTPATIENT
Start: 2019-03-27 | End: 2019-03-28 | Stop reason: HOSPADM

## 2019-03-27 RX ORDER — CARVEDILOL 6.25 MG/1
6.25 TABLET ORAL 2 TIMES DAILY
Status: DISCONTINUED | OUTPATIENT
Start: 2019-03-27 | End: 2019-03-28 | Stop reason: HOSPADM

## 2019-03-27 RX ORDER — SIMETHICONE 80 MG
80 TABLET,CHEWABLE ORAL EVERY 6 HOURS PRN
Status: DISCONTINUED | OUTPATIENT
Start: 2019-03-27 | End: 2019-03-28 | Stop reason: HOSPADM

## 2019-03-27 RX ORDER — AMIODARONE HYDROCHLORIDE 200 MG/1
200 TABLET ORAL DAILY
Status: DISCONTINUED | OUTPATIENT
Start: 2019-03-27 | End: 2019-03-28 | Stop reason: HOSPADM

## 2019-03-27 RX ORDER — FERROUS SULFATE 325(65) MG
325 TABLET, DELAYED RELEASE (ENTERIC COATED) ORAL DAILY
Status: DISCONTINUED | OUTPATIENT
Start: 2019-03-27 | End: 2019-03-28 | Stop reason: HOSPADM

## 2019-03-27 RX ADMIN — SPIRONOLACTONE 25 MG: 25 TABLET, FILM COATED ORAL at 12:03

## 2019-03-27 RX ADMIN — ASCORBIC ACID TAB 250 MG 250 MG: 250 TAB at 12:03

## 2019-03-27 RX ADMIN — KETOROLAC TROMETHAMINE 30 MG: 30 INJECTION, SOLUTION INTRAMUSCULAR at 06:03

## 2019-03-27 RX ADMIN — CARVEDILOL 6.25 MG: 6.25 TABLET, FILM COATED ORAL at 08:03

## 2019-03-27 RX ADMIN — CARVEDILOL 6.25 MG: 6.25 TABLET, FILM COATED ORAL at 12:03

## 2019-03-27 RX ADMIN — KETOROLAC TROMETHAMINE 30 MG: 30 INJECTION, SOLUTION INTRAMUSCULAR at 12:03

## 2019-03-27 RX ADMIN — IBUPROFEN 600 MG: 600 TABLET ORAL at 06:03

## 2019-03-27 RX ADMIN — IBUPROFEN 600 MG: 600 TABLET ORAL at 11:03

## 2019-03-27 RX ADMIN — MUPIROCIN 1 G: 20 OINTMENT TOPICAL at 08:03

## 2019-03-27 RX ADMIN — SACUBITRIL AND VALSARTAN 1 TABLET: 49; 51 TABLET, FILM COATED ORAL at 12:03

## 2019-03-27 RX ADMIN — FERROUS SULFATE TAB EC 325 MG (65 MG FE EQUIVALENT) 325 MG: 325 (65 FE) TABLET DELAYED RESPONSE at 12:03

## 2019-03-27 RX ADMIN — SACUBITRIL AND VALSARTAN 1 TABLET: 49; 51 TABLET, FILM COATED ORAL at 08:03

## 2019-03-27 RX ADMIN — OXYCODONE HYDROCHLORIDE AND ACETAMINOPHEN 1 TABLET: 10; 325 TABLET ORAL at 11:03

## 2019-03-27 RX ADMIN — AMIODARONE HYDROCHLORIDE 200 MG: 200 TABLET ORAL at 12:03

## 2019-03-27 RX ADMIN — MUPIROCIN 1 G: 20 OINTMENT TOPICAL at 09:03

## 2019-03-27 RX ADMIN — SIMETHICONE CHEW TAB 80 MG 80 MG: 80 TABLET ORAL at 12:03

## 2019-03-27 RX ADMIN — OXYCODONE HYDROCHLORIDE AND ACETAMINOPHEN 1 TABLET: 10; 325 TABLET ORAL at 06:03

## 2019-03-27 NOTE — PROGRESS NOTES
Postoperative day 1:    Afebrile, upper abdominal discomfort secondary to gas.  Patient given simethicone.  Cardiology consultation appreciated.  The patient is now back on her regular medications.  PCA pump is giving some relief but she has significant nausea along with that so we will be discontinued and oral or injectable medication will be used as needed.  Patient and family were given description of the surgical findings and procedure but pathology report is still pending.  Will increase activity very slowly in with supervision and also advance diet as tolerated.  Episodes of bradycardia still noted.  Patient brought pressure remains low but her oxygenation is adequate and she is afebrile.  Intake and output is balanced.  Pending clinical improvement and pain level, patient may be able to go home tomorrow but if needs to stay for further monitoring and assistants will send home on 3/29/2019.

## 2019-03-28 VITALS
WEIGHT: 257 LBS | HEIGHT: 69 IN | DIASTOLIC BLOOD PRESSURE: 53 MMHG | RESPIRATION RATE: 18 BRPM | HEART RATE: 64 BPM | SYSTOLIC BLOOD PRESSURE: 109 MMHG | OXYGEN SATURATION: 99 % | BODY MASS INDEX: 38.06 KG/M2 | TEMPERATURE: 98 F

## 2019-03-28 PROBLEM — N92.0 MENORRHAGIA: Chronic | Status: ACTIVE | Noted: 2017-02-10

## 2019-03-28 PROCEDURE — 25000003 PHARM REV CODE 250: Performed by: OBSTETRICS & GYNECOLOGY

## 2019-03-28 PROCEDURE — 94761 N-INVAS EAR/PLS OXIMETRY MLT: CPT

## 2019-03-28 RX ORDER — IBUPROFEN 600 MG/1
600 TABLET ORAL EVERY 6 HOURS PRN
Qty: 30 TABLET | Refills: 2 | Status: SHIPPED | OUTPATIENT
Start: 2019-03-28 | End: 2021-07-07

## 2019-03-28 RX ORDER — OXYCODONE AND ACETAMINOPHEN 10; 325 MG/1; MG/1
1 TABLET ORAL EVERY 6 HOURS PRN
Qty: 30 TABLET | Refills: 0 | Status: SHIPPED | OUTPATIENT
Start: 2019-03-28 | End: 2019-09-12

## 2019-03-28 RX ADMIN — FERROUS SULFATE TAB EC 325 MG (65 MG FE EQUIVALENT) 325 MG: 325 (65 FE) TABLET DELAYED RESPONSE at 08:03

## 2019-03-28 RX ADMIN — OXYCODONE HYDROCHLORIDE AND ACETAMINOPHEN 1 TABLET: 5; 325 TABLET ORAL at 11:03

## 2019-03-28 RX ADMIN — ASCORBIC ACID TAB 250 MG 250 MG: 250 TAB at 08:03

## 2019-03-28 RX ADMIN — CARVEDILOL 6.25 MG: 6.25 TABLET, FILM COATED ORAL at 08:03

## 2019-03-28 RX ADMIN — SACUBITRIL AND VALSARTAN 1 TABLET: 49; 51 TABLET, FILM COATED ORAL at 08:03

## 2019-03-28 RX ADMIN — IBUPROFEN 600 MG: 600 TABLET ORAL at 11:03

## 2019-03-28 RX ADMIN — AMIODARONE HYDROCHLORIDE 200 MG: 200 TABLET ORAL at 08:03

## 2019-03-28 RX ADMIN — IBUPROFEN 600 MG: 600 TABLET ORAL at 05:03

## 2019-03-28 RX ADMIN — SPIRONOLACTONE 25 MG: 25 TABLET, FILM COATED ORAL at 08:03

## 2019-03-28 RX ADMIN — SIMETHICONE CHEW TAB 80 MG 80 MG: 80 TABLET ORAL at 11:03

## 2019-03-28 RX ADMIN — MUPIROCIN 1 G: 20 OINTMENT TOPICAL at 08:03

## 2019-03-28 NOTE — PLAN OF CARE
"Problem: Adult Inpatient Plan of Care  Goal: Plan of Care Review  Outcome: Ongoing (interventions implemented as appropriate)    3998 - vss, nad, pain well controlled w/po pain meds, tolerating regular diet, passing gas.  Poc: pain management as needed, ambulation and po hydration encourage, possible d/c home today.  Reviewed poc w/pt.  Pt verbalized understanding.    MD Rose @ bedside - Pt reports both hands feel weak "can't even peel anything open".  Will continue to monitor.      1230 - reviewed discharge instructions and meds w/pt.  D/c meds to be delivered to pt from outpatient pharmacy.  Pt verbalizes understanding of instructions and meds.  Vss, nad, pain well controlled w/po pain meds, tolerating regular diet, passing gas, no additional drainage outside of marked area on aquacel.  Pt still waiting on meds from pharmacy and will call when ready for a wheelchair.    7808 - Pt taken to lobby via wheelchair transport in stable condition.    "

## 2019-03-28 NOTE — DISCHARGE INSTRUCTIONS
"It often takes 1-4 weeks to recover from your surgery.  This varies from women to women.    To avoid constipation, eat fruits, vegetables, and whole grains.  Drink plenty of water.  Your doctor may suggest that you use a laxative or a mild stool softener.    Increase activity gradually.  Ask your friends and family to help with shores and errands while you recover.      Do not lift anything over 10 pounds to avoid straining your incision.    You may shower    Do not put anything in your vagina until your doctor says it is safe to do so.  This includes tampons, douching and sexual intercourse.    CALL YOUR DOCTOR    *Chills or a fever of 100.4 or higher    *Bright red vaginal bleeding or foul smelling discharge    *Difficulty urinating or burning during urination    *Severe abdominal pain or bloating    *Red, swollen, draining or bleeding incision site.    Take care of your self emotionally  Having a hysterectomy may affect your emotions.  You may be relieved to no longer have symptoms.  But you may feel "down" about the changes in your body.     "

## 2019-03-28 NOTE — NURSING
Called cardiology MD on call, (Dr. ANUM Ortiz), to inform him that patient's BP is 99/55, HR: 74 and patient has carvedilol and Entresto ordered for tonight @2100.  MD verbalized understanding.  Per MD, okay to administer medications as ordered.  NAD noted. WCTM.

## 2019-03-28 NOTE — PROGRESS NOTES
POD #2    Improving---still discomfort but OK to go home.  Instructions and precautions given, will also follow-up with cardiology.  Meds from [facility].

## 2019-03-28 NOTE — DISCHARGE SUMMARY
Admit Date: 3/26/2019    Discharge Date:3/28/2019    Attending Physician: MD Seble  Procedures: CLARISSA    Admit Diagnosis: Menorrhagia  Discharge Diagnosis: Same     Hospital course: Afebrile and vital signs stable throughout course. Routine progressive care. Positive flatus prior to discharge. No nausea/vomiting.Cardiology consult obtained.  Discharged Condition: Improving    Disposition: Discharge to home or self care    Patient Instructions:   Pelvic rest x 4-6 weeks  Follow up 10 days  No heavy lifting  Call for excessive vaginal bleeding, temperature greater than 100.6, redness or increasing pain to incision.  Discharge Medications: Given to patient or in chart     Diet: Regular

## 2019-04-02 ENCOUNTER — TELEPHONE (OUTPATIENT)
Dept: OBSTETRICS AND GYNECOLOGY | Facility: CLINIC | Age: 44
End: 2019-04-02

## 2019-04-02 ENCOUNTER — PATIENT MESSAGE (OUTPATIENT)
Dept: TRANSPLANT | Facility: CLINIC | Age: 44
End: 2019-04-02

## 2019-04-02 ENCOUNTER — PATIENT MESSAGE (OUTPATIENT)
Dept: OBSTETRICS AND GYNECOLOGY | Facility: CLINIC | Age: 44
End: 2019-04-02

## 2019-04-02 ENCOUNTER — TELEPHONE (OUTPATIENT)
Dept: CARDIOLOGY | Facility: HOSPITAL | Age: 44
End: 2019-04-02

## 2019-04-02 NOTE — TELEPHONE ENCOUNTER
Also I normally go the chiropractor once a week and get on a rolling bed. I'm sure I can't right now .could you send me something letting the  know when I'm able to return.   Thanks.       WE CAN PROVIDE A NOTE WHEN NEEDED

## 2019-04-02 NOTE — LETTER
April 3, 2019    Mario Mhaajan  [address] [address]             [facility]  [address], [address]  [address]th [address] [facility]  [place] [address]  Phone: [phone] Dear Ms. Mahajan:    Due to patients major abdominal surgery, patient can not attend chiropractor appointments at this time.    If you have any questions or concerns, please don't hesitate to call.    Sincerely,        Dr. Victorino Rose

## 2019-04-02 NOTE — TELEPHONE ENCOUNTER
----- Message from Ramya Cortes MA sent at 4/2/2019 10:13 AM CDT -----  Contact: patient called  The patient need to talk reschedule her appointment for today. Please call [phone]. Thank you.

## 2019-04-03 ENCOUNTER — OFFICE VISIT (OUTPATIENT)
Dept: OBSTETRICS AND GYNECOLOGY | Facility: CLINIC | Age: 44
End: 2019-04-03
Payer: MEDICAID

## 2019-04-03 ENCOUNTER — LAB VISIT (OUTPATIENT)
Dept: LAB | Facility: HOSPITAL | Age: 44
End: 2019-04-03
Attending: INTERNAL MEDICINE
Payer: MEDICAID

## 2019-04-03 VITALS
BODY MASS INDEX: 37.13 KG/M2 | HEIGHT: 69 IN | SYSTOLIC BLOOD PRESSURE: 126 MMHG | DIASTOLIC BLOOD PRESSURE: 84 MMHG | WEIGHT: 250.69 LBS

## 2019-04-03 DIAGNOSIS — I42.0 NONISCHEMIC DILATED CARDIOMYOPATHY: Chronic | ICD-10-CM

## 2019-04-03 DIAGNOSIS — Z48.89 POSTOPERATIVE VISIT: Primary | ICD-10-CM

## 2019-04-03 LAB
ALBUMIN SERPL BCP-MCNC: 3.4 G/DL (ref 3.5–5.2)
ALP SERPL-CCNC: 38 U/L (ref 55–135)
ALT SERPL W/O P-5'-P-CCNC: 8 U/L (ref 10–44)
ANION GAP SERPL CALC-SCNC: 7 MMOL/L (ref 8–16)
AST SERPL-CCNC: 10 U/L (ref 10–40)
BILIRUB SERPL-MCNC: 1.1 MG/DL (ref 0.1–1)
BNP SERPL-MCNC: 265 PG/ML (ref 0–99)
BUN SERPL-MCNC: 13 MG/DL (ref 6–20)
CALCIUM SERPL-MCNC: 9.6 MG/DL (ref 8.7–10.5)
CHLORIDE SERPL-SCNC: 106 MMOL/L (ref 95–110)
CO2 SERPL-SCNC: 27 MMOL/L (ref 23–29)
CREAT SERPL-MCNC: 0.9 MG/DL (ref 0.5–1.4)
EST. GFR  (AFRICAN AMERICAN): >60 ML/MIN/1.73 M^2
EST. GFR  (NON AFRICAN AMERICAN): >60 ML/MIN/1.73 M^2
GLUCOSE SERPL-MCNC: 92 MG/DL (ref 70–110)
POTASSIUM SERPL-SCNC: 3.9 MMOL/L (ref 3.5–5.1)
PROT SERPL-MCNC: 7.5 G/DL (ref 6–8.4)
SODIUM SERPL-SCNC: 140 MMOL/L (ref 136–145)

## 2019-04-03 PROCEDURE — 99999 PR PBB SHADOW E&M-EST. PATIENT-LVL III: ICD-10-PCS | Mod: PBBFAC,,, | Performed by: OBSTETRICS & GYNECOLOGY

## 2019-04-03 PROCEDURE — 80053 COMPREHEN METABOLIC PANEL: CPT

## 2019-04-03 PROCEDURE — 99999 PR PBB SHADOW E&M-EST. PATIENT-LVL III: CPT | Mod: PBBFAC,,, | Performed by: OBSTETRICS & GYNECOLOGY

## 2019-04-03 PROCEDURE — 36415 COLL VENOUS BLD VENIPUNCTURE: CPT

## 2019-04-03 PROCEDURE — 83880 ASSAY OF NATRIURETIC PEPTIDE: CPT

## 2019-04-03 PROCEDURE — 99213 OFFICE O/P EST LOW 20 MIN: CPT | Mod: PBBFAC,PO | Performed by: OBSTETRICS & GYNECOLOGY

## 2019-04-03 PROCEDURE — 99024 PR POST-OP FOLLOW-UP VISIT: ICD-10-PCS | Mod: ,,, | Performed by: OBSTETRICS & GYNECOLOGY

## 2019-04-03 PROCEDURE — 99024 POSTOP FOLLOW-UP VISIT: CPT | Mod: ,,, | Performed by: OBSTETRICS & GYNECOLOGY

## 2019-04-03 RX ORDER — CEPHALEXIN 500 MG/1
500 CAPSULE ORAL EVERY 8 HOURS
Qty: 30 CAPSULE | Refills: 1 | Status: SHIPPED | OUTPATIENT
Start: 2019-04-03 | End: 2019-05-14

## 2019-04-03 NOTE — PROGRESS NOTES
Patient presents to the office today for postop visit.  The Aqua cell dressing was removed with the help of a adhesive dissolve her spray.  Patient's incision looks fine.  There is a small gap where these skin edges are on even but not open on the left side as well as another 1 on the right.  There is no drainage and looks like it is healing well and also healing cleanly.  Patient complains of discomfort on the left side greater than the right.  She also states that she has had chills and is a hot and then cold time to time.  Patient's ovaries were left in situ at her age of 43.  Pathology report was reviewed and is benign.  Patient also stated that she has been having difficulty with bowel recovery after the surgery requiring some stool softener/laxatives and not having regular return to bowel function.  Examination shows a soft abdomen with a for healing incision. The abdomen is soft without rebound or guarding.  There are no masses palpable.  No service bruising.  Patient is only 1 week and today out from surgery at this time.  Patient was counseled that she is doing well at 1 week time.  She is encouraged to take extra iron S a tablet or through food sources.  A mild stool softener/laxatives also recommended.  Patient will additionally be placed on Keflex 500 mg t.i.d. as precautionary antibiotic against urinary tract or late wound problems.  Patient return to the office in 2 weeks if she is not significantly better than a hormone assessment and other lab for be ordered.  If she has any trouble in the interval she is to call or come in and workup will be done at that time for postop problems. Presently however, she looks like she is healing well but is is having an uncomfortable recovery.  She does admit to trying not to use the pain medication she was given leaving the hospital and today's encouraged she uses medication to help her rest and remained comfortable, bowel effects not withstanding.

## 2019-04-04 ENCOUNTER — TELEPHONE (OUTPATIENT)
Dept: TRANSPLANT | Facility: CLINIC | Age: 44
End: 2019-04-04

## 2019-04-04 ENCOUNTER — HOSPITAL ENCOUNTER (EMERGENCY)
Facility: HOSPITAL | Age: 44
Discharge: HOME | End: 2019-04-05
Attending: EMERGENCY MEDICINE
Payer: MEDICAID

## 2019-04-04 ENCOUNTER — NURSE TRIAGE (OUTPATIENT)
Dept: ADMINISTRATIVE | Facility: CLINIC | Age: 44
End: 2019-04-04

## 2019-04-04 ENCOUNTER — PATIENT MESSAGE (OUTPATIENT)
Dept: OBSTETRICS AND GYNECOLOGY | Facility: CLINIC | Age: 44
End: 2019-04-04

## 2019-04-04 DIAGNOSIS — S30.1XXA ABDOMINAL WALL SEROMA, INITIAL ENCOUNTER: Primary | ICD-10-CM

## 2019-04-04 PROCEDURE — 96361 HYDRATE IV INFUSION ADD-ON: CPT

## 2019-04-04 PROCEDURE — 96374 THER/PROPH/DIAG INJ IV PUSH: CPT

## 2019-04-04 PROCEDURE — 99284 EMERGENCY DEPT VISIT MOD MDM: CPT | Mod: 25

## 2019-04-04 NOTE — TELEPHONE ENCOUNTER
Pt returned my call and confirmed that she is taking 97/103 twice daily. Informed pt of lab results.

## 2019-04-04 NOTE — TELEPHONE ENCOUNTER
----- Message from Merlene Poe sent at 4/4/2019  4:57 PM CDT -----  Contact: pt  Pt returning a missed call    Thanks

## 2019-04-04 NOTE — TELEPHONE ENCOUNTER
Attempted to reach pt to clarify if shes already taking Entresto 97/103 bid per a message that I received form coworker LATESHA Bess per coworker ERROL Hernandez RN. Asked pt to please call back and let us know what dose of coreg shes taking then we can decide if she needs any additional labs.

## 2019-04-05 ENCOUNTER — TELEPHONE (OUTPATIENT)
Dept: OBSTETRICS AND GYNECOLOGY | Facility: CLINIC | Age: 44
End: 2019-04-05

## 2019-04-05 VITALS
DIASTOLIC BLOOD PRESSURE: 56 MMHG | TEMPERATURE: 99 F | SYSTOLIC BLOOD PRESSURE: 103 MMHG | RESPIRATION RATE: 16 BRPM | HEIGHT: 69 IN | BODY MASS INDEX: 34.07 KG/M2 | HEART RATE: 73 BPM | OXYGEN SATURATION: 98 % | WEIGHT: 230 LBS

## 2019-04-05 LAB
ALBUMIN SERPL BCP-MCNC: 3.1 G/DL (ref 3.5–5.2)
ALP SERPL-CCNC: 43 U/L (ref 55–135)
ALT SERPL W/O P-5'-P-CCNC: 8 U/L (ref 10–44)
ANION GAP SERPL CALC-SCNC: 8 MMOL/L (ref 8–16)
AST SERPL-CCNC: 10 U/L (ref 10–40)
BACTERIA #/AREA URNS HPF: NORMAL /HPF
BASOPHILS # BLD AUTO: 0.01 K/UL (ref 0–0.2)
BASOPHILS NFR BLD: 0.1 % (ref 0–1.9)
BILIRUB SERPL-MCNC: 1.2 MG/DL (ref 0.1–1)
BILIRUB UR QL STRIP: NEGATIVE
BUN SERPL-MCNC: 13 MG/DL (ref 6–20)
CALCIUM SERPL-MCNC: 9.2 MG/DL (ref 8.7–10.5)
CHLORIDE SERPL-SCNC: 107 MMOL/L (ref 95–110)
CLARITY UR: CLEAR
CO2 SERPL-SCNC: 24 MMOL/L (ref 23–29)
COLOR UR: ABNORMAL
CREAT SERPL-MCNC: 0.8 MG/DL (ref 0.5–1.4)
DIFFERENTIAL METHOD: ABNORMAL
EOSINOPHIL # BLD AUTO: 0.1 K/UL (ref 0–0.5)
EOSINOPHIL NFR BLD: 1.1 % (ref 0–8)
ERYTHROCYTE [DISTWIDTH] IN BLOOD BY AUTOMATED COUNT: 13 % (ref 11.5–14.5)
EST. GFR  (AFRICAN AMERICAN): >60 ML/MIN/1.73 M^2
EST. GFR  (NON AFRICAN AMERICAN): >60 ML/MIN/1.73 M^2
GLUCOSE SERPL-MCNC: 98 MG/DL (ref 70–110)
GLUCOSE UR QL STRIP: NEGATIVE
HCT VFR BLD AUTO: 31.4 % (ref 37–48.5)
HGB BLD-MCNC: 10 G/DL (ref 12–16)
HGB UR QL STRIP: ABNORMAL
HYALINE CASTS #/AREA URNS LPF: 0 /LPF
KETONES UR QL STRIP: NEGATIVE
LEUKOCYTE ESTERASE UR QL STRIP: NEGATIVE
LYMPHOCYTES # BLD AUTO: 1.8 K/UL (ref 1–4.8)
LYMPHOCYTES NFR BLD: 17.1 % (ref 18–48)
MCH RBC QN AUTO: 28.6 PG (ref 27–31)
MCHC RBC AUTO-ENTMCNC: 31.8 G/DL (ref 32–36)
MCV RBC AUTO: 90 FL (ref 82–98)
MICROSCOPIC COMMENT: NORMAL
MONOCYTES # BLD AUTO: 0.9 K/UL (ref 0.3–1)
MONOCYTES NFR BLD: 8.4 % (ref 4–15)
NEUTROPHILS # BLD AUTO: 7.7 K/UL (ref 1.8–7.7)
NEUTROPHILS NFR BLD: 73 % (ref 38–73)
NITRITE UR QL STRIP: NEGATIVE
PH UR STRIP: 6 [PH] (ref 5–8)
PLATELET # BLD AUTO: 340 K/UL (ref 150–350)
PMV BLD AUTO: 10.2 FL (ref 9.2–12.9)
POTASSIUM SERPL-SCNC: 3.8 MMOL/L (ref 3.5–5.1)
PROT SERPL-MCNC: 7.2 G/DL (ref 6–8.4)
PROT UR QL STRIP: ABNORMAL
RBC # BLD AUTO: 3.5 M/UL (ref 4–5.4)
RBC #/AREA URNS HPF: 3 /HPF (ref 0–4)
SODIUM SERPL-SCNC: 139 MMOL/L (ref 136–145)
SP GR UR STRIP: 1.02 (ref 1–1.03)
SQUAMOUS #/AREA URNS HPF: 4 /HPF
URN SPEC COLLECT METH UR: ABNORMAL
UROBILINOGEN UR STRIP-ACNC: 1 EU/DL
WBC # BLD AUTO: 10.54 K/UL (ref 3.9–12.7)
WBC #/AREA URNS HPF: 5 /HPF (ref 0–5)

## 2019-04-05 PROCEDURE — 81000 URINALYSIS NONAUTO W/SCOPE: CPT

## 2019-04-05 PROCEDURE — 87040 BLOOD CULTURE FOR BACTERIA: CPT | Mod: 59

## 2019-04-05 PROCEDURE — 85025 COMPLETE CBC W/AUTO DIFF WBC: CPT

## 2019-04-05 PROCEDURE — 63600175 PHARM REV CODE 636 W HCPCS: Performed by: EMERGENCY MEDICINE

## 2019-04-05 PROCEDURE — 25000003 PHARM REV CODE 250: Performed by: EMERGENCY MEDICINE

## 2019-04-05 PROCEDURE — 25500020 PHARM REV CODE 255: Performed by: EMERGENCY MEDICINE

## 2019-04-05 PROCEDURE — 80053 COMPREHEN METABOLIC PANEL: CPT

## 2019-04-05 RX ORDER — AZITHROMYCIN 250 MG/1
1000 TABLET, FILM COATED ORAL
Status: DISCONTINUED | OUTPATIENT
Start: 2019-04-05 | End: 2019-04-05

## 2019-04-05 RX ORDER — KETOROLAC TROMETHAMINE 30 MG/ML
15 INJECTION, SOLUTION INTRAMUSCULAR; INTRAVENOUS
Status: COMPLETED | OUTPATIENT
Start: 2019-04-05 | End: 2019-04-05

## 2019-04-05 RX ORDER — ONDANSETRON 4 MG/1
4 TABLET, ORALLY DISINTEGRATING ORAL
Status: DISCONTINUED | OUTPATIENT
Start: 2019-04-05 | End: 2019-04-05

## 2019-04-05 RX ADMIN — SODIUM CHLORIDE 1000 ML: 0.9 INJECTION, SOLUTION INTRAVENOUS at 12:04

## 2019-04-05 RX ADMIN — IOHEXOL 100 ML: 350 INJECTION, SOLUTION INTRAVENOUS at 01:04

## 2019-04-05 RX ADMIN — KETOROLAC TROMETHAMINE 15 MG: 30 INJECTION, SOLUTION INTRAMUSCULAR at 12:04

## 2019-04-05 NOTE — ED NOTES
Pt provided with new abd pad. Small amount of red/brown drainage noted on old pad that was discarded.

## 2019-04-05 NOTE — TELEPHONE ENCOUNTER
Reason for Disposition   [1] Pus or bad-smelling fluid draining from incision AND [2] no fever    Protocols used: POST-OP INCISION SYMPTOMS-A-AH    Patient called to report the following:     -s/p hysterectomy   -drainage that looks like pus  -took 3 doses of abx   -chills, hot and cold   -denies redness, severe pain   -advised to report  To ED within 4 hours

## 2019-04-05 NOTE — DISCHARGE INSTRUCTIONS
Keep taking your antibiotics as prescribed. Follow up with Dr Rose on Monday for recheck. Return to the Emergency Department if you have fevers or notice pus from the site. Refer to the additional material for further information including when to return to the Emergency Department.

## 2019-04-05 NOTE — ED PROVIDER NOTES
Encounter Date: 4/4/2019    SCRIBE #1 NOTE: I, Anthony Lucia, am scribing for, and in the presence of,  Dr. Gregory. I have scribed the entire note.     I, Dr. Minerva Gregory MD, personally performed the services described in this documentation. All medical record entries made by the scribe were at my direction and in my presence.  I have reviewed the chart and agree that the record reflects my personal performance and is accurate and complete. Minerva Gregory MD.    History     Chief Complaint   Patient presents with    Post-op Problem     Patient presents to the ED with reports of having abdominal pain and drainage from surgical wound. Reports having had a hysterectomy by Dr. Rose x 1 week ago.     Abdominal Pain     CHIEF COMPLAINT: Patient presents with: Post-op problem    HISTORY OF PRESENT ILLNESS: Mario Mahajan who is a 43 y.o. presents to the emergency department today with complaint of abdominal pain and drainage from a surgical wound. The patient reports having a hysterectomy with Dr. Rose on 3/265/2019. She complains of nausea, abdominal pain, and pink drainage. The patient reports she has been passing some gas and has been able to have bowel movements. Denies diarrhea or vomiting. She began taking Cephalexin yesterday but has not taken any pain medication for her symptoms.      ALLERGIES REVIEWED  MEDICATIONS REVIEWED  PMH/PSH/SOC/FH REVIEWED     The history is provided by the patient.    Nursing/Ancillary staff note reviewed.        Review of patient's allergies indicates:   Allergen Reactions    Ace inhibitors      Cough     Past Medical History:   Diagnosis Date    Asthma     Chronic back pain 7/1/2014    Chronic combined systolic and diastolic congestive heart failure 4/28/2015     2-10-17   1 - Severely depressed left ventricular systolic function (EF 20-25%).    2 - Severe left ventricular enlargement.    3 - Severe left atrial enlargement.    4 - Left ventricular diastolic  dysfunction.    5 - The estimated PA systolic pressure is 18 mmHg.    6 - Mild mitral regurgitation.     Encounter for blood transfusion     ICD (implantable cardioverter-defibrillator) in place 12/01/15 3/3/2016    Menorrhagia, premenopausal 2/10/2017    Microcytic anemia 2015    Non-rheumatic mitral regurgitation 3/5/2015    Nonischemic dilated cardiomyopathy 2015    Sleep apnea     Syncope and collapse 2017    Ventricular tachycardia, polymorphic      Past Surgical History:   Procedure Laterality Date    CARDIAC DEFIBRILLATOR PLACEMENT  2015     SECTION      HEART CATH-RIGHT Right 3/26/2018    Performed by Jeimy Srivastava MD at [facility] CATH LAB    HYSTERECTOMY, TOTAL, ABDOMINAL N/A 3/26/2019    [facility]    INSERTION-ICD-SINGLE N/A 2015    Performed by Victorino Tay MD at [facility] CATH LAB    REVISION-LEAD-ICD N/A 2016    Performed by Victorino Tay MD at [facility] CATH LAB    TUBAL LIGATION       Family History   Problem Relation Age of Onset    Diabetes Father     Pancreatic cancer Father     Heart failure Father     Heart failure Brother     Lung cancer Paternal Grandmother      Social History     Tobacco Use    Smoking status: Never Smoker    Smokeless tobacco: Never Used   Substance Use Topics    Alcohol use: Yes     Comment: 1 glass per 3 months    Drug use: No     Review of Systems   Constitutional: Negative for activity change, appetite change, chills, diaphoresis and fever.   HENT: Negative for congestion, drooling, ear pain, mouth sores, rhinorrhea, sinus pain, sore throat and trouble swallowing.    Eyes: Negative for pain and discharge.   Respiratory: Negative for cough, chest tightness, shortness of breath, wheezing and stridor.    Cardiovascular: Negative for chest pain, palpitations and leg swelling.   Gastrointestinal: Negative for abdominal distention, abdominal pain, blood in stool, constipation,  diarrhea, nausea and vomiting.   Genitourinary: Negative for difficulty urinating, dysuria, flank pain, frequency, hematuria and urgency.   Musculoskeletal: Negative for arthralgias, back pain and myalgias.   Skin: Positive for wound. Negative for pallor and rash.   Neurological: Negative for dizziness, syncope, weakness, light-headedness and numbness.   All other systems reviewed and are negative.      Physical Exam     Initial Vitals [04/04/19 2330]   BP Pulse Resp Temp SpO2   117/65 92 18 98.2 °F (36.8 °C) 98 %      MAP       --         Physical Exam    Nursing note and vitals reviewed.  Constitutional: She appears well-developed and well-nourished.   HENT:   Head: Normocephalic and atraumatic.   Right Ear: External ear normal.   Left Ear: External ear normal.   Nose: Nose normal.   Mouth/Throat: Oropharynx is clear and moist.   Eyes: Conjunctivae and EOM are normal. Pupils are equal, round, and reactive to light. No scleral icterus.   Neck: Normal range of motion. Neck supple. No JVD present.   Cardiovascular: Normal rate, regular rhythm, normal heart sounds and intact distal pulses. Exam reveals no gallop and no friction rub.    No murmur heard.  Pulmonary/Chest: Breath sounds normal. No stridor. No respiratory distress. She has no wheezes. She exhibits no tenderness.   Abdominal: Soft. Bowel sounds are normal. She exhibits no distension and no mass. There is no tenderness. There is no rebound and no guarding.   Musculoskeletal: Normal range of motion. She exhibits no edema or tenderness.   Back is nontender to palpation.    Neurological: She is alert and oriented to person, place, and time. She has normal strength. No cranial nerve deficit.   Skin: Skin is warm and dry. Capillary refill takes less than 2 seconds. No rash noted. No pallor.   Gap to the incision site at the skin  on the lateral aspect of lower abdominal incision site but no wound dehiscence Serosanguinous fluid can be expressed.  No purulent  material.    Psychiatric: She has a normal mood and affect. Thought content normal.         ED Course   Procedures  Labs Reviewed   CBC W/ AUTO DIFFERENTIAL - Abnormal; Notable for the following components:       Result Value    RBC 3.50 (*)     Hemoglobin 10.0 (*)     Hematocrit 31.4 (*)     MCHC 31.8 (*)     Lymph% 17.1 (*)     All other components within normal limits   COMPREHENSIVE METABOLIC PANEL - Abnormal; Notable for the following components:    Albumin 3.1 (*)     Total Bilirubin 1.2 (*)     Alkaline Phosphatase 43 (*)     ALT 8 (*)     All other components within normal limits   URINALYSIS, REFLEX TO URINE CULTURE - Abnormal; Notable for the following components:    Color, UA Brown (*)     Protein, UA 1+ (*)     Occult Blood UA Trace (*)     All other components within normal limits    Narrative:     Preferred Collection Type->Urine, Clean Catch   CULTURE, BLOOD   CULTURE, BLOOD   URINALYSIS MICROSCOPIC    Narrative:     Preferred Collection Type->Urine, Clean Catch          Imaging Results          CT Abdomen Pelvis With Contrast (Final result)  Result time 04/05/19 02:32:32    Final result by Mireya Zafar MD (04/05/19 02:32:32)                 Impression:      Postsurgical changes of a hysterectomy.  Focal fluid collection with tiny foci of air at the surgical site between the lower abdominal rectus musculature.  Differential considerations include small abscess, postop seroma, or involving the Mao.    Gallbladder sludge or tumefactive sludge.    Small hiatal hernia.      Electronically signed by: Mireya Zafar  Date:    04/05/2019  Time:    02:32             Narrative:    EXAMINATION:  CT OF ABDOMEN PELVIS WITH    CLINICAL HISTORY:  Infection, abdomen-pelvis;    TECHNIQUE:  5 mm enhanced axial images were obtained from the lung bases through the greater trochanters.  One hundred mL of Omnipaque 350 was injected.    COMPARISON:  03/17/2017    FINDINGS:  The right lobe of the liver is  elongated, which may be due to right Els lobe versus mild hepatomegaly.  There is a too small to characterize low attenuation lesions seen in the left lobe of the liver, which may represent a tiny cyst.    There is a subtle hyperdensity seen occupying nearly all of the gallbladder lumen.  This may represent tumefactive sludge or simple gallbladder sludge.    The spleen, pancreas, kidneys, and adrenal glands are unremarkable.    There is no definite evidence for abdominal adenopathy or ascites.  A tiny fat containing umbilical hernia is present.    Postsurgical changes of a hysterectomy are seen.  Between in the lower abdominus rectus musculature, there is a small fluid collection measuring 2.5 x 1.0 x 3.4 cm (series 2 axial image 112 and series 601 coronal image 32).    At the lung bases, there is a small hiatal hernia.  There is mild right basilar pleural thickening or compressive atelectasis.                                 Medical Decision Making:   History:   Old Medical Records: I decided to obtain old medical records.  Initial Assessment:   This is a 44 yo female who presents to the ED today with postop drainage, the drainage is serosanguinous.  Not purulent. She is afebrile her and afebrile at home. Is not experiencing increased pain. I am able to express serosanguinous fluid from her incision site at the area where the skin is not closed. Will obtain labs and CT scan for further assessment.   Differential Diagnosis:   Seroma, abscess, intraabdominal abscess, postop infection  Clinical Tests:   Lab Tests: Ordered and Reviewed  Radiological Study: Ordered and Reviewed  ED Management:  3:08 AM - Spoke with Dr. Lovell regarding presenation, physical, and CAT scan findings. As of right now, Dr. Lovell is okay with the patient going home with continuation of antibiotics and instructions to follow up with Dr. Guajardo about possible seroma. If the patient devlopes pus or fever she must return for admission. The pt is  comfortable with going home and understands the warning signs for return. After taking into careful account the historical factors and physical exam findings of the patient's presentation today, in conjunction with the empirical and objective data obtained on ED workup, no acute emergent medical condition requiring admission has been identified. The patient appears to be low risk for an emergent medical condition and I feel it is safe and appropriate at this time for the patient to be discharged to follow-up as detailed in their discharge instructions for reevaluation and possible continued outpatient workup and management. I have discussed the specifics of the workup with the patient and the patient has verbalized understanding of the details of the workup, the diagnosis, the treatment plan, and the need for outpatient follow-up.  Although the patient has no emergent etiology today this does not preclude the development of an emergent condition so in addition, I have advised the patient that they can return to the ED and/or activate EMS at any time with worsening of their symptoms, change of their symptoms, or with any other medical complaint.  The patient remained comfortable and stable during their visit in the ED.  Discharge and follow-up instructions discussed with the patient who expressed understanding and willingness to comply with my recommendations.     This medical record was prepared using voice dictation software. There may be phonetic errors.                       ED Course as of Apr 09 0056 Fri Apr 05, 2019   0248 Small fluid collection at the lower abd rectus muscle seen on CT scan.     [JA]      ED Course User Index  [JA] Minerva Gregory MD     Clinical Impression:       ICD-10-CM ICD-9-CM   1. Abdominal wall seroma, initial encounter T88.8XXA 998.13    T79.2XXA        Disposition:   Disposition: Discharged  Condition: Stable                        Minerva Gregory MD  04/09/19 0056

## 2019-04-05 NOTE — ED NOTES
Pt given instructions for clean catch urine sample. Pt verbalized understanding.  Pt to bathroom via WC with RN.

## 2019-04-09 ENCOUNTER — TELEPHONE (OUTPATIENT)
Dept: OBSTETRICS AND GYNECOLOGY | Facility: CLINIC | Age: 44
End: 2019-04-09

## 2019-04-09 NOTE — TELEPHONE ENCOUNTER
----- Message from Niya Kumar sent at 4/9/2019  2:21 PM CDT -----  No. [phone]   Patient went to the ER on 4/4/19 because her incision was leaking.   The incision is still leaking.   Please call.

## 2019-04-10 ENCOUNTER — OFFICE VISIT (OUTPATIENT)
Dept: OBSTETRICS AND GYNECOLOGY | Facility: CLINIC | Age: 44
End: 2019-04-10
Payer: MEDICAID

## 2019-04-10 VITALS
WEIGHT: 251.31 LBS | BODY MASS INDEX: 37.22 KG/M2 | SYSTOLIC BLOOD PRESSURE: 120 MMHG | HEIGHT: 69 IN | DIASTOLIC BLOOD PRESSURE: 72 MMHG

## 2019-04-10 DIAGNOSIS — Z51.89 VISIT FOR WOUND CHECK: Primary | ICD-10-CM

## 2019-04-10 LAB
BACTERIA BLD CULT: NORMAL
BACTERIA BLD CULT: NORMAL

## 2019-04-10 PROCEDURE — 99024 POSTOP FOLLOW-UP VISIT: CPT | Mod: ,,, | Performed by: OBSTETRICS & GYNECOLOGY

## 2019-04-10 PROCEDURE — 99024 PR POST-OP FOLLOW-UP VISIT: ICD-10-PCS | Mod: ,,, | Performed by: OBSTETRICS & GYNECOLOGY

## 2019-04-10 PROCEDURE — 99999 PR PBB SHADOW E&M-EST. PATIENT-LVL III: ICD-10-PCS | Mod: PBBFAC,,, | Performed by: OBSTETRICS & GYNECOLOGY

## 2019-04-10 PROCEDURE — 99999 PR PBB SHADOW E&M-EST. PATIENT-LVL III: CPT | Mod: PBBFAC,,, | Performed by: OBSTETRICS & GYNECOLOGY

## 2019-04-10 PROCEDURE — 99213 OFFICE O/P EST LOW 20 MIN: CPT | Mod: PBBFAC,PO | Performed by: OBSTETRICS & GYNECOLOGY

## 2019-04-10 NOTE — PROGRESS NOTES
Patient returns for follow-up on wound healing.  The area of concern on left side is healed and closed over.  However there is a new small opening approximately 1 cm in length along the incision line to the right of midline.  It is draining clear fluid and probing with sterile cotton swab it is approximately 1/2 cm deep and approximately 2 cm extending towards the midline. The area was cleaned with hydrogen peroxide and sterile cotton swabs and a clean 4 x 4 dressing was placed over it.  It is not infected but is draining a pocket of serous fluid.  Patient given instructions to allow the drainage to persist at home but she will call if there is any extension of the separation in the skin.  She has an appointment in approximately 1 week and will return for that for another wound insect inspection and update.  Is anticipated that this will continue to heal and when the serous pockets underneath the skin incision are dry then complete wound healing will be effected.

## 2019-04-17 ENCOUNTER — OFFICE VISIT (OUTPATIENT)
Dept: OBSTETRICS AND GYNECOLOGY | Facility: CLINIC | Age: 44
End: 2019-04-17
Payer: MEDICAID

## 2019-04-17 VITALS
DIASTOLIC BLOOD PRESSURE: 86 MMHG | SYSTOLIC BLOOD PRESSURE: 118 MMHG | WEIGHT: 253.19 LBS | HEIGHT: 69 IN | BODY MASS INDEX: 37.5 KG/M2

## 2019-04-17 DIAGNOSIS — Z09 POSTOP CHECK: Primary | ICD-10-CM

## 2019-04-17 PROCEDURE — 99024 POSTOP FOLLOW-UP VISIT: CPT | Mod: ,,, | Performed by: OBSTETRICS & GYNECOLOGY

## 2019-04-17 PROCEDURE — 99999 PR PBB SHADOW E&M-EST. PATIENT-LVL III: ICD-10-PCS | Mod: PBBFAC,,, | Performed by: OBSTETRICS & GYNECOLOGY

## 2019-04-17 PROCEDURE — 99999 PR PBB SHADOW E&M-EST. PATIENT-LVL III: CPT | Mod: PBBFAC,,, | Performed by: OBSTETRICS & GYNECOLOGY

## 2019-04-17 PROCEDURE — 99213 OFFICE O/P EST LOW 20 MIN: CPT | Mod: PBBFAC,PO | Performed by: OBSTETRICS & GYNECOLOGY

## 2019-04-17 PROCEDURE — 99024 PR POST-OP FOLLOW-UP VISIT: ICD-10-PCS | Mod: ,,, | Performed by: OBSTETRICS & GYNECOLOGY

## 2019-04-17 NOTE — PROGRESS NOTES
Patient returns for postop visit.  The small area of seroma drainage has stopped.  The incision is dry and intact. Patient does still have right lower quadrant discomfort which is a generalized soreness.  Abdominal examination shows no rebound tenderness and no mass palpable in the abdominal wall.  Patient given okay to increase activities with the exception of heavy lifting, strenuous activity sexual activity.  She will return to the office in 3 weeks full checkup including pelvic exam and probable full released normal activities after that visit.  No prescriptions required today.  Patient is doing well half way through her recovery phase.

## 2019-04-19 DIAGNOSIS — I50.9 HEART FAILURE: ICD-10-CM

## 2019-04-19 DIAGNOSIS — Z76.82 ORGAN TRANSPLANT CANDIDATE: ICD-10-CM

## 2019-04-22 ENCOUNTER — PATIENT MESSAGE (OUTPATIENT)
Dept: TRANSPLANT | Facility: CLINIC | Age: 44
End: 2019-04-22

## 2019-04-22 ENCOUNTER — PATIENT MESSAGE (OUTPATIENT)
Dept: CARDIOLOGY | Facility: CLINIC | Age: 44
End: 2019-04-22

## 2019-04-22 DIAGNOSIS — I50.22 CHRONIC SYSTOLIC CHF, NYHA CLASS 2 AND ACA/AHA STAGE C: Primary | ICD-10-CM

## 2019-04-22 RX ORDER — SACUBITRIL AND VALSARTAN 49; 51 MG/1; MG/1
TABLET, FILM COATED ORAL
Qty: 60 TABLET | Refills: 0 | Status: SHIPPED | OUTPATIENT
Start: 2019-04-22 | End: 2019-04-22 | Stop reason: DRUGHIGH

## 2019-04-29 ENCOUNTER — TELEPHONE (OUTPATIENT)
Dept: CARDIOLOGY | Facility: HOSPITAL | Age: 44
End: 2019-04-29

## 2019-04-29 NOTE — TELEPHONE ENCOUNTER
----- Message from Dianna Garza sent at 4/29/2019 12:22 PM CDT -----  Contact: patient  The Pt is calling to schedule her appointment. Please call her back @ [phone]. Thanks, Dianna

## 2019-04-29 NOTE — TELEPHONE ENCOUNTER
----- Message from Dianna Garza sent at 4/29/2019 10:07 AM CDT -----  Contact: Pateint  The Pt is returning a call. Please call her back @ [phone]. Thanks, Dianna

## 2019-04-29 NOTE — TELEPHONE ENCOUNTER
Spoke to patient.  Scheduled May 14th EKG, Device and Dr. Tay.  She will look at appointments in portal no need to mail slip.

## 2019-05-09 DIAGNOSIS — I50.42 CHRONIC COMBINED SYSTOLIC AND DIASTOLIC CONGESTIVE HEART FAILURE: ICD-10-CM

## 2019-05-10 RX ORDER — SPIRONOLACTONE 25 MG/1
25 TABLET ORAL DAILY
Qty: 30 TABLET | Refills: 11 | Status: SHIPPED | OUTPATIENT
Start: 2019-05-10 | End: 2020-06-30

## 2019-05-14 ENCOUNTER — CLINICAL SUPPORT (OUTPATIENT)
Dept: CARDIOLOGY | Facility: HOSPITAL | Age: 44
End: 2019-05-14
Attending: INTERNAL MEDICINE
Payer: MEDICAID

## 2019-05-14 ENCOUNTER — HOSPITAL ENCOUNTER (OUTPATIENT)
Dept: CARDIOLOGY | Facility: CLINIC | Age: 44
Discharge: HOME | End: 2019-05-14
Payer: MEDICAID

## 2019-05-14 ENCOUNTER — OFFICE VISIT (OUTPATIENT)
Dept: ELECTROPHYSIOLOGY | Facility: CLINIC | Age: 44
End: 2019-05-14
Payer: MEDICAID

## 2019-05-14 VITALS
DIASTOLIC BLOOD PRESSURE: 72 MMHG | HEIGHT: 65 IN | BODY MASS INDEX: 42.42 KG/M2 | WEIGHT: 254.63 LBS | HEART RATE: 68 BPM | SYSTOLIC BLOOD PRESSURE: 110 MMHG

## 2019-05-14 DIAGNOSIS — I47.29 POLYMORPHIC VENTRICULAR TACHYCARDIA: ICD-10-CM

## 2019-05-14 DIAGNOSIS — Z95.810 CARDIAC DEFIBRILLATOR IN SITU: ICD-10-CM

## 2019-05-14 DIAGNOSIS — Z95.810 CARDIAC DEFIBRILLATOR IN PLACE: ICD-10-CM

## 2019-05-14 DIAGNOSIS — Z95.810 ICD (IMPLANTABLE CARDIOVERTER-DEFIBRILLATOR) IN PLACE: Primary | Chronic | ICD-10-CM

## 2019-05-14 DIAGNOSIS — I42.0 NONISCHEMIC DILATED CARDIOMYOPATHY: Chronic | ICD-10-CM

## 2019-05-14 PROCEDURE — 93010 RHYTHM STRIP: ICD-10-PCS | Mod: S$PBB,,, | Performed by: INTERNAL MEDICINE

## 2019-05-14 PROCEDURE — 93010 ELECTROCARDIOGRAM REPORT: CPT | Mod: S$PBB,,, | Performed by: INTERNAL MEDICINE

## 2019-05-14 PROCEDURE — 99999 PR PBB SHADOW E&M-EST. PATIENT-LVL III: CPT | Mod: PBBFAC,,, | Performed by: INTERNAL MEDICINE

## 2019-05-14 PROCEDURE — 99213 OFFICE O/P EST LOW 20 MIN: CPT | Mod: PBBFAC,25 | Performed by: INTERNAL MEDICINE

## 2019-05-14 PROCEDURE — 99214 OFFICE O/P EST MOD 30 MIN: CPT | Mod: S$PBB,25,, | Performed by: INTERNAL MEDICINE

## 2019-05-14 PROCEDURE — 99214 PR OFFICE/OUTPT VISIT, EST, LEVL IV, 30-39 MIN: ICD-10-PCS | Mod: S$PBB,25,, | Performed by: INTERNAL MEDICINE

## 2019-05-14 PROCEDURE — 93005 ELECTROCARDIOGRAM TRACING: CPT | Mod: PBBFAC,59 | Performed by: INTERNAL MEDICINE

## 2019-05-14 PROCEDURE — 99999 PR PBB SHADOW E&M-EST. PATIENT-LVL III: ICD-10-PCS | Mod: PBBFAC,,, | Performed by: INTERNAL MEDICINE

## 2019-05-14 PROCEDURE — 93282 PRGRMG EVAL IMPLANTABLE DFB: CPT

## 2019-05-14 NOTE — PROGRESS NOTES
Subjective:    Patient ID:  Mario Mahajan is a 43 y.o. female who presents for follow-up of Cardiomyopathy      43 yoF NICM,  NYHA Class II-III here for ICD Check. She was diagnosed with NICM early 2015 and was started on goal-directed medical therapy. .She had persistent LV dysfunction despite her medical treatment. A PET Stress Test at the time (06/2015)  revealed no ischemia. Her EF at the time was 20%. At her initial [facility] EP office visit, Ms. Mahajan reported having a brother and father with history of ICD implantation; no known SCD in the family. She denied a history of syncope, near syncope, or palpitations. She reported a hx of DELGADO.    Ms. Mahajan underwent successful ICD placement (12/01/15). Her course was complicated however, by inappropriate shock 2/2 to subsequent lead displacement. She underwent a successful lead revision (01/05/16). Per her request, tachytherapies were turned off until 6-weeks post-revision. At the 6-week point (02/24/16), her tachytherapies were turned on. Her device interrogation at the time, revealed normal SC ICD function and no arrhythmias. She feels fatigued and is sluggish at times. She has developed cough that has been chronic.   In February of 2017 Ms Mahajan presented to [facility] ED following an episode of syncope and was admitted for further evaluation after VT/VF events had been detected on her ICD; two VT episodes 02/05/17 and 02/10/17, neither of which required shocks and a VF episode 02/09/17 corresponding with her syncopal event, and requiring a shock. Amiodarone 400 mg QD was initiated, and was later decreased to 200 mg once a day.     Interval history: No ER visits or hospitalizations. No VT/VF events. Remains on amiodarone 200 mg qd. PFT 10/18. She saw ophtho 2 weeks ago, she has glaucoma- now on drops.     Past Medical History:  No date: Asthma  7/1/2014: Chronic back pain  4/28/2015: Chronic combined systolic and diastolic congestive heart   failure      Comment:    2-10-17   1 - Severely depressed left ventricular                systolic function (EF 20-25%).    2 - Severe left                ventricular enlargement.    3 - Severe left atrial                enlargement.    4 - Left ventricular diastolic                dysfunction.    5 - The estimated PA systolic pressure is               18 mmHg.    6 - Mild mitral regurgitation.   No date: Encounter for blood transfusion  3/3/2016: ICD (implantable cardioverter-defibrillator) in place 12/01/  15  2/10/2017: Menorrhagia, premenopausal  2015: Microcytic anemia  3/5/2015: Non-rheumatic mitral regurgitation  2015: Nonischemic dilated cardiomyopathy  No date: Sleep apnea  2017: Syncope and collapse  No date: Ventricular tachycardia, polymorphic    Past Surgical History:  2015: CARDIAC DEFIBRILLATOR PLACEMENT  No date:  SECTION  3/26/2018: HEART CATH-RIGHT; Right      Comment:  Performed by Jeimy Srivastava MD at [facility] CATH LAB  3/26/2019: HYSTERECTOMY, TOTAL, ABDOMINAL; N/A      Comment:  Performed by Victorino Rose MD at [facility] OR  2015: INSERTION-ICD-SINGLE; N/A      Comment:  Performed by Victorino Tay MD at [facility] CATH LAB  2016: REVISION-LEAD-ICD; N/A      Comment:  Performed by Victorino Tay MD at [facility] CATH LAB  No date: TUBAL LIGATION    Social History    Socioeconomic History      Marital status: Single      Spouse name: Not on file      Number of children: 2      Years of education: Not on file      Highest education level: Not on file    Occupational History      Occupation: Unemployed        Employer: [employer]    Social Needs      Financial resource strain: Not on file      Food insecurity:        Worry: Not on file        Inability: Not on file      Transportation needs:        Medical: Not on file        Non-medical: Not on file    Tobacco Use      Smoking status: Never Smoker      Smokeless tobacco: Never Used    Substance and Sexual Activity      Alcohol  use: Yes        Comment: 1 glass per 3 months      Drug use: No      Sexual activity: Yes        Partners: Male        Comment: one male partner (boyfriend)    Lifestyle      Physical activity:        Days per week: Not on file        Minutes per session: Not on file      Stress: Not on file    Relationships      Social connections:        Talks on phone: Not on file        Gets together: Not on file        Attends Gnosticist service: Not on file        Active member of club or organization: Not on file        Attends meetings of clubs or organizations: Not on file        Relationship status: Not on file    Other Topics      Concerns:        Not on file    Social History Narrative      Not on file      Review of patient's family history indicates:  Problem: Diabetes      Relation: Father          Age of Onset: (Not Specified)  Problem: Pancreatic cancer      Relation: Father          Age of Onset: (Not Specified)  Problem: Heart failure      Relation: Father          Age of Onset: (Not Specified)  Problem: Heart failure      Relation: Brother          Age of Onset: (Not Specified)  Problem: Lung cancer      Relation: Paternal Grandmother          Age of Onset: (Not Specified)          Review of Systems   Constitution: Positive for malaise/fatigue. Negative for diaphoresis.   HENT: Negative for nosebleeds.    Eyes: Negative for double vision.   Cardiovascular: Positive for palpitations. Negative for chest pain, dyspnea on exertion, irregular heartbeat, near-syncope and syncope.   Respiratory: Negative for shortness of breath.    Skin: Negative.    Musculoskeletal: Negative.    Gastrointestinal: Negative for abdominal pain, hematemesis and hematochezia.   Genitourinary: Negative for hematuria.   Neurological: Negative for dizziness, headaches and light-headedness.   Psychiatric/Behavioral: Negative for altered mental status.        Objective:    Physical Exam   Constitutional: She is oriented to person, place, and  time. She appears well-developed and well-nourished. No distress.   HENT:   Head: Normocephalic and atraumatic.   Eyes: Pupils are equal, round, and reactive to light. EOM are normal.   Neck: Normal range of motion. No JVD present. No thyromegaly present.   Cardiovascular: Normal rate, regular rhythm, S1 normal, S2 normal and normal heart sounds. PMI is not displaced. Exam reveals no gallop and no friction rub.   No murmur heard.  Pulmonary/Chest: Effort normal and breath sounds normal. No respiratory distress. She has no wheezes. She has no rales.   Abdominal: Soft. Bowel sounds are normal. She exhibits no distension. There is no tenderness. There is no rebound and no guarding.   Musculoskeletal: Normal range of motion. She exhibits no edema or tenderness.   Neurological: She is alert and oriented to person, place, and time. No cranial nerve deficit.   Skin: Skin is warm and dry. No rash noted. No erythema.   Psychiatric: She has a normal mood and affect. Her behavior is normal. Judgment and thought content normal.   Vitals reviewed.    ECG: NSR nl MA, IVCD 134 ms, QTc 495        Assessment:       1. ICD (implantable cardioverter-defibrillator) in place 12/01/15    2. Polymorphic ventricular tachycardia    3. Nonischemic dilated cardiomyopathy         Plan:       43 yoF NICM, s/p ICD here for routine follow up. Normal DC ICD function with no sustained arrhythmias. Remains on amio with up to date studies. RTC 1y

## 2019-05-21 ENCOUNTER — OFFICE VISIT (OUTPATIENT)
Dept: OBSTETRICS AND GYNECOLOGY | Facility: CLINIC | Age: 44
End: 2019-05-21
Payer: MEDICAID

## 2019-05-21 VITALS
DIASTOLIC BLOOD PRESSURE: 74 MMHG | SYSTOLIC BLOOD PRESSURE: 126 MMHG | BODY MASS INDEX: 42.17 KG/M2 | WEIGHT: 253.44 LBS

## 2019-05-21 DIAGNOSIS — Z09 POSTOP CHECK: Primary | ICD-10-CM

## 2019-05-21 PROCEDURE — 99024 POSTOP FOLLOW-UP VISIT: CPT | Mod: ,,, | Performed by: OBSTETRICS & GYNECOLOGY

## 2019-05-21 PROCEDURE — 99999 PR PBB SHADOW E&M-EST. PATIENT-LVL III: CPT | Mod: PBBFAC,,, | Performed by: OBSTETRICS & GYNECOLOGY

## 2019-05-21 PROCEDURE — 99999 PR PBB SHADOW E&M-EST. PATIENT-LVL III: ICD-10-PCS | Mod: PBBFAC,,, | Performed by: OBSTETRICS & GYNECOLOGY

## 2019-05-21 PROCEDURE — 99024 PR POST-OP FOLLOW-UP VISIT: ICD-10-PCS | Mod: ,,, | Performed by: OBSTETRICS & GYNECOLOGY

## 2019-05-21 PROCEDURE — 99213 OFFICE O/P EST LOW 20 MIN: CPT | Mod: PBBFAC,PO | Performed by: OBSTETRICS & GYNECOLOGY

## 2019-05-21 NOTE — PROGRESS NOTES
Doing well; occasional RLQ pulling sensation---exam normal. Watch and should resolve over time.  Cuff closed; incision dry and healed  . She is healing well from the procedure and continues to improve.  No significant post-op problems or complications are encountered at this time.   All pertinent pathology reports have been reviewed.  Instructions for increased activity level have been given.  Return for next scheduled appointment or call prn any questions or problems.  Saw cardiology after surgery---all OK.

## 2019-05-29 DIAGNOSIS — I50.42 CHRONIC COMBINED SYSTOLIC AND DIASTOLIC CONGESTIVE HEART FAILURE: ICD-10-CM

## 2019-05-29 DIAGNOSIS — I42.0 NONISCHEMIC DILATED CARDIOMYOPATHY: Chronic | ICD-10-CM

## 2019-05-29 DIAGNOSIS — I47.29 POLYMORPHIC VENTRICULAR TACHYCARDIA: ICD-10-CM

## 2019-05-29 RX ORDER — AMIODARONE HYDROCHLORIDE 200 MG/1
TABLET ORAL
Qty: 90 TABLET | Refills: 3 | Status: SHIPPED | OUTPATIENT
Start: 2019-05-29 | End: 2020-07-01

## 2019-06-14 ENCOUNTER — OFFICE VISIT (OUTPATIENT)
Dept: TRANSPLANT | Facility: CLINIC | Age: 44
End: 2019-06-14
Payer: MEDICAID

## 2019-06-14 ENCOUNTER — LAB VISIT (OUTPATIENT)
Dept: LAB | Facility: HOSPITAL | Age: 44
End: 2019-06-14
Attending: INTERNAL MEDICINE
Payer: MEDICAID

## 2019-06-14 VITALS
WEIGHT: 253.31 LBS | HEIGHT: 69 IN | SYSTOLIC BLOOD PRESSURE: 112 MMHG | DIASTOLIC BLOOD PRESSURE: 70 MMHG | HEART RATE: 70 BPM | BODY MASS INDEX: 37.52 KG/M2

## 2019-06-14 DIAGNOSIS — Z79.899 ON AMIODARONE THERAPY: ICD-10-CM

## 2019-06-14 DIAGNOSIS — E66.9 OBESITY (BMI 35.0-39.9 WITHOUT COMORBIDITY): Primary | ICD-10-CM

## 2019-06-14 DIAGNOSIS — I50.42 CHRONIC COMBINED SYSTOLIC AND DIASTOLIC CONGESTIVE HEART FAILURE: ICD-10-CM

## 2019-06-14 DIAGNOSIS — I34.0 NON-RHEUMATIC MITRAL REGURGITATION: Chronic | ICD-10-CM

## 2019-06-14 LAB
ANION GAP SERPL CALC-SCNC: 6 MMOL/L (ref 8–16)
BNP SERPL-MCNC: 334 PG/ML (ref 0–99)
BUN SERPL-MCNC: 15 MG/DL (ref 6–20)
CALCIUM SERPL-MCNC: 9.1 MG/DL (ref 8.7–10.5)
CHLORIDE SERPL-SCNC: 110 MMOL/L (ref 95–110)
CO2 SERPL-SCNC: 25 MMOL/L (ref 23–29)
CREAT SERPL-MCNC: 0.9 MG/DL (ref 0.5–1.4)
EST. GFR  (AFRICAN AMERICAN): >60 ML/MIN/1.73 M^2
EST. GFR  (NON AFRICAN AMERICAN): >60 ML/MIN/1.73 M^2
GLUCOSE SERPL-MCNC: 78 MG/DL (ref 70–110)
POTASSIUM SERPL-SCNC: 4.4 MMOL/L (ref 3.5–5.1)
SODIUM SERPL-SCNC: 141 MMOL/L (ref 136–145)

## 2019-06-14 PROCEDURE — 99213 OFFICE O/P EST LOW 20 MIN: CPT | Mod: PBBFAC,PO | Performed by: INTERNAL MEDICINE

## 2019-06-14 PROCEDURE — 83880 ASSAY OF NATRIURETIC PEPTIDE: CPT

## 2019-06-14 PROCEDURE — 99214 OFFICE O/P EST MOD 30 MIN: CPT | Mod: S$PBB,,, | Performed by: INTERNAL MEDICINE

## 2019-06-14 PROCEDURE — 80048 BASIC METABOLIC PNL TOTAL CA: CPT

## 2019-06-14 PROCEDURE — 99214 PR OFFICE/OUTPT VISIT, EST, LEVL IV, 30-39 MIN: ICD-10-PCS | Mod: S$PBB,,, | Performed by: INTERNAL MEDICINE

## 2019-06-14 PROCEDURE — 99999 PR PBB SHADOW E&M-EST. PATIENT-LVL III: ICD-10-PCS | Mod: PBBFAC,,, | Performed by: INTERNAL MEDICINE

## 2019-06-14 PROCEDURE — 36415 COLL VENOUS BLD VENIPUNCTURE: CPT

## 2019-06-14 PROCEDURE — 99999 PR PBB SHADOW E&M-EST. PATIENT-LVL III: CPT | Mod: PBBFAC,,, | Performed by: INTERNAL MEDICINE

## 2019-06-14 RX ORDER — CARVEDILOL 12.5 MG/1
12.5 TABLET ORAL 2 TIMES DAILY
Qty: 180 TABLET | Refills: 3 | Status: SHIPPED | OUTPATIENT
Start: 2019-06-14 | End: 2020-04-24

## 2019-06-14 NOTE — LETTER
June 14, 2019        Juanjose Nuñez  [address]  [facility] [address]  Phone: [phone]  Fax: [phone]             [facility]  [address]  [place] [address]  Phone: [phone]  Fax: [phone]   Patient: Mario Mahajan   MR Number: [id number]   YOB: 1975   Date of Visit: 6/14/2019       Dear Dr. Juanjose Nuñez    Thank you for referring Mario Mahajan to me for evaluation. Attached you will find relevant portions of my assessment and plan of care.    If you have questions, please do not hesitate to call me. I look forward to following Mario Mahajan along with you.    Sincerely,    Nereida Hatch MD    Enclosure    If you would like to receive this communication electronically, please contact [email or web address] or [phone] to request Invisible Link access.    Invisible Link is a tool which provides read-only access to select patient information with whom you have a relationship. Its easy to use and provides real time access to review your patients record including encounter summaries, notes, results, and demographic information.    If you feel you have received this communication in error or would no longer like to receive these types of communications, please e-mail [email or web address]

## 2019-06-14 NOTE — PROGRESS NOTES
"Subjective: status 2     Patient ID:  Mario Mahajan is a 43 y.o. female who presents for follow-up of Congestive Heart Failure      HPI Nonischemic CHF (LVEF 28%, LVEDD 8 cm severe MR March 2019) who had three episodes of VF in early Feb 2017 (none since) who comes in for routine followup At her March 2019 visit, I increased her entresto to 97 /103 prior to her hysterectomy / BSO  She has no side effects to the higher dose of entresto. Additionally she tolerated the hysterectomy / BSO in March 2019 without issues   Today able to walk more than two blocks  Has started line dancing which may have helped her lost 7 lbs since March 2019 visit  No edema today  Needs lasix about twice a week to maintain weight     Review of Systems   Constitution: Negative for decreased appetite, weight gain and weight loss.   Cardiovascular: Negative for chest pain, dyspnea on exertion, leg swelling, near-syncope, orthopnea and palpitations.   Respiratory: Negative for cough and shortness of breath.    Musculoskeletal: Negative for myalgias.   Gastrointestinal: Negative for jaundice.        Objective:    Physical Exam   Constitutional: She appears well-developed and well-nourished. No distress.   /70 (BP Location: Left arm, Patient Position: Sitting, BP Method: Large (Automatic))   Pulse 70   Ht 5' 9" (1.753 m)   Wt 114.9 kg (253 lb 4.9 oz)   LMP 03/01/2019   BMI 37.41 kg/m²      HENT:   Head: Normocephalic and atraumatic. Head is without abrasion and without contusion.   Right Ear: External ear normal.   Left Ear: External ear normal.   Nose: Nose normal. No epistaxis.   Mouth/Throat: Oropharynx is clear and moist. Mucous membranes are not cyanotic.   Eyes: Pupils are equal, round, and reactive to light. Conjunctivae and EOM are normal.   Neck: Normal range of motion. Neck supple. No JVD (JVP < 5 ) present. No tracheal deviation present.   Cardiovascular: Normal rate, regular rhythm and normal pulses. PMI is displaced " (mixaxillary ). Exam reveals no gallop.   Murmur heard.  High-pitched blowing decrescendo early systolic murmur is present with a grade of 3/6 at the apex radiating to the apex.  Pulmonary/Chest: Effort normal and breath sounds normal. No stridor. No respiratory distress. She has no wheezes.   Abdominal: Soft. Normal appearance, normal aorta and bowel sounds are normal. She exhibits no distension. There is no tenderness.   Musculoskeletal: She exhibits no edema or tenderness.   Neurological: She is alert. She has normal strength and normal reflexes. She exhibits normal muscle tone.   Skin: Skin is warm. No rash noted. No erythema.   Psychiatric: She has a normal mood and affect. Her speech is normal and behavior is normal. Judgment and thought content normal. Cognition and memory are normal.     BMP  Lab Results   Component Value Date     06/14/2019    K 4.4 06/14/2019     06/14/2019    CO2 25 06/14/2019    BUN 15 06/14/2019    CREATININE 0.9 06/14/2019    CALCIUM 9.1 06/14/2019    ANIONGAP 6 (L) 06/14/2019    ESTGFRAFRICA >60 06/14/2019    EGFRNONAA >60 06/14/2019         BNP   Date Value Ref Range Status   06/14/2019 334 (H) 0 - 99 pg/mL Final     Comment:     Values of less than 100 pg/ml are consistent with non-CHF populations.   04/03/2019 265 (H) 0 - 99 pg/mL Final     Comment:     Values of less than 100 pg/ml are consistent with non-CHF populations.   10/08/2018 128 (H) 0 - 99 pg/mL Final     Comment:     Values of less than 100 pg/ml are consistent with non-CHF populations.           Assessment:       1. Obesity (BMI 35.0-39.9 without comorbidity)    2. Chronic combined systolic and diastolic congestive heart failure    3. Non-rheumatic mitral regurgitation    4. On amiodarone therapy         Plan:       Chronic combined systolic and diastolic congestive heart failure  Will increase coreg to 12.5 BID / keep on entresto 97 / 103  Continue lasix PRN     Obesity (BMI 35.0-39.9 without  comorbidity)  Will consult bariatric surgery as she ill have medicare later this year    Non-rheumatic mitral regurgitation  Will refer to valve clinic  if severe MR present on next echo     On amiodarone therapy  Follows with Dr Jasvir Vasquez if we can slowly decrease amiodarone     Follow up in about 6 months (around 12/14/2019).    Orders Placed This Encounter   Procedures    Basic metabolic panel    Basic metabolic panel    Brain natriuretic peptide    Transthoracic echo (TTE) 2D with Color Flow

## 2019-07-09 ENCOUNTER — CLINICAL SUPPORT (OUTPATIENT)
Dept: CARDIOLOGY | Facility: HOSPITAL | Age: 44
End: 2019-07-09
Attending: INTERNAL MEDICINE
Payer: MEDICAID

## 2019-07-09 DIAGNOSIS — Z95.810 PRESENCE OF AUTOMATIC CARDIOVERTER/DEFIBRILLATOR (AICD): ICD-10-CM

## 2019-07-09 DIAGNOSIS — I42.8 NONISCHEMIC CARDIOMYOPATHY: ICD-10-CM

## 2019-07-09 PROCEDURE — 93296 REM INTERROG EVL PM/IDS: CPT

## 2019-08-07 ENCOUNTER — HOSPITAL ENCOUNTER (OUTPATIENT)
Facility: HOSPITAL | Age: 44
Discharge: HOME | End: 2019-08-07
Attending: EMERGENCY MEDICINE | Admitting: INTERNAL MEDICINE
Payer: MEDICARE

## 2019-08-07 VITALS
DIASTOLIC BLOOD PRESSURE: 57 MMHG | WEIGHT: 264.75 LBS | HEART RATE: 72 BPM | OXYGEN SATURATION: 99 % | BODY MASS INDEX: 39.21 KG/M2 | SYSTOLIC BLOOD PRESSURE: 121 MMHG | TEMPERATURE: 97 F | RESPIRATION RATE: 16 BRPM | HEIGHT: 69 IN

## 2019-08-07 DIAGNOSIS — R07.9 CHEST PAIN: ICD-10-CM

## 2019-08-07 DIAGNOSIS — I50.42 CHRONIC COMBINED SYSTOLIC AND DIASTOLIC CONGESTIVE HEART FAILURE: ICD-10-CM

## 2019-08-07 DIAGNOSIS — I21.4 NSTEMI (NON-ST ELEVATED MYOCARDIAL INFARCTION): Primary | ICD-10-CM

## 2019-08-07 LAB
ALBUMIN SERPL BCP-MCNC: 3.3 G/DL (ref 3.5–5.2)
ALP SERPL-CCNC: 36 U/L (ref 55–135)
ALT SERPL W/O P-5'-P-CCNC: 8 U/L (ref 10–44)
ANION GAP SERPL CALC-SCNC: 7 MMOL/L (ref 8–16)
AST SERPL-CCNC: 13 U/L (ref 10–40)
BASOPHILS # BLD AUTO: 0.01 K/UL (ref 0–0.2)
BASOPHILS NFR BLD: 0.2 % (ref 0–1.9)
BILIRUB SERPL-MCNC: 1.2 MG/DL (ref 0.1–1)
BNP SERPL-MCNC: 332 PG/ML (ref 0–99)
BUN SERPL-MCNC: 12 MG/DL (ref 6–20)
CALCIUM SERPL-MCNC: 8.5 MG/DL (ref 8.7–10.5)
CHLORIDE SERPL-SCNC: 108 MMOL/L (ref 95–110)
CO2 SERPL-SCNC: 25 MMOL/L (ref 23–29)
CREAT SERPL-MCNC: 0.8 MG/DL (ref 0.5–1.4)
DIFFERENTIAL METHOD: ABNORMAL
EOSINOPHIL # BLD AUTO: 0.1 K/UL (ref 0–0.5)
EOSINOPHIL NFR BLD: 2.2 % (ref 0–8)
ERYTHROCYTE [DISTWIDTH] IN BLOOD BY AUTOMATED COUNT: 14.8 % (ref 11.5–14.5)
EST. GFR  (AFRICAN AMERICAN): >60 ML/MIN/1.73 M^2
EST. GFR  (NON AFRICAN AMERICAN): >60 ML/MIN/1.73 M^2
GLUCOSE SERPL-MCNC: 86 MG/DL (ref 70–110)
HCT VFR BLD AUTO: 33.2 % (ref 37–48.5)
HGB BLD-MCNC: 10.2 G/DL (ref 12–16)
LYMPHOCYTES # BLD AUTO: 1.4 K/UL (ref 1–4.8)
LYMPHOCYTES NFR BLD: 22.1 % (ref 18–48)
MCH RBC QN AUTO: 27.2 PG (ref 27–31)
MCHC RBC AUTO-ENTMCNC: 30.7 G/DL (ref 32–36)
MCV RBC AUTO: 89 FL (ref 82–98)
MONOCYTES # BLD AUTO: 0.5 K/UL (ref 0.3–1)
MONOCYTES NFR BLD: 8 % (ref 4–15)
NEUTROPHILS # BLD AUTO: 4.2 K/UL (ref 1.8–7.7)
NEUTROPHILS NFR BLD: 67.5 % (ref 38–73)
PLATELET # BLD AUTO: 265 K/UL (ref 150–350)
PMV BLD AUTO: 10.8 FL (ref 9.2–12.9)
POTASSIUM SERPL-SCNC: 3.8 MMOL/L (ref 3.5–5.1)
PROT SERPL-MCNC: 6.6 G/DL (ref 6–8.4)
RBC # BLD AUTO: 3.75 M/UL (ref 4–5.4)
SODIUM SERPL-SCNC: 140 MMOL/L (ref 136–145)
TROPONIN I SERPL DL<=0.01 NG/ML-MCNC: 0.15 NG/ML (ref 0–0.03)
TROPONIN I SERPL DL<=0.01 NG/ML-MCNC: 0.15 NG/ML (ref 0–0.03)
WBC # BLD AUTO: 6.28 K/UL (ref 3.9–12.7)

## 2019-08-07 PROCEDURE — G0378 HOSPITAL OBSERVATION PER HR: HCPCS

## 2019-08-07 PROCEDURE — 99220 PR INITIAL OBSERVATION CARE,LEVL III: ICD-10-PCS | Mod: ,,, | Performed by: INTERNAL MEDICINE

## 2019-08-07 PROCEDURE — 25000242 PHARM REV CODE 250 ALT 637 W/ HCPCS: Performed by: NURSE PRACTITIONER

## 2019-08-07 PROCEDURE — 93005 ELECTROCARDIOGRAM TRACING: CPT

## 2019-08-07 PROCEDURE — 85025 COMPLETE CBC W/AUTO DIFF WBC: CPT

## 2019-08-07 PROCEDURE — 83880 ASSAY OF NATRIURETIC PEPTIDE: CPT

## 2019-08-07 PROCEDURE — 93010 EKG 12-LEAD: ICD-10-PCS | Mod: ,,, | Performed by: INTERNAL MEDICINE

## 2019-08-07 PROCEDURE — 84484 ASSAY OF TROPONIN QUANT: CPT | Mod: 91

## 2019-08-07 PROCEDURE — 99285 EMERGENCY DEPT VISIT HI MDM: CPT | Mod: 25

## 2019-08-07 PROCEDURE — 36415 COLL VENOUS BLD VENIPUNCTURE: CPT

## 2019-08-07 PROCEDURE — 93010 ELECTROCARDIOGRAM REPORT: CPT | Mod: ,,, | Performed by: INTERNAL MEDICINE

## 2019-08-07 PROCEDURE — 80053 COMPREHEN METABOLIC PANEL: CPT

## 2019-08-07 PROCEDURE — 99220 PR INITIAL OBSERVATION CARE,LEVL III: CPT | Mod: ,,, | Performed by: INTERNAL MEDICINE

## 2019-08-07 PROCEDURE — 84484 ASSAY OF TROPONIN QUANT: CPT

## 2019-08-07 PROCEDURE — 25000003 PHARM REV CODE 250: Performed by: NURSE PRACTITIONER

## 2019-08-07 RX ORDER — ONDANSETRON 8 MG/1
8 TABLET, ORALLY DISINTEGRATING ORAL EVERY 8 HOURS PRN
Status: DISCONTINUED | OUTPATIENT
Start: 2019-08-07 | End: 2019-08-07 | Stop reason: HOSPADM

## 2019-08-07 RX ORDER — ACETAMINOPHEN 325 MG/1
650 TABLET ORAL EVERY 4 HOURS PRN
Status: DISCONTINUED | OUTPATIENT
Start: 2019-08-07 | End: 2019-08-07 | Stop reason: HOSPADM

## 2019-08-07 RX ORDER — CETIRIZINE HYDROCHLORIDE 10 MG/1
10 TABLET ORAL DAILY
Status: DISCONTINUED | OUTPATIENT
Start: 2019-08-07 | End: 2019-08-07 | Stop reason: HOSPADM

## 2019-08-07 RX ORDER — ZOLPIDEM TARTRATE 5 MG/1
5 TABLET ORAL NIGHTLY PRN
Status: DISCONTINUED | OUTPATIENT
Start: 2019-08-07 | End: 2019-08-07 | Stop reason: HOSPADM

## 2019-08-07 RX ORDER — CARVEDILOL 12.5 MG/1
12.5 TABLET ORAL 2 TIMES DAILY
Status: DISCONTINUED | OUTPATIENT
Start: 2019-08-07 | End: 2019-08-07 | Stop reason: HOSPADM

## 2019-08-07 RX ORDER — ALBUTEROL SULFATE 90 UG/1
2 AEROSOL, METERED RESPIRATORY (INHALATION) EVERY 6 HOURS PRN
Status: DISCONTINUED | OUTPATIENT
Start: 2019-08-07 | End: 2019-08-07 | Stop reason: HOSPADM

## 2019-08-07 RX ORDER — MORPHINE SULFATE 4 MG/ML
4 INJECTION, SOLUTION INTRAMUSCULAR; INTRAVENOUS EVERY 4 HOURS PRN
Status: DISCONTINUED | OUTPATIENT
Start: 2019-08-07 | End: 2019-08-07 | Stop reason: HOSPADM

## 2019-08-07 RX ORDER — SPIRONOLACTONE 25 MG/1
25 TABLET ORAL DAILY
Status: DISCONTINUED | OUTPATIENT
Start: 2019-08-07 | End: 2019-08-07 | Stop reason: HOSPADM

## 2019-08-07 RX ORDER — LOPERAMIDE HYDROCHLORIDE 2 MG/1
2 CAPSULE ORAL
Status: DISCONTINUED | OUTPATIENT
Start: 2019-08-07 | End: 2019-08-07 | Stop reason: HOSPADM

## 2019-08-07 RX ORDER — OXYCODONE HYDROCHLORIDE 5 MG/1
5 TABLET ORAL EVERY 4 HOURS PRN
Status: DISCONTINUED | OUTPATIENT
Start: 2019-08-07 | End: 2019-08-07 | Stop reason: HOSPADM

## 2019-08-07 RX ORDER — SODIUM CHLORIDE 0.9 % (FLUSH) 0.9 %
10 SYRINGE (ML) INJECTION
Status: DISCONTINUED | OUTPATIENT
Start: 2019-08-07 | End: 2019-08-07 | Stop reason: HOSPADM

## 2019-08-07 RX ORDER — PANTOPRAZOLE SODIUM 40 MG/1
40 TABLET, DELAYED RELEASE ORAL DAILY
Status: DISCONTINUED | OUTPATIENT
Start: 2019-08-08 | End: 2019-08-07 | Stop reason: HOSPADM

## 2019-08-07 RX ORDER — AMIODARONE HYDROCHLORIDE 200 MG/1
200 TABLET ORAL DAILY
Status: DISCONTINUED | OUTPATIENT
Start: 2019-08-07 | End: 2019-08-07 | Stop reason: HOSPADM

## 2019-08-07 RX ORDER — LATANOPROST 50 UG/ML
1 SOLUTION/ DROPS OPHTHALMIC NIGHTLY
Status: DISCONTINUED | OUTPATIENT
Start: 2019-08-07 | End: 2019-08-07 | Stop reason: HOSPADM

## 2019-08-07 RX ORDER — FLUTICASONE PROPIONATE 110 UG/1
2 AEROSOL, METERED RESPIRATORY (INHALATION) EVERY 12 HOURS
Status: DISCONTINUED | OUTPATIENT
Start: 2019-08-07 | End: 2019-08-07 | Stop reason: HOSPADM

## 2019-08-07 RX ORDER — TOPIRAMATE 25 MG/1
50 TABLET ORAL DAILY
Status: DISCONTINUED | OUTPATIENT
Start: 2019-08-07 | End: 2019-08-07 | Stop reason: HOSPADM

## 2019-08-07 RX ADMIN — AMIODARONE HYDROCHLORIDE 200 MG: 200 TABLET ORAL at 04:08

## 2019-08-07 RX ADMIN — CETIRIZINE HYDROCHLORIDE 10 MG: 10 TABLET, FILM COATED ORAL at 04:08

## 2019-08-07 RX ADMIN — CARVEDILOL 12.5 MG: 3.12 TABLET, FILM COATED ORAL at 04:08

## 2019-08-07 RX ADMIN — FLUTICASONE PROPIONATE 2 PUFF: 110 AEROSOL, METERED RESPIRATORY (INHALATION) at 04:08

## 2019-08-07 NOTE — ASSESSMENT & PLAN NOTE
Atypical chest pain, musculoskeletal in nature- worse with position changes   Troponin 0.1 and has been as high as 0.2 in the past  LHC 03/2017 with clean coronaries   CPX 10/2018 negative for ischemia    Will repeat troponin at 1630 and if similar, will discharge to home, if significantly elevated will plan for [facility] in AM   No ADHF on exam  Hemodynamically stable

## 2019-08-07 NOTE — PLAN OF CARE
Problem: Adult Inpatient Plan of Care  Goal: Plan of Care Review  Outcome: Ongoing (interventions implemented as appropriate)  20 gauge IV to right A/C removed without difficulty- no redness or swelling noted to site at time of removal. Tele monitor removed, cleaned, and returned to telemetry bunker. D/C instructions and medications reviewed with patient per Maxine, VN- verbalizes understanding. Patient to be transported home via personal vehicle by a family member. NADN at time of D/C.

## 2019-08-07 NOTE — ED PROVIDER NOTES
Encounter Date: 8/7/2019       History     Chief Complaint   Patient presents with    Chest Pain     mid sternal chest pain with palpitations that began this morning      43-year-old female presents ED for evaluation of midsternal chest pain that started at approximately 8:00 AM this morning that has resolved at this time.  Denies injury or trauma. Denies palpitations as previously reported to triage. Patient has defibrillator and her cardiologist is at [facility] which she last saw couple of months ago.  Associates a little shortness of breath that has resolved at this time.  Patient does report that she took an ibuprofen at approximately 8:00 am.  Patient does report that the pain is worse with movement.  Denies any alleviating factors.  Denies fever, chills, neck pain/stiffness, headache, dizziness, cough/congestion, N/V/D, abdominal pain, leg swelling, any other concerns.        Review of patient's allergies indicates:   Allergen Reactions    Ace inhibitors      Cough     Past Medical History:   Diagnosis Date    Asthma     Chronic back pain 7/1/2014    Chronic combined systolic and diastolic congestive heart failure 4/28/2015     2-10-17   1 - Severely depressed left ventricular systolic function (EF 20-25%).    2 - Severe left ventricular enlargement.    3 - Severe left atrial enlargement.    4 - Left ventricular diastolic dysfunction.    5 - The estimated PA systolic pressure is 18 mmHg.    6 - Mild mitral regurgitation.     Encounter for blood transfusion     ICD (implantable cardioverter-defibrillator) in place 12/01/15 3/3/2016    Menorrhagia, premenopausal 2/10/2017    Microcytic anemia 2/9/2015    Non-rheumatic mitral regurgitation 3/5/2015    Nonischemic dilated cardiomyopathy 12/1/2015    Sleep apnea     Syncope and collapse 2/9/2017    Ventricular tachycardia, polymorphic      Past Surgical History:   Procedure Laterality Date    CARDIAC DEFIBRILLATOR PLACEMENT  12/1/2015      SECTION      HEART CATH-RIGHT Right 3/26/2018    Performed by [facility] at [facility] CATH LAB    HYSTERECTOMY, TOTAL, ABDOMINAL N/A 3/26/2019    [facility]    INSERTION-ICD-SINGLE N/A 2015    Performed by [facility] Tay [facility] at [facility] CATH LAB    REVISION-LEAD-ICD N/A 2016    Performed by [facility] at [facility] CATH LAB    TUBAL LIGATION       Family History   Problem Relation Age of Onset    Diabetes Father     Pancreatic cancer Father     Heart failure Father     Heart failure Brother     Lung cancer Paternal Grandmother      Social History     Tobacco Use    Smoking status: Never Smoker    Smokeless tobacco: Never Used   Substance Use Topics    Alcohol use: Yes     Comment: 1 glass per 3 months    Drug use: No     Review of Systems   Constitutional: Negative for chills and fever.   Respiratory: Positive for shortness of breath (resolved).    Cardiovascular: Positive for chest pain (resolved). Negative for leg swelling.   Gastrointestinal: Negative for abdominal pain, diarrhea, nausea and vomiting.   Musculoskeletal: Negative for neck pain and neck stiffness.   Neurological: Negative for dizziness and headaches.   Hematological: Does not bruise/bleed easily.   All other systems reviewed and are negative.      Physical Exam     Initial Vitals [19 1233]   BP Pulse Resp Temp SpO2   (!) 140/65 75 20 97.6 °F (36.4 °C) 98 %      MAP       --         Physical Exam    Vitals reviewed.  Constitutional: She is Obese .  Non-toxic appearance. She does not have a sickly appearance.   HENT:   Head: Atraumatic.   Mouth/Throat: Oropharynx is clear and moist.   Eyes: EOM are normal.   Neck: Normal range of motion, full passive range of motion without pain and phonation normal. Neck supple.   Cardiovascular: Regular rhythm.   No LE edema.  Defibrillator noted to left chest wall.   Pulmonary/Chest: Effort normal and breath sounds normal. No  respiratory distress.       TTP to marked area, reproducible.  No signs of injury or trauma.  No ecchymosis or bruising.     Abdominal: Soft. Bowel sounds are normal.   Neurological: She is alert and oriented to person, place, and time.   Skin: Skin is warm. No rash noted.   Psychiatric: She has a normal mood and affect.         ED Course   Procedures  Labs Reviewed   CBC W/ AUTO DIFFERENTIAL - Abnormal; Notable for the following components:       Result Value    RBC 3.75 (*)     Hemoglobin 10.2 (*)     Hematocrit 33.2 (*)     Mean Corpuscular Hemoglobin Conc 30.7 (*)     RDW 14.8 (*)     All other components within normal limits   COMPREHENSIVE METABOLIC PANEL - Abnormal; Notable for the following components:    Calcium 8.5 (*)     Albumin 3.3 (*)     Total Bilirubin 1.2 (*)     Alkaline Phosphatase 36 (*)     ALT 8 (*)     Anion Gap 7 (*)     All other components within normal limits   TROPONIN I - Abnormal; Notable for the following components:    Troponin I 0.154 (*)     All other components within normal limits   B-TYPE NATRIURETIC PEPTIDE - Abnormal; Notable for the following components:     (*)     All other components within normal limits          Imaging Results          X-Ray Chest PA And Lateral (Final result)  Result time 08/07/19 14:08:02    Final result by Bennett Bush MD (08/07/19 14:08:02)                 Impression:      1. No acute cardiopulmonary process.      Electronically signed by: Bennett Bush MD  Date:    08/07/2019  Time:    14:08             Narrative:    EXAMINATION:  XR CHEST PA AND LATERAL    CLINICAL HISTORY:  Chest pain, unspecified    TECHNIQUE:  PA and lateral views of the chest were performed.    COMPARISON:  03/13/2017    FINDINGS:  Left chest wall pacer noted.  The cardiomediastinal silhouette is prominent, similar to the previous exam.  There is no pleural effusion.  The trachea is midline.  The lungs are symmetrically expanded bilaterally without evidence of  acute parenchymal process. No large focal consolidation seen.  There is no pneumothorax.  The osseous structures are remarkable for degenerative changes..                                 Medical Decision Making:   History:   Old Medical Records: I decided to obtain old medical records.  Initial Assessment:   43-year-old female presents ED for evaluation of midsternal chest pain that started at approximately 8:00 AM this morning that has resolved at this time.  Denies injury or trauma. Denies palpitations as previously reported to triage. Patient has defibrillator and her cardiologist is at [facility] which she last saw couple of months ago.  Associates a little shortness of breath that has resolved at this time.  Patient does report that she took an ibuprofen at approximately 8:00 am.  Patient does report that the pain is worse with movement.  Denies any alleviating factors.  Denies fever, chills, neck pain/stiffness, headache, dizziness, cough/congestion, N/V/D, abdominal pain, leg swelling, any other concerns.        Clinical Tests:   Lab Tests: Ordered and Reviewed  Radiological Study: Reviewed and Ordered  ED Management:  Labs, CXR, EKG  Troponin elevated 0.154. CXR shows no acute cardiopulmonary process.  EKG shows no acute abnormalities. Upon re-evaluation, the patient's status has remained stable.  At this time, I believe the patient should be admitted to Dr. Roman for further evaluation and management of chest pain. Dr. Roman contacted and the case was discussed. Additional recommendations at this time: none. Dr. Roman agrees with plan and will place patient in observation under his service.  The patient was updated with test results, overall impression, and further plan of care.  All questions were answered.  The patient expresses understanding and agrees with current plan.    Other:   I discussed test(s) with the performing physician.       <> Summary of the Findings: Care of this patient  discussed with Dr. Winters who agrees with ED course   I have discussed this case with another health care provider.    Additional MDM:   Heart Score:    History:          Slightly suspicious.  ECG:             Normal  Age:               Less than 45 years  Risk factors: >= 3 risk factors or history of atherosclerotic disease  Troponin:       >2x normal limit  Final Score: 4               Attending Attestation:     Physician Attestation Statement for NP/PA:   I have conducted a face to face encounter with this patient in addition to the NP/PA, due to NP/PA Request    Other NP/PA Attestation Additions:    History of Present Illness: 43-year-old female with chest pain.   Physical Exam: Mild tenderness along the sternum.                  ED Course as of Aug 07 1526   Wed Aug 07, 2019   1458 2:58 PM- Cardiology NP at bedside for evaluation.      [CH]      ED Course User Index  [CH] Earl Katz NP     Clinical Impression:       ICD-10-CM ICD-9-CM   1. NSTEMI (non-ST elevated myocardial infarction) I21.4 410.70   2. Chest pain R07.9 786.50                                Earl Katz NP  08/07/19 1531       Wade Winters MD  08/07/19 1600

## 2019-08-07 NOTE — SUBJECTIVE & OBJECTIVE
Past Medical History:   Diagnosis Date    Asthma     Chronic back pain 2014    Chronic combined systolic and diastolic congestive heart failure 2015     2-10-17   1 - Severely depressed left ventricular systolic function (EF 20-25%).    2 - Severe left ventricular enlargement.    3 - Severe left atrial enlargement.    4 - Left ventricular diastolic dysfunction.    5 - The estimated PA systolic pressure is 18 mmHg.    6 - Mild mitral regurgitation.     Encounter for blood transfusion     ICD (implantable cardioverter-defibrillator) in place 12/01/15 3/3/2016    Menorrhagia, premenopausal 2/10/2017    Microcytic anemia 2015    Non-rheumatic mitral regurgitation 3/5/2015    Nonischemic dilated cardiomyopathy 2015    Sleep apnea     Syncope and collapse 2017    Ventricular tachycardia, polymorphic        Past Surgical History:   Procedure Laterality Date    CARDIAC DEFIBRILLATOR PLACEMENT  2015     SECTION      HEART CATH-RIGHT Right 3/26/2018    Performed [facility] CATH LAB    HYSTERECTOMY, TOTAL, ABDOMINAL N/A 3/26/2019    [facility] OR    INSERTION-ICD-SINGLE N/A 2015    Performed by [facility] Tay [facility] at [facility] CATH LAB    REVISION-LEAD-ICD N/A 2016    Performed by [facility] at [facility] CATH LAB    TUBAL LIGATION         Review of patient's allergies indicates:   Allergen Reactions    Ace inhibitors      Cough       No current facility-administered medications on file prior to encounter.      Current Outpatient Medications on File Prior to Encounter   Medication Sig    albuterol 90 mcg/actuation inhaler Inhale 1-2 puffs into the lungs every 6 (six) hours as needed for Wheezing. Rescue    amiodarone (PACERONE) 200 MG Tab TAKE 1 TABLET(200 MG) BY MOUTH EVERY DAY    ascorbic acid, vitamin C, (VITAMIN C) 250 MG tablet Take 1 tablet (250 mg) by mouth twice daily. Take with the iron tablets.  (Patient taking differently: once daily. )    carvedilol (COREG) 12.5 MG tablet Take 1 tablet (12.5 mg total) by mouth 2 (two) times daily.    ferrous sulfate 325 (65 FE) MG EC tablet Take 1 tablet (325 mg total) by mouth 2 (two) times daily. Take with vitamin C. (Patient taking differently: 325 mg once daily. Take 1 tablet (325 mg total) by mouth 2 (two) times daily. Take with vitamin C.)    FLOVENT  mcg/actuation inhaler INL 2 PFS PO TWICE DAILY. RM AFTER U    furosemide (LASIX) 40 MG tablet Take 1 tablet (40 mg total) by mouth daily as needed (Leg swelling or weight gain).    ibuprofen (ADVIL,MOTRIN) 600 MG tablet Take 1 tablet (600 mg total) by mouth every 6 (six) hours as needed for Pain.    latanoprost 0.005 % ophthalmic solution INT 1 GTT INTO OU QHS    loratadine (CLARITIN) 10 mg tablet TK 1 T PO QD    oxyCODONE-acetaminophen (PERCOCET)  mg per tablet Take 1 tablet by mouth every 6 (six) hours as needed.    sacubitril-valsartan (ENTRESTO)  mg per tablet Take 1 tablet by mouth 2 (two) times daily.    spironolactone (ALDACTONE) 25 MG tablet Take 1 tablet (25 mg total) by mouth once daily.    topiramate (TOPAMAX) 50 MG tablet      Family History     Problem Relation (Age of Onset)    Diabetes Father    Heart failure Father, Brother    Lung cancer Paternal Grandmother    Pancreatic cancer Father        Tobacco Use    Smoking status: Never Smoker    Smokeless tobacco: Never Used   Substance and Sexual Activity    Alcohol use: Yes     Comment: 1 glass per 3 months    Drug use: No    Sexual activity: Yes     Partners: Male     Comment: one male partner (boyfriend)     Review of Systems   Constitution: Negative for diaphoresis, malaise/fatigue, night sweats, weight gain and weight loss.   HENT: Negative.    Eyes: Negative.    Cardiovascular: Positive for chest pain and palpitations. Negative for irregular heartbeat, leg swelling, near-syncope, orthopnea, paroxysmal nocturnal  dyspnea and syncope.   Respiratory: Negative for cough, shortness of breath and wheezing.    Endocrine: Negative.    Hematologic/Lymphatic: Negative.    Skin: Positive for suspicious lesions.   Musculoskeletal: Positive for myalgias.   Gastrointestinal: Negative.  Negative for bloating, abdominal pain, nausea and vomiting.   Genitourinary: Negative.    Neurological: Negative.    Psychiatric/Behavioral: Negative.    Allergic/Immunologic: Negative.      Objective:     Vital Signs (Most Recent):  Temp: 97.6 °F (36.4 °C) (08/07/19 1233)  Pulse: 75 (08/07/19 1233)  Resp: 20 (08/07/19 1233)  BP: (!) 140/65 (08/07/19 1233)  SpO2: 98 % (08/07/19 1233) Vital Signs (24h Range):  Temp:  [97.6 °F (36.4 °C)] 97.6 °F (36.4 °C)  Pulse:  [75] 75  Resp:  [20] 20  SpO2:  [98 %] 98 %  BP: (140)/(65) 140/65     Weight: 114.3 kg (252 lb)  Body mass index is 37.21 kg/m².    SpO2: 98 %  O2 Device (Oxygen Therapy): room air    No intake or output data in the 24 hours ending 08/07/19 1536    Lines/Drains/Airways     Peripheral Intravenous Line                 Peripheral IV - Single Lumen 08/07/19 1303 20 G Anterior Forearm less than 1 day                Physical Exam   Constitutional: She is oriented to person, place, and time. She appears well-developed and well-nourished. No distress.   HENT:   Head: Normocephalic and atraumatic.   Eyes: Right eye exhibits no discharge. Left eye exhibits no discharge.   Neck: No JVD present.   Cardiovascular: Normal rate and regular rhythm.   Murmur heard.  Pulmonary/Chest: Effort normal and breath sounds normal. She has no rales.   Abdominal: Soft. Bowel sounds are normal.   Musculoskeletal: She exhibits no edema.   Neurological: She is alert and oriented to person, place, and time.   Skin: Skin is warm and dry. She is not diaphoretic.   Psychiatric: She has a normal mood and affect. Her behavior is normal. Judgment and thought content normal.       Significant Labs:   BMP:   Recent Labs   Lab  08/07/19  1307   GLU 86      K 3.8      CO2 25   BUN 12   CREATININE 0.8   CALCIUM 8.5*   , CMP   Recent Labs   Lab 08/07/19  1307      K 3.8      CO2 25   GLU 86   BUN 12   CREATININE 0.8   CALCIUM 8.5*   PROT 6.6   ALBUMIN 3.3*   BILITOT 1.2*   ALKPHOS 36*   AST 13   ALT 8*   ANIONGAP 7*   ESTGFRAFRICA >60   EGFRNONAA >60   , CBC   Recent Labs   Lab 08/07/19  1307   WBC 6.28   HGB 10.2*   HCT 33.2*      , INR No results for input(s): INR, PROTIME in the last 48 hours., Lipid Panel No results for input(s): CHOL, HDL, LDLCALC, TRIG, CHOLHDL in the last 48 hours., Troponin   Recent Labs   Lab 08/07/19  1307   TROPONINI 0.154*    and All pertinent lab results from the last 24 hours have been reviewed.    Significant Imaging: Echocardiogram:   2D echo with color flow doppler:   Results for orders placed or performed during the hospital encounter of 04/10/18   2D Echo w/ Color Flow Doppler   Result Value Ref Range    QEF 18 (A) 55 - 65    Mitral Valve Regurgitation SEVERE (A)     Est. PA Systolic Pressure 25.66     Tricuspid Valve Regurgitation TRIVIAL     Narrative    Date of Procedure: 04/10/2018        TEST DESCRIPTION       General: A catheter is present in the right-sided cardiac chambers.     Aorta: The aortic root is normal in size, measuring 2.3 cm at sinotubular junction and 2.7 cm at Sinuses of Valsalva. The proximal ascending aorta is normal in size, measuring 2.7 cm across.     Left Atrium: The left atrial volume index is severely enlarged, measuring 71.01 cc/m2.     Left Ventricle: The left ventricle is severely enlarged, with an end-diastolic diameter of 7.6 cm, and an end-systolic diameter of 7.3 cm. LV wall thickness is normal, with the septum measuring 0.7 cm and the posterior wall measuring 0.9 cm across.   Relative wall thickness was normal at 0.24, and the LV mass index was increased at 150.3 g/m2 consistent with eccentric left ventricular hypertrophy. The anterior  septum is akinetic. The inferior wall is akinetic. The apex is akinetic.   The following segments were akinetic: mid inferoseptum, basal inferoseptum, apical anterior wall.  The following segments were severely hypokinetic: mid inferolateral wall.  Left ventricular systolic function appears severely depressed. Visually estimated ejection fraction is 15-20%. The LV Doppler derived stroke volume equals 50.0 ccs.         Right Atrium: The right atrium is normal in size, measuring 4.3 cm in length and 3.8 cm in width in the apical view.     Right Ventricle: The right ventricle is normal in size measuring 2.7 cm at the base in the apical right ventricle-focused view. Global right ventricular systolic function appears normal. Tricuspid annular plane systolic excursion (TAPSE) is 2.2 cm. The   estimated PA systolic pressure is 26 mmHg.     Aortic Valve:  The aortic valve is normal in structure with normal leaflet mobility. The aortic valve is tri-leaflet in structure. The peak velocity obtained across the aortic valve is 1.4 m/s, which translates to a peak gradient of 8 mmHg. The mean   gradient is 4 mmHg. Using a left ventricular outflow tract diameter of 2.6 cm, a left ventricular outflow tract velocity time integral of 10 cm, and a peak instantaneous transvalvular velocity time integral of 27 cm, the calculated aortic valve area is   1.87 cm2(AVAi is 0.83 cm2/m2).     Mitral Valve:  The mitral valve is mildly sclerotic. There is severe mitral regurgitation.     Tricuspid Valve:  There is trivial tricuspid regurgitation.     Pulmonary Valve:  There is trivial to mild pulmonic regurgitation.     IVC: IVC is normal in size and collapses > 50% with a sniff, suggesting normal right atrial pressure of 3 mmHg.     Intracavitary: There is no evidence of pericardial effusion, intracavity mass, thrombi, or vegetation.         CONCLUSIONS     1 - Severe left ventricular enlargement.     2 - Severely depressed left ventricular  systolic function (EF 15-20%).     3 - Eccentric hypertrophy.     4 - Wall motion abnormalities.     5 - Normal right ventricular systolic function .     6 - Severe left atrial enlargement.     7 - Severe mitral regurgitation.     8 - Trivial tricuspid regurgitation.     9 - Trivial to mild pulmonic regurgitation.     10 - The estimated PA systolic pressure is 26 mmHg.             This document has been electronically    SIGNED BY: Filiberto Raymond MD On: 04/10/2018 09:04    and Transthoracic echo (TTE) complete (Cupid Only):   Results for orders placed or performed during the hospital encounter of 03/22/19   Transthoracic echo (TTE) complete (Cupid Only)   Result Value Ref Range    Ascending aorta 2.52 cm    STJ 2.32 cm    AV mean gradient 5.51 mmHg    Ao peak aje 1.49 m/s    Ao VTI 29.75 cm    IVRT 0.10 msec    IVS 0.48 (A) 0.6 - 1.1 cm    LA size 5.42 cm    Left Atrium Major Axis 6.44 cm    Left Atrium Minor Axis 6.26 cm    LVIDD 8.00 (A) 3.5 - 6.0 cm    LVIDS 7.30 (A) 2.1 - 4.0 cm    LVOT peak VTI 13.31 cm    PW 0.81 0.6 - 1.1 cm    MV Peak A Jae 1.16 m/s    E wave decelartion time 202.73 msec    MV Peak E Jae 1.25 m/s    PV Peak D Jae 0.54 m/s    PV Peak S Jae 0.49 m/s    RA Major Axis 4.46 cm    RA Width 4.11 cm    RVDD 4.08 cm    Sinus 2.61 cm    TR Max Jae 2.93 m/s    TDI LATERAL 0.10     TDI SEPTAL 0.06     LA WIDTH 6.15 cm    LV Diastolic Volume 378.00 mL    LV Systolic Volume 280.84 mL    RV S' 13.01 m/s    LVOT peak jae [id number] m/s    LV LATERAL E/E' RATIO 12.50     LV SEPTAL E/E' RATIO 20.83     FS 9 %    LA volume 179.88 cm3    LV mass 241.68 g    Left Ventricle Relative Wall Thickness 0.20 cm    AV Velocity Ratio 0.42     AV index (prosthetic) 0.45     E/A ratio 1.08     Mean e' 0.08     Pulm vein S/D ratio 0.91     AV peak gradient 8.88 mmHg    E/E' ratio 15.63     LV Systolic Volume Index 121.5 mL/m2    LV Diastolic Volume Index 163.53 mL/m2    LA Volume Index 77.8 mL/m2    LV Mass Index 104.6  g/m2    Triscuspid Valve Regurgitation Peak Gradient 34.34 mmHg    BSA 2.3 m2    Right Atrial Pressure (from IVC) 8 mmHg    QEF 28 %    AV valve area 2.02 cm2    LVOT diameter 2.40 cm    LVOT area 4.52 cm2    LVOT stroke volume 60.18 cm3    TV rest pulmonary artery pressure 42 mmHg    Narrative    · Severely decreased left ventricular systolic function. The estimated   ejection fraction is 20%, 28% by quantitiative LVEF.  · Severe left ventricular enlargement.  · Eccentric left ventricular hypertrophy.  · Grade II (moderate) left ventricular diastolic dysfunction consistent   with pseudonormalization.  · Elevated left atrial pressure.  · Severe left atrial enlargement.  · Severe mitral regurgitation.  · Mild to moderate tricuspid regurgitation.  · Severe right atrial enlargement.  · Pulmonary hypertension present.  · The estimated PA systolic pressure is 42 mm Hg  · Intermediate central venous pressure (8 mm Hg).

## 2019-08-07 NOTE — H&P
[facility]  Cardiology  History and Physical     Patient Name: Mario Mahajan  MRN: [id number]  Admission Date: 8/7/2019  Code Status: Full Code   Attending Provider: Romy Roman MD   Primary Care Physician: Riccardo Davila DO  Principal Problem:<principal problem not specified>    Patient information was obtained from patient and ER records.     Subjective:     Chief Complaint:  Chest Pain      HPI:  Mario Mahajan is a 43-year-old female with Nonischemic CHF (LVEF 28%, LVEDD 8 cm severe MR March 2019) who had three episodes of VF in early Feb 2017 (none since) s/p ICD. Patient presented to the ED for evaluation of midsternal chest pain that started at approximately 8:00 AM this morning while sitting down. Pain worsened with position change and was described as muscular soreness. Pain resolved prior to presenting to the ED.  Denies injury or trauma. Endorses palpitations. No recent ICD discharge.  She denies any SOB, edema, worsening DELGADO, syncope, or other cardiac complaint.  Initial troponin 0.1, similar to previous. EKG SR without acute ischemic changes. Patient reports marked improvement in her functional status since she started Entresto.     Cardiac summary:  NICM diagnosed in 2015 and was started on goal-directed medical therapy.  PET Stress 06/2015  revealed no ischemia.   Her EF at the time was 20% and ICD was placed 12/01/15. She experienced inappropriate shock 2/2 to lead displacement. She underwent a successful lead revision in 01/05/16.   February of 2017 she presented following an episode of syncope and was admitted for further evaluation after VT/VF events had been detected on her ICD; two VT episodes 02/05/17 and 02/10/17, neither of which required shocks and a VF episode 02/09/17 corresponding with her syncopal event, and requiring a shock. Amiodarone was initiated at that time.   03/2017 [facility] Normal coronary arteries  03/2018 RA: 16/16 (13)  RV: 48/6  RVEDP: 15     PW: 27/33  (25)  PA: 52/22 (35)    CONDITION 1 (3/26/2018 13:47:20):  FICKCI: 2.2200  FICKCO: 5.0600  FICKSV: 76.7000  PULVENSAT: 100.0000  SYSVENSAT: 62.0000    10/2018 CPX: negative for cardiac ischemia.   Severe functional impairment associated with a borderline reduced breathing reserve, normal oxygen saturation, an excellent effort, and a reduced AT. These findings are indicative of functional impairment secondary to circulatory insufficiency.     Past Medical History:   Diagnosis Date    Asthma     Chronic back pain 2014    Chronic combined systolic and diastolic congestive heart failure 2015     2-10-17   1 - Severely depressed left ventricular systolic function (EF 20-25%).    2 - Severe left ventricular enlargement.    3 - Severe left atrial enlargement.    4 - Left ventricular diastolic dysfunction.    5 - The estimated PA systolic pressure is 18 mmHg.    6 - Mild mitral regurgitation.     Encounter for blood transfusion     ICD (implantable cardioverter-defibrillator) in place 12/01/15 3/3/2016    Menorrhagia, premenopausal 2/10/2017    Microcytic anemia 2015    Non-rheumatic mitral regurgitation 3/5/2015    Nonischemic dilated cardiomyopathy 2015    Sleep apnea     Syncope and collapse 2017    Ventricular tachycardia, polymorphic        Past Surgical History:   Procedure Laterality Date    CARDIAC DEFIBRILLATOR PLACEMENT  2015     SECTION      HEART CATH-RIGHT Right 3/26/2018    Performed by [facility]imy [facility]rivastava [facility] at [facility] CATH LAB    HYSTERECTOMY, TOTAL, ABDOMINAL N/A 3/26/2019    Performed by [facility] at [facility] OR    INSERTION-ICD-SINGLE N/A 2015    Performed by Victorino Tay MD at [facility] CATH LAB    REVISION-LEAD-ICD N/A 2016    Performed by Victorino Tay MD at [facility] CATH LAB    TUBAL LIGATION         Review of patient's allergies indicates:   Allergen Reactions    Ace inhibitors      Cough       No current facility-administered  medications on file prior to encounter.      Current Outpatient Medications on File Prior to Encounter   Medication Sig    albuterol 90 mcg/actuation inhaler Inhale 1-2 puffs into the lungs every 6 (six) hours as needed for Wheezing. Rescue    amiodarone (PACERONE) 200 MG Tab TAKE 1 TABLET(200 MG) BY MOUTH EVERY DAY    ascorbic acid, vitamin C, (VITAMIN C) 250 MG tablet Take 1 tablet (250 mg) by mouth twice daily. Take with the iron tablets. (Patient taking differently: once daily. )    carvedilol (COREG) 12.5 MG tablet Take 1 tablet (12.5 mg total) by mouth 2 (two) times daily.    ferrous sulfate 325 (65 FE) MG EC tablet Take 1 tablet (325 mg total) by mouth 2 (two) times daily. Take with vitamin C. (Patient taking differently: 325 mg once daily. Take 1 tablet (325 mg total) by mouth 2 (two) times daily. Take with vitamin C.)    FLOVENT  mcg/actuation inhaler INL 2 PFS PO TWICE DAILY. RM AFTER U    furosemide (LASIX) 40 MG tablet Take 1 tablet (40 mg total) by mouth daily as needed (Leg swelling or weight gain).    ibuprofen (ADVIL,MOTRIN) 600 MG tablet Take 1 tablet (600 mg total) by mouth every 6 (six) hours as needed for Pain.    latanoprost 0.005 % ophthalmic solution INT 1 GTT INTO OU QHS    loratadine (CLARITIN) 10 mg tablet TK 1 T PO QD    oxyCODONE-acetaminophen (PERCOCET)  mg per tablet Take 1 tablet by mouth every 6 (six) hours as needed.    sacubitril-valsartan (ENTRESTO)  mg per tablet Take 1 tablet by mouth 2 (two) times daily.    spironolactone (ALDACTONE) 25 MG tablet Take 1 tablet (25 mg total) by mouth once daily.    topiramate (TOPAMAX) 50 MG tablet      Family History     Problem Relation (Age of Onset)    Diabetes Father    Heart failure Father, Brother    Lung cancer Paternal Grandmother    Pancreatic cancer Father        Tobacco Use    Smoking status: Never Smoker    Smokeless tobacco: Never Used   Substance and Sexual Activity    Alcohol use: Yes      Comment: 1 glass per 3 months    Drug use: No    Sexual activity: Yes     Partners: Male     Comment: one male partner (boyfriend)     Review of Systems   Constitution: Negative for diaphoresis, malaise/fatigue, night sweats, weight gain and weight loss.   HENT: Negative.    Eyes: Negative.    Cardiovascular: Positive for chest pain and palpitations. Negative for irregular heartbeat, leg swelling, near-syncope, orthopnea, paroxysmal nocturnal dyspnea and syncope.   Respiratory: Negative for cough, shortness of breath and wheezing.    Endocrine: Negative.    Hematologic/Lymphatic: Negative.    Skin: Positive for suspicious lesions.   Musculoskeletal: Positive for myalgias.   Gastrointestinal: Negative.  Negative for bloating, abdominal pain, nausea and vomiting.   Genitourinary: Negative.    Neurological: Negative.    Psychiatric/Behavioral: Negative.    Allergic/Immunologic: Negative.      Objective:     Vital Signs (Most Recent):  Temp: 97.6 °F (36.4 °C) (08/07/19 1233)  Pulse: 75 (08/07/19 1233)  Resp: 20 (08/07/19 1233)  BP: (!) 140/65 (08/07/19 1233)  SpO2: 98 % (08/07/19 1233) Vital Signs (24h Range):  Temp:  [97.6 °F (36.4 °C)] 97.6 °F (36.4 °C)  Pulse:  [75] 75  Resp:  [20] 20  SpO2:  [98 %] 98 %  BP: (140)/(65) 140/65     Weight: 114.3 kg (252 lb)  Body mass index is 37.21 kg/m².    SpO2: 98 %  O2 Device (Oxygen Therapy): room air    No intake or output data in the 24 hours ending 08/07/19 1536    Lines/Drains/Airways     Peripheral Intravenous Line                 Peripheral IV - Single Lumen 08/07/19 1303 20 G Anterior Forearm less than 1 day                Physical Exam   Constitutional: She is oriented to person, place, and time. She appears well-developed and well-nourished. No distress.   HENT:   Head: Normocephalic and atraumatic.   Eyes: Right eye exhibits no discharge. Left eye exhibits no discharge.   Neck: No JVD present.   Cardiovascular: Normal rate and regular rhythm.   Murmur  heard.  Pulmonary/Chest: Effort normal and breath sounds normal. She has no rales.   Abdominal: Soft. Bowel sounds are normal.   Musculoskeletal: She exhibits no edema.   Neurological: She is alert and oriented to person, place, and time.   Skin: Skin is warm and dry. She is not diaphoretic.   Psychiatric: She has a normal mood and affect. Her behavior is normal. Judgment and thought content normal.       Significant Labs:   BMP:   Recent Labs   Lab 08/07/19  1307   GLU 86      K 3.8      CO2 25   BUN 12   CREATININE 0.8   CALCIUM 8.5*   , CMP   Recent Labs   Lab 08/07/19  1307      K 3.8      CO2 25   GLU 86   BUN 12   CREATININE 0.8   CALCIUM 8.5*   PROT 6.6   ALBUMIN 3.3*   BILITOT 1.2*   ALKPHOS 36*   AST 13   ALT 8*   ANIONGAP 7*   ESTGFRAFRICA >60   EGFRNONAA >60   , CBC   Recent Labs   Lab 08/07/19  1307   WBC 6.28   HGB 10.2*   HCT 33.2*      , INR No results for input(s): INR, PROTIME in the last 48 hours., Lipid Panel No results for input(s): CHOL, HDL, LDLCALC, TRIG, CHOLHDL in the last 48 hours., Troponin   Recent Labs   Lab 08/07/19  1307   TROPONINI 0.154*    and All pertinent lab results from the last 24 hours have been reviewed.    Significant Imaging: Echocardiogram:   2D echo with color flow doppler:   Results for orders placed or performed during the hospital encounter of 04/10/18   2D Echo w/ Color Flow Doppler   Result Value Ref Range    QEF 18 (A) 55 - 65    Mitral Valve Regurgitation SEVERE (A)     Est. PA Systolic Pressure 25.66     Tricuspid Valve Regurgitation TRIVIAL     Narrative    Date of Procedure: 04/10/2018        TEST DESCRIPTION       General: A catheter is present in the right-sided cardiac chambers.     Aorta: The aortic root is normal in size, measuring 2.3 cm at sinotubular junction and 2.7 cm at Sinuses of Valsalva. The proximal ascending aorta is normal in size, measuring 2.7 cm across.     Left Atrium: The left atrial volume index is severely  enlarged, measuring 71.01 cc/m2.     Left Ventricle: The left ventricle is severely enlarged, with an end-diastolic diameter of 7.6 cm, and an end-systolic diameter of 7.3 cm. LV wall thickness is normal, with the septum measuring 0.7 cm and the posterior wall measuring 0.9 cm across.   Relative wall thickness was normal at 0.24, and the LV mass index was increased at 150.3 g/m2 consistent with eccentric left ventricular hypertrophy. The anterior septum is akinetic. The inferior wall is akinetic. The apex is akinetic.   The following segments were akinetic: mid inferoseptum, basal inferoseptum, apical anterior wall.  The following segments were severely hypokinetic: mid inferolateral wall.  Left ventricular systolic function appears severely depressed. Visually estimated ejection fraction is 15-20%. The LV Doppler derived stroke volume equals 50.0 ccs.         Right Atrium: The right atrium is normal in size, measuring 4.3 cm in length and 3.8 cm in width in the apical view.     Right Ventricle: The right ventricle is normal in size measuring 2.7 cm at the base in the apical right ventricle-focused view. Global right ventricular systolic function appears normal. Tricuspid annular plane systolic excursion (TAPSE) is 2.2 cm. The   estimated PA systolic pressure is 26 mmHg.     Aortic Valve:  The aortic valve is normal in structure with normal leaflet mobility. The aortic valve is tri-leaflet in structure. The peak velocity obtained across the aortic valve is 1.4 m/s, which translates to a peak gradient of 8 mmHg. The mean   gradient is 4 mmHg. Using a left ventricular outflow tract diameter of 2.6 cm, a left ventricular outflow tract velocity time integral of 10 cm, and a peak instantaneous transvalvular velocity time integral of 27 cm, the calculated aortic valve area is   1.87 cm2(AVAi is 0.83 cm2/m2).     Mitral Valve:  The mitral valve is mildly sclerotic. There is severe mitral regurgitation.     Tricuspid Valve:   There is trivial tricuspid regurgitation.     Pulmonary Valve:  There is trivial to mild pulmonic regurgitation.     IVC: IVC is normal in size and collapses > 50% with a sniff, suggesting normal right atrial pressure of 3 mmHg.     Intracavitary: There is no evidence of pericardial effusion, intracavity mass, thrombi, or vegetation.         CONCLUSIONS     1 - Severe left ventricular enlargement.     2 - Severely depressed left ventricular systolic function (EF 15-20%).     3 - Eccentric hypertrophy.     4 - Wall motion abnormalities.     5 - Normal right ventricular systolic function .     6 - Severe left atrial enlargement.     7 - Severe mitral regurgitation.     8 - Trivial tricuspid regurgitation.     9 - Trivial to mild pulmonic regurgitation.     10 - The estimated PA systolic pressure is 26 mmHg.             This document has been electronically    SIGNED BY: Filiberto Raymond MD On: 04/10/2018 09:04    and Transthoracic echo (TTE) complete (Cupid Only):   Results for orders placed or performed during the hospital encounter of 03/22/19   Transthoracic echo (TTE) complete (Cupid Only)   Result Value Ref Range    Ascending aorta 2.52 cm    STJ 2.32 cm    AV mean gradient 5.51 mmHg    Ao peak jae 1.49 m/s    Ao VTI 29.75 cm    IVRT 0.10 msec    IVS 0.48 (A) 0.6 - 1.1 cm    LA size 5.42 cm    Left Atrium Major Axis 6.44 cm    Left Atrium Minor Axis 6.26 cm    LVIDD 8.00 (A) 3.5 - 6.0 cm    LVIDS 7.30 (A) 2.1 - 4.0 cm    LVOT peak VTI 13.31 cm    PW 0.81 0.6 - 1.1 cm    MV Peak A Jae 1.16 m/s    E wave decelartion time 202.73 msec    MV Peak E Jae 1.25 m/s    PV Peak D Jae 0.54 m/s    PV Peak S Jae 0.49 m/s    RA Major Axis 4.46 cm    RA Width 4.11 cm    RVDD 4.08 cm    Sinus 2.61 cm    TR Max Jae 2.93 m/s    TDI LATERAL 0.10     TDI SEPTAL 0.06     LA WIDTH 6.15 cm    LV Diastolic Volume 378.00 mL    LV Systolic Volume 280.84 mL    RV S' 13.01 m/s    LVOT peak jae [id number] m/s    LV LATERAL E/E' RATIO 12.50      LV SEPTAL E/E' RATIO 20.83     FS 9 %    LA volume 179.88 cm3    LV mass 241.68 g    Left Ventricle Relative Wall Thickness 0.20 cm    AV Velocity Ratio 0.42     AV index (prosthetic) 0.45     E/A ratio 1.08     Mean e' 0.08     Pulm vein S/D ratio 0.91     AV peak gradient 8.88 mmHg    E/E' ratio 15.63     LV Systolic Volume Index 121.5 mL/m2    LV Diastolic Volume Index 163.53 mL/m2    LA Volume Index 77.8 mL/m2    LV Mass Index 104.6 g/m2    Triscuspid Valve Regurgitation Peak Gradient 34.34 mmHg    BSA 2.3 m2    Right Atrial Pressure (from IVC) 8 mmHg    QEF 28 %    AV valve area 2.02 cm2    LVOT diameter 2.40 cm    LVOT area 4.52 cm2    LVOT stroke volume 60.18 cm3    TV rest pulmonary artery pressure 42 mmHg    Narrative    · Severely decreased left ventricular systolic function. The estimated   ejection fraction is 20%, 28% by quantitiative LVEF.  · Severe left ventricular enlargement.  · Eccentric left ventricular hypertrophy.  · Grade II (moderate) left ventricular diastolic dysfunction consistent   with pseudonormalization.  · Elevated left atrial pressure.  · Severe left atrial enlargement.  · Severe mitral regurgitation.  · Mild to moderate tricuspid regurgitation.  · Severe right atrial enlargement.  · Pulmonary hypertension present.  · The estimated PA systolic pressure is 42 mm Hg  · Intermediate central venous pressure (8 mm Hg).        Assessment and Plan:     Polymorphic ventricular tachycardia  SR at present without frequent ectopy   Telemetry monitoring   Amiodarone and BB continued     Chronic combined systolic and diastolic congestive heart failure  03/2019 TTE  · Severely decreased left ventricular systolic function. The estimated ejection fraction is 20%, 28% by quantitiative LVEF.  · Severe left ventricular enlargement.  · Eccentric left ventricular hypertrophy.  · Grade II (moderate) left ventricular diastolic dysfunction consistent with pseudonormalization.  · Elevated left atrial  pressure.  · Severe left atrial enlargement.  · Severe mitral regurgitation.  · Mild to moderate tricuspid regurgitation.  · Severe right atrial enlargement.  · Pulmonary hypertension present.  · The estimated PA systolic pressure is 42 mm Hg  · Intermediate central venous pressure (8 mm Hg).  · Normal right ventricular systolic function.       Euvolemic on exam   Takes Lasix PRN   Home Entresto, Aldactone, BB continued   Follow up with HTS as scheduled     Other chest pain  Atypical chest pain, musculoskeletal in nature- worse with position changes   Troponin 0.1 and has been as high as 0.2 in the past  LHC 03/2017 with clean coronaries   CPX 10/2018 negative for ischemia    Will repeat troponin at 1630 and if similar, will discharge to home, if significantly elevated will plan for LHC in AM   No ADHF on exam  Hemodynamically stable           VTE Risk Mitigation (From admission, onward)        Ordered     Place SHAMAR hose  Until discontinued      08/07/19 1524     IP VTE HIGH RISK PATIENT  Once      08/07/19 1524          Rodo Willis NP  Cardiology   [facility]

## 2019-08-07 NOTE — ED TRIAGE NOTES
Patient states she began having pains about 8am this morning. Midsternal chest pain. Patient states she had SOB earlier but not anymore. Patient denies N/V. Patient denies pain radiating anywhere else and states SOB has subsided.

## 2019-08-07 NOTE — HPI
Mario Mahajan is a 43-year-old female with Nonischemic CHF (LVEF 28%, LVEDD 8 cm severe MR March 2019) who had three episodes of VF in early Feb 2017 (none since) s/p ICD. Patient presented to the ED for evaluation of midsternal chest pain that started at approximately 8:00 AM this morning while sitting down. Pain worsened with position change and was described as muscular soreness. Pain resolved prior to presenting to the ED.  Denies injury or trauma. Endorses palpitations. No recent ICD discharge.  She denies any SOB, edema, worsening DELGADO, syncope, or other cardiac complaint.  Initial troponin 0.1, similar to previous. EKG SR without acute ischemic changes. Patient reports marked improvement in her functional status since she started Entresto.     Cardiac summary:  NICM diagnosed in 2015 and was started on goal-directed medical therapy.  PET Stress 06/2015  revealed no ischemia.   Her EF at the time was 20% and ICD was placed 12/01/15. She experienced inappropriate shock 2/2 to lead displacement. She underwent a successful lead revision in 01/05/16.   February of 2017 she presented following an episode of syncope and was admitted for further evaluation after VT/VF events had been detected on her ICD; two VT episodes 02/05/17 and 02/10/17, neither of which required shocks and a VF episode 02/09/17 corresponding with her syncopal event, and requiring a shock. Amiodarone was initiated at that time.   03/2017 [facility] Normal coronary arteries  03/2018 RA: 16/16 (13)  RV: 48/6  RVEDP: 15     PW: 27/33 (25)  PA: 52/22 (35)    CONDITION 1 (3/26/2018 13:47:20):  FICKCI: 2.2200  FICKCO: 5.0600  FICKSV: 76.7000  PULVENSAT: 100.0000  SYSVENSAT: 62.0000    10/2018 CPX: negative for cardiac ischemia.   Severe functional impairment associated with a borderline reduced breathing reserve, normal oxygen saturation, an excellent effort, and a reduced AT. These findings are indicative of functional impairment secondary to circulatory  insufficiency.

## 2019-08-07 NOTE — ASSESSMENT & PLAN NOTE
03/2019 TTE  · Severely decreased left ventricular systolic function. The estimated ejection fraction is 20%, 28% by quantitiative LVEF.  · Severe left ventricular enlargement.  · Eccentric left ventricular hypertrophy.  · Grade II (moderate) left ventricular diastolic dysfunction consistent with pseudonormalization.  · Elevated left atrial pressure.  · Severe left atrial enlargement.  · Severe mitral regurgitation.  · Mild to moderate tricuspid regurgitation.  · Severe right atrial enlargement.  · Pulmonary hypertension present.  · The estimated PA systolic pressure is 42 mm Hg  · Intermediate central venous pressure (8 mm Hg).  · Normal right ventricular systolic function.       Euvolemic on exam   Takes Lasix PRN   Home Entresto, Aldactone, BB continued   Follow up with HTS as scheduled

## 2019-08-07 NOTE — HOSPITAL COURSE
08/07/2019 Troponin mildly elevated at 0.1, EKG SR without acute ischemic changes. Hemodynamically stable.

## 2019-08-07 NOTE — PLAN OF CARE
VN reviewed discharge instructions with pt. Reviewed follow-up appointments, medications, diet, and importance of medication compliance. Reviewed home care instructions and HF education, treatment plan, self-management, and when to seek medical attention. Allowed time for questions. All questions answered. Patient verbalized complete understanding of discharge instructions and voices no concerns.    Discharge instructions complete. Bedside nurse notified.

## 2019-08-07 NOTE — NURSING
Patient admitted to unit from ED via W/C without incident. No S/S of pain or discomfort noted at time of arrival. Patient ambulatory to restroom. Denies CP. Patient oriented to room and placed on cardiac monitoring. Allotted time for questions and concerns. Plan of care and medications reviewed with patient- verbalizes understanding. Bed in lowest position, side rails up x 2, call light within reach. Will continue to monitor patient.

## 2019-08-12 NOTE — DISCHARGE SUMMARY
[facility]  Cardiology  Discharge Summary      Patient Name: Mario Mahajan  MRN: [id number]  Admission Date: 8/7/2019  Hospital Length of Stay: 0 days  Discharge Date and Time: 8/7/2019  Attending Physician: No att. providers found  Discharging Provider: DUNIA Dey, ANP  Primary Care Physician: Riccardo Davila DO    HPI: Mario Mahajan is a 43-year-old female with Nonischemic CHF (LVEF 28%, LVEDD 8 cm severe MR March 2019) who had three episodes of VF in early Feb 2017 (none since) s/p ICD. Patient presented to the ED for evaluation of midsternal chest pain that started at approximately 8:00 AM this morning while sitting down. Pain worsened with position change and was described as muscular soreness. Pain resolved prior to presenting to the ED.  Denies injury or trauma. Endorses palpitations. No recent ICD discharge.  She denies any SOB, edema, worsening DELGADO, syncope, or other cardiac complaint.  Initial troponin 0.1, similar to previous. EKG SR without acute ischemic changes. Patient reports marked improvement in her functional status since she started Entresto.      Cardiac summary:  NICM diagnosed in 2015 and was started on goal-directed medical therapy.  PET Stress 06/2015  revealed no ischemia.   Her EF at the time was 20% and ICD was placed 12/01/15. She experienced inappropriate shock 2/2 to lead displacement. She underwent a successful lead revision in 01/05/16.   February of 2017 she presented following an episode of syncope and was admitted for further evaluation after VT/VF events had been detected on her ICD; two VT episodes 02/05/17 and 02/10/17, neither of which required shocks and a VF episode 02/09/17 corresponding with her syncopal event, and requiring a shock. Amiodarone was initiated at that time.   03/2017 [facility] Normal coronary arteries  03/2018 RA: 16/16 (13)  RV: 48/6  RVEDP: 15     PW: 27/33 (25)  PA: 52/22 (35)    CONDITION 1 (3/26/2018 13:47:20):  FICKCI:  2.2200  FICKCO: 5.0600  FICKSV: 76.7000  PULVENSAT: 100.0000  SYSVENSAT: 62.0000     10/2018 CPX: negative for cardiac ischemia.   Severe functional impairment associated with a borderline reduced breathing reserve, normal oxygen saturation, an excellent effort, and a reduced AT. These findings are indicative of functional impairment secondary to circulatory insufficiency.     * No surgery found *     Indwelling Lines/Drains at time of discharge: none       Hospital Course  8/7/2019 Presented to the ER with complaints of atypical chest pain, musculoskeletal in nature- worse with position changes. Initial troponin 0.1 and has been as high as 0.2 in the past. Recent LHC in 03/2017 with clean coronaries with CPX 10/2018 negative for ischemia. Repeat troponin at 1630 .150 and similar to previous. Therefore will discharge to home and will continue on current medication regimen. Follow up with [facility] HF/HTS as scheduled.     Consults: none     Significant Diagnostic Studies: none     Pending Diagnostic Studies:     None          Final Active Diagnoses:    Diagnosis Date Noted POA    PRINCIPAL PROBLEM:  Chronic combined systolic and diastolic congestive heart failure [I50.42] 04/28/2015 Yes    NSTEMI (non-ST elevated myocardial infarction) [I21.4] 08/07/2019 Yes    Polymorphic ventricular tachycardia [I47.2] 03/08/2017 Yes    Other chest pain [R07.89]  Unknown      Problems Resolved During this Admission:       Discharged Condition: good    Follow Up:    Patient Instructions:      Diet Cardiac     Activity as tolerated     Medications:  Reconciled Home Medications:      Medication List      CHANGE how you take these medications    ascorbic acid (vitamin C) 250 MG tablet  Commonly known as:  VITAMIN C  Take 1 tablet (250 mg) by mouth twice daily. Take with the iron tablets.  What changed:    · when to take this  · additional instructions     ferrous sulfate 325 (65 FE) MG EC tablet  Take 1 tablet (325 mg total) by mouth 2  (two) times daily. Take with vitamin C.  What changed:    · how much to take  · when to take this  · additional instructions        CONTINUE taking these medications    albuterol 90 mcg/actuation inhaler  Commonly known as:  PROVENTIL/VENTOLIN HFA  Inhale 1-2 puffs into the lungs every 6 (six) hours as needed for Wheezing. Rescue     amiodarone 200 MG Tab  Commonly known as:  PACERONE  TAKE 1 TABLET(200 MG) BY MOUTH EVERY DAY     carvedilol 12.5 MG tablet  Commonly known as:  COREG  Take 1 tablet (12.5 mg total) by mouth 2 (two) times daily.     FLOVENT  mcg/actuation inhaler  Generic drug:  fluticasone propionate  INL 2 PFS PO TWICE DAILY. RM AFTER U     furosemide 40 MG tablet  Commonly known as:  LASIX  Take 1 tablet (40 mg total) by mouth daily as needed (Leg swelling or weight gain).     ibuprofen 600 MG tablet  Commonly known as:  ADVIL,MOTRIN  Take 1 tablet (600 mg total) by mouth every 6 (six) hours as needed for Pain.     latanoprost 0.005 % ophthalmic solution  INT 1 GTT INTO OU QHS     loratadine 10 mg tablet  Commonly known as:  CLARITIN  TK 1 T PO QD     oxyCODONE-acetaminophen  mg per tablet  Commonly known as:  PERCOCET  Take 1 tablet by mouth every 6 (six) hours as needed.     sacubitril-valsartan  mg per tablet  Commonly known as:  ENTRESTO  Take 1 tablet by mouth 2 (two) times daily.     spironolactone 25 MG tablet  Commonly known as:  ALDACTONE  Take 1 tablet (25 mg total) by mouth once daily.     topiramate 50 MG tablet  Commonly known as:  TOPAMAX            Time spent on the discharge of patient: 45 minutes    DUNIA Dey, ANP  Cardiology  [facility]

## 2019-08-27 ENCOUNTER — OFFICE VISIT (OUTPATIENT)
Dept: INTERNAL MEDICINE | Facility: CLINIC | Age: 44
End: 2019-08-27
Payer: MEDICARE

## 2019-08-27 VITALS
OXYGEN SATURATION: 94 % | BODY MASS INDEX: 37.06 KG/M2 | WEIGHT: 250.25 LBS | DIASTOLIC BLOOD PRESSURE: 74 MMHG | SYSTOLIC BLOOD PRESSURE: 120 MMHG | HEART RATE: 80 BPM | HEIGHT: 69 IN

## 2019-08-27 DIAGNOSIS — J45.20 MILD INTERMITTENT ASTHMA WITHOUT COMPLICATION: ICD-10-CM

## 2019-08-27 DIAGNOSIS — I50.42 CHRONIC COMBINED SYSTOLIC AND DIASTOLIC CONGESTIVE HEART FAILURE: ICD-10-CM

## 2019-08-27 DIAGNOSIS — Z00.00 ROUTINE GENERAL MEDICAL EXAMINATION AT A HEALTH CARE FACILITY: Primary | ICD-10-CM

## 2019-08-27 DIAGNOSIS — Z12.39 BREAST CANCER SCREENING: ICD-10-CM

## 2019-08-27 DIAGNOSIS — E66.01 MORBID OBESITY: ICD-10-CM

## 2019-08-27 DIAGNOSIS — G43.409 HEMIPLEGIC MIGRAINE WITHOUT STATUS MIGRAINOSUS, NOT INTRACTABLE: ICD-10-CM

## 2019-08-27 PROBLEM — Z01.818 PREOPERATIVE EXAM FOR GYNECOLOGIC SURGERY: Status: RESOLVED | Noted: 2019-03-26 | Resolved: 2019-08-27

## 2019-08-27 PROBLEM — Z01.818 PRE-OP EVALUATION: Status: RESOLVED | Noted: 2019-03-22 | Resolved: 2019-08-27

## 2019-08-27 PROCEDURE — 99213 OFFICE O/P EST LOW 20 MIN: CPT | Mod: PBBFAC,PN | Performed by: INTERNAL MEDICINE

## 2019-08-27 PROCEDURE — 99999 PR PBB SHADOW E&M-EST. PATIENT-LVL III: ICD-10-PCS | Mod: PBBFAC,,, | Performed by: INTERNAL MEDICINE

## 2019-08-27 PROCEDURE — 99214 OFFICE O/P EST MOD 30 MIN: CPT | Mod: S$PBB,ICN,, | Performed by: INTERNAL MEDICINE

## 2019-08-27 PROCEDURE — 99999 PR PBB SHADOW E&M-EST. PATIENT-LVL III: CPT | Mod: PBBFAC,,, | Performed by: INTERNAL MEDICINE

## 2019-08-27 PROCEDURE — 99214 PR OFFICE/OUTPT VISIT, EST, LEVL IV, 30-39 MIN: ICD-10-PCS | Mod: S$PBB,ICN,, | Performed by: INTERNAL MEDICINE

## 2019-08-27 NOTE — PROGRESS NOTES
Subjective:       Patient ID: Mario Mahajan is a 43 y.o. female.    Chief Complaint: Establish Care and Migraine    HPI  Wellness check.  Establishing care.  Chart reviewed.  C/O classic L hemicranial migraine w/o aura, denies triggers.  Good resp to topamax.  Trying to lose weight.  No CP, SOB, orthopnea.  Review of Systems   Constitutional: Negative for activity change and unexpected weight change.   HENT: Negative for hearing loss, rhinorrhea and trouble swallowing.    Eyes: Negative for discharge and visual disturbance.   Respiratory: Negative for chest tightness and wheezing.    Cardiovascular: Negative for chest pain and palpitations.   Gastrointestinal: Negative for blood in stool, constipation, diarrhea and vomiting.   Endocrine: Negative for polydipsia and polyuria.   Genitourinary: Negative for difficulty urinating, dysuria, hematuria and menstrual problem.   Musculoskeletal: Positive for arthralgias and neck pain. Negative for joint swelling.   Neurological: Positive for weakness and headaches.   Psychiatric/Behavioral: Positive for confusion. Negative for dysphoric mood.   All other systems reviewed and are negative.      Objective:      Physical Exam   Constitutional: She appears well-developed.   obese   HENT:   Head: Normocephalic.   Eyes: EOM are normal.   Neck: Normal range of motion.   Cardiovascular: Normal rate, regular rhythm, normal heart sounds and intact distal pulses.   Pulmonary/Chest: Effort normal and breath sounds normal.   Abdominal: Soft. Bowel sounds are normal. She exhibits no distension. There is no tenderness.   Musculoskeletal: Normal range of motion. She exhibits no edema.   Lymphadenopathy:     She has no cervical adenopathy.   Neurological: She is alert. No cranial nerve deficit. She exhibits normal muscle tone. Coordination normal.   Skin: Skin is warm and dry.   Psychiatric: She has a normal mood and affect. Her behavior is normal.   Vitals reviewed.      Assessment:        1. Routine general medical examination at a health care facility    2. Morbid obesity    3. Chronic combined systolic and diastolic congestive heart failure    4. Mild intermittent asthma without complication    5. Hemiplegic migraine without status migrainosus, not intractable    6. Breast cancer screening        Plan:       Mario was seen today for establish care and migraine.    Diagnoses and all orders for this visit:    Routine general medical examination at a health care facility    Morbid obesity   DASH diet    Chronic combined systolic and diastolic congestive heart failure   Cont rx; compensated    Mild intermittent asthma without complication   Quiescent    Hemiplegic migraine without status migrainosus, not intractable   Cont rx   Gave handout on dietary triggers    Breast cancer screening  -     Mammo Digital Screening Bilat w/ [facility]; Future      Follow up if symptoms worsen or fail to improve.

## 2019-08-27 NOTE — PATIENT INSTRUCTIONS
Eating Heart-Healthy Foods  Eating has a big impact on your heart health. In fact, eating healthier can improve several of your heart risks at once. For instance, it helps you manage weight, cholesterol, and blood pressure. Here are ideas to help you make heart-healthy changes without giving up all the foods and flavors you love.  Getting started  · Talk with your health care provider about eating plans, such as the DASH or Mediterranean diet. You may also be referred to a dietitian.  · Change a few things at a time. Give yourself time to get used to a few eating changes before adding more.  · Work to create a tasty, healthy eating plan that you can stick to for the rest of your life.    Goals for healthy eating  Below are some tips to improve your eating habits:  · Limit saturated fats and trans fats. Saturated fats raise your levels of cholesterol, so keep these fats to a minimum. They are found in foods such as fatty meats, whole milk, cheese, and palm and coconut oils. Avoid trans fats because they lower good cholesterol as well as raise bad cholesterol. Trans fats are most often found in processed foods.  · Reduce sodium (salt) intake. Eating too much salt may increase your blood pressure. Limit your sodium intake to 2,300 milligrams (mg) per day, or less if your health care provider recommends it. Dining out less often and eating fewer processed foods are two great ways to decrease the amount of salt you consume.  · Managing calories. A calorie is a unit of energy. Your body burns calories for fuel, but if you eat more calories than your body burns, the extras are stored as fat. Your health care provider can help you create a diet plan to manage your calories. This will likely include eating healthier foods as well as exercising regularly. To help you track your progress, keep a diary to record what you eat and how often you exercise.  Choose the right foods  Aim to make these foods staples of your diet. If  you have diabetes, you may have different recommendations than what is listed here:  · Fruits and vegetable provide plenty of nutrients without a lot of calories. At meals, fill half your plate with these foods. Split the other half of your plate between whole grains and lean protein.  · Whole grains are high in fiber and rich in vitamins and nutrients. Good choices include whole-wheat bread, pasta, and brown rice.  · Lean proteins give you nutrition with less fat. Good choices include fish, skinless chicken, and beans.  · Low-fat or nonfat dairy provides nutrients without a lot of fat. Try low-fat or nonfat milk, cheese, or yogurt.  · Healthy fats can be good for you in small amounts. These are unsaturated fats, such as olive oil, nuts, and fish. Try to have at least 2 servings per week of fatty fish such as salmon, sardines, mackerel, rainbow trout, and albacore tuna. These contain omega-3 fatty acids, which are good for your heart. Flaxseed is another source of a heart-healthy fat.  More on heart healthy eating    Read food labels  Healthy eating starts at the grocery store. Be sure to pay attention to food labels on packaged foods. Look for products that are high in fiber and protein, and low in saturated fat, cholesterol, and sodium. Avoid products that contain trans fat. And pay close attention to serving size. For instance, if you plan to eat two servings, double all the numbers on the label.  Prepare food right  A key part of healthy cooking is cutting down on added fat and salt. Look on the internet for lower-fat, lower-sodium recipes. Also, try these tips:  · Remove fat from meat and skin from poultry before cooking.  · Skim fat from the surface of soups and sauces.  · Broil, boil, bake, steam, grill, and microwave food without added fats.  · Choose ingredients that spice up your food without adding calories, fat, or sodium. Try these items: horseradish, hot sauce, lemon, mustard, nonfat salad dressings,  and vinegar. For salt-free herbs and spices, try basil, cilantro, cinnamon, pepper, and rosemary.  Date Last Reviewed: 6/25/2015 © 2000-2017 [employer]. [address] [place] [address], [place] [address]. All rights reserved. This information is not intended as a substitute for professional medical care. Always follow your healthcare professional's instructions.        Migraine Headache  This often severe type of headache is different from other types of headaches in that symptoms other than pain occur with the headache. Nausea and vomiting, lightheadedness, sensitivity to light (photophobia), and other visual disturbances are common migraine symptoms. The pain may last from a few hours to several days. It is not clear why migraines occur but certain factors called triggers can raise the risk of having a migraine attack. A migraine may be triggered by emotional stress or depression, or by hormone changes during the menstrual cycle. Other triggers include birth control pills, overuse of migraine medicines, alcohol or caffeine, foods with tyramine (such as aged cheese and wine), eyestrain, weather changes, missed meals, or too little or too much sleep.  Home care  Follow these tips when taking care of yourself at home:  · Dont drive yourself home if you were given pain medicine for your headache or are having visual symptoms. Instead, have someone else drive you home. Try to sleep when you get home. You should feel much better when you wake up.  · Cold can help ease migraine symptoms. Put an ice pack on your forehead or at the base of your skull. Put heat on the back of your neck to help ease any neck spasm.  · Drink only clear liquids or eat a light diet until your symptoms get better. This will help you avoid nausea and vomiting.  How to prevent migraines  Pay attention to what seems to trigger your headache. Try to avoid the triggers when you can. If you have frequent headaches, consider keeping a  headache diary. In it, write down what you were doing, feeling, or eating in the hours before each headache. Show this to your healthcare provider to help find the cause of your headaches.  If stress seems to be a trigger for your headaches, figure out what is causing stress in your life. Learn new ways to handle your stress. Ideas include regular exercise, biofeedback, self-hypnosis, yoga, and meditation. Talk with your healthcare provider to find out more information about managing stress. Many books and digital media are also available on this subject.  Tyramine is a substance found in many foods. It can trigger a migraine in some people. These foods contain tyramine:  · Chocolate  · Yogurt  · All cheeses, but especially aged cheeses  · Smoked or pickled fish and meat, including herring, caviar, bologna, pepperoni, and salami  · Liver  · Avocados  · Bananas  · Figs  · Raisins  · Red wine  Try staying away from these foods for 1 to 2 months to see if you have fewer headaches.  How to treat future headaches  · Take time out at the first sign of a headache, if possible. Find a quiet, dark, comfortable place to sit or lie down. Let yourself relax or sleep.  · Put an ice pack on your forehead or on the area of greatest pain. A heating pad and massage may help if you are having a muscle spasm and tightness in your neck.  · If you have been prescribed a medicine to stop a migraine headache, use this at the first warning sign of the headache for best results. First signs may be an aura or pain.  · If you need to take medicine often for your migraine, talk with your healthcare provider about other ways to prevent your headaches.  Follow-up care  Follow up with your healthcare provider, or as advised. Talk with your provider if you have frequent headaches. He or she can figure out a treatment plan. Ask if you can have medicine to take at home the next time you get a bad headache. This may keep you from having to visit the  emergency department in the future. You may need to see a headache specialist (neurologist) if you continue to have headaches.  When to seek medical advice  Call your healthcare provider right away if any of these occur:  · Your head pain gets worse, or doesnt get better within 24 hours  · You cant keep liquids down (repeated vomiting)  · Pain in your sinuses, ears, or throat  · Fever of 100.4º F (38º C) or higher, or as directed by your healthcare provider  · Stiff neck  · Extreme drowsiness, confusion, or fainting  · Dizziness, or dizziness with spinning sensation (vertigo)  · Weakness in an arm or leg, or on one side of your face  · Difficulty talking or seeing  Date Last Reviewed: 8/1/2016 © 2000-2017 [employer]. [address] [place] [address], [place] [address]. All rights reserved. This information is not intended as a substitute for professional medical care. Always follow your healthcare professional's instructions.        Migraines and Cluster Headaches  Migraines and cluster headaches cause intense, throbbing pain on one side of the head. With a migraine, you may have nausea and vomiting and be sensitive to light and sound. You may also have warning signs, such as flashing lights or loss of parts of your vision, before the pain starts. Migraines are three times more common in women than men. This may be due to hormonal changes during menstruation. Typical migrains may last for 4 to 72 hours untreated.  Cluster headaches recur in groups for days, weeks, or months. The pain is centered around or behind one eye. The eye may also become red or teary, or the eyelid may droop. Migraines and cluster headaches can have many triggers.    Preventing migraines and cluster headaches  Try the following steps:  · Avoid aged cheeses, nuts, beans, chocolate, red wine, or foods that contain caffeine, alcohol, tobacco, nitrates, and MSG.  · Try not to skip meals.  · Dont work in poor lighting.  · Reduce stress as  much as you can.  · Get plenty of sleep each night.  · Exercise regularly under your doctors guidance.  · Avoid taking headache medicines for more than 3 days, because of the risk of rebound headaches.  Relieving the pain  Try these suggestions:  · Stay quiet and rest.  · Use cold to numb the pain. Wrap ice or a cold can of soda in a cloth. Hold it against the site of pain for 10 minutes. Repeat every 20 minutes.  · Avoid light. Wear dark glasses, turn out lights, and close the curtains. When outdoors, wear a brimmed hat.  · Drink lots of fluids. Sip caffeine-free flat soda to help relieve nausea.  · See your doctor if you get migraines or cluster headaches often. There are effective medications to help treat or prevent them.  · Hormone therapy. This may help women whose migraines are related to hormonal changes during menstruation.  Date Last Reviewed: 10/19/2015  © 7719-3906 [employer]. [address] [place] [address]. All rights reserved. This information is not intended as a substitute for professional medical care. Always follow your healthcare professional's instructions.        Preventing Migraine Headaches: Triggers     Red wine is a common migraine trigger.     The first step in preventing migraines is to learn what triggers them. You may then be able to control your triggers to avoid or reduce the severity of your migraines.  Know your triggers  Be aware that you may have more than one trigger, and that some triggers may work together. Common migraine triggers include:  · Food and nutrition. Skipping meals or not drinking enough water can trigger headaches. So can certain foods, such as caffeine, monosodium glutamate (MSG), aged cheese, or sausage.  · Alcohol. Red wine and other alcoholic beverages are common migraine triggers.  · Chemicals. Scents, cleaning products, gasoline, glue, perfume, and paint can be triggers. So can tobacco smoke, including secondhand smoke.  · Emotions.  Stress can trigger headaches or make them worse once they begin.  · Sleep disruption. Staying up late, sleeping late, and traveling across time zones can disrupt your sleep cycle, triggering headaches.  · Hormones. Many women notice that migraines tend to happen at a certain point in their menstrual cycle. Birth control pills or hormone replacement therapy may also trigger migraines.  · Environment and weather. Air travel, changes in altitude, air pressure changes, hot sun, or bright or flashing lights can be triggers.  Control your triggers  These are some of the things you can do to try to control triggers:  · Avoid triggers if you can. For example, stay clear of alcohol and foods that trigger your headaches. Use unscented household products. Keep regular sleep habits. Manage stress to help control emotional triggers.  · Change your behavior at times when triggers can't be avoided. For example, make sure to get enough rest and drink plenty of water while you're traveling. Make sure to carry a hat, sunglasses, and your medicines. Be alert for migraine symptoms, so you can treat a migraine early if it happens.  Date Last Reviewed: 10/9/2015  © 9763-4985 [employer]. [address] [place] [address], [place] [address]. All rights reserved. This information is not intended as a substitute for professional medical care. Always follow your healthcare professional's instructions.

## 2019-08-29 ENCOUNTER — TELEPHONE (OUTPATIENT)
Dept: ORTHOPEDICS | Facility: CLINIC | Age: 44
End: 2019-08-29

## 2019-08-29 ENCOUNTER — HOSPITAL ENCOUNTER (OUTPATIENT)
Dept: RADIOLOGY | Facility: HOSPITAL | Age: 44
Discharge: HOME | End: 2019-08-29
Attending: INTERNAL MEDICINE
Payer: MEDICARE

## 2019-08-29 DIAGNOSIS — M50.30 DDD (DEGENERATIVE DISC DISEASE), CERVICAL: Primary | ICD-10-CM

## 2019-08-29 DIAGNOSIS — Z12.39 BREAST CANCER SCREENING: ICD-10-CM

## 2019-08-29 PROCEDURE — 77067 SCR MAMMO BI INCL CAD: CPT | Mod: TC,PO

## 2019-08-30 ENCOUNTER — PATIENT MESSAGE (OUTPATIENT)
Dept: TRANSPLANT | Facility: CLINIC | Age: 44
End: 2019-08-30

## 2019-09-09 ENCOUNTER — HOSPITAL ENCOUNTER (OUTPATIENT)
Dept: RADIOLOGY | Facility: HOSPITAL | Age: 44
Discharge: HOME | End: 2019-09-09
Attending: PHYSICIAN ASSISTANT
Payer: MEDICARE

## 2019-09-09 ENCOUNTER — OFFICE VISIT (OUTPATIENT)
Dept: ORTHOPEDICS | Facility: CLINIC | Age: 44
End: 2019-09-09
Payer: MEDICARE

## 2019-09-09 VITALS
HEIGHT: 69 IN | DIASTOLIC BLOOD PRESSURE: 69 MMHG | WEIGHT: 246.56 LBS | HEART RATE: 76 BPM | SYSTOLIC BLOOD PRESSURE: 127 MMHG | BODY MASS INDEX: 36.52 KG/M2

## 2019-09-09 DIAGNOSIS — M50.30 DDD (DEGENERATIVE DISC DISEASE), CERVICAL: ICD-10-CM

## 2019-09-09 DIAGNOSIS — M54.2 NECK PAIN: Primary | ICD-10-CM

## 2019-09-09 DIAGNOSIS — M54.50 CHRONIC BILATERAL LOW BACK PAIN WITHOUT SCIATICA: ICD-10-CM

## 2019-09-09 DIAGNOSIS — G89.29 CHRONIC BILATERAL LOW BACK PAIN WITHOUT SCIATICA: ICD-10-CM

## 2019-09-09 PROCEDURE — 72050 XR CERVICAL SPINE AP LAT WITH FLEX EXTEN: ICD-10-PCS | Mod: 26,,, | Performed by: RADIOLOGY

## 2019-09-09 PROCEDURE — 99204 PR OFFICE/OUTPT VISIT, NEW, LEVL IV, 45-59 MIN: ICD-10-PCS | Mod: S$PBB,,, | Performed by: PHYSICIAN ASSISTANT

## 2019-09-09 PROCEDURE — 99999 PR PBB SHADOW E&M-EST. PATIENT-LVL IV: CPT | Mod: PBBFAC,,, | Performed by: PHYSICIAN ASSISTANT

## 2019-09-09 PROCEDURE — 99204 OFFICE O/P NEW MOD 45 MIN: CPT | Mod: S$PBB,,, | Performed by: PHYSICIAN ASSISTANT

## 2019-09-09 PROCEDURE — 99999 PR PBB SHADOW E&M-EST. PATIENT-LVL IV: ICD-10-PCS | Mod: PBBFAC,,, | Performed by: PHYSICIAN ASSISTANT

## 2019-09-09 PROCEDURE — 72050 X-RAY EXAM NECK SPINE 4/5VWS: CPT | Mod: TC

## 2019-09-09 PROCEDURE — 72050 X-RAY EXAM NECK SPINE 4/5VWS: CPT | Mod: 26,,, | Performed by: RADIOLOGY

## 2019-09-09 PROCEDURE — 99214 OFFICE O/P EST MOD 30 MIN: CPT | Mod: PBBFAC,25 | Performed by: PHYSICIAN ASSISTANT

## 2019-09-09 RX ORDER — METHOCARBAMOL 750 MG/1
750 TABLET, FILM COATED ORAL 3 TIMES DAILY
Qty: 60 TABLET | Refills: 0 | Status: SHIPPED | OUTPATIENT
Start: 2019-09-09 | End: 2019-09-29

## 2019-09-09 NOTE — PROGRESS NOTES
DATE: 2019  PATIENT: Mario Mahajan    Supervising Physician: Wilmer Steiner M.D.    CHIEF COMPLAINT: neck and back pain    HISTORY:  Mario Mahajan is a 43 y.o. female here for initial evaluation of low back and neck pain (Back - 9, Neck - 3).  She says her whole back hurts.  The pain has been present for about 6 months. The patient describes the pain as sharp and throbbing.  The pain is worse with rolling over in bed, standing, walking and in the morning and improved by massage. There is no associated numbness and tingling. There is no subjective weakness. Prior treatments have included tylenol, but no PT, ESIs or surgery.    The patient denies myelopathic symptoms such as handwriting changes or difficulty with buttons/coins/keys. Denies perineal paresthesias, bowel/bladder dysfunction.    PAST MEDICAL/SURGICAL HISTORY:  Past Medical History:   Diagnosis Date    Asthma     Chronic back pain 2014    Chronic combined systolic and diastolic congestive heart failure 2015     2-10-17   1 - Severely depressed left ventricular systolic function (EF 20-25%).    2 - Severe left ventricular enlargement.    3 - Severe left atrial enlargement.    4 - Left ventricular diastolic dysfunction.    5 - The estimated PA systolic pressure is 18 mmHg.    6 - Mild mitral regurgitation.     Encounter for blood transfusion     ICD (implantable cardioverter-defibrillator) in place 12/01/15 3/3/2016    Menorrhagia, premenopausal 2/10/2017    Microcytic anemia 2015    Non-rheumatic mitral regurgitation 3/5/2015    Nonischemic dilated cardiomyopathy 2015    Sleep apnea     Syncope and collapse 2017    Ventricular tachycardia, polymorphic      Past Surgical History:   Procedure Laterality Date    CARDIAC DEFIBRILLATOR PLACEMENT  2015     SECTION      HEART CATH-RIGHT Right 3/26/2018    Performed [facility]ava [facility] at [facility] C[facility] LAB    HYSTERECTOMY, TOTAL, ABDOMINAL N/A  3/26/2019    [facility]    INSERTION-ICD-SINGLE N/A 12/1/2015    [facility] at [facility] CATH LAB    OOPHORECTOMY      REVISION-LEAD-ICD N/A 1/5/2016    Performed by [facility] at [facility] CATH LAB    TUBAL LIGATION         Medications:   Current Outpatient Medications on File Prior to Visit   Medication Sig Dispense Refill    albuterol 90 mcg/actuation inhaler Inhale 1-2 puffs into the lungs every 6 (six) hours as needed for Wheezing. Rescue 18 g 3    amiodarone (PACERONE) 200 MG Tab TAKE 1 TABLET(200 MG) BY MOUTH EVERY DAY 90 tablet 3    ascorbic acid, vitamin C, (VITAMIN C) 250 MG tablet Take 1 tablet (250 mg) by mouth twice daily. Take with the iron tablets. (Patient taking differently: once daily. ) 60 tablet 3    carvedilol (COREG) 12.5 MG tablet Take 1 tablet (12.5 mg total) by mouth 2 (two) times daily. 180 tablet 3    ferrous sulfate 325 (65 FE) MG EC tablet Take 1 tablet (325 mg total) by mouth 2 (two) times daily. Take with vitamin C. (Patient taking differently: 325 mg once daily. Take 1 tablet (325 mg total) by mouth 2 (two) times daily. Take with vitamin C.) 60 tablet 3    FLOVENT  mcg/actuation inhaler INL 2 PFS PO TWICE DAILY. RM AFTER U  2    ibuprofen (ADVIL,MOTRIN) 600 MG tablet Take 1 tablet (600 mg total) by mouth every 6 (six) hours as needed for Pain. 30 tablet 2    latanoprost 0.005 % ophthalmic solution INT 1 GTT INTO OU QHS  3    loratadine (CLARITIN) 10 mg tablet TK 1 T PO QD  0    oxyCODONE-acetaminophen (PERCOCET)  mg per tablet Take 1 tablet by mouth every 6 (six) hours as needed. 30 tablet 0    sacubitril-valsartan (ENTRESTO)  mg per tablet Take 1 tablet by mouth 2 (two) times daily. 60 tablet 11    spironolactone (ALDACTONE) 25 MG tablet Take 1 tablet (25 mg total) by mouth once daily. 30 tablet 11    topiramate (TOPAMAX) 50 MG tablet   2    furosemide (LASIX) 40 MG tablet Take 1 tablet  (40 mg total) by mouth daily as needed (Leg swelling or weight gain). 90 tablet 4     No current facility-administered medications on file prior to visit.        Social History:   Social History     Socioeconomic History    Marital status: Single     Spouse name: Not on file    Number of children: 2    Years of education: Not on file    Highest education level: Not on file   Occupational History    Occupation: Unemployed     Employer: [employer]   Social Needs    Financial resource strain: Not on file    Food insecurity:     Worry: Not on file     Inability: Not on file    Transportation needs:     Medical: Not on file     Non-medical: Not on file   Tobacco Use    Smoking status: Never Smoker    Smokeless tobacco: Never Used   Substance and Sexual Activity    Alcohol use: Yes     Comment: 1 glass per 3 months    Drug use: No    Sexual activity: Yes     Partners: Male     Comment: one male partner (boyfriend)   Lifestyle    Physical activity:     Days per week: Not on file     Minutes per session: Not on file    Stress: Not on file   Relationships    Social connections:     Talks on phone: Not on file     Gets together: Not on file     Attends Taoism service: Not on file     Active member of club or organization: Not on file     Attends meetings of clubs or organizations: Not on file     Relationship status: Not on file   Other Topics Concern    Not on file   Social History Narrative    Not on file       REVIEW OF SYSTEMS:  Constitution: Negative. Negative for chills, fever and night sweats.   Cardiovascular: Negative for chest pain and syncope.   Respiratory: Negative for cough and shortness of breath.   Gastrointestinal: See HPI. Negative for nausea/vomiting. Negative for abdominal pain.  Genitourinary: See HPI. Negative for discoloration or dysuria.  Skin: Negative for dry skin, itching and rash.   Hematologic/Lymphatic: Negative for bleeding problem. Does not bruise/bleed easily.  "  Musculoskeletal: Negative for falls and muscle weakness.   Neurological: See HPI. No seizures.   Endocrine: Negative for polydipsia, polyphagia and polyuria.   Allergic/Immunologic: Negative for hives and persistent infections.     EXAM:  /69 (BP Location: Left arm, Patient Position: Sitting, BP Method: X-Large (Automatic))   Pulse 76   Ht 5' 9" (1.753 m)   Wt 111.8 kg (246 lb 9.4 oz)   LMP 03/01/2019   BMI 36.41 kg/m²     PHYSICAL EXAMINATION:    General: The patient is a very pleasant 43 y.o. female in no apparent distress, the patient is oriented to person, place and time.  Psych: Normal mood and affect  HEENT: Vision grossly intact, hearing intact to the spoken word.  Lungs: Respirations unlabored.  Gait: Normal station and gait, no difficulty with toe or heel walk.   Skin: Cervical skin and dorsal lumbar skin negative for rashes, lesions, hairy patches and surgical scars.    Range of motion: Cervical and lumbar range of motion is acceptable. There is mild tenderness to palpation of the paracervical muscles.  There is mild lumbar tenderness to palpation.  Spinal Balance: Global saggital and coronal spinal balance acceptable, no significant for scoliosis and kyphosis.  Musculoskeletal: No pain with the range of motion of the bilateral shoulders and elbows. Normal bulk and contour of the bilateral hands.  No pain with the range of motion of the bilateral hips. Mild bilateral trochanteric tenderness to palpation.  Vascular: Bilateral upper and lower extremities warm and well perfused, radial pulses 2+ bilaterally, dorsalis pedis pulses 2+ bilaterally.  Neurological: Normal strength and tone in all major motor groups in the bilateral upper and lower extremities. Normal sensation to light touch in the C5-T1 and L2-S1 dermatomes bilaterally.  Deep tendon reflexes symmetric 2++ in the bilateral upper and lower extremities.  Positive Inverted Radial Reflex and Billingsley's bilaterally. Negative Babinski " bilaterally. Negative straight leg raise bilaterally.     IMAGING:   Today I personally reviewed AP, Lat and Flex/Ex  upright C-spine films that demonstrate normal disc spacing and alignment.  No acute abnormalities.    CT abdomen/pelvis shows no significant spinal canal or neural foraminal narrowing.     Body mass index is 36.41 kg/m².    Hemoglobin A1C   Date Value Ref Range Status   03/21/2017 4.4 (L) 4.5 - 6.2 % Final     Comment:     According to ADA guidelines, hemoglobin A1C <7.0% represents  optimal control in non-pregnant diabetic patients.  Different  metrics may apply to specific populations.   Standards of Medical Care in Diabetes - 2016.  For the purpose of screening for the presence of diabetes:  <5.7%     Consistent with the absence of diabetes  5.7-6.4%  Consistent with increasing risk for diabetes   (prediabetes)  >or=6.5%  Consistent with diabetes  Currently no consensus exists for use of hemoglobin A1C  for diagnosis of diabetes for children.           ASSESSMENT/PLAN:    Diagnoses and all orders for this visit:    Neck pain  -     Ambulatory Referral to Physical/Occupational Therapy    Chronic bilateral low back pain without sciatica  -     Ambulatory Referral to Physical/Occupational Therapy    Other orders  -     methocarbamol (ROBAXIN) 750 MG Tab; Take 1 tablet (750 mg total) by mouth 3 (three) times daily. As needed for muscle spasms for 60 doses        The patient is having neck and back pain without radicular or myelopathic symptoms.      Today we discussed at length all of the different treatment options including anti-inflammatories, acetaminophen, rest, ice, heat, physical therapy including strengthening and stretching exercises, home exercises, ROM, aerobic conditioning, aqua therapy, other modalities including ultrasound, massage, and dry needling, epidural steroid injections and finally surgical intervention.      There is no surgical intervention indicated at this time.  Referral for  PT placed today.  Prescription for robaxin sent to the pharmacy.  She will continue tylenol as needed as well.  Follow up after     Follow up if symptoms worsen or fail to improve.

## 2019-09-11 ENCOUNTER — CLINICAL SUPPORT (OUTPATIENT)
Dept: REHABILITATION | Facility: HOSPITAL | Age: 44
End: 2019-09-11
Payer: MEDICARE

## 2019-09-11 DIAGNOSIS — M54.50 CHRONIC BILATERAL LOW BACK PAIN WITHOUT SCIATICA: ICD-10-CM

## 2019-09-11 DIAGNOSIS — M54.2 CERVICALGIA: ICD-10-CM

## 2019-09-11 DIAGNOSIS — G89.29 CHRONIC BILATERAL LOW BACK PAIN WITHOUT SCIATICA: ICD-10-CM

## 2019-09-11 PROCEDURE — 97110 THERAPEUTIC EXERCISES: CPT | Mod: PO

## 2019-09-11 PROCEDURE — 97161 PT EVAL LOW COMPLEX 20 MIN: CPT | Mod: PO

## 2019-09-11 NOTE — PLAN OF CARE
[facility]  Physical Therapy Initial Evaluation    Name: Mario Wade [facility] Number: [id number]    Therapy Diagnosis:   Encounter Diagnoses   Name Primary?    Cervicalgia     Chronic bilateral low back pain without sciatica      Physician: Caprice Vanegas PA-C    Physician Orders: PT Eval and Treat   Medical Diagnosis from Referral:   M54.2 (ICD-10-CM) - Neck pain   M54.5,G89.29 (ICD-10-CM) - Chronic bilateral low back pain without sciatica   Evaluation Date: 9/11/2019  Authorization Period Expiration: 09/08/2020  Plan of Care Expiration: 11/11/19  Visit # / Visits authorized: 1/ 1    Time In: 2:00pm  Time Out: 3:00pm  Total Billable Time: 60 minutes    Precautions: Standard and defibrillator    Subjective   Date of onset: 9/9/19  History of current condition - Mario reports: history of back and neck pain with recent exacerbation of symptoms.  She is currently complaining of cervical pain in lower cervical region and into upper traps.  Patient complains of frequent headaches.  Mario reports her main complaint at this time is centralized low back pain with pain greater on right side.  She reports pain has limited tolerance to ADL's, house work, shopping activities.     Medical History:   Past Medical History:   Diagnosis Date    Asthma     Chronic back pain 7/1/2014    Chronic combined systolic and diastolic congestive heart failure 4/28/2015     2-10-17   1 - Severely depressed left ventricular systolic function (EF 20-25%).    2 - Severe left ventricular enlargement.    3 - Severe left atrial enlargement.    4 - Left ventricular diastolic dysfunction.    5 - The estimated PA systolic pressure is 18 mmHg.    6 - Mild mitral regurgitation.     Encounter for blood transfusion     ICD (implantable cardioverter-defibrillator) in place 12/01/15 3/3/2016    Menorrhagia, premenopausal 2/10/2017    Microcytic anemia 2/9/2015    Non-rheumatic mitral regurgitation 3/5/2015     Nonischemic dilated cardiomyopathy 2015    Sleep apnea     Syncope and collapse 2017    Ventricular tachycardia, polymorphic        Surgical History:   Mario Mahajan  has a past surgical history that includes Tubal ligation;  section; Cardiac defibrillator placement (2015); Total abdominal hysterectomy (N/A, 3/26/2019); and Oophorectomy.    Medications:   Mario has a current medication list which includes the following prescription(s): albuterol, amiodarone, ascorbic acid (vitamin c), carvedilol, ferrous sulfate, flovent hfa, furosemide, ibuprofen, latanoprost, loratadine, methocarbamol, oxycodone-acetaminophen, sacubitril-valsartan, spironolactone, and topiramate.    Allergies:   Review of patient's allergies indicates:   Allergen Reactions    Ace inhibitors      Cough        Imaging, x-ray  FINDINGS:  C1-C2: Pre-dens space is maintained.  Dens and lateral masses of C1 are unremarkable.  Alignment: Alignment is maintained.  No dynamic instability.  Vertebrae: Vertebral body heights are maintained.  No suspicious appearing lytic or blastic lesions.  Discs and facets: Disc heights are maintained.  Facet joints are unremarkable.    Prior Therapy: none  Social History:  lives with their daughter  Occupation: not working  Prior Level of Function: indpendent  Current Level of Function: independent    Pain:  Current 4/10, worst 10/10, best 0/10   Location: bilateral back , neck  and shoulder   Description: Aching, Throbbing and Tight  Aggravating Factors: Standing, Bending, Walking, Lifting, Getting out of bed/chair and bed mobiilty  Easing Factors: rest    Pts goals: decrease pain    Objective     Observation: forward head/rounded shoulder posture, increased lumbar lordosis  - posterior rotation of right ilium      Cervical Range of Motion:    Degrees Pain   Flexion 45 C7 region     Extension 35 no     Right Rotation 65 tightness     Left Rotation 70  tightness     Right Side Bending 30 no    Left Side Bending 30  no      Lumbar ROM:  Forward bending: reach feet tightness hamstrings  Backward bending: 10deg  Side bending right: reach to lower thigh  Side bending left: reach to lower thigh pain  Rotation right: 40 deg  Rotation left: 40 deg pain      Strength:  Cervical MMT   Flexion 4   Extension 4   Right Side Bend 4   Left Side Bend 4     Upper Extremity Strength  (R) UE  (L) UE    Shoulder flexion: 4+/5 Shoulder flexion: 4+/5   Shoulder Abduction: 4/5 Shoulder abduction: 4/5   Shoulder ER 4/5 Shoulder ER 4/5   Shoulder IR 4+/5 Shoulder IR 4+/5   Elbow flexion: 5/5 Elbow flexion: 5/5   Elbow extension: 5/5 Elbow extension: 5/5   Lower Trap 3+/5 Lower Trap 3+/5   Middle Trap 4-/5 Middle Trap 4-/5   Rhomboids 4-/5 Rhomboids 4-/5     Hip strength:  Flexion : 4/5  Abduction : 4/5      Special Tests:  Distraction neg   Compression neg   Spurlings neg   Sharp-Lilly neg       Palpation: tenderness to palpation in bilateral upper trapezius, levator scapulae, scalenes, cervical erector spinae, and sub-occipital musculature  Tenderness to palpation in bilateral lumbar erector spinae, quadratus lumborum      Sensation: intact to light touch    Flexibility:   - 90/90 hamstring testing : (R) 10deg, (L) 10deg        CMS Impairment/Limitation/Restriction for FOTO Lumbar Survey    Therapist reviewed FOTO scores for Mario Mahajan on 9/11/2019.   FOTO documents entered into [employer] - see Media section.    Limitation Score: 61%  Category: Other    Current : CL = least 60% but < 80% impaired, limited or restricted  Goal: CK = at least 40% but < 60% impaired, limited or restricted  Discharge: not at this time         TREATMENT   Treatment Time In: 2:50pm  Treatment Time Out: 3:00pm  Total Treatment time separate from Evaluation: 10 minutes    Mario received therapeutic exercises to develop strength, endurance, ROM, flexibility, posture and core stabilization for 15 minutes including:  - pelvic tilts, bridging, lower  trunk rotations  - scapular retractions, cervical retractions, and upper trap stretching    Home Exercises and Patient Education Provided    Education provided:   - HEP    Written Home Exercises Provided: yes.  Exercises were reviewed and Mario was able to demonstrate them prior to the end of the session.  Mario demonstrated good  understanding of the education provided.     See EMR under Patient Instructions for exercises provided 9/11/2019.    Assessment   Mario is a 43 y.o. female referred to outpatient Physical Therapy with a medical diagnosis of cervical and lumbar pain. Pt presents with signs and symptoms consistent with the diagnosis.  She is noted to have decreased ROM, decreased strength in scapular stabilizers and core stabilizers, and pain in neck and back limiting tolerance to ADL's.  She would benefit from manual therapy, posture exercises, core stabilization exercises, LE stretching and strengthening, and modalities to decrease pain, improve mobility, and return to prior level of function.     Pt prognosis is Good.   Pt will benefit from skilled outpatient Physical Therapy to address the deficits stated above and in the chart below, provide pt/family education, and to maximize pt's level of independence.     Plan of care discussed with patient: Yes  Pt's spiritual, cultural and educational needs considered and patient is agreeable to the plan of care and goals as stated below:     Anticipated Barriers for therapy: none    Medical Necessity is demonstrated by the following  History  Co-morbidities and personal factors that may impact the plan of care Co-morbidities:   CHF and high BMI    Personal Factors:   no deficits     moderate   Examination  Body Structures and Functions, activity limitations and participation restrictions that may impact the plan of care Body Regions:   neck  back    Body Systems:    ROM  strength    Participation Restrictions:   none    Activity limitations:   Learning and applying  knowledge  no deficits    General Tasks and Commands  no deficits    Communication  no deficits    Mobility  lifting and carrying objects  walking    Self care  no deficits    Domestic Life  shopping  cooking  doing house work (cleaning house, washing dishes, laundry)  assisting others    Interactions/Relationships  no deficits    Life Areas  no deficits    Community and Social Life  no deficits         moderate   Clinical Presentation stable and uncomplicated low   Decision Making/ Complexity Score: low     Goals:  Short Term Goals: 4 weeks  1. Patient will be compliant with HEP to promote the independent management of current diagnosis.  2. Patient will increase scapular stabilization strength to 4+/5..  3. Patient will report a decrease in complaints of low back pain from 8/10 to 5/10 during ADLs.      Long Term Goals:  8 weeks  1. Patient will improve core stabilization strength to Good to improve tolerance to normal house work activities for self care independence.  2. Patient will increase scapular stabilization strength to 5/5.  3. Patient will report a decrease in complaints of low back pain from 8/10 to 2/10 during ADLs.  4. Patient will improve FOTO limitation status from 61% to 44% placing the patient in the 40-60% impaired, limited, or restricted category indicating increased functional mobility.      Plan   Plan of care Certification: 9/11/2019 to 11/11/2019.    Outpatient Physical Therapy 2 times weekly for 8 weeks to include the following interventions: Manual Therapy, Moist Heat/ Ice, Patient Education, Therapeutic Exercise, Ultrasound and IASTM, vacuum cupping, dry needling, and kinesiotaping.     Victorino Garcia, PT

## 2019-09-12 ENCOUNTER — INITIAL CONSULT (OUTPATIENT)
Dept: BARIATRICS | Facility: CLINIC | Age: 44
End: 2019-09-12
Payer: MEDICARE

## 2019-09-12 VITALS
HEART RATE: 63 BPM | WEIGHT: 241.38 LBS | SYSTOLIC BLOOD PRESSURE: 96 MMHG | DIASTOLIC BLOOD PRESSURE: 52 MMHG | HEIGHT: 66 IN | BODY MASS INDEX: 38.79 KG/M2

## 2019-09-12 DIAGNOSIS — E66.9 OBESITY (BMI 35.0-39.9 WITHOUT COMORBIDITY): Primary | ICD-10-CM

## 2019-09-12 DIAGNOSIS — Z79.899 ON AMIODARONE THERAPY: ICD-10-CM

## 2019-09-12 DIAGNOSIS — I50.9 CONGESTIVE HEART FAILURE, UNSPECIFIED HF CHRONICITY, UNSPECIFIED HEART FAILURE TYPE: ICD-10-CM

## 2019-09-12 DIAGNOSIS — I50.42 CHRONIC COMBINED SYSTOLIC AND DIASTOLIC CONGESTIVE HEART FAILURE: ICD-10-CM

## 2019-09-12 DIAGNOSIS — Z76.82 ORGAN TRANSPLANT CANDIDATE: ICD-10-CM

## 2019-09-12 DIAGNOSIS — D50.9 MICROCYTIC ANEMIA: Chronic | ICD-10-CM

## 2019-09-12 DIAGNOSIS — G43.409 HEMIPLEGIC MIGRAINE WITHOUT STATUS MIGRAINOSUS, NOT INTRACTABLE: ICD-10-CM

## 2019-09-12 DIAGNOSIS — I42.0 NONISCHEMIC DILATED CARDIOMYOPATHY: Chronic | ICD-10-CM

## 2019-09-12 PROBLEM — N92.0 MENORRHAGIA: Chronic | Status: RESOLVED | Noted: 2017-02-10 | Resolved: 2019-09-12

## 2019-09-12 PROBLEM — M25.562 LEFT KNEE PAIN: Status: RESOLVED | Noted: 2018-01-23 | Resolved: 2019-09-12

## 2019-09-12 PROCEDURE — 99999 PR PBB SHADOW E&M-EST. PATIENT-LVL V: CPT | Mod: PBBFAC,,, | Performed by: NURSE PRACTITIONER

## 2019-09-12 PROCEDURE — 99215 OFFICE O/P EST HI 40 MIN: CPT | Mod: PBBFAC | Performed by: NURSE PRACTITIONER

## 2019-09-12 PROCEDURE — 99205 PR OFFICE/OUTPT VISIT, NEW, LEVL V, 60-74 MIN: ICD-10-PCS | Mod: S$PBB,,, | Performed by: NURSE PRACTITIONER

## 2019-09-12 PROCEDURE — 99999 PR PBB SHADOW E&M-EST. PATIENT-LVL V: ICD-10-PCS | Mod: PBBFAC,,, | Performed by: NURSE PRACTITIONER

## 2019-09-12 PROCEDURE — 99205 OFFICE O/P NEW HI 60 MIN: CPT | Mod: S$PBB,,, | Performed by: NURSE PRACTITIONER

## 2019-09-12 NOTE — PATIENT INSTRUCTIONS
Prior to surgery you will need to complete:  - Dietitian consult and follow up appointments as needed  - Heart Failure clearance  - Labs  - Chest X-ray  - EKG  - Psychological evaluation, Please call psychiatry [phone] to schedule  - 6 month MSD  - Medical photos    In preparation for bariatric surgery, please complete the following:   · Discuss your current medications with your primary care provider, remember your medications will need to be crushed, chewable, or in liquid form for the first 2-4 weeks after a gastric bypass or sleeve.  For a gastric band, your medications will need to be crushed indefinitely.    · If you take a blood thinner such as: Coumadin (warfarin), Pradaxa (dabigatran), or Plavix (clopidogrel), you will need to speak with your prescribing provider on how or if this medication can be stopped before surgery.   · If you take a medication for depression or anxiety, you will need to begin crushing or opening the capsule 1-3 months prior to surgery.  Remember to discuss this with the psychologist or psychiatrist that you see.   · If you take medication for arthritis on a daily basis that is considered a non-steroidal anti-inflammatory (NSAID), please discuss with your prescribing physician an alternative medication.  After having gastric bypass or gastric sleeve, this group of medications is not appropriate to take due to increased risk of bleeding stomach ulcers.      DEFINITIONS  Appointments: Pre-scheduled meetings or consultations with any physician, advanced practice provider, dietitian, or psychologist, and labs, imaging studies, sleep studies, etc.   Late cancellation: Cancelling an appointment 24-48 hours prior to scheduled time.  No-Show appointment:  is when    You do NOT arrive to your appointment at the time its scheduled.   You call to cancel or cancel via [facility] less than 24 hours in advance of your scheduled appointment   You show up 15 minutes AFTER your scheduled  appointment time without any notification of being late.     POLICY  1. You are allowed up to 3 cancellations for appointments.    After 3 cancellations your case will be placed on hold for 2 months and after that time you can resume the program.   2. You are allowed only 1 no-show for an appointment.    You will be re-scheduled one time and if there is a 2nd no-show at any point, your case will be placed on hold for 3 months.  After 3 months you can resume the program.     3. Upon resuming the program after being placed on hold for either above mentioned reasons, if you have a late cancel or no show for any appointment, the bariatric team will review if youre an appropriate candidate for surgery at the monthly meeting.

## 2019-09-12 NOTE — LETTER
Myles quan - Bariatric Surgery  [address]  [facility] [address]  Phone: [phone]  Fax: [phone] September 12, 2019      Nereida Hatch MD  [address]  [facility] [address]    Patient: Mario Mahajan   MR Number: [id number]   YOB: 1975   Date of Visit: 9/12/2019     Dear Dr. Hatch:    Thank you for referring Mario Mahajan to me for evaluation. Below are the relevant portions of my assessment and plan of care.    DIAGNOSIS:  1. Morbid Obesity with Body mass index is 38.96 kg/m². and difficulty with weight loss.  2. Co-morbidities: asthma, CHF, ICD, CMP, on amiodarone, organ transplant candidate, migraines     PLAN:  The patient is a potential candidate for bariatric surgery. She is interested in sleeve gastrectomy with Dr. Dang. The surgery and post-op care were discussed in detail with the patient. All questions were answered.     She understands that bariatric surgery is a tool to aid in weight loss and that she needs to be committed to the diet and exercise post-operatively for successful weight loss.  We discussed expected weight loss outcomes after surgery which is 50% of the excess weight on her frame.  It was discussed with the patient that bariatric surgery is not the easy way out and that it will take plenty of dedication on the patient's part to be successful. We also discussed the possibility of weight regain if the patient strays from the diet guidelines or exercise requirements. The patient verbalized understanding and wishes to proceed with the work-up.     ORDERS:  1. Chest X-Ray  EKG 8/7/2019  2. Psychological Consult, Bariatric Dietician Consult and Heart Failure Clearance  3. Bariatric Work up Labs  4. 6MSD, medical photos, per insurance  5. Psych short form.      60 minute visit, over 50% of time spent counseling patient face to face on surgical options, risks, benefits, expected diet, recommended exercise regimen, and expected weight loss.     If you  have questions, please do not hesitate to call me. I look forward to following Mario along with you.    Sincerely,    BECKY Riley  Section of Bariatric Surgery  Department of Surgery  [facility]    NF/afw    CC  Riccardo Daivla DO

## 2019-09-12 NOTE — PROGRESS NOTES
BARIATRIC NEW PATIENT EVALUATION    CHIEF COMPLAINT:   Morbid Obesity Body mass index is 38.96 kg/m². and difficulty with weight loss.     HISTORY OF PRESENT ILLNESS:  Mario Mahajan  is a 43 y.o. female presenting for morbid obesity Body mass index is 38.96 kg/m². with asthma, CHF, ICD, Non ischemic dilated CMP, and additional medical conditions and is an organ transplant candidate. Reports difficulty with weight loss. The patient has tried portion control, low CHO, exercise. She has been able to lose 15 pounds with dietary changes, which she has maintained. She has not been able to lose additional weight. Wt difficulty began 15 years ago after birth of her daughter. Her highest wt has been 250 pounds.     Psych- negative for diagnoses, treatment, admission, suicide attempts.    PAST MEDICAL HISTORY:  Past Medical History:   Diagnosis Date    Asthma     Chronic back pain 2014    Chronic combined systolic and diastolic congestive heart failure 2015     2-10-17   1 - Severely depressed left ventricular systolic function (EF 20-25%).    2 - Severe left ventricular enlargement.    3 - Severe left atrial enlargement.    4 - Left ventricular diastolic dysfunction.    5 - The estimated PA systolic pressure is 18 mmHg.    6 - Mild mitral regurgitation.     Encounter for blood transfusion     ICD (implantable cardioverter-defibrillator) in place 12/01/15 3/3/2016    Menorrhagia, premenopausal 2/10/2017    Microcytic anemia 2015    Non-rheumatic mitral regurgitation 3/5/2015    Nonischemic dilated cardiomyopathy 2015    Sleep apnea     Syncope and collapse 2017    Ventricular tachycardia, polymorphic        PAST SURGICAL HISTORY:  Past Surgical History:   Procedure Laterality Date    CARDIAC DEFIBRILLATOR PLACEMENT  2015     SECTION      HEART CATH-RIGHT Right 3/26/2018    Per[facility] at [facility] CATH LAB    HYSTERECTOMY, TOTAL, ABDOMINAL N/A 3/26/2019     [facility]    INSERTION-ICD-SINGLE N/A 12/1/2015    [facility] CATH LAB    OOPHORECTOMY      REVISION-LEAD-ICD N/A 1/5/2016    [facility] at [facility] CATH LAB    RIGHT HEART CATHETERIZATION      TUBAL LIGATION         FAMILY HISTORY:  Family History   Problem Relation Age of Onset    Diabetes Father     Pancreatic cancer Father     Heart failure Father     Heart failure Brother     Lung cancer Paternal Grandmother        SOCIAL HISTORY:  Social History     Socioeconomic History    Marital status: Single     Spouse name: Not on file    Number of children: 2    Years of education: Not on file    Highest education level: Not on file   Occupational History    Occupation: Unemployed     Employer: [employer]   Social Needs    Financial resource strain: Not on file    Food insecurity:     Worry: Not on file     Inability: Not on file    Transportation needs:     Medical: Not on file     Non-medical: Not on file   Tobacco Use    Smoking status: Never Smoker    Smokeless tobacco: Never Used   Substance and Sexual Activity    Alcohol use: Yes     Comment: 1 glass per 3 months    Drug use: No    Sexual activity: Yes     Partners: Male     Comment: one male partner (boyfriend)   Lifestyle    Physical activity:     Days per week: Not on file     Minutes per session: Not on file    Stress: Not on file   Relationships    Social connections:     Talks on phone: Not on file     Gets together: Not on file     Attends Rastafarian service: Not on file     Active member of club or organization: Not on file     Attends meetings of clubs or organizations: Not on file     Relationship status: Not on file   Other Topics Concern    Not on file   Social History Narrative    Not on file       MEDICATIONS:  Current Outpatient Medications   Medication Sig Dispense Refill    albuterol 90 mcg/actuation inhaler Inhale 1-2  puffs into the lungs every 6 (six) hours as needed for Wheezing. Rescue 18 g 3    amiodarone (PACERONE) 200 MG Tab TAKE 1 TABLET(200 MG) BY MOUTH EVERY DAY 90 tablet 3    ascorbic acid, vitamin C, (VITAMIN C) 250 MG tablet Take 1 tablet (250 mg) by mouth twice daily. Take with the iron tablets. (Patient taking differently: once daily. ) 60 tablet 3    carvedilol (COREG) 12.5 MG tablet Take 1 tablet (12.5 mg total) by mouth 2 (two) times daily. 180 tablet 3    ferrous sulfate 325 (65 FE) MG EC tablet Take 1 tablet (325 mg total) by mouth 2 (two) times daily. Take with vitamin C. (Patient taking differently: 325 mg once daily. Take 1 tablet (325 mg total) by mouth 2 (two) times daily. Take with vitamin C.) 60 tablet 3    FLOVENT  mcg/actuation inhaler INL 2 PFS PO TWICE DAILY. RM AFTER U  2    furosemide (LASIX) 40 MG tablet Take 1 tablet (40 mg total) by mouth daily as needed (Leg swelling or weight gain). 90 tablet 4    ibuprofen (ADVIL,MOTRIN) 600 MG tablet Take 1 tablet (600 mg total) by mouth every 6 (six) hours as needed for Pain. 30 tablet 2    latanoprost 0.005 % ophthalmic solution INT 1 GTT INTO OU QHS  3    loratadine (CLARITIN) 10 mg tablet TK 1 T PO QD  0    sacubitril-valsartan (ENTRESTO)  mg per tablet Take 1 tablet by mouth 2 (two) times daily. 60 tablet 11    spironolactone (ALDACTONE) 25 MG tablet Take 1 tablet (25 mg total) by mouth once daily. 30 tablet 11    topiramate (TOPAMAX) 50 MG tablet   2    methocarbamol (ROBAXIN) 750 MG Tab Take 1 tablet (750 mg total) by mouth 3 (three) times daily. As needed for muscle spasms for 60 doses 60 tablet 0     No current facility-administered medications for this visit.        ALLERGIES:  Review of patient's allergies indicates:   Allergen Reactions    Ace inhibitors      Cough       ROS:  Review of Systems   Constitutional: Negative for chills, fever, malaise/fatigue and weight loss.   Eyes: Negative for blurred vision and double  "vision.   Respiratory: Negative for cough, shortness of breath (denies DELGADO with stairs.) and wheezing.    Cardiovascular: Positive for palpitations (none now). Negative for chest pain and leg swelling.   Gastrointestinal: Negative for abdominal pain, blood in stool, constipation, diarrhea, heartburn, melena, nausea and vomiting.   Genitourinary: Negative for dysuria, frequency, hematuria and urgency.   Musculoskeletal: Positive for back pain, joint pain (shoulders) and neck pain. Negative for myalgias.   Neurological: Positive for headaches. Negative for dizziness and tingling.   Psychiatric/Behavioral: Negative for depression, substance abuse and suicidal ideas. The patient is not nervous/anxious.        PE:  Vitals:    09/12/19 1337   BP: (!) 96/52   Pulse: 63   Weight: 109.5 kg (241 lb 6.5 oz)   Height: 5' 6" (1.676 m)       Physical Exam   Constitutional: She is oriented to person, place, and time. She appears well-developed and well-nourished. No distress.   Morbidly obese   HENT:   Head: Normocephalic and atraumatic.   Eyes: Conjunctivae are normal. Right eye exhibits no discharge. Left eye exhibits no discharge. No scleral icterus.   Neck: Neck supple.   Cardiovascular: Normal rate, regular rhythm and normal heart sounds. Exam reveals no gallop and no friction rub.   No murmur heard.  Pulmonary/Chest: Effort normal and breath sounds normal. No respiratory distress. She has no wheezes. She has no rales.   Abdominal: Soft. Bowel sounds are normal. She exhibits no distension and no mass. There is no tenderness. There is no rebound and no guarding. No hernia.   Musculoskeletal: Normal range of motion. She exhibits no edema.   Neurological: She is alert and oriented to person, place, and time.   Skin: Skin is warm, dry and intact. No rash noted. She is not diaphoretic. No erythema. No pallor.   Psychiatric: She has a normal mood and affect. Her speech is normal and behavior is normal. Judgment and thought content " normal.   Nursing note and vitals reviewed.       DIAGNOSIS:  1. Morbid Obesity with Body mass index is 38.96 kg/m². and difficulty with weight loss.  2. Co-morbidities: asthma, CHF, ICD, CMP, on amiodarone, organ transplant candidate, migraines    PLAN:  The patient is a potential candidate for Bariatric Surgery. she is interested in sleeve gastrectomy with Dr. Dang. The surgery and post-op care were discussed in detail with the patient. All questions were answered.    she understands that bariatric surgery is a tool to aid in weight loss and that she needs to be committed to the diet and exercise post-operatively for successful weight loss.  Discussed expected weight loss outcomes after surgery which is 50% of the excess weight on her frame. Discussed with patient that bariatric surgery is not the easy way out and that it will take plenty of dedication on the patient's part to be successful. Also discussed the possibility of weight regain if the patient strays from the diet guidelines or exercise requirements. Patient verbalized understanding and wishes to proceed with the work-up.    ORDERS:  1. Chest X-Ray  EKG 8/7/2019  2. Psychological Consult, Bariatric Dietician Consult and Heart Failure Clearance  3. Bariatric Work up Labs  4. 6MSD, medical photos, per insurance  5. Psych short form.    Primary Physician: Riccardo Davila DO  RTC: As scheduled.    60 minute visit, over 50% of time spent counseling patient face to face on surgical options, risks, benefits, expected diet, recommended exercise regimen, and expected weight loss.

## 2019-09-14 ENCOUNTER — PATIENT MESSAGE (OUTPATIENT)
Dept: TRANSPLANT | Facility: CLINIC | Age: 44
End: 2019-09-14

## 2019-09-16 ENCOUNTER — TELEPHONE (OUTPATIENT)
Dept: BARIATRICS | Facility: CLINIC | Age: 44
End: 2019-09-16

## 2019-09-16 ENCOUNTER — HOSPITAL ENCOUNTER (OUTPATIENT)
Dept: RADIOLOGY | Facility: HOSPITAL | Age: 44
Discharge: HOME | End: 2019-09-16
Attending: NURSE PRACTITIONER
Payer: MEDICARE

## 2019-09-16 ENCOUNTER — HOSPITAL ENCOUNTER (OUTPATIENT)
Dept: CARDIOLOGY | Facility: HOSPITAL | Age: 44
Discharge: HOME | End: 2019-09-16
Attending: INTERNAL MEDICINE
Payer: MEDICARE

## 2019-09-16 ENCOUNTER — TELEPHONE (OUTPATIENT)
Dept: REHABILITATION | Facility: HOSPITAL | Age: 44
End: 2019-09-16

## 2019-09-16 DIAGNOSIS — I34.0 MITRAL VALVE INSUFFICIENCY, UNSPECIFIED ETIOLOGY: Primary | ICD-10-CM

## 2019-09-16 DIAGNOSIS — I42.0 NONISCHEMIC DILATED CARDIOMYOPATHY: ICD-10-CM

## 2019-09-16 DIAGNOSIS — I50.9 CONGESTIVE HEART FAILURE, UNSPECIFIED HF CHRONICITY, UNSPECIFIED HEART FAILURE TYPE: ICD-10-CM

## 2019-09-16 DIAGNOSIS — Z76.82 ORGAN TRANSPLANT CANDIDATE: ICD-10-CM

## 2019-09-16 DIAGNOSIS — E66.9 OBESITY (BMI 35.0-39.9 WITHOUT COMORBIDITY): ICD-10-CM

## 2019-09-16 DIAGNOSIS — D50.9 MICROCYTIC ANEMIA: ICD-10-CM

## 2019-09-16 DIAGNOSIS — Z79.899 ON AMIODARONE THERAPY: ICD-10-CM

## 2019-09-16 DIAGNOSIS — I50.42 CHRONIC COMBINED SYSTOLIC AND DIASTOLIC CONGESTIVE HEART FAILURE: ICD-10-CM

## 2019-09-16 LAB
AORTIC ROOT ANNULUS: 2.05 CM
AORTIC VALVE CUSP SEPERATION: 2.22 CM
AV INDEX (PROSTH): 0.45
AV MEAN GRADIENT: 8 MMHG
AV PEAK GRADIENT: 12 MMHG
AV VALVE AREA: 1.26 CM2
AV VELOCITY RATIO: 0.46
CV ECHO LV RWT: 0.22 CM
DOP CALC AO PEAK VEL: 1.71 M/S
DOP CALC AO VTI: 41.99 CM
DOP CALC LVOT AREA: 2.8 CM2
DOP CALC LVOT DIAMETER: 1.89 CM
DOP CALC LVOT PEAK VEL: 0.79 M/S
DOP CALC LVOT STROKE VOLUME: 52.86 CM3
DOP CALCLVOT PEAK VEL VTI: 18.85 CM
E WAVE DECELERATION TIME: 217.38 MSEC
E/A RATIO: 1.23
E/E' RATIO: 11.74 M/S
ECHO LV POSTERIOR WALL: 0.91 CM (ref 0.6–1.1)
FRACTIONAL SHORTENING: 9 % (ref 28–44)
INTERVENTRICULAR SEPTUM: 0.73 CM (ref 0.6–1.1)
IVC PROX: 2.4 CM
LA MAJOR: 5.91 CM
LA MINOR: 5.65 CM
LA WIDTH: 5.5 CM
LEFT ATRIUM SIZE: 3.99 CM
LEFT ATRIUM VOLUME: 107.76 CM3
LEFT INTERNAL DIMENSION IN SYSTOLE: 7.41 CM (ref 2.1–4)
LEFT VENTRICLE DIASTOLIC VOLUME: 360.56 ML
LEFT VENTRICLE SYSTOLIC VOLUME: 290.75 ML
LEFT VENTRICULAR INTERNAL DIMENSION IN DIASTOLE: 8.16 CM (ref 3.5–6)
LEFT VENTRICULAR MASS: 331.61 G
LV LATERAL E/E' RATIO: 9 M/S
LV SEPTAL E/E' RATIO: 16.88 M/S
MV A" WAVE DURATION": 145 MSEC
MV PEAK A VEL: 1.1 M/S
MV PEAK E VEL: 1.35 M/S
PISA TR MAX VEL: 2.55 M/S
PULM VEIN S/D RATIO: 1.4
PV PEAK D VEL: 0.43 M/S
PV PEAK S VEL: 0.6 M/S
PV PEAK VELOCITY: 1.29 CM/S
RA MAJOR: 4.41 CM
RA PRESSURE: 8 MMHG
RA WIDTH: 4.76 CM
RIGHT VENTRICULAR END-DIASTOLIC DIMENSION: 1.95 CM
SINUS: 2.16 CM
TDI LATERAL: 0.15 M/S
TDI SEPTAL: 0.08 M/S
TDI: 0.12 M/S
TR MAX PG: 26 MMHG
TRICUSPID ANNULAR PLANE SYSTOLIC EXCURSION: 3.2 CM
TV REST PULMONARY ARTERY PRESSURE: 34 MMHG

## 2019-09-16 PROCEDURE — 93306 TRANSTHORACIC ECHO (TTE) COMPLETE (CUPID ONLY): ICD-10-PCS | Mod: 26,,, | Performed by: INTERNAL MEDICINE

## 2019-09-16 PROCEDURE — 93306 TTE W/DOPPLER COMPLETE: CPT | Mod: 26,,, | Performed by: INTERNAL MEDICINE

## 2019-09-16 PROCEDURE — 71046 X-RAY EXAM CHEST 2 VIEWS: CPT | Mod: TC,FY,PO

## 2019-09-16 PROCEDURE — 93306 TTE W/DOPPLER COMPLETE: CPT | Mod: PO

## 2019-09-16 NOTE — TELEPHONE ENCOUNTER
Called regarding today's No Show, left a message on her voicemail reminding her that her next appointment is on 9/18 @ 2:00.

## 2019-09-16 NOTE — TELEPHONE ENCOUNTER
Spoke with pt, informed of low TSH and to f/u with PCP.  Appt made with her PCP, Dr. Dave for 9/20.  Patient instructed to call us with any concerns or questions. Patient verbalized understanding.

## 2019-09-16 NOTE — TELEPHONE ENCOUNTER
----- Message from Rehana Carrero sent at 9/16/2019  4:33 PM CDT -----  Contact: Patient   Patient Returning Call    Who Left Message for Patient: No message left    Does the patient know what this is regarding?: Regarding lab results    Best Call Back Number: [phone]

## 2019-09-18 ENCOUNTER — TELEPHONE (OUTPATIENT)
Dept: REHABILITATION | Facility: HOSPITAL | Age: 44
End: 2019-09-18

## 2019-09-18 NOTE — TELEPHONE ENCOUNTER
Called patient reguarding missed appointment today. Could not leave message because patient's voicemail box is full.

## 2019-09-20 ENCOUNTER — OFFICE VISIT (OUTPATIENT)
Dept: INTERNAL MEDICINE | Facility: CLINIC | Age: 44
End: 2019-09-20
Payer: MEDICARE

## 2019-09-20 VITALS
OXYGEN SATURATION: 99 % | BODY MASS INDEX: 38.89 KG/M2 | WEIGHT: 242 LBS | SYSTOLIC BLOOD PRESSURE: 110 MMHG | DIASTOLIC BLOOD PRESSURE: 82 MMHG | HEIGHT: 66 IN | HEART RATE: 82 BPM

## 2019-09-20 DIAGNOSIS — I50.42 CHRONIC COMBINED SYSTOLIC AND DIASTOLIC CONGESTIVE HEART FAILURE: ICD-10-CM

## 2019-09-20 DIAGNOSIS — E05.90 HYPERTHYROIDISM: Primary | ICD-10-CM

## 2019-09-20 DIAGNOSIS — E53.8 B12 DEFICIENCY: ICD-10-CM

## 2019-09-20 DIAGNOSIS — E66.01 MORBID OBESITY: ICD-10-CM

## 2019-09-20 PROCEDURE — 99214 OFFICE O/P EST MOD 30 MIN: CPT | Mod: S$PBB,,, | Performed by: INTERNAL MEDICINE

## 2019-09-20 PROCEDURE — 99214 PR OFFICE/OUTPT VISIT, EST, LEVL IV, 30-39 MIN: ICD-10-PCS | Mod: S$PBB,,, | Performed by: INTERNAL MEDICINE

## 2019-09-20 PROCEDURE — 99999 PR PBB SHADOW E&M-EST. PATIENT-LVL IV: ICD-10-PCS | Mod: PBBFAC,,, | Performed by: INTERNAL MEDICINE

## 2019-09-20 PROCEDURE — 99999 PR PBB SHADOW E&M-EST. PATIENT-LVL IV: CPT | Mod: PBBFAC,,, | Performed by: INTERNAL MEDICINE

## 2019-09-20 PROCEDURE — 90686 IIV4 VACC NO PRSV 0.5 ML IM: CPT | Mod: PBBFAC,PN

## 2019-09-20 PROCEDURE — 99214 OFFICE O/P EST MOD 30 MIN: CPT | Mod: PBBFAC,PN | Performed by: INTERNAL MEDICINE

## 2019-09-20 NOTE — PROGRESS NOTES
Subjective:       Patient ID: Mario Mahajan is a 43 y.o. female.    Chief Complaint: check thyroid level (referred by syd pina np to check thyroid levels also she wants to discuss swelling of neck )    HPI  Chart reviewed. Pt found to be mildly hyperthyroid, also with a low B12 level this week. Pt with occ palpitations, but denies loose stools, tremors, skin or hair changes. Weight down 8 lbs/ 1 mo.  Review of Systems   All other systems reviewed and are negative.      Objective:      Physical Exam   Constitutional: She appears well-developed.   obese   HENT:   Head: Normocephalic.   Eyes: EOM are normal.   Neck: Normal range of motion. No JVD present. No tracheal deviation present. No thyromegaly present.   Cardiovascular: Normal rate, regular rhythm, normal heart sounds and intact distal pulses.   Pulmonary/Chest: Effort normal and breath sounds normal.   Abdominal: Soft. Bowel sounds are normal. She exhibits no distension.   Musculoskeletal: Normal range of motion.   Lymphadenopathy:     She has no cervical adenopathy.   Neurological: She is alert. No cranial nerve deficit. She exhibits normal muscle tone. Coordination normal.   Skin: Skin is warm and dry.   Psychiatric: She has a normal mood and affect. Her behavior is normal.   Vitals reviewed.      Assessment:       1. Hyperthyroidism    2. Morbid obesity    3. Chronic combined systolic and diastolic congestive heart failure    4. B12 deficiency        Plan:       Mario was seen today for check thyroid level.    Diagnoses and all orders for this visit:    Hyperthyroidism  -     TSH; Future    Morbid obesity   Cont weight loss    Chronic combined systolic and diastolic congestive heart failure  -     Influenza - Quadrivalent (PF)    B12 deficiency  -     Vitamin B12; Future   Vit B12 2000 u qd    Follow up if symptoms worsen or fail to improve.

## 2019-09-23 ENCOUNTER — DOCUMENTATION ONLY (OUTPATIENT)
Dept: BARIATRICS | Facility: CLINIC | Age: 44
End: 2019-09-23

## 2019-09-23 ENCOUNTER — CLINICAL SUPPORT (OUTPATIENT)
Dept: REHABILITATION | Facility: HOSPITAL | Age: 44
End: 2019-09-23
Payer: MEDICARE

## 2019-09-23 DIAGNOSIS — I50.42 CHRONIC COMBINED SYSTOLIC AND DIASTOLIC CONGESTIVE HEART FAILURE: ICD-10-CM

## 2019-09-23 DIAGNOSIS — M54.50 CHRONIC BILATERAL LOW BACK PAIN WITHOUT SCIATICA: ICD-10-CM

## 2019-09-23 DIAGNOSIS — E66.9 OBESITY (BMI 35.0-39.9 WITHOUT COMORBIDITY): ICD-10-CM

## 2019-09-23 DIAGNOSIS — M54.2 CERVICALGIA: ICD-10-CM

## 2019-09-23 DIAGNOSIS — E51.9 THIAMINE DEFICIENCY: Primary | ICD-10-CM

## 2019-09-23 DIAGNOSIS — I50.9 CONGESTIVE HEART FAILURE, UNSPECIFIED HF CHRONICITY, UNSPECIFIED HEART FAILURE TYPE: ICD-10-CM

## 2019-09-23 DIAGNOSIS — G89.29 CHRONIC BILATERAL LOW BACK PAIN WITHOUT SCIATICA: ICD-10-CM

## 2019-09-23 DIAGNOSIS — I42.0 NONISCHEMIC DILATED CARDIOMYOPATHY: ICD-10-CM

## 2019-09-23 PROCEDURE — 97110 THERAPEUTIC EXERCISES: CPT | Mod: PO

## 2019-09-23 PROCEDURE — 97140 MANUAL THERAPY 1/> REGIONS: CPT | Mod: PO

## 2019-09-23 RX ORDER — ERGOCALCIFEROL 1.25 MG/1
50000 CAPSULE ORAL
Qty: 24 CAPSULE | Refills: 0 | Status: SHIPPED | OUTPATIENT
Start: 2019-09-23 | End: 2020-04-24

## 2019-09-23 NOTE — PROGRESS NOTES
"  Physical Therapy Daily Treatment Note     [facility]  Cl[facility] Number: [id number]    Therapy Diagnosis:   Encounter Diagnoses   Name Primary?    Cervicalgia     Chronic bilateral low back pain without sciatica      Physician: Caprice Vanegas PA-C    Visit Date: 9/23/2019    Physician Orders: PT Eval and Treat   Medical Diagnosis from Referral:   M54.2 (ICD-10-CM) - Neck pain   M54.5,G89.29 (ICD-10-CM) - Chronic bilateral low back pain without sciatica   Evaluation Date: 9/11/2019  Authorization Period Expiration: 09/08/2020  Plan of Care Expiration: 11/11/19  Visit # / Visits authorized: 2/ 20    Time In: 2:00pm  Time Out: 2:45pm  Total Billable Time: 45 minutes    Precautions: Standard and defibulatoer    Subjective     Pt reports: having pain in low back and neck with her low back pain her main complaint.   She was compliant with home exercise program.  Response to previous treatment: soreness  Functional change: none at this time    Pain: 7/10  Location: bilateral back  and neck      Objective     Mario received therapeutic exercises to develop strength, endurance, ROM, flexibility, posture and core stabilization for 35 minutes including:    Date  9/23/19   VISIT 2/20       POC EXP. DATE 11/11/19   VISIT AMOUNT  MEDICARE TOTAL 88.10  201.30   FACE-TO-FACE 2/10   FOTO 2/5       TABLE:    Hamstring stretch 10 x 10"   LTR 1 x 10   TA 20 x 3"   TA with marching 2 x 10   bridging 2 x 10   SLR --   Hip adduction 20 x 3" w/ball   Hip abduction 2 x 10 BTB       SEATED:    Upper trap stretch 3 x 10"   LAQ    Hamstring curl    TA with march            STANDING:    rows 2 x 10 GTB   W  I  T 1 x 10 YTB  1 x 10 YTB  1 x 10 YTB                       Initials ARIAS Martin received the following manual therapy techniques: Manual traction and Soft tissue Mobilization were applied to the: lumbar region for 10 minutes, including:  - manual lumbar traction x 10 minutes    Home Exercises Provided and Patient Education " Provided     Education provided:   - pt educated on scapular setting and abdominal bracing   - HEP    Written Home Exercises Provided: Patient instructed to cont prior HEP.  Exercises were reviewed and Mario was able to demonstrate them prior to the end of the session.  Mario demonstrated good  understanding of the education provided.     See EMR under Patient Instructions for exercises provided prior visit.    Assessment     Mario is noted to have increased tissue tension in bilateral upper trapezius musculature and uses upper trap substitution when performing scapular stabilization exercises.  She requires verbal and tactile cues to decrease upper trap substitution as well as cues for abdominal bracing during table exercises.  Patient has poor endurance and requires frequent rest breaks and will be progress slowly with strengthening exercises.    Mario is progressing well towards her goals.   Pt prognosis is Good.     Pt will continue to benefit from skilled outpatient physical therapy to address the deficits listed in the problem list box on initial evaluation, provide pt/family education and to maximize pt's level of independence in the home and community environment.     Pt's spiritual, cultural and educational needs considered and pt agreeable to plan of care and goals.     Anticipated barriers to physical therapy: none    Goals:   Short Term Goals: 4 weeks  1. Patient will be compliant with HEP to promote the independent management of current diagnosis.  2. Patient will increase scapular stabilization strength to 4+/5..  3. Patient will report a decrease in complaints of low back pain from 8/10 to 5/10 during ADLs.        Long Term Goals:  8 weeks  1. Patient will improve core stabilization strength to Good to improve tolerance to normal house work activities for self care independence.  2. Patient will increase scapular stabilization strength to 5/5.  3. Patient will report a decrease in complaints of low back pain  from 8/10 to 2/10 during ADLs.  4. Patient will improve FOTO limitation status from 61% to 44% placing the patient in the 40-60% impaired, limited, or restricted category indicating increased functional mobility.    Plan     Progress scapular stabilization exercises as tolerated.     Victorino Garcia, PT

## 2019-09-23 NOTE — PROGRESS NOTES
Someone has submitted a Bariatric Surgery Online Course Form Submission on this page.  Date: 09- 20:42:40  Patient's Name: any farmer  YOB: 1975 Email: [email or web address]  Phone: [phone]   Patient Address: [address] [place] [id number]  Preferred Surgical Location: [facility]   I certify that I am 18 years of age or older: Yes   Confirmation Code: [id number]  Verification of Bariatric Seminar: yes  Insurance Information  Insurance or Self Pay? Insurance - Fill out fields below  Insurance Company Name: Medicare   Type of Coverage/Coverage Plan (i.e. PPO, HMO): hospital part A medical part B   Group Name: Medicare health insurance   Subscriber Name:   Member Name (patient's name): any farmer   Member ID/Policy #:[id number]   Plan Effective Date: 08/01/2019  Card Issuance date:

## 2019-09-26 ENCOUNTER — OFFICE VISIT (OUTPATIENT)
Dept: PSYCHIATRY | Facility: CLINIC | Age: 44
End: 2019-09-26
Payer: MEDICARE

## 2019-09-26 DIAGNOSIS — Z01.818 PREOP EXAMINATION: Primary | ICD-10-CM

## 2019-09-26 DIAGNOSIS — D50.9 MICROCYTIC ANEMIA: Chronic | ICD-10-CM

## 2019-09-26 DIAGNOSIS — E66.01 MORBID OBESITY DUE TO EXCESS CALORIES: ICD-10-CM

## 2019-09-26 DIAGNOSIS — I50.9 CONGESTIVE HEART FAILURE, UNSPECIFIED HF CHRONICITY, UNSPECIFIED HEART FAILURE TYPE: ICD-10-CM

## 2019-09-26 DIAGNOSIS — Z76.82 ORGAN TRANSPLANT CANDIDATE: ICD-10-CM

## 2019-09-26 DIAGNOSIS — I50.42 CHRONIC COMBINED SYSTOLIC AND DIASTOLIC CONGESTIVE HEART FAILURE: ICD-10-CM

## 2019-09-26 DIAGNOSIS — Z86.59 HISTORY OF ADJUSTMENT DISORDER: ICD-10-CM

## 2019-09-26 DIAGNOSIS — Z79.899 ON AMIODARONE THERAPY: ICD-10-CM

## 2019-09-26 DIAGNOSIS — I42.0 NONISCHEMIC DILATED CARDIOMYOPATHY: Chronic | ICD-10-CM

## 2019-09-26 DIAGNOSIS — G43.409 HEMIPLEGIC MIGRAINE WITHOUT STATUS MIGRAINOSUS, NOT INTRACTABLE: ICD-10-CM

## 2019-09-26 PROCEDURE — 90791 PR PSYCHIATRIC DIAGNOSTIC EVALUATION: ICD-10-PCS | Mod: ,,, | Performed by: PSYCHOLOGIST

## 2019-09-26 PROCEDURE — 99999 PR PBB SHADOW E&M-EST. PATIENT-LVL I: CPT | Mod: PBBFAC,,, | Performed by: PSYCHOLOGIST

## 2019-09-26 PROCEDURE — 90791 PSYCH DIAGNOSTIC EVALUATION: CPT | Mod: ,,, | Performed by: PSYCHOLOGIST

## 2019-09-26 PROCEDURE — 99999 PR PBB SHADOW E&M-EST. PATIENT-LVL I: ICD-10-PCS | Mod: PBBFAC,,, | Performed by: PSYCHOLOGIST

## 2019-09-26 PROCEDURE — 99211 OFF/OP EST MAY X REQ PHY/QHP: CPT | Mod: PBBFAC | Performed by: PSYCHOLOGIST

## 2019-09-26 NOTE — Clinical Note
Ms. Mahajan is psychologically cleared to proceed with bariatric surgery.  She has a brief psychiatric history for adjustment issues in 2017 that appear to be in full remission.  She reports no current psychiatric problems or major adjustment issues.  She has reasonable expectations for surgery, good social support, and has already begun making appropriate lifestyle changes in anticipation for surgery.  There are no recommendations for psychological treatment at this time. Ms. Mahajan is aware of resources available should her needs change in the future.  JR. Patrick

## 2019-09-30 ENCOUNTER — TELEPHONE (OUTPATIENT)
Dept: REHABILITATION | Facility: HOSPITAL | Age: 44
End: 2019-09-30

## 2019-09-30 NOTE — TELEPHONE ENCOUNTER
Called patient in regards to missed appointment and there was no answer and unable to leave voicemail.

## 2019-10-02 ENCOUNTER — LAB VISIT (OUTPATIENT)
Dept: LAB | Facility: HOSPITAL | Age: 44
End: 2019-10-02
Attending: NURSE PRACTITIONER
Payer: MEDICARE

## 2019-10-02 ENCOUNTER — TELEPHONE (OUTPATIENT)
Dept: REHABILITATION | Facility: HOSPITAL | Age: 44
End: 2019-10-02

## 2019-10-02 DIAGNOSIS — I50.9 CONGESTIVE HEART FAILURE, UNSPECIFIED HF CHRONICITY, UNSPECIFIED HEART FAILURE TYPE: ICD-10-CM

## 2019-10-02 DIAGNOSIS — E66.9 OBESITY (BMI 35.0-39.9 WITHOUT COMORBIDITY): ICD-10-CM

## 2019-10-02 DIAGNOSIS — I42.0 NONISCHEMIC DILATED CARDIOMYOPATHY: ICD-10-CM

## 2019-10-02 DIAGNOSIS — E51.9 THIAMINE DEFICIENCY: ICD-10-CM

## 2019-10-02 DIAGNOSIS — I50.42 CHRONIC COMBINED SYSTOLIC AND DIASTOLIC CONGESTIVE HEART FAILURE: ICD-10-CM

## 2019-10-02 PROCEDURE — 84425 ASSAY OF VITAMIN B-1: CPT | Mod: PO

## 2019-10-02 PROCEDURE — 36415 COLL VENOUS BLD VENIPUNCTURE: CPT | Mod: PO

## 2019-10-02 NOTE — TELEPHONE ENCOUNTER
Spoke to Mario this afternoon in regards to missed appointment.  She stated she had cardiology appointments today and was unable to make it to therapy.  She confirmed her appointment for Monday 10/7/19 at 2:00.

## 2019-10-07 LAB — VIT B1 BLD-MCNC: 43 UG/L (ref 38–122)

## 2019-10-08 ENCOUNTER — TELEPHONE (OUTPATIENT)
Dept: BARIATRICS | Facility: CLINIC | Age: 44
End: 2019-10-08

## 2019-10-11 ENCOUNTER — CLINICAL SUPPORT (OUTPATIENT)
Dept: BARIATRICS | Facility: CLINIC | Age: 44
End: 2019-10-11
Payer: MEDICARE

## 2019-10-11 VITALS — BODY MASS INDEX: 39.51 KG/M2 | WEIGHT: 245.81 LBS | HEIGHT: 66 IN

## 2019-10-11 DIAGNOSIS — E66.9 OBESITY (BMI 35.0-39.9 WITHOUT COMORBIDITY): ICD-10-CM

## 2019-10-11 DIAGNOSIS — Z71.3 DIETARY COUNSELING: ICD-10-CM

## 2019-10-11 DIAGNOSIS — G47.20 SLEEP STAGE DYSFUNCTION: ICD-10-CM

## 2019-10-11 DIAGNOSIS — I50.9 CONGESTIVE HEART FAILURE, UNSPECIFIED HF CHRONICITY, UNSPECIFIED HEART FAILURE TYPE: ICD-10-CM

## 2019-10-11 PROCEDURE — 99999 PR PBB SHADOW E&M-EST. PATIENT-LVL II: ICD-10-PCS | Mod: PBBFAC,,, | Performed by: DIETITIAN, REGISTERED

## 2019-10-11 PROCEDURE — 99499 NO LOS: ICD-10-PCS | Mod: S$PBB,,, | Performed by: DIETITIAN, REGISTERED

## 2019-10-11 PROCEDURE — 99499 UNLISTED E&M SERVICE: CPT | Mod: S$PBB,,, | Performed by: DIETITIAN, REGISTERED

## 2019-10-11 PROCEDURE — 97802 MEDICAL NUTRITION INDIV IN: CPT | Mod: PBBFAC | Performed by: DIETITIAN, REGISTERED

## 2019-10-11 PROCEDURE — 99212 OFFICE O/P EST SF 10 MIN: CPT | Mod: PBBFAC | Performed by: DIETITIAN, REGISTERED

## 2019-10-11 PROCEDURE — 99999 PR PBB SHADOW E&M-EST. PATIENT-LVL II: CPT | Mod: PBBFAC,,, | Performed by: DIETITIAN, REGISTERED

## 2019-10-11 NOTE — PATIENT INSTRUCTIONS
GOALS:      **Every day get at least 80 gm protein **    Daily calories: 4810-7283 kcal    Daily water/ sugar free beverages:  64 fluid ounces    NO Carbonated Beverages (even Diet Coke)!!    **NO SweetS**    EXERCISE 5 days/ week for at least 45 minutes.    EAT YOUR PROTEIN FIRST at every meal!!!!!!    Daily carbohydrates: 45-70 gm daily!    Take 30 minutes to eat your meal    VITAMINS EVERY DAY!    Bariatric Diet Grocery List      High in Protein:   ? Canned tuna or chicken (packed in water)  ? Lean ground turkey breast or ground round  ? Turkey or chicken (no skin)  ? Lean pork or beef   ? Scrambled, poached, or boiled eggs  ? Baked or broiled fish and seafood (not fried!)  ? Beans, edamame and lentils  ? Low fat deli meats: turkey, chicken, ham, roast beef  ? 1% or Skim Milk, Lactaid, or Soymilk  ? Low-fat cheese, cottage cheese, mozzarella string cheese, ricotta  ? Light yogurt, FF/SF frozen yogurt, custard, SF pudding  ? Protein drinks and protein bars with 0-4 grams sugar     Fruits, Vegetables and Snacks   - Green beans, broccoli, cauliflower, spinach, asparagus, carrots, lima beans, yellow squash, zucchini, etc.  - Apple, pineapple, peach, grapes, banana, watermelon, oranges, etc.  - Fruit canned in its own juice or in water (not in syrup)  - Raw veggies  - Lettuce: dark greens like spinach and Ameya  - Unsalted Nuts  - Noman Links beef jerky     Fluids:   Skim/1% milk, Lactaid, Soymilk  Sugar-free beverages  (decaf and non-carbonated)  Decaf coffee & decaf tea   Water       Eating healthy on the go:     Fast Food Restaurant/Item Calories Protein    Wilkersons Premium Grilled Chicken Classic [place], no bun < 350 28   Wilkersons Premium Salads with Grilled Chicken, no dressing  190-290 27-30   Linettes Ultimate Chicken [place], no bun < 390 34   Linettes Half-Size Salads 290-440 19-23   Linettes Large [place]  270 19   Repairy Double Hamburger, no bun < 330 19   Repairy TENDERGRILL Chicken [place], no  bun or bird < 360 36   Burger Chan Veggie Burger, no bun or bird < 320 22   Kaleb Giles [place] Fresh Salad Chicken Caesar with TENDERGRILL and dressing   (*Use ½ dressing packet to reduce calories)  490 41   Chick-stephanie-A Chargrilled Chicken [place], no bun < 310 29   Chick-stephanie-A Salads with Grilled Chicken, no dressing  (*Not the Nj Salad)  180-330 25-29   Heather Jr. Burger, no bun < 330 15   Sonic Classic Chiken [place] Grilled, no bun  < 450 32   Popeyes Naked Tenders (3) with Regular Green Beans, no sauce 210 28   [facility] [place], no bird 277 37   Chipotle Chicken, Steak, or Carnitas Salad with Black or Grant Beans, Tomato Salsa, and Fajita Veggies on Bed of Lettuce  335-360 33-42     Sit Down Restaurant/Item Calories Protein    Applebees Under 550 Calories Entrees, no potatoes  410-490 43-56   Applebees Weight Watchers Entrees, no potatoes 300-490 22-53   Applebees 7 oz. House Sirloin with seasonal vegetables 285-310 36-40   Applebees Bowl of [place] 400 29   IHOP Simple&Fit Spinach, Mushroom, and Tomato Omelette  330 29   IHOP Simple&Fit Grilled Balsamic-Glazed Chicken  440 39   [place] Grilled Chicken Spiedini with Sweet Red Wine Sauce 472 37   Olive Garden Sicilian Meatballs 360 23   [place] Herb-Grilled [place] 480 56   Faustos Fit Fare Omelette  390 34     Fast Food under 500 Calories    Fast Food Restaurant/Item Calories Protein    Wilkersons Premium Grilled Chicken Classic [place], no bun < 350 28   Wilkersons Premium Salads with Grilled Chicken, no dressing  190-290 27-30   Linettes Ultimate Chicken [place], no bun < 390 34   Linettes Half-Size Salads 290-440 19-23   Linettes Large [place]  270 19   Kaleb Giles Double Hamburger, no bun < 330 19   Kaleb Giles TENDERGRILL Chicken [place], no bun or bird < 360 36   Burger Chan Veggie Burger, no bun or bird < 320 22   Kaleb Giles [place] Fresh Salad Chicken Caesar with TENDERGRILL and dressing   (*Use ½ dressing packet to reduce  calories)  490 41   Chick-stephanie-A Chargrilled Chicken [place], no bun < 310 29   Chick-stephanie-A Salads with Grilled Chicken, no dressing  (*Not the Nj Salad)  180-330 25-29   Heather Jr. [place], no bun < 330 15   Sonic Classic Chiken [place] Grilled, no bun  < 450 32   Popeyes Naked Tenders (3) with Regular Green Beans, no sauce 210 28   [facility] [place], no bird 277 37   Chipotle Chicken, Steak, or Carnitas Salad with Black or Grant Beans, Tomato Salsa, and Fajita Veggies on Bed of Lettuce  335-360 33-42     Sit Down Restaurant/Item Calories Protein    Applebees Under 550 Calories Entrees, no potatoes  410-490 43-56   Applebees Weight Watchers Entrees, no potatoes 300-490 22-53   Applebees 7 oz. House Sirloin with seasonal vegetables 285-310 36-40   Applebees Bowl of [place] 400 29   IHOP Simple&Fit Spinach, Mushroom, and Tomato Omelette  330 29   IHOP Simple&Fit Grilled Balsamic-Glazed Chicken  440 39   [place] Garden Grilled Chicken Spiedini with Sweet Red Wine Sauce 472 37   Olive Garden Sicilian Meatballs 360 23   [place] Garden Herb-Grilled [place] 480 56   Faustos Fit Fare Omelette  390 34     Snacks: (100-200 calories; >5g protein)    - 1 low-fat cheese stick with 8 cherry tomatoes or 1 serving fresh fruit  - 4 thin slices fat-free turkey breast and 1 slice low-fat cheese  - 4 thin slices fat-free honey ham with wedge of melon  - 2 slices of turkey bishop  - Boiled eggs (can buy at [employer] already boiled w/ shell removed)  - for convenience,  [place] read, snack, go (deli meat and cheese rolls)  - P3 packets (Protein packs w/ cheese, nuts, lean deli meat)  - [facility] Fit and Lean Protein Pudding (find at [employer] - per 1 cup serving = 100 calories, 15 g protein, 0 g sugar)  - 6-8 edamame pods (equivalent to about 1/4 cup edamame without pods).   - 1/4 cup unsalted nuts with ½ cup fruit  - 6-oz container Dannon Light n Fit vanilla yogurt, topped with 1oz unsalted nuts         - apple, celery  "or baby carrots spread with 2 Tbsp PB2  - apple slices with 1 oz slice low-fat cheese  - Apple slices dipped in 2 Tbsp of PB2  - 2 Tbsp PB2 mixed in light or greek yogurt or sugar-free pudding  - celery, cucumber, bell pepper or baby carrots dipped in ¼ cup hummus bean spread   - celery, cucumber, baby carrots dipped in high protein greek yogurt (Mix 16 oz plain greek yogurt + 1 packet of hidden valley ranch dip mix)  - Noman Links Beef Steak - 14g protein! (similar to beef jerky but very lean)  - 2 wedges [place] - Light Herb & Garlic Cheese with sliced cucumber or green bell pepper  - 1/2 cup low-fat cottage cheese with ¼ cup fruit or ¼ cup salsa  - 1/2 cup low fat cottage cheese with 10-15 cherry tomatoes  - 8 oz glass of FAIRLIFE fat free milk (13 g protein)  - 8 oz glass of FAIRLIFE fat free milk + 1 packet of sugar-free hot cocoa  - Add Atkins advantage Cafe Caramel shake to decaf coffee. Serve hot or blend with ice for "frappaccino" like drink  - RTD Protein drinks: Atkins, Low Carb Slim Fast, EAS light, Muscle Milk Light, etc.  - Homemade Protein drinks: GNC Soy95, Isopure, Nectar, UNJURY, Whey Gourmet, etc. Mix 1 scoop powder with 8oz skim/1% milk or light soymilk.  - Protein bars: Atkins, EAS, Pure Protein,  Quest, Think Thin, Detour, etc. Must have 0-4 grams sugar - Read the label.    ** Be CREATIVE. You can always snack on bites of grilled chicken or tuna salad made with low fat bird, if needed!       "

## 2019-10-11 NOTE — PROGRESS NOTES
"NUTRITIONAL CONSULT    Referring Physician: Dr. Dang  Reason for MNT Referral: Initial assessment for sleeve gastrectomy work-up    PAST MEDICAL HISTORY:   43 y.o. female  There is no height or weight on file to calculate BMI..    Reports difficulty with weight loss. The patient has tried portion control, low CHO, exercise. She has been able to lose 15 pounds with dietary changes, which she has maintained. She has not been able to lose additional weight. Wt difficulty began 15 years ago after birth of her daughter. Her highest wt has been 250 pounds.     Past Medical History:   Diagnosis Date    Asthma     Chronic back pain 7/1/2014    Chronic combined systolic and diastolic congestive heart failure 4/28/2015     2-10-17   1 - Severely depressed left ventricular systolic function (EF 20-25%).    2 - Severe left ventricular enlargement.    3 - Severe left atrial enlargement.    4 - Left ventricular diastolic dysfunction.    5 - The estimated PA systolic pressure is 18 mmHg.    6 - Mild mitral regurgitation.     Encounter for blood transfusion     ICD (implantable cardioverter-defibrillator) in place 12/01/15 3/3/2016    Menorrhagia, premenopausal 2/10/2017    Microcytic anemia 2/9/2015    Non-rheumatic mitral regurgitation 3/5/2015    Nonischemic dilated cardiomyopathy 12/1/2015    Sleep apnea     Syncope and collapse 2/9/2017    Ventricular tachycardia, polymorphic        CLINICAL DATA:  43 y.o.-year-old Black or  female.  Height: 5'6"  Weight: 245 lbs  IBW: 144 lbs  BMI: 39.68   The patient's goal weight (50% EBW): 195 lbs  Personal goal weight: 180- 200 lbs    Goal for Bariatric Surgery: to improve health, to improve quality of life and heart faliure     NUTRITIONAL NEEDS:  1438 Calories (using Rhea St. Jeor Equation)  ~ BMR: 1781.75 X 1.2 = 2138.10 - 700 for weight loss  84-98 Grams Protein (using 1.3-1.5 IBW 65 kg)     NUTRITION & HEALTH HISTORY:  Greatest challenge: dining out " frequency, starchy CHO, portion control and skipping meals, inadequate protein, not much vegetables    Current diet recall:    Breakfast:  Usually skips, sometimes  Turkey and cheese, or fruit loops with whole milk    Snack: handful of peanuts of pistachios     Lunch:    Goes out for lunch - restaurant or fast foods , stuffed mushrooms, cream soup or wings    Dinner: beans, baked turkey wings,  Green beans, cabbage   Snack:  3 spoons of ice cream, snickers  usually something sweet - trying to cut back on sweets    Current Diet:  Meal pattern:  2  - lunch and dinner   Protein supplements:  none  Snackin-2 / day  Vegetables: Likes a variety. Eats almost daily.  Fruits: Likes a variety. Eats daily.  Beverages: water, sugary beverages and lemonade and vitamin water  Dining out: Weekly. Mostly fast food, restaurants, take-out and couple times a week .  Cooking at home: Daily. Mostly baked, grilled, smothered and fried meat, fish, starchy CHO and vegetables.    Exercise:  Past exercise: Fair    Current exercise:  the gym treadmill/ bike   30 mins 3 X/ week   Restrictions to exercise: none    Vitamins / Minerals / Herbs:   Vit D  B complex         Labs:    no recent, none available at this time    Food Allergies:   none    Social:  Disabled.  Lives with none.  Family and friends close  Grocery shopping and food prep self.  Patient believes the household will be supportive after surgery.  Alcohol: Socially.  Smoking: None.    ASSESSMENT:  · Patient reports attempts at weight loss, only to regain lost weight.  · Patient demonstrated knowledge of healthy eating behaviors and exercise patterns; admits to not eating healthy and not exercising at this point.  · Patient states willingness to change lifestyle and make behavior modifications as evidenced by good exercise and smaller portions, limiting sweets.    Insurance requires medically supervised diet prior to consideration for bariatric surgery. 6 consecutive  MSD visits  required.    Barriers to Education: none    Stage of change: contemplation    NUTRITION DIAGNOSIS:    Obesity related to Excessive carbohydrate intake and Inadequate protein intake as evidence by BMI.    BARIATRIC DIET DISCUSSION/PLAN:  Discussed diet after surgery and related to patient's food record.  Reviewed nutrition guidelines for before and after surgery.  Answered all questions.  Resume work-up for surgery.  Continue to review Bariatric Nutrition Guidebook at home and call with any questions.  Work on Bariatric Nutrition Checklist.  Work on expanding variety of vegetables.  Start including protein supplements in the diet plan daily.  Reduce frequency of dining out.  More grocery shopping and meal preparation at home.  Start shopping for bariatric vitamins & minerals.  Return to clinic.  Eliminate sugary drinks and switch to sugar free  drinks    RECOMMENDATIONS:  Patient is  a Potential candidate for bariatric surgery.    Needs additional visit(s) with RD.    Patient verbalized understanding.    Expect fair  / poor compliance after surgery at this time.    Communicated nutrition plan with bariatric team.    SESSION TIME:  60 minutes

## 2019-10-22 ENCOUNTER — INITIAL CONSULT (OUTPATIENT)
Dept: CARDIOLOGY | Facility: CLINIC | Age: 44
End: 2019-10-22
Payer: MEDICARE

## 2019-10-22 VITALS
BODY MASS INDEX: 38.76 KG/M2 | WEIGHT: 246.94 LBS | HEIGHT: 67 IN | HEART RATE: 69 BPM | SYSTOLIC BLOOD PRESSURE: 116 MMHG | OXYGEN SATURATION: 99 % | DIASTOLIC BLOOD PRESSURE: 55 MMHG

## 2019-10-22 DIAGNOSIS — I47.29 POLYMORPHIC VENTRICULAR TACHYCARDIA: ICD-10-CM

## 2019-10-22 DIAGNOSIS — I42.0 NONISCHEMIC DILATED CARDIOMYOPATHY: Chronic | ICD-10-CM

## 2019-10-22 DIAGNOSIS — Z95.810 ICD (IMPLANTABLE CARDIOVERTER-DEFIBRILLATOR) IN PLACE: Chronic | ICD-10-CM

## 2019-10-22 DIAGNOSIS — Z79.899 ON AMIODARONE THERAPY: ICD-10-CM

## 2019-10-22 DIAGNOSIS — J45.20 MILD INTERMITTENT ASTHMA WITHOUT COMPLICATION: ICD-10-CM

## 2019-10-22 DIAGNOSIS — I50.42 CHRONIC COMBINED SYSTOLIC AND DIASTOLIC CONGESTIVE HEART FAILURE: ICD-10-CM

## 2019-10-22 DIAGNOSIS — E66.9 OBESITY (BMI 35.0-39.9 WITHOUT COMORBIDITY): ICD-10-CM

## 2019-10-22 DIAGNOSIS — I34.0 NON-RHEUMATIC MITRAL REGURGITATION: Primary | Chronic | ICD-10-CM

## 2019-10-22 PROCEDURE — 99213 OFFICE O/P EST LOW 20 MIN: CPT | Mod: PBBFAC | Performed by: INTERNAL MEDICINE

## 2019-10-22 PROCEDURE — 99214 OFFICE O/P EST MOD 30 MIN: CPT | Mod: S$PBB,,, | Performed by: INTERNAL MEDICINE

## 2019-10-22 PROCEDURE — 99214 PR OFFICE/OUTPT VISIT, EST, LEVL IV, 30-39 MIN: ICD-10-PCS | Mod: S$PBB,,, | Performed by: INTERNAL MEDICINE

## 2019-10-22 PROCEDURE — 99999 PR PBB SHADOW E&M-EST. PATIENT-LVL III: CPT | Mod: PBBFAC,,, | Performed by: INTERNAL MEDICINE

## 2019-10-22 PROCEDURE — 99999 PR PBB SHADOW E&M-EST. PATIENT-LVL III: ICD-10-PCS | Mod: PBBFAC,,, | Performed by: INTERNAL MEDICINE

## 2019-10-22 NOTE — LETTER
October 22, 2019      Nereida Hatch MD  [address]  [facility] [address]           [facility]  [address]  [facility] [address]  Phone: [phone]          Patient: Mario Mahajan   MR Number: [id number]   YOB: 1975   Date of Visit: 10/22/2019       Dear Dr. Nereida Hatch:    Thank you for referring Mario Mahajan to me for evaluation. Attached you will find relevant portions of my assessment and plan of care.    If you have questions, please do not hesitate to call me. I look forward to following Mario Mahajan along with you.    Sincerely,    Memo Luis MD    Enclosure  CC:  No Recipients    If you would like to receive this communication electronically, please contact [email or web address] or [phone] to request more information on [employer] Link access.    For providers and/or their staff who would like to refer a patient to [facility], please contact us through our one-stop-shop provider referral line, [facility], [place], at [phone].    If you feel you have received this communication in error or would no longer like to receive these types of communications, please e-mail [email or web address]

## 2019-10-22 NOTE — PROGRESS NOTES
Interventional Cardiology Clinic Note  Reason for Visit: Mitral regurgitation   Referring Physician: Dr. Nereida Hatch    HPI:   Mario Mahajan is a 43 y.o. female who presents for mitral regurgitation.  Ms. Mahajan has a PMH of NICMP, HFrEF s/p AICD, hx VT/VF, and obesity who presents for evaluation of severe mitral regurgitation. Ms Mahajan states that her heart failure has been well controlled. She works out at the gym 2x/week and goes on walks twice a week. At the gym, she walks on the treadmill for 15 minutes and then rides the stationary bike for 15 min. When she goes on walks, she walks 1.5-2 miles which takes about 35 minutes. She does admit to occasional chest tightness with exertion, relieved with rest, but denies any shortness of breath. She takes Lasix as needed for fluid retention, and only needs it about once a month. She denies any dizziness, syncope, PND, orthopnea, lower extremity edema, palpitations, ICD shocks, and claudication. She reports compliance with medication but does not follow low sodium diet. She has had hospitalization for decompensated heart failure in the past but has been stable since her medication regimen has been optimized. She has NYHA Class I symptoms.     ROS:    Constitution: Negative for diaphoresis and malaise/fatigue.   HENT: Negative for nosebleeds.  Cardiovascular: Negative for claudication, dyspnea on exertion, leg swelling, near-syncope, orthopnea, palpitations, paroxysmal nocturnal dyspnea and syncope. Positive for chest tightness.  Respiratory: Negative for shortness of breath, snoring and wheezing.    Hematologic/Lymphatic: Negative for bleeding problem. Does not bruise/bleed easily.   Musculoskeletal: Negative for muscle cramps and myalgias.   Gastrointestinal: Negative for bloating, abdominal pain, nausea and vomiting.   Neurological: Negative for dizziness, headaches and light-headedness.   PMH:     Past Medical History:   Diagnosis Date    Asthma      Chronic back pain 2014    Chronic combined systolic and diastolic congestive heart failure 2015     2-10-17   1 - Severely depressed left ventricular systolic function (EF 20-25%).    2 - Severe left ventricular enlargement.    3 - Severe left atrial enlargement.    4 - Left ventricular diastolic dysfunction.    5 - The estimated PA systolic pressure is 18 mmHg.    6 - Mild mitral regurgitation.     Encounter for blood transfusion     ICD (implantable cardioverter-defibrillator) in place 12/01/15 3/3/2016    Menorrhagia, premenopausal 2/10/2017    Microcytic anemia 2015    Non-rheumatic mitral regurgitation 3/5/2015    Nonischemic dilated cardiomyopathy 2015    Sleep apnea     Syncope and collapse 2017    Ventricular tachycardia, polymorphic      Past Surgical History:   Procedure Laterality Date    CARDIAC DEFIBRILLATOR PLACEMENT  2015     SECTION      OOPHORECTOMY      RIGHT HEART CATHETERIZATION      TOTAL ABDOMINAL HYSTERECTOMY N/A 3/26/2019    Procedure: HYSTERECTOMY, TOTAL, ABDOMINAL;  Surgeon: Victorino Rose MD;  [facility];  Service: OB/GYN;  Laterality: N/A;    TUBAL LIGATION       Allergies:     Review of patient's allergies indicates:   Allergen Reactions    Ace inhibitors      Cough     Medications:     Current Outpatient Medications on File Prior to Visit   Medication Sig Dispense Refill    albuterol 90 mcg/actuation inhaler Inhale 1-2 puffs into the lungs every 6 (six) hours as needed for Wheezing. Rescue 18 g 3    amiodarone (PACERONE) 200 MG Tab TAKE 1 TABLET(200 MG) BY MOUTH EVERY DAY 90 tablet 3    ascorbic acid, vitamin C, (VITAMIN C) 250 MG tablet Take 1 tablet (250 mg) by mouth twice daily. Take with the iron tablets. (Patient taking differently: once daily. ) 60 tablet 3    carvedilol (COREG) 12.5 MG tablet Take 1 tablet (12.5 mg total) by mouth 2 (two) times daily. 180 tablet 3    ergocalciferol (ERGOCALCIFEROL) 50,000 unit Cap  "Take 1 capsule (50,000 Units total) by mouth twice a week. 24 capsule 0    ferrous sulfate 325 (65 FE) MG EC tablet Take 1 tablet (325 mg total) by mouth 2 (two) times daily. Take with vitamin C. (Patient taking differently: 325 mg once daily. Take 1 tablet (325 mg total) by mouth 2 (two) times daily. Take with vitamin C.) 60 tablet 3    FLOVENT  mcg/actuation inhaler INL 2 PFS PO TWICE DAILY. RM AFTER U  2    furosemide (LASIX) 40 MG tablet Take 1 tablet (40 mg total) by mouth daily as needed (Leg swelling or weight gain). 90 tablet 4    ibuprofen (ADVIL,MOTRIN) 600 MG tablet Take 1 tablet (600 mg total) by mouth every 6 (six) hours as needed for Pain. 30 tablet 2    latanoprost 0.005 % ophthalmic solution INT 1 GTT INTO OU QHS  3    loratadine (CLARITIN) 10 mg tablet TK 1 T PO QD  0    sacubitril-valsartan (ENTRESTO)  mg per tablet Take 1 tablet by mouth 2 (two) times daily. 60 tablet 11    spironolactone (ALDACTONE) 25 MG tablet Take 1 tablet (25 mg total) by mouth once daily. 30 tablet 11    topiramate (TOPAMAX) 50 MG tablet   2     No current facility-administered medications on file prior to visit.      Social History:     Social History     Tobacco Use    Smoking status: Never Smoker    Smokeless tobacco: Never Used   Substance Use Topics    Alcohol use: Not Currently     Comment: 1 glass per 3 months     Family History:     Family History   Problem Relation Age of Onset    Diabetes Father     Pancreatic cancer Father     Heart failure Father     Heart failure Brother     Lung cancer Paternal Grandmother      Physical Exam:   BP (!) 116/55 (BP Location: Right arm, Patient Position: Sitting, BP Method: Large (Automatic))   Pulse 69   Ht 5' 6.93" (1.7 m)   Wt 112 kg (246 lb 14.6 oz)   LMP 03/01/2019   SpO2 99%   BMI 38.75 kg/m²      Constitutional: NAD, conversant  HEENT: Sclera anicteric, PERRLA, EOMI  Neck: No JVD, no carotid bruits  CV: RRR, 2/6 systolic murmur over apex, " normal S1/S2  Pulm: CTAB, no wheezes, rales, or ronchi  GI: Abdomen soft, NTND, +BS  Extremities: No LE edema, warm and well perfused  Skin: No ecchymosis, erythema, or ulcers  Psych: AOx3, appropriate affect  Neuro: No gross deficits    Labs:     Lab Results   Component Value Date     2019    K 3.8 2019     2019    CO2 25 2019    BUN 12 2019    BUN 12 2015    CREATININE 0.8 2019    ANIONGAP 7 (L) 2019     Lab Results   Component Value Date    HGBA1C 4.8 2019     Lab Results   Component Value Date     (H) 2019     (H) 2019     (H) 2019    Lab Results   Component Value Date    WBC 6.28 2019    HGB 10.2 (L) 2019    HCT 33.2 (L) 2019     2019    GRAN 4.2 2019    GRAN 67.5 2019     Lab Results   Component Value Date    CHOL 98 (L) 2019    HDL 40 2019    LDLCALC 46.2 (L) 2019    TRIG 59 2019          Imagin. TTE (19)  · Severely decreased left ventricular systolic function. The estimated ejection fraction is 20-25%  · Severe left ventricular enlargement.  · Moderate-to-severe mitral regurgitation. Appears to be functional. The jet is posterior directed and is eccentric. There is posterior leaflet tethering.  · Severe left atrial enlargement.  · Local segmental wall motion abnormalities.  · Grade I (mild) grade II (moderate) left ventricular diastolic dysfunction consistent with pseudonormalization.    2. Left heart cath (3/2017): normal coronaries    Assessment:     1. Non-rheumatic mitral regurgitation    2. Nonischemic dilated cardiomyopathy    3. ICD (implantable cardioverter-defibrillator) in place 12/01/15    4. Polymorphic ventricular tachycardia    5. On amiodarone therapy    6. Chronic combined systolic and diastolic congestive heart failure    7. Obesity (BMI 35.0-39.9 without comorbidity)    8. Mild intermittent asthma without  complication      Plan:   44 y/o female with NIDCMP and moderate-severe functional mitral regurgitation and NYHA Class I/II symptoms presents for evaluation for mitral valve repair.    Non-rheumatic mitral regurgitation  -- TTE films reviewed by Dr. Luis. Moderate-severe functional mitral regurgitation; however, patient has few symptoms of mitral regurgitation.   -- Will order VASQUEZ to further assess severity of mitral valve disease   -- Refer to Dr. Gilmore for candidacy for surgical mitral valve replacement.  -- Research consulted to determine candidacy for CA RI LLON trial.    Nonischemic dilated cardiomyopathy  -- Well compensated.   -- Continue Entresto 97/103 mg bid   -- Continue Coreg 12.5 mg bid  -- Continue Lasix prn  -- Recommend adhering to low sodium diet (< 2g/day)    Chronic combined systolic and diastolic congestive heart failure  -- See above.     Obesity (BMI 35.0-39.9 without comorbidity)  -- Recommend weight loss, heart healthy diet, and continuing exercise.       Signed:  Susana Burgess PA-C  Interventional Cardiology   [id number]  10/22/2019 4:10 PM\    I have personally taken the history and examined this patient. I have discussed and agree with the resident's findings and plan as documented in the resident's note.  43-year-old female with nonischemic cardiomyopathy, heart failure with a reduced ejection fraction of 30% and severe mitral regurgitation referred by Dr. Nereida Hatch to consider for mitral clip.  Patient has no contraindications to the [facility] CA RI LLON trial.  She also may be a reasonable candidate for surgery, however, she is mildly symptomatic with NYHA  1/2 symptoms.  I recommend surgical evaluation to determine if she is a candidate for surgical repair prior to proceeding for with any percutaneous options.  Memo Luis

## 2019-10-29 ENCOUNTER — PATIENT MESSAGE (OUTPATIENT)
Dept: CARDIOTHORACIC SURGERY | Facility: CLINIC | Age: 44
End: 2019-10-29

## 2019-10-30 NOTE — ED NOTES
Let her know no broken bones in feet. [facility]     Patient on cardiac monitor, automatic blood pressure cuff and pulse oximeter.

## 2019-11-05 ENCOUNTER — CLINICAL SUPPORT (OUTPATIENT)
Dept: CARDIOLOGY | Facility: HOSPITAL | Age: 44
End: 2019-11-05
Attending: INTERNAL MEDICINE
Payer: MEDICARE

## 2019-11-05 DIAGNOSIS — Z95.810 PRESENCE OF AUTOMATIC CARDIOVERTER/DEFIBRILLATOR (AICD): ICD-10-CM

## 2019-11-05 DIAGNOSIS — I42.8 NONISCHEMIC CARDIOMYOPATHY: ICD-10-CM

## 2019-11-05 PROCEDURE — 93296 REM INTERROG EVL PM/IDS: CPT

## 2019-11-07 ENCOUNTER — CLINICAL SUPPORT (OUTPATIENT)
Dept: CARDIOLOGY | Facility: HOSPITAL | Age: 44
End: 2019-11-07
Payer: MEDICARE

## 2019-11-07 DIAGNOSIS — Z95.810 PRESENCE OF AUTOMATIC (IMPLANTABLE) CARDIAC DEFIBRILLATOR: ICD-10-CM

## 2019-11-07 PROCEDURE — 93295 DEV INTERROG REMOTE 1/2/MLT: CPT | Mod: ,,, | Performed by: INTERNAL MEDICINE

## 2019-11-07 PROCEDURE — 93295 CARDIAC DEVICE CHECK - REMOTE: ICD-10-PCS | Mod: ,,, | Performed by: INTERNAL MEDICINE

## 2019-11-15 ENCOUNTER — CLINICAL SUPPORT (OUTPATIENT)
Dept: BARIATRICS | Facility: CLINIC | Age: 44
End: 2019-11-15
Payer: MEDICARE

## 2019-11-15 VITALS — HEIGHT: 68 IN | BODY MASS INDEX: 38.35 KG/M2 | WEIGHT: 253.06 LBS

## 2019-11-15 DIAGNOSIS — G47.20 SLEEP STAGE DYSFUNCTION: ICD-10-CM

## 2019-11-15 DIAGNOSIS — I50.20 SYSTOLIC CONGESTIVE HEART FAILURE, UNSPECIFIED HF CHRONICITY: ICD-10-CM

## 2019-11-15 DIAGNOSIS — E66.9 OBESITY (BMI 35.0-39.9 WITHOUT COMORBIDITY): ICD-10-CM

## 2019-11-15 DIAGNOSIS — Z71.3 DIETARY COUNSELING: ICD-10-CM

## 2019-11-15 PROCEDURE — 99499 NO LOS: ICD-10-PCS | Mod: S$PBB,,, | Performed by: DIETITIAN, REGISTERED

## 2019-11-15 PROCEDURE — 99999 PR PBB SHADOW E&M-EST. PATIENT-LVL II: ICD-10-PCS | Mod: PBBFAC,,, | Performed by: DIETITIAN, REGISTERED

## 2019-11-15 PROCEDURE — 99212 OFFICE O/P EST SF 10 MIN: CPT | Mod: PBBFAC | Performed by: DIETITIAN, REGISTERED

## 2019-11-15 PROCEDURE — 99499 UNLISTED E&M SERVICE: CPT | Mod: S$PBB,,, | Performed by: DIETITIAN, REGISTERED

## 2019-11-15 PROCEDURE — 97803 MED NUTRITION INDIV SUBSEQ: CPT | Mod: PBBFAC,59 | Performed by: DIETITIAN, REGISTERED

## 2019-11-15 PROCEDURE — 99999 PR PBB SHADOW E&M-EST. PATIENT-LVL II: CPT | Mod: PBBFAC,,, | Performed by: DIETITIAN, REGISTERED

## 2019-11-15 NOTE — PROGRESS NOTES
NUTRITION NOTE    Referring Physician: Dr. Dang  Reason for MNT Referral: Established patient follow up- 6 MSD visits  pending Gastric Sleeve    Patient presents for 2nd visit of 6 for MSD with weight gain over the past month; total weight loss by making numerous dietary and lifestyle changes.    CLINICAL DATA:  44 y.o. female.    Current Weight: 253 lbs  Weight Change Since Initial Visit: + 8  lbs  Ideal Body Weight: 144 lbs  BMI: 38.48  Last weight/ consult weight: 245 lbs     NUTRITIONAL NEEDS:  1438 Calories (using [place]. Jeor Equation)  ~ BMR: 1781.75 X 1.2 = 2138.10 - 700 for weight loss  84-98 Grams Protein (using 1.3-1.5 IBW 65 kg)      CURRENT DIET:  Regular diet.  Diet Recall: Food records are not present.    Diet Includes:    Breakfast: egg spinach and cheese  Lunch: Skip  Snack: nuts or popcorn  Dinner: turkey sausage, egg      Has had some alcoholic beverages, cake,  some fried foods and starchy  foods( doesn't remember what though) over past month, but states she has  Cut out most sweets and  Eating out less     Meal Pattern: Irregular. Still skipping 1 meal/ day   Protein Supplements: 0  per day. Has tried premier   once  Snacking: Adequate. Snacks include healthy choices and junk foods.  Vegetables: Likes a variety. Eats daily.  Fruits: Likes a variety. Eats almost daily.  Beverages: water - cut out sugary beverages   Dining Out:  Weekly. Mostly fast food, restaurants and take-out.  Cooking at home: Daily. Mostly baked and grilled meat, fish, starchy CHO and vegetables.    CURRENT EXERCISE:  Past exercise: Fair     Current exercise:  the gym treadmill/ bike   30 mins 3 X/ week   Restrictions to exercise: none    Vitamins / Minerals / Herbs:   Vit D  B complex    Food Allergies:   none    Social:  Disabled.  Lives with none.  Family and friends close  Grocery shopping and food prep self.  Patient believes the household will be supportive after surgery.  Alcohol: Socially.  Smoking:  None.      ASSESSMENT:  Patient demonstrates some willingness to change lifestyle habits as evidenced by good exercise, reduced dining out and cut out sugary drinks and most sweets from diet trying to increase vegetables in diet, however has gained weight and still skipping meals and eating starches.    Doing fair with working on greatest challenges: dining out frequency, starchy CHO, portion control and skipping meals, inadequate protein, not much vegetables    Barriers to Education:  none  Stage of Change:  contemplation    NUTRITION DIAGNOSIS:  Obesity related to Excessive carbohydrate intake and inadequate protein as evidence by BMI.  Status: Same    PLAN:  Pt is  a potential  candidate for surgery. Patient needs to make multiple changes in order to be successful in bariatric surgery.     Diet: Adjust diet plan.  Resume work-up for surgery.  Continue to review Bariatric Nutrition Guidebook at home and call with any questions.  Work on Bariatric Nutrition Checklist.  Work on expanding variety of vegetables.  Work on gradually cutting back on starchy CHO in the diet.  Begin trying various protein supplements to determine preference.  5-6 meals per day.  Start including protein supplements in the diet plan daily.  Reduce frequency of dining out.  More grocery shopping and meal preparation at home.  Start shopping for bariatric vitamins & minerals.  Return to clinic.    Exercise: Maintain.    Behavior Modification: Begin to document food & activity logs daily.      Return to clinic in one month.    Needs additional visit(s) with RD. 2/ 6 MSD's complete.     Communicated nutrition plan with bariatric team.    SESSION TIME:  30 minutes

## 2019-11-15 NOTE — PATIENT INSTRUCTIONS
"Snacks: (100-200 calories; >5g protein)    - 1 low-fat cheese stick with 8 cherry tomatoes or 1 serving fresh fruit  - 4 thin slices fat-free turkey breast and 1 slice low-fat cheese  - 4 thin slices fat-free honey ham with wedge of melon  - 2 slices of turkey bishop  - Boiled eggs (can buy at [employer] already boiled w/ shell removed)  - for convenience,  [place] read, snack, go (deli meat and cheese rolls)  - P3 packets (Protein packs w/ cheese, nuts, lean deli meat)  - MHP Fit and Lean Protein Pudding (find at [employer] - per 1 cup serving = 100 calories, 15 g protein, 0 g sugar)  - 6-8 edamame pods (equivalent to about 1/4 cup edamame without pods).   - 1/4 cup unsalted nuts with ½ cup fruit  - 6-oz container Dannon Light n Fit vanilla yogurt, topped with 1oz unsalted nuts         - apple, celery or baby carrots spread with 2 Tbsp PB2  - apple slices with 1 oz slice low-fat cheese  - Apple slices dipped in 2 Tbsp of PB2  - 2 Tbsp PB2 mixed in light or greek yogurt or sugar-free pudding  - celery, cucumber, bell pepper or baby carrots dipped in ¼ cup hummus bean spread   - celery, cucumber, baby carrots dipped in high protein greek yogurt (Mix 16 oz plain greek yogurt + 1 packet of hidden [place] ranch dip mix)  - Noman Links Beef Steak - 14g protein! (similar to beef jerky but very lean)  - 2 wedges [place] - Light Herb & Garlic Cheese with sliced cucumber or green bell pepper  - 1/2 cup low-fat cottage cheese with ¼ cup fruit or ¼ cup salsa  - 1/2 cup low fat cottage cheese with 10-15 cherry tomatoes  - 8 oz glass of FAIRLIFE fat free milk (13 g protein)  - 8 oz glass of FAIRLIFE fat free milk + 1 packet of sugar-free hot cocoa  - Add Atkins advantage Cafe Caramel shake to decaf coffee. Serve hot or blend with ice for "frappaccino" like drink  - RTD Protein drinks: Atkins, Low Carb Slim Fast, EAS light, Muscle Milk Light, etc.  - Homemade Protein drinks: GNC Soy95, Isopure, Nectar, UNJURY, Whey " Gourmet, etc. Mix 1 scoop powder with 8oz skim/1% milk or light soymilk.  - Protein bars: Atkins, EAS, Pure Protein,  Quest, Think Thin, Detour, etc. Must have 0-4 grams sugar - Read the label.    ** Be CREATIVE. You can always snack on bites of grilled chicken or tuna salad made with low fat bird, if needed!

## 2019-12-12 DIAGNOSIS — E55.9 VITAMIN D INSUFFICIENCY: Primary | ICD-10-CM

## 2019-12-12 RX ORDER — ERGOCALCIFEROL 1.25 MG/1
CAPSULE ORAL
Qty: 24 CAPSULE | Refills: 0 | OUTPATIENT
Start: 2019-12-12

## 2019-12-17 ENCOUNTER — CLINICAL SUPPORT (OUTPATIENT)
Dept: BARIATRICS | Facility: CLINIC | Age: 44
End: 2019-12-17
Payer: MEDICARE

## 2019-12-17 VITALS — HEIGHT: 68 IN | WEIGHT: 251.56 LBS | BODY MASS INDEX: 38.13 KG/M2

## 2019-12-17 DIAGNOSIS — G47.20 SLEEP STAGE DYSFUNCTION: ICD-10-CM

## 2019-12-17 DIAGNOSIS — Z71.3 DIETARY COUNSELING: ICD-10-CM

## 2019-12-17 DIAGNOSIS — E66.9 OBESITY (BMI 35.0-39.9 WITHOUT COMORBIDITY): ICD-10-CM

## 2019-12-17 DIAGNOSIS — I50.20 SYSTOLIC CONGESTIVE HEART FAILURE, UNSPECIFIED HF CHRONICITY: ICD-10-CM

## 2019-12-17 PROCEDURE — 99999 PR PBB SHADOW E&M-EST. PATIENT-LVL II: CPT | Mod: PBBFAC,,, | Performed by: DIETITIAN, REGISTERED

## 2019-12-17 PROCEDURE — 99212 OFFICE O/P EST SF 10 MIN: CPT | Mod: PBBFAC | Performed by: DIETITIAN, REGISTERED

## 2019-12-17 PROCEDURE — 99999 PR PBB SHADOW E&M-EST. PATIENT-LVL II: ICD-10-PCS | Mod: PBBFAC,,, | Performed by: DIETITIAN, REGISTERED

## 2019-12-17 PROCEDURE — 97803 MED NUTRITION INDIV SUBSEQ: CPT | Mod: PBBFAC | Performed by: DIETITIAN, REGISTERED

## 2019-12-17 NOTE — PATIENT INSTRUCTIONS
Syntrax nectar shake - Available at [employer], Vitamin shopee  1 scoop of powder mixed with water in shaker bottle.

## 2019-12-17 NOTE — PROGRESS NOTES
Referring Physician: Dr. Dang  Reason for MNT Referral: Established patient follow up- 6 MSD visits  pending Gastric Sleeve     Patient presents for 3rd visit of 6 for MSD with 2 lb  weight loss over the past month;  Gain of 6 lb total since initial visit. Patient reports she is getting back on track with diet.  Feel off track last month due to birthday. Patient reports being motivated. Stopped skipping lunch and  Cut out carbs. Has tried protein water  but admits to not liking it or the RTD shakes. Advised her on different shakes to try and incorporate into her meals. .      CLINICAL DATA:  44 y.o. female.     Current Weight: 251 lbs  Weight Change Since Initial Visit: + 6 lbs  Ideal Body Weight: 144 lbs  BMI: 38.25  Initial  weight: 245 lbs   Last months Weight: 253 lbs      NUTRITIONAL NEEDS:    1438 Calories (using [place] [address]. Jeor Equation)  ~ BMR: 1781.75 X 1.2 = 2138.10 - 700 for weight loss  84-98 Grams Protein (using 1.3-1.5 IBW 65 kg)        CURRENT DIET:  Regular diet.  Diet Recall: Food records are not present.     Diet Includes:     Breakfast: Egg spinach and cheese  Lunch: Shrimp  And egg with veggies   Snack: nuts, grapes    Dinner: turkey sausage veggies              Meal Pattern: Improved- No longer skipping lunch   Protein Supplements: 0  per day.   Snacking: Adequate. Snacks include healthy choices   Vegetables: Likes a variety. Eats daily.  Fruits: Likes a variety. Eats almost daily.  Beverages: water - cut out sugary beverages   Dining Out:  Weekly. Mostly fast food, restaurants and take-out.  Cooking at home: Daily. Mostly baked and grilled meat, fish, starchy CHO and vegetables.     CURRENT EXERCISE:     Current exercise:  the gym treadmill/ bike   30 mins 3 X/ week   Restrictions to exercise: none     Vitamins / Minerals / Herbs:   Vit D  B complex     Food Allergies:   none     Social:  Disabled.  Lives with none.  Family and friends close  Grocery shopping and food prep self.  Patient  believes the household will be supportive after surgery.  Alcohol: Socially.  Smoking: None.        ASSESSMENT:  Patient demonstrates some willingness to change lifestyle habits as evidenced by good exercise, reduced dining out and cut out sugary drinks and most sweets from diet trying to increase vegetables in diet and no longer skipping meals.      Doing fair with working on greatest challenges: dining out frequency, starchy CHO, portion control and skipping meals, inadequate protein, not much vegetables     Barriers to Education:  none  Stage of Change:  Determination      NUTRITION DIAGNOSIS:  Obesity related to Excessive carbohydrate intake and inadequate protein as evidence by BMI.  Status: Improved     PLAN:     Diet: Adjust diet plan.  Resume work-up for surgery.  Continue to review Bariatric Nutrition Guidebook at home and call with any questions.  Work on Bariatric Nutrition Checklist.  Work on expanding variety of vegetables.  Work on gradually cutting back on starchy CHO in the diet.  Begin trying more protein supplements to determine preference.  Increase meals to 5-6 meals per day.  Start including protein supplements in the diet plan daily. Try syntrax nectar powder.  Start shopping for bariatric vitamins & minerals.  Return to clinic.       Exercise: Maintain.     Behavior Modification: Begin to document food & activity logs daily.        Return to clinic in one month.     Needs additional visit(s) with RD. 3/ 6 MSD's complete.      Communicated nutrition plan with bariatric team.     SESSION TIME:  30 minutes

## 2019-12-22 ENCOUNTER — PATIENT MESSAGE (OUTPATIENT)
Dept: BARIATRICS | Facility: CLINIC | Age: 44
End: 2019-12-22

## 2020-01-02 ENCOUNTER — LAB VISIT (OUTPATIENT)
Dept: LAB | Facility: HOSPITAL | Age: 45
End: 2020-01-02
Attending: INTERNAL MEDICINE
Payer: MEDICARE

## 2020-01-02 DIAGNOSIS — E53.8 B12 DEFICIENCY: ICD-10-CM

## 2020-01-02 DIAGNOSIS — E05.90 HYPERTHYROIDISM: ICD-10-CM

## 2020-01-02 LAB
TSH SERPL DL<=0.005 MIU/L-ACNC: 0.52 UIU/ML (ref 0.4–4)
VIT B12 SERPL-MCNC: 247 PG/ML (ref 210–950)

## 2020-01-02 PROCEDURE — 36415 COLL VENOUS BLD VENIPUNCTURE: CPT | Mod: PO

## 2020-01-02 PROCEDURE — 84443 ASSAY THYROID STIM HORMONE: CPT | Mod: PO

## 2020-01-02 PROCEDURE — 82607 VITAMIN B-12: CPT | Mod: PO

## 2020-01-14 ENCOUNTER — CLINICAL SUPPORT (OUTPATIENT)
Dept: BARIATRICS | Facility: CLINIC | Age: 45
End: 2020-01-14
Payer: MEDICARE

## 2020-01-14 VITALS — HEIGHT: 68 IN | BODY MASS INDEX: 38.09 KG/M2 | WEIGHT: 251.31 LBS

## 2020-01-14 DIAGNOSIS — Z71.3 DIETARY COUNSELING: ICD-10-CM

## 2020-01-14 DIAGNOSIS — E66.9 OBESITY (BMI 35.0-39.9 WITHOUT COMORBIDITY): ICD-10-CM

## 2020-01-14 DIAGNOSIS — G47.20 SLEEP STAGE DYSFUNCTION: ICD-10-CM

## 2020-01-14 DIAGNOSIS — E66.01 MORBID OBESITY: ICD-10-CM

## 2020-01-14 PROCEDURE — 97803 MED NUTRITION INDIV SUBSEQ: CPT | Mod: PBBFAC | Performed by: DIETITIAN, REGISTERED

## 2020-01-14 PROCEDURE — 99999 PR PBB SHADOW E&M-EST. PATIENT-LVL I: ICD-10-PCS | Mod: PBBFAC,,, | Performed by: DIETITIAN, REGISTERED

## 2020-01-14 PROCEDURE — 99999 PR PBB SHADOW E&M-EST. PATIENT-LVL I: CPT | Mod: PBBFAC,,, | Performed by: DIETITIAN, REGISTERED

## 2020-01-14 PROCEDURE — 99211 OFF/OP EST MAY X REQ PHY/QHP: CPT | Mod: PBBFAC | Performed by: DIETITIAN, REGISTERED

## 2020-01-14 NOTE — PROGRESS NOTES
NUTRITION NOTE    Referring Physician: Dr. Dang  Reason for MNT Referral: 6 months Medically Supervised Diet pending Gastric Sleeve    Patient presents for 4th visit for MSD with weight at a standstill over the past month; total weight gain of 10 lbs since initial consult by making numerous dietary and lifestyle changes.  Patient reports  has been doing well overall but  struggling with trying to get 3 meals in.  She is currently working on it.  She was recently sick and has not been working out as much.  She is confused why she hasn't been loosing more weight since she really improved her diet. Denies cheating  within this past month.   Patient told to track food  Log so RD can see exactly what shes been eating   Every day and evaluate.    CLINICAL DATA:  44 y.o. female.    Current Weight: 251 lbs   Weight Change Since Initial Visit: + 10 lbs  Ideal Body Weight: 144 lbs  BMI: 38.21    NUTRITIONAL NEEDS:  1438 Calories (using [place] [address]. Jeor Equation)  ~ BMR: 1781.75 X 1.2 = 2138.10 - 700 for weight loss  84-98 Grams Protein (using 1.3-1.5 IBW 65 kg)     CURRENT DIET:  Regular diet.     Breakfast: Egg spinach and cheese or premier   Lunch: Shrimp  And egg with veggies   Snack: nuts, grapes, protein bars ( low sugar)     Dinner: grilled  chicken with  veggies           Meal Pattern: Improved- No longer skipping lunch   Protein Supplements: 0  per day.   Snacking: Adequate. Snacks include healthy choices   Vegetables: Likes a variety. Eats daily.  Fruits: Likes a variety. Eats almost daily.  Beverages: water - cut out sugary beverages   Dining Out:  Weekly. Mostly fast food, restaurants and take-out.  Cooking at home: Daily. Mostly baked and grilled meat, fish, starchy CHO and vegetables.    Diet Recall: Food records are not present.      CURRENT EXERCISE:  Gym-  3 X/ week       ASSESSMENT:  Patient demonstrates some willingness to change lifestyle habits as evidenced by good exercise and including protein  drinks.    Doing fairly well with working on greatest challenges (starchy CHO).    Barriers to Education:  none  Stage of Change:  determination    NUTRITION DIAGNOSIS:  Obesity related to Excessive carbohydrate intake as evidence by BMI.  Status: Improved    PLAN:      Diet: Maintain diet plan.  Resume work-up for surgery.  Continue to review Bariatric Nutrition Guidebook at home and call with any questions.  Work on Bariatric Nutrition Checklist.  Start including protein supplements in the diet plan daily.  Start shopping for bariatric vitamins & minerals.  Return to clinic.    Exercise: Maintain.    Behavior Modification: Begin to document food & activity logs daily.        Return to clinic in one month.  Needs additional visit(s) with RD.    Communicated nutrition plan with bariatric team.    SESSION TIME:  30 minutes

## 2020-02-05 ENCOUNTER — CLINICAL SUPPORT (OUTPATIENT)
Dept: CARDIOLOGY | Facility: HOSPITAL | Age: 45
End: 2020-02-05
Payer: MEDICARE

## 2020-02-05 DIAGNOSIS — Z95.810 PRESENCE OF AUTOMATIC (IMPLANTABLE) CARDIAC DEFIBRILLATOR: ICD-10-CM

## 2020-02-05 PROCEDURE — 93296 REM INTERROG EVL PM/IDS: CPT | Performed by: INTERNAL MEDICINE

## 2020-02-05 PROCEDURE — 93295 DEV INTERROG REMOTE 1/2/MLT: CPT | Mod: ,,, | Performed by: INTERNAL MEDICINE

## 2020-02-05 PROCEDURE — 93295 CARDIAC DEVICE CHECK - REMOTE: ICD-10-PCS | Mod: ,,, | Performed by: INTERNAL MEDICINE

## 2020-02-11 ENCOUNTER — CLINICAL SUPPORT (OUTPATIENT)
Dept: BARIATRICS | Facility: CLINIC | Age: 45
End: 2020-02-11
Payer: MEDICARE

## 2020-02-11 VITALS — BODY MASS INDEX: 40.89 KG/M2 | HEIGHT: 66 IN | WEIGHT: 254.44 LBS

## 2020-02-11 DIAGNOSIS — G47.20 SLEEP STAGE DYSFUNCTION: ICD-10-CM

## 2020-02-11 DIAGNOSIS — E66.01 MORBID OBESITY: ICD-10-CM

## 2020-02-11 DIAGNOSIS — E66.9 OBESITY (BMI 35.0-39.9 WITHOUT COMORBIDITY): ICD-10-CM

## 2020-02-11 DIAGNOSIS — I50.20 SYSTOLIC CONGESTIVE HEART FAILURE, UNSPECIFIED HF CHRONICITY: ICD-10-CM

## 2020-02-11 DIAGNOSIS — Z71.3 DIETARY COUNSELING: ICD-10-CM

## 2020-02-11 PROCEDURE — 99999 PR PBB SHADOW E&M-EST. PATIENT-LVL II: ICD-10-PCS | Mod: PBBFAC,,, | Performed by: DIETITIAN, REGISTERED

## 2020-02-11 PROCEDURE — 97803 MED NUTRITION INDIV SUBSEQ: CPT | Mod: PBBFAC | Performed by: DIETITIAN, REGISTERED

## 2020-02-11 PROCEDURE — 99212 OFFICE O/P EST SF 10 MIN: CPT | Mod: PBBFAC | Performed by: DIETITIAN, REGISTERED

## 2020-02-11 PROCEDURE — 99999 PR PBB SHADOW E&M-EST. PATIENT-LVL II: CPT | Mod: PBBFAC,,, | Performed by: DIETITIAN, REGISTERED

## 2020-02-11 NOTE — PROGRESS NOTES
NUTRITION NOTE     Referring Physician: Dr. Dnag  Reason for MNT Referral: 6 months Medically Supervised Diet pending Gastric Sleeve     Patient presents for 5th visit for MSD with weight gain of 3 lbs  over the past month; total weight gain of 13 lbs since initial consult despite making numerous dietary and lifestyle changes.  Patient reports   being sick for 2 weeks and was un able to get to gym has been doing well overall  with trying to get 3 meals in.   She still remains  confused why she hasn't been loosing more weight since she really improved her diet. Reports eating gumbo a few times but had taken out  the rice. Denies any other starchy or fatty food intake.   Patient told to track food   last visit so RD can see exactly what shes been eating   every day and evaluate. Patient tracked food intake and is doing well overall after RD review.  Patient thinks  Her weight gain may be due to fluid build up.  She has not taken her lasix that is RX'd  as PRN. Will make an appointment  with cardiologist.     CLINICAL DATA:  44 y.o. female.       Current Weight:  254 lbs   Weight last month: 251 lbs  Weight at consult: 241 lbs  Weight change Since Initial Visit: + 13 lbs, + 3 lbs in 1 month  Ideal Body Weight: 144 lbs  BMI: 41.06     NUTRITIONAL NEEDS:  1438 Calories (using [place] St. Jeor Equation)  ~ BMR: 1781.75 X 1.2 = 2138.10 - 700 for weight loss  84-98 Grams Protein (using 1.3-1.5 IBW 65 kg)      CURRENT DIET:  Regular diet.      Breakfast: Eggs spinach and  Light cheese or premier   Lunch: Shrimp and egg with veggies   Snack: nuts, grapes, protein bars ( low sugar)     Dinner: grilled  chicken with  veggies            Meal Pattern:  same - remains sufficient   Protein Supplements: 1  per day.   Snacking: Adequate. Snacks include healthy choices   Vegetables: Likes a variety. Eats daily.  Fruits: Likes a variety. Eats almost daily.  Beverages: water - cut out sugary beverages   Dining Out:  Has not eaten out  this past month   Cooking at home: Daily. Mostly baked and grilled meat, fish and vegetables.     Diet Recall: Food records are  present.        CURRENT EXERCISE:  Gym-  3 X/ week - has not been working out lately since recovering from being sick         ASSESSMENT:  Patient demonstrates some willingness to change lifestyle habits as evidenced by  including protein drinks and dietary changes.      Doing fairly well with working on greatest challenges (starchy CHO).     Barriers to Education:  none  Stage of Change:  determination      NUTRITION DIAGNOSIS:  Obesity related to Excessive carbohydrate intake as evidence by BMI.  Status: Improved     PLAN:       Diet: Maintain diet plan.  Resume work-up for surgery.  Continue to review Bariatric Nutrition Guidebook at home and call with any questions.  Work on Bariatric Nutrition Checklist.  Start shopping for bariatric vitamins & minerals.  Return to clinic.  Follow up with cardiologist to possibly adjust lasix.      Exercise: Maintain.     Behavior Modification: Contiune to document food & activity logs daily.           Return to clinic in one month.  Needs additional visit(s) with RD.     Communicated nutrition plan with bariatric team.     SESSION TIME:  30 minutes

## 2020-02-14 PROBLEM — M54.2 CERVICALGIA: Status: RESOLVED | Noted: 2019-09-11 | Resolved: 2020-02-14

## 2020-02-14 PROBLEM — G89.29 CHRONIC BILATERAL LOW BACK PAIN WITHOUT SCIATICA: Status: RESOLVED | Noted: 2019-09-11 | Resolved: 2020-02-14

## 2020-02-14 PROBLEM — M54.50 CHRONIC BILATERAL LOW BACK PAIN WITHOUT SCIATICA: Status: RESOLVED | Noted: 2019-09-11 | Resolved: 2020-02-14

## 2020-03-09 ENCOUNTER — TELEPHONE (OUTPATIENT)
Dept: INTERNAL MEDICINE | Facility: CLINIC | Age: 45
End: 2020-03-09

## 2020-03-09 NOTE — TELEPHONE ENCOUNTER
----- Message from Bridgette Flaherty sent at 3/9/2020  9:30 AM CDT -----  Contact: Pt  Pt called in to request a referral to see a Back and Spine Specialist as she is been lower right side back pain for over a week now.    Pt can be reached at [phone].    Thank you

## 2020-03-10 ENCOUNTER — CLINICAL SUPPORT (OUTPATIENT)
Dept: BARIATRICS | Facility: CLINIC | Age: 45
End: 2020-03-10
Payer: MEDICARE

## 2020-03-10 ENCOUNTER — PATIENT MESSAGE (OUTPATIENT)
Dept: TRANSPLANT | Facility: CLINIC | Age: 45
End: 2020-03-10

## 2020-03-10 VITALS — WEIGHT: 256.19 LBS | BODY MASS INDEX: 41.17 KG/M2 | HEIGHT: 66 IN

## 2020-03-10 DIAGNOSIS — E66.01 MORBID OBESITY: ICD-10-CM

## 2020-03-10 DIAGNOSIS — I50.20 SYSTOLIC CONGESTIVE HEART FAILURE, UNSPECIFIED HF CHRONICITY: ICD-10-CM

## 2020-03-10 PROCEDURE — 99999 PR PBB SHADOW E&M-EST. PATIENT-LVL I: ICD-10-PCS | Mod: PBBFAC,HCNC,, | Performed by: DIETITIAN, REGISTERED

## 2020-03-10 PROCEDURE — 99999 PR PBB SHADOW E&M-EST. PATIENT-LVL I: CPT | Mod: PBBFAC,HCNC,, | Performed by: DIETITIAN, REGISTERED

## 2020-03-10 PROCEDURE — 97803 MED NUTRITION INDIV SUBSEQ: CPT | Mod: HCNC,S$GLB,, | Performed by: DIETITIAN, REGISTERED

## 2020-03-10 PROCEDURE — 97803 PR MED NUTR THER, SUBSQ, INDIV, EA 15 MIN: ICD-10-PCS | Mod: HCNC,S$GLB,, | Performed by: DIETITIAN, REGISTERED

## 2020-03-10 NOTE — PROGRESS NOTES
NUTRITION NOTE     Referring Physician: Dr. Dang  Reason for MNT Referral: 6 months Medically Supervised Diet pending Gastric Sleeve     Patient presents for 6th visit for MSD with weight gain of 2 lbs  over the past month; total weight gain of 15 lbs since initial consult.  She still remains  confused why she hasn't been loosing more weight since she really improved her diet. Denies any other starchy or fatty food intake.   Patient told to track food last visit so RD but left in car. Patient thinks  her weight gain may be due to fluid build up. She does admit that 1 weekend she ate some sandwiches and drank some alcohol ( apple martinis) and sugary lemonade. She has had cereal once or twice in the past month she thinks  She has not taken her lasix that is RX'd  as PRN and continues to think this may be the issue with  Monthly weight gain.  Made an apt  with cardiologist patient says but no apt in epic. Says she must have forgot then. Says will go across street  to make an apt today to see if all this is fluid weight.        CLINICAL DATA:  44 y.o. female.     Current Weight:  256 lbs   Weight last month: 254 lbs  Weight at consult: 241 lbs  Weight change Since Initial Visit: + 15 lbs, + 2 lbs in 1 month  Ideal Body Weight: 144 lbs  BMI: 41.35     NUTRITIONAL NEEDS:  1438 Calories (using [place] St. Jeor Equation)  ~ BMR: 1781.75 X 1.2 = 2138.10 - 700  for weight loss  84-98 Grams Protein (using 1.3-1.5 IBW 65 kg)      CURRENT DIET:  Regular diet.      Breakfast: Eggs spinach and  Light cheese or premier   Or cereal  Lunch: Shrimp and egg with veggies   Snack: nuts, grapes, protein bars ( low sugar)     Dinner: grilled  chicken with  veggies  or premier      Does admit to eating some sandwiches, carbs  and having some  alcohol       Meal Pattern:  same - remains  good   Protein Supplements: 1  per day.   Snacking: Adequate. Snacks include healthy choices   Vegetables: Likes a variety. Eats daily.  Fruits: Likes a  variety. Eats almost daily.  Beverages: water, 3 lemonades this month.  Dining Out:  Has not eaten out this past month.   Cooking at home: Daily. Mostly baked and grilled meat, fish and vegetables.     Diet Recall: Food records are   not present. Per patient in the car and she will send to me         CURRENT EXERCISE:   has not been working out lately  due to  Back pain        ASSESSMENT:    Patient is a potential  Candidate for bariatric surgery.   Patient informed she needs to make necessary  Dietary and lifestyle changes in order to be successful for bariatric surgery.     Patient demonstrates some willingness to change lifestyle habits as evidenced by  including protein drinks on most days. However shows  Resistance as   Weight continues to increase monthly       Doing poorly well with working on greatest challenges (starchy CHO)     Barriers to Education:  unwillingness to change  behaviors   Stage of Change:  contemplation     NUTRITION DIAGNOSIS:  Obesity related to Excessive carbohydrate intake as evidence by BMI.  Status: Worse     PLAN:  Diet: Adjust  diet plan.  Resume work-up for surgery.  Continue to review Bariatric Nutrition Guidebook at home and call with any questions.  Back on track with diet plan  Work on Bariatric Nutrition Checklist.  Start shopping for bariatric vitamins & minerals.  Return to clinic.   Follow up with cardiologist- pt said made apt but none in epic.  Send pictures of her food log to Dietitian   Eliminate sugary drinks and alcohol  Cut down on carbs     Exercise: Increased     Behavior Modification: send  document food & activity logs daily and send to dietitian           Return to clinic  after speaking with cardiologist and sending RD food logs logs.        Communicated nutrition plan with bariatric team.     SESSION TIME:  30 minutes

## 2020-03-12 ENCOUNTER — DOCUMENTATION ONLY (OUTPATIENT)
Dept: BARIATRICS | Facility: CLINIC | Age: 45
End: 2020-03-12

## 2020-03-13 ENCOUNTER — OFFICE VISIT (OUTPATIENT)
Dept: INTERNAL MEDICINE | Facility: CLINIC | Age: 45
End: 2020-03-13
Payer: MEDICARE

## 2020-03-13 VITALS
BODY MASS INDEX: 40.04 KG/M2 | OXYGEN SATURATION: 97 % | DIASTOLIC BLOOD PRESSURE: 76 MMHG | HEIGHT: 66 IN | SYSTOLIC BLOOD PRESSURE: 108 MMHG | HEART RATE: 72 BPM | WEIGHT: 249.13 LBS

## 2020-03-13 DIAGNOSIS — E66.01 MORBID OBESITY: ICD-10-CM

## 2020-03-13 DIAGNOSIS — I50.42 CHRONIC COMBINED SYSTOLIC AND DIASTOLIC CONGESTIVE HEART FAILURE: ICD-10-CM

## 2020-03-13 DIAGNOSIS — J45.20 MILD INTERMITTENT ASTHMA WITHOUT COMPLICATION: Primary | ICD-10-CM

## 2020-03-13 DIAGNOSIS — G43.409 HEMIPLEGIC MIGRAINE WITHOUT STATUS MIGRAINOSUS, NOT INTRACTABLE: ICD-10-CM

## 2020-03-13 PROBLEM — E66.9 OBESITY (BMI 35.0-39.9 WITHOUT COMORBIDITY): Status: RESOLVED | Noted: 2018-01-23 | Resolved: 2020-03-13

## 2020-03-13 PROBLEM — I21.4 NSTEMI (NON-ST ELEVATED MYOCARDIAL INFARCTION): Status: RESOLVED | Noted: 2019-08-07 | Resolved: 2020-03-13

## 2020-03-13 PROCEDURE — 99499 RISK ADDL DX/OHS AUDIT: ICD-10-PCS | Mod: HCNC,S$GLB,, | Performed by: INTERNAL MEDICINE

## 2020-03-13 PROCEDURE — 99999 PR PBB SHADOW E&M-EST. PATIENT-LVL III: CPT | Mod: PBBFAC,HCNC,, | Performed by: INTERNAL MEDICINE

## 2020-03-13 PROCEDURE — 3008F PR BODY MASS INDEX (BMI) DOCUMENTED: ICD-10-PCS | Mod: HCNC,CPTII,S$GLB, | Performed by: INTERNAL MEDICINE

## 2020-03-13 PROCEDURE — 99999 PR PBB SHADOW E&M-EST. PATIENT-LVL III: ICD-10-PCS | Mod: PBBFAC,HCNC,, | Performed by: INTERNAL MEDICINE

## 2020-03-13 PROCEDURE — 99214 OFFICE O/P EST MOD 30 MIN: CPT | Mod: HCNC,S$GLB,, | Performed by: INTERNAL MEDICINE

## 2020-03-13 PROCEDURE — 3008F BODY MASS INDEX DOCD: CPT | Mod: HCNC,CPTII,S$GLB, | Performed by: INTERNAL MEDICINE

## 2020-03-13 PROCEDURE — 99214 PR OFFICE/OUTPT VISIT, EST, LEVL IV, 30-39 MIN: ICD-10-PCS | Mod: HCNC,S$GLB,, | Performed by: INTERNAL MEDICINE

## 2020-03-13 PROCEDURE — 99499 UNLISTED E&M SERVICE: CPT | Mod: HCNC,S$GLB,, | Performed by: INTERNAL MEDICINE

## 2020-03-13 RX ORDER — DEXAMETHASONE 4 MG/1
TABLET ORAL
Qty: 12 G | Refills: 4 | Status: SHIPPED | OUTPATIENT
Start: 2020-03-13 | End: 2020-04-20 | Stop reason: SDUPTHER

## 2020-03-13 RX ORDER — TOPIRAMATE 50 MG/1
50 TABLET, FILM COATED ORAL DAILY
Qty: 90 TABLET | Refills: 4 | Status: SHIPPED | OUTPATIENT
Start: 2020-03-13 | End: 2021-01-15 | Stop reason: SDUPTHER

## 2020-03-13 RX ORDER — PREDNISONE 20 MG/1
TABLET ORAL
Qty: 14 TABLET | Refills: 0 | Status: SHIPPED | OUTPATIENT
Start: 2020-03-13 | End: 2020-04-24

## 2020-03-13 RX ORDER — ALBUTEROL SULFATE 90 UG/1
2 AEROSOL, METERED RESPIRATORY (INHALATION) EVERY 6 HOURS PRN
Qty: 18 G | Refills: 6 | Status: SHIPPED | OUTPATIENT
Start: 2020-03-13 | End: 2022-06-24 | Stop reason: SDUPTHER

## 2020-03-13 NOTE — PROGRESS NOTES
Subjective:       Patient ID: Mario Mahajan is a 44 y.o. female.    Chief Complaint: Asthma; Back Pain; and Medication Refill (Topamax)    HPI  Pt c/o 2 weeks of cough and wheezing.  Stopped Asmanex, ran out of albuterol MDI.  Poor resp to Combivent, steroid shot given in UC.  Able to walk 3-4 blocks w/o stopping.  No LE edema, orthopnea, PND.  No f/c.  Review of Systems   All other systems reviewed and are negative.      Objective:      Physical Exam   Constitutional: She appears well-developed.   obese   HENT:   Head: Normocephalic.   Eyes: EOM are normal.   Neck: Normal range of motion.   Cardiovascular: Normal rate, regular rhythm and intact distal pulses. Exam reveals no gallop.   Murmur (3/6 SM apex, LSB) heard.  Pulmonary/Chest: Effort normal and breath sounds normal. She has no wheezes. She has no rales.   Abdominal: Soft. Bowel sounds are normal. She exhibits no distension.   Musculoskeletal: Normal range of motion. She exhibits no edema.   Lymphadenopathy:     She has no cervical adenopathy.   Neurological: She is alert. No cranial nerve deficit. She exhibits normal muscle tone. Coordination normal.   Skin: Skin is warm and dry.   Psychiatric: She has a normal mood and affect. Her behavior is normal.   Vitals reviewed.      Assessment:       1. Mild intermittent asthma without complication    2. Chronic combined systolic and diastolic congestive heart failure    3. Morbid obesity        Plan:       Mario was seen today for asthma, back pain and medication refill.    Diagnoses and all orders for this visit:    Mild intermittent asthma without complication  -     FLOVENT  mcg/actuation inhaler; INL 2 PFS PO TWICE DAILY. RM AFTER U  -     albuterol (PROVENTIL/VENTOLIN HFA) 90 mcg/actuation inhaler; Inhale 2 puffs into the lungs every 6 (six) hours as needed for Wheezing.  -     predniSONE (DELTASONE) 20 MG tablet; 2 tabs po qd x 7 days    Chronic combined systolic and diastolic congestive heart  failure   Cont rx    Morbid obesity   Monitor      Follow up if symptoms worsen or fail to improve.

## 2020-03-28 ENCOUNTER — PATIENT MESSAGE (OUTPATIENT)
Dept: TRANSPLANT | Facility: CLINIC | Age: 45
End: 2020-03-28

## 2020-03-30 ENCOUNTER — PATIENT MESSAGE (OUTPATIENT)
Dept: INTERNAL MEDICINE | Facility: CLINIC | Age: 45
End: 2020-03-30

## 2020-03-30 DIAGNOSIS — R19.7 DIARRHEA, UNSPECIFIED TYPE: Primary | ICD-10-CM

## 2020-03-31 ENCOUNTER — DOCUMENTATION ONLY (OUTPATIENT)
Dept: BARIATRICS | Facility: CLINIC | Age: 45
End: 2020-03-31

## 2020-03-31 RX ORDER — DIPHENOXYLATE HYDROCHLORIDE AND ATROPINE SULFATE 2.5; .025 MG/1; MG/1
1 TABLET ORAL 4 TIMES DAILY PRN
Qty: 20 TABLET | Refills: 1 | Status: SHIPPED | OUTPATIENT
Start: 2020-03-31 | End: 2020-04-10

## 2020-04-03 ENCOUNTER — PATIENT MESSAGE (OUTPATIENT)
Dept: BARIATRICS | Facility: CLINIC | Age: 45
End: 2020-04-03

## 2020-04-06 ENCOUNTER — TELEPHONE (OUTPATIENT)
Dept: BARIATRICS | Facility: CLINIC | Age: 45
End: 2020-04-06

## 2020-04-06 NOTE — TELEPHONE ENCOUNTER
"----- Message from Yessica Mckee RN sent at 4/6/2020  7:51 AM CDT -----      ----- Message -----  From: Delon Garcia RN  Sent: 4/1/2020  11:05 AM CDT  To: ARCHANA Guerrero,    She no-showed he appt with Dr. Gilmore several months ago... With the Covid situation she will be put on hold till late may or June for a follow-up since she is basically "stable".... Please let me know if she wants to follow up with him in late May or early June, since she did not respond to my [employer] message about her appt no-show....    Thanks........ Jong  [phone]  "

## 2020-04-13 ENCOUNTER — OFFICE VISIT (OUTPATIENT)
Dept: TRANSPLANT | Facility: CLINIC | Age: 45
End: 2020-04-13
Payer: MEDICARE

## 2020-04-13 VITALS — DIASTOLIC BLOOD PRESSURE: 68 MMHG | WEIGHT: 245 LBS | SYSTOLIC BLOOD PRESSURE: 101 MMHG | BODY MASS INDEX: 39.54 KG/M2

## 2020-04-13 DIAGNOSIS — Z79.899 ON AMIODARONE THERAPY: ICD-10-CM

## 2020-04-13 DIAGNOSIS — I47.29 POLYMORPHIC VENTRICULAR TACHYCARDIA: ICD-10-CM

## 2020-04-13 DIAGNOSIS — G47.20 SLEEP STAGE DYSFUNCTION: ICD-10-CM

## 2020-04-13 DIAGNOSIS — I50.42 CHRONIC COMBINED SYSTOLIC AND DIASTOLIC CONGESTIVE HEART FAILURE: ICD-10-CM

## 2020-04-13 DIAGNOSIS — E66.01 MORBID OBESITY: ICD-10-CM

## 2020-04-13 PROCEDURE — 99215 PR OFFICE/OUTPT VISIT, EST, LEVL V, 40-54 MIN: ICD-10-PCS | Mod: HCNC,95,, | Performed by: INTERNAL MEDICINE

## 2020-04-13 PROCEDURE — 99215 OFFICE O/P EST HI 40 MIN: CPT | Mod: HCNC,95,, | Performed by: INTERNAL MEDICINE

## 2020-04-13 PROCEDURE — 3008F BODY MASS INDEX DOCD: CPT | Mod: HCNC,CPTII,95, | Performed by: INTERNAL MEDICINE

## 2020-04-13 PROCEDURE — 3008F PR BODY MASS INDEX (BMI) DOCUMENTED: ICD-10-PCS | Mod: HCNC,CPTII,95, | Performed by: INTERNAL MEDICINE

## 2020-04-13 NOTE — ASSESSMENT & PLAN NOTE
Will increase coreg from 12.5 BID to 12.5 and 25 at night   Later this month can increase it to 25 BID during a video visit

## 2020-04-13 NOTE — PATIENT INSTRUCTIONS
INCREASE coreg to 12.5 in morning (one tablet) and 25 mg (two tablets) at night   READ a BOOK at night before you go to sleep

## 2020-04-13 NOTE — LETTER
April 13, 2020        Juanjose Nuñez  [address]  [facility] [address]  Phone: [phone]  Fax: [phone]             [facility] [address]  [facility] [address]  Phone: [phone]   Patient: Mario Mahajan   MR Number: [id number]   YOB: 1975   Date of Visit: 4/13/2020       Dear Dr. Juanjose Nuñez    Thank you for referring Mario Mahajan to me for evaluation. Attached you will find relevant portions of my assessment and plan of care.    If you have questions, please do not hesitate to call me. I look forward to following Mario Mahajan along with you.    Sincerely,    Nereida Hatch MD    Enclosure    If you would like to receive this communication electronically, please contact [email or web address] or [phone] to request [employer] Link access.    Air[employer] Link is a tool which provides read-only access to select patient information with whom you have a relationship. Its easy to use and provides real time access to review your patients record including encounter summaries, notes, results, and demographic information.    If you feel you have received this communication in error or would no longer like to receive these types of communications, please e-mail [email or web address]

## 2020-04-13 NOTE — PROGRESS NOTES
Subjective: status 2     Patient ID:  Mario Mahajan is a 44 y.o. female who presents for follow-up of Congestive Heart Failure    The patient location is: home  The chief complaint leading to consultation is: fear about covid and CHF   Visit type: Virtual visit with synchronous audio and video  Total time spent with patient: 20 minutes   Each patient to whom he or she provides medical services by telemedicine is:  (1) informed of the relationship between the physician and patient and the respective role of any other health care provider with respect to management of the patient; and (2) notified that he or she may decline to receive medical services by telemedicine and may withdraw from such care at any time.        Congestive Heart Failure   Pertinent negatives include no chest pain, coughing or myalgias.    Nonischemic CHF (LVEF 28%, LVEDD 8 cm severe MR March 2019) who had three episodes of VF in early Feb 2017 (none since) who comes in for routine followup At her Sept 2019 visit, recommended she consider pericutaneous options for her mitral regurgiation   Has some issues wheezing in march 2020 which for which she was given a week of prednisone / inhalers   Has been working on losing weight by walking / eating proteins continuing foods.  Still considering bariatric surgery but needs better cardiac status. Also  waiting for covid to pass. Has lost 8 lbs since June 2019 visit with me    Review of Systems   Constitution: Negative for decreased appetite, weight gain and weight loss.   Cardiovascular: Positive for palpitations (worse early in am ). Negative for chest pain, dyspnea on exertion, leg swelling, near-syncope and orthopnea.   Respiratory: Negative for cough and shortness of breath.    Musculoskeletal: Negative for myalgias.   Gastrointestinal: Negative for jaundice.        Objective:    Physical Exam   Constitutional: She appears well-developed and well-nourished. No distress.   /70 (BP Location:  "Left arm, Patient Position: Sitting, BP Method: Large (Automatic))   Pulse 70   Ht 5' 9" (1.753 m)   Wt 114.9 kg (253 lb 4.9 oz)   LMP 03/01/2019   BMI 37.41 kg/m²      HENT:   Head: Normocephalic and atraumatic. Head is without abrasion and without contusion.   Right Ear: External ear normal.   Left Ear: External ear normal.   Nose: Nose normal. No epistaxis.   Mouth/Throat: Oropharynx is clear and moist. Mucous membranes are not cyanotic.   Eyes: Pupils are equal, round, and reactive to light. Conjunctivae and EOM are normal.   Neck: Normal range of motion. Neck supple. No JVD (JVP < 5 ) present. No tracheal deviation present.   Cardiovascular: Normal rate, regular rhythm and normal pulses. PMI is displaced (mixaxillary ). Exam reveals no gallop.   Murmur heard.  High-pitched blowing decrescendo early systolic murmur is present with a grade of 3/6 at the apex radiating to the apex.  Pulmonary/Chest: Effort normal and breath sounds normal. No stridor. No respiratory distress. She has no wheezes.   Abdominal: Soft. Normal appearance, normal aorta and bowel sounds are normal. She exhibits no distension. There is no tenderness.   Musculoskeletal: She exhibits no edema or tenderness.   Neurological: She is alert. She has normal strength and normal reflexes. She exhibits normal muscle tone.   Skin: Skin is warm. No rash noted. No erythema.   Psychiatric: She has a normal mood and affect. Her speech is normal and behavior is normal. Judgment and thought content normal. Cognition and memory are normal.     BMP  Lab Results   Component Value Date     08/07/2019    K 3.8 08/07/2019     08/07/2019    CO2 25 08/07/2019    BUN 12 08/07/2019    CREATININE 0.8 08/07/2019    CALCIUM 8.5 (L) 08/07/2019    ANIONGAP 7 (L) 08/07/2019    ESTGFRAFRICA >60 08/07/2019    EGFRNONAA >60 08/07/2019         BNP   Date Value Ref Range Status   08/07/2019 332 (H) 0 - 99 pg/mL Final     Comment:     Values of less than 100 " pg/ml are consistent with non-CHF populations.   06/14/2019 334 (H) 0 - 99 pg/mL Final     Comment:     Values of less than 100 pg/ml are consistent with non-CHF populations.   04/03/2019 265 (H) 0 - 99 pg/mL Final     Comment:     Values of less than 100 pg/ml are consistent with non-CHF populations.           Assessment:       1. Chronic combined systolic and diastolic congestive heart failure    2. Morbid obesity    3. On amiodarone therapy    4. Polymorphic ventricular tachycardia    5. Sleep stage dysfunction         Plan:       Chronic combined systolic and diastolic congestive heart failure  Will increase coreg from 12.5 BID to 12.5 and 25 at night   Later this month can increase it to 25 BID during a video visit    Morbid obesity  Will consider bariatric surgery once mitral valve issues addressed (post COVID of course)    On amiodarone therapy  Wonder if she can decrease / stop amiodarone once mitral valve addressed    Polymorphic ventricular tachycardia  Would prefer to optimize CHF management over using amidoarone     Sleep stage dysfunction  Having insominia encourage relaxation including reading books    Follow up in about 10 days (around 4/23/2020) for video visit to titrate coreg.

## 2020-04-20 DIAGNOSIS — J45.20 MILD INTERMITTENT ASTHMA WITHOUT COMPLICATION: ICD-10-CM

## 2020-04-20 RX ORDER — DEXAMETHASONE 4 MG/1
TABLET ORAL
Qty: 12 G | Refills: 4 | Status: SHIPPED | OUTPATIENT
Start: 2020-04-20 | End: 2021-07-07

## 2020-04-21 DIAGNOSIS — I50.22 CHRONIC SYSTOLIC CHF, NYHA CLASS 2 AND ACA/AHA STAGE C: ICD-10-CM

## 2020-04-21 RX ORDER — SACUBITRIL AND VALSARTAN 97; 103 MG/1; MG/1
TABLET, FILM COATED ORAL
Qty: 60 TABLET | Refills: 11 | Status: SHIPPED | OUTPATIENT
Start: 2020-04-21 | End: 2020-05-07 | Stop reason: SDUPTHER

## 2020-04-24 ENCOUNTER — OFFICE VISIT (OUTPATIENT)
Dept: TRANSPLANT | Facility: CLINIC | Age: 45
End: 2020-04-24
Payer: MEDICARE

## 2020-04-24 ENCOUNTER — PATIENT MESSAGE (OUTPATIENT)
Dept: TRANSPLANT | Facility: CLINIC | Age: 45
End: 2020-04-24

## 2020-04-24 VITALS
HEIGHT: 68 IN | BODY MASS INDEX: 37.59 KG/M2 | DIASTOLIC BLOOD PRESSURE: 68 MMHG | HEART RATE: 70 BPM | SYSTOLIC BLOOD PRESSURE: 101 MMHG | WEIGHT: 248 LBS

## 2020-04-24 DIAGNOSIS — E66.01 MORBID OBESITY: Primary | ICD-10-CM

## 2020-04-24 DIAGNOSIS — I50.42 CHRONIC COMBINED SYSTOLIC AND DIASTOLIC CONGESTIVE HEART FAILURE: ICD-10-CM

## 2020-04-24 DIAGNOSIS — Z79.899 ON AMIODARONE THERAPY: ICD-10-CM

## 2020-04-24 DIAGNOSIS — I34.0 NON-RHEUMATIC MITRAL REGURGITATION: Chronic | ICD-10-CM

## 2020-04-24 DIAGNOSIS — G47.20 SLEEP STAGE DYSFUNCTION: ICD-10-CM

## 2020-04-24 PROCEDURE — 3008F PR BODY MASS INDEX (BMI) DOCUMENTED: ICD-10-PCS | Mod: HCNC,CPTII,95, | Performed by: INTERNAL MEDICINE

## 2020-04-24 PROCEDURE — 99214 OFFICE O/P EST MOD 30 MIN: CPT | Mod: HCNC,95,, | Performed by: INTERNAL MEDICINE

## 2020-04-24 PROCEDURE — 99214 PR OFFICE/OUTPT VISIT, EST, LEVL IV, 30-39 MIN: ICD-10-PCS | Mod: HCNC,95,, | Performed by: INTERNAL MEDICINE

## 2020-04-24 PROCEDURE — 3008F BODY MASS INDEX DOCD: CPT | Mod: HCNC,CPTII,95, | Performed by: INTERNAL MEDICINE

## 2020-04-24 RX ORDER — CARVEDILOL 25 MG/1
25 TABLET ORAL 2 TIMES DAILY
Qty: 180 TABLET | Refills: 3 | Status: SHIPPED | OUTPATIENT
Start: 2020-04-24 | End: 2021-05-24 | Stop reason: SDUPTHER

## 2020-04-24 RX ORDER — FUROSEMIDE 40 MG/1
40 TABLET ORAL DAILY PRN
Qty: 90 TABLET | Refills: 4 | Status: ON HOLD | OUTPATIENT
Start: 2020-04-24 | End: 2020-06-19 | Stop reason: SDUPTHER

## 2020-04-24 NOTE — LETTER
April 24, 2020        Juanjose Nuñez  [address]  [facility] [address]  Phone: [phone]  Fax: [phone]             [facility] [address]  [facility] [address]  Phone: [phone]   Patient: Mario Mahajan   MR Number: [id number]   YOB: 1975   Date of Visit: 4/24/2020       Dear Dr. Juanjose Nuñez    Thank you for referring Mario Mahajan to me for evaluation. Attached you will find relevant portions of my assessment and plan of care.    If you have questions, please do not hesitate to call me. I look forward to following Mario Mahajan along with you.    Sincerely,    Nereida Hatch MD    Enclosure    If you would like to receive this communication electronically, please contact [email or web address] or [phone] to request St[employer] Link access.    St[employer] Link is a tool which provides read-only access to select patient information with whom you have a relationship. Its easy to use and provides real time access to review your patients record including encounter summaries, notes, results, and demographic information.    If you feel you have received this communication in error or would no longer like to receive these types of communications, please e-mail [email or web address]

## 2020-04-24 NOTE — PROGRESS NOTES
Subjective: status 2     Patient ID:  Mario Mahajan is a 44 y.o. female who presents for follow-up of Congestive Heart Failure    The patient location is: home  The chief complaint leading to consultation is: fear about covid and CHF   Visit type: Virtual visit with synchronous audio and video  Total time spent with patient: 20 minutes   Each patient to whom he or she provides medical services by telemedicine is:  (1) informed of the relationship between the physician and patient and the respective role of any other health care provider with respect to management of the patient; and (2) notified that he or she may decline to receive medical services by telemedicine and may withdraw from such care at any time.        Congestive Heart Failure   Pertinent negatives include no chest pain, coughing or myalgias.    Nonischemic CHF (LVEF 28%, LVEDD 8 cm severe MR March 2019) who had three episodes of VF in early Feb 2017 (none since) who comes in for routine followup At her Sept 2019 visit, recommended she consider pericutaneous options for her mitral regurgiation   Has some issues wheezing in march 2020 which for which she was given a week of prednisone / inhalers   At her visit earlier this month, I asked her to increase coreg from 12.5 BID to 12.5 and 25 at night.   Unfortunately she did not do this as she was waiting on 25 mg tablets to come in rather than doubling the 12.5 mg tablets she had.  ( I sent in that RX today).  She continues to have trouble with sleeping though reading a book and turning off her TV has helped.  Has been working on losing weight by walking / eating proteins continuing foods.  Still considering bariatric surgery but needs better cardiac status. Also  waiting for covid to pass. Has lost 4 lbs since his visit in early April 2020 visit with me.    Review of Systems   Constitution: Negative for decreased appetite, weight gain and weight loss.   Cardiovascular: Positive for palpitations  "(worse early in am ). Negative for chest pain, dyspnea on exertion, leg swelling, near-syncope and orthopnea.   Respiratory: Negative for cough and shortness of breath.    Musculoskeletal: Negative for myalgias.   Gastrointestinal: Negative for jaundice.        Objective:    Physical Exam   /68   Pulse 70   Ht 5' 8" (1.727 m)   Wt 112.5 kg (248 lb)   LMP 03/01/2019   BMI 37.71 kg/m²       BNP   Date Value Ref Range Status   08/07/2019 332 (H) 0 - 99 pg/mL Final     Comment:     Values of less than 100 pg/ml are consistent with non-CHF populations.   06/14/2019 334 (H) 0 - 99 pg/mL Final     Comment:     Values of less than 100 pg/ml are consistent with non-CHF populations.   04/03/2019 265 (H) 0 - 99 pg/mL Final     Comment:     Values of less than 100 pg/ml are consistent with non-CHF populations.           Assessment:       1. Morbid obesity    2. Sleep stage dysfunction    3. On amiodarone therapy    4. Chronic combined systolic and diastolic congestive heart failure    5. Non-rheumatic mitral regurgitation         Plan:       On amiodarone therapy  Wonder if she can decrease / stop amiodarone once mitral valve addressed    Morbid obesity  Will consider bariatric surgery once mitral valve issues addressed (post COVID of course)          Chronic combined systolic and diastolic congestive heart failure  Will increase coreg to 25 BID as she has tolerated this preciously without issues     Non-rheumatic mitral regurgitation  Will reestablish with mitral valve clinic to see if she is a candidate for percutaneous options   Follow up in about 3 months (around 7/24/2020).    Orders Placed This Encounter   Procedures    Brain Natriuretic Peptide     Standing Status:   Future     Standing Expiration Date:   6/23/2021    Comprehensive metabolic panel     Standing Status:   Future     Standing Expiration Date:   6/23/2021                   "

## 2020-05-04 DIAGNOSIS — I34.0 NON-RHEUMATIC MITRAL REGURGITATION: Primary | ICD-10-CM

## 2020-05-07 ENCOUNTER — PATIENT MESSAGE (OUTPATIENT)
Dept: TRANSPLANT | Facility: CLINIC | Age: 45
End: 2020-05-07

## 2020-05-07 ENCOUNTER — CLINICAL SUPPORT (OUTPATIENT)
Dept: CARDIOLOGY | Facility: HOSPITAL | Age: 45
End: 2020-05-07
Attending: INTERNAL MEDICINE
Payer: MEDICARE

## 2020-05-07 ENCOUNTER — DOCUMENTATION ONLY (OUTPATIENT)
Dept: CARDIOLOGY | Facility: HOSPITAL | Age: 45
End: 2020-05-07

## 2020-05-07 DIAGNOSIS — Z95.810 PRESENCE OF AUTOMATIC CARDIOVERTER/DEFIBRILLATOR (AICD): ICD-10-CM

## 2020-05-07 DIAGNOSIS — I42.8 NONISCHEMIC CARDIOMYOPATHY: ICD-10-CM

## 2020-05-07 DIAGNOSIS — I50.22 CHRONIC SYSTOLIC CHF, NYHA CLASS 2 AND ACA/AHA STAGE C: ICD-10-CM

## 2020-05-07 PROCEDURE — 93295 DEV INTERROG REMOTE 1/2/MLT: CPT | Mod: HCNC,,, | Performed by: INTERNAL MEDICINE

## 2020-05-07 PROCEDURE — 93296 REM INTERROG EVL PM/IDS: CPT | Mod: HCNC | Performed by: INTERNAL MEDICINE

## 2020-05-07 PROCEDURE — 93295 CARDIAC DEVICE CHECK - REMOTE: ICD-10-PCS | Mod: HCNC,,, | Performed by: INTERNAL MEDICINE

## 2020-05-11 ENCOUNTER — PATIENT MESSAGE (OUTPATIENT)
Dept: INTERNAL MEDICINE | Facility: CLINIC | Age: 45
End: 2020-05-11

## 2020-05-11 DIAGNOSIS — R50.9 FEVER, UNSPECIFIED FEVER CAUSE: Primary | ICD-10-CM

## 2020-05-12 ENCOUNTER — PATIENT MESSAGE (OUTPATIENT)
Dept: INTERNAL MEDICINE | Facility: CLINIC | Age: 45
End: 2020-05-12

## 2020-06-02 ENCOUNTER — HOSPITAL ENCOUNTER (OUTPATIENT)
Dept: CARDIOLOGY | Facility: CLINIC | Age: 45
Discharge: HOME | End: 2020-06-02
Attending: INTERNAL MEDICINE
Payer: MEDICARE

## 2020-06-02 ENCOUNTER — OFFICE VISIT (OUTPATIENT)
Dept: CARDIOTHORACIC SURGERY | Facility: CLINIC | Age: 45
End: 2020-06-02
Payer: MEDICAID

## 2020-06-02 VITALS
DIASTOLIC BLOOD PRESSURE: 59 MMHG | WEIGHT: 254.44 LBS | BODY MASS INDEX: 37.68 KG/M2 | HEIGHT: 69 IN | SYSTOLIC BLOOD PRESSURE: 116 MMHG | HEART RATE: 51 BPM | OXYGEN SATURATION: 100 %

## 2020-06-02 VITALS
HEART RATE: 70 BPM | SYSTOLIC BLOOD PRESSURE: 110 MMHG | BODY MASS INDEX: 37.59 KG/M2 | DIASTOLIC BLOOD PRESSURE: 70 MMHG | HEIGHT: 68 IN | WEIGHT: 248 LBS

## 2020-06-02 DIAGNOSIS — I42.0 NONISCHEMIC DILATED CARDIOMYOPATHY: Primary | Chronic | ICD-10-CM

## 2020-06-02 DIAGNOSIS — I34.0 NON-RHEUMATIC MITRAL REGURGITATION: ICD-10-CM

## 2020-06-02 LAB
ASCENDING AORTA: 2.71 CM
AV INDEX (PROSTH): 0.83
AV MEAN GRADIENT: 3 MMHG
AV PEAK GRADIENT: 5 MMHG
AV VALVE AREA: 2.51 CM2
AV VELOCITY RATIO: 0.78
BSA FOR ECHO PROCEDURE: 2.32 M2
CV ECHO LV RWT: 0.2 CM
DOP CALC AO PEAK VEL: 1.16 M/S
DOP CALC AO VTI: 23.85 CM
DOP CALC LVOT AREA: 3 CM2
DOP CALC LVOT DIAMETER: 1.97 CM
DOP CALC LVOT PEAK VEL: 0.91 M/S
DOP CALC LVOT STROKE VOLUME: 59.96 CM3
DOP CALCLVOT PEAK VEL VTI: 19.68 CM
E WAVE DECELERATION TIME: 310.27 MSEC
E/A RATIO: 1.57
E/E' RATIO: 9.58 M/S
ECHO LV POSTERIOR WALL: 0.8 CM (ref 0.6–1.1)
FRACTIONAL SHORTENING: 10 % (ref 28–44)
INTERVENTRICULAR SEPTUM: 0.8 CM (ref 0.6–1.1)
IVRT: 76.12 MSEC
LA MAJOR: 6.9 CM
LA MINOR: 7.25 CM
LA WIDTH: 7 CM
LEFT ATRIUM SIZE: 5.48 CM
LEFT ATRIUM VOLUME INDEX: 102.9 ML/M2
LEFT ATRIUM VOLUME: 230.55 CM3
LEFT INTERNAL DIMENSION IN SYSTOLE: 7.09 CM (ref 2.1–4)
LEFT VENTRICLE DIASTOLIC VOLUME INDEX: 148.69 ML/M2
LEFT VENTRICLE DIASTOLIC VOLUME: 333.01 ML
LEFT VENTRICLE MASS INDEX: 135 G/M2
LEFT VENTRICLE SYSTOLIC VOLUME INDEX: 117.4 ML/M2
LEFT VENTRICLE SYSTOLIC VOLUME: 263.01 ML
LEFT VENTRICULAR INTERNAL DIMENSION IN DIASTOLE: 7.88 CM (ref 3.5–6)
LEFT VENTRICULAR MASS: 302.34 G
LV LATERAL E/E' RATIO: 9.1 M/S
LV SEPTAL E/E' RATIO: 10.11 M/S
MV PEAK A VEL: 0.58 M/S
MV PEAK E VEL: 0.91 M/S
PISA TR MAX VEL: 2.99 M/S
PULM VEIN S/D RATIO: 0.91
PV PEAK D VEL: 0.7 M/S
PV PEAK S VEL: 0.64 M/S
RA MAJOR: 4.33 CM
RA PRESSURE: 3 MMHG
RA WIDTH: 4.72 CM
RIGHT VENTRICULAR END-DIASTOLIC DIMENSION: 3.31 CM
RV TISSUE DOPPLER FREE WALL SYSTOLIC VELOCITY 1 (APICAL 4 CHAMBER VIEW): 11.56 CM/S
SINUS: 2.84 CM
STJ: 2.44 CM
TDI LATERAL: 0.1 M/S
TDI SEPTAL: 0.09 M/S
TDI: 0.1 M/S
TR MAX PG: 36 MMHG
TRICUSPID ANNULAR PLANE SYSTOLIC EXCURSION: 2.7 CM
TV REST PULMONARY ARTERY PRESSURE: 39 MMHG

## 2020-06-02 PROCEDURE — 99205 PR OFFICE/OUTPT VISIT, NEW, LEVL V, 60-74 MIN: ICD-10-PCS | Mod: S$PBB,HCNC,, | Performed by: THORACIC SURGERY (CARDIOTHORACIC VASCULAR SURGERY)

## 2020-06-02 PROCEDURE — 99999 PR PBB SHADOW E&M-EST. PATIENT-LVL III: ICD-10-PCS | Mod: PBBFAC,HCNC,, | Performed by: THORACIC SURGERY (CARDIOTHORACIC VASCULAR SURGERY)

## 2020-06-02 PROCEDURE — 99999 PR PBB SHADOW E&M-EST. PATIENT-LVL III: CPT | Mod: PBBFAC,HCNC,, | Performed by: THORACIC SURGERY (CARDIOTHORACIC VASCULAR SURGERY)

## 2020-06-02 PROCEDURE — C8929 TTE W OR WO FOL WCON,DOPPLER: HCPCS | Mod: HCNC

## 2020-06-02 PROCEDURE — 99205 OFFICE O/P NEW HI 60 MIN: CPT | Mod: S$PBB,HCNC,, | Performed by: THORACIC SURGERY (CARDIOTHORACIC VASCULAR SURGERY)

## 2020-06-02 PROCEDURE — 93306 ECHO (CUPID ONLY): ICD-10-PCS | Mod: 26,HCNC,, | Performed by: INTERNAL MEDICINE

## 2020-06-02 PROCEDURE — 93306 TTE W/DOPPLER COMPLETE: CPT | Mod: 26,HCNC,, | Performed by: INTERNAL MEDICINE

## 2020-06-02 PROCEDURE — 99213 OFFICE O/P EST LOW 20 MIN: CPT | Mod: PBBFAC,25,HCNC | Performed by: THORACIC SURGERY (CARDIOTHORACIC VASCULAR SURGERY)

## 2020-06-02 RX ORDER — MULTIVIT WITH MINERALS/HERBS
1 TABLET ORAL DAILY
COMMUNITY
End: 2023-08-21

## 2020-06-02 NOTE — LETTER
June 4, 2020      Memo Luis MD  [address]  [facility] [address]           [facility]  [address]  [facility] [address]  Phone: [phone]          Patient: Mario Mahajan   MR Number: [id number]   YOB: 1975   Date of Visit: 6/2/2020       Dear Dr. Memo Luis:    Thank you for referring Mario Mahajan to me for evaluation. Attached you will find relevant portions of my assessment and plan of care.    If you have questions, please do not hesitate to call me. I look forward to following Mario Mahajan along with you.    Sincerely,    David Gilmore MD    Enclosure  CC:  No Recipients    If you would like to receive this communication electronically, please contact [email or web address] or [phone] to request more information on Insider Pages Link access.    For providers and/or their staff who would like to refer a patient to [facility], please contact us through our one-stop-shop provider referral line, [facility], at [phone].    If you feel you have received this communication in error or would no longer like to receive these types of communications, please e-mail [email or web address]

## 2020-06-02 NOTE — PROGRESS NOTES
Procedure explained. 22 g sl started in left hand for optison use, optison given ivp via sl for imaging by jamilah renee melva. Sl d/kate after. Pressure applied.

## 2020-06-02 NOTE — PROGRESS NOTES
Subjective:      Patient ID: Mario Mahajan is a 44 y.o. female.    Chief Complaint: Consult and Mitral Regurgitation      HPI:  Mario Mahajan is a 44 y.o. female who presents with  Nonischemic CHF (LVEF 28%, LVEDD 8 cm severe MR 2019) who had three episodes of VF in early 2017 (none since) who comes in for routine followup At her 2019 visit, recommended she consider pericutaneous options for her mitral regurgiation   Has some issues wheezing in 2020 which for which she was given a week of prednisone / inhalers. Pt was referred by Dr. Luis for surgical intervention for MR. Pt has c/o palpitations. Denies CP or DELGADO.       Family and social history reviewed    Review of patient's allergies indicates:   Allergen Reactions    Ace inhibitors      Cough     Past Medical History:   Diagnosis Date    Asthma     Chronic back pain 2014    Chronic combined systolic and diastolic congestive heart failure 2015     2-10-17   1 - Severely depressed left ventricular systolic function (EF 20-25%).    2 - Severe left ventricular enlargement.    3 - Severe left atrial enlargement.    4 - Left ventricular diastolic dysfunction.    5 - The estimated PA systolic pressure is 18 mmHg.    6 - Mild mitral regurgitation.     Encounter for blood transfusion     ICD (implantable cardioverter-defibrillator) in place 12/01/15 3/3/2016    Menorrhagia, premenopausal 2/10/2017    Microcytic anemia 2015    Non-rheumatic mitral regurgitation 3/5/2015    Nonischemic dilated cardiomyopathy 2015    Sleep apnea     Syncope and collapse 2017    Ventricular tachycardia, polymorphic      Past Surgical History:   Procedure Laterality Date    CARDIAC DEFIBRILLATOR PLACEMENT  2015     SECTION      OOPHORECTOMY      RIGHT HEART CATHETERIZATION      TOTAL ABDOMINAL HYSTERECTOMY N/A 3/26/2019    Procedure: HYSTERECTOMY, TOTAL, ABDOMINAL;  Surgeon: Victorino Rose MD;  Location:  [facility] OR;  Service: OB/GYN;  Laterality: N/A;    TUBAL LIGATION       Family History     Problem Relation (Age of Onset)    Diabetes Father    Heart failure Father, Brother    Lung cancer Paternal Grandmother    Pancreatic cancer Father        Social History     Socioeconomic History    Marital status: Single     Spouse name: Not on file    Number of children: 2    Years of education: Not on file    Highest education level: Not on file   Occupational History    Occupation: Unemployed     Employer: [employer]   Social Needs    Financial resource strain: Not on file    Food insecurity:     Worry: Not on file     Inability: Not on file    Transportation needs:     Medical: Not on file     Non-medical: Not on file   Tobacco Use    Smoking status: Never Smoker    Smokeless tobacco: Never Used   Substance and Sexual Activity    Alcohol use: Not Currently     Comment: 1 glass per 3 months    Drug use: No    Sexual activity: Yes     Partners: Male     Comment: one male partner (boyfriend)   Lifestyle    Physical activity:     Days per week: Not on file     Minutes per session: Not on file    Stress: Not at all   Relationships    Social connections:     Talks on phone: Not on file     Gets together: Not on file     Attends Orthodox service: Not on file     Active member of club or organization: Not on file     Attends meetings of clubs or organizations: Not on file     Relationship status: Not on file   Other Topics Concern    Not on file   Social History Narrative    Not on file       Current medications Reviewed    Review of Systems   Constitutional: Negative for activity change and fatigue.   Respiratory: Negative for cough and shortness of breath.    Cardiovascular: Positive for palpitations. Negative for chest pain and leg swelling.   Gastrointestinal: Negative for abdominal pain, nausea and vomiting.   Endocrine: Negative for polydipsia, polyphagia and polyuria.   Genitourinary: Negative  for dysuria.   Musculoskeletal: Negative for gait problem.   Skin: Negative for rash.   Allergic/Immunologic: Negative for immunocompromised state.   Neurological: Negative for dizziness, syncope and weakness.   Hematological: Does not bruise/bleed easily.   Psychiatric/Behavioral: Negative for behavioral problems.     Objective:   Physical Exam   Constitutional: She is oriented to person, place, and time. She appears well-developed and well-nourished.   HENT:   Head: Normocephalic.   Eyes: EOM are normal.   Neck: Normal range of motion.   Cardiovascular: Normal rate, regular rhythm and normal heart sounds.   Pulmonary/Chest: Effort normal and breath sounds normal.   Abdominal: Soft.   Musculoskeletal: Normal range of motion.   Neurological: She is alert and oriented to person, place, and time.   Skin: Skin is warm, dry and intact.   Psychiatric: She has a normal mood and affect.       Diagnostic Results:   ECHO:   · Severely decreased left ventricular systolic function. The estimated ejection fraction is 20-25%.  · Severe left ventricular enlargement. Eccentric left ventricular hypertrophy.  · Grade II (moderate) left ventricular diastolic dysfunction consistent with pseudonormalization.  · Severe eccentric mitral regurgitation with posteriorly directed jet due to posterior leaflet tethering. Functional MR.  · Normal right ventricular systolic function.  · Mild tricuspid regurgitation.  · The estimated PA systolic pressure is 39 mmHg.  · Normal central venous pressure (3 mmHg).  · Severe left atrial enlargement.  · Severe left ventricular enlargement.  · Severe global hypokinetic wall motion.    LVEDD 7.8  Assessment:   1. MR  2. NICM  Plan:   Refer to  for mitral clip    CTS Attending Note:    I have personally taken the history and examined this patient and agree with the ATUL's note as stated above.   Pleasant 44-year-old woman with dilated cardiomyopathy.  She has a disease of the ventricle, not of the  valve.  I believe that mitral valve replacement would be high risk with little benefit.  I do think mitral clip would be a good option for her,  Although I ultimately think she will need transplant evaluation.

## 2020-06-09 ENCOUNTER — OFFICE VISIT (OUTPATIENT)
Dept: CARDIOLOGY | Facility: CLINIC | Age: 45
End: 2020-06-09
Payer: MEDICARE

## 2020-06-09 ENCOUNTER — PATIENT OUTREACH (OUTPATIENT)
Dept: ADMINISTRATIVE | Facility: OTHER | Age: 45
End: 2020-06-09

## 2020-06-09 VITALS
HEART RATE: 58 BPM | SYSTOLIC BLOOD PRESSURE: 117 MMHG | WEIGHT: 246.25 LBS | OXYGEN SATURATION: 95 % | DIASTOLIC BLOOD PRESSURE: 60 MMHG | BODY MASS INDEX: 36.47 KG/M2 | HEIGHT: 69 IN

## 2020-06-09 DIAGNOSIS — N18.9 CHRONIC KIDNEY DISEASE, UNSPECIFIED CKD STAGE: ICD-10-CM

## 2020-06-09 DIAGNOSIS — E66.01 MORBID OBESITY: ICD-10-CM

## 2020-06-09 DIAGNOSIS — I42.8 NONISCHEMIC CARDIOMYOPATHY: Primary | ICD-10-CM

## 2020-06-09 DIAGNOSIS — I34.0 NON-RHEUMATIC MITRAL REGURGITATION: Primary | Chronic | ICD-10-CM

## 2020-06-09 DIAGNOSIS — I42.0 NONISCHEMIC DILATED CARDIOMYOPATHY: Chronic | ICD-10-CM

## 2020-06-09 DIAGNOSIS — I34.0 NON-RHEUMATIC MITRAL REGURGITATION: Primary | ICD-10-CM

## 2020-06-09 DIAGNOSIS — Z95.810 ICD (IMPLANTABLE CARDIOVERTER-DEFIBRILLATOR) IN PLACE: Chronic | ICD-10-CM

## 2020-06-09 DIAGNOSIS — I47.29 POLYMORPHIC VENTRICULAR TACHYCARDIA: ICD-10-CM

## 2020-06-09 PROCEDURE — 99999 PR PBB SHADOW E&M-EST. PATIENT-LVL III: CPT | Mod: PBBFAC,HCNC,, | Performed by: INTERNAL MEDICINE

## 2020-06-09 PROCEDURE — 99214 OFFICE O/P EST MOD 30 MIN: CPT | Mod: HCNC,S$GLB,, | Performed by: INTERNAL MEDICINE

## 2020-06-09 PROCEDURE — 99999 PR PBB SHADOW E&M-EST. PATIENT-LVL III: ICD-10-PCS | Mod: PBBFAC,HCNC,, | Performed by: INTERNAL MEDICINE

## 2020-06-09 PROCEDURE — 99499 RISK ADDL DX/OHS AUDIT: ICD-10-PCS | Mod: HCNC,S$GLB,, | Performed by: INTERNAL MEDICINE

## 2020-06-09 PROCEDURE — 99214 PR OFFICE/OUTPT VISIT, EST, LEVL IV, 30-39 MIN: ICD-10-PCS | Mod: HCNC,S$GLB,, | Performed by: INTERNAL MEDICINE

## 2020-06-09 PROCEDURE — 3008F PR BODY MASS INDEX (BMI) DOCUMENTED: ICD-10-PCS | Mod: HCNC,CPTII,S$GLB, | Performed by: INTERNAL MEDICINE

## 2020-06-09 PROCEDURE — 99499 UNLISTED E&M SERVICE: CPT | Mod: HCNC,S$GLB,, | Performed by: INTERNAL MEDICINE

## 2020-06-09 PROCEDURE — 3008F BODY MASS INDEX DOCD: CPT | Mod: HCNC,CPTII,S$GLB, | Performed by: INTERNAL MEDICINE

## 2020-06-09 RX ORDER — DIPHENHYDRAMINE HCL 25 MG
50 CAPSULE ORAL ONCE
Status: CANCELLED | OUTPATIENT
Start: 2020-06-09 | End: 2020-06-09

## 2020-06-09 RX ORDER — DEXTROSE MONOHYDRATE AND SODIUM CHLORIDE 5; .45 G/100ML; G/100ML
INJECTION, SOLUTION INTRAVENOUS CONTINUOUS
Status: CANCELLED | OUTPATIENT
Start: 2020-06-09

## 2020-06-09 NOTE — PROGRESS NOTES
Cardiology Clinic Note  Reason for Visit: Mitraclip Eval   Referring MD: KOBE Hatch    HPI:   45 y/o AAF with hx of NICM EF 30% since 2015(NL PET 2015, NML coronary angiogram 2017), VT s/p AICD 2015, Obesity and severe functional MR with LVEDD 7.8cm and NYHA II symptoms here for radha clip eval. Initially seen in 10/2019 by Dr. Luis and referred for surgical mitral valve repair/replacment as part of heart team approach. Dr Gilmore felt risk outweighed benefits for surgical repair given reduced EF and functional nature of MR and felt radha clip would be a better option. She Is on optimal GDMT dictated by Dr. ELOISA aHtch. No recent admits for decompensated CHF. States with titration of GDMT her symptoms have improved but she still gets SOB and chest tightness when walking short distances outside. No orpthopnea or PND.  Minimal LE edema. No syncope or recent ICD shocks    ROS:    Constitution: Negative for fever or chills. Negative for weight loss or gain.   HENT: Negative for sore throat or headaches. Negative for rhinorrhea.  Eyes: Negative for blurred or double vision.   Cardiovascular: See above  Pulmonary: + SOB. Negative for cough.   Gastrointestinal: Negative for abdominal pain. Negative for nausea/ vomiting. Negative for diarrhea.   : Negative for dysuria.   Neurological: Negative for focal weakness or sensory changes.  PMH:     Past Medical History:   Diagnosis Date    Asthma     Chronic back pain 7/1/2014    Chronic combined systolic and diastolic congestive heart failure 4/28/2015     2-10-17   1 - Severely depressed left ventricular systolic function (EF 20-25%).    2 - Severe left ventricular enlargement.    3 - Severe left atrial enlargement.    4 - Left ventricular diastolic dysfunction.    5 - The estimated PA systolic pressure is 18 mmHg.    6 - Mild mitral regurgitation.     Encounter for blood transfusion     ICD (implantable cardioverter-defibrillator) in place 12/01/15 3/3/2016     Menorrhagia, premenopausal 2/10/2017    Microcytic anemia 2015    Non-rheumatic mitral regurgitation 3/5/2015    Nonischemic dilated cardiomyopathy 2015    Sleep apnea     Syncope and collapse 2017    Ventricular tachycardia, polymorphic      Past Surgical History:   Procedure Laterality Date    CARDIAC DEFIBRILLATOR PLACEMENT  2015     SECTION      OOPHORECTOMY      RIGHT HEART CATHETERIZATION      TOTAL ABDOMINAL HYSTERECTOMY N/A 3/26/2019    Procedure: HYSTERECTOMY, TOTAL, ABDOMINAL;  Surgeon: Victorino Rose MD;  Location: [facility];  Service: OB/GYN;  Laterality: N/A;    TUBAL LIGATION       Allergies:     Review of patient's allergies indicates:   Allergen Reactions    Ace inhibitors      Cough     Medications:     Current Outpatient Medications on File Prior to Visit   Medication Sig Dispense Refill    albuterol (PROVENTIL/VENTOLIN HFA) 90 mcg/actuation inhaler Inhale 2 puffs into the lungs every 6 (six) hours as needed for Wheezing. 18 g 6    amiodarone (PACERONE) 200 MG Tab TAKE 1 TABLET(200 MG) BY MOUTH EVERY DAY 90 tablet 3    ascorbic acid, vitamin C, (VITAMIN C) 250 MG tablet Take 1 tablet (250 mg) by mouth twice daily. Take with the iron tablets. 60 tablet 3    b complex vitamins tablet Take 1 tablet by mouth once daily.      carvediloL (COREG) 25 MG tablet Take 1 tablet (25 mg total) by mouth 2 (two) times daily. 180 tablet 3    ferrous sulfate 325 (65 FE) MG EC tablet Take 1 tablet (325 mg total) by mouth 2 (two) times daily. Take with vitamin C. 60 tablet 3    FLOVENT  mcg/actuation inhaler INL 2 PFS PO TWICE DAILY. RM AFTER U 12 g 4    furosemide (LASIX) 40 MG tablet Take 1 tablet (40 mg total) by mouth daily as needed (Leg swelling or weight gain). 90 tablet 4    ibuprofen (ADVIL,MOTRIN) 600 MG tablet Take 1 tablet (600 mg total) by mouth every 6 (six) hours as needed for Pain. 30 tablet 2    ipratropium-albuteroL (COMBIVENT RESPIMAT)   "mcg/actuation inhaler Inhale 1 puff into the lungs 4 (four) times daily. Rescue      sacubitriL-valsartan (ENTRESTO)  mg per tablet Take 1 tablet by mouth 2 (two) times daily. 60 tablet 11    spironolactone (ALDACTONE) 25 MG tablet Take 1 tablet (25 mg total) by mouth once daily. 30 tablet 11    topiramate (TOPAMAX) 50 MG tablet Take 1 tablet (50 mg total) by mouth once daily. 90 tablet 4     No current facility-administered medications on file prior to visit.      Social History:     Social History     Tobacco Use    Smoking status: Never Smoker    Smokeless tobacco: Never Used   Substance Use Topics    Alcohol use: Not Currently     Comment: 1 glass per 3 months     Family History:     Family History   Problem Relation Age of Onset    Diabetes Father     Pancreatic cancer Father     Heart failure Father     Heart failure Brother     Lung cancer Paternal Grandmother      Physical Exam:   /60 (BP Location: Left arm, Patient Position: Sitting, BP Method: Large (Automatic))   Pulse (!) 58   Ht 5' 9" (1.753 m)   Wt 111.7 kg (246 lb 4.1 oz)   LMP 03/01/2019   SpO2 95%   BMI 36.37 kg/m²      Constitutional: No acute distress, conversant  HEENT: Sclera anicteric, Pupils equal, round and reactive to light, extraocular motions intact, Oropharynx clear  Neck: + JVD, no carotid bruits  Cardiovascular: regular rate and rhythm, 3/6 systolic murmur at apex  Pulmonary: Clear to auscultation bilaterally  Abdominal: Abdomen soft, nontender, nondistended, positive bowel sounds  Extremities: 1+ BL lower extremity edema,   Pulses: 2+ BL Radial, 2+ BL carotid, 2+ BL DP, 2+ BL PT  Skin: No ecchymosis, erythema, or ulcers  Psych: Alert and oriented x 3, appropriate affect  Neuro: CNII-XII intact, no focal deficits    Labs:     Lab Results   Component Value Date     08/07/2019    K 3.8 08/07/2019     08/07/2019    CO2 25 08/07/2019    BUN 12 08/07/2019    BUN 12 06/19/2015    CREATININE 0.8 " 08/07/2019    ANIONGAP 7 (L) 08/07/2019     Lab Results   Component Value Date    AST 30 09/16/2019    ALT 33 09/16/2019    ALKPHOS 39 09/16/2019    BILITOT 1.3 (H) 09/16/2019    BILIDIR 0.7 (H) 01/12/2018    ALBUMIN 3.5 09/16/2019     Lab Results   Component Value Date    CALCIUM 8.5 (L) 08/07/2019    MG 1.7 09/16/2019    PHOS 3.7 09/16/2019     Lab Results   Component Value Date     (H) 08/07/2019     (H) 06/14/2019     (H) 04/03/2019    Lab Results   Component Value Date    WBC 6.28 08/07/2019    HGB 10.2 (L) 08/07/2019    HCT 33.2 (L) 08/07/2019     08/07/2019    GRAN 4.2 08/07/2019    GRAN 67.5 08/07/2019     Lab Results   Component Value Date    INR 1.2 03/13/2017     Lab Results   Component Value Date    CHOL 98 (L) 09/16/2019    HDL 40 09/16/2019    LDLCALC 46.2 (L) 09/16/2019    TRIG 59 09/16/2019     Lab Results   Component Value Date    HGBA1C 4.8 09/16/2019     Lab Results   Component Value Date    TSH 0.516 01/02/2020    H1JWNGJ 14.9 (H) 09/16/2019            Assessment:     1. Non-rheumatic mitral regurgitation    2. Nonischemic dilated cardiomyopathy    3. ICD (implantable cardioverter-defibrillator) in place 12/01/15    4. Polymorphic ventricular tachycardia    5. Morbid obesity        Plan:     - Pt with severe functional MR and EF 30% with NYHA II + symptoms despite optimal GDMT felt to be at prohibitive surgical risk and excellent candidate for mitraclip per heart team.   - Will plan on mitraclip via R CFV access  - Can place on ASA 81mg qd after procedure    The risks (including death, heart attack, limb loss, paralysis, emergency surgery, bleeding, renal failure, and stroke), the potential benefits of the procedure, and the alternatives to the procedure, were discussed in detail and accepted by the patient. All questions were answered. Patient agrees to proceed.      Signed:  Carlton Tian MD  Cardiology Fellow  [phone]  6/9/2020 2:08 PM      Interventional  Cardiology Staff  I have personally taken the history and examined this patient. I have discussed and agree with the resident's findings and plan as documented in the resident's note.   44-year-old female with non rheumatic mitral regurgitation, nonischemic dilated cardiomyopathy and ejection fraction of approximately 25% and implantable cardio defibrillator due to polymorphic ventricular Kock cardia and morbid obesity referred by Dr. pratt to evaluate for MitraClip.  She is felt to be a poor surgical candidate due to these medical comorbidities listed.  She has severe regurgitation and NYHA class 2-3 symptoms despite guideline directed medical therapy and has been offered mitral clip procedure.  She agrees with the plan and wishes to proceed.  Risk and benefit have been discussed in detail.      Michael Luis

## 2020-06-09 NOTE — LETTER
June 9, 2020      Nereida Hatch MD  [address]  [facility] [address]           [facility]-Interventional Card  [address]  [facility] [address]  Phone: [phone]          Patient: Mario Mahajan   MR Number: [id number]   YOB: 1975   Date of Visit: 6/9/2020       Dear Dr. Nereida Hatch:    Thank you for referring Mario Mahajan to me for evaluation. Attached you will find relevant portions of my assessment and plan of care.    If you have questions, please do not hesitate to call me. I look forward to following Mario Mahajan along with you.    Sincerely,    Memo Luis MD    Enclosure  CC:  No Recipients    If you would like to receive this communication electronically, please contact [email or web address] or [phone] to request more information on [employer] Link access.    For providers and/or their staff who would like to refer a patient to [facility], please contact us through our one-stop-shop provider referral line, [facility], at [phone].    If you feel you have received this communication in error or would no longer like to receive these types of communications, please e-mail [email or web address]

## 2020-06-09 NOTE — H&P (VIEW-ONLY)
Cardiology Clinic Note  Reason for Visit: Mitraclip Eval   Referring MD: KOBE Hatch    HPI:   43 y/o AAF with hx of NICM EF 30% since 2015(NL PET 2015, NML coronary angiogram 2017), VT s/p AICD 2015, Obesity and severe functional MR with LVEDD 7.8cm and NYHA II symptoms here for radha clip eval. Initially seen in 10/2019 by Dr. Luis and referred for surgical mitral valve repair/replacment as part of heart team approach. Dr Gilmore felt risk outweighed benefits for surgical repair given reduced EF and functional nature of MR and felt radha clip would be a better option. She Is on optimal GDMT dictated by Dr. ELOISA Hatch. No recent admits for decompensated CHF. States with titration of GDMT her symptoms have improved but she still gets SOB and chest tightness when walking short distances outside. No orpthopnea or PND.  Minimal LE edema. No syncope or recent ICD shocks    ROS:    Constitution: Negative for fever or chills. Negative for weight loss or gain.   HENT: Negative for sore throat or headaches. Negative for rhinorrhea.  Eyes: Negative for blurred or double vision.   Cardiovascular: See above  Pulmonary: + SOB. Negative for cough.   Gastrointestinal: Negative for abdominal pain. Negative for nausea/ vomiting. Negative for diarrhea.   : Negative for dysuria.   Neurological: Negative for focal weakness or sensory changes.  PMH:     Past Medical History:   Diagnosis Date    Asthma     Chronic back pain 7/1/2014    Chronic combined systolic and diastolic congestive heart failure 4/28/2015     2-10-17   1 - Severely depressed left ventricular systolic function (EF 20-25%).    2 - Severe left ventricular enlargement.    3 - Severe left atrial enlargement.    4 - Left ventricular diastolic dysfunction.    5 - The estimated PA systolic pressure is 18 mmHg.    6 - Mild mitral regurgitation.     Encounter for blood transfusion     ICD (implantable cardioverter-defibrillator) in place 12/01/15 3/3/2016     Menorrhagia, premenopausal 2/10/2017    Microcytic anemia 2015    Non-rheumatic mitral regurgitation 3/5/2015    Nonischemic dilated cardiomyopathy 2015    Sleep apnea     Syncope and collapse 2017    Ventricular tachycardia, polymorphic      Past Surgical History:   Procedure Laterality Date    CARDIAC DEFIBRILLATOR PLACEMENT  2015     SECTION      OOPHORECTOMY      RIGHT HEART CATHETERIZATION      TOTAL ABDOMINAL HYSTERECTOMY N/A 3/26/2019    Procedure: HYSTERECTOMY, TOTAL, ABDOMINAL;  Surgeon: Victorino Rose MD;  Location: [facility];  Service: OB/GYN;  Laterality: N/A;    TUBAL LIGATION       Allergies:     Review of patient's allergies indicates:   Allergen Reactions    Ace inhibitors      Cough     Medications:     Current Outpatient Medications on File Prior to Visit   Medication Sig Dispense Refill    albuterol (PROVENTIL/VENTOLIN HFA) 90 mcg/actuation inhaler Inhale 2 puffs into the lungs every 6 (six) hours as needed for Wheezing. 18 g 6    amiodarone (PACERONE) 200 MG Tab TAKE 1 TABLET(200 MG) BY MOUTH EVERY DAY 90 tablet 3    ascorbic acid, vitamin C, (VITAMIN C) 250 MG tablet Take 1 tablet (250 mg) by mouth twice daily. Take with the iron tablets. 60 tablet 3    b complex vitamins tablet Take 1 tablet by mouth once daily.      carvediloL (COREG) 25 MG tablet Take 1 tablet (25 mg total) by mouth 2 (two) times daily. 180 tablet 3    ferrous sulfate 325 (65 FE) MG EC tablet Take 1 tablet (325 mg total) by mouth 2 (two) times daily. Take with vitamin C. 60 tablet 3    FLOVENT  mcg/actuation inhaler INL 2 PFS PO TWICE DAILY. RM AFTER U 12 g 4    furosemide (LASIX) 40 MG tablet Take 1 tablet (40 mg total) by mouth daily as needed (Leg swelling or weight gain). 90 tablet 4    ibuprofen (ADVIL,MOTRIN) 600 MG tablet Take 1 tablet (600 mg total) by mouth every 6 (six) hours as needed for Pain. 30 tablet 2    ipratropium-albuteroL (COMBIVENT RESPIMAT)   "mcg/actuation inhaler Inhale 1 puff into the lungs 4 (four) times daily. Rescue      sacubitriL-valsartan (ENTRESTO)  mg per tablet Take 1 tablet by mouth 2 (two) times daily. 60 tablet 11    spironolactone (ALDACTONE) 25 MG tablet Take 1 tablet (25 mg total) by mouth once daily. 30 tablet 11    topiramate (TOPAMAX) 50 MG tablet Take 1 tablet (50 mg total) by mouth once daily. 90 tablet 4     No current facility-administered medications on file prior to visit.      Social History:     Social History     Tobacco Use    Smoking status: Never Smoker    Smokeless tobacco: Never Used   Substance Use Topics    Alcohol use: Not Currently     Comment: 1 glass per 3 months     Family History:     Family History   Problem Relation Age of Onset    Diabetes Father     Pancreatic cancer Father     Heart failure Father     Heart failure Brother     Lung cancer Paternal Grandmother      Physical Exam:   /60 (BP Location: Left arm, Patient Position: Sitting, BP Method: Large (Automatic))   Pulse (!) 58   Ht 5' 9" (1.753 m)   Wt 111.7 kg (246 lb 4.1 oz)   LMP 03/01/2019   SpO2 95%   BMI 36.37 kg/m²      Constitutional: No acute distress, conversant  HEENT: Sclera anicteric, Pupils equal, round and reactive to light, extraocular motions intact, Oropharynx clear  Neck: + JVD, no carotid bruits  Cardiovascular: regular rate and rhythm, 3/6 systolic murmur at apex  Pulmonary: Clear to auscultation bilaterally  Abdominal: Abdomen soft, nontender, nondistended, positive bowel sounds  Extremities: 1+ BL lower extremity edema,   Pulses: 2+ BL Radial, 2+ BL carotid, 2+ BL DP, 2+ BL PT  Skin: No ecchymosis, erythema, or ulcers  Psych: Alert and oriented x 3, appropriate affect  Neuro: CNII-XII intact, no focal deficits    Labs:     Lab Results   Component Value Date     08/07/2019    K 3.8 08/07/2019     08/07/2019    CO2 25 08/07/2019    BUN 12 08/07/2019    BUN 12 06/19/2015    CREATININE 0.8 " 08/07/2019    ANIONGAP 7 (L) 08/07/2019     Lab Results   Component Value Date    AST 30 09/16/2019    ALT 33 09/16/2019    ALKPHOS 39 09/16/2019    BILITOT 1.3 (H) 09/16/2019    BILIDIR 0.7 (H) 01/12/2018    ALBUMIN 3.5 09/16/2019     Lab Results   Component Value Date    CALCIUM 8.5 (L) 08/07/2019    MG 1.7 09/16/2019    PHOS 3.7 09/16/2019     Lab Results   Component Value Date     (H) 08/07/2019     (H) 06/14/2019     (H) 04/03/2019    Lab Results   Component Value Date    WBC 6.28 08/07/2019    HGB 10.2 (L) 08/07/2019    HCT 33.2 (L) 08/07/2019     08/07/2019    GRAN 4.2 08/07/2019    GRAN 67.5 08/07/2019     Lab Results   Component Value Date    INR 1.2 03/13/2017     Lab Results   Component Value Date    CHOL 98 (L) 09/16/2019    HDL 40 09/16/2019    LDLCALC 46.2 (L) 09/16/2019    TRIG 59 09/16/2019     Lab Results   Component Value Date    HGBA1C 4.8 09/16/2019     Lab Results   Component Value Date    TSH 0.516 01/02/2020    [id number] 14.9 (H) 09/16/2019            Assessment:     1. Non-rheumatic mitral regurgitation    2. Nonischemic dilated cardiomyopathy    3. ICD (implantable cardioverter-defibrillator) in place 12/01/15    4. Polymorphic ventricular tachycardia    5. Morbid obesity        Plan:     - Pt with severe functional MR and EF 30% with NYHA II + symptoms despite optimal GDMT felt to be at prohibitive surgical risk and excellent candidate for mitraclip per heart team.   - Will plan on mitraclip via R CFV access  - Can place on ASA 81mg qd after procedure    The risks (including death, heart attack, limb loss, paralysis, emergency surgery, bleeding, renal failure, and stroke), the potential benefits of the procedure, and the alternatives to the procedure, were discussed in detail and accepted by the patient. All questions were answered. Patient agrees to proceed.      Signed:  Carlton Tian MD  Cardiology Fellow  [phone]  6/9/2020 2:08 PM      Interventional  Cardiology Staff  I have personally taken the history and examined this patient. I have discussed and agree with the resident's findings and plan as documented in the resident's note.   44-year-old female with non rheumatic mitral regurgitation, nonischemic dilated cardiomyopathy and ejection fraction of approximately 25% and implantable cardio defibrillator due to polymorphic ventricular Kock cardia and morbid obesity referred by Dr. pratt to evaluate for MitraClip.  She is felt to be a poor surgical candidate due to these medical comorbidities listed.  She has severe regurgitation and NYHA class 2-3 symptoms despite guideline directed medical therapy and has been offered mitral clip procedure.  She agrees with the plan and wishes to proceed.  Risk and benefit have been discussed in detail.      Michael Luis

## 2020-06-15 ENCOUNTER — LAB VISIT (OUTPATIENT)
Dept: SURGERY | Facility: CLINIC | Age: 45
DRG: 274 | End: 2020-06-15
Payer: MEDICARE

## 2020-06-15 LAB — SARS-COV-2 RNA RESP QL NAA+PROBE: NOT DETECTED

## 2020-06-15 PROCEDURE — U0003 INFECTIOUS AGENT DETECTION BY NUCLEIC ACID (DNA OR RNA); SEVERE ACUTE RESPIRATORY SYNDROME CORONAVIRUS 2 (SARS-COV-2) (CORONAVIRUS DISEASE [COVID-19]), AMPLIFIED PROBE TECHNIQUE, MAKING USE OF HIGH THROUGHPUT TECHNOLOGIES AS DESCRIBED BY CMS-2020-01-R: HCPCS | Mod: HCNC

## 2020-06-16 ENCOUNTER — ANESTHESIA EVENT (OUTPATIENT)
Dept: MEDSURG UNIT | Facility: HOSPITAL | Age: 45
DRG: 274 | End: 2020-06-16
Payer: MEDICARE

## 2020-06-16 NOTE — ANESTHESIA PREPROCEDURE EVALUATION
[facility]  Anesthesia Pre-Operative Evaluation         Patient Name: Mario Mahajan  YOB: 1975  MRN: [id number]    SUBJECTIVE:     Pre-operative evaluation for Procedure(s) (LRB):  Mitral clip (N/A)     06/16/2020    Mario Mahajan is a 44 y.o. female w/ a significant PMHx of NICM EF 20-25% since 2015(NL PET 2015, NML coronary angiogram 2017), VT s/p AICD 2015, Obesity and severe functional MR with LVEDD 7.8cm and NYHA II symptoms here for radha clip eval. .    Patient now presents for the above procedure(s).      LDA: None documented.       Prev airway:   Airway (Primary) Present Prior to Hospital Arrival?: No; Placement Date: 03/26/19; Placement Time: 1238; Method of Intubation: Direct laryngoscopy; Inserted by: Other (RADHAMES Ibrahim); Airway Device: Endotracheal Tube; Mask Ventilation: Easy; Intubated: Postinduction; Blade: Edna #3; Airway Device Size: 7.0; Style: Cuffed; Cuff Inflation: Minimal occlusive pressure; Placement Verified By: Auscultation, Capnometry, ETT Condensation; Grade: Grade I; Complicating Factors: None; Intubation Findings: Positive EtCO2, Bilateral breath sounds, Atraumatic/Condition of teeth unchanged;  Depth of Insertion: 19; Securment: Lips; Complications: None; Breath Sounds: Equal Bilateral; Insertion Attempts: 1; Removal Date: 03/26/19;  Removal Time: 1417         Drips: None documented.      Patient Active Problem List   Diagnosis    Microcytic anemia    Non-rheumatic mitral regurgitation    Chronic combined systolic and diastolic congestive heart failure    Nonischemic dilated cardiomyopathy    ICD (implantable cardioverter-defibrillator) in place 12/01/15    Polymorphic ventricular tachycardia    Organ transplant candidate    Sleep stage dysfunction    On amiodarone therapy    Mild intermittent asthma without complication    Left shoulder pain    Morbid obesity     Hemiplegic migraine without status migrainosus, not intractable    B12 deficiency       Review of patient's allergies indicates:   Allergen Reactions    Ace inhibitors      Cough       Current Inpatient Medications:      No current facility-administered medications on file prior to encounter.      Current Outpatient Medications on File Prior to Encounter   Medication Sig Dispense Refill    albuterol (PROVENTIL/VENTOLIN HFA) 90 mcg/actuation inhaler Inhale 2 puffs into the lungs every 6 (six) hours as needed for Wheezing. 18 g 6    amiodarone (PACERONE) 200 MG Tab TAKE 1 TABLET(200 MG) BY MOUTH EVERY DAY 90 tablet 3    ascorbic acid, vitamin C, (VITAMIN C) 250 MG tablet Take 1 tablet (250 mg) by mouth twice daily. Take with the iron tablets. 60 tablet 3    b complex vitamins tablet Take 1 tablet by mouth once daily.      carvediloL (COREG) 25 MG tablet Take 1 tablet (25 mg total) by mouth 2 (two) times daily. 180 tablet 3    ferrous sulfate 325 (65 FE) MG EC tablet Take 1 tablet (325 mg total) by mouth 2 (two) times daily. Take with vitamin C. 60 tablet 3    FLOVENT  mcg/actuation inhaler INL 2 PFS PO TWICE DAILY. RM AFTER U 12 g 4    furosemide (LASIX) 40 MG tablet Take 1 tablet (40 mg total) by mouth daily as needed (Leg swelling or weight gain). 90 tablet 4    ibuprofen (ADVIL,MOTRIN) 600 MG tablet Take 1 tablet (600 mg total) by mouth every 6 (six) hours as needed for Pain. 30 tablet 2    ipratropium-albuteroL (COMBIVENT RESPIMAT)  mcg/actuation inhaler Inhale 1 puff into the lungs 4 (four) times daily. Rescue      sacubitriL-valsartan (ENTRESTO)  mg per tablet Take 1 tablet by mouth 2 (two) times daily. 60 tablet 11    spironolactone (ALDACTONE) 25 MG tablet Take 1 tablet (25 mg total) by mouth once daily. 30 tablet 11    topiramate (TOPAMAX) 50 MG tablet Take 1 tablet (50 mg total) by mouth once daily. 90 tablet 4       Past Surgical History:   Procedure Laterality Date     CARDIAC DEFIBRILLATOR PLACEMENT  2015     SECTION      OOPHORECTOMY      RIGHT HEART CATHETERIZATION      TOTAL ABDOMINAL HYSTERECTOMY N/A 3/26/2019    Procedure: HYSTERECTOMY, TOTAL, ABDOMINAL;  Surgeon: Victorino Rose MD;  Location: [facility];  Service: OB/GYN;  Laterality: N/A;    TUBAL LIGATION         Social History     Socioeconomic History    Marital status: Single     Spouse name: Not on file    Number of children: 2    Years of education: Not on file    Highest education level: Not on file   Occupational History    Occupation: Unemployed     Employer: [employer]   Social Needs    Financial resource strain: Not on file    Food insecurity     Worry: Not on file     Inability: Not on file    Transportation needs     Medical: Not on file     Non-medical: Not on file   Tobacco Use    Smoking status: Never Smoker    Smokeless tobacco: Never Used   Substance and Sexual Activity    Alcohol use: Not Currently     Comment: 1 glass per 3 months    Drug use: No    Sexual activity: Yes     Partners: Male     Comment: one male partner (boyfriend)   Lifestyle    Physical activity     Days per week: Not on file     Minutes per session: Not on file    Stress: Not at all   Relationships    Social connections     Talks on phone: Not on file     Gets together: Not on file     Attends Sabianist service: Not on file     Active member of club or organization: Not on file     Attends meetings of clubs or organizations: Not on file     Relationship status: Not on file   Other Topics Concern    Not on file   Social History Narrative    Not on file       OBJECTIVE:     Vital Signs Range (Last 24H):  BP: ()/()   Arterial Line BP: ()/()       Significant Labs:  Lab Results   Component Value Date    WBC 4.84 2020    HGB 12.1 2020    HCT 39.4 2020     2020    CHOL 98 (L) 2019    TRIG 59 2019    HDL 40 2019    ALT 33 2019    AST 30  09/16/2019     06/09/2020    K 4.2 06/09/2020     06/09/2020    CREATININE 0.9 06/09/2020    BUN 13 06/09/2020    CO2 28 06/09/2020    TSH 0.516 01/02/2020    INR 1.0 06/09/2020    HGBA1C 4.8 09/16/2019       Diagnostic Studies: No relevant studies.    EKG:   Results for orders placed or performed during the hospital encounter of 08/07/19   EKG 12-lead    Collection Time: 08/07/19 12:35 PM    Narrative    Test Reason : R07.9,    Vent. Rate : 073 BPM     Atrial Rate : 073 BPM     P-R Int : 188 ms          QRS Dur : 128 ms      QT Int : 422 ms       P-R-T Axes : 072 041 -57 degrees     QTc Int : 464 ms    Normal sinus rhythm  Nonspecific intraventricular block  Cannot rule out Septal infarct ,age undetermined  LVH  T wave abnormality, consider inferolateral ischemia  Abnormal ECG  When compared with ECG of 14-MAY-2019 15:13,  Premature ventricular complexes are no longer Present  Premature atrial complexes are no longer Present  Confirmed by Ki Burnette MD ([id number]) on 8/8/2019 5:43:38 PM    Referred By: [email or web address]   SELF           Confirmed By:Ki Burnette MD       2D ECHO:  TTE:  Results for orders placed or performed during the hospital encounter of 06/02/20   Echo Color Flow Doppler? Yes   Result Value Ref Range    Ascending aorta 2.71 cm    STJ 2.44 cm    AV mean gradient 3 mmHg    Ao peak boone 1.16 m/s    Ao VTI 23.85 cm    IVRT 76.12 msec    IVS 0.80 (A) 0.6 - 1.1 cm    LA size 5.48 cm    Left Atrium Major Axis 6.90 cm    Left Atrium Minor Axis 7.25 cm    LVIDD 7.88 (A) 3.5 - 6.0 cm    LVIDS 7.09 (A) 2.1 - 4.0 cm    LVOT diameter 1.97 cm    LVOT peak VTI 19.68 cm    PW 0.80 (A) 0.6 - 1.1 cm    MV Peak A Boone 0.58 m/s    E wave decelartion time 310.27 msec    MV Peak E Boone 0.91 m/s    PV Peak D Boone 0.70 m/s    PV Peak S Boone 0.64 m/s    RA Major Axis 4.33 cm    RA Width 4.72 cm    RVDD 3.31 cm    Sinus 2.84 cm    TAPSE 2.70 cm    TR Max Boone 2.99 m/s    TDI LATERAL 0.10 m/s    TDI SEPTAL 0.09 m/s     LA WIDTH 7.00 cm    LV Diastolic Volume 333.01 mL    LV Systolic Volume 263.01 mL    RV S' 11.56 cm/s    LVOT peak boone 0.91 m/s    LV LATERAL E/E' RATIO 9.10 m/s    LV SEPTAL E/E' RATIO 10.11 m/s    FS 10 %    LA volume 230.55 cm3    LV mass 302.34 g    Left Ventricle Relative Wall Thickness 0.20 cm    AV valve area 2.51 cm2    AV Velocity Ratio 0.78     AV index (prosthetic) 0.83     E/A ratio 1.57     Mean e' 0.10 m/s    Pulm vein S/D ratio 0.91     LVOT area 3.0 cm2    LVOT stroke volume 59.96 cm3    AV peak gradient 5 mmHg    E/E' ratio 9.58 m/s    Triscuspid Valve Regurgitation Peak Gradient 36 mmHg    BSA 2.32 m2    LV Systolic Volume Index 117.4 mL/m2    LV Diastolic Volume Index 148.69 mL/m2    LA Volume Index 102.9 mL/m2    LV Mass Index 135 g/m2    Right Atrial Pressure (from IVC) 3 mmHg    TV rest pulmonary artery pressure 39 mmHg    Narrative    · Severely decreased left ventricular systolic function. The estimated   ejection fraction is 20-25%.  · Severe left ventricular enlargement. Eccentric left ventricular   hypertrophy.  · Grade II (moderate) left ventricular diastolic dysfunction consistent   with pseudonormalization.  · Severe eccentric mitral regurgitation with posteriorly directed jet due   to posterior leaflet tethering. Functional MR.  · Normal right ventricular systolic function.  · Mild tricuspid regurgitation.  · The estimated PA systolic pressure is 39 mmHg.  · Normal central venous pressure (3 mmHg).  · Severe left atrial enlargement.  · Severe left ventricular enlargement.  · Severe global hypokinetic wall motion.          VASQUEZ:  No results found for this or any previous visit.    ASSESSMENT/PLAN:              Anesthesia Evaluation    I have reviewed the Patient Summary Reports.   I have reviewed the NPO Status.   I have reviewed the Medications.     Review of Systems  Anesthesia Hx:  No problems with previous Anesthesia   Denies Personal Hx of Anesthesia complications.    Cardiovascular:   Pacemaker Valvular problems/Murmurs, MR CAD   CHF    Pulmonary:   Asthma moderate Shortness of breath Sleep Apnea    Renal/:  Renal/ Normal     Hepatic/GI:  Hepatic/GI Normal    Neurological:   Denies CVA. Headaches Denies Seizures.        Physical Exam  General:  Morbid Obesity    Airway/Jaw/Neck:  Airway Findings: Mouth Opening: Normal Tongue: Normal  General Airway Assessment: Adult  Mallampati: II  TM Distance: Normal, at least 6 cm  Jaw/Neck Findings:  Neck ROM: Normal ROM            Mental Status:  Mental Status Findings:  Cooperative, Alert and Oriented         Anesthesia Plan  Type of Anesthesia, risks & benefits discussed:  Anesthesia Type:  general  Patient's Preference:   Intra-op Monitoring Plan: standard ASA monitors and arterial line  Intra-op Monitoring Plan Comments:   Post Op Pain Control Plan: per primary service following discharge from PACU, IV/PO Opioids PRN and multimodal analgesia  Post Op Pain Control Plan Comments:   Induction:   IV  Beta Blocker:  Patient is on a Beta-Blocker and has received one dose within the past 24 hours (No further documentation required).       Informed Consent: Patient understands risks and agrees with Anesthesia plan.  Questions answered. Anesthesia consent signed with patient.  ASA Score: 4     Day of Surgery Review of History & Physical:    H&P update referred to the surgeon.         Ready For Surgery From Anesthesia Perspective.

## 2020-06-17 ENCOUNTER — ANESTHESIA (OUTPATIENT)
Dept: MEDSURG UNIT | Facility: HOSPITAL | Age: 45
DRG: 274 | End: 2020-06-17
Payer: MEDICARE

## 2020-06-17 ENCOUNTER — HOSPITAL ENCOUNTER (INPATIENT)
Facility: HOSPITAL | Age: 45
LOS: 2 days | Discharge: HOME | DRG: 274 | End: 2020-06-19
Attending: INTERNAL MEDICINE | Admitting: INTERNAL MEDICINE
Payer: MEDICARE

## 2020-06-17 DIAGNOSIS — I34.0 NON-RHEUMATIC MITRAL REGURGITATION: ICD-10-CM

## 2020-06-17 DIAGNOSIS — Z00.00 ROUTINE CHECK-UP: ICD-10-CM

## 2020-06-17 DIAGNOSIS — N18.9 CHRONIC KIDNEY DISEASE, UNSPECIFIED CKD STAGE: ICD-10-CM

## 2020-06-17 DIAGNOSIS — I50.42 CHRONIC COMBINED SYSTOLIC AND DIASTOLIC CONGESTIVE HEART FAILURE: ICD-10-CM

## 2020-06-17 DIAGNOSIS — I31.39 PERICARDIAL EFFUSION: ICD-10-CM

## 2020-06-17 LAB
ABO + RH BLD: NORMAL
ALBUMIN SERPL BCP-MCNC: 3 G/DL (ref 3.5–5.2)
ALLENS TEST: ABNORMAL
ALLENS TEST: ABNORMAL
ALP SERPL-CCNC: 40 U/L (ref 55–135)
ALT SERPL W/O P-5'-P-CCNC: 20 U/L (ref 10–44)
ANION GAP SERPL CALC-SCNC: 6 MMOL/L (ref 8–16)
ANION GAP SERPL CALC-SCNC: 8 MMOL/L (ref 8–16)
APTT BLDCRRT: 25.1 SEC (ref 21–32)
APTT BLDCRRT: 29.8 SEC (ref 21–32)
AST SERPL-CCNC: 26 U/L (ref 10–40)
BASOPHILS # BLD AUTO: 0.01 K/UL (ref 0–0.2)
BASOPHILS # BLD AUTO: 0.01 K/UL (ref 0–0.2)
BASOPHILS NFR BLD: 0.1 % (ref 0–1.9)
BASOPHILS NFR BLD: 0.1 % (ref 0–1.9)
BILIRUB SERPL-MCNC: 1.6 MG/DL (ref 0.1–1)
BLD GP AB SCN CELLS X3 SERPL QL: NORMAL
BLD PROD TYP BPU: NORMAL
BLOOD UNIT EXPIRATION DATE: NORMAL
BLOOD UNIT TYPE CODE: 5100
BLOOD UNIT TYPE CODE: 5100
BLOOD UNIT TYPE CODE: 600
BLOOD UNIT TYPE CODE: 6200
BLOOD UNIT TYPE CODE: 6200
BLOOD UNIT TYPE: NORMAL
BUN SERPL-MCNC: 12 MG/DL (ref 6–20)
BUN SERPL-MCNC: 15 MG/DL (ref 6–20)
CALCIUM SERPL-MCNC: 10.5 MG/DL (ref 8.7–10.5)
CALCIUM SERPL-MCNC: 8.6 MG/DL (ref 8.7–10.5)
CHLORIDE SERPL-SCNC: 108 MMOL/L (ref 95–110)
CHLORIDE SERPL-SCNC: 109 MMOL/L (ref 95–110)
CO2 SERPL-SCNC: 23 MMOL/L (ref 23–29)
CO2 SERPL-SCNC: 24 MMOL/L (ref 23–29)
CODING SYSTEM: NORMAL
CREAT SERPL-MCNC: 0.8 MG/DL (ref 0.5–1.4)
CREAT SERPL-MCNC: 0.8 MG/DL (ref 0.5–1.4)
DELSYS: ABNORMAL
DIFFERENTIAL METHOD: ABNORMAL
DIFFERENTIAL METHOD: ABNORMAL
DISPENSE STATUS: NORMAL
EOSINOPHIL # BLD AUTO: 0.1 K/UL (ref 0–0.5)
EOSINOPHIL # BLD AUTO: 0.1 K/UL (ref 0–0.5)
EOSINOPHIL NFR BLD: 0.5 % (ref 0–8)
EOSINOPHIL NFR BLD: 0.5 % (ref 0–8)
ERYTHROCYTE [DISTWIDTH] IN BLOOD BY AUTOMATED COUNT: 13.5 % (ref 11.5–14.5)
ERYTHROCYTE [DISTWIDTH] IN BLOOD BY AUTOMATED COUNT: 14.3 % (ref 11.5–14.5)
ERYTHROCYTE [DISTWIDTH] IN BLOOD BY AUTOMATED COUNT: 14.3 % (ref 11.5–14.5)
ERYTHROCYTE [SEDIMENTATION RATE] IN BLOOD BY WESTERGREN METHOD: 20 MM/H
EST. GFR  (AFRICAN AMERICAN): >60 ML/MIN/1.73 M^2
EST. GFR  (AFRICAN AMERICAN): >60 ML/MIN/1.73 M^2
EST. GFR  (NON AFRICAN AMERICAN): >60 ML/MIN/1.73 M^2
EST. GFR  (NON AFRICAN AMERICAN): >60 ML/MIN/1.73 M^2
FIBRINOGEN PPP-MCNC: 225 MG/DL (ref 182–366)
FIO2: 50
GLUCOSE SERPL-MCNC: 136 MG/DL (ref 70–110)
GLUCOSE SERPL-MCNC: 88 MG/DL (ref 70–110)
HCO3 UR-SCNC: 20.9 MMOL/L (ref 24–28)
HCO3 UR-SCNC: 25.2 MMOL/L (ref 24–28)
HCT VFR BLD AUTO: 37 % (ref 37–48.5)
HCT VFR BLD AUTO: 40.8 % (ref 37–48.5)
HCT VFR BLD AUTO: 40.8 % (ref 37–48.5)
HGB BLD-MCNC: 11.9 G/DL (ref 12–16)
HGB BLD-MCNC: 12.8 G/DL (ref 12–16)
HGB BLD-MCNC: 12.8 G/DL (ref 12–16)
IMM GRANULOCYTES # BLD AUTO: 0.05 K/UL (ref 0–0.04)
IMM GRANULOCYTES # BLD AUTO: 0.05 K/UL (ref 0–0.04)
IMM GRANULOCYTES NFR BLD AUTO: 0.5 % (ref 0–0.5)
IMM GRANULOCYTES NFR BLD AUTO: 0.5 % (ref 0–0.5)
INR PPP: 1.1 (ref 0.8–1.2)
LACTATE SERPL-SCNC: 0.9 MMOL/L (ref 0.5–2.2)
LYMPHOCYTES # BLD AUTO: 1.3 K/UL (ref 1–4.8)
LYMPHOCYTES # BLD AUTO: 1.3 K/UL (ref 1–4.8)
LYMPHOCYTES NFR BLD: 13 % (ref 18–48)
LYMPHOCYTES NFR BLD: 13 % (ref 18–48)
MAGNESIUM SERPL-MCNC: 1.7 MG/DL (ref 1.6–2.6)
MCH RBC QN AUTO: 30.5 PG (ref 27–31)
MCH RBC QN AUTO: 30.5 PG (ref 27–31)
MCH RBC QN AUTO: 31.1 PG (ref 27–31)
MCHC RBC AUTO-ENTMCNC: 31.4 G/DL (ref 32–36)
MCHC RBC AUTO-ENTMCNC: 31.4 G/DL (ref 32–36)
MCHC RBC AUTO-ENTMCNC: 32.2 G/DL (ref 32–36)
MCV RBC AUTO: 97 FL (ref 82–98)
MIN VOL: 9.3
MODE: ABNORMAL
MONOCYTES # BLD AUTO: 0.3 K/UL (ref 0.3–1)
MONOCYTES # BLD AUTO: 0.3 K/UL (ref 0.3–1)
MONOCYTES NFR BLD: 3.4 % (ref 4–15)
MONOCYTES NFR BLD: 3.4 % (ref 4–15)
NEUTROPHILS # BLD AUTO: 8.1 K/UL (ref 1.8–7.7)
NEUTROPHILS # BLD AUTO: 8.1 K/UL (ref 1.8–7.7)
NEUTROPHILS NFR BLD: 82.5 % (ref 38–73)
NEUTROPHILS NFR BLD: 82.5 % (ref 38–73)
NRBC BLD-RTO: 0 /100 WBC
NRBC BLD-RTO: 0 /100 WBC
NUM UNITS TRANS PACKED RBC: NORMAL
NUM UNITS TRANS PACKED RBC: NORMAL
PCO2 BLDA: 36.7 MMHG (ref 35–45)
PCO2 BLDA: 50 MMHG (ref 35–45)
PEEP: 5
PH SMN: 7.31 [PH] (ref 7.35–7.45)
PH SMN: 7.36 [PH] (ref 7.35–7.45)
PLATELET # BLD AUTO: 198 K/UL (ref 150–350)
PLATELET # BLD AUTO: 198 K/UL (ref 150–350)
PLATELET # BLD AUTO: 227 K/UL (ref 150–350)
PMV BLD AUTO: 10.9 FL (ref 9.2–12.9)
PMV BLD AUTO: 10.9 FL (ref 9.2–12.9)
PMV BLD AUTO: 11.5 FL (ref 9.2–12.9)
PO2 BLDA: 132 MMHG (ref 80–100)
PO2 BLDA: 75 MMHG (ref 80–100)
POC BE: -1 MMOL/L
POC BE: -5 MMOL/L
POC SATURATED O2: 93 % (ref 95–100)
POC SATURATED O2: 99 % (ref 95–100)
POC TCO2: 22 MMOL/L (ref 23–27)
POC TCO2: 27 MMOL/L (ref 23–27)
POTASSIUM SERPL-SCNC: 3.8 MMOL/L (ref 3.5–5.1)
POTASSIUM SERPL-SCNC: 3.9 MMOL/L (ref 3.5–5.1)
PROT SERPL-MCNC: 5.7 G/DL (ref 6–8.4)
PROTHROMBIN TIME: 10.9 SEC (ref 9–12.5)
RBC # BLD AUTO: 3.83 M/UL (ref 4–5.4)
RBC # BLD AUTO: 4.19 M/UL (ref 4–5.4)
RBC # BLD AUTO: 4.19 M/UL (ref 4–5.4)
SAMPLE: ABNORMAL
SAMPLE: ABNORMAL
SITE: ABNORMAL
SITE: ABNORMAL
SODIUM SERPL-SCNC: 139 MMOL/L (ref 136–145)
SODIUM SERPL-SCNC: 139 MMOL/L (ref 136–145)
SP02: 100
TRANS PLATPHERESIS VOL PATIENT: NORMAL ML
VT: 440
WBC # BLD AUTO: 5.81 K/UL (ref 3.9–12.7)
WBC # BLD AUTO: 9.85 K/UL (ref 3.9–12.7)
WBC # BLD AUTO: 9.85 K/UL (ref 3.9–12.7)

## 2020-06-17 PROCEDURE — 37000009 HC ANESTHESIA EA ADD 15 MINS: Mod: HCNC | Performed by: INTERNAL MEDICINE

## 2020-06-17 PROCEDURE — 86850 RBC ANTIBODY SCREEN: CPT | Mod: HCNC

## 2020-06-17 PROCEDURE — 82803 BLOOD GASES ANY COMBINATION: CPT | Mod: HCNC

## 2020-06-17 PROCEDURE — C1817 SEPTAL DEFECT IMP SYS: HCPCS | Mod: HCNC | Performed by: INTERNAL MEDICINE

## 2020-06-17 PROCEDURE — 63600175 PHARM REV CODE 636 W HCPCS: Mod: HCNC | Performed by: STUDENT IN AN ORGANIZED HEALTH CARE EDUCATION/TRAINING PROGRAM

## 2020-06-17 PROCEDURE — 80053 COMPREHEN METABOLIC PANEL: CPT | Mod: HCNC

## 2020-06-17 PROCEDURE — P9035 PLATELET PHERES LEUKOREDUCED: HCPCS | Mod: HCNC

## 2020-06-17 PROCEDURE — D9220A PRA ANESTHESIA: Mod: HCNC,,, | Performed by: ANESTHESIOLOGY

## 2020-06-17 PROCEDURE — 94002 VENT MGMT INPAT INIT DAY: CPT | Mod: HCNC

## 2020-06-17 PROCEDURE — 93010 EKG 12-LEAD: ICD-10-PCS | Mod: HCNC,,, | Performed by: INTERNAL MEDICINE

## 2020-06-17 PROCEDURE — C1887 CATHETER, GUIDING: HCPCS | Mod: HCNC | Performed by: INTERNAL MEDICINE

## 2020-06-17 PROCEDURE — C1769 GUIDE WIRE: HCPCS | Mod: HCNC | Performed by: INTERNAL MEDICINE

## 2020-06-17 PROCEDURE — 27201423 OPTIME MED/SURG SUP & DEVICES STERILE SUPPLY: Mod: HCNC | Performed by: INTERNAL MEDICINE

## 2020-06-17 PROCEDURE — 85384 FIBRINOGEN ACTIVITY: CPT | Mod: HCNC

## 2020-06-17 PROCEDURE — A4216 STERILE WATER/SALINE, 10 ML: HCPCS | Mod: HCNC | Performed by: STUDENT IN AN ORGANIZED HEALTH CARE EDUCATION/TRAINING PROGRAM

## 2020-06-17 PROCEDURE — 83735 ASSAY OF MAGNESIUM: CPT | Mod: HCNC

## 2020-06-17 PROCEDURE — 36620 INSERTION CATHETER ARTERY: CPT | Mod: 59,HCNC,, | Performed by: ANESTHESIOLOGY

## 2020-06-17 PROCEDURE — 25000003 PHARM REV CODE 250: Mod: HCNC | Performed by: INTERNAL MEDICINE

## 2020-06-17 PROCEDURE — 25000003 PHARM REV CODE 250: Mod: HCNC | Performed by: STUDENT IN AN ORGANIZED HEALTH CARE EDUCATION/TRAINING PROGRAM

## 2020-06-17 PROCEDURE — 37799 UNLISTED PX VASCULAR SURGERY: CPT | Mod: HCNC

## 2020-06-17 PROCEDURE — 25500020 PHARM REV CODE 255: Mod: HCNC | Performed by: INTERNAL MEDICINE

## 2020-06-17 PROCEDURE — 99900026 HC AIRWAY MAINTENANCE (STAT): Mod: HCNC

## 2020-06-17 PROCEDURE — 63600175 PHARM REV CODE 636 W HCPCS: Mod: HCNC

## 2020-06-17 PROCEDURE — 86920 COMPATIBILITY TEST SPIN: CPT | Mod: HCNC

## 2020-06-17 PROCEDURE — P9017 PLASMA 1 DONOR FRZ W/IN 8 HR: HCPCS | Mod: HCNC

## 2020-06-17 PROCEDURE — 36620 ARTERIAL: ICD-10-PCS | Mod: 59,HCNC,, | Performed by: ANESTHESIOLOGY

## 2020-06-17 PROCEDURE — C1729 CATH, DRAINAGE: HCPCS | Mod: HCNC | Performed by: INTERNAL MEDICINE

## 2020-06-17 PROCEDURE — C1894 INTRO/SHEATH, NON-LASER: HCPCS | Mod: HCNC | Performed by: INTERNAL MEDICINE

## 2020-06-17 PROCEDURE — C1760 CLOSURE DEV, VASC: HCPCS | Mod: HCNC | Performed by: INTERNAL MEDICINE

## 2020-06-17 PROCEDURE — 93005 ELECTROCARDIOGRAM TRACING: CPT | Mod: HCNC

## 2020-06-17 PROCEDURE — 93010 ELECTROCARDIOGRAM REPORT: CPT | Mod: HCNC,,, | Performed by: INTERNAL MEDICINE

## 2020-06-17 PROCEDURE — P9016 RBC LEUKOCYTES REDUCED: HCPCS | Mod: HCNC

## 2020-06-17 PROCEDURE — D9220A PRA ANESTHESIA: ICD-10-PCS | Mod: HCNC,,, | Performed by: ANESTHESIOLOGY

## 2020-06-17 PROCEDURE — 99900035 HC TECH TIME PER 15 MIN (STAT): Mod: HCNC

## 2020-06-17 PROCEDURE — 33418 REPAIR TCAT MITRAL VALVE: CPT | Mod: Q0,53,HCNC | Performed by: INTERNAL MEDICINE

## 2020-06-17 PROCEDURE — 33017 PRCRD DRG 6YR+ W/O CGEN CAR: CPT | Mod: HCNC | Performed by: INTERNAL MEDICINE

## 2020-06-17 PROCEDURE — 20000000 HC ICU ROOM: Mod: HCNC

## 2020-06-17 PROCEDURE — 85730 THROMBOPLASTIN TIME PARTIAL: CPT | Mod: 91,HCNC

## 2020-06-17 PROCEDURE — 33418 PR REPAIR MITRAL VALVE PERC APPRCH, INCL TRANSSEPTAL PUNCTRE;INITIAL: ICD-10-PCS | Mod: HCNC,Q0,53, | Performed by: INTERNAL MEDICINE

## 2020-06-17 PROCEDURE — 37000008 HC ANESTHESIA 1ST 15 MINUTES: Mod: HCNC | Performed by: INTERNAL MEDICINE

## 2020-06-17 PROCEDURE — 85027 COMPLETE CBC AUTOMATED: CPT | Mod: HCNC

## 2020-06-17 PROCEDURE — 85730 THROMBOPLASTIN TIME PARTIAL: CPT | Mod: HCNC

## 2020-06-17 PROCEDURE — 85025 COMPLETE CBC W/AUTO DIFF WBC: CPT | Mod: 91,HCNC

## 2020-06-17 PROCEDURE — 85610 PROTHROMBIN TIME: CPT | Mod: HCNC

## 2020-06-17 PROCEDURE — 63600175 PHARM REV CODE 636 W HCPCS: Mod: HCNC | Performed by: INTERNAL MEDICINE

## 2020-06-17 PROCEDURE — 27000221 HC OXYGEN, UP TO 24 HOURS: Mod: HCNC

## 2020-06-17 PROCEDURE — 83605 ASSAY OF LACTIC ACID: CPT | Mod: HCNC

## 2020-06-17 PROCEDURE — 33418 REPAIR TCAT MITRAL VALVE: CPT | Mod: HCNC,Q0,53, | Performed by: INTERNAL MEDICINE

## 2020-06-17 PROCEDURE — 94761 N-INVAS EAR/PLS OXIMETRY MLT: CPT | Mod: HCNC

## 2020-06-17 PROCEDURE — 80048 BASIC METABOLIC PNL TOTAL CA: CPT | Mod: HCNC

## 2020-06-17 DEVICE — SEPTAL OCCLUDER
Type: IMPLANTABLE DEVICE | Site: HEART | Status: FUNCTIONAL
Brand: AMPLATZER™

## 2020-06-17 RX ORDER — HYDROCODONE BITARTRATE AND ACETAMINOPHEN 500; 5 MG/1; MG/1
TABLET ORAL
Status: DISCONTINUED | OUTPATIENT
Start: 2020-06-17 | End: 2020-06-19 | Stop reason: HOSPADM

## 2020-06-17 RX ORDER — ONDANSETRON 2 MG/ML
4 INJECTION INTRAMUSCULAR; INTRAVENOUS DAILY PRN
Status: CANCELLED | OUTPATIENT
Start: 2020-06-17

## 2020-06-17 RX ORDER — DEXMEDETOMIDINE HYDROCHLORIDE 4 UG/ML
0.2 INJECTION, SOLUTION INTRAVENOUS CONTINUOUS
Status: DISCONTINUED | OUTPATIENT
Start: 2020-06-17 | End: 2020-06-19 | Stop reason: HOSPADM

## 2020-06-17 RX ORDER — SODIUM CHLORIDE, SODIUM LACTATE, POTASSIUM CHLORIDE, CALCIUM CHLORIDE 600; 310; 30; 20 MG/100ML; MG/100ML; MG/100ML; MG/100ML
INJECTION, SOLUTION INTRAVENOUS CONTINUOUS PRN
Status: DISCONTINUED | OUTPATIENT
Start: 2020-06-17 | End: 2020-06-17

## 2020-06-17 RX ORDER — FUROSEMIDE 10 MG/ML
60 INJECTION INTRAMUSCULAR; INTRAVENOUS ONCE
Status: DISCONTINUED | OUTPATIENT
Start: 2020-06-17 | End: 2020-06-17

## 2020-06-17 RX ORDER — GLYCOPYRROLATE 0.2 MG/ML
INJECTION INTRAMUSCULAR; INTRAVENOUS
Status: DISCONTINUED | OUTPATIENT
Start: 2020-06-17 | End: 2020-06-17

## 2020-06-17 RX ORDER — PROPOFOL 10 MG/ML
INJECTION, EMULSION INTRAVENOUS
Status: COMPLETED
Start: 2020-06-17 | End: 2020-06-17

## 2020-06-17 RX ORDER — PROPOFOL 10 MG/ML
INJECTION, EMULSION INTRAVENOUS
Status: DISCONTINUED | OUTPATIENT
Start: 2020-06-17 | End: 2020-06-17

## 2020-06-17 RX ORDER — MIDAZOLAM HYDROCHLORIDE 1 MG/ML
INJECTION, SOLUTION INTRAMUSCULAR; INTRAVENOUS
Status: DISCONTINUED | OUTPATIENT
Start: 2020-06-17 | End: 2020-06-17

## 2020-06-17 RX ORDER — HEPARIN SODIUM 200 [USP'U]/100ML
INJECTION, SOLUTION INTRAVENOUS
Status: DISCONTINUED | OUTPATIENT
Start: 2020-06-17 | End: 2020-06-19 | Stop reason: HOSPADM

## 2020-06-17 RX ORDER — DIPHENHYDRAMINE HCL 50 MG
50 CAPSULE ORAL ONCE
Status: COMPLETED | OUTPATIENT
Start: 2020-06-17 | End: 2020-06-17

## 2020-06-17 RX ORDER — ONDANSETRON 2 MG/ML
INJECTION INTRAMUSCULAR; INTRAVENOUS
Status: DISCONTINUED | OUTPATIENT
Start: 2020-06-17 | End: 2020-06-17

## 2020-06-17 RX ORDER — FENTANYL CITRATE 50 UG/ML
25 INJECTION, SOLUTION INTRAMUSCULAR; INTRAVENOUS EVERY 5 MIN PRN
Status: CANCELLED | OUTPATIENT
Start: 2020-06-17

## 2020-06-17 RX ORDER — FENTANYL CITRATE 50 UG/ML
INJECTION, SOLUTION INTRAMUSCULAR; INTRAVENOUS
Status: DISCONTINUED | OUTPATIENT
Start: 2020-06-17 | End: 2020-06-17

## 2020-06-17 RX ORDER — NOREPINEPHRINE BITARTRATE/D5W 4MG/250ML
0.02 PLASTIC BAG, INJECTION (ML) INTRAVENOUS CONTINUOUS
Status: DISCONTINUED | OUTPATIENT
Start: 2020-06-17 | End: 2020-06-18

## 2020-06-17 RX ORDER — PROTAMINE SULFATE 10 MG/ML
INJECTION, SOLUTION INTRAVENOUS
Status: DISCONTINUED | OUTPATIENT
Start: 2020-06-17 | End: 2020-06-17

## 2020-06-17 RX ORDER — DEXTROSE MONOHYDRATE AND SODIUM CHLORIDE 5; .45 G/100ML; G/100ML
INJECTION, SOLUTION INTRAVENOUS CONTINUOUS
Status: DISCONTINUED | OUTPATIENT
Start: 2020-06-17 | End: 2020-06-17

## 2020-06-17 RX ORDER — SODIUM CHLORIDE 0.9 % (FLUSH) 0.9 %
10 SYRINGE (ML) INJECTION
Status: CANCELLED | OUTPATIENT
Start: 2020-06-17

## 2020-06-17 RX ORDER — SODIUM CHLORIDE 0.9 % (FLUSH) 0.9 %
10 SYRINGE (ML) INJECTION
Status: DISCONTINUED | OUTPATIENT
Start: 2020-06-17 | End: 2020-06-19 | Stop reason: HOSPADM

## 2020-06-17 RX ORDER — FUROSEMIDE 10 MG/ML
40 INJECTION INTRAMUSCULAR; INTRAVENOUS ONCE
Status: DISCONTINUED | OUTPATIENT
Start: 2020-06-17 | End: 2020-06-17

## 2020-06-17 RX ORDER — NOREPINEPHRINE BITARTRATE/D5W 4MG/250ML
0.02 PLASTIC BAG, INJECTION (ML) INTRAVENOUS CONTINUOUS
Status: DISCONTINUED | OUTPATIENT
Start: 2020-06-17 | End: 2020-06-17

## 2020-06-17 RX ORDER — HEPARIN SODIUM 1000 [USP'U]/ML
INJECTION, SOLUTION INTRAVENOUS; SUBCUTANEOUS
Status: DISCONTINUED | OUTPATIENT
Start: 2020-06-17 | End: 2020-06-17

## 2020-06-17 RX ORDER — FUROSEMIDE 10 MG/ML
60 INJECTION INTRAMUSCULAR; INTRAVENOUS ONCE
Status: COMPLETED | OUTPATIENT
Start: 2020-06-17 | End: 2020-06-17

## 2020-06-17 RX ORDER — EPINEPHRINE 1 MG/ML
INJECTION, SOLUTION INTRACARDIAC; INTRAMUSCULAR; INTRAVENOUS; SUBCUTANEOUS
Status: DISCONTINUED | OUTPATIENT
Start: 2020-06-17 | End: 2020-06-17

## 2020-06-17 RX ORDER — LIDOCAINE HYDROCHLORIDE 20 MG/ML
INJECTION, SOLUTION INFILTRATION; PERINEURAL
Status: DISCONTINUED | OUTPATIENT
Start: 2020-06-17 | End: 2020-06-17 | Stop reason: HOSPADM

## 2020-06-17 RX ORDER — PROPOFOL 10 MG/ML
5 INJECTION, EMULSION INTRAVENOUS CONTINUOUS
Status: DISCONTINUED | OUTPATIENT
Start: 2020-06-17 | End: 2020-06-18

## 2020-06-17 RX ORDER — PROPOFOL 10 MG/ML
5 INJECTION, EMULSION INTRAVENOUS
Status: DISCONTINUED | OUTPATIENT
Start: 2020-06-17 | End: 2020-06-17

## 2020-06-17 RX ORDER — LIDOCAINE HCL/PF 100 MG/5ML
SYRINGE (ML) INTRAVENOUS
Status: DISCONTINUED | OUTPATIENT
Start: 2020-06-17 | End: 2020-06-17

## 2020-06-17 RX ORDER — FUROSEMIDE 10 MG/ML
60 INJECTION INTRAMUSCULAR; INTRAVENOUS
Status: DISCONTINUED | OUTPATIENT
Start: 2020-06-17 | End: 2020-06-19 | Stop reason: HOSPADM

## 2020-06-17 RX ORDER — CEFAZOLIN SODIUM 1 G/3ML
INJECTION, POWDER, FOR SOLUTION INTRAMUSCULAR; INTRAVENOUS
Status: DISCONTINUED | OUTPATIENT
Start: 2020-06-17 | End: 2020-06-17

## 2020-06-17 RX ADMIN — EPINEPHRINE 0.02 MCG/KG/MIN: 1 INJECTION INTRAMUSCULAR; INTRAVENOUS; SUBCUTANEOUS at 12:06

## 2020-06-17 RX ADMIN — PROPOFOL 30 MG: 10 INJECTION, EMULSION INTRAVENOUS at 11:06

## 2020-06-17 RX ADMIN — EPINEPHRINE 20 MCG: 1 INJECTION, SOLUTION INTRAMUSCULAR; SUBCUTANEOUS at 01:06

## 2020-06-17 RX ADMIN — CEFAZOLIN 2 G: 330 INJECTION, POWDER, FOR SOLUTION INTRAMUSCULAR; INTRAVENOUS at 12:06

## 2020-06-17 RX ADMIN — DEXMEDETOMIDINE HYDROCHLORIDE 1.4 MCG/KG/HR: 4 INJECTION, SOLUTION INTRAVENOUS at 04:06

## 2020-06-17 RX ADMIN — PROPOFOL 20 MG: 10 INJECTION, EMULSION INTRAVENOUS at 12:06

## 2020-06-17 RX ADMIN — PROPOFOL 30 MG: 10 INJECTION, EMULSION INTRAVENOUS at 12:06

## 2020-06-17 RX ADMIN — CALCIUM CHLORIDE 2 G: 100 INJECTION, SOLUTION INTRAVENOUS at 01:06

## 2020-06-17 RX ADMIN — DIPHENHYDRAMINE HYDROCHLORIDE 50 MG: 50 CAPSULE ORAL at 09:06

## 2020-06-17 RX ADMIN — MIDAZOLAM 1 MG: 1 INJECTION INTRAMUSCULAR; INTRAVENOUS at 12:06

## 2020-06-17 RX ADMIN — PROTAMINE SULFATE 100 MG: 10 INJECTION, SOLUTION INTRAVENOUS at 01:06

## 2020-06-17 RX ADMIN — CALCIUM CHLORIDE 1 G: 100 INJECTION, SOLUTION INTRAVENOUS at 02:06

## 2020-06-17 RX ADMIN — FENTANYL CITRATE 50 MCG: 50 INJECTION, SOLUTION INTRAMUSCULAR; INTRAVENOUS at 01:06

## 2020-06-17 RX ADMIN — EPINEPHRINE 10 MCG: 1 INJECTION, SOLUTION INTRAMUSCULAR; SUBCUTANEOUS at 01:06

## 2020-06-17 RX ADMIN — DEXMEDETOMIDINE HYDROCHLORIDE 1.2 MCG/KG/HR: 4 INJECTION, SOLUTION INTRAVENOUS at 03:06

## 2020-06-17 RX ADMIN — FENTANYL CITRATE 50 MCG: 50 INJECTION, SOLUTION INTRAMUSCULAR; INTRAVENOUS at 12:06

## 2020-06-17 RX ADMIN — PROPOFOL 50 MCG/KG/MIN: 10 INJECTION, EMULSION INTRAVENOUS at 03:06

## 2020-06-17 RX ADMIN — PROPOFOL INJECTABLE EMULSION 50 MCG/KG/MIN: 10 INJECTION, EMULSION INTRAVENOUS at 03:06

## 2020-06-17 RX ADMIN — SODIUM CHLORIDE, SODIUM LACTATE, POTASSIUM CHLORIDE, AND CALCIUM CHLORIDE: 600; 310; 30; 20 INJECTION, SOLUTION INTRAVENOUS at 11:06

## 2020-06-17 RX ADMIN — DEXMEDETOMIDINE HYDROCHLORIDE 1 MCG/KG/HR: 100 INJECTION, SOLUTION, CONCENTRATE INTRAVENOUS at 11:06

## 2020-06-17 RX ADMIN — GLYCOPYRROLATE 0.2 MG: 0.2 INJECTION INTRAMUSCULAR; INTRAVENOUS at 11:06

## 2020-06-17 RX ADMIN — DEXMEDETOMIDINE HYDROCHLORIDE 0.2 MCG/KG/HR: 4 INJECTION, SOLUTION INTRAVENOUS at 07:06

## 2020-06-17 RX ADMIN — MIDAZOLAM 2 MG: 1 INJECTION INTRAMUSCULAR; INTRAVENOUS at 11:06

## 2020-06-17 RX ADMIN — LIDOCAINE HYDROCHLORIDE 40 MG: 20 INJECTION, SOLUTION INTRAVENOUS at 12:06

## 2020-06-17 RX ADMIN — FUROSEMIDE 60 MG: 10 INJECTION, SOLUTION INTRAMUSCULAR; INTRAVENOUS at 06:06

## 2020-06-17 RX ADMIN — PROPOFOL INJECTABLE EMULSION 30 MCG/KG/MIN: 10 INJECTION, EMULSION INTRAVENOUS at 07:06

## 2020-06-17 RX ADMIN — HEPARIN SODIUM 12000 UNITS: 1000 INJECTION INTRAVENOUS; SUBCUTANEOUS at 12:06

## 2020-06-17 RX ADMIN — SODIUM CHLORIDE, SODIUM GLUCONATE, SODIUM ACETATE, POTASSIUM CHLORIDE, MAGNESIUM CHLORIDE, SODIUM PHOSPHATE, DIBASIC, AND POTASSIUM PHOSPHATE: .53; .5; .37; .037; .03; .012; .00082 INJECTION, SOLUTION INTRAVENOUS at 11:06

## 2020-06-17 RX ADMIN — FENTANYL CITRATE 50 MCG: 50 INJECTION, SOLUTION INTRAMUSCULAR; INTRAVENOUS at 11:06

## 2020-06-17 RX ADMIN — FUROSEMIDE 60 MG: 10 INJECTION, SOLUTION INTRAMUSCULAR; INTRAVENOUS at 03:06

## 2020-06-17 RX ADMIN — PROPOFOL 30 MCG/KG/MIN: 10 INJECTION, EMULSION INTRAVENOUS at 04:06

## 2020-06-17 NOTE — INTERVAL H&P NOTE
The patient has been examined and the H&P has been reviewed:    I concur with the findings and no changes have occurred since H&P was written.    Anesthesia/Surgery risks, benefits and alternative options discussed and understood by patient/family.          Active Hospital Problems    Diagnosis  POA    Non-rheumatic mitral regurgitation [I34.0]  Yes     Chronic      Resolved Hospital Problems   No resolved problems to display.

## 2020-06-17 NOTE — CONSULTS
[facility]  Cardiology  Consult Note    Patient Name: Mario Mahajan  MRN: [id number]  Admission Date: 6/17/2020  Hospital Length of Stay: 0 days  Code Status: Prior   Attending Provider: Memo Luis MD   Consulting Provider: Jake Crowe MD  Primary Care Physician: Maykel Dave MD  Principal Problem:<principal problem not specified>    Patient information was obtained from patient and ER records.     Consults  Subjective:     Chief Complaint:  Mitral Regurgitation     HPI:   44 yoF, here for MitraClip. PMhx of NICM EF 30% since 2015(NL PET 2015, NML coronary angiogram 2017), VT s/p AICD 2015, Obesity and severe functional MR with LVEDD 7.8cm and NYHA II symptoms here for radha clip eval. Found to have severe functional MR with EF 30%, symptomatic with NYHA II symptoms despite optimal GDMT. plan for R CFV mitraclip.     No VASQUEZ on file    TTE 2020  · Severely decreased left ventricular systolic function. The estimated ejection fraction is 20-25%.  · Severe left ventricular enlargement. Eccentric left ventricular hypertrophy.  · Grade II (moderate) left ventricular diastolic dysfunction consistent with pseudonormalization.  · Severe eccentric mitral regurgitation with posteriorly directed jet due to posterior leaflet tethering. Functional MR.  · Normal right ventricular systolic function.  · Mild tricuspid regurgitation.  · The estimated PA systolic pressure is 39 mmHg.  · Normal central venous pressure (3 mmHg).  · Severe left atrial enlargement.  · Severe left ventricular enlargement.  · Severe global hypokinetic wall motion.      Past Medical History:   Diagnosis Date    Asthma     Chronic back pain 7/1/2014    Chronic combined systolic and diastolic congestive heart failure 4/28/2015     2-10-17   1 - Severely depressed left ventricular systolic function (EF 20-25%).    2 - Severe left ventricular enlargement.    3 - Severe left atrial enlargement.    4 -  Left ventricular diastolic dysfunction.    5 - The estimated PA systolic pressure is 18 mmHg.    6 - Mild mitral regurgitation.     Encounter for blood transfusion     ICD (implantable cardioverter-defibrillator) in place 12/01/15 3/3/2016    Menorrhagia, premenopausal 2/10/2017    Microcytic anemia 2015    Non-rheumatic mitral regurgitation 3/5/2015    Nonischemic dilated cardiomyopathy 2015    Sleep apnea     Syncope and collapse 2017    Ventricular tachycardia, polymorphic        Past Surgical History:   Procedure Laterality Date    CARDIAC DEFIBRILLATOR PLACEMENT  2015     SECTION      OOPHORECTOMY      RIGHT HEART CATHETERIZATION      TOTAL ABDOMINAL HYSTERECTOMY N/A 3/26/2019    Procedure: HYSTERECTOMY, TOTAL, ABDOMINAL;  Surgeon: Victorino Rose MD;  Location: [facility];  Service: OB/GYN;  Laterality: N/A;    TUBAL LIGATION         Review of patient's allergies indicates:   Allergen Reactions    Ace inhibitors      Cough       No current facility-administered medications on file prior to encounter.      Current Outpatient Medications on File Prior to Encounter   Medication Sig    albuterol (PROVENTIL/VENTOLIN HFA) 90 mcg/actuation inhaler Inhale 2 puffs into the lungs every 6 (six) hours as needed for Wheezing.    amiodarone (PACERONE) 200 MG Tab TAKE 1 TABLET(200 MG) BY MOUTH EVERY DAY    ascorbic acid, vitamin C, (VITAMIN C) 250 MG tablet Take 1 tablet (250 mg) by mouth twice daily. Take with the iron tablets.    b complex vitamins tablet Take 1 tablet by mouth once daily.    carvediloL (COREG) 25 MG tablet Take 1 tablet (25 mg total) by mouth 2 (two) times daily.    ferrous sulfate 325 (65 FE) MG EC tablet Take 1 tablet (325 mg total) by mouth 2 (two) times daily. Take with vitamin C.    FLOVENT  mcg/actuation inhaler INL 2 PFS PO TWICE DAILY. RM AFTER U    furosemide (LASIX) 40 MG tablet Take 1 tablet (40 mg total) by mouth daily as needed (Leg  swelling or weight gain).    ibuprofen (ADVIL,MOTRIN) 600 MG tablet Take 1 tablet (600 mg total) by mouth every 6 (six) hours as needed for Pain.    ipratropium-albuteroL (COMBIVENT RESPIMAT)  mcg/actuation inhaler Inhale 1 puff into the lungs 4 (four) times daily. Rescue    sacubitriL-valsartan (ENTRESTO)  mg per tablet Take 1 tablet by mouth 2 (two) times daily.    spironolactone (ALDACTONE) 25 MG tablet Take 1 tablet (25 mg total) by mouth once daily.    topiramate (TOPAMAX) 50 MG tablet Take 1 tablet (50 mg total) by mouth once daily.     Family History     Problem Relation (Age of Onset)    Diabetes Father    Heart failure Father, Brother    Lung cancer Paternal Grandmother    Pancreatic cancer Father        Tobacco Use    Smoking status: Never Smoker    Smokeless tobacco: Never Used   Substance and Sexual Activity    Alcohol use: Not Currently     Comment: 1 glass per 3 months    Drug use: No    Sexual activity: Yes     Partners: Male     Comment: one male partner (boyfriend)     Review of Systems   Constitution: Negative for chills, decreased appetite and diaphoresis.   HENT: Negative for congestion and ear discharge.    Eyes: Negative for blurred vision and discharge.   Cardiovascular: Negative for chest pain, dyspnea on exertion, irregular heartbeat, leg swelling and paroxysmal nocturnal dyspnea.   Respiratory: Negative for cough, hemoptysis and shortness of breath.    Gastrointestinal: Negative for abdominal pain.     Objective:     Vital Signs (Most Recent):    Vital Signs (24h Range):           There is no height or weight on file to calculate BMI.            No intake or output data in the 24 hours ending 06/17/20 0842    Lines/Drains/Airways     None                 Physical Exam   Constitutional: She is oriented to person, place, and time. She appears well-developed and well-nourished. No distress.   Eyes: Pupils are equal, round, and reactive to light. Conjunctivae are normal.    Neck: No tracheal deviation present. No thyromegaly present.   Cardiovascular: Normal rate, regular rhythm, normal heart sounds and intact distal pulses. Exam reveals no gallop and no friction rub.   No murmur heard.  Pulses:       Radial pulses are 2+ on the right side and 2+ on the left side.        Femoral pulses are 2+ on the right side and 2+ on the left side.  Pulmonary/Chest: Effort normal and breath sounds normal. No respiratory distress. She has no wheezes. She has no rales.   Abdominal: Soft. Bowel sounds are normal. She exhibits no distension. There is no abdominal tenderness.   Musculoskeletal:         General: No deformity or edema.   Neurological: She is alert and oriented to person, place, and time. No cranial nerve deficit. Coordination normal.   Skin: Skin is warm and dry. She is not diaphoretic.   Psychiatric: She has a normal mood and affect. Her behavior is normal.       Significant Labs: BMP: No results for input(s): GLU, NA, K, CL, CO2, BUN, CREATININE, CALCIUM, MG in the last 48 hours.    Significant Imaging: Echocardiogram:   Transthoracic echo (TTE) complete (Cupid Only):   Results for orders placed or performed during the hospital encounter of 06/02/20   Echo Color Flow Doppler? Yes   Result Value Ref Range    Ascending aorta 2.71 cm    STJ 2.44 cm    AV mean gradient 3 mmHg    Ao peak jae 1.16 m/s    Ao VTI 23.85 cm    IVRT 76.12 msec    IVS 0.80 (A) 0.6 - 1.1 cm    LA size 5.48 cm    Left Atrium Major Axis 6.90 cm    Left Atrium Minor Axis 7.25 cm    LVIDD 7.88 (A) 3.5 - 6.0 cm    LVIDS 7.09 (A) 2.1 - 4.0 cm    LVOT diameter 1.97 cm    LVOT peak VTI 19.68 cm    PW 0.80 (A) 0.6 - 1.1 cm    MV Peak A Jae 0.58 m/s    E wave decelartion time 310.27 msec    MV Peak E Jae 0.91 m/s    PV Peak D Jae 0.70 m/s    PV Peak S Jae 0.64 m/s    RA Major Axis 4.33 cm    RA Width 4.72 cm    RVDD 3.31 cm    Sinus 2.84 cm    TAPSE 2.70 cm    TR Max Jae 2.99 m/s    TDI LATERAL 0.10 m/s    TDI SEPTAL 0.09  m/s    LA WIDTH 7.00 cm    LV Diastolic Volume 333.01 mL    LV Systolic Volume 263.01 mL    RV S' 11.56 cm/s    LVOT peak boone 0.91 m/s    LV LATERAL E/E' RATIO 9.10 m/s    LV SEPTAL E/E' RATIO 10.11 m/s    FS 10 %    LA volume 230.55 cm3    LV mass 302.34 g    Left Ventricle Relative Wall Thickness 0.20 cm    AV valve area 2.51 cm2    AV Velocity Ratio 0.78     AV index (prosthetic) 0.83     E/A ratio 1.57     Mean e' 0.10 m/s    Pulm vein S/D ratio 0.91     LVOT area 3.0 cm2    LVOT stroke volume 59.96 cm3    AV peak gradient 5 mmHg    E/E' ratio 9.58 m/s    Triscuspid Valve Regurgitation Peak Gradient 36 mmHg    BSA 2.32 m2    LV Systolic Volume Index 117.4 mL/m2    LV Diastolic Volume Index 148.69 mL/m2    LA Volume Index 102.9 mL/m2    LV Mass Index 135 g/m2    Right Atrial Pressure (from IVC) 3 mmHg    TV rest pulmonary artery pressure 39 mmHg    Narrative    · Severely decreased left ventricular systolic function. The estimated   ejection fraction is 20-25%.  · Severe left ventricular enlargement. Eccentric left ventricular   hypertrophy.  · Grade II (moderate) left ventricular diastolic dysfunction consistent   with pseudonormalization.  · Severe eccentric mitral regurgitation with posteriorly directed jet due   to posterior leaflet tethering. Functional MR.  · Normal right ventricular systolic function.  · Mild tricuspid regurgitation.  · The estimated PA systolic pressure is 39 mmHg.  · Normal central venous pressure (3 mmHg).  · Severe left atrial enlargement.  · Severe left ventricular enlargement.  · Severe global hypokinetic wall motion.        Assessment and Plan:     Non-rheumatic mitral regurgitation  1. VASQUEZ for evaluation of MitraClip  -No absolute contraindications of esophageal stricture, tumor, perforation, laceration,or diverticulum and/or active GI bleed  -The risks, benefits & alternatives of the procedure were explained to the patient.   -The risks of transesophageal echo include but are not  limited to:  Dental trauma, esophageal trauma/perforation, bleeding, laryngospasm/brochospasm, aspiration, sore throat/hoarseness, & dislodgement of the endotracheal tube/nasogastric tube (where applicable).    -The risks of moderate sedation include hypotension, respiratory depression, arrhythmias, bronchospasm, & death.    -Informed consent was obtained. The patient is agreeable to proceed with the procedure and all questions and concerns addressed.    Case discussed with an attending in echocardiography lab.     Further recommendations per attending addendum        VTE Risk Mitigation (From admission, onward)    None          Thank you for your consult. I will follow-up with patient. Please contact us if you have any additional questions.    Jake Crowe MD  Cardiology   [facility]

## 2020-06-17 NOTE — PROCEDURES
Brief Operative Note:    : Memo Luis MD     Referring Physician: Maykel Dave     All Operators: Surgeon(s):  MD Carlton Tidwell MD Daniel C. Garcia, MD Mohanad Abbas Hasan, MD Stephen R. Ramee, MD     Preoperative Diagnosis: Non-rheumatic mitral regurgitation [I34.0]     Postop Diagnosis: Non-rheumatic mitral regurgitation [I34.0]    Treatments/Procedures: Procedure(s) (LRB):  Mitral clip (N/A)  Pericardiocentesis (N/A)  OCCLUSION OR EMBOLIZATION, VESSEL, WITH IMAGING GUIDANCE    Findings:Severe structural disease is present. See catheterization report for full details.    Attempted mitral clip procedure complicated by myocardial perforation.   Status post myocardial perforation with successful occlusion with Amplatzer occluder.  Patient currently intubated, and sedated with goal of possible extubation today.  Pericardial drain still in place.  Interventional cardiology will continue to follow please call for any question.     Estimated Blood loss: 20 cc    Specimens removed: No        Rozina Cambpell MD  Interventional Cardiovascular Fellow, PGY7  Pager: [phone]

## 2020-06-17 NOTE — ANESTHESIA PROCEDURE NOTES
Arterial    Diagnosis: MR    Patient location during procedure: done in OR  Procedure start time: 6/17/2020 1:50 PM  Timeout: 6/17/2020 1:50 PM  Procedure end time: 6/17/2020 1:59 PM    Staffing  Authorizing Provider: Fredo Boston MD  Performing Provider: Niki Lopez MD    Anesthesiologist was present at the time of the procedure.  Arterial  Skin Prep: chlorhexidine gluconate  Local Infiltration: lidocaine  Orientation: left  Location: radial  Catheter Size: 20 G  Catheter placement by Ultrasound guidance. Heme positive aspiration all ports.  Vessel Caliber: small, patent, compressibility normal  Needle advanced into vessel with real time Ultrasound guidance.Insertion Attempts: 2  Assessment  Dressing: secured with tape and tegaderm

## 2020-06-17 NOTE — HPI
44 yoF, here for MitraClip. PMhx of NICM EF 30% since 2015(NL PET 2015, NML coronary angiogram 2017), VT s/p AICD 2015, Obesity and severe functional MR with LVEDD 7.8cm and NYHA II symptoms here for radha clip eval. Found to have severe functional MR with EF 30%, symptomatic with NYHA II symptoms despite optimal GDMT. plan for R CFV mitraclip.     No VASQUEZ on file    TTE 2020  · Severely decreased left ventricular systolic function. The estimated ejection fraction is 20-25%.  · Severe left ventricular enlargement. Eccentric left ventricular hypertrophy.  · Grade II (moderate) left ventricular diastolic dysfunction consistent with pseudonormalization.  · Severe eccentric mitral regurgitation with posteriorly directed jet due to posterior leaflet tethering. Functional MR.  · Normal right ventricular systolic function.  · Mild tricuspid regurgitation.  · The estimated PA systolic pressure is 39 mmHg.  · Normal central venous pressure (3 mmHg).  · Severe left atrial enlargement.  · Severe left ventricular enlargement.  · Severe global hypokinetic wall motion.

## 2020-06-17 NOTE — TRANSFER OF CARE
"Anesthesia Transfer of Care Note    Patient: Mario Mahajan    Procedure(s) Performed: Procedure(s) (LRB):  Mitral clip (N/A)  Pericardiocentesis (N/A)  OCCLUSION OR EMBOLIZATION, VESSEL, WITH IMAGING GUIDANCE    Patient location: ICU    Anesthesia Type: general    Transport from OR: Transported from OR intubated on 100% O2 by AMBU with assisted ventilation    Post pain: adequate analgesia    Post assessment: no apparent anesthetic complications (cardiac tamponade from cardiology procedure)    Post vital signs: stable    Level of consciousness: sedated    Nausea/Vomiting: no nausea/vomiting    Complications: none    Transfer of care protocol was followed      Last vitals:   Visit Vitals  /60 (BP Location: Right arm, Patient Position: Lying)   Pulse (!) 56   Temp 35.7 °C (96.3 °F) (Oral)   Resp 16   Ht 5' 8" (1.727 m)   Wt 111.1 kg (245 lb)   LMP 03/01/2019   SpO2 97%   Breastfeeding No   BMI 37.25 kg/m²     "

## 2020-06-17 NOTE — NURSING
Patient to room 6086 from cath lab. Pt connected to monitor and placed on vent. Pericardial drain in place with ~10cc of bright red blood in drain. Right fem site with occlusive dressing and oozing serosanguinous drainage. Precedex gtt infusing.  Team aware and at bedside, new orders being placed.

## 2020-06-17 NOTE — SUBJECTIVE & OBJECTIVE
Past Medical History:   Diagnosis Date    Asthma     Chronic back pain 2014    Chronic combined systolic and diastolic congestive heart failure 2015     2-10-17   1 - Severely depressed left ventricular systolic function (EF 20-25%).    2 - Severe left ventricular enlargement.    3 - Severe left atrial enlargement.    4 - Left ventricular diastolic dysfunction.    5 - The estimated PA systolic pressure is 18 mmHg.    6 - Mild mitral regurgitation.     Encounter for blood transfusion     ICD (implantable cardioverter-defibrillator) in place 12/01/15 3/3/2016    Menorrhagia, premenopausal 2/10/2017    Microcytic anemia 2015    Non-rheumatic mitral regurgitation 3/5/2015    Nonischemic dilated cardiomyopathy 2015    Sleep apnea     Syncope and collapse 2017    Ventricular tachycardia, polymorphic        Past Surgical History:   Procedure Laterality Date    CARDIAC DEFIBRILLATOR PLACEMENT  2015     SECTION      OOPHORECTOMY      RIGHT HEART CATHETERIZATION      TOTAL ABDOMINAL HYSTERECTOMY N/A 3/26/2019    Procedure: HYSTERECTOMY, TOTAL, ABDOMINAL;  Surgeon: Victorino Rose MD;  Location: [facility];  Service: OB/GYN;  Laterality: N/A;    TUBAL LIGATION         Review of patient's allergies indicates:   Allergen Reactions    Ace inhibitors      Cough       No current facility-administered medications on file prior to encounter.      Current Outpatient Medications on File Prior to Encounter   Medication Sig    albuterol (PROVENTIL/VENTOLIN HFA) 90 mcg/actuation inhaler Inhale 2 puffs into the lungs every 6 (six) hours as needed for Wheezing.    amiodarone (PACERONE) 200 MG Tab TAKE 1 TABLET(200 MG) BY MOUTH EVERY DAY    ascorbic acid, vitamin C, (VITAMIN C) 250 MG tablet Take 1 tablet (250 mg) by mouth twice daily. Take with the iron tablets.    b complex vitamins tablet Take 1 tablet by mouth once daily.    carvediloL (COREG) 25 MG tablet Take 1 tablet (25 mg  total) by mouth 2 (two) times daily.    ferrous sulfate 325 (65 FE) MG EC tablet Take 1 tablet (325 mg total) by mouth 2 (two) times daily. Take with vitamin C.    FLOVENT  mcg/actuation inhaler INL 2 PFS PO TWICE DAILY. RM AFTER U    furosemide (LASIX) 40 MG tablet Take 1 tablet (40 mg total) by mouth daily as needed (Leg swelling or weight gain).    ibuprofen (ADVIL,MOTRIN) 600 MG tablet Take 1 tablet (600 mg total) by mouth every 6 (six) hours as needed for Pain.    ipratropium-albuteroL (COMBIVENT RESPIMAT)  mcg/actuation inhaler Inhale 1 puff into the lungs 4 (four) times daily. Rescue    sacubitriL-valsartan (ENTRESTO)  mg per tablet Take 1 tablet by mouth 2 (two) times daily.    spironolactone (ALDACTONE) 25 MG tablet Take 1 tablet (25 mg total) by mouth once daily.    topiramate (TOPAMAX) 50 MG tablet Take 1 tablet (50 mg total) by mouth once daily.     Family History     Problem Relation (Age of Onset)    Diabetes Father    Heart failure Father, Brother    Lung cancer Paternal Grandmother    Pancreatic cancer Father        Tobacco Use    Smoking status: Never Smoker    Smokeless tobacco: Never Used   Substance and Sexual Activity    Alcohol use: Not Currently     Comment: 1 glass per 3 months    Drug use: No    Sexual activity: Yes     Partners: Male     Comment: one male partner (boyfriend)     Review of Systems   Constitution: Negative for chills, decreased appetite and diaphoresis.   HENT: Negative for congestion and ear discharge.    Eyes: Negative for blurred vision and discharge.   Cardiovascular: Negative for chest pain, dyspnea on exertion, irregular heartbeat, leg swelling and paroxysmal nocturnal dyspnea.   Respiratory: Negative for cough, hemoptysis and shortness of breath.    Gastrointestinal: Negative for abdominal pain.     Objective:     Vital Signs (Most Recent):    Vital Signs (24h Range):           There is no height or weight on file to calculate BMI.             No intake or output data in the 24 hours ending 06/17/20 0842    Lines/Drains/Airways     None                 Physical Exam   Constitutional: She is oriented to person, place, and time. She appears well-developed and well-nourished. No distress.   Eyes: Pupils are equal, round, and reactive to light. Conjunctivae are normal.   Neck: No tracheal deviation present. No thyromegaly present.   Cardiovascular: Normal rate, regular rhythm, normal heart sounds and intact distal pulses. Exam reveals no gallop and no friction rub.   No murmur heard.  Pulses:       Radial pulses are 2+ on the right side and 2+ on the left side.        Femoral pulses are 2+ on the right side and 2+ on the left side.  Pulmonary/Chest: Effort normal and breath sounds normal. No respiratory distress. She has no wheezes. She has no rales.   Abdominal: Soft. Bowel sounds are normal. She exhibits no distension. There is no abdominal tenderness.   Musculoskeletal:         General: No deformity or edema.   Neurological: She is alert and oriented to person, place, and time. No cranial nerve deficit. Coordination normal.   Skin: Skin is warm and dry. She is not diaphoretic.   Psychiatric: She has a normal mood and affect. Her behavior is normal.       Significant Labs: BMP: No results for input(s): GLU, NA, K, CL, CO2, BUN, CREATININE, CALCIUM, MG in the last 48 hours.    Significant Imaging: Echocardiogram:   Transthoracic echo (TTE) complete (Cupid Only):   Results for orders placed or performed during the hospital encounter of 06/02/20   Echo Color Flow Doppler? Yes   Result Value Ref Range    Ascending aorta 2.71 cm    STJ 2.44 cm    AV mean gradient 3 mmHg    Ao peak boone 1.16 m/s    Ao VTI 23.85 cm    IVRT 76.12 msec    IVS 0.80 (A) 0.6 - 1.1 cm    LA size 5.48 cm    Left Atrium Major Axis 6.90 cm    Left Atrium Minor Axis 7.25 cm    LVIDD 7.88 (A) 3.5 - 6.0 cm    LVIDS 7.09 (A) 2.1 - 4.0 cm    LVOT diameter 1.97 cm    LVOT peak VTI 19.68 cm     PW 0.80 (A) 0.6 - 1.1 cm    MV Peak A Jae 0.58 m/s    E wave decelartion time 310.27 msec    MV Peak E Jae 0.91 m/s    PV Peak D Jae 0.70 m/s    PV Peak S Jae 0.64 m/s    RA Major Axis 4.33 cm    RA Width 4.72 cm    RVDD 3.31 cm    Sinus 2.84 cm    TAPSE 2.70 cm    TR Max Jae 2.99 m/s    TDI LATERAL 0.10 m/s    TDI SEPTAL 0.09 m/s    LA WIDTH 7.00 cm    LV Diastolic Volume 333.01 mL    LV Systolic Volume 263.01 mL    RV S' 11.56 cm/s    LVOT peak jae 0.91 m/s    LV LATERAL E/E' RATIO 9.10 m/s    LV SEPTAL E/E' RATIO 10.11 m/s    FS 10 %    LA volume 230.55 cm3    LV mass 302.34 g    Left Ventricle Relative Wall Thickness 0.20 cm    AV valve area 2.51 cm2    AV Velocity Ratio 0.78     AV index (prosthetic) 0.83     E/A ratio 1.57     Mean e' 0.10 m/s    Pulm vein S/D ratio 0.91     LVOT area 3.0 cm2    LVOT stroke volume 59.96 cm3    AV peak gradient 5 mmHg    E/E' ratio 9.58 m/s    Triscuspid Valve Regurgitation Peak Gradient 36 mmHg    BSA 2.32 m2    LV Systolic Volume Index 117.4 mL/m2    LV Diastolic Volume Index 148.69 mL/m2    LA Volume Index 102.9 mL/m2    LV Mass Index 135 g/m2    Right Atrial Pressure (from IVC) 3 mmHg    TV rest pulmonary artery pressure 39 mmHg    Narrative    · Severely decreased left ventricular systolic function. The estimated   ejection fraction is 20-25%.  · Severe left ventricular enlargement. Eccentric left ventricular   hypertrophy.  · Grade II (moderate) left ventricular diastolic dysfunction consistent   with pseudonormalization.  · Severe eccentric mitral regurgitation with posteriorly directed jet due   to posterior leaflet tethering. Functional MR.  · Normal right ventricular systolic function.  · Mild tricuspid regurgitation.  · The estimated PA systolic pressure is 39 mmHg.  · Normal central venous pressure (3 mmHg).  · Severe left atrial enlargement.  · Severe left ventricular enlargement.  · Severe global hypokinetic wall motion.

## 2020-06-17 NOTE — RESPIRATORY THERAPY
Pt came up from the cath lab intubated with a 7.0 ETT secured 23cm at the lip. Pt was placed on the vent at the documented settings. Will continue to monitor.

## 2020-06-17 NOTE — ASSESSMENT & PLAN NOTE
1. VASQUEZ for evaluation of MitraClip  -No absolute contraindications of esophageal stricture, tumor, perforation, laceration,or diverticulum and/or active GI bleed  -The risks, benefits & alternatives of the procedure were explained to the patient.   -The risks of transesophageal echo include but are not limited to:  Dental trauma, esophageal trauma/perforation, bleeding, laryngospasm/brochospasm, aspiration, sore throat/hoarseness, & dislodgement of the endotracheal tube/nasogastric tube (where applicable).    -The risks of moderate sedation include hypotension, respiratory depression, arrhythmias, bronchospasm, & death.    -Informed consent was obtained. The patient is agreeable to proceed with the procedure and all questions and concerns addressed.    Case discussed with an attending in echocardiography lab.     Further recommendations per attending addendum

## 2020-06-17 NOTE — PLAN OF CARE
CMICU DAILY GOALS       A: Awake    RASS: Goal - RASS Goal: 0-->alert and calm  Actual - RASS (Anderson Agitation-Sedation Scale): -4-->deep sedation   Restraint necessity: Clinical Justification: Removing medical devices  B: Breath   SBT: Not attempted   C: Coordinate A & B, analgesics/sedatives   Pain: managed    SAT: Not attempted  D: Delirium   CAM-ICU: Overall CAM-ICU: Positive  E: Early Mobility   MOVE Screen: Pass   Activity: Activity Management: bedrest maintained per order  FAS: Feeding/Nutrition   Diet order: Diet/Nutrition Received: NPO,   Fluid restriction:    T: Thrombus   DVT prophylaxis: VTE Required Core Measure: Pharmacological prophylaxis initiated/maintained  H: HOB Elevation   Head of Bed (HOB): HOB at 30-45 degrees  U: Ulcer Prophylaxis   GI: yes  G: Glucose control   managed    S: Skin   Bundle compliance: yes   Bathing/Skin Care: incontinence care, linen changed, dressed/undressed Date: 6/17/2020  B: Bowel Function   no issues   I: Indwelling Catheters   Landa necessity:     CVC necessity: Yes   IPAD offered: Not appropriate  D: De-escalation Antibx   No  Plan for the day   Monitor Pericardial drain output. Extubate tomorrow  Family/Goals of care/Code Status   Code Status: Full Code     No acute events throughout day, VS and assessment per flow sheet, patient progressing towards goals as tolerated, plan of care reviewed with Mario Mahajan and family, all concerns addressed, will continue to monitor.

## 2020-06-18 PROBLEM — I30.9 ACUTE PERICARDITIS: Status: ACTIVE | Noted: 2020-06-18

## 2020-06-18 LAB
ALBUMIN SERPL BCP-MCNC: 3.5 G/DL (ref 3.5–5.2)
ALLENS TEST: ABNORMAL
ALP SERPL-CCNC: 47 U/L (ref 55–135)
ALT SERPL W/O P-5'-P-CCNC: 21 U/L (ref 10–44)
ANION GAP SERPL CALC-SCNC: 12 MMOL/L (ref 8–16)
ASCENDING AORTA: 2.72 CM
AST SERPL-CCNC: 30 U/L (ref 10–40)
BASOPHILS # BLD AUTO: 0 K/UL (ref 0–0.2)
BASOPHILS # BLD AUTO: 0.01 K/UL (ref 0–0.2)
BASOPHILS NFR BLD: 0 % (ref 0–1.9)
BASOPHILS NFR BLD: 0.1 % (ref 0–1.9)
BILIRUB SERPL-MCNC: 2.4 MG/DL (ref 0.1–1)
BSA FOR ECHO PROCEDURE: 2.46 M2
BUN SERPL-MCNC: 13 MG/DL (ref 6–20)
CALCIUM SERPL-MCNC: 10 MG/DL (ref 8.7–10.5)
CHLORIDE SERPL-SCNC: 103 MMOL/L (ref 95–110)
CO2 SERPL-SCNC: 26 MMOL/L (ref 23–29)
CREAT SERPL-MCNC: 1 MG/DL (ref 0.5–1.4)
CV ECHO LV RWT: 0.25 CM
DELSYS: ABNORMAL
DIFFERENTIAL METHOD: ABNORMAL
DOP CALC LVOT AREA: 3.2 CM2
DOP CALC LVOT DIAMETER: 2.01 CM
ECHO LV POSTERIOR WALL: 0.82 CM (ref 0.6–1.1)
EOSINOPHIL # BLD AUTO: 0 K/UL (ref 0–0.5)
EOSINOPHIL NFR BLD: 0 % (ref 0–8)
EOSINOPHIL NFR BLD: 0.1 % (ref 0–8)
EOSINOPHIL NFR BLD: 0.1 % (ref 0–8)
EOSINOPHIL NFR BLD: 0.5 % (ref 0–8)
ERYTHROCYTE [DISTWIDTH] IN BLOOD BY AUTOMATED COUNT: 14.4 % (ref 11.5–14.5)
ERYTHROCYTE [DISTWIDTH] IN BLOOD BY AUTOMATED COUNT: 14.6 % (ref 11.5–14.5)
ERYTHROCYTE [DISTWIDTH] IN BLOOD BY AUTOMATED COUNT: 14.7 % (ref 11.5–14.5)
ERYTHROCYTE [DISTWIDTH] IN BLOOD BY AUTOMATED COUNT: 14.8 % (ref 11.5–14.5)
EST. GFR  (AFRICAN AMERICAN): >60 ML/MIN/1.73 M^2
EST. GFR  (NON AFRICAN AMERICAN): >60 ML/MIN/1.73 M^2
FIO2: 30
FRACTIONAL SHORTENING: 3 % (ref 28–44)
GLUCOSE SERPL-MCNC: 117 MG/DL (ref 70–110)
GLUCOSE SERPL-MCNC: 166 MG/DL (ref 70–110)
GLUCOSE SERPL-MCNC: 171 MG/DL (ref 70–110)
GLUCOSE SERPL-MCNC: 183 MG/DL (ref 70–110)
HCO3 UR-SCNC: 17.8 MMOL/L (ref 24–28)
HCO3 UR-SCNC: 24.1 MMOL/L (ref 24–28)
HCO3 UR-SCNC: 26 MMOL/L (ref 24–28)
HCO3 UR-SCNC: 26.4 MMOL/L (ref 24–28)
HCT VFR BLD AUTO: 38 % (ref 37–48.5)
HCT VFR BLD AUTO: 40.7 % (ref 37–48.5)
HCT VFR BLD AUTO: 42.7 % (ref 37–48.5)
HCT VFR BLD AUTO: 43.1 % (ref 37–48.5)
HCT VFR BLD CALC: 22 %PCV (ref 36–54)
HCT VFR BLD CALC: 25 %PCV (ref 36–54)
HCT VFR BLD CALC: 29 %PCV (ref 36–54)
HGB BLD-MCNC: 12.3 G/DL (ref 12–16)
HGB BLD-MCNC: 13.2 G/DL (ref 12–16)
HGB BLD-MCNC: 13.9 G/DL (ref 12–16)
HGB BLD-MCNC: 14 G/DL (ref 12–16)
IMM GRANULOCYTES # BLD AUTO: 0.01 K/UL (ref 0–0.04)
IMM GRANULOCYTES # BLD AUTO: 0.02 K/UL (ref 0–0.04)
IMM GRANULOCYTES # BLD AUTO: 0.02 K/UL (ref 0–0.04)
IMM GRANULOCYTES # BLD AUTO: 0.03 K/UL (ref 0–0.04)
IMM GRANULOCYTES NFR BLD AUTO: 0.1 % (ref 0–0.5)
IMM GRANULOCYTES NFR BLD AUTO: 0.2 % (ref 0–0.5)
IMM GRANULOCYTES NFR BLD AUTO: 0.2 % (ref 0–0.5)
IMM GRANULOCYTES NFR BLD AUTO: 0.3 % (ref 0–0.5)
INTERVENTRICULAR SEPTUM: 0.82 CM (ref 0.6–1.1)
LA MAJOR: 5.13 CM
LA MINOR: 5.99 CM
LA WIDTH: 3.89 CM
LEFT ATRIUM SIZE: 5.29 CM
LEFT ATRIUM VOLUME INDEX: 41.1 ML/M2
LEFT ATRIUM VOLUME: 96.67 CM3
LEFT INTERNAL DIMENSION IN SYSTOLE: 6.37 CM (ref 2.1–4)
LEFT VENTRICLE DIASTOLIC VOLUME INDEX: 93.96 ML/M2
LEFT VENTRICLE DIASTOLIC VOLUME: 220.9 ML
LEFT VENTRICLE MASS INDEX: 95 G/M2
LEFT VENTRICLE SYSTOLIC VOLUME INDEX: 87.9 ML/M2
LEFT VENTRICLE SYSTOLIC VOLUME: 206.65 ML
LEFT VENTRICULAR INTERNAL DIMENSION IN DIASTOLE: 6.56 CM (ref 3.5–6)
LEFT VENTRICULAR MASS: 224.46 G
LYMPHOCYTES # BLD AUTO: 0.6 K/UL (ref 1–4.8)
LYMPHOCYTES # BLD AUTO: 0.7 K/UL (ref 1–4.8)
LYMPHOCYTES # BLD AUTO: 0.8 K/UL (ref 1–4.8)
LYMPHOCYTES # BLD AUTO: 1 K/UL (ref 1–4.8)
LYMPHOCYTES NFR BLD: 10.8 % (ref 18–48)
LYMPHOCYTES NFR BLD: 10.9 % (ref 18–48)
LYMPHOCYTES NFR BLD: 7.3 % (ref 18–48)
LYMPHOCYTES NFR BLD: 8.2 % (ref 18–48)
MAGNESIUM SERPL-MCNC: 1.3 MG/DL (ref 1.6–2.6)
MCH RBC QN AUTO: 30.5 PG (ref 27–31)
MCH RBC QN AUTO: 30.7 PG (ref 27–31)
MCH RBC QN AUTO: 31.1 PG (ref 27–31)
MCH RBC QN AUTO: 31.1 PG (ref 27–31)
MCHC RBC AUTO-ENTMCNC: 32.4 G/DL (ref 32–36)
MCHC RBC AUTO-ENTMCNC: 32.4 G/DL (ref 32–36)
MCHC RBC AUTO-ENTMCNC: 32.5 G/DL (ref 32–36)
MCHC RBC AUTO-ENTMCNC: 32.6 G/DL (ref 32–36)
MCV RBC AUTO: 94 FL (ref 82–98)
MCV RBC AUTO: 95 FL (ref 82–98)
MCV RBC AUTO: 96 FL (ref 82–98)
MCV RBC AUTO: 96 FL (ref 82–98)
MIN VOL: 8.7
MODE: ABNORMAL
MONOCYTES # BLD AUTO: 0.5 K/UL (ref 0.3–1)
MONOCYTES # BLD AUTO: 0.5 K/UL (ref 0.3–1)
MONOCYTES # BLD AUTO: 0.7 K/UL (ref 0.3–1)
MONOCYTES # BLD AUTO: 0.8 K/UL (ref 0.3–1)
MONOCYTES NFR BLD: 5.2 % (ref 4–15)
MONOCYTES NFR BLD: 6.4 % (ref 4–15)
MONOCYTES NFR BLD: 7.6 % (ref 4–15)
MONOCYTES NFR BLD: 8.1 % (ref 4–15)
NEUTROPHILS # BLD AUTO: 6.1 K/UL (ref 1.8–7.7)
NEUTROPHILS # BLD AUTO: 7.3 K/UL (ref 1.8–7.7)
NEUTROPHILS # BLD AUTO: 7.5 K/UL (ref 1.8–7.7)
NEUTROPHILS # BLD AUTO: 7.6 K/UL (ref 1.8–7.7)
NEUTROPHILS NFR BLD: 80.9 % (ref 38–73)
NEUTROPHILS NFR BLD: 82 % (ref 38–73)
NEUTROPHILS NFR BLD: 84.7 % (ref 38–73)
NEUTROPHILS NFR BLD: 86.1 % (ref 38–73)
NRBC BLD-RTO: 0 /100 WBC
PCO2 BLDA: 38 MMHG (ref 35–45)
PCO2 BLDA: 41.2 MMHG (ref 35–45)
PCO2 BLDA: 43.8 MMHG (ref 35–45)
PCO2 BLDA: 55.4 MMHG (ref 35–45)
PEEP: 5
PH SMN: 7.21 [PH] (ref 7.35–7.45)
PH SMN: 7.25 [PH] (ref 7.35–7.45)
PH SMN: 7.41 [PH] (ref 7.35–7.45)
PH SMN: 7.45 [PH] (ref 7.35–7.45)
PLATELET # BLD AUTO: 157 K/UL (ref 150–350)
PLATELET # BLD AUTO: 173 K/UL (ref 150–350)
PLATELET # BLD AUTO: 212 K/UL (ref 150–350)
PLATELET # BLD AUTO: 216 K/UL (ref 150–350)
PMV BLD AUTO: 11.3 FL (ref 9.2–12.9)
PMV BLD AUTO: 11.3 FL (ref 9.2–12.9)
PMV BLD AUTO: 11.6 FL (ref 9.2–12.9)
PMV BLD AUTO: 11.6 FL (ref 9.2–12.9)
PO2 BLDA: 131 MMHG (ref 80–100)
PO2 BLDA: 157 MMHG (ref 80–100)
PO2 BLDA: 308 MMHG (ref 80–100)
PO2 BLDA: 72 MMHG (ref 80–100)
POC ACTIVATED CLOTTING TIME K: 114 SEC (ref 74–137)
POC ACTIVATED CLOTTING TIME K: 120 SEC (ref 74–137)
POC ACTIVATED CLOTTING TIME K: 263 SEC (ref 74–137)
POC BE: -10 MMOL/L
POC BE: -3 MMOL/L
POC BE: 1 MMOL/L
POC BE: 2 MMOL/L
POC IONIZED CALCIUM: 0.76 MMOL/L (ref 1.06–1.42)
POC IONIZED CALCIUM: 0.98 MMOL/L (ref 1.06–1.42)
POC IONIZED CALCIUM: 1.08 MMOL/L (ref 1.06–1.42)
POC SATURATED O2: 100 % (ref 95–100)
POC SATURATED O2: 95 % (ref 95–100)
POC SATURATED O2: 98 % (ref 95–100)
POC SATURATED O2: 99 % (ref 95–100)
POC TCO2: 19 MMOL/L (ref 23–27)
POC TCO2: 26 MMOL/L (ref 23–27)
POC TCO2: 27 MMOL/L (ref 23–27)
POC TCO2: 28 MMOL/L (ref 23–27)
POTASSIUM BLD-SCNC: 3.3 MMOL/L (ref 3.5–5.1)
POTASSIUM BLD-SCNC: 3.5 MMOL/L (ref 3.5–5.1)
POTASSIUM BLD-SCNC: 3.6 MMOL/L (ref 3.5–5.1)
POTASSIUM SERPL-SCNC: 3.6 MMOL/L (ref 3.5–5.1)
PROT SERPL-MCNC: 6.7 G/DL (ref 6–8.4)
PS: 5
RA MAJOR: 4.13 CM
RA PRESSURE: 3 MMHG
RA WIDTH: 3.4 CM
RBC # BLD AUTO: 3.96 M/UL (ref 4–5.4)
RBC # BLD AUTO: 4.25 M/UL (ref 4–5.4)
RBC # BLD AUTO: 4.55 M/UL (ref 4–5.4)
RBC # BLD AUTO: 4.56 M/UL (ref 4–5.4)
RIGHT VENTRICULAR END-DIASTOLIC DIMENSION: 3.43 CM
SAMPLE: ABNORMAL
SAMPLE: NORMAL
SAMPLE: NORMAL
SINUS: 2.9 CM
SITE: ABNORMAL
SODIUM BLD-SCNC: 128 MMOL/L (ref 136–145)
SODIUM BLD-SCNC: 139 MMOL/L (ref 136–145)
SODIUM BLD-SCNC: 141 MMOL/L (ref 136–145)
SODIUM SERPL-SCNC: 141 MMOL/L (ref 136–145)
SP02: 93
SPONT RATE: 25
STJ: 2.56 CM
TDI LATERAL: 0.06 M/S
TDI SEPTAL: 0.06 M/S
TDI: 0.06 M/S
WBC # BLD AUTO: 7.45 K/UL (ref 3.9–12.7)
WBC # BLD AUTO: 8.59 K/UL (ref 3.9–12.7)
WBC # BLD AUTO: 8.8 K/UL (ref 3.9–12.7)
WBC # BLD AUTO: 9.23 K/UL (ref 3.9–12.7)

## 2020-06-18 PROCEDURE — 27000221 HC OXYGEN, UP TO 24 HOURS: Mod: HCNC

## 2020-06-18 PROCEDURE — 80053 COMPREHEN METABOLIC PANEL: CPT | Mod: HCNC

## 2020-06-18 PROCEDURE — 25000003 PHARM REV CODE 250: Mod: HCNC | Performed by: STUDENT IN AN ORGANIZED HEALTH CARE EDUCATION/TRAINING PROGRAM

## 2020-06-18 PROCEDURE — 27201037 HC PRESSURE MONITORING SET UP: Mod: HCNC

## 2020-06-18 PROCEDURE — 99900035 HC TECH TIME PER 15 MIN (STAT): Mod: HCNC

## 2020-06-18 PROCEDURE — 94667 MNPJ CHEST WALL 1ST: CPT | Mod: HCNC

## 2020-06-18 PROCEDURE — 25000003 PHARM REV CODE 250: Mod: HCNC | Performed by: INTERNAL MEDICINE

## 2020-06-18 PROCEDURE — 63700000 PHARM REV CODE 250 ALT 637 W/O HCPCS: Mod: HCNC | Performed by: INTERNAL MEDICINE

## 2020-06-18 PROCEDURE — 83735 ASSAY OF MAGNESIUM: CPT | Mod: HCNC

## 2020-06-18 PROCEDURE — 99900026 HC AIRWAY MAINTENANCE (STAT): Mod: HCNC

## 2020-06-18 PROCEDURE — 85025 COMPLETE CBC W/AUTO DIFF WBC: CPT | Mod: HCNC

## 2020-06-18 PROCEDURE — 63600175 PHARM REV CODE 636 W HCPCS: Mod: HCNC | Performed by: INTERNAL MEDICINE

## 2020-06-18 PROCEDURE — 94761 N-INVAS EAR/PLS OXIMETRY MLT: CPT | Mod: HCNC

## 2020-06-18 PROCEDURE — 20000000 HC ICU ROOM: Mod: HCNC

## 2020-06-18 RX ORDER — ACETAMINOPHEN 325 MG/1
650 TABLET ORAL ONCE
Status: COMPLETED | OUTPATIENT
Start: 2020-06-18 | End: 2020-06-18

## 2020-06-18 RX ORDER — POTASSIUM CHLORIDE 29.8 MG/ML
40 INJECTION INTRAVENOUS ONCE
Status: DISCONTINUED | OUTPATIENT
Start: 2020-06-18 | End: 2020-06-19 | Stop reason: HOSPADM

## 2020-06-18 RX ORDER — POTASSIUM CHLORIDE 20 MEQ/15ML
40 SOLUTION ORAL ONCE
Status: COMPLETED | OUTPATIENT
Start: 2020-06-18 | End: 2020-06-18

## 2020-06-18 RX ORDER — PANTOPRAZOLE SODIUM 40 MG/1
40 TABLET, DELAYED RELEASE ORAL DAILY
Status: DISCONTINUED | OUTPATIENT
Start: 2020-06-18 | End: 2020-06-19 | Stop reason: HOSPADM

## 2020-06-18 RX ORDER — ASPIRIN 325 MG
650 TABLET ORAL 3 TIMES DAILY
Status: DISCONTINUED | OUTPATIENT
Start: 2020-06-18 | End: 2020-06-19 | Stop reason: HOSPADM

## 2020-06-18 RX ORDER — COLCHICINE 0.6 MG/1
0.6 TABLET, FILM COATED ORAL 2 TIMES DAILY
Status: DISCONTINUED | OUTPATIENT
Start: 2020-06-18 | End: 2020-06-19 | Stop reason: HOSPADM

## 2020-06-18 RX ADMIN — POTASSIUM CHLORIDE 40 MEQ: 20 SOLUTION ORAL at 09:06

## 2020-06-18 RX ADMIN — ASPIRIN 325 MG ORAL TABLET 650 MG: 325 PILL ORAL at 09:06

## 2020-06-18 RX ADMIN — ACETAMINOPHEN 650 MG: 325 TABLET ORAL at 11:06

## 2020-06-18 RX ADMIN — HUMAN ALBUMIN MICROSPHERES AND PERFLUTREN 0.66 MG: 10; .22 INJECTION, SOLUTION INTRAVENOUS at 10:06

## 2020-06-18 RX ADMIN — COLCHICINE 0.6 MG: 0.6 TABLET, FILM COATED ORAL at 03:06

## 2020-06-18 RX ADMIN — FUROSEMIDE 60 MG: 10 INJECTION, SOLUTION INTRAMUSCULAR; INTRAVENOUS at 03:06

## 2020-06-18 RX ADMIN — ASPIRIN 325 MG ORAL TABLET 650 MG: 325 PILL ORAL at 03:06

## 2020-06-18 RX ADMIN — FUROSEMIDE 60 MG: 10 INJECTION, SOLUTION INTRAMUSCULAR; INTRAVENOUS at 06:06

## 2020-06-18 RX ADMIN — MAGNESIUM SULFATE HEPTAHYDRATE 1 G: 500 INJECTION, SOLUTION INTRAMUSCULAR; INTRAVENOUS at 08:06

## 2020-06-18 RX ADMIN — PANTOPRAZOLE SODIUM 40 MG: 40 TABLET, DELAYED RELEASE ORAL at 03:06

## 2020-06-18 RX ADMIN — DEXMEDETOMIDINE HYDROCHLORIDE 1.4 MCG/KG/HR: 4 INJECTION, SOLUTION INTRAVENOUS at 06:06

## 2020-06-18 NOTE — PLAN OF CARE
Maykel Dave MD  [address] / [place] [address]      [id number] [place] - [address] AT [facility] [address]  [address]  [place] [address]  Phone: [phone] Fax: [phone]    Extended Emergency Contact Information  Primary Emergency Contact: Mario Wade   [facility]  Home Phone: [phone]  Mobile Phone: [phone]  Relation: Mother    Future Appointments   Date Time Provider Department Center   8/5/2020 10:00 AM [facility]       Payor: [employer] MANAGED MEDICARE / Plan: [address] [employer] (SPECIAL NEEDS PLAN) / Product Type: Medicare Advantage /        06/18/20 1259   Discharge Assessment   Assessment Type Discharge Planning Assessment   Confirmed/corrected address and phone number on facesheet? Yes   Assessment information obtained from? Other  (mother)   Prior to hospitilization cognitive status: Alert/Oriented   Prior to hospitalization functional status: Independent   Current cognitive status: Alert/Oriented   Current Functional Status: Independent   Lives With child(jason), dependent   Able to Return to Prior Arrangements   (TBD)   Is patient able to care for self after discharge? Unable to determine at this time (comments)   Who are your caregiver(s) and their phone number(s)? Mario Mahajan (mother)[phone]   Patient currently being followed by outpatient case management? No   Patient currently receives any other outside agency services? No   Equipment Currently Used at Home none   Do you have any problems affording any of your prescribed medications? No   Is the patient taking medications as prescribed? yes   Does the patient have transportation home? Yes   Transportation Anticipated family or friend will provide   Does the patient receive services at the [facility] Clinic? No   Discharge Plan A Home   Discharge Plan B Home with family   DME Needed Upon Discharge  other (see  comments)  (TBD)       Joseph Chandra, RN MSN  Critical Care-   Ext. [phone]

## 2020-06-18 NOTE — PLAN OF CARE
CMICU DAILY GOALS       A: Awake    RASS: Goal - RASS Goal: -4-->deep sedation  Actual - RASS (Anderson Agitation-Sedation Scale): -2-->light sedation   Restraint necessity: Clinical Justification: Removing medical devices  B: Breath   SBT: Not attempted   C: Coordinate A & B, analgesics/sedatives   Pain: Managed   SAT: Fail  D: Delirium   CAM-ICU: Overall CAM-ICU: Positive  E: Early Mobility   MOVE Screen: Fail   Activity: Activity Management: bedrest maintained per order  FAS: Feeding/Nutrition   Diet order: Diet/Nutrition Received: NPO,   Fluid restriction:    T: Thrombus   DVT prophylaxis: VTE Required Core Measure: Pharmacological prophylaxis initiated/maintained  H: HOB Elevation   Head of Bed (HOB): HOB at 30-45 degrees  U: Ulcer Prophylaxis   GI: yes  G: Glucose control   managed    S: Skin   Bundle compliance: yes   Bathing/Skin Care: incontinence care, linen changed, dressed/undressed Date: 06/18/20 Bathed prior to admit  B: Bowel Function   no issues   I: Indwelling Catheters   Landa necessity:      Urethral Catheter 06/17/20 1850-Reason for Continuing Urinary Catheterization: Critically ill in ICU requiring intensive monitoring   CVC necessity: Yes   IPAD offered: No  D: De-escalation Antibx   N/A  Plan for the day   SAT & SBT in AM.  Family/Goals of care/Code Status   Code Status: Full Code     No acute events throughout day, VS and assessment per flow sheet, patient progressing towards goals as tolerated, plan of care reviewed with Mario Mahajan and family, all concerns addressed, will continue to monitor.

## 2020-06-18 NOTE — SUBJECTIVE & OBJECTIVE
Interval History: Patient remained stable. Pericardial drain output was 70 cc, so it was DCed today AM. TTE showed no pericardial effusion afterwards. She is complaining of chest pain suggestive of pericarditis. We discussed the case (Adrian Pringle, and myself) with the patient and her mother today PM.    Objective:     Vital Signs (Most Recent):  Temp: 97.6 °F (36.4 °C) (06/18/20 1500)  Pulse: 72 (06/18/20 1500)  Resp: (!) 22 (06/18/20 1500)  BP: (!) 104/59 (06/18/20 1500)  SpO2: 98 % (06/18/20 1500) Vital Signs (24h Range):  Temp:  [90.5 °F (32.5 °C)-100 °F (37.8 °C)] 97.6 °F (36.4 °C)  Pulse:  [57-79] 72  Resp:  [17-32] 22  SpO2:  [92 %-100 %] 98 %  BP: ()/(47-76) 104/59  Arterial Line BP: ()/(47-78) 104/47     Weight: 126.1 kg (278 lb)  Body mass index is 42.27 kg/m².    SpO2: 98 %  O2 Device (Oxygen Therapy): nasal cannula      Intake/Output Summary (Last 24 hours) at 6/18/2020 1554  Last data filed at 6/18/2020 1300  Gross per 24 hour   Intake 1205.92 ml   Output 5800 ml   Net -4594.08 ml       Lines/Drains/Airways     Peripheral Intravenous Line                 Peripheral IV - Single Lumen 06/17/20 1300 18 G Left;Posterior Hand 1 day         Peripheral IV - Single Lumen 06/17/20 1300 20 G Anterior;Proximal;Right Forearm 1 day                Physical Exam   Constitutional: She is oriented to person, place, and time. She appears well-developed and well-nourished.   HENT:   Head: Normocephalic and atraumatic.   Nose: Nose normal.   Mouth/Throat: No oropharyngeal exudate.   Eyes: Right eye exhibits no discharge. Left eye exhibits no discharge. No scleral icterus.   Neck: Normal range of motion. Neck supple. No JVD present.   Cardiovascular: Normal rate, regular rhythm, S1 normal and S2 normal. Exam reveals no gallop, no S3, no S4, no distant heart sounds, no friction rub, no midsystolic click and no opening snap.   Pulses:       Radial pulses are 2+ on the right side and 2+ on the left side.         Femoral pulses are 2+ on the right side and 2+ on the left side.  Holosystolic murmur louder in the apical region   Pulmonary/Chest: Effort normal and breath sounds normal. No respiratory distress. She has no wheezes. She has no rales. She exhibits no tenderness.   Abdominal: Soft. Bowel sounds are normal. She exhibits no distension. There is no abdominal tenderness. There is no rebound.   Musculoskeletal: Normal range of motion.         General: No tenderness, deformity or edema.   Lymphadenopathy:     She has no cervical adenopathy.   Neurological: She is alert and oriented to person, place, and time. No cranial nerve deficit.   Skin: Skin is warm and dry.   Psychiatric: She has a normal mood and affect. Her behavior is normal.       Significant Labs: All pertinent lab results from the last 24 hours have been reviewed.    Significant Imaging: All pertinent images from the last 24h have been reviewed.

## 2020-06-18 NOTE — ANESTHESIA POSTPROCEDURE EVALUATION
Anesthesia Post Evaluation    Patient: Mario Mahajan    Procedure(s) Performed: Procedure(s) (LRB):  Mitral clip (N/A)  Pericardiocentesis (N/A)  OCCLUSION OR EMBOLIZATION, VESSEL, WITH IMAGING GUIDANCE    Final Anesthesia Type: general    Patient location during evaluation: ICU  Patient participation: Yes- Able to Participate  Level of consciousness: awake and alert  Post-procedure vital signs: reviewed and stable  Pain management: adequate  Airway patency: patent    PONV status at discharge: No PONV  Anesthetic complications: yes  Perioperative Events: unplanned ICU admission        Cardiovascular status: hemodynamically stable  Respiratory status: unassisted  Hydration status: euvolemic  Follow-up not needed.          Vitals Value Taken Time   /61 06/18/20 0608   Temp 37 °C (98.6 °F) 06/18/20 0745   Pulse 75 06/18/20 0825   Resp 23 06/18/20 0825   SpO2 98 % 06/18/20 0825   Vitals shown include unvalidated device data.      No case tracking events are documented in the log.      Pain/[place] Score: [place] Score: 10 (6/17/2020 11:48 AM)

## 2020-06-18 NOTE — ASSESSMENT & PLAN NOTE
- Pt with severe functional MR and EF 30% with NYHA II + symptoms despite optimal GDMT felt to be at prohibitive surgical risk and excellent candidate for mitraclip per heart team.   -Aborted MitraClip due to perforation of the free atrial wall  -Can attempt MitraClip in another encounter

## 2020-06-18 NOTE — PROGRESS NOTES
[facility]  Interventional Cardiology  Progress Note    Patient Name: Mario Mahajan  MRN: [id number]  Admission Date: 6/17/2020  Hospital Length of Stay: 1 days  Code Status: Full Code   Attending Physician: Dixon Degroot MD   Primary Care Physician: Maykel Dave MD  Principal Problem:Non-rheumatic mitral regurgitation    Subjective:     Interval History: Patient remained stable. Pericardial drain output was 70 cc, so it was DCed today AM. TTE showed no pericardial effusion afterwards. She is complaining of chest pain suggestive of pericarditis. We discussed the case (Casey Luis, Adrian, and myself) with the patient and her mother today PM.    Objective:     Vital Signs (Most Recent):  Temp: 97.6 °F (36.4 °C) (06/18/20 1500)  Pulse: 72 (06/18/20 1500)  Resp: (!) 22 (06/18/20 1500)  BP: (!) 104/59 (06/18/20 1500)  SpO2: 98 % (06/18/20 1500) Vital Signs (24h Range):  Temp:  [90.5 °F (32.5 °C)-100 °F (37.8 °C)] 97.6 °F (36.4 °C)  Pulse:  [57-79] 72  Resp:  [17-32] 22  SpO2:  [92 %-100 %] 98 %  BP: ()/(47-76) 104/59  Arterial Line BP: ()/(47-78) 104/47     Weight: 126.1 kg (278 lb)  Body mass index is 42.27 kg/m².    SpO2: 98 %  O2 Device (Oxygen Therapy): nasal cannula      Intake/Output Summary (Last 24 hours) at 6/18/2020 1554  Last data filed at 6/18/2020 1300  Gross per 24 hour   Intake 1205.92 ml   Output 5800 ml   Net -4594.08 ml       Lines/Drains/Airways     Peripheral Intravenous Line                 Peripheral IV - Single Lumen 06/17/20 1300 18 G Left;Posterior Hand 1 day         Peripheral IV - Single Lumen 06/17/20 1300 20 G Anterior;Proximal;Right Forearm 1 day                Physical Exam   Constitutional: She is oriented to person, place, and time. She appears well-developed and well-nourished.   HENT:   Head: Normocephalic and atraumatic.   Nose: Nose normal.   Mouth/Throat: No oropharyngeal exudate.   Eyes: Right eye exhibits no discharge. Left eye exhibits no  discharge. No scleral icterus.   Neck: Normal range of motion. Neck supple. No JVD present.   Cardiovascular: Normal rate, regular rhythm, S1 normal and S2 normal. Exam reveals no gallop, no S3, no S4, no distant heart sounds, no friction rub, no midsystolic click and no opening snap.   Pulses:       Radial pulses are 2+ on the right side and 2+ on the left side.        Femoral pulses are 2+ on the right side and 2+ on the left side.  Holosystolic murmur louder in the apical region   Pulmonary/Chest: Effort normal and breath sounds normal. No respiratory distress. She has no wheezes. She has no rales. She exhibits no tenderness.   Abdominal: Soft. Bowel sounds are normal. She exhibits no distension. There is no abdominal tenderness. There is no rebound.   Musculoskeletal: Normal range of motion.         General: No tenderness, deformity or edema.   Lymphadenopathy:     She has no cervical adenopathy.   Neurological: She is alert and oriented to person, place, and time. No cranial nerve deficit.   Skin: Skin is warm and dry.   Psychiatric: She has a normal mood and affect. Her behavior is normal.       Significant Labs: All pertinent lab results from the last 24 hours have been reviewed.    Significant Imaging: All pertinent images from the last 24h have been reviewed.      Assessment and Plan:     Patient is a 44 y.o. female presenting with:    * Non-rheumatic mitral regurgitation  - Pt with severe functional MR and EF 30% with NYHA II + symptoms despite optimal GDMT felt to be at prohibitive surgical risk and excellent candidate for mitraclip per heart team.   -Aborted MitraClip due to perforation of the free atrial wall  -Can attempt MitraClip in another encounter    Acute pericarditis  Due to L atrial perforation during attempt to perform MitraClip  - TID  -Colchicine BID    Polymorphic ventricular tachycardia  -Holding amiodarone at this point, will restart tomorrow    Nonischemic dilated  cardiomyopathy  -Restart carvedilol and spironolactone tomorrow if stable  -Can re-attempt MitraClip later as OP      VTE Risk Mitigation (From admission, onward)         Ordered     heparin infusion 1,000 units/500 ml in 0.9% NaCl (pressure line flush)  Intra-op continuous PRN      06/17/20 1152                Eros Oneal MD  Interventional Cardiology  [facility]

## 2020-06-18 NOTE — PLAN OF CARE
CMICU DAILY GOALS       A: Awake    RASS: Goal - RASS Goal: 0-->alert and calm  Actual - RASS (Anderson Agitation-Sedation Scale): -1-->drowsy   Restraint necessity: Clinical Justification: Removing medical devices  B: Breath   SBT: Not intubated   C: Coordinate A & B, analgesics/sedatives   Pain: managed    SAT: Not intubated  D: Delirium   CAM-ICU: Overall CAM-ICU: Negative  E: Early Mobility   MOVE Screen: Pass   Activity: Activity Management: bedrest maintained per order  FAS: Feeding/Nutrition   Diet order: Diet/Nutrition Received: NPO,   Fluid restriction:    T: Thrombus   DVT prophylaxis: VTE Required Core Measure: Pharmacological prophylaxis initiated/maintained  H: HOB Elevation   Head of Bed (HOB): HOB at 30-45 degrees  U: Ulcer Prophylaxis   GI: yes  G: Glucose control   managed    S: Skin   Bundle compliance: yes   Bathing/Skin Care: incontinence care, linen changed, dressed/undressed Date: [unfilled]  B: Bowel Function   no issues   I: Indwelling Catheters   Landa necessity: [REMOVED]      Urethral Catheter 06/17/20 1850-Reason for Continuing Urinary Catheterization: Critically ill in ICU requiring intensive monitoring   CVC necessity: No   IPAD offered: Not appropriate  D: De-escalation Antibx   No  Plan for the day   Pericardial drain removed by MD, MARCIA central line removed, A-line removed, restraints removed, extubated, 3L NC, OGT removed, no gtts, up ad andrew  Family/Goals of care/Code Status   Code Status: Full Code     No acute events throughout day, VS and assessment per flow sheet, patient progressing towards goals as tolerated, plan of care reviewed with Mario Mahajan and family, all concerns addressed, will continue to monitor.

## 2020-06-18 NOTE — NURSING
Interventional Cardiology on-call paged through hospital .  Patient having multiple PVCs on monitor, monitor reading bigeminy frequently within last 30 minutes.  Awaiting for phone call to be returned.

## 2020-06-18 NOTE — RESPIRATORY THERAPY
Pt extubated without parameters per MD order. ABG obtained prior to extubation and all results WNL. Pt tolerated extubation well, and we will continue to monitor.

## 2020-06-19 VITALS
BODY MASS INDEX: 42.13 KG/M2 | RESPIRATION RATE: 18 BRPM | DIASTOLIC BLOOD PRESSURE: 57 MMHG | HEIGHT: 68 IN | SYSTOLIC BLOOD PRESSURE: 127 MMHG | TEMPERATURE: 98 F | OXYGEN SATURATION: 95 % | HEART RATE: 80 BPM | WEIGHT: 278 LBS

## 2020-06-19 LAB
ALBUMIN SERPL BCP-MCNC: 3.2 G/DL (ref 3.5–5.2)
ALP SERPL-CCNC: 39 U/L (ref 55–135)
ALT SERPL W/O P-5'-P-CCNC: 20 U/L (ref 10–44)
ANION GAP SERPL CALC-SCNC: 10 MMOL/L (ref 8–16)
AST SERPL-CCNC: 25 U/L (ref 10–40)
BASOPHILS # BLD AUTO: 0.02 K/UL (ref 0–0.2)
BASOPHILS NFR BLD: 0.2 % (ref 0–1.9)
BILIRUB SERPL-MCNC: 2.4 MG/DL (ref 0.1–1)
BUN SERPL-MCNC: 16 MG/DL (ref 6–20)
CALCIUM SERPL-MCNC: 9.2 MG/DL (ref 8.7–10.5)
CHLORIDE SERPL-SCNC: 101 MMOL/L (ref 95–110)
CO2 SERPL-SCNC: 30 MMOL/L (ref 23–29)
CREAT SERPL-MCNC: 1 MG/DL (ref 0.5–1.4)
DIFFERENTIAL METHOD: ABNORMAL
EOSINOPHIL # BLD AUTO: 0.1 K/UL (ref 0–0.5)
EOSINOPHIL NFR BLD: 0.8 % (ref 0–8)
ERYTHROCYTE [DISTWIDTH] IN BLOOD BY AUTOMATED COUNT: 14.6 % (ref 11.5–14.5)
EST. GFR  (AFRICAN AMERICAN): >60 ML/MIN/1.73 M^2
EST. GFR  (NON AFRICAN AMERICAN): >60 ML/MIN/1.73 M^2
GLUCOSE SERPL-MCNC: 97 MG/DL (ref 70–110)
HCT VFR BLD AUTO: 35.5 % (ref 37–48.5)
HGB BLD-MCNC: 11.4 G/DL (ref 12–16)
IMM GRANULOCYTES # BLD AUTO: 0.03 K/UL (ref 0–0.04)
IMM GRANULOCYTES NFR BLD AUTO: 0.3 % (ref 0–0.5)
LYMPHOCYTES # BLD AUTO: 1.6 K/UL (ref 1–4.8)
LYMPHOCYTES NFR BLD: 18.3 % (ref 18–48)
MAGNESIUM SERPL-MCNC: 1.6 MG/DL (ref 1.6–2.6)
MCH RBC QN AUTO: 31 PG (ref 27–31)
MCHC RBC AUTO-ENTMCNC: 32.1 G/DL (ref 32–36)
MCV RBC AUTO: 97 FL (ref 82–98)
MONOCYTES # BLD AUTO: 0.8 K/UL (ref 0.3–1)
MONOCYTES NFR BLD: 9.4 % (ref 4–15)
NEUTROPHILS # BLD AUTO: 6.2 K/UL (ref 1.8–7.7)
NEUTROPHILS NFR BLD: 71 % (ref 38–73)
NRBC BLD-RTO: 0 /100 WBC
PLATELET # BLD AUTO: 153 K/UL (ref 150–350)
PMV BLD AUTO: 10.8 FL (ref 9.2–12.9)
POTASSIUM SERPL-SCNC: 3.1 MMOL/L (ref 3.5–5.1)
PROT SERPL-MCNC: 6.3 G/DL (ref 6–8.4)
RBC # BLD AUTO: 3.68 M/UL (ref 4–5.4)
SODIUM SERPL-SCNC: 141 MMOL/L (ref 136–145)
WBC # BLD AUTO: 8.72 K/UL (ref 3.9–12.7)

## 2020-06-19 PROCEDURE — 94761 N-INVAS EAR/PLS OXIMETRY MLT: CPT | Mod: HCNC

## 2020-06-19 PROCEDURE — 94668 MNPJ CHEST WALL SBSQ: CPT | Mod: HCNC

## 2020-06-19 PROCEDURE — 63600175 PHARM REV CODE 636 W HCPCS: Mod: HCNC | Performed by: INTERNAL MEDICINE

## 2020-06-19 PROCEDURE — 63700000 PHARM REV CODE 250 ALT 637 W/O HCPCS: Mod: HCNC | Performed by: STUDENT IN AN ORGANIZED HEALTH CARE EDUCATION/TRAINING PROGRAM

## 2020-06-19 PROCEDURE — 25000003 PHARM REV CODE 250: Mod: HCNC | Performed by: STUDENT IN AN ORGANIZED HEALTH CARE EDUCATION/TRAINING PROGRAM

## 2020-06-19 PROCEDURE — 85025 COMPLETE CBC W/AUTO DIFF WBC: CPT | Mod: HCNC

## 2020-06-19 PROCEDURE — 80053 COMPREHEN METABOLIC PANEL: CPT | Mod: HCNC

## 2020-06-19 PROCEDURE — 27000221 HC OXYGEN, UP TO 24 HOURS: Mod: HCNC

## 2020-06-19 PROCEDURE — 63600175 PHARM REV CODE 636 W HCPCS: Mod: HCNC | Performed by: STUDENT IN AN ORGANIZED HEALTH CARE EDUCATION/TRAINING PROGRAM

## 2020-06-19 PROCEDURE — 83735 ASSAY OF MAGNESIUM: CPT | Mod: HCNC

## 2020-06-19 RX ORDER — ASPIRIN 81 MG/1
81 TABLET ORAL DAILY
Qty: 360 TABLET | Refills: 0 | Status: SHIPPED | OUTPATIENT
Start: 2020-06-19 | End: 2021-07-07

## 2020-06-19 RX ORDER — POTASSIUM CHLORIDE 20 MEQ/15ML
80 SOLUTION ORAL ONCE
Status: COMPLETED | OUTPATIENT
Start: 2020-06-19 | End: 2020-06-19

## 2020-06-19 RX ORDER — LANOLIN ALCOHOL/MO/W.PET/CERES
800 CREAM (GRAM) TOPICAL ONCE
Status: COMPLETED | OUTPATIENT
Start: 2020-06-19 | End: 2020-06-19

## 2020-06-19 RX ORDER — PANTOPRAZOLE SODIUM 40 MG/1
40 TABLET, DELAYED RELEASE ORAL DAILY
Qty: 30 TABLET | Refills: 11 | Status: SHIPPED | OUTPATIENT
Start: 2020-06-19 | End: 2021-07-07

## 2020-06-19 RX ORDER — POTASSIUM CHLORIDE 20 MEQ/1
40 TABLET, EXTENDED RELEASE ORAL ONCE
Status: COMPLETED | OUTPATIENT
Start: 2020-06-19 | End: 2020-06-19

## 2020-06-19 RX ORDER — POTASSIUM CHLORIDE 20 MEQ/15ML
20 SOLUTION ORAL ONCE
Status: COMPLETED | OUTPATIENT
Start: 2020-06-19 | End: 2020-06-19

## 2020-06-19 RX ORDER — ASPIRIN 325 MG
650 TABLET ORAL 3 TIMES DAILY
Qty: 180 TABLET | Refills: 11 | Status: SHIPPED | OUTPATIENT
Start: 2020-06-19 | End: 2021-01-15 | Stop reason: SDUPTHER

## 2020-06-19 RX ORDER — FUROSEMIDE 40 MG/1
40 TABLET ORAL DAILY PRN
Qty: 90 TABLET | Refills: 4 | Status: SHIPPED | OUTPATIENT
Start: 2020-06-19 | End: 2021-01-22

## 2020-06-19 RX ORDER — MAGNESIUM SULFATE HEPTAHYDRATE 40 MG/ML
2 INJECTION, SOLUTION INTRAVENOUS ONCE
Status: COMPLETED | OUTPATIENT
Start: 2020-06-19 | End: 2020-06-19

## 2020-06-19 RX ORDER — COLCHICINE 0.6 MG/1
0.6 TABLET ORAL 2 TIMES DAILY
Qty: 60 TABLET | Refills: 5 | Status: SHIPPED | OUTPATIENT
Start: 2020-06-19 | End: 2021-08-12

## 2020-06-19 RX ADMIN — POTASSIUM CHLORIDE 20 MEQ: 20 SOLUTION ORAL at 08:06

## 2020-06-19 RX ADMIN — ASPIRIN 325 MG ORAL TABLET 650 MG: 325 PILL ORAL at 08:06

## 2020-06-19 RX ADMIN — POTASSIUM CHLORIDE 40 MEQ: 1500 TABLET, EXTENDED RELEASE ORAL at 09:06

## 2020-06-19 RX ADMIN — PANTOPRAZOLE SODIUM 40 MG: 40 TABLET, DELAYED RELEASE ORAL at 08:06

## 2020-06-19 RX ADMIN — POTASSIUM CHLORIDE 80 MEQ: 20 SOLUTION ORAL at 05:06

## 2020-06-19 RX ADMIN — MAGNESIUM SULFATE IN WATER 2 G: 40 INJECTION, SOLUTION INTRAVENOUS at 05:06

## 2020-06-19 RX ADMIN — Medication 800 MG: at 05:06

## 2020-06-19 RX ADMIN — FUROSEMIDE 60 MG: 10 INJECTION, SOLUTION INTRAMUSCULAR; INTRAVENOUS at 06:06

## 2020-06-19 RX ADMIN — COLCHICINE 0.6 MG: 0.6 TABLET, FILM COATED ORAL at 08:06

## 2020-06-19 NOTE — DISCHARGE INSTRUCTIONS
1. Take aspirin 650 mg 3 times daily (every 8 hours) for two weeks, if your chest pain improves, then decrease to twice daily (every 12h) for one week, then one aspirin pill daily for one more week. Then, just take a baby aspirin (81 mg) daily for the rest of your life.  2. Antibiotic prophylaxis for life (see the card we provided)   3. Follow up with interventional clinic   4. Follow up with general cardiology as scheduled.

## 2020-06-19 NOTE — DISCHARGE SUMMARY
[facility]  Interventional Cardiology  Discharge Summary      Patient Name: Mario Mahajan  MRN: [id number]  Admission Date: 6/17/2020  Hospital Length of Stay: 2 days  Discharge Date and Time:  06/19/2020 8:00 AM  Attending Physician: Dixon Degroot MD  Discharging Provider: Eros Oneal MD  Primary Care Physician: Maykel Dave MD    HPI:   45 y/o AAF with hx of NICM EF 30% since 2015(NL PET 2015, NML coronary angiogram 2017), VT s/p AICD 2015, Obesity and severe functional MR with LVEDD 7.8cm and NYHA II symptoms here for radha clip eval. Initially seen in 10/2019 by Dr. Luis and referred for surgical mitral valve repair/replacment as part of heart team approach. Dr Gilmore felt risk outweighed benefits for surgical repair given reduced EF and functional nature of MR and felt radha clip would be a better option. She Is on optimal GDMT dictated by Dr. ELOISA Hatch. No recent admits for decompensated CHF. States with titration of GDMT her symptoms have improved but she still gets SOB and chest tightness when walking short distances outside. No orpthopnea or PND.  Minimal LE edema. No syncope or recent ICD shocks       Procedure(s) (LRB):  Mitral clip (N/A)  Pericardiocentesis (N/A)  OCCLUSION OR EMBOLIZATION, VESSEL, WITH IMAGING GUIDANCE     Indwelling Lines/Drains at time of discharge:  Lines/Drains/Airways     None                 Hospital Course (synopsis of major diagnoses, care, treatment, and services provided during the course of the hospital stay):   The patient was brought to the cath lab for MitraClip, however, due to perforation of the LA free wall, an 8-mm Amplatzer was placed in the lesion and a pericardiocentesis was performed. She was admitted to the ICU for monitoring and diuresis after fluids were given intraprocedurally. TTE showed no reacumulation of the pericardial effusion after drainage. She was started on ASA and colchicine (with pantoprazole) for  postprocedural pericarditis.      Physical Exam   Constitutional: She is oriented to person, place, and time. She appears well-developed and well-nourished.   HENT:   Head: Normocephalic and atraumatic.   Nose: Nose normal.   Mouth/Throat: No oropharyngeal exudate.   Eyes: Right eye exhibits no discharge. Left eye exhibits no discharge. No scleral icterus.   Neck: Normal range of motion. Neck supple. No JVD present.   Cardiovascular: Normal rate, regular rhythm, S1 normal and S2 normal. Exam reveals no gallop, no S3, no S4, no distant heart sounds, no friction rub, no midsystolic click and no opening snap.   Pulses:       Radial pulses are 2+ on the right side and 2+ on the left side.        Femoral pulses are 2+ on the right side and 2+ on the left side.  Holosystolic murmur louder in the apical region   Pulmonary/Chest: Effort normal and breath sounds normal. No respiratory distress. She has no wheezes. She has no rales. She exhibits no tenderness.   Abdominal: Soft. Bowel sounds are normal. She exhibits no distension. There is no abdominal tenderness. There is no rebound.   Musculoskeletal: Normal range of motion.         General: No tenderness, deformity or edema.   Lymphadenopathy:     She has no cervical adenopathy.   Neurological: She is alert and oriented to person, place, and time. No cranial nerve deficit.   Skin: Skin is warm and dry.   Psychiatric: She has a normal mood and affect. Her behavior is normal.     Significant Diagnostic Studies:    TTE (after pericardiocentesis):  · Severely decreased left ventricular systolic function. The estimated ejection fraction is 20-25%. Global hypokinetic wall motion.  · Left ventricular diastolic dysfunction.  · Moderate left ventricular enlargement. Eccentric left ventricular hypertrophy.  · Normal central venous pressure (3 mmHg).  · Mild left atrial enlargement.  · No pericardial effusion.    Intraprocedural VASQUEZ:  · Severe left ventricular enlargement. Severely  decreased left ventricular systolic function. The estimated ejection fraction is 20%.  · Severe mitral regurgitation.  · Normal appearing left atrial thrombus. Low velocities 0.4m/s. No thrombus visualized.  · Normal right ventricular systolic function.  · MitraClip procedure complicated by atrial perforation resulting in a moderate sized circumferential pericardial effusion. RA collapse consistent with tamponade physiology.  · Amplatzer ASO 8mm device used to plug leak.  · Trivial pericardial effusion with no right atrial collapse after drainage.  Pending Diagnostic Studies:     Procedure Component Value Units Date/Time    APTT [[id number]] Collected: 06/17/20 1607    Order Status: Sent Lab Status: In process Updated: 06/17/20 1607    Specimen: Blood     CBC auto differential [[id number]] Collected: 06/17/20 1804    Order Status: Sent Lab Status: In process Updated: 06/17/20 1804    Specimen: Blood     EKG 12-LEAD on arrival to floor [[id number]]     Order Status: Sent Lab Status: No result     EKG 12-lead [[id number]]     Order Status: Sent Lab Status: No result     Echo Color Flow Doppler? Yes; Bubble Contrast? No [[id number]]     Order Status: Sent Lab Status: No result     Fibrinogen [[id number]] Collected: 06/17/20 1607    Order Status: Sent Lab Status: In process Updated: 06/17/20 1607    Specimen: Blood     Protime-INR [[id number]] Collected: 06/17/20 1607    Order Status: Sent Lab Status: In process Updated: 06/17/20 1607    Specimen: Blood         Final Active Diagnoses:    Diagnosis Date Noted POA    PRINCIPAL PROBLEM:  Non-rheumatic mitral regurgitation [I34.0] 03/05/2015 Yes     Chronic    Acute pericarditis [I30.9] 06/18/2020 No    Polymorphic ventricular tachycardia [I47.2] 03/08/2017 Yes    Nonischemic dilated cardiomyopathy [I42.0] 12/01/2015 Yes     Chronic      Problems Resolved During this Admission:       Discharged Condition: good    Patient Instructions:   1. Take aspirin 650 mg 3 times daily  (every 8 hours) for two weeks, if your chest pain improves, then decrease to twice daily (every 12h) for one week, then one aspirin pill daily for one more week. Then, just take a baby aspirin (81 mg) daily for the rest of your life.  2. Antibiotic prophylaxis for life (see the card we provided)   3. Follow up with interventional clinic   4. Follow up with general cardiology as scheduled.    Medications:  Reconciled Home Medications:      Medication List      START taking these medications    * aspirin 325 MG tablet  Take 2 tablets (650 mg total) by mouth 3 (three) times daily.     * aspirin 81 MG EC tablet  Commonly known as: ECOTRIN  Take 1 tablet (81 mg total) by mouth once daily.     colchicine 0.6 mg tablet  Commonly known as: COLCRYS  Take 1 tablet (0.6 mg total) by mouth 2 (two) times daily.     pantoprazole 40 MG tablet  Commonly known as: PROTONIX  Take 1 tablet (40 mg total) by mouth once daily.Take one pil daily during the time you take aspirin or another antiinflammatory medication for the pericarditis         * This list has 2 medication(s) that are the same as other medications prescribed for you. Read the directions carefully, and ask your doctor or other care provider to review them with you.            CONTINUE taking these medications    albuterol 90 mcg/actuation inhaler  Commonly known as: PROVENTIL/VENTOLIN HFA  Inhale 2 puffs into the lungs every 6 (six) hours as needed for Wheezing.     amiodarone 200 MG Tab  Commonly known as: PACERONE  TAKE 1 TABLET(200 MG) BY MOUTH EVERY DAY     ascorbic acid (vitamin C) 250 MG tablet  Commonly known as: VITAMIN C  Take 1 tablet (250 mg) by mouth twice daily. Take with the iron tablets.     b complex vitamins tablet  Take 1 tablet by mouth once daily.     carvediloL 25 MG tablet  Commonly known as: COREG  Take 1 tablet (25 mg total) by mouth 2 (two) times daily.     COMBIVENT RESPIMAT  mcg/actuation inhaler  Generic drug: ipratropium-albuteroL  Inhale  1 puff into the lungs 4 (four) times daily. Rescue     ferrous sulfate 325 (65 FE) MG EC tablet  Take 1 tablet (325 mg total) by mouth 2 (two) times daily. Take with vitamin C.     FLOVENT  mcg/actuation inhaler  Generic drug: fluticasone propionate  INL 2 PFS PO TWICE DAILY. RM AFTER U     furosemide 40 MG tablet  Commonly known as: LASIX  Take one tablet by mouth daily for 4 days. Then, take one tablet if your weight increases by 3 lb or more in one day or by 5 lb in a week.     ibuprofen 600 MG tablet  Commonly known as: ADVIL,MOTRIN  Take 1 tablet (600 mg total) by mouth every 6 (six) hours as needed for Pain.     sacubitriL-valsartan  mg per tablet  Commonly known as: ENTRESTO  Take 1 tablet by mouth 2 (two) times daily.     spironolactone 25 MG tablet  Commonly known as: ALDACTONE  Take 1 tablet (25 mg total) by mouth once daily.     topiramate 50 MG tablet  Commonly known as: TOPAMAX  Take 1 tablet (50 mg total) by mouth once daily.            Time spent on the discharge of patient: 20 minutes    Eros Oneal MD  Interventional Cardiology  [facility]

## 2020-06-19 NOTE — PLAN OF CARE
06/19/20 1301   Final Note   Assessment Type Final Discharge Note   Anticipated Discharge Disposition Home   What phone number can be called within the next 1-3 days to see how you are doing after discharge? [phone]   Hospital Follow Up  Appt(s) scheduled? Yes   Discharge plans and expectations educations in teach back method with documentation complete? Yes   Right Care Referral Info   Post Acute Recommendation No Care   Referral Type n/a home       Joseph Chandra RN MSN  Critical Care-   Ext. [phone]

## 2020-06-19 NOTE — PROGRESS NOTES
Pt IVs removed. No bleeding or redness noted. Pt and family educated regarding new medications. Pt and family verbalized understanding of education. Pharmacy transported new medications to bedside. Pt discharged from unit via wheelchair. No acute distress noted

## 2020-06-21 LAB
BLD PROD TYP BPU: NORMAL
BLOOD UNIT EXPIRATION DATE: NORMAL
BLOOD UNIT TYPE CODE: 5100
BLOOD UNIT TYPE CODE: 6200
BLOOD UNIT TYPE CODE: 9500
BLOOD UNIT TYPE: NORMAL
CODING SYSTEM: NORMAL
DISPENSE STATUS: NORMAL
NUM UNITS TRANS FFP: NORMAL
NUM UNITS TRANS PACKED RBC: NORMAL
NUM UNITS TRANS PACKED RBC: NORMAL

## 2020-06-22 ENCOUNTER — PATIENT OUTREACH (OUTPATIENT)
Dept: ADMINISTRATIVE | Facility: CLINIC | Age: 45
End: 2020-06-22

## 2020-06-22 ENCOUNTER — TELEPHONE (OUTPATIENT)
Dept: TRANSPLANT | Facility: CLINIC | Age: 45
End: 2020-06-22

## 2020-06-22 ENCOUNTER — OUTPATIENT CASE MANAGEMENT (OUTPATIENT)
Dept: ADMINISTRATIVE | Facility: OTHER | Age: 45
End: 2020-06-22

## 2020-06-22 NOTE — TELEPHONE ENCOUNTER
1045 am  Reviewed message and recent hospital d/c summary  Pt admitted 6/17-6/19 for MitralClip, that looks like did not occur due to complications.    Forwarded message to Dr. Degroot's staff asking they review it and return call to pt.         ----- Message from Leighann Gates LPN sent at 6/22/2020 10:31 AM CDT -----  Patient states that she is still having pain. States that she had pain when she left the hospital but was advised to take Tylenol. Patient states Tylenol is not working. Patient states she was also not told who or when she should follow up. Please contact patient to discuss.    Respectfully,  Leighann Gates LPN  [facility]    [email or web address]       Please do not reply to this message, as this inbox is not routinely monitored.

## 2020-06-22 NOTE — PATIENT INSTRUCTIONS

## 2020-06-22 NOTE — PROGRESS NOTES
06/22/20- OPCM RN placed call to pt to introduce, offer, and enroll pt into OPCM program. Pt was contacted, declined participation at this time, stated she felt all needs were met. OPCM RN provided contact information should any future needs arise.

## 2020-06-22 NOTE — PROGRESS NOTES
Patient states that she is still having pain. States that she had pain when she left the hospital but was advised to take Tylenol. Patient states Tylenol is not working. Patient states she was also not told who or when she should follow up. Message sent to Dr Degroot and Dr Hatch.

## 2020-07-20 ENCOUNTER — PATIENT OUTREACH (OUTPATIENT)
Dept: ADMINISTRATIVE | Facility: OTHER | Age: 45
End: 2020-07-20

## 2020-07-20 NOTE — PROGRESS NOTES
Requested updates within Care Everywhere.  Patient's chart was reviewed for overdue JOSE topics.  Immunizations reconciled.    Orders placed:n/a  Tasked appts:n/a  Labs Linked:n/a

## 2020-07-21 ENCOUNTER — OFFICE VISIT (OUTPATIENT)
Dept: CARDIOLOGY | Facility: CLINIC | Age: 45
End: 2020-07-21
Payer: MEDICARE

## 2020-07-21 VITALS
WEIGHT: 254.63 LBS | OXYGEN SATURATION: 97 % | SYSTOLIC BLOOD PRESSURE: 114 MMHG | HEIGHT: 69 IN | DIASTOLIC BLOOD PRESSURE: 59 MMHG | HEART RATE: 70 BPM | BODY MASS INDEX: 37.71 KG/M2

## 2020-07-21 DIAGNOSIS — I34.0 NON-RHEUMATIC MITRAL REGURGITATION: Primary | Chronic | ICD-10-CM

## 2020-07-21 DIAGNOSIS — I50.42 CHRONIC COMBINED SYSTOLIC AND DIASTOLIC CONGESTIVE HEART FAILURE: ICD-10-CM

## 2020-07-21 PROCEDURE — 3008F PR BODY MASS INDEX (BMI) DOCUMENTED: ICD-10-PCS | Mod: HCNC,CPTII,S$GLB, | Performed by: INTERNAL MEDICINE

## 2020-07-21 PROCEDURE — 3008F BODY MASS INDEX DOCD: CPT | Mod: HCNC,CPTII,S$GLB, | Performed by: INTERNAL MEDICINE

## 2020-07-21 PROCEDURE — 99214 PR OFFICE/OUTPT VISIT, EST, LEVL IV, 30-39 MIN: ICD-10-PCS | Mod: HCNC,24,S$GLB, | Performed by: INTERNAL MEDICINE

## 2020-07-21 PROCEDURE — 99999 PR PBB SHADOW E&M-EST. PATIENT-LVL V: CPT | Mod: PBBFAC,HCNC,, | Performed by: INTERNAL MEDICINE

## 2020-07-21 PROCEDURE — 99499 UNLISTED E&M SERVICE: CPT | Mod: S$PBB,,, | Performed by: INTERNAL MEDICINE

## 2020-07-21 PROCEDURE — 99499 RISK ADDL DX/OHS AUDIT: ICD-10-PCS | Mod: S$PBB,,, | Performed by: INTERNAL MEDICINE

## 2020-07-21 PROCEDURE — 99214 OFFICE O/P EST MOD 30 MIN: CPT | Mod: HCNC,24,S$GLB, | Performed by: INTERNAL MEDICINE

## 2020-07-21 PROCEDURE — 99999 PR PBB SHADOW E&M-EST. PATIENT-LVL V: ICD-10-PCS | Mod: PBBFAC,HCNC,, | Performed by: INTERNAL MEDICINE

## 2020-07-21 NOTE — ASSESSMENT & PLAN NOTE
- Patient with severe functional MR and EF 30%, is a prohibitive surgical risk and excellent candidate for MitraClip per heart team  - MitraClip was attempted in 6/2020 which resulted in a perforation of the posterior wall, required an AGA 8 mm ASD occluder and aborted procedure  - Patient now has NYHA Class I-II symptoms and is well controlled with GDMT  - Patient will continue to follow with Dr. Nereida Hatch and may consider MitraClip if she becomes more symptomatic  - Follow up PRN with Dr. Luis

## 2020-07-21 NOTE — PROGRESS NOTES
Interventional Cardiology Clinic Note  Reason for Visit: Severe MR  Referring Physician: Dr. Nereida Hatch    HPI:   Mario Mahajan is a 44 y.o. female who presents for Mitral Regurgitation    Patient has a PMHx of NICM (EF 30%) since 2015 (normal PET 2015, normal coronary angiogram 2017), VT s/p AICD 2015, obesity and severe functional MR with LVEDD 7.8cm and NYHA I-II symptoms. She had been worked up for MitraClip and underwent the procedure in 6/2020. However during the procedure her posterior wall was perforated requiring an emergent AGA ASD 8 mm occluder to be placed; the procedure was aborted and a MitraClip was never placed. She also had a pericardial drain placed and had acute pericarditis at the time of her hospitalization.    She has recovered remarkably well. Since then her pericarditis has resolved. She has been continued on GDMT for her HF following closely with Dr. Nereida Hatch. She now states that she has NYHA Class I-II symptoms, mostly asymptomatic and can walk 0.5-1 miles without difficulty. She now only takes furosemide PRN. Denies chest pain, SOB, syncope, claudication, PND, orthopnea, or LE edema.    ROS:    Constitution: Negative for fever, chills, weight loss or gain.   HENT: Negative for sore throat, rhinorrhea, or headache.  Eyes: Negative for blurred or double vision.   Cardiovascular: See above  Pulmonary: Negative for SOB   Gastrointestinal: Negative for abdominal pain, nausea, vomiting, or diarrhea.   : Negative for dysuria.   Neurological: Negative for focal weakness or sensory changes.  PMH:     Past Medical History:   Diagnosis Date    Asthma     Chronic back pain 7/1/2014    Chronic combined systolic and diastolic congestive heart failure 4/28/2015     2-10-17   1 - Severely depressed left ventricular systolic function (EF 20-25%).    2 - Severe left ventricular enlargement.    3 - Severe left atrial enlargement.    4 - Left ventricular diastolic dysfunction.    5 - The  estimated PA systolic pressure is 18 mmHg.    6 - Mild mitral regurgitation.     Encounter for blood transfusion     ICD (implantable cardioverter-defibrillator) in place 12/01/15 3/3/2016    Menorrhagia, premenopausal 2/10/2017    Microcytic anemia 2015    Non-rheumatic mitral regurgitation 3/5/2015    Nonischemic dilated cardiomyopathy 2015    Sleep apnea     Syncope and collapse 2017    Ventricular tachycardia, polymorphic      Past Surgical History:   Procedure Laterality Date    CARDIAC DEFIBRILLATOR PLACEMENT  2015     SECTION      OOPHORECTOMY      PERICARDIOCENTESIS N/A 2020    Procedure: Pericardiocentesis;  Surgeon: Memo Luis MD;  Location: [facility] CATH LAB;  Service: Cardiology;  Laterality: N/A;    RIGHT HEART CATHETERIZATION      TOTAL ABDOMINAL HYSTERECTOMY N/A 3/26/2019    Procedure: HYSTERECTOMY, TOTAL, ABDOMINAL;  Surgeon: Victorino Rose MD;  Location: [facility] OR;  Service: OB/GYN;  Laterality: N/A;    TUBAL LIGATION       Allergies:     Review of patient's allergies indicates:   Allergen Reactions    Ace inhibitors      Cough     Medications:     Current Outpatient Medications on File Prior to Visit   Medication Sig Dispense Refill    albuterol (PROVENTIL/VENTOLIN HFA) 90 mcg/actuation inhaler Inhale 2 puffs into the lungs every 6 (six) hours as needed for Wheezing. 18 g 6    amiodarone (PACERONE) 200 MG Tab TAKE 1 TABLET(200 MG) BY MOUTH EVERY DAY 90 tablet 3    ascorbic acid, vitamin C, (VITAMIN C) 250 MG tablet Take 1 tablet (250 mg) by mouth twice daily. Take with the iron tablets. 60 tablet 3    aspirin (ECOTRIN) 81 MG EC tablet Take 1 tablet (81 mg total) by mouth once daily.Start taking this pill the day after you stop high-dose aspirin (650). Continue taking this pill (aspirin 81 mg) the rest of your life 360 tablet 0    aspirin 325 MG tablet Take 2 tablets (650 mg total) by mouth 3 (three) times daily. 180 tablet 11    b complex  vitamins tablet Take 1 tablet by mouth once daily.      carvediloL (COREG) 25 MG tablet Take 1 tablet (25 mg total) by mouth 2 (two) times daily. 180 tablet 3    colchicine (COLCRYS) 0.6 mg tablet Take 1 tablet (0.6 mg total) by mouth 2 (two) times daily. 60 tablet 5    ferrous sulfate 325 (65 FE) MG EC tablet Take 1 tablet (325 mg total) by mouth 2 (two) times daily. Take with vitamin C. 60 tablet 3    FLOVENT  mcg/actuation inhaler INL 2 PFS PO TWICE DAILY. RM AFTER U 12 g 4    furosemide (LASIX) 40 MG tablet Take one tablet by mouth daily for 4 days. Then, take one tablet if your weight increases by 3 lb or more in one day or by 5 lb in a week. 90 tablet 4    ibuprofen (ADVIL,MOTRIN) 600 MG tablet Take 1 tablet (600 mg total) by mouth every 6 (six) hours as needed for Pain. 30 tablet 2    ipratropium-albuteroL (COMBIVENT RESPIMAT)  mcg/actuation inhaler Inhale 1 puff into the lungs 4 (four) times daily. Rescue      pantoprazole (PROTONIX) 40 MG tablet Take 1 tablet (40 mg total) by mouth once daily.Take one pil daily during the time you take aspirin or another antiinflammatory medication for the pericarditis 30 tablet 11    sacubitriL-valsartan (ENTRESTO)  mg per tablet Take 1 tablet by mouth 2 (two) times daily. 60 tablet 11    spironolactone (ALDACTONE) 25 MG tablet TAKE 1 TABLET(25 MG) BY MOUTH EVERY DAY 30 tablet 11    topiramate (TOPAMAX) 50 MG tablet Take 1 tablet (50 mg total) by mouth once daily. (Patient taking differently: Take 50 mg by mouth daily as needed. ) 90 tablet 4     No current facility-administered medications on file prior to visit.      Social History:     Social History     Tobacco Use    Smoking status: Never Smoker    Smokeless tobacco: Never Used   Substance Use Topics    Alcohol use: Not Currently     Comment: 1 glass per 3 months     Family History:     Family History   Problem Relation Age of Onset    Diabetes Father     Pancreatic cancer Father   "   Heart failure Father     Heart failure Brother     Lung cancer Paternal Grandmother      Physical Exam:   BP (!) 114/59 (BP Location: Right arm, Patient Position: Sitting, BP Method: X-Large (Automatic))   Pulse 70   Ht 5' 9" (1.753 m)   Wt 115.5 kg (254 lb 10.1 oz)   LMP 03/01/2019   SpO2 97%   BMI 37.60 kg/m²      Constitutional: NAD, conversant  HEENT: Sclera anicteric, PERRLA, EOMI  Neck: No JVD, no carotid bruits  CV: RRR, no murmur, normal S1/S2  Pulm: CTAB, no wheezes, rales, or ronchi  GI: Abdomen soft, NTND, +BS  Extremities: No LE edema, warm and well perfused; 2+ radial pulses B/L, 2+ DP and PT pulses B/L  Skin: No ecchymosis, erythema, or ulcers  Psych: AOx3, appropriate affect  Neuro: No gross deficits    Labs:     Lab Results   Component Value Date     06/19/2020    K 3.1 (L) 06/19/2020     06/19/2020    CO2 30 (H) 06/19/2020    BUN 16 06/19/2020    BUN 12 06/19/2015    CREATININE 1.0 06/19/2020    ANIONGAP 10 06/19/2020     Lab Results   Component Value Date    HGBA1C 4.8 09/16/2019     Lab Results   Component Value Date     (H) 08/07/2019     (H) 06/14/2019     (H) 04/03/2019    Lab Results   Component Value Date    WBC 8.72 06/19/2020    HGB 11.4 (L) 06/19/2020    HCT 35.5 (L) 06/19/2020    HCT 29 (L) 06/17/2020     06/19/2020    GRAN 6.2 06/19/2020    GRAN 71.0 06/19/2020     Lab Results   Component Value Date    CHOL 98 (L) 09/16/2019    HDL 40 09/16/2019    LDLCALC 46.2 (L) 09/16/2019    TRIG 59 09/16/2019          Imaging:   TTE (6/18/2020)   · Severely decreased left ventricular systolic function. The estimated ejection fraction is 20-25%. Global hypokinetic wall motion.  · Left ventricular diastolic dysfunction.  · Moderate left ventricular enlargement. Eccentric left ventricular hypertrophy.  · Normal central venous pressure (3 mmHg).  · Mild left atrial enlargement.  · No pericardial effusion.     Assessment:     1. Non-rheumatic mitral " regurgitation    2. Chronic combined systolic and diastolic congestive heart failure        Plan:     Non-rheumatic mitral regurgitation  - Patient with severe functional MR and EF 30%, is a prohibitive surgical risk and excellent candidate for MitraClip per heart team  - MitraClip was attempted in 6/2020 which resulted in a perforation of the posterior wall, required an AGA 8 mm ASD occluder and aborted procedure  - Patient now has NYHA Class I-II symptoms and is well controlled with GDMT  - Patient will continue to follow with Dr. Nereida Hatch and may consider MitraClip if she becomes more symptomatic  - Follow up PRN with Dr. Luis    Chronic combined systolic and diastolic congestive heart failure  - Euvolemic on exam today, NYHA Class I-II  - Continue GDMT with sulcubitril-valsartan  mg BID, carvedilol 25 mg BID, spironolactone 25 mg Daily, and furosemide 40 mg PRN  - Low sodium diet  - Follow up with Dr. Nereida Hatch as scheduled      Signed:  Markell Asher MD  Advanced Interventional Cardiology Fellow, PGY-8  Pager: [phone]  7/21/2020 3:56 PM  Interventional Cardiology Staff  I have personally taken the history and examined this patient. I have discussed and agree with the resident's findings and plan as documented in the resident's note.  Status post failed mitral clip due to left atrial perforation sealed with atrial septal closure device.  At the current time patient has NYHA class 1-2 symptoms and has elected to continue medical management until her symptoms worsen.  She will follow with me p.r.n. and call if symptoms worsen to reconsider MitraClip.  She also continues to be a candidate for advanced options in the future.  She will follow with MARCELLE.   Memo Luis

## 2020-07-21 NOTE — ASSESSMENT & PLAN NOTE
- Euvolemic on exam today, NYHA Class I-II  - Continue GDMT with sulcubitril-valsartan  mg BID, carvedilol 25 mg BID, spironolactone 25 mg Daily, and furosemide 40 mg PRN  - Low sodium diet  - Follow up with Dr. Nereida Hatch as scheduled

## 2020-08-05 ENCOUNTER — CLINICAL SUPPORT (OUTPATIENT)
Dept: CARDIOLOGY | Facility: HOSPITAL | Age: 45
End: 2020-08-05
Attending: INTERNAL MEDICINE

## 2020-08-05 DIAGNOSIS — I42.8 NONISCHEMIC CARDIOMYOPATHY: ICD-10-CM

## 2020-08-05 DIAGNOSIS — Z95.810 PRESENCE OF AUTOMATIC CARDIOVERTER/DEFIBRILLATOR (AICD): ICD-10-CM

## 2020-08-07 ENCOUNTER — TELEPHONE (OUTPATIENT)
Dept: CARDIOLOGY | Facility: HOSPITAL | Age: 45
End: 2020-08-07

## 2020-08-07 NOTE — TELEPHONE ENCOUNTER
----- Message from Victorino Tay MD sent at 8/6/2020  4:46 PM CDT -----  Ok we will watch thanks  ----- Message -----  From: Jaymie Love RN  Sent: 8/6/2020  12:57 PM CDT  To: Sy Sprague, Victorino Tay MD    Pt has single chamber SJM ICD. RV lead impedance suddenly started trending up at the end of May 2020. It's jumped from ~400 to 1175. It is still within normal range and sensing and capture appear to be stable.

## 2020-08-17 ENCOUNTER — PATIENT MESSAGE (OUTPATIENT)
Dept: INTERNAL MEDICINE | Facility: CLINIC | Age: 45
End: 2020-08-17

## 2020-08-17 DIAGNOSIS — G47.51 CONFUSIONAL AROUSALS: ICD-10-CM

## 2020-08-17 DIAGNOSIS — R06.81 WITNESSED APNEIC SPELLS: Primary | ICD-10-CM

## 2020-08-20 ENCOUNTER — TELEPHONE (OUTPATIENT)
Dept: INTERNAL MEDICINE | Facility: CLINIC | Age: 45
End: 2020-08-20

## 2020-09-03 ENCOUNTER — OFFICE VISIT (OUTPATIENT)
Dept: INTERNAL MEDICINE | Facility: CLINIC | Age: 45
End: 2020-09-03
Payer: MEDICARE

## 2020-09-03 VITALS
WEIGHT: 249 LBS | TEMPERATURE: 98 F | BODY MASS INDEX: 36.88 KG/M2 | HEIGHT: 69 IN | DIASTOLIC BLOOD PRESSURE: 64 MMHG | OXYGEN SATURATION: 98 % | SYSTOLIC BLOOD PRESSURE: 122 MMHG | HEART RATE: 78 BPM

## 2020-09-03 DIAGNOSIS — I50.42 CHRONIC COMBINED SYSTOLIC AND DIASTOLIC CONGESTIVE HEART FAILURE: ICD-10-CM

## 2020-09-03 DIAGNOSIS — K14.1 GEOGRAPHIC TONGUE: Primary | ICD-10-CM

## 2020-09-03 DIAGNOSIS — J45.20 MILD INTERMITTENT ASTHMA WITHOUT COMPLICATION: ICD-10-CM

## 2020-09-03 PROCEDURE — 99999 PR PBB SHADOW E&M-EST. PATIENT-LVL V: ICD-10-PCS | Mod: PBBFAC,,, | Performed by: INTERNAL MEDICINE

## 2020-09-03 PROCEDURE — 3008F PR BODY MASS INDEX (BMI) DOCUMENTED: ICD-10-PCS | Mod: CPTII,S$GLB,, | Performed by: INTERNAL MEDICINE

## 2020-09-03 PROCEDURE — G0009 PNEUMOCOCCAL POLYSACCHARIDE VACCINE 23-VALENT =>2YO SQ IM: ICD-10-PCS | Mod: S$GLB,,, | Performed by: INTERNAL MEDICINE

## 2020-09-03 PROCEDURE — 90732 PNEUMOCOCCAL POLYSACCHARIDE VACCINE 23-VALENT =>2YO SQ IM: ICD-10-PCS | Mod: S$GLB,,, | Performed by: INTERNAL MEDICINE

## 2020-09-03 PROCEDURE — 3008F BODY MASS INDEX DOCD: CPT | Mod: CPTII,S$GLB,, | Performed by: INTERNAL MEDICINE

## 2020-09-03 PROCEDURE — 99214 PR OFFICE/OUTPT VISIT, EST, LEVL IV, 30-39 MIN: ICD-10-PCS | Mod: 25,S$GLB,, | Performed by: INTERNAL MEDICINE

## 2020-09-03 PROCEDURE — 99999 PR PBB SHADOW E&M-EST. PATIENT-LVL V: CPT | Mod: PBBFAC,,, | Performed by: INTERNAL MEDICINE

## 2020-09-03 PROCEDURE — 99214 OFFICE O/P EST MOD 30 MIN: CPT | Mod: 25,S$GLB,, | Performed by: INTERNAL MEDICINE

## 2020-09-03 PROCEDURE — 90732 PPSV23 VACC 2 YRS+ SUBQ/IM: CPT | Mod: S$GLB,,, | Performed by: INTERNAL MEDICINE

## 2020-09-03 PROCEDURE — G0009 ADMIN PNEUMOCOCCAL VACCINE: HCPCS | Mod: S$GLB,,, | Performed by: INTERNAL MEDICINE

## 2020-09-03 RX ORDER — METHYLPREDNISOLONE 4 MG/1
TABLET ORAL
Qty: 1 PACKAGE | Refills: 0 | OUTPATIENT
Start: 2020-09-03 | End: 2021-04-16

## 2020-09-03 NOTE — PROGRESS NOTES
Subjective:       Patient ID: Mario Mahajan is a 44 y.o. female.    Chief Complaint: TONGUE PROBLEM (PT TO CLINIC to discuss tongue, blisters on the side, swollen, hurts when eating salt x1 week.)    HPI  Pt c/o waxing and waning rawness on edges of tongue which is painful.  No CP, SOB, palpitations.  No use of rescue inhaler.  Weight stable.  Chart reviewed.  Review of Systems   All other systems reviewed and are negative.      Objective:      Physical Exam  Vitals signs reviewed.   Constitutional:       General: She is not in acute distress.     Appearance: She is well-developed. She is obese.   HENT:      Head: Normocephalic.      Mouth/Throat:      Mouth: Mucous membranes are moist.      Comments: Geographic tongue  Eyes:      General: No scleral icterus.     Conjunctiva/sclera: Conjunctivae normal.   Neck:      Musculoskeletal: Normal range of motion.   Cardiovascular:      Rate and Rhythm: Normal rate and regular rhythm.      Pulses: Normal pulses.      Heart sounds: Normal heart sounds.   Pulmonary:      Effort: Pulmonary effort is normal.      Breath sounds: Normal breath sounds.   Abdominal:      General: Bowel sounds are normal. There is no distension.      Palpations: Abdomen is soft. There is no mass.   Musculoskeletal: Normal range of motion.      Right lower leg: No edema.      Left lower leg: No edema.   Lymphadenopathy:      Cervical: No cervical adenopathy.   Skin:     General: Skin is warm and dry.   Neurological:      General: No focal deficit present.      Mental Status: She is alert.      Cranial Nerves: No cranial nerve deficit.      Motor: No abnormal muscle tone.      Coordination: Coordination normal.   Psychiatric:         Mood and Affect: Mood normal.         Behavior: Behavior normal.         Assessment:       1. Geographic tongue    2. Chronic combined systolic and diastolic congestive heart failure    3. Mild intermittent asthma without complication        Plan:       Mario was seen  today for tongue problem.    Diagnoses and all orders for this visit:    Geographic tongue  -     methylPREDNISolone (MEDROL DOSEPACK) 4 mg tablet; use as directed    Chronic combined systolic and diastolic congestive heart failure   Cont rx    Mild intermittent asthma without complication  -     Pneumococcal Polysaccharide Vaccine (23 Valent) (SQ/IM)      Follow up if symptoms worsen or fail to improve.

## 2020-10-12 ENCOUNTER — TELEPHONE (OUTPATIENT)
Dept: INTERNAL MEDICINE | Facility: CLINIC | Age: 45
End: 2020-10-12

## 2020-10-12 DIAGNOSIS — Z12.31 ENCOUNTER FOR SCREENING MAMMOGRAM FOR MALIGNANT NEOPLASM OF BREAST: ICD-10-CM

## 2020-10-12 DIAGNOSIS — Z00.00 ROUTINE GENERAL MEDICAL EXAMINATION AT A HEALTH CARE FACILITY: Primary | ICD-10-CM

## 2020-10-12 DIAGNOSIS — Z12.39 BREAST CANCER SCREENING, HIGH RISK PATIENT: ICD-10-CM

## 2020-10-12 NOTE — TELEPHONE ENCOUNTER
----- Message from Dieudonne Jameson sent at 10/12/2020  1:44 PM CDT -----  Regarding: access  Contact: pt  Pt is requesting a call regarding mammo  Pt would like a order to be sent over     Pt can be reached at [phone]

## 2020-10-21 ENCOUNTER — HOSPITAL ENCOUNTER (OUTPATIENT)
Dept: RADIOLOGY | Facility: HOSPITAL | Age: 45
Discharge: HOME | End: 2020-10-21
Attending: INTERNAL MEDICINE
Payer: MEDICARE

## 2020-10-21 DIAGNOSIS — Z12.31 ENCOUNTER FOR SCREENING MAMMOGRAM FOR MALIGNANT NEOPLASM OF BREAST: ICD-10-CM

## 2020-10-21 DIAGNOSIS — Z00.00 ROUTINE GENERAL MEDICAL EXAMINATION AT A HEALTH CARE FACILITY: ICD-10-CM

## 2020-10-21 DIAGNOSIS — Z12.39 BREAST CANCER SCREENING, HIGH RISK PATIENT: ICD-10-CM

## 2020-10-21 PROCEDURE — 77067 SCR MAMMO BI INCL CAD: CPT | Mod: TC,HCNC,PO

## 2020-10-22 ENCOUNTER — TELEPHONE (OUTPATIENT)
Dept: FAMILY MEDICINE | Facility: CLINIC | Age: 45
End: 2020-10-22

## 2020-10-27 ENCOUNTER — PATIENT OUTREACH (OUTPATIENT)
Dept: ADMINISTRATIVE | Facility: OTHER | Age: 45
End: 2020-10-27

## 2020-11-03 ENCOUNTER — DOCUMENTATION ONLY (OUTPATIENT)
Dept: CARDIOLOGY | Facility: HOSPITAL | Age: 45
End: 2020-11-03

## 2020-11-03 NOTE — PROGRESS NOTES
Single lead ICD w/lead impedance at 1475 ohm this morning. Sensing is appropriate. Stable trends   Not sure of threshold as autocapture is not on.     Pt is due for interrogation and MD office visit.

## 2020-11-05 ENCOUNTER — CLINICAL SUPPORT (OUTPATIENT)
Dept: CARDIOLOGY | Facility: HOSPITAL | Age: 45
End: 2020-11-05
Payer: MEDICARE

## 2020-11-05 DIAGNOSIS — Z95.810 PRESENCE OF AUTOMATIC (IMPLANTABLE) CARDIAC DEFIBRILLATOR: ICD-10-CM

## 2020-11-05 DIAGNOSIS — I50.9 HEART FAILURE, UNSPECIFIED: ICD-10-CM

## 2020-11-05 DIAGNOSIS — I47.20 VENTRICULAR TACHYCARDIA: ICD-10-CM

## 2020-11-05 DIAGNOSIS — I42.9 CARDIOMYOPATHY, UNSPECIFIED: ICD-10-CM

## 2020-11-05 PROCEDURE — 93296 REM INTERROG EVL PM/IDS: CPT | Mod: HCNC | Performed by: INTERNAL MEDICINE

## 2020-11-05 PROCEDURE — 93295 CARDIAC DEVICE CHECK - REMOTE: ICD-10-PCS | Mod: HCNC,,, | Performed by: INTERNAL MEDICINE

## 2020-11-05 PROCEDURE — 93295 DEV INTERROG REMOTE 1/2/MLT: CPT | Mod: HCNC,,, | Performed by: INTERNAL MEDICINE

## 2020-11-11 ENCOUNTER — OFFICE VISIT (OUTPATIENT)
Dept: OBSTETRICS AND GYNECOLOGY | Facility: CLINIC | Age: 45
End: 2020-11-11
Payer: MEDICARE

## 2020-11-11 VITALS
DIASTOLIC BLOOD PRESSURE: 84 MMHG | WEIGHT: 254.19 LBS | BODY MASS INDEX: 37.54 KG/M2 | SYSTOLIC BLOOD PRESSURE: 130 MMHG

## 2020-11-11 DIAGNOSIS — Z01.419 WELL WOMAN EXAM WITH ROUTINE GYNECOLOGICAL EXAM: ICD-10-CM

## 2020-11-11 DIAGNOSIS — N39.41 URGE INCONTINENCE: ICD-10-CM

## 2020-11-11 DIAGNOSIS — R30.0 DYSURIA: Primary | ICD-10-CM

## 2020-11-11 PROCEDURE — G0101 CA SCREEN;PELVIC/BREAST EXAM: HCPCS | Mod: HCNC,S$GLB,, | Performed by: OBSTETRICS & GYNECOLOGY

## 2020-11-11 PROCEDURE — 87086 URINE CULTURE/COLONY COUNT: CPT | Mod: HCNC

## 2020-11-11 PROCEDURE — G0101 PR CA SCREEN;PELVIC/BREAST EXAM: ICD-10-PCS | Mod: HCNC,S$GLB,, | Performed by: OBSTETRICS & GYNECOLOGY

## 2020-11-11 PROCEDURE — 87088 URINE BACTERIA CULTURE: CPT | Mod: HCNC

## 2020-11-11 PROCEDURE — 87077 CULTURE AEROBIC IDENTIFY: CPT | Mod: HCNC

## 2020-11-11 PROCEDURE — 3008F BODY MASS INDEX DOCD: CPT | Mod: HCNC,CPTII,S$GLB, | Performed by: OBSTETRICS & GYNECOLOGY

## 2020-11-11 PROCEDURE — 99999 PR PBB SHADOW E&M-EST. PATIENT-LVL III: CPT | Mod: PBBFAC,HCNC,, | Performed by: OBSTETRICS & GYNECOLOGY

## 2020-11-11 PROCEDURE — 3008F PR BODY MASS INDEX (BMI) DOCUMENTED: ICD-10-PCS | Mod: HCNC,CPTII,S$GLB, | Performed by: OBSTETRICS & GYNECOLOGY

## 2020-11-11 PROCEDURE — 87186 SC STD MICRODIL/AGAR DIL: CPT | Mod: HCNC

## 2020-11-11 PROCEDURE — 99999 PR PBB SHADOW E&M-EST. PATIENT-LVL III: ICD-10-PCS | Mod: PBBFAC,HCNC,, | Performed by: OBSTETRICS & GYNECOLOGY

## 2020-11-11 RX ORDER — NITROFURANTOIN 25; 75 MG/1; MG/1
100 CAPSULE ORAL 2 TIMES DAILY
Qty: 14 CAPSULE | Refills: 0 | Status: SHIPPED | OUTPATIENT
Start: 2020-11-11 | End: 2020-11-18

## 2020-11-11 RX ORDER — ESTRADIOL 0.1 MG/G
1 CREAM VAGINAL
Qty: 42.5 G | Refills: 1 | Status: SHIPPED | OUTPATIENT
Start: 2020-11-12 | End: 2021-10-12

## 2020-11-11 NOTE — PROGRESS NOTES
CC: Annual check-up    SUBJECTIVE:   44 y.o. female   for annual routine Pap and checkup. Patient's last menstrual period was 2019..  She complains of dysuria and urinary frequecny and urge incontinence sx's for 1 month.        Past Medical History:   Diagnosis Date    Asthma     Chronic back pain 2014    Chronic combined systolic and diastolic congestive heart failure 2015     2-10-17   1 - Severely depressed left ventricular systolic function (EF 20-25%).    2 - Severe left ventricular enlargement.    3 - Severe left atrial enlargement.    4 - Left ventricular diastolic dysfunction.    5 - The estimated PA systolic pressure is 18 mmHg.    6 - Mild mitral regurgitation.     Encounter for blood transfusion     ICD (implantable cardioverter-defibrillator) in place 12/01/15 3/3/2016    Menorrhagia, premenopausal 2/10/2017    Microcytic anemia 2015    Non-rheumatic mitral regurgitation 3/5/2015    Nonischemic dilated cardiomyopathy 2015    Sleep apnea     Syncope and collapse 2017    Ventricular tachycardia, polymorphic      Past Surgical History:   Procedure Laterality Date    CARDIAC DEFIBRILLATOR PLACEMENT  2015     SECTION      OOPHORECTOMY      PERICARDIOCENTESIS N/A 2020    Procedure: Pericardiocentesis;  Surgeon: Memo Luis MD;  Location: [facility] CATH LAB;  Service: Cardiology;  Laterality: N/A;    RIGHT HEART CATHETERIZATION      TOTAL ABDOMINAL HYSTERECTOMY N/A 3/26/2019    Procedure: HYSTERECTOMY, TOTAL, ABDOMINAL;  Surgeon: Victorino Rose MD;  Location: [facility] OR;  Service: OB/GYN;  Laterality: N/A;    TUBAL LIGATION       Social History     Socioeconomic History    Marital status: Single     Spouse name: Not on file    Number of children: 2    Years of education: Not on file    Highest education level: Not on file   Occupational History    Occupation: Unemployed     Employer: [employer]   Social Needs    Financial  resource strain: Not on file    Food insecurity     Worry: Not on file     Inability: Not on file    Transportation needs     Medical: Not on file     Non-medical: Not on file   Tobacco Use    Smoking status: Never Smoker    Smokeless tobacco: Never Used   Substance and Sexual Activity    Alcohol use: Not Currently     Comment: 1 glass per 3 months    Drug use: No    Sexual activity: Yes     Partners: Male     Comment: one male partner (boyfriend)   Lifestyle    Physical activity     Days per week: Not on file     Minutes per session: Not on file    Stress: Not at all   Relationships    Social connections     Talks on phone: Not on file     Gets together: Not on file     Attends Jehovah's witness service: Not on file     Active member of club or organization: Not on file     Attends meetings of clubs or organizations: Not on file     Relationship status: Not on file   Other Topics Concern    Not on file   Social History Narrative    Not on file     Family History   Problem Relation Age of Onset    Diabetes Father     Pancreatic cancer Father     Heart failure Father     Heart failure Brother     Lung cancer Paternal Grandmother      OB History    Para Term  AB Living   2 2 2     2   SAB TAB Ectopic Multiple Live Births           2      # Outcome Date GA Lbr Sanjay/2nd Weight Sex Delivery Anes PTL Lv   2 Term 03    F CS-LTranv   RITA   1 Term 10/31/94    M CS-LTranv   RITA         Current Outpatient Medications   Medication Sig Dispense Refill    albuterol (PROVENTIL/VENTOLIN HFA) 90 mcg/actuation inhaler Inhale 2 puffs into the lungs every 6 (six) hours as needed for Wheezing. 18 g 6    amiodarone (PACERONE) 200 MG Tab TAKE 1 TABLET(200 MG) BY MOUTH EVERY DAY 90 tablet 3    ascorbic acid, vitamin C, (VITAMIN C) 250 MG tablet Take 1 tablet (250 mg) by mouth twice daily. Take with the iron tablets. 60 tablet 3    aspirin (ECOTRIN) 81 MG EC tablet Take 1 tablet (81 mg total) by mouth once  daily.Start taking this pill the day after you stop high-dose aspirin (650). Continue taking this pill (aspirin 81 mg) the rest of your life 360 tablet 0    aspirin 325 MG tablet Take 2 tablets (650 mg total) by mouth 3 (three) times daily. 180 tablet 11    b complex vitamins tablet Take 1 tablet by mouth once daily.      carvediloL (COREG) 25 MG tablet Take 1 tablet (25 mg total) by mouth 2 (two) times daily. 180 tablet 3    colchicine (COLCRYS) 0.6 mg tablet Take 1 tablet (0.6 mg total) by mouth 2 (two) times daily. 60 tablet 5    ferrous sulfate 325 (65 FE) MG EC tablet Take 1 tablet (325 mg total) by mouth 2 (two) times daily. Take with vitamin C. 60 tablet 3    FLOVENT  mcg/actuation inhaler INL 2 PFS PO TWICE DAILY. RM AFTER U 12 g 4    furosemide (LASIX) 40 MG tablet Take one tablet by mouth daily for 4 days. Then, take one tablet if your weight increases by 3 lb or more in one day or by 5 lb in a week. 90 tablet 4    ibuprofen (ADVIL,MOTRIN) 600 MG tablet Take 1 tablet (600 mg total) by mouth every 6 (six) hours as needed for Pain. 30 tablet 2    ipratropium-albuteroL (COMBIVENT RESPIMAT)  mcg/actuation inhaler Inhale 1 puff into the lungs 4 (four) times daily. Rescue      methylPREDNISolone (MEDROL DOSEPACK) 4 mg tablet use as directed 1 Package 0    pantoprazole (PROTONIX) 40 MG tablet Take 1 tablet (40 mg total) by mouth once daily.Take one pil daily during the time you take aspirin or another antiinflammatory medication for the pericarditis 30 tablet 11    sacubitriL-valsartan (ENTRESTO)  mg per tablet Take 1 tablet by mouth 2 (two) times daily. 60 tablet 11    spironolactone (ALDACTONE) 25 MG tablet TAKE 1 TABLET(25 MG) BY MOUTH EVERY DAY 30 tablet 11    topiramate (TOPAMAX) 50 MG tablet Take 1 tablet (50 mg total) by mouth once daily. (Patient taking differently: Take 50 mg by mouth daily as needed. ) 90 tablet 4    nitrofurantoin, macrocrystal-monohydrate, (MACROBID)  100 MG capsule Take 1 capsule (100 mg total) by mouth 2 (two) times daily. for 7 days 14 capsule 0     No current facility-administered medications for this visit.      Allergies: Ace inhibitors     ROS:  Constitutional: no weight loss, weight gain, fever, fatigue  Eyes:  No vision changes, glasses/contacts  ENT/Mouth: No ulcers, sinus problems, ears ringing, headache  Cardiovascular: No inability to lie flat, chest pain, exercise intolerance, swelling, heart palpitations  Respiratory: No wheezing, coughing blood, shortness of breath, or cough  Gastrointestinal: No diarrhea, bloody stool, nausea/vomiting, constipation, gas, hemorrhoids  Genitourinary: No blood in urine, painful urination, urgency of urination, frequency of urination, incomplete emptying, incontinence, abnormal bleeding, painful periods, heavy periods, vaginal discharge, vaginal odor, painful intercourse, sexual problems, bleeding after intercourse.  Musculoskeletal: No muscle weakness  Skin/Breast: No painful breasts, nipple discharge, masses, rash, ulcers  Neurological: No passing out, seizures, numbness, headache  Endocrine: No diabetes, hypothyroid, hyperthyroid, hot flashes, hair loss, abnormal hair growth, ance  Psychiatric: No depression, crying  Hematologic: No bruises, bleeding, swollen lymph nodes, anemia.      OBJECTIVE:   The patient appears well, alert, oriented x 3, in no distress.  /84   Wt 115.3 kg (254 lb 3.2 oz)   LMP 03/01/2019   BMI 37.54 kg/m²   NECK: no thyromegaly, trachea midline  SKIN: no acne, striae, hirsutism  BREAST EXAM: breasts appear normal, no suspicious masses, no skin or nipple changes or axillary nodes, symmetric fibrous changes in both upper outer quadrants  ABDOMEN: no hernias, masses, or hepatosplenomegaly  GENITALIA: normal external genitalia, no erythema, no discharge  URETHRA: normal urethra, normal urethral meatus  VAGINA: mucosal atrophy  CERVIX: absent  UTERUS: uterus absent  ADNEXA: no mass,  fullness, tenderness  Mild s/p tenderness    ASSESSMENT:   well woman  no contraindication to begin hormonal therapy  1. Dysuria    2. Urge incontinence    3. Well woman exam with routine gynecological exam        PLAN:   mammogram  pap smear  return annually or prn  Orders Placed This Encounter    Urine culture    nitrofurantoin, macrocrystal-monohydrate, (MACROBID) 100 MG capsule   if no UTI and approved for ditropan/detrol by opthamologist will try those for urge incontinence sx's

## 2020-11-14 LAB — BACTERIA UR CULT: ABNORMAL

## 2020-11-16 ENCOUNTER — TELEPHONE (OUTPATIENT)
Dept: OBSTETRICS AND GYNECOLOGY | Facility: CLINIC | Age: 45
End: 2020-11-16

## 2020-11-16 RX ORDER — AMOXICILLIN AND CLAVULANATE POTASSIUM 500; 125 MG/1; MG/1
1 TABLET, FILM COATED ORAL 2 TIMES DAILY
Qty: 14 TABLET | Refills: 0 | Status: SHIPPED | OUTPATIENT
Start: 2020-11-16 | End: 2020-11-23

## 2020-12-16 ENCOUNTER — PATIENT MESSAGE (OUTPATIENT)
Dept: INTERNAL MEDICINE | Facility: CLINIC | Age: 45
End: 2020-12-16

## 2021-01-04 ENCOUNTER — HOSPITAL ENCOUNTER (EMERGENCY)
Facility: HOSPITAL | Age: 46
Discharge: HOME | End: 2021-01-04
Attending: FAMILY MEDICINE
Payer: MEDICARE

## 2021-01-04 VITALS
HEIGHT: 69 IN | DIASTOLIC BLOOD PRESSURE: 64 MMHG | SYSTOLIC BLOOD PRESSURE: 120 MMHG | HEART RATE: 78 BPM | TEMPERATURE: 99 F | OXYGEN SATURATION: 99 % | WEIGHT: 235 LBS | RESPIRATION RATE: 18 BRPM | BODY MASS INDEX: 34.8 KG/M2

## 2021-01-04 DIAGNOSIS — I50.9 ACUTE CONGESTIVE HEART FAILURE, UNSPECIFIED HEART FAILURE TYPE: Primary | ICD-10-CM

## 2021-01-04 DIAGNOSIS — R06.02 SOB (SHORTNESS OF BREATH): ICD-10-CM

## 2021-01-04 LAB
ALBUMIN SERPL BCP-MCNC: 3.4 G/DL (ref 3.5–5.2)
ALP SERPL-CCNC: 36 U/L (ref 38–126)
ALT SERPL W/O P-5'-P-CCNC: 25 U/L (ref 10–44)
ANION GAP SERPL CALC-SCNC: 4 MMOL/L (ref 8–16)
AST SERPL-CCNC: 22 U/L (ref 15–46)
BASOPHILS # BLD AUTO: 0.02 K/UL (ref 0–0.2)
BASOPHILS NFR BLD: 0.3 % (ref 0–1.9)
BILIRUB SERPL-MCNC: 1.8 MG/DL (ref 0.1–1)
CALCIUM SERPL-MCNC: 8.8 MG/DL (ref 8.7–10.5)
CHLORIDE SERPL-SCNC: 109 MMOL/L (ref 95–110)
CO2 SERPL-SCNC: 28 MMOL/L (ref 23–29)
CREAT SERPL-MCNC: 0.81 MG/DL (ref 0.5–1.4)
DIFFERENTIAL METHOD: ABNORMAL
EOSINOPHIL # BLD AUTO: 0.1 K/UL (ref 0–0.5)
EOSINOPHIL NFR BLD: 2.2 % (ref 0–8)
ERYTHROCYTE [DISTWIDTH] IN BLOOD BY AUTOMATED COUNT: 11.9 % (ref 11.5–14.5)
EST. GFR  (AFRICAN AMERICAN): >60 ML/MIN/1.73 M^2
EST. GFR  (NON AFRICAN AMERICAN): >60 ML/MIN/1.73 M^2
GLUCOSE SERPL-MCNC: 91 MG/DL (ref 70–110)
HCT VFR BLD AUTO: 34.8 % (ref 37–48.5)
HGB BLD-MCNC: 11.4 G/DL (ref 12–16)
IMM GRANULOCYTES # BLD AUTO: 0.01 K/UL (ref 0–0.04)
IMM GRANULOCYTES NFR BLD AUTO: 0.2 % (ref 0–0.5)
LYMPHOCYTES # BLD AUTO: 1.6 K/UL (ref 1–4.8)
LYMPHOCYTES NFR BLD: 26.8 % (ref 18–48)
MCH RBC QN AUTO: 33.2 PG (ref 27–31)
MCHC RBC AUTO-ENTMCNC: 32.8 G/DL (ref 32–36)
MCV RBC AUTO: 102 FL (ref 82–98)
MONOCYTES # BLD AUTO: 0.5 K/UL (ref 0.3–1)
MONOCYTES NFR BLD: 7.8 % (ref 4–15)
NEUTROPHILS # BLD AUTO: 3.6 K/UL (ref 1.8–7.7)
NEUTROPHILS NFR BLD: 62.7 % (ref 38–73)
NRBC BLD-RTO: 0 /100 WBC
NT-PROBNP SERPL-MCNC: 1120 PG/ML (ref 5–450)
PLATELET # BLD AUTO: 201 K/UL (ref 150–350)
PMV BLD AUTO: 11.2 FL (ref 9.2–12.9)
POTASSIUM SERPL-SCNC: 3.6 MMOL/L (ref 3.5–5.1)
PROT SERPL-MCNC: 6.6 G/DL (ref 6–8.4)
RBC # BLD AUTO: 3.43 M/UL (ref 4–5.4)
SARS-COV-2 RDRP RESP QL NAA+PROBE: NEGATIVE
SODIUM SERPL-SCNC: 141 MMOL/L (ref 136–145)
TROPONIN I SERPL-MCNC: 0.07 NG/ML (ref 0.01–0.03)
TROPONIN I SERPL-MCNC: 0.07 NG/ML (ref 0.01–0.03)
UUN UR-MCNC: 15 MG/DL (ref 7–17)
WBC # BLD AUTO: 5.78 K/UL (ref 3.9–12.7)

## 2021-01-04 PROCEDURE — 93010 ELECTROCARDIOGRAM REPORT: CPT | Mod: HCNC,,, | Performed by: INTERNAL MEDICINE

## 2021-01-04 PROCEDURE — 63600175 PHARM REV CODE 636 W HCPCS: Mod: HCNC,ER | Performed by: FAMILY MEDICINE

## 2021-01-04 PROCEDURE — 94640 AIRWAY INHALATION TREATMENT: CPT | Mod: HCNC,ER

## 2021-01-04 PROCEDURE — 80053 COMPREHEN METABOLIC PANEL: CPT | Mod: HCNC,ER

## 2021-01-04 PROCEDURE — 36000 PLACE NEEDLE IN VEIN: CPT | Mod: HCNC,ER

## 2021-01-04 PROCEDURE — 93010 EKG 12-LEAD: ICD-10-PCS | Mod: HCNC,,, | Performed by: INTERNAL MEDICINE

## 2021-01-04 PROCEDURE — 85025 COMPLETE CBC W/AUTO DIFF WBC: CPT | Mod: HCNC,ER

## 2021-01-04 PROCEDURE — 25000242 PHARM REV CODE 250 ALT 637 W/ HCPCS: Mod: HCNC,ER | Performed by: FAMILY MEDICINE

## 2021-01-04 PROCEDURE — 84484 ASSAY OF TROPONIN QUANT: CPT | Mod: 91,HCNC,ER

## 2021-01-04 PROCEDURE — U0002 COVID-19 LAB TEST NON-CDC: HCPCS | Mod: HCNC,ER

## 2021-01-04 PROCEDURE — 83880 ASSAY OF NATRIURETIC PEPTIDE: CPT | Mod: HCNC,ER

## 2021-01-04 PROCEDURE — 93005 ELECTROCARDIOGRAM TRACING: CPT | Mod: HCNC,ER

## 2021-01-04 PROCEDURE — 99285 EMERGENCY DEPT VISIT HI MDM: CPT | Mod: 25,HCNC,ER

## 2021-01-04 RX ORDER — ALBUTEROL SULFATE 2.5 MG/.5ML
2.5 SOLUTION RESPIRATORY (INHALATION)
Status: DISPENSED | OUTPATIENT
Start: 2021-01-04 | End: 2021-01-04

## 2021-01-04 RX ORDER — FUROSEMIDE 10 MG/ML
60 INJECTION INTRAMUSCULAR; INTRAVENOUS
Status: COMPLETED | OUTPATIENT
Start: 2021-01-04 | End: 2021-01-04

## 2021-01-04 RX ADMIN — ALBUTEROL SULFATE 2.5 MG: 2.5 SOLUTION RESPIRATORY (INHALATION) at 11:01

## 2021-01-04 RX ADMIN — FUROSEMIDE 60 MG: 10 INJECTION, SOLUTION INTRAMUSCULAR; INTRAVENOUS at 12:01

## 2021-01-04 RX ADMIN — ALBUTEROL SULFATE 2.5 MG: 2.5 SOLUTION RESPIRATORY (INHALATION) at 12:01

## 2021-01-06 ENCOUNTER — PATIENT MESSAGE (OUTPATIENT)
Dept: TRANSPLANT | Facility: CLINIC | Age: 46
End: 2021-01-06

## 2021-01-15 ENCOUNTER — LAB VISIT (OUTPATIENT)
Dept: LAB | Facility: HOSPITAL | Age: 46
End: 2021-01-15
Payer: MEDICARE

## 2021-01-15 ENCOUNTER — OFFICE VISIT (OUTPATIENT)
Dept: TRANSPLANT | Facility: CLINIC | Age: 46
End: 2021-01-15
Payer: MEDICARE

## 2021-01-15 VITALS
SYSTOLIC BLOOD PRESSURE: 111 MMHG | DIASTOLIC BLOOD PRESSURE: 65 MMHG | HEIGHT: 69 IN | BODY MASS INDEX: 38.53 KG/M2 | WEIGHT: 260.13 LBS | HEART RATE: 74 BPM

## 2021-01-15 DIAGNOSIS — R06.02 SHORTNESS OF BREATH: ICD-10-CM

## 2021-01-15 DIAGNOSIS — I42.0 NONISCHEMIC DILATED CARDIOMYOPATHY: Primary | Chronic | ICD-10-CM

## 2021-01-15 DIAGNOSIS — E66.01 MORBID OBESITY: ICD-10-CM

## 2021-01-15 DIAGNOSIS — I47.29 POLYMORPHIC VENTRICULAR TACHYCARDIA: ICD-10-CM

## 2021-01-15 DIAGNOSIS — I50.42 CHRONIC COMBINED SYSTOLIC AND DIASTOLIC CONGESTIVE HEART FAILURE: Primary | ICD-10-CM

## 2021-01-15 DIAGNOSIS — I34.0 NON-RHEUMATIC MITRAL REGURGITATION: Chronic | ICD-10-CM

## 2021-01-15 DIAGNOSIS — I50.42 CHRONIC COMBINED SYSTOLIC AND DIASTOLIC CONGESTIVE HEART FAILURE: ICD-10-CM

## 2021-01-15 DIAGNOSIS — Z79.899 ON AMIODARONE THERAPY: ICD-10-CM

## 2021-01-15 DIAGNOSIS — G43.409 HEMIPLEGIC MIGRAINE WITHOUT STATUS MIGRAINOSUS, NOT INTRACTABLE: ICD-10-CM

## 2021-01-15 LAB
ALBUMIN SERPL BCP-MCNC: 3.6 G/DL (ref 3.5–5.2)
ALP SERPL-CCNC: 38 U/L (ref 55–135)
ALT SERPL W/O P-5'-P-CCNC: 16 U/L (ref 10–44)
ANION GAP SERPL CALC-SCNC: 8 MMOL/L (ref 8–16)
AST SERPL-CCNC: 14 U/L (ref 10–40)
BILIRUB SERPL-MCNC: 1.5 MG/DL (ref 0.1–1)
BNP SERPL-MCNC: 280 PG/ML (ref 0–99)
BUN SERPL-MCNC: 15 MG/DL (ref 6–20)
CALCIUM SERPL-MCNC: 8.2 MG/DL (ref 8.7–10.5)
CHLORIDE SERPL-SCNC: 109 MMOL/L (ref 95–110)
CO2 SERPL-SCNC: 23 MMOL/L (ref 23–29)
CREAT SERPL-MCNC: 0.9 MG/DL (ref 0.5–1.4)
EST. GFR  (AFRICAN AMERICAN): >60 ML/MIN/1.73 M^2
EST. GFR  (NON AFRICAN AMERICAN): >60 ML/MIN/1.73 M^2
GLUCOSE SERPL-MCNC: 91 MG/DL (ref 70–110)
POTASSIUM SERPL-SCNC: 4 MMOL/L (ref 3.5–5.1)
PROT SERPL-MCNC: 6.8 G/DL (ref 6–8.4)
SODIUM SERPL-SCNC: 140 MMOL/L (ref 136–145)

## 2021-01-15 PROCEDURE — 1126F PR PAIN SEVERITY QUANTIFIED, NO PAIN PRESENT: ICD-10-PCS | Mod: S$GLB,,, | Performed by: INTERNAL MEDICINE

## 2021-01-15 PROCEDURE — 3008F PR BODY MASS INDEX (BMI) DOCUMENTED: ICD-10-PCS | Mod: CPTII,S$GLB,, | Performed by: INTERNAL MEDICINE

## 2021-01-15 PROCEDURE — 80053 COMPREHEN METABOLIC PANEL: CPT | Mod: HCNC

## 2021-01-15 PROCEDURE — 36415 COLL VENOUS BLD VENIPUNCTURE: CPT | Mod: HCNC

## 2021-01-15 PROCEDURE — 99499 UNLISTED E&M SERVICE: CPT | Mod: S$GLB,,, | Performed by: INTERNAL MEDICINE

## 2021-01-15 PROCEDURE — 1126F AMNT PAIN NOTED NONE PRSNT: CPT | Mod: S$GLB,,, | Performed by: INTERNAL MEDICINE

## 2021-01-15 PROCEDURE — 99499 RISK ADDL DX/OHS AUDIT: ICD-10-PCS | Mod: S$GLB,,, | Performed by: INTERNAL MEDICINE

## 2021-01-15 PROCEDURE — 83880 ASSAY OF NATRIURETIC PEPTIDE: CPT | Mod: HCNC

## 2021-01-15 PROCEDURE — 99214 OFFICE O/P EST MOD 30 MIN: CPT | Mod: S$GLB,,, | Performed by: INTERNAL MEDICINE

## 2021-01-15 PROCEDURE — 99999 PR PBB SHADOW E&M-EST. PATIENT-LVL IV: CPT | Mod: PBBFAC,,, | Performed by: INTERNAL MEDICINE

## 2021-01-15 PROCEDURE — 99214 PR OFFICE/OUTPT VISIT, EST, LEVL IV, 30-39 MIN: ICD-10-PCS | Mod: S$GLB,,, | Performed by: INTERNAL MEDICINE

## 2021-01-15 PROCEDURE — 99999 PR PBB SHADOW E&M-EST. PATIENT-LVL IV: ICD-10-PCS | Mod: PBBFAC,,, | Performed by: INTERNAL MEDICINE

## 2021-01-15 PROCEDURE — 3008F BODY MASS INDEX DOCD: CPT | Mod: CPTII,S$GLB,, | Performed by: INTERNAL MEDICINE

## 2021-01-15 RX ORDER — TOPIRAMATE 50 MG/1
50 TABLET, FILM COATED ORAL DAILY
Qty: 90 TABLET | Refills: 4 | Status: SHIPPED | OUTPATIENT
Start: 2021-01-15 | End: 2022-01-20

## 2021-01-21 ENCOUNTER — TELEPHONE (OUTPATIENT)
Dept: TRANSPLANT | Facility: CLINIC | Age: 46
End: 2021-01-21

## 2021-01-21 ENCOUNTER — LAB VISIT (OUTPATIENT)
Dept: LAB | Facility: HOSPITAL | Age: 46
End: 2021-01-21
Attending: INTERNAL MEDICINE
Payer: MEDICARE

## 2021-01-21 DIAGNOSIS — I50.42 CHRONIC COMBINED SYSTOLIC AND DIASTOLIC CONGESTIVE HEART FAILURE: ICD-10-CM

## 2021-01-21 LAB
ALBUMIN SERPL BCP-MCNC: 4.1 G/DL (ref 3.5–5.2)
ALP SERPL-CCNC: 47 U/L (ref 38–126)
ALT SERPL W/O P-5'-P-CCNC: 16 U/L (ref 10–44)
ANION GAP SERPL CALC-SCNC: 8 MMOL/L (ref 8–16)
AST SERPL-CCNC: 25 U/L (ref 15–46)
BILIRUB SERPL-MCNC: 1.5 MG/DL (ref 0.1–1)
CALCIUM SERPL-MCNC: 9 MG/DL (ref 8.7–10.5)
CHLORIDE SERPL-SCNC: 105 MMOL/L (ref 95–110)
CO2 SERPL-SCNC: 28 MMOL/L (ref 23–29)
CREAT SERPL-MCNC: 1.22 MG/DL (ref 0.5–1.4)
EST. GFR  (AFRICAN AMERICAN): >60 ML/MIN/1.73 M^2
EST. GFR  (NON AFRICAN AMERICAN): 54.4 ML/MIN/1.73 M^2
GLUCOSE SERPL-MCNC: 95 MG/DL (ref 70–110)
NT-PROBNP SERPL-MCNC: 150 PG/ML (ref 5–450)
POTASSIUM SERPL-SCNC: 4.3 MMOL/L (ref 3.5–5.1)
PROT SERPL-MCNC: 7.8 G/DL (ref 6–8.4)
SODIUM SERPL-SCNC: 141 MMOL/L (ref 136–145)
UUN UR-MCNC: 18 MG/DL (ref 7–17)

## 2021-01-21 PROCEDURE — 83880 ASSAY OF NATRIURETIC PEPTIDE: CPT | Mod: HCNC,PO

## 2021-01-21 PROCEDURE — 80053 COMPREHEN METABOLIC PANEL: CPT | Mod: HCNC,PO

## 2021-01-21 PROCEDURE — 36415 COLL VENOUS BLD VENIPUNCTURE: CPT | Mod: HCNC,PO

## 2021-01-22 DIAGNOSIS — I50.42 CHRONIC COMBINED SYSTOLIC AND DIASTOLIC CONGESTIVE HEART FAILURE: Primary | ICD-10-CM

## 2021-01-23 RX ORDER — FUROSEMIDE 40 MG/1
40 TABLET ORAL DAILY
Qty: 30 TABLET | Refills: 11
Start: 2021-01-23 | End: 2021-04-16

## 2021-02-03 ENCOUNTER — CLINICAL SUPPORT (OUTPATIENT)
Dept: CARDIOLOGY | Facility: HOSPITAL | Age: 46
End: 2021-02-03
Payer: MEDICARE

## 2021-02-03 DIAGNOSIS — Z95.810 PRESENCE OF AUTOMATIC (IMPLANTABLE) CARDIAC DEFIBRILLATOR: ICD-10-CM

## 2021-02-03 PROCEDURE — 93295 DEV INTERROG REMOTE 1/2/MLT: CPT | Mod: ,,, | Performed by: INTERNAL MEDICINE

## 2021-02-03 PROCEDURE — 93296 REM INTERROG EVL PM/IDS: CPT | Performed by: INTERNAL MEDICINE

## 2021-02-03 PROCEDURE — 93295 CARDIAC DEVICE CHECK - REMOTE: ICD-10-PCS | Mod: ,,, | Performed by: INTERNAL MEDICINE

## 2021-02-08 ENCOUNTER — LAB VISIT (OUTPATIENT)
Dept: FAMILY MEDICINE | Facility: CLINIC | Age: 46
End: 2021-02-08
Payer: MEDICARE

## 2021-02-08 DIAGNOSIS — R06.02 SHORTNESS OF BREATH: ICD-10-CM

## 2021-02-08 PROCEDURE — U0003 INFECTIOUS AGENT DETECTION BY NUCLEIC ACID (DNA OR RNA); SEVERE ACUTE RESPIRATORY SYNDROME CORONAVIRUS 2 (SARS-COV-2) (CORONAVIRUS DISEASE [COVID-19]), AMPLIFIED PROBE TECHNIQUE, MAKING USE OF HIGH THROUGHPUT TECHNOLOGIES AS DESCRIBED BY CMS-2020-01-R: HCPCS

## 2021-02-08 PROCEDURE — U0005 INFEC AGEN DETEC AMPLI PROBE: HCPCS

## 2021-02-09 LAB — SARS-COV-2 RNA RESP QL NAA+PROBE: NOT DETECTED

## 2021-02-10 ENCOUNTER — HOSPITAL ENCOUNTER (OUTPATIENT)
Dept: CARDIOLOGY | Facility: HOSPITAL | Age: 46
Discharge: HOME | End: 2021-02-10
Attending: INTERNAL MEDICINE
Payer: MEDICARE

## 2021-02-10 ENCOUNTER — OFFICE VISIT (OUTPATIENT)
Dept: TRANSPLANT | Facility: CLINIC | Age: 46
End: 2021-02-10
Payer: MEDICARE

## 2021-02-10 VITALS
HEART RATE: 65 BPM | HEIGHT: 69 IN | SYSTOLIC BLOOD PRESSURE: 108 MMHG | HEIGHT: 69 IN | BODY MASS INDEX: 37.03 KG/M2 | HEART RATE: 50 BPM | WEIGHT: 250 LBS | BODY MASS INDEX: 36.88 KG/M2 | DIASTOLIC BLOOD PRESSURE: 57 MMHG | WEIGHT: 249 LBS | SYSTOLIC BLOOD PRESSURE: 119 MMHG | DIASTOLIC BLOOD PRESSURE: 75 MMHG

## 2021-02-10 VITALS
SYSTOLIC BLOOD PRESSURE: 113 MMHG | WEIGHT: 260.13 LBS | HEIGHT: 69 IN | DIASTOLIC BLOOD PRESSURE: 59 MMHG | HEART RATE: 55 BPM | BODY MASS INDEX: 38.53 KG/M2

## 2021-02-10 DIAGNOSIS — I34.0 NON-RHEUMATIC MITRAL REGURGITATION: ICD-10-CM

## 2021-02-10 DIAGNOSIS — Z95.810 ICD (IMPLANTABLE CARDIOVERTER-DEFIBRILLATOR) IN PLACE: ICD-10-CM

## 2021-02-10 DIAGNOSIS — I42.0 NONISCHEMIC DILATED CARDIOMYOPATHY: ICD-10-CM

## 2021-02-10 DIAGNOSIS — I50.42 CHRONIC COMBINED SYSTOLIC AND DIASTOLIC CONGESTIVE HEART FAILURE: ICD-10-CM

## 2021-02-10 DIAGNOSIS — I42.0 NONISCHEMIC DILATED CARDIOMYOPATHY: Primary | Chronic | ICD-10-CM

## 2021-02-10 LAB
ASCENDING AORTA: 2.6 CM
AV INDEX (PROSTH): 0.51
AV MEAN GRADIENT: 3 MMHG
AV PEAK GRADIENT: 5 MMHG
AV VALVE AREA: 1.64 CM2
AV VELOCITY RATIO: 0.5
BSA FOR ECHO PROCEDURE: 2.4 M2
CV ECHO LV RWT: 0.2 CM
CV STRESS BASE HR: 50 BPM
DIASTOLIC BLOOD PRESSURE: 75 MMHG
DOP CALC AO PEAK VEL: 1.15 M/S
DOP CALC AO VTI: 22.31 CM
DOP CALC LVOT AREA: 3.2 CM2
DOP CALC LVOT DIAMETER: 2.02 CM
DOP CALC LVOT PEAK VEL: 0.57 M/S
DOP CALC LVOT STROKE VOLUME: 36.68 CM3
DOP CALCLVOT PEAK VEL VTI: 11.45 CM
E WAVE DECELERATION TIME: 321.82 MSEC
E/A RATIO: 1.31
E/E' RATIO: 16.43 M/S
ECHO LV POSTERIOR WALL: 0.76 CM (ref 0.6–1.1)
FRACTIONAL SHORTENING: 5 % (ref 28–44)
INTERVENTRICULAR SEPTUM: 0.8 CM (ref 0.6–1.1)
IVRT: 134.16 MSEC
LA MAJOR: 6.57 CM
LA MINOR: 6.43 CM
LA WIDTH: 4.62 CM
LEFT ATRIUM SIZE: 5.84 CM
LEFT ATRIUM VOLUME INDEX: 64.5 ML/M2
LEFT ATRIUM VOLUME: 149.05 CM3
LEFT INTERNAL DIMENSION IN SYSTOLE: 7.13 CM (ref 2.1–4)
LEFT VENTRICLE DIASTOLIC VOLUME INDEX: 129.2 ML/M2
LEFT VENTRICLE DIASTOLIC VOLUME: 298.46 ML
LEFT VENTRICLE MASS INDEX: 116 G/M2
LEFT VENTRICLE SYSTOLIC VOLUME INDEX: 115.4 ML/M2
LEFT VENTRICLE SYSTOLIC VOLUME: 266.63 ML
LEFT VENTRICULAR INTERNAL DIMENSION IN DIASTOLE: 7.5 CM (ref 3.5–6)
LEFT VENTRICULAR MASS: 268.34 G
LV LATERAL E/E' RATIO: 12.78 M/S
LV SEPTAL E/E' RATIO: 23 M/S
MV A" WAVE DURATION": 13.7 MSEC
MV PEAK A VEL: 0.88 M/S
MV PEAK E VEL: 1.15 M/S
OHS CV CPX 1 MINUTE RECOVERY HEART RATE: 106 BPM
OHS CV CPX 85 PERCENT MAX PREDICTED HEART RATE MALE: 143
OHS CV CPX ANAEROBIC THRESHOLD DIASTOLIC BLOOD PRESSURE: 76 MMHG
OHS CV CPX ANAEROBIC THRESHOLD HEART RATE: 109
OHS CV CPX ANAEROBIC THRESHOLD RATE PRESSURE PRODUCT: NORMAL
OHS CV CPX ANAEROBIC THRESHOLD SYSTOLIC BLOOD PRESSURE: 111
OHS CV CPX DATA GRADE - AT: 7.9
OHS CV CPX DATA GRADE - PEAK: 9.2
OHS CV CPX DATA O2 SAT - PEAK: 98
OHS CV CPX DATA O2 SAT - REST: 98
OHS CV CPX DATA SPEED - AT: 2.5
OHS CV CPX DATA SPEED - PEAK: 2.8
OHS CV CPX DATA TIME - AT: 3.75
OHS CV CPX DATA TIME - PEAK: 4.43
OHS CV CPX DATA VE/VCO2 - AT: 41
OHS CV CPX DATA VE/VCO2 - PEAK: 42
OHS CV CPX DATA VE/VO2 - AT: 35
OHS CV CPX DATA VE/VO2 - PEAK: 38
OHS CV CPX DATA VO2 - AT: 9.6
OHS CV CPX DATA VO2 - PEAK: 11
OHS CV CPX DATA VO2 - REST: 3.1
OHS CV CPX FEV1/FVC: 0.8
OHS CV CPX FORCED EXPIRATORY VOLUME: 2.02
OHS CV CPX FORCED VITAL CAPACITY (FVC): 2.52
OHS CV CPX HIGHEST VO: 19.6
OHS CV CPX MAX PREDICTED HEART RATE: 168
OHS CV CPX MAXIMAL VOLUNTARY VENTILATION (MVV) PREDICTED: 80.8
OHS CV CPX MAXIMAL VOLUNTARY VENTILATION (MVV): 97
OHS CV CPX MAXIUMUM EXERCISE VENTILATION (VE MAX): 44.7
OHS CV CPX PATIENT AGE: 43
OHS CV CPX PATIENT HEIGHT IN: 69
OHS CV CPX PATIENT IS FEMALE AGE 11-19: 0
OHS CV CPX PATIENT IS FEMALE AGE GREATER THAN 19: 1
OHS CV CPX PATIENT IS FEMALE AGE LESS THAN 11: 0
OHS CV CPX PATIENT IS FEMALE: 1
OHS CV CPX PATIENT IS MALE AGE 11-25: 0
OHS CV CPX PATIENT IS MALE AGE GREATER THAN 25: 0
OHS CV CPX PATIENT IS MALE AGE LESS THAN 11: 0
OHS CV CPX PATIENT IS MALE GREATER THAN 18: 0
OHS CV CPX PATIENT IS MALE LESS THAN OR EQUAL TO 18: 0
OHS CV CPX PATIENT IS MALE: 0
OHS CV CPX PATIENT WEIGHT RETURNED IN OZ: 3984
OHS CV CPX PEAK DIASTOLIC BLOOD PRESSURE: 71 MMHG
OHS CV CPX PEAK HEAR RATE: 117 BPM
OHS CV CPX PEAK RATE PRESSURE PRODUCT: NORMAL
OHS CV CPX PEAK SYSTOLIC BLOOD PRESSURE: 117 MMHG
OHS CV CPX PERCENT BODY FAT: 32.3
OHS CV CPX PERCENT MAX PREDICTED HEART RATE ACHIEVED: 70
OHS CV CPX PREDICTED VO2: 19.6 ML/KG/MIN
OHS CV CPX RATE PRESSURE PRODUCT PRESENTING: 5400
OHS CV CPX REST PET CO2: 26
OHS CV CPX VE/VCO2 SLOPE: 36.3
PISA MRMAX VEL: 0.05 M/S
PISA TR MAX VEL: 2.78 M/S
PULM VEIN S/D RATIO: 1.82
PV PEAK D VEL: 0.38 M/S
PV PEAK S VEL: 0.69 M/S
RA MAJOR: 4.14 CM
RA PRESSURE: 3 MMHG
RA WIDTH: 4.2 CM
RIGHT VENTRICULAR END-DIASTOLIC DIMENSION: 3.24 CM
RV TISSUE DOPPLER FREE WALL SYSTOLIC VELOCITY 1 (APICAL 4 CHAMBER VIEW): 9.92 CM/S
SINUS: 2.86 CM
STJ: 2.17 CM
STRESS ECHO POST EXERCISE DUR MIN: 4 MINUTES
STRESS ECHO POST EXERCISE DUR SEC: 26 SECONDS
STRESS ST DEPRESSION: 0.5 MM
SYSTOLIC BLOOD PRESSURE: 108 MMHG
TDI LATERAL: 0.09 M/S
TDI SEPTAL: 0.05 M/S
TDI: 0.07 M/S
TR MAX PG: 31 MMHG
TRICUSPID ANNULAR PLANE SYSTOLIC EXCURSION: 2.5 CM
TV REST PULMONARY ARTERY PRESSURE: 34 MMHG

## 2021-02-10 PROCEDURE — 94621 CARDIOPULM EXERCISE TESTING: CPT | Mod: 26,,, | Performed by: INTERNAL MEDICINE

## 2021-02-10 PROCEDURE — 99499 RISK ADDL DX/OHS AUDIT: ICD-10-PCS | Mod: S$GLB,,, | Performed by: INTERNAL MEDICINE

## 2021-02-10 PROCEDURE — 1126F AMNT PAIN NOTED NONE PRSNT: CPT | Mod: S$GLB,,, | Performed by: INTERNAL MEDICINE

## 2021-02-10 PROCEDURE — C8929 TTE W OR WO FOL WCON,DOPPLER: HCPCS

## 2021-02-10 PROCEDURE — 94621 CARDIOPULMONARY EXERCISE TESTING (CUPID ONLY): ICD-10-PCS | Mod: 26,,, | Performed by: INTERNAL MEDICINE

## 2021-02-10 PROCEDURE — 99214 OFFICE O/P EST MOD 30 MIN: CPT | Mod: S$GLB,,, | Performed by: INTERNAL MEDICINE

## 2021-02-10 PROCEDURE — 25500020 PHARM REV CODE 255: Performed by: INTERNAL MEDICINE

## 2021-02-10 PROCEDURE — 99999 PR PBB SHADOW E&M-EST. PATIENT-LVL III: CPT | Mod: PBBFAC,,, | Performed by: INTERNAL MEDICINE

## 2021-02-10 PROCEDURE — 1126F PR PAIN SEVERITY QUANTIFIED, NO PAIN PRESENT: ICD-10-PCS | Mod: S$GLB,,, | Performed by: INTERNAL MEDICINE

## 2021-02-10 PROCEDURE — 93306 TTE W/DOPPLER COMPLETE: CPT | Mod: 26,,, | Performed by: INTERNAL MEDICINE

## 2021-02-10 PROCEDURE — 99214 PR OFFICE/OUTPT VISIT, EST, LEVL IV, 30-39 MIN: ICD-10-PCS | Mod: S$GLB,,, | Performed by: INTERNAL MEDICINE

## 2021-02-10 PROCEDURE — 3008F PR BODY MASS INDEX (BMI) DOCUMENTED: ICD-10-PCS | Mod: CPTII,S$GLB,, | Performed by: INTERNAL MEDICINE

## 2021-02-10 PROCEDURE — 99499 UNLISTED E&M SERVICE: CPT | Mod: S$GLB,,, | Performed by: INTERNAL MEDICINE

## 2021-02-10 PROCEDURE — 93306 ECHO (CUPID ONLY): ICD-10-PCS | Mod: 26,,, | Performed by: INTERNAL MEDICINE

## 2021-02-10 PROCEDURE — 99999 PR PBB SHADOW E&M-EST. PATIENT-LVL III: ICD-10-PCS | Mod: PBBFAC,,, | Performed by: INTERNAL MEDICINE

## 2021-02-10 PROCEDURE — 94621 CARDIOPULM EXERCISE TESTING: CPT

## 2021-02-10 PROCEDURE — 3008F BODY MASS INDEX DOCD: CPT | Mod: CPTII,S$GLB,, | Performed by: INTERNAL MEDICINE

## 2021-02-10 RX ORDER — SPIRONOLACTONE 25 MG/1
25 TABLET ORAL DAILY
Qty: 30 TABLET | Refills: 11 | Status: SHIPPED | OUTPATIENT
Start: 2021-02-10 | End: 2021-10-12 | Stop reason: SDUPTHER

## 2021-02-10 RX ADMIN — HUMAN ALBUMIN MICROSPHERES AND PERFLUTREN 0.66 MG: 10; .22 INJECTION, SOLUTION INTRAVENOUS at 01:02

## 2021-02-17 DIAGNOSIS — Z95.810 AUTOMATIC IMPLANTABLE CARDIAC DEFIBRILLATOR IN SITU: Primary | ICD-10-CM

## 2021-02-17 DIAGNOSIS — I42.9 CARDIOMYOPATHY, UNSPECIFIED TYPE: ICD-10-CM

## 2021-02-25 ENCOUNTER — IMMUNIZATION (OUTPATIENT)
Dept: INTERNAL MEDICINE | Facility: CLINIC | Age: 46
End: 2021-02-25
Payer: MEDICARE

## 2021-02-25 DIAGNOSIS — Z23 NEED FOR VACCINATION: Primary | ICD-10-CM

## 2021-02-25 PROCEDURE — 0001A COVID-19, MRNA, LNP-S, PF, 30 MCG/0.3 ML DOSE VACCINE: CPT | Mod: PBBFAC | Performed by: INTERNAL MEDICINE

## 2021-02-25 PROCEDURE — 91300 COVID-19, MRNA, LNP-S, PF, 30 MCG/0.3 ML DOSE VACCINE: CPT | Mod: PBBFAC

## 2021-03-01 ENCOUNTER — PATIENT OUTREACH (OUTPATIENT)
Dept: ADMINISTRATIVE | Facility: OTHER | Age: 46
End: 2021-03-01

## 2021-03-01 ENCOUNTER — OFFICE VISIT (OUTPATIENT)
Dept: OBSTETRICS AND GYNECOLOGY | Facility: CLINIC | Age: 46
End: 2021-03-01
Payer: MEDICARE

## 2021-03-01 VITALS — WEIGHT: 253 LBS | BODY MASS INDEX: 37.36 KG/M2 | DIASTOLIC BLOOD PRESSURE: 74 MMHG | SYSTOLIC BLOOD PRESSURE: 118 MMHG

## 2021-03-01 DIAGNOSIS — N89.8 VAGINAL DISCHARGE: ICD-10-CM

## 2021-03-01 DIAGNOSIS — N30.01 ACUTE CYSTITIS WITH HEMATURIA: Primary | ICD-10-CM

## 2021-03-01 PROCEDURE — 87086 URINE CULTURE/COLONY COUNT: CPT

## 2021-03-01 PROCEDURE — 99999 PR PBB SHADOW E&M-EST. PATIENT-LVL III: CPT | Mod: PBBFAC,,, | Performed by: OBSTETRICS & GYNECOLOGY

## 2021-03-01 PROCEDURE — 87186 SC STD MICRODIL/AGAR DIL: CPT | Performed by: OBSTETRICS & GYNECOLOGY

## 2021-03-01 PROCEDURE — 99213 OFFICE O/P EST LOW 20 MIN: CPT | Mod: S$GLB,,, | Performed by: OBSTETRICS & GYNECOLOGY

## 2021-03-01 PROCEDURE — 99213 PR OFFICE/OUTPT VISIT, EST, LEVL III, 20-29 MIN: ICD-10-PCS | Mod: S$GLB,,, | Performed by: OBSTETRICS & GYNECOLOGY

## 2021-03-01 PROCEDURE — 99213 OFFICE O/P EST LOW 20 MIN: CPT | Mod: PBBFAC,PN | Performed by: OBSTETRICS & GYNECOLOGY

## 2021-03-01 PROCEDURE — 87088 URINE BACTERIA CULTURE: CPT | Performed by: OBSTETRICS & GYNECOLOGY

## 2021-03-01 PROCEDURE — 87077 CULTURE AEROBIC IDENTIFY: CPT | Performed by: OBSTETRICS & GYNECOLOGY

## 2021-03-01 PROCEDURE — 99999 PR PBB SHADOW E&M-EST. PATIENT-LVL III: ICD-10-PCS | Mod: PBBFAC,,, | Performed by: OBSTETRICS & GYNECOLOGY

## 2021-03-01 RX ORDER — NITROFURANTOIN 25; 75 MG/1; MG/1
100 CAPSULE ORAL 2 TIMES DAILY
Qty: 10 CAPSULE | Refills: 0 | Status: SHIPPED | OUTPATIENT
Start: 2021-03-01 | End: 2021-03-04

## 2021-03-03 LAB
CANDIDA RRNA VAG QL PROBE: NORMAL
G VAGINALIS RRNA GENITAL QL PROBE: NORMAL
T VAGINALIS RRNA GENITAL QL PROBE: NORMAL

## 2021-03-04 ENCOUNTER — TELEPHONE (OUTPATIENT)
Dept: OBSTETRICS AND GYNECOLOGY | Facility: CLINIC | Age: 46
End: 2021-03-04

## 2021-03-04 LAB — BACTERIA UR CULT: ABNORMAL

## 2021-03-04 RX ORDER — SULFAMETHOXAZOLE AND TRIMETHOPRIM 800; 160 MG/1; MG/1
1 TABLET ORAL 2 TIMES DAILY
Qty: 14 TABLET | Refills: 0 | OUTPATIENT
Start: 2021-03-04 | End: 2021-04-16

## 2021-03-18 ENCOUNTER — IMMUNIZATION (OUTPATIENT)
Dept: INTERNAL MEDICINE | Facility: CLINIC | Age: 46
End: 2021-03-18
Payer: MEDICARE

## 2021-03-18 DIAGNOSIS — Z23 NEED FOR VACCINATION: Primary | ICD-10-CM

## 2021-03-18 PROCEDURE — 0002A COVID-19, MRNA, LNP-S, PF, 30 MCG/0.3 ML DOSE VACCINE: CPT | Mod: PBBFAC | Performed by: INTERNAL MEDICINE

## 2021-03-18 PROCEDURE — 91300 COVID-19, MRNA, LNP-S, PF, 30 MCG/0.3 ML DOSE VACCINE: CPT | Mod: PBBFAC | Performed by: INTERNAL MEDICINE

## 2021-04-06 ENCOUNTER — PATIENT OUTREACH (OUTPATIENT)
Dept: ADMINISTRATIVE | Facility: OTHER | Age: 46
End: 2021-04-06

## 2021-04-09 ENCOUNTER — OFFICE VISIT (OUTPATIENT)
Dept: ELECTROPHYSIOLOGY | Facility: CLINIC | Age: 46
End: 2021-04-09
Payer: MEDICARE

## 2021-04-09 ENCOUNTER — CLINICAL SUPPORT (OUTPATIENT)
Dept: CARDIOLOGY | Facility: HOSPITAL | Age: 46
End: 2021-04-09
Attending: INTERNAL MEDICINE
Payer: MEDICARE

## 2021-04-09 ENCOUNTER — HOSPITAL ENCOUNTER (OUTPATIENT)
Dept: CARDIOLOGY | Facility: CLINIC | Age: 46
Discharge: HOME | End: 2021-04-09
Payer: MEDICARE

## 2021-04-09 VITALS
HEART RATE: 62 BPM | BODY MASS INDEX: 38.17 KG/M2 | DIASTOLIC BLOOD PRESSURE: 73 MMHG | WEIGHT: 257.69 LBS | HEIGHT: 69 IN | SYSTOLIC BLOOD PRESSURE: 115 MMHG

## 2021-04-09 DIAGNOSIS — I42.0 NONISCHEMIC DILATED CARDIOMYOPATHY: Chronic | ICD-10-CM

## 2021-04-09 DIAGNOSIS — Z95.810 AUTOMATIC IMPLANTABLE CARDIAC DEFIBRILLATOR IN SITU: ICD-10-CM

## 2021-04-09 DIAGNOSIS — Z95.810 ICD (IMPLANTABLE CARDIOVERTER-DEFIBRILLATOR) IN PLACE: Primary | Chronic | ICD-10-CM

## 2021-04-09 DIAGNOSIS — I42.9 CARDIOMYOPATHY, UNSPECIFIED TYPE: ICD-10-CM

## 2021-04-09 DIAGNOSIS — E66.01 MORBID OBESITY: ICD-10-CM

## 2021-04-09 DIAGNOSIS — Z79.899 ON AMIODARONE THERAPY: ICD-10-CM

## 2021-04-09 PROCEDURE — 3008F PR BODY MASS INDEX (BMI) DOCUMENTED: ICD-10-PCS | Mod: CPTII,S$GLB,, | Performed by: NURSE PRACTITIONER

## 2021-04-09 PROCEDURE — 99999 PR PBB SHADOW E&M-EST. PATIENT-LVL IV: ICD-10-PCS | Mod: PBBFAC,,, | Performed by: NURSE PRACTITIONER

## 2021-04-09 PROCEDURE — 3008F BODY MASS INDEX DOCD: CPT | Mod: CPTII,S$GLB,, | Performed by: NURSE PRACTITIONER

## 2021-04-09 PROCEDURE — 93010 ELECTROCARDIOGRAM REPORT: CPT | Mod: S$GLB,,, | Performed by: INTERNAL MEDICINE

## 2021-04-09 PROCEDURE — 99499 RISK ADDL DX/OHS AUDIT: ICD-10-PCS | Mod: S$GLB,,, | Performed by: NURSE PRACTITIONER

## 2021-04-09 PROCEDURE — 99499 UNLISTED E&M SERVICE: CPT | Mod: S$GLB,,, | Performed by: NURSE PRACTITIONER

## 2021-04-09 PROCEDURE — 93282 PRGRMG EVAL IMPLANTABLE DFB: CPT | Mod: 26,,, | Performed by: INTERNAL MEDICINE

## 2021-04-09 PROCEDURE — 1126F AMNT PAIN NOTED NONE PRSNT: CPT | Mod: S$GLB,,, | Performed by: NURSE PRACTITIONER

## 2021-04-09 PROCEDURE — 93282 CARDIAC DEVICE CHECK - IN CLINIC & HOSPITAL: ICD-10-PCS | Mod: 26,,, | Performed by: INTERNAL MEDICINE

## 2021-04-09 PROCEDURE — 99214 OFFICE O/P EST MOD 30 MIN: CPT | Mod: S$GLB,,, | Performed by: NURSE PRACTITIONER

## 2021-04-09 PROCEDURE — 93005 ELECTROCARDIOGRAM TRACING: CPT | Mod: S$GLB,,, | Performed by: INTERNAL MEDICINE

## 2021-04-09 PROCEDURE — 93010 RHYTHM STRIP: ICD-10-PCS | Mod: S$GLB,,, | Performed by: INTERNAL MEDICINE

## 2021-04-09 PROCEDURE — 99214 PR OFFICE/OUTPT VISIT, EST, LEVL IV, 30-39 MIN: ICD-10-PCS | Mod: S$GLB,,, | Performed by: NURSE PRACTITIONER

## 2021-04-09 PROCEDURE — 1126F PR PAIN SEVERITY QUANTIFIED, NO PAIN PRESENT: ICD-10-PCS | Mod: S$GLB,,, | Performed by: NURSE PRACTITIONER

## 2021-04-09 PROCEDURE — 99999 PR PBB SHADOW E&M-EST. PATIENT-LVL IV: CPT | Mod: PBBFAC,,, | Performed by: NURSE PRACTITIONER

## 2021-04-09 PROCEDURE — 93005 RHYTHM STRIP: ICD-10-PCS | Mod: S$GLB,,, | Performed by: INTERNAL MEDICINE

## 2021-04-09 PROCEDURE — 93282 PRGRMG EVAL IMPLANTABLE DFB: CPT

## 2021-04-15 ENCOUNTER — PATIENT MESSAGE (OUTPATIENT)
Dept: TRANSPLANT | Facility: CLINIC | Age: 46
End: 2021-04-15

## 2021-04-15 PROCEDURE — 93010 EKG 12-LEAD: ICD-10-PCS | Mod: ,,, | Performed by: INTERNAL MEDICINE

## 2021-04-15 PROCEDURE — 93005 ELECTROCARDIOGRAM TRACING: CPT

## 2021-04-15 PROCEDURE — 99285 EMERGENCY DEPT VISIT HI MDM: CPT | Mod: 25

## 2021-04-15 PROCEDURE — 96374 THER/PROPH/DIAG INJ IV PUSH: CPT

## 2021-04-15 PROCEDURE — 93010 ELECTROCARDIOGRAM REPORT: CPT | Mod: ,,, | Performed by: INTERNAL MEDICINE

## 2021-04-15 PROCEDURE — 96376 TX/PRO/DX INJ SAME DRUG ADON: CPT

## 2021-04-16 ENCOUNTER — HOSPITAL ENCOUNTER (EMERGENCY)
Facility: HOSPITAL | Age: 46
Discharge: HOME | End: 2021-04-16
Attending: EMERGENCY MEDICINE | Admitting: HOSPITALIST
Payer: MEDICARE

## 2021-04-16 VITALS
DIASTOLIC BLOOD PRESSURE: 78 MMHG | WEIGHT: 225 LBS | BODY MASS INDEX: 33.33 KG/M2 | RESPIRATION RATE: 17 BRPM | HEIGHT: 69 IN | OXYGEN SATURATION: 100 % | SYSTOLIC BLOOD PRESSURE: 115 MMHG | TEMPERATURE: 98 F | HEART RATE: 48 BPM

## 2021-04-16 DIAGNOSIS — R79.89 ELEVATED TROPONIN: ICD-10-CM

## 2021-04-16 DIAGNOSIS — R07.9 CHEST PAIN: ICD-10-CM

## 2021-04-16 DIAGNOSIS — I50.42 CHRONIC COMBINED SYSTOLIC AND DIASTOLIC CONGESTIVE HEART FAILURE: ICD-10-CM

## 2021-04-16 DIAGNOSIS — I50.43 ACUTE ON CHRONIC COMBINED SYSTOLIC AND DIASTOLIC CONGESTIVE HEART FAILURE: Primary | ICD-10-CM

## 2021-04-16 DIAGNOSIS — I50.9 CONGESTIVE HEART FAILURE (CHF): ICD-10-CM

## 2021-04-16 LAB
ALBUMIN SERPL BCP-MCNC: 3.7 G/DL (ref 3.5–5.2)
ALP SERPL-CCNC: 32 U/L (ref 55–135)
ALT SERPL W/O P-5'-P-CCNC: 17 U/L (ref 10–44)
ANION GAP SERPL CALC-SCNC: 7 MMOL/L (ref 8–16)
AST SERPL-CCNC: 16 U/L (ref 10–40)
BASOPHILS # BLD AUTO: 0.01 K/UL (ref 0–0.2)
BASOPHILS # BLD AUTO: 0.01 K/UL (ref 0–0.2)
BASOPHILS NFR BLD: 0.2 % (ref 0–1.9)
BASOPHILS NFR BLD: 0.2 % (ref 0–1.9)
BILIRUB SERPL-MCNC: 1.3 MG/DL (ref 0.1–1)
BNP SERPL-MCNC: 563 PG/ML (ref 0–99)
BUN SERPL-MCNC: 17 MG/DL (ref 6–20)
CALCIUM SERPL-MCNC: 8.4 MG/DL (ref 8.7–10.5)
CHLORIDE SERPL-SCNC: 109 MMOL/L (ref 95–110)
CO2 SERPL-SCNC: 24 MMOL/L (ref 23–29)
CREAT SERPL-MCNC: 0.9 MG/DL (ref 0.5–1.4)
CTP QC/QA: YES
DIFFERENTIAL METHOD: ABNORMAL
DIFFERENTIAL METHOD: ABNORMAL
EOSINOPHIL # BLD AUTO: 0.2 K/UL (ref 0–0.5)
EOSINOPHIL # BLD AUTO: 0.2 K/UL (ref 0–0.5)
EOSINOPHIL NFR BLD: 3.5 % (ref 0–8)
EOSINOPHIL NFR BLD: 3.9 % (ref 0–8)
ERYTHROCYTE [DISTWIDTH] IN BLOOD BY AUTOMATED COUNT: 13.1 % (ref 11.5–14.5)
ERYTHROCYTE [DISTWIDTH] IN BLOOD BY AUTOMATED COUNT: 13.4 % (ref 11.5–14.5)
EST. GFR  (AFRICAN AMERICAN): >60 ML/MIN/1.73 M^2
EST. GFR  (NON AFRICAN AMERICAN): >60 ML/MIN/1.73 M^2
GLUCOSE SERPL-MCNC: 94 MG/DL (ref 70–110)
HCT VFR BLD AUTO: 36 % (ref 37–48.5)
HCT VFR BLD AUTO: 36.5 % (ref 37–48.5)
HGB BLD-MCNC: 11.8 G/DL (ref 12–16)
HGB BLD-MCNC: 12.2 G/DL (ref 12–16)
IMM GRANULOCYTES # BLD AUTO: 0.01 K/UL (ref 0–0.04)
IMM GRANULOCYTES # BLD AUTO: 0.03 K/UL (ref 0–0.04)
IMM GRANULOCYTES NFR BLD AUTO: 0.2 % (ref 0–0.5)
IMM GRANULOCYTES NFR BLD AUTO: 0.6 % (ref 0–0.5)
LYMPHOCYTES # BLD AUTO: 1.6 K/UL (ref 1–4.8)
LYMPHOCYTES # BLD AUTO: 1.9 K/UL (ref 1–4.8)
LYMPHOCYTES NFR BLD: 31.9 % (ref 18–48)
LYMPHOCYTES NFR BLD: 35 % (ref 18–48)
MCH RBC QN AUTO: 33.8 PG (ref 27–31)
MCH RBC QN AUTO: 34.1 PG (ref 27–31)
MCHC RBC AUTO-ENTMCNC: 32.8 G/DL (ref 32–36)
MCHC RBC AUTO-ENTMCNC: 33.4 G/DL (ref 32–36)
MCV RBC AUTO: 101 FL (ref 82–98)
MCV RBC AUTO: 104 FL (ref 82–98)
MONOCYTES # BLD AUTO: 0.3 K/UL (ref 0.3–1)
MONOCYTES # BLD AUTO: 0.4 K/UL (ref 0.3–1)
MONOCYTES NFR BLD: 6.3 % (ref 4–15)
MONOCYTES NFR BLD: 8.6 % (ref 4–15)
NEUTROPHILS # BLD AUTO: 2.8 K/UL (ref 1.8–7.7)
NEUTROPHILS # BLD AUTO: 2.9 K/UL (ref 1.8–7.7)
NEUTROPHILS NFR BLD: 54.8 % (ref 38–73)
NEUTROPHILS NFR BLD: 54.8 % (ref 38–73)
NRBC BLD-RTO: 0 /100 WBC
NRBC BLD-RTO: 0 /100 WBC
PLATELET # BLD AUTO: 232 K/UL (ref 150–450)
PLATELET # BLD AUTO: 253 K/UL (ref 150–450)
PMV BLD AUTO: 10.1 FL (ref 9.2–12.9)
PMV BLD AUTO: 10.1 FL (ref 9.2–12.9)
POTASSIUM SERPL-SCNC: 4 MMOL/L (ref 3.5–5.1)
PROT SERPL-MCNC: 7 G/DL (ref 6–8.4)
RBC # BLD AUTO: 3.46 M/UL (ref 4–5.4)
RBC # BLD AUTO: 3.61 M/UL (ref 4–5.4)
SARS-COV-2 RDRP RESP QL NAA+PROBE: NEGATIVE
SODIUM SERPL-SCNC: 140 MMOL/L (ref 136–145)
TROPONIN I SERPL DL<=0.01 NG/ML-MCNC: 0.12 NG/ML (ref 0–0.03)
TROPONIN I SERPL DL<=0.01 NG/ML-MCNC: 0.13 NG/ML (ref 0–0.03)
WBC # BLD AUTO: 5.11 K/UL (ref 3.9–12.7)
WBC # BLD AUTO: 5.37 K/UL (ref 3.9–12.7)

## 2021-04-16 PROCEDURE — 94761 N-INVAS EAR/PLS OXIMETRY MLT: CPT

## 2021-04-16 PROCEDURE — 63600175 PHARM REV CODE 636 W HCPCS: Performed by: NURSE PRACTITIONER

## 2021-04-16 PROCEDURE — 85025 COMPLETE CBC W/AUTO DIFF WBC: CPT | Mod: 91 | Performed by: NURSE PRACTITIONER

## 2021-04-16 PROCEDURE — 84484 ASSAY OF TROPONIN QUANT: CPT | Mod: 91 | Performed by: NURSE PRACTITIONER

## 2021-04-16 PROCEDURE — 99284 PR EMERGENCY DEPT VISIT,LEVEL IV: ICD-10-PCS | Mod: ,,, | Performed by: NURSE PRACTITIONER

## 2021-04-16 PROCEDURE — 84484 ASSAY OF TROPONIN QUANT: CPT | Performed by: PHYSICIAN ASSISTANT

## 2021-04-16 PROCEDURE — 99284 EMERGENCY DEPT VISIT MOD MDM: CPT | Mod: ,,, | Performed by: NURSE PRACTITIONER

## 2021-04-16 PROCEDURE — 25000003 PHARM REV CODE 250: Performed by: EMERGENCY MEDICINE

## 2021-04-16 PROCEDURE — 85025 COMPLETE CBC W/AUTO DIFF WBC: CPT | Performed by: PHYSICIAN ASSISTANT

## 2021-04-16 PROCEDURE — 63600175 PHARM REV CODE 636 W HCPCS: Performed by: EMERGENCY MEDICINE

## 2021-04-16 PROCEDURE — 83880 ASSAY OF NATRIURETIC PEPTIDE: CPT | Performed by: PHYSICIAN ASSISTANT

## 2021-04-16 PROCEDURE — 96376 TX/PRO/DX INJ SAME DRUG ADON: CPT | Performed by: EMERGENCY MEDICINE

## 2021-04-16 PROCEDURE — 80053 COMPREHEN METABOLIC PANEL: CPT | Performed by: PHYSICIAN ASSISTANT

## 2021-04-16 PROCEDURE — U0002 COVID-19 LAB TEST NON-CDC: HCPCS | Performed by: EMERGENCY MEDICINE

## 2021-04-16 RX ORDER — FUROSEMIDE 10 MG/ML
80 INJECTION INTRAMUSCULAR; INTRAVENOUS
Status: COMPLETED | OUTPATIENT
Start: 2021-04-16 | End: 2021-04-16

## 2021-04-16 RX ORDER — FUROSEMIDE 40 MG/1
40 TABLET ORAL DAILY
Qty: 30 TABLET | Refills: 3
Start: 2021-04-16 | End: 2021-04-16 | Stop reason: HOSPADM

## 2021-04-16 RX ORDER — FUROSEMIDE 10 MG/ML
40 INJECTION INTRAMUSCULAR; INTRAVENOUS ONCE
Status: COMPLETED | OUTPATIENT
Start: 2021-04-16 | End: 2021-04-16

## 2021-04-16 RX ORDER — FUROSEMIDE 10 MG/ML
20 INJECTION INTRAMUSCULAR; INTRAVENOUS ONCE
Status: DISCONTINUED | OUTPATIENT
Start: 2021-04-16 | End: 2021-04-16

## 2021-04-16 RX ORDER — ASPIRIN 325 MG
325 TABLET ORAL
Status: COMPLETED | OUTPATIENT
Start: 2021-04-16 | End: 2021-04-16

## 2021-04-16 RX ORDER — FUROSEMIDE 40 MG/1
40 TABLET ORAL DAILY
Qty: 30 TABLET | Refills: 3 | Status: SHIPPED | OUTPATIENT
Start: 2021-04-16 | End: 2021-04-16 | Stop reason: SDUPTHER

## 2021-04-16 RX ORDER — SODIUM CHLORIDE 0.9 % (FLUSH) 0.9 %
10 SYRINGE (ML) INJECTION
Status: DISCONTINUED | OUTPATIENT
Start: 2021-04-16 | End: 2021-04-16 | Stop reason: HOSPADM

## 2021-04-16 RX ORDER — POLYETHYLENE GLYCOL 3350 17 G/17G
17 POWDER, FOR SOLUTION ORAL DAILY PRN
Status: DISCONTINUED | OUTPATIENT
Start: 2021-04-16 | End: 2021-04-16 | Stop reason: HOSPADM

## 2021-04-16 RX ORDER — ACETAMINOPHEN 325 MG/1
650 TABLET ORAL EVERY 4 HOURS PRN
Status: DISCONTINUED | OUTPATIENT
Start: 2021-04-16 | End: 2021-04-16 | Stop reason: HOSPADM

## 2021-04-16 RX ORDER — FUROSEMIDE 40 MG/1
40 TABLET ORAL DAILY
Qty: 30 TABLET | Refills: 3 | Status: SHIPPED | OUTPATIENT
Start: 2021-04-16 | End: 2021-06-18 | Stop reason: SDUPTHER

## 2021-04-16 RX ORDER — ONDANSETRON 2 MG/ML
4 INJECTION INTRAMUSCULAR; INTRAVENOUS EVERY 8 HOURS PRN
Status: DISCONTINUED | OUTPATIENT
Start: 2021-04-16 | End: 2021-04-16 | Stop reason: HOSPADM

## 2021-04-16 RX ADMIN — LIDOCAINE HYDROCHLORIDE: 20 SOLUTION ORAL; TOPICAL at 02:04

## 2021-04-16 RX ADMIN — ASPIRIN 325 MG ORAL TABLET 325 MG: 325 PILL ORAL at 01:04

## 2021-04-16 RX ADMIN — FUROSEMIDE 40 MG: 10 INJECTION, SOLUTION INTRAVENOUS at 11:04

## 2021-04-16 RX ADMIN — FUROSEMIDE 80 MG: 10 INJECTION, SOLUTION INTRAVENOUS at 01:04

## 2021-04-20 ENCOUNTER — HOSPITAL ENCOUNTER (OUTPATIENT)
Dept: PULMONOLOGY | Facility: CLINIC | Age: 46
Discharge: HOME | End: 2021-04-20
Payer: MEDICARE

## 2021-04-20 DIAGNOSIS — Z79.899 ON AMIODARONE THERAPY: ICD-10-CM

## 2021-04-20 LAB
DLCO ADJ PRE: 22.02 ML/(MIN*MMHG) (ref 20.83–32.29)
DLCO SINGLE BREATH LLN: 20.83
DLCO SINGLE BREATH PRE REF: 79.7 %
DLCO SINGLE BREATH REF: 26.56
DLCOC SBVA LLN: 3.58
DLCOC SBVA PRE REF: 91.7 %
DLCOC SBVA REF: 5.01
DLCOC SINGLE BREATH LLN: 20.83
DLCOC SINGLE BREATH PRE REF: 82.9 %
DLCOC SINGLE BREATH REF: 26.56
DLCOCSBVAULN: 6.44
DLCOCSINGLEBREATHULN: 32.29
DLCOSINGLEBREATHULN: 32.29
DLCOVA LLN: 3.58
DLCOVA PRE REF: 88.1 %
DLCOVA PRE: 4.42 ML/(MIN*MMHG*L) (ref 3.58–6.44)
DLCOVA REF: 5.01
DLCOVAULN: 6.44
DLVAADJ PRE: 4.6 ML/(MIN*MMHG*L) (ref 3.58–6.44)
FEF 25 75 LLN: 1.46
FEF 25 75 PRE REF: 49.2 %
FEF 25 75 REF: 2.8
FEV05 LLN: 1.33
FEV05 REF: 2.18
FEV1 FVC LLN: 71
FEV1 FVC PRE REF: 89.1 %
FEV1 FVC REF: 82
FEV1 LLN: 2.09
FEV1 PRE REF: 71.4 %
FEV1 REF: 2.71
FVC LLN: 2.59
FVC PRE REF: 79.7 %
FVC REF: 3.33
IVC PRE: 3.01 L (ref 2.59–4.07)
IVC SINGLE BREATH LLN: 2.59
IVC SINGLE BREATH PRE REF: 90.3 %
IVC SINGLE BREATH REF: 3.33
IVCSINGLEBREATHULN: 4.07
PEF LLN: 4.71
PEF PRE REF: 81.4 %
PEF REF: 6.85
PHYSICIAN COMMENT: ABNORMAL
PRE DLCO: 21.16 ML/(MIN*MMHG) (ref 20.83–32.29)
PRE FEF 25 75: 1.38 L/S (ref 1.46–4.14)
PRE FET 100: 6.21 SEC
PRE FEV05 REF: 71.7 %
PRE FEV1 FVC: 72.87 % (ref 71.14–92.38)
PRE FEV1: 1.93 L (ref 2.09–3.34)
PRE FEV5: 1.57 L (ref 1.33–3.04)
PRE FVC: 2.65 L (ref 2.59–4.07)
PRE PEF: 5.58 L/S (ref 4.71–8.99)
VA PRE: 4.79 L (ref 5.15–5.15)
VA SINGLE BREATH LLN: 5.15
VA SINGLE BREATH PRE REF: 93.1 %
VA SINGLE BREATH REF: 5.15
VASINGLEBREATHULN: 5.15

## 2021-04-20 PROCEDURE — 94729 PR C02/MEMBANE DIFFUSE CAPACITY: ICD-10-PCS | Mod: S$GLB,,, | Performed by: INTERNAL MEDICINE

## 2021-04-20 PROCEDURE — 94010 BREATHING CAPACITY TEST: CPT | Mod: S$GLB,,, | Performed by: INTERNAL MEDICINE

## 2021-04-20 PROCEDURE — 94729 DIFFUSING CAPACITY: CPT | Mod: S$GLB,,, | Performed by: INTERNAL MEDICINE

## 2021-04-20 PROCEDURE — 94010 BREATHING CAPACITY TEST: ICD-10-PCS | Mod: S$GLB,,, | Performed by: INTERNAL MEDICINE

## 2021-05-06 ENCOUNTER — CLINICAL SUPPORT (OUTPATIENT)
Dept: CARDIOLOGY | Facility: HOSPITAL | Age: 46
End: 2021-05-06
Payer: MEDICARE

## 2021-05-06 DIAGNOSIS — I42.9 CARDIOMYOPATHY, UNSPECIFIED: ICD-10-CM

## 2021-05-06 DIAGNOSIS — Z95.810 PRESENCE OF AUTOMATIC (IMPLANTABLE) CARDIAC DEFIBRILLATOR: ICD-10-CM

## 2021-05-06 PROCEDURE — 93296 REM INTERROG EVL PM/IDS: CPT | Performed by: INTERNAL MEDICINE

## 2021-05-06 PROCEDURE — 93295 DEV INTERROG REMOTE 1/2/MLT: CPT | Mod: ,,, | Performed by: INTERNAL MEDICINE

## 2021-05-06 PROCEDURE — 93295 CARDIAC DEVICE CHECK - REMOTE: ICD-10-PCS | Mod: ,,, | Performed by: INTERNAL MEDICINE

## 2021-05-14 ENCOUNTER — LAB VISIT (OUTPATIENT)
Dept: LAB | Facility: HOSPITAL | Age: 46
End: 2021-05-14
Attending: INTERNAL MEDICINE
Payer: MEDICARE

## 2021-05-14 ENCOUNTER — OFFICE VISIT (OUTPATIENT)
Dept: TRANSPLANT | Facility: CLINIC | Age: 46
End: 2021-05-14
Payer: MEDICARE

## 2021-05-14 VITALS
DIASTOLIC BLOOD PRESSURE: 56 MMHG | BODY MASS INDEX: 38.5 KG/M2 | WEIGHT: 259.94 LBS | HEART RATE: 54 BPM | SYSTOLIC BLOOD PRESSURE: 101 MMHG | HEIGHT: 69 IN

## 2021-05-14 DIAGNOSIS — I50.42 CHRONIC COMBINED SYSTOLIC AND DIASTOLIC CONGESTIVE HEART FAILURE: ICD-10-CM

## 2021-05-14 DIAGNOSIS — I42.0 NONISCHEMIC DILATED CARDIOMYOPATHY: Chronic | ICD-10-CM

## 2021-05-14 DIAGNOSIS — I50.42 CHRONIC COMBINED SYSTOLIC AND DIASTOLIC CONGESTIVE HEART FAILURE: Primary | ICD-10-CM

## 2021-05-14 DIAGNOSIS — Z95.810 ICD (IMPLANTABLE CARDIOVERTER-DEFIBRILLATOR) IN PLACE: ICD-10-CM

## 2021-05-14 LAB
ALBUMIN SERPL BCP-MCNC: 3.4 G/DL (ref 3.5–5.2)
ALP SERPL-CCNC: 35 U/L (ref 55–135)
ALT SERPL W/O P-5'-P-CCNC: 10 U/L (ref 10–44)
ANION GAP SERPL CALC-SCNC: 4 MMOL/L (ref 8–16)
AST SERPL-CCNC: 12 U/L (ref 10–40)
BILIRUB SERPL-MCNC: 1 MG/DL (ref 0.1–1)
BUN SERPL-MCNC: 14 MG/DL (ref 6–20)
CALCIUM SERPL-MCNC: 9.1 MG/DL (ref 8.7–10.5)
CHLORIDE SERPL-SCNC: 109 MMOL/L (ref 95–110)
CO2 SERPL-SCNC: 26 MMOL/L (ref 23–29)
CREAT SERPL-MCNC: 1.1 MG/DL (ref 0.5–1.4)
EST. GFR  (AFRICAN AMERICAN): >60 ML/MIN/1.73 M^2
EST. GFR  (NON AFRICAN AMERICAN): >60 ML/MIN/1.73 M^2
GLUCOSE SERPL-MCNC: 67 MG/DL (ref 70–110)
POTASSIUM SERPL-SCNC: 4.4 MMOL/L (ref 3.5–5.1)
PROT SERPL-MCNC: 7 G/DL (ref 6–8.4)
SODIUM SERPL-SCNC: 139 MMOL/L (ref 136–145)

## 2021-05-14 PROCEDURE — 99499 RISK ADDL DX/OHS AUDIT: ICD-10-PCS | Mod: S$GLB,,, | Performed by: INTERNAL MEDICINE

## 2021-05-14 PROCEDURE — 99999 PR PBB SHADOW E&M-EST. PATIENT-LVL V: ICD-10-PCS | Mod: PBBFAC,,, | Performed by: INTERNAL MEDICINE

## 2021-05-14 PROCEDURE — 80053 COMPREHEN METABOLIC PANEL: CPT | Performed by: INTERNAL MEDICINE

## 2021-05-14 PROCEDURE — 1126F PR PAIN SEVERITY QUANTIFIED, NO PAIN PRESENT: ICD-10-PCS | Mod: S$GLB,,, | Performed by: INTERNAL MEDICINE

## 2021-05-14 PROCEDURE — 3008F BODY MASS INDEX DOCD: CPT | Mod: CPTII,S$GLB,, | Performed by: INTERNAL MEDICINE

## 2021-05-14 PROCEDURE — 99999 PR PBB SHADOW E&M-EST. PATIENT-LVL V: CPT | Mod: PBBFAC,,, | Performed by: INTERNAL MEDICINE

## 2021-05-14 PROCEDURE — 99499 UNLISTED E&M SERVICE: CPT | Mod: S$GLB,,, | Performed by: INTERNAL MEDICINE

## 2021-05-14 PROCEDURE — 99214 OFFICE O/P EST MOD 30 MIN: CPT | Mod: S$GLB,,, | Performed by: INTERNAL MEDICINE

## 2021-05-14 PROCEDURE — 1126F AMNT PAIN NOTED NONE PRSNT: CPT | Mod: S$GLB,,, | Performed by: INTERNAL MEDICINE

## 2021-05-14 PROCEDURE — 36415 COLL VENOUS BLD VENIPUNCTURE: CPT | Performed by: INTERNAL MEDICINE

## 2021-05-14 PROCEDURE — 3008F PR BODY MASS INDEX (BMI) DOCUMENTED: ICD-10-PCS | Mod: CPTII,S$GLB,, | Performed by: INTERNAL MEDICINE

## 2021-05-14 PROCEDURE — 99214 PR OFFICE/OUTPT VISIT, EST, LEVL IV, 30-39 MIN: ICD-10-PCS | Mod: S$GLB,,, | Performed by: INTERNAL MEDICINE

## 2021-05-14 RX ORDER — DAPAGLIFLOZIN 10 MG/1
10 TABLET, FILM COATED ORAL DAILY
Qty: 90 TABLET | Refills: 3 | Status: SHIPPED | OUTPATIENT
Start: 2021-05-14 | End: 2023-08-31

## 2021-05-16 ENCOUNTER — PATIENT MESSAGE (OUTPATIENT)
Dept: OBSTETRICS AND GYNECOLOGY | Facility: CLINIC | Age: 46
End: 2021-05-16

## 2021-05-21 ENCOUNTER — DOCUMENTATION ONLY (OUTPATIENT)
Dept: TRANSPLANT | Facility: CLINIC | Age: 46
End: 2021-05-21

## 2021-05-21 ENCOUNTER — LAB VISIT (OUTPATIENT)
Dept: LAB | Facility: HOSPITAL | Age: 46
End: 2021-05-21
Attending: INTERNAL MEDICINE
Payer: MEDICARE

## 2021-05-21 DIAGNOSIS — I50.42 CHRONIC COMBINED SYSTOLIC AND DIASTOLIC CONGESTIVE HEART FAILURE: ICD-10-CM

## 2021-05-21 LAB
ANION GAP SERPL CALC-SCNC: 5 MMOL/L (ref 8–16)
CALCIUM SERPL-MCNC: 8.8 MG/DL (ref 8.7–10.5)
CHLORIDE SERPL-SCNC: 109 MMOL/L (ref 95–110)
CO2 SERPL-SCNC: 25 MMOL/L (ref 23–29)
CREAT SERPL-MCNC: 1.17 MG/DL (ref 0.5–1.4)
EST. GFR  (AFRICAN AMERICAN): >60 ML/MIN/1.73 M^2
EST. GFR  (NON AFRICAN AMERICAN): 57.2 ML/MIN/1.73 M^2
GLUCOSE SERPL-MCNC: 93 MG/DL (ref 70–110)
POTASSIUM SERPL-SCNC: 4.4 MMOL/L (ref 3.5–5.1)
SODIUM SERPL-SCNC: 139 MMOL/L (ref 136–145)
UUN UR-MCNC: 22 MG/DL (ref 7–17)

## 2021-05-21 PROCEDURE — 36415 COLL VENOUS BLD VENIPUNCTURE: CPT | Mod: PO | Performed by: INTERNAL MEDICINE

## 2021-05-21 PROCEDURE — 80048 BASIC METABOLIC PNL TOTAL CA: CPT | Mod: PO | Performed by: INTERNAL MEDICINE

## 2021-05-24 DIAGNOSIS — I50.42 CHRONIC COMBINED SYSTOLIC AND DIASTOLIC CONGESTIVE HEART FAILURE: ICD-10-CM

## 2021-05-24 DIAGNOSIS — I50.22 NYHA CLASS 2 AND ACC/AHA STAGE C CHRONIC SYSTOLIC CONGESTIVE HEART FAILURE: ICD-10-CM

## 2021-05-24 RX ORDER — CARVEDILOL 25 MG/1
25 TABLET ORAL 2 TIMES DAILY
Qty: 180 TABLET | Refills: 3 | Status: SHIPPED | OUTPATIENT
Start: 2021-05-24 | End: 2022-10-27

## 2021-05-24 RX ORDER — SACUBITRIL AND VALSARTAN 97; 103 MG/1; MG/1
1 TABLET, FILM COATED ORAL 2 TIMES DAILY
Qty: 60 TABLET | Refills: 11 | Status: SHIPPED | OUTPATIENT
Start: 2021-05-24 | End: 2022-05-26 | Stop reason: SDUPTHER

## 2021-06-16 ENCOUNTER — TELEPHONE (OUTPATIENT)
Dept: FAMILY MEDICINE | Facility: CLINIC | Age: 46
End: 2021-06-16

## 2021-06-18 ENCOUNTER — OFFICE VISIT (OUTPATIENT)
Dept: FAMILY MEDICINE | Facility: CLINIC | Age: 46
End: 2021-06-18
Payer: MEDICARE

## 2021-06-18 VITALS
WEIGHT: 255.5 LBS | HEIGHT: 69 IN | DIASTOLIC BLOOD PRESSURE: 78 MMHG | BODY MASS INDEX: 37.84 KG/M2 | OXYGEN SATURATION: 95 % | SYSTOLIC BLOOD PRESSURE: 118 MMHG | TEMPERATURE: 97 F | HEART RATE: 75 BPM

## 2021-06-18 DIAGNOSIS — I50.9 CHRONIC CONGESTIVE HEART FAILURE, UNSPECIFIED HEART FAILURE TYPE: Primary | ICD-10-CM

## 2021-06-18 DIAGNOSIS — Z95.810 ICD (IMPLANTABLE CARDIOVERTER-DEFIBRILLATOR) IN PLACE: ICD-10-CM

## 2021-06-18 DIAGNOSIS — Z76.82 ORGAN TRANSPLANT CANDIDATE: ICD-10-CM

## 2021-06-18 PROCEDURE — 1126F AMNT PAIN NOTED NONE PRSNT: CPT | Mod: S$GLB,,, | Performed by: FAMILY MEDICINE

## 2021-06-18 PROCEDURE — 3008F BODY MASS INDEX DOCD: CPT | Mod: CPTII,S$GLB,, | Performed by: FAMILY MEDICINE

## 2021-06-18 PROCEDURE — 99213 OFFICE O/P EST LOW 20 MIN: CPT | Mod: S$GLB,,, | Performed by: FAMILY MEDICINE

## 2021-06-18 PROCEDURE — 1126F PR PAIN SEVERITY QUANTIFIED, NO PAIN PRESENT: ICD-10-PCS | Mod: S$GLB,,, | Performed by: FAMILY MEDICINE

## 2021-06-18 PROCEDURE — 3008F PR BODY MASS INDEX (BMI) DOCUMENTED: ICD-10-PCS | Mod: CPTII,S$GLB,, | Performed by: FAMILY MEDICINE

## 2021-06-18 PROCEDURE — 99213 PR OFFICE/OUTPT VISIT, EST, LEVL III, 20-29 MIN: ICD-10-PCS | Mod: S$GLB,,, | Performed by: FAMILY MEDICINE

## 2021-06-18 RX ORDER — FUROSEMIDE 40 MG/1
40 TABLET ORAL DAILY
Qty: 30 TABLET | Refills: 3 | Status: SHIPPED | OUTPATIENT
Start: 2021-06-18 | End: 2021-10-12

## 2021-07-07 ENCOUNTER — OFFICE VISIT (OUTPATIENT)
Dept: FAMILY MEDICINE | Facility: CLINIC | Age: 46
End: 2021-07-07
Payer: MEDICARE

## 2021-07-07 VITALS
DIASTOLIC BLOOD PRESSURE: 78 MMHG | TEMPERATURE: 98 F | HEART RATE: 67 BPM | BODY MASS INDEX: 37.96 KG/M2 | HEIGHT: 69 IN | WEIGHT: 256.31 LBS | SYSTOLIC BLOOD PRESSURE: 118 MMHG | OXYGEN SATURATION: 97 %

## 2021-07-07 DIAGNOSIS — I50.42 CHRONIC COMBINED SYSTOLIC AND DIASTOLIC CONGESTIVE HEART FAILURE: ICD-10-CM

## 2021-07-07 DIAGNOSIS — Z79.899 ON AMIODARONE THERAPY: ICD-10-CM

## 2021-07-07 DIAGNOSIS — I47.29 POLYMORPHIC VENTRICULAR TACHYCARDIA: ICD-10-CM

## 2021-07-07 DIAGNOSIS — Z95.810 ICD (IMPLANTABLE CARDIOVERTER-DEFIBRILLATOR) IN PLACE: ICD-10-CM

## 2021-07-07 DIAGNOSIS — I42.0 NONISCHEMIC DILATED CARDIOMYOPATHY: Chronic | ICD-10-CM

## 2021-07-07 DIAGNOSIS — E66.01 MORBID OBESITY: Primary | ICD-10-CM

## 2021-07-07 PROCEDURE — 1126F PR PAIN SEVERITY QUANTIFIED, NO PAIN PRESENT: ICD-10-PCS | Mod: S$GLB,,, | Performed by: STUDENT IN AN ORGANIZED HEALTH CARE EDUCATION/TRAINING PROGRAM

## 2021-07-07 PROCEDURE — 99213 PR OFFICE/OUTPT VISIT, EST, LEVL III, 20-29 MIN: ICD-10-PCS | Mod: S$GLB,,, | Performed by: STUDENT IN AN ORGANIZED HEALTH CARE EDUCATION/TRAINING PROGRAM

## 2021-07-07 PROCEDURE — 3008F PR BODY MASS INDEX (BMI) DOCUMENTED: ICD-10-PCS | Mod: CPTII,S$GLB,, | Performed by: STUDENT IN AN ORGANIZED HEALTH CARE EDUCATION/TRAINING PROGRAM

## 2021-07-07 PROCEDURE — 3008F BODY MASS INDEX DOCD: CPT | Mod: CPTII,S$GLB,, | Performed by: STUDENT IN AN ORGANIZED HEALTH CARE EDUCATION/TRAINING PROGRAM

## 2021-07-07 PROCEDURE — 99213 OFFICE O/P EST LOW 20 MIN: CPT | Mod: S$GLB,,, | Performed by: STUDENT IN AN ORGANIZED HEALTH CARE EDUCATION/TRAINING PROGRAM

## 2021-07-07 PROCEDURE — 1126F AMNT PAIN NOTED NONE PRSNT: CPT | Mod: S$GLB,,, | Performed by: STUDENT IN AN ORGANIZED HEALTH CARE EDUCATION/TRAINING PROGRAM

## 2021-07-07 RX ORDER — AMIODARONE HYDROCHLORIDE 200 MG/1
200 TABLET ORAL DAILY
Qty: 90 TABLET | Refills: 3 | Status: SHIPPED | OUTPATIENT
Start: 2021-07-07 | End: 2022-09-12

## 2021-07-29 ENCOUNTER — PATIENT MESSAGE (OUTPATIENT)
Dept: TRANSPLANT | Facility: CLINIC | Age: 46
End: 2021-07-29

## 2021-07-29 DIAGNOSIS — I50.42 CHRONIC COMBINED SYSTOLIC AND DIASTOLIC CHF (CONGESTIVE HEART FAILURE): Primary | ICD-10-CM

## 2021-07-30 ENCOUNTER — LAB VISIT (OUTPATIENT)
Dept: LAB | Facility: HOSPITAL | Age: 46
End: 2021-07-30
Attending: INTERNAL MEDICINE
Payer: MEDICARE

## 2021-07-30 ENCOUNTER — OFFICE VISIT (OUTPATIENT)
Dept: TRANSPLANT | Facility: CLINIC | Age: 46
End: 2021-07-30
Payer: MEDICARE

## 2021-07-30 ENCOUNTER — RESEARCH ENCOUNTER (OUTPATIENT)
Dept: RESEARCH | Facility: HOSPITAL | Age: 46
End: 2021-07-30

## 2021-07-30 VITALS
SYSTOLIC BLOOD PRESSURE: 125 MMHG | WEIGHT: 255.75 LBS | HEIGHT: 69 IN | HEART RATE: 63 BPM | DIASTOLIC BLOOD PRESSURE: 86 MMHG | BODY MASS INDEX: 37.88 KG/M2

## 2021-07-30 DIAGNOSIS — I50.42 CHRONIC COMBINED SYSTOLIC AND DIASTOLIC CONGESTIVE HEART FAILURE: Primary | ICD-10-CM

## 2021-07-30 DIAGNOSIS — I50.42 CHRONIC COMBINED SYSTOLIC AND DIASTOLIC CHF (CONGESTIVE HEART FAILURE): ICD-10-CM

## 2021-07-30 DIAGNOSIS — I34.0 NON-RHEUMATIC MITRAL REGURGITATION: Chronic | ICD-10-CM

## 2021-07-30 DIAGNOSIS — I42.0 NONISCHEMIC DILATED CARDIOMYOPATHY: Chronic | ICD-10-CM

## 2021-07-30 DIAGNOSIS — I50.42 CHRONIC COMBINED SYSTOLIC AND DIASTOLIC CONGESTIVE HEART FAILURE: ICD-10-CM

## 2021-07-30 PROBLEM — I50.43 ACUTE ON CHRONIC COMBINED SYSTOLIC AND DIASTOLIC CONGESTIVE HEART FAILURE: Status: RESOLVED | Noted: 2021-04-16 | Resolved: 2021-07-30

## 2021-07-30 LAB
ANION GAP SERPL CALC-SCNC: 5 MMOL/L (ref 8–16)
BNP SERPL-MCNC: 407 PG/ML (ref 0–99)
BUN SERPL-MCNC: 11 MG/DL (ref 6–20)
CALCIUM SERPL-MCNC: 9.2 MG/DL (ref 8.7–10.5)
CHLORIDE SERPL-SCNC: 108 MMOL/L (ref 95–110)
CO2 SERPL-SCNC: 28 MMOL/L (ref 23–29)
CREAT SERPL-MCNC: 0.9 MG/DL (ref 0.5–1.4)
EST. GFR  (AFRICAN AMERICAN): >60 ML/MIN/1.73 M^2
EST. GFR  (NON AFRICAN AMERICAN): >60 ML/MIN/1.73 M^2
GLUCOSE SERPL-MCNC: 93 MG/DL (ref 70–110)
POTASSIUM SERPL-SCNC: 3.4 MMOL/L (ref 3.5–5.1)
SODIUM SERPL-SCNC: 141 MMOL/L (ref 136–145)

## 2021-07-30 PROCEDURE — 1159F MED LIST DOCD IN RCRD: CPT | Mod: CPTII,S$GLB,, | Performed by: INTERNAL MEDICINE

## 2021-07-30 PROCEDURE — 3008F BODY MASS INDEX DOCD: CPT | Mod: CPTII,S$GLB,, | Performed by: INTERNAL MEDICINE

## 2021-07-30 PROCEDURE — 3079F DIAST BP 80-89 MM HG: CPT | Mod: CPTII,S$GLB,, | Performed by: INTERNAL MEDICINE

## 2021-07-30 PROCEDURE — 36415 COLL VENOUS BLD VENIPUNCTURE: CPT | Performed by: INTERNAL MEDICINE

## 2021-07-30 PROCEDURE — 3074F SYST BP LT 130 MM HG: CPT | Mod: CPTII,S$GLB,, | Performed by: INTERNAL MEDICINE

## 2021-07-30 PROCEDURE — 99999 PR PBB SHADOW E&M-EST. PATIENT-LVL IV: CPT | Mod: PBBFAC,,, | Performed by: INTERNAL MEDICINE

## 2021-07-30 PROCEDURE — 1126F PR PAIN SEVERITY QUANTIFIED, NO PAIN PRESENT: ICD-10-PCS | Mod: CPTII,S$GLB,, | Performed by: INTERNAL MEDICINE

## 2021-07-30 PROCEDURE — 1159F PR MEDICATION LIST DOCUMENTED IN MEDICAL RECORD: ICD-10-PCS | Mod: CPTII,S$GLB,, | Performed by: INTERNAL MEDICINE

## 2021-07-30 PROCEDURE — 83880 ASSAY OF NATRIURETIC PEPTIDE: CPT | Performed by: INTERNAL MEDICINE

## 2021-07-30 PROCEDURE — 80048 BASIC METABOLIC PNL TOTAL CA: CPT | Performed by: INTERNAL MEDICINE

## 2021-07-30 PROCEDURE — 3008F PR BODY MASS INDEX (BMI) DOCUMENTED: ICD-10-PCS | Mod: CPTII,S$GLB,, | Performed by: INTERNAL MEDICINE

## 2021-07-30 PROCEDURE — 99999 PR PBB SHADOW E&M-EST. PATIENT-LVL IV: ICD-10-PCS | Mod: PBBFAC,,, | Performed by: INTERNAL MEDICINE

## 2021-07-30 PROCEDURE — 99214 PR OFFICE/OUTPT VISIT, EST, LEVL IV, 30-39 MIN: ICD-10-PCS | Mod: S$GLB,,, | Performed by: INTERNAL MEDICINE

## 2021-07-30 PROCEDURE — 3079F PR MOST RECENT DIASTOLIC BLOOD PRESSURE 80-89 MM HG: ICD-10-PCS | Mod: CPTII,S$GLB,, | Performed by: INTERNAL MEDICINE

## 2021-07-30 PROCEDURE — 3074F PR MOST RECENT SYSTOLIC BLOOD PRESSURE < 130 MM HG: ICD-10-PCS | Mod: CPTII,S$GLB,, | Performed by: INTERNAL MEDICINE

## 2021-07-30 PROCEDURE — 1126F AMNT PAIN NOTED NONE PRSNT: CPT | Mod: CPTII,S$GLB,, | Performed by: INTERNAL MEDICINE

## 2021-07-30 PROCEDURE — 99214 OFFICE O/P EST MOD 30 MIN: CPT | Mod: S$GLB,,, | Performed by: INTERNAL MEDICINE

## 2021-08-04 ENCOUNTER — CLINICAL SUPPORT (OUTPATIENT)
Dept: CARDIOLOGY | Facility: HOSPITAL | Age: 46
End: 2021-08-04
Payer: MEDICARE

## 2021-08-04 DIAGNOSIS — I42.9 CARDIOMYOPATHY, UNSPECIFIED: ICD-10-CM

## 2021-08-04 DIAGNOSIS — Z95.810 PRESENCE OF AUTOMATIC (IMPLANTABLE) CARDIAC DEFIBRILLATOR: ICD-10-CM

## 2021-08-04 PROCEDURE — 93295 DEV INTERROG REMOTE 1/2/MLT: CPT | Mod: ,,, | Performed by: INTERNAL MEDICINE

## 2021-08-04 PROCEDURE — 93296 REM INTERROG EVL PM/IDS: CPT | Performed by: INTERNAL MEDICINE

## 2021-08-04 PROCEDURE — 93295 CARDIAC DEVICE CHECK - REMOTE: ICD-10-PCS | Mod: ,,, | Performed by: INTERNAL MEDICINE

## 2021-08-09 ENCOUNTER — DOCUMENTATION ONLY (OUTPATIENT)
Dept: TRANSPLANT | Facility: CLINIC | Age: 46
End: 2021-08-09

## 2021-08-12 ENCOUNTER — OFFICE VISIT (OUTPATIENT)
Dept: FAMILY MEDICINE | Facility: CLINIC | Age: 46
End: 2021-08-12
Payer: MEDICARE

## 2021-08-12 VITALS
SYSTOLIC BLOOD PRESSURE: 118 MMHG | DIASTOLIC BLOOD PRESSURE: 78 MMHG | OXYGEN SATURATION: 95 % | HEIGHT: 69 IN | BODY MASS INDEX: 37.13 KG/M2 | WEIGHT: 250.69 LBS | HEART RATE: 74 BPM | TEMPERATURE: 98 F

## 2021-08-12 DIAGNOSIS — M79.642 LEFT HAND PAIN: ICD-10-CM

## 2021-08-12 DIAGNOSIS — I42.0 NONISCHEMIC DILATED CARDIOMYOPATHY: ICD-10-CM

## 2021-08-12 DIAGNOSIS — G89.29 CHRONIC PAIN OF LEFT KNEE: Primary | ICD-10-CM

## 2021-08-12 DIAGNOSIS — M25.562 CHRONIC PAIN OF LEFT KNEE: Primary | ICD-10-CM

## 2021-08-12 PROCEDURE — 1159F MED LIST DOCD IN RCRD: CPT | Mod: CPTII,S$GLB,, | Performed by: STUDENT IN AN ORGANIZED HEALTH CARE EDUCATION/TRAINING PROGRAM

## 2021-08-12 PROCEDURE — 1159F PR MEDICATION LIST DOCUMENTED IN MEDICAL RECORD: ICD-10-PCS | Mod: CPTII,S$GLB,, | Performed by: STUDENT IN AN ORGANIZED HEALTH CARE EDUCATION/TRAINING PROGRAM

## 2021-08-12 PROCEDURE — 3078F PR MOST RECENT DIASTOLIC BLOOD PRESSURE < 80 MM HG: ICD-10-PCS | Mod: CPTII,S$GLB,, | Performed by: STUDENT IN AN ORGANIZED HEALTH CARE EDUCATION/TRAINING PROGRAM

## 2021-08-12 PROCEDURE — 3078F DIAST BP <80 MM HG: CPT | Mod: CPTII,S$GLB,, | Performed by: STUDENT IN AN ORGANIZED HEALTH CARE EDUCATION/TRAINING PROGRAM

## 2021-08-12 PROCEDURE — 1125F AMNT PAIN NOTED PAIN PRSNT: CPT | Mod: CPTII,S$GLB,, | Performed by: STUDENT IN AN ORGANIZED HEALTH CARE EDUCATION/TRAINING PROGRAM

## 2021-08-12 PROCEDURE — 1160F RVW MEDS BY RX/DR IN RCRD: CPT | Mod: CPTII,S$GLB,, | Performed by: STUDENT IN AN ORGANIZED HEALTH CARE EDUCATION/TRAINING PROGRAM

## 2021-08-12 PROCEDURE — 1160F PR REVIEW ALL MEDS BY PRESCRIBER/CLIN PHARMACIST DOCUMENTED: ICD-10-PCS | Mod: CPTII,S$GLB,, | Performed by: STUDENT IN AN ORGANIZED HEALTH CARE EDUCATION/TRAINING PROGRAM

## 2021-08-12 PROCEDURE — 99214 OFFICE O/P EST MOD 30 MIN: CPT | Mod: S$GLB,,, | Performed by: STUDENT IN AN ORGANIZED HEALTH CARE EDUCATION/TRAINING PROGRAM

## 2021-08-12 PROCEDURE — 3074F PR MOST RECENT SYSTOLIC BLOOD PRESSURE < 130 MM HG: ICD-10-PCS | Mod: CPTII,S$GLB,, | Performed by: STUDENT IN AN ORGANIZED HEALTH CARE EDUCATION/TRAINING PROGRAM

## 2021-08-12 PROCEDURE — 3008F PR BODY MASS INDEX (BMI) DOCUMENTED: ICD-10-PCS | Mod: CPTII,S$GLB,, | Performed by: STUDENT IN AN ORGANIZED HEALTH CARE EDUCATION/TRAINING PROGRAM

## 2021-08-12 PROCEDURE — 3008F BODY MASS INDEX DOCD: CPT | Mod: CPTII,S$GLB,, | Performed by: STUDENT IN AN ORGANIZED HEALTH CARE EDUCATION/TRAINING PROGRAM

## 2021-08-12 PROCEDURE — 99214 PR OFFICE/OUTPT VISIT, EST, LEVL IV, 30-39 MIN: ICD-10-PCS | Mod: S$GLB,,, | Performed by: STUDENT IN AN ORGANIZED HEALTH CARE EDUCATION/TRAINING PROGRAM

## 2021-08-12 PROCEDURE — 1125F PR PAIN SEVERITY QUANTIFIED, PAIN PRESENT: ICD-10-PCS | Mod: CPTII,S$GLB,, | Performed by: STUDENT IN AN ORGANIZED HEALTH CARE EDUCATION/TRAINING PROGRAM

## 2021-08-12 PROCEDURE — 3074F SYST BP LT 130 MM HG: CPT | Mod: CPTII,S$GLB,, | Performed by: STUDENT IN AN ORGANIZED HEALTH CARE EDUCATION/TRAINING PROGRAM

## 2021-08-12 RX ORDER — TIMOLOL MALEATE 5 MG/ML
SOLUTION/ DROPS OPHTHALMIC
COMMUNITY
Start: 2021-05-24

## 2021-08-19 NOTE — CONSULTS
[facility]  Cardiology  Consult Note    Patient Name: Mario Mahajan  MRN: [id number]  Admission Date: 3/26/2019  Hospital Length of Stay: 0 days  Code Status: Full Code   Attending Provider: Victorino Rose MD   Consulting Provider: DUNIA Dey ANP  Primary Care Physician: Zuly Keys MD  Principal Problem:<principal problem not specified>    Patient information was obtained from past medical records.     Inpatient consult to [facility]  Consult performed by: DUNIA Allen ANP  Consult ordered by: Victorino Rose MD  Reason for consult: NICM and VT management         Subjective:     Chief Complaint:  Elective CLARISSA      HPI:   44yo female with history of NICM EF 20% s/p AICD, polymorphic VT on Amiodarone, MR & TR who was admitted for elective CLARISSA. Ms. Mahajan is followed by [facility]S since 2015 due to PPCM with EF 15-20%. She underwent AICD placement and has had episodes of VT in 2017 with no recurrence since then. She was seen in the recovery room after her surgery and after receiving IV pain medications. She is lethargic but arousable but is unable to fully answer questions therefore limited HPI was performed. Her HR and BP were stable. She was lying flat with no visible SOB and no JVD or rales were noted on exam. Cardiology was consulted due to her extensive cardiac history. Her labs from 3/21 were reviewed with Hgb 10.6 Hct 34.2 and BMP with K+ 4.0 BUN 14 creatinine .98. Her last echo on 3/22/2019 showed severely depressed LV function with EF 20%, severe LV enlargement, grade II diastolic dysfunction, elevated LAP, severe LAE, severe MR, mild to moderate TR, severe SAWYER and PA pressure 42mmHg. She was seen by Dr. Hatch on 3/22/2019 for clearance prior to her CLARISSA with recommendation to continue her current dose of Entresto and Amiodarone with plans to up titrate her Entresto after her CLARISSA was completed     Hospital Course:    Per HPI     Past Medical  History:   Diagnosis Date    Asthma     Chronic back pain 2014    Chronic combined systolic and diastolic congestive heart failure 2015     2-10-17   1 - Severely depressed left ventricular systolic function (EF 20-25%).    2 - Severe left ventricular enlargement.    3 - Severe left atrial enlargement.    4 - Left ventricular diastolic dysfunction.    5 - The estimated PA systolic pressure is 18 mmHg.    6 - Mild mitral regurgitation.     Encounter for blood transfusion     ICD (implantable cardioverter-defibrillator) in place 12/01/15 3/3/2016    Menorrhagia, premenopausal 2/10/2017    Microcytic anemia 2015    Non-rheumatic mitral regurgitation 3/5/2015    Nonischemic dilated cardiomyopathy 2015    Sleep apnea     Syncope and collapse 2017    Ventricular tachycardia, polymorphic        Past Surgical History:   Procedure Laterality Date    CARDIAC DEFIBRILLATOR PLACEMENT  2015     SECTION      HEART CATH-RIGHT Right 3/26/2018    Performed by Jeim[facility]rivastava [facility] at [facility] CATH LAB    INSERTION-ICD-SINGLE N/A 2015    Performed by [facility]ino Tay [facility] at [facility] CATH LAB    REVISION-LEAD-ICD N/A 2016    Performed by [facility] at [facility] CATH LAB    TUBAL LIGATION         Review of patient's allergies indicates:   Allergen Reactions    Ace inhibitors      Cough       No current facility-administered medications on file prior to encounter.      Current Outpatient Medications on File Prior to Encounter   Medication Sig    amiodarone (PACERONE) 200 MG Tab Take 1 tablet (200 mg total) by mouth once daily.    ascorbic acid, vitamin C, (VITAMIN C) 250 MG tablet Take 1 tablet (250 mg) by mouth twice daily. Take with the iron tablets. (Patient taking differently: once daily. )    carvedilol (COREG) 6.25 MG tablet Take 1 tablet (6.25 mg total) by mouth 2 (two) times daily.    ENTRESTO 49-51 mg per tablet TK 1 T PO BID    ferrous sulfate 325 (65 FE) MG EC  tablet Take 1 tablet (325 mg total) by mouth 2 (two) times daily. Take with vitamin C. (Patient taking differently: 325 mg once daily. Take 1 tablet (325 mg total) by mouth 2 (two) times daily. Take with vitamin C.)    FLOVENT  mcg/actuation inhaler INL 2 PFS PO TWICE DAILY. RM AFTER U    furosemide (LASIX) 40 MG tablet Take 1 tablet (40 mg total) by mouth daily as needed (Leg swelling or weight gain).    ibuprofen (ADVIL,MOTRIN) 600 MG tablet TK 1 T PO Q 6 TO 8 H WITH FOOD PRN FOR PAIN OR FEVER    latanoprost 0.005 % ophthalmic solution INT 1 GTT INTO OU QHS    loratadine (CLARITIN) 10 mg tablet TK 1 T PO QD    sacubitril-valsartan (ENTRESTO)  mg per tablet Take 1 tablet by mouth 2 (two) times daily.    spironolactone (ALDACTONE) 25 MG tablet Take 1 tablet (25 mg total) by mouth once daily.    topiramate (TOPAMAX) 50 MG tablet     albuterol 90 mcg/actuation inhaler Inhale 1-2 puffs into the lungs every 6 (six) hours as needed for Wheezing. Rescue     Family History     Problem Relation (Age of Onset)    Diabetes Father    Heart failure Father, Brother    Lung cancer Paternal Grandmother    Pancreatic cancer Father        Tobacco Use    Smoking status: Never Smoker    Smokeless tobacco: Never Used   Substance and Sexual Activity    Alcohol use: Yes     Comment: 1 glass per 3 months    Drug use: No    Sexual activity: Yes     Partners: Male     Comment: one male partner (boyfriend)     Review of Systems   Unable to perform ROS: other     Objective:     Vital Signs (Most Recent):  Temp: 97.6 °F (36.4 °C) (03/26/19 1430)  Pulse: (!) 54 (03/26/19 1505)  Resp: 13 (03/26/19 1505)  BP: 125/66 (03/26/19 1505)  SpO2: 100 % (03/26/19 1505) Vital Signs (24h Range):  Temp:  [97.6 °F (36.4 °C)-97.9 °F (36.6 °C)] 97.6 °F (36.4 °C)  Pulse:  [50-76] 54  Resp:  [12-18] 13  SpO2:  [98 %-100 %] 100 %  BP: (118-125)/(56-66) 125/66     Weight: 116.6 kg (257 lb)  Body mass index is 37.95 kg/m².    SpO2: 100  %  O2 Device (Oxygen Therapy): Simple Face Mask      Intake/Output Summary (Last 24 hours) at 3/26/2019 1531  Last data filed at 3/26/2019 1426  Gross per 24 hour   Intake 1200 ml   Output 260 ml   Net 940 ml       Lines/Drains/Airways     Drain                 Urethral Catheter 03/26/19 1245 Non-latex;Straight-tip 16 Fr. less than 1 day          Peripheral Intravenous Line                 Peripheral IV - Single Lumen 03/26/19 1020 Right Wrist less than 1 day                Physical Exam   Constitutional: She appears well-developed and well-nourished. No distress.   Cardiovascular: Normal rate and regular rhythm. Exam reveals no gallop.   No murmur heard.  Pulmonary/Chest: Effort normal and breath sounds normal. No respiratory distress. She has no wheezes.   Abdominal: Soft. Bowel sounds are normal. She exhibits no distension. There is no tenderness.   Neurological:   Lethargic yesterday arousable after pain medication    Skin: Skin is warm and dry.       Significant Labs:     CBC and BMP reviewed from 3/21/2019 with no acute abnormalities      Significant Imaging:     TTE 3/22/2019    · Severely decreased left ventricular systolic function. The estimated ejection fraction is 20%, 28% by quantitiative LVEF.  · Severe left ventricular enlargement.  · Eccentric left ventricular hypertrophy.  · Grade II (moderate) left ventricular diastolic dysfunction consistent with pseudonormalization.  · Elevated left atrial pressure.  · Severe left atrial enlargement.  · Severe mitral regurgitation.  · Mild to moderate tricuspid regurgitation.  · Severe right atrial enlargement.  · Pulmonary hypertension present.  · The estimated PA systolic pressure is 42 mm Hg  · Intermediate central venous pressure (8 mm Hg).  · Normal right ventricular systolic function.    Assessment and Plan:     Polymorphic ventricular tachycardia  -history of VT with AICD firing last episode in 2017  -AICD monitored via home device; last office  interrogation in 2018 with no evidence of NSVT; VT zone 160-200 monitor; VF zone 200-ATP and shock  -recommend continuation of oral Amiodarone at home dose  -follow up with EP/device clinic as recommended     Nonischemic dilated cardiomyopathy  -long standing history of NICM with EF 20% on most recent echo  -followed by [facility] HF/[facility]S with last office visit on 3/22/2018  -on good medication regimen with Entresto 49/51mg po BID, Coreg 6.25mg po BID, Aldactone 25mg po daily and Lasix 40mg po daily prn  -not uncommon to be on Lasix prn when Entresto used   -lethargic after pain medications; resting comfortably on PE with no acute respiratory distress  -recommend resumption of Entresto, Coreg and Aldactone when fully tolerating po diet  -follow up with [facility] HF/HTS in 2-3 weeks after discharge         VTE Risk Mitigation (From admission, onward)        Ordered     IP VTE HIGH RISK PATIENT  Once      03/26/19 1013     Place sequential compression device  Until discontinued      03/26/19 1013          Thank you for your consult. I will sign off. Please contact us if you have any additional questions.    DUNIA Dey, ANP  Cardiology   [facility]     No antibiotics or any antibiotics < 2 hrs prior to birth

## 2021-08-25 ENCOUNTER — OFFICE VISIT (OUTPATIENT)
Dept: FAMILY MEDICINE | Facility: CLINIC | Age: 46
End: 2021-08-25
Payer: MEDICARE

## 2021-08-25 VITALS
TEMPERATURE: 97 F | OXYGEN SATURATION: 98 % | DIASTOLIC BLOOD PRESSURE: 68 MMHG | BODY MASS INDEX: 37.52 KG/M2 | SYSTOLIC BLOOD PRESSURE: 112 MMHG | HEART RATE: 78 BPM | HEIGHT: 69 IN | WEIGHT: 253.31 LBS

## 2021-08-25 DIAGNOSIS — J30.1 SEASONAL ALLERGIC RHINITIS DUE TO POLLEN: Primary | ICD-10-CM

## 2021-08-25 DIAGNOSIS — J06.9 URI, ACUTE: ICD-10-CM

## 2021-08-25 PROCEDURE — 3078F PR MOST RECENT DIASTOLIC BLOOD PRESSURE < 80 MM HG: ICD-10-PCS | Mod: CPTII,S$GLB,, | Performed by: FAMILY MEDICINE

## 2021-08-25 PROCEDURE — 1126F PR PAIN SEVERITY QUANTIFIED, NO PAIN PRESENT: ICD-10-PCS | Mod: CPTII,S$GLB,, | Performed by: FAMILY MEDICINE

## 2021-08-25 PROCEDURE — 1160F PR REVIEW ALL MEDS BY PRESCRIBER/CLIN PHARMACIST DOCUMENTED: ICD-10-PCS | Mod: CPTII,S$GLB,, | Performed by: FAMILY MEDICINE

## 2021-08-25 PROCEDURE — 3074F SYST BP LT 130 MM HG: CPT | Mod: CPTII,S$GLB,, | Performed by: FAMILY MEDICINE

## 2021-08-25 PROCEDURE — 99213 PR OFFICE/OUTPT VISIT, EST, LEVL III, 20-29 MIN: ICD-10-PCS | Mod: S$GLB,,, | Performed by: FAMILY MEDICINE

## 2021-08-25 PROCEDURE — 1126F AMNT PAIN NOTED NONE PRSNT: CPT | Mod: CPTII,S$GLB,, | Performed by: FAMILY MEDICINE

## 2021-08-25 PROCEDURE — 3008F PR BODY MASS INDEX (BMI) DOCUMENTED: ICD-10-PCS | Mod: CPTII,S$GLB,, | Performed by: FAMILY MEDICINE

## 2021-08-25 PROCEDURE — 1159F MED LIST DOCD IN RCRD: CPT | Mod: CPTII,S$GLB,, | Performed by: FAMILY MEDICINE

## 2021-08-25 PROCEDURE — 1160F RVW MEDS BY RX/DR IN RCRD: CPT | Mod: CPTII,S$GLB,, | Performed by: FAMILY MEDICINE

## 2021-08-25 PROCEDURE — 3008F BODY MASS INDEX DOCD: CPT | Mod: CPTII,S$GLB,, | Performed by: FAMILY MEDICINE

## 2021-08-25 PROCEDURE — 3078F DIAST BP <80 MM HG: CPT | Mod: CPTII,S$GLB,, | Performed by: FAMILY MEDICINE

## 2021-08-25 PROCEDURE — 1159F PR MEDICATION LIST DOCUMENTED IN MEDICAL RECORD: ICD-10-PCS | Mod: CPTII,S$GLB,, | Performed by: FAMILY MEDICINE

## 2021-08-25 PROCEDURE — 3074F PR MOST RECENT SYSTOLIC BLOOD PRESSURE < 130 MM HG: ICD-10-PCS | Mod: CPTII,S$GLB,, | Performed by: FAMILY MEDICINE

## 2021-08-25 PROCEDURE — 99213 OFFICE O/P EST LOW 20 MIN: CPT | Mod: S$GLB,,, | Performed by: FAMILY MEDICINE

## 2021-08-25 RX ORDER — GUAIFENESIN 600 MG/1
600 TABLET, EXTENDED RELEASE ORAL 2 TIMES DAILY
Qty: 14 TABLET | Refills: 2 | Status: SHIPPED | OUTPATIENT
Start: 2021-08-25 | End: 2021-09-01

## 2021-08-25 RX ORDER — LORATADINE 10 MG/1
10 TABLET ORAL DAILY
Qty: 30 TABLET | Refills: 0 | Status: SHIPPED | OUTPATIENT
Start: 2021-08-25 | End: 2023-08-21

## 2021-09-20 ENCOUNTER — RESEARCH ENCOUNTER (OUTPATIENT)
Dept: RESEARCH | Facility: HOSPITAL | Age: 46
End: 2021-09-20

## 2021-10-04 DIAGNOSIS — Z12.31 SCREENING MAMMOGRAM FOR HIGH-RISK PATIENT: Primary | ICD-10-CM

## 2021-10-12 ENCOUNTER — OFFICE VISIT (OUTPATIENT)
Dept: TRANSPLANT | Facility: CLINIC | Age: 46
End: 2021-10-12
Payer: MEDICARE

## 2021-10-12 VITALS
HEART RATE: 77 BPM | HEIGHT: 69 IN | SYSTOLIC BLOOD PRESSURE: 117 MMHG | BODY MASS INDEX: 37.78 KG/M2 | WEIGHT: 255.06 LBS | DIASTOLIC BLOOD PRESSURE: 71 MMHG

## 2021-10-12 DIAGNOSIS — I50.42 CHRONIC COMBINED SYSTOLIC AND DIASTOLIC CONGESTIVE HEART FAILURE: ICD-10-CM

## 2021-10-12 DIAGNOSIS — I42.0 NONISCHEMIC DILATED CARDIOMYOPATHY: Primary | Chronic | ICD-10-CM

## 2021-10-12 DIAGNOSIS — I34.0 NON-RHEUMATIC MITRAL REGURGITATION: Chronic | ICD-10-CM

## 2021-10-12 DIAGNOSIS — Z95.810 ICD (IMPLANTABLE CARDIOVERTER-DEFIBRILLATOR) IN PLACE: Chronic | ICD-10-CM

## 2021-10-12 DIAGNOSIS — E66.9 OBESITY (BMI 35.0-39.9 WITHOUT COMORBIDITY): ICD-10-CM

## 2021-10-12 PROBLEM — E66.01 MORBID OBESITY: Status: RESOLVED | Noted: 2019-08-27 | Resolved: 2021-10-12

## 2021-10-12 PROCEDURE — 3074F PR MOST RECENT SYSTOLIC BLOOD PRESSURE < 130 MM HG: ICD-10-PCS | Mod: CPTII,S$GLB,, | Performed by: INTERNAL MEDICINE

## 2021-10-12 PROCEDURE — 4010F PR ACE/ARB THEARPY RXD/TAKEN: ICD-10-PCS | Mod: CPTII,S$GLB,, | Performed by: INTERNAL MEDICINE

## 2021-10-12 PROCEDURE — 3008F BODY MASS INDEX DOCD: CPT | Mod: CPTII,S$GLB,, | Performed by: INTERNAL MEDICINE

## 2021-10-12 PROCEDURE — 1160F RVW MEDS BY RX/DR IN RCRD: CPT | Mod: CPTII,S$GLB,, | Performed by: INTERNAL MEDICINE

## 2021-10-12 PROCEDURE — 3008F PR BODY MASS INDEX (BMI) DOCUMENTED: ICD-10-PCS | Mod: CPTII,S$GLB,, | Performed by: INTERNAL MEDICINE

## 2021-10-12 PROCEDURE — 1160F PR REVIEW ALL MEDS BY PRESCRIBER/CLIN PHARMACIST DOCUMENTED: ICD-10-PCS | Mod: CPTII,S$GLB,, | Performed by: INTERNAL MEDICINE

## 2021-10-12 PROCEDURE — 99214 PR OFFICE/OUTPT VISIT, EST, LEVL IV, 30-39 MIN: ICD-10-PCS | Mod: S$GLB,,, | Performed by: INTERNAL MEDICINE

## 2021-10-12 PROCEDURE — 4010F ACE/ARB THERAPY RXD/TAKEN: CPT | Mod: CPTII,S$GLB,, | Performed by: INTERNAL MEDICINE

## 2021-10-12 PROCEDURE — 3078F DIAST BP <80 MM HG: CPT | Mod: CPTII,S$GLB,, | Performed by: INTERNAL MEDICINE

## 2021-10-12 PROCEDURE — 1159F PR MEDICATION LIST DOCUMENTED IN MEDICAL RECORD: ICD-10-PCS | Mod: CPTII,S$GLB,, | Performed by: INTERNAL MEDICINE

## 2021-10-12 PROCEDURE — 99999 PR PBB SHADOW E&M-EST. PATIENT-LVL III: ICD-10-PCS | Mod: PBBFAC,,, | Performed by: INTERNAL MEDICINE

## 2021-10-12 PROCEDURE — 3078F PR MOST RECENT DIASTOLIC BLOOD PRESSURE < 80 MM HG: ICD-10-PCS | Mod: CPTII,S$GLB,, | Performed by: INTERNAL MEDICINE

## 2021-10-12 PROCEDURE — 1159F MED LIST DOCD IN RCRD: CPT | Mod: CPTII,S$GLB,, | Performed by: INTERNAL MEDICINE

## 2021-10-12 PROCEDURE — 99999 PR PBB SHADOW E&M-EST. PATIENT-LVL III: CPT | Mod: PBBFAC,,, | Performed by: INTERNAL MEDICINE

## 2021-10-12 PROCEDURE — 3074F SYST BP LT 130 MM HG: CPT | Mod: CPTII,S$GLB,, | Performed by: INTERNAL MEDICINE

## 2021-10-12 PROCEDURE — 99214 OFFICE O/P EST MOD 30 MIN: CPT | Mod: S$GLB,,, | Performed by: INTERNAL MEDICINE

## 2021-10-12 RX ORDER — FLUTICASONE PROPIONATE 44 UG/1
1 AEROSOL, METERED RESPIRATORY (INHALATION)
COMMUNITY
Start: 2021-09-14 | End: 2023-08-21

## 2021-10-12 RX ORDER — SPIRONOLACTONE 25 MG/1
25 TABLET ORAL DAILY
Qty: 30 TABLET | Refills: 11 | Status: SHIPPED | OUTPATIENT
Start: 2021-10-12 | End: 2022-06-09

## 2021-10-12 RX ORDER — FUROSEMIDE 40 MG/1
40 TABLET ORAL 2 TIMES DAILY
Qty: 30 TABLET | Refills: 3 | Status: ON HOLD | OUTPATIENT
Start: 2021-10-12 | End: 2021-10-22 | Stop reason: HOSPADM

## 2021-10-13 ENCOUNTER — LAB VISIT (OUTPATIENT)
Dept: LAB | Facility: HOSPITAL | Age: 46
End: 2021-10-13
Attending: INTERNAL MEDICINE
Payer: MEDICARE

## 2021-10-13 ENCOUNTER — RESEARCH ENCOUNTER (OUTPATIENT)
Dept: RESEARCH | Facility: HOSPITAL | Age: 46
End: 2021-10-13

## 2021-10-13 DIAGNOSIS — I50.42 CHRONIC COMBINED SYSTOLIC AND DIASTOLIC CONGESTIVE HEART FAILURE: ICD-10-CM

## 2021-10-13 LAB
ALBUMIN SERPL BCP-MCNC: 3.7 G/DL (ref 3.5–5.2)
ALP SERPL-CCNC: 36 U/L (ref 38–126)
ALT SERPL W/O P-5'-P-CCNC: 20 U/L (ref 10–44)
ANION GAP SERPL CALC-SCNC: 8 MMOL/L (ref 8–16)
AST SERPL-CCNC: 26 U/L (ref 15–46)
BILIRUB SERPL-MCNC: 1.7 MG/DL (ref 0.1–1)
CALCIUM SERPL-MCNC: 8.8 MG/DL (ref 8.7–10.5)
CHLORIDE SERPL-SCNC: 107 MMOL/L (ref 95–110)
CO2 SERPL-SCNC: 28 MMOL/L (ref 23–29)
CREAT SERPL-MCNC: 1.07 MG/DL (ref 0.5–1.4)
EST. GFR  (AFRICAN AMERICAN): >60 ML/MIN/1.73 M^2
EST. GFR  (NON AFRICAN AMERICAN): >60 ML/MIN/1.73 M^2
GLUCOSE SERPL-MCNC: 94 MG/DL (ref 70–110)
NT-PROBNP SERPL-MCNC: 720 PG/ML (ref 5–450)
POTASSIUM SERPL-SCNC: 3.6 MMOL/L (ref 3.5–5.1)
PROT SERPL-MCNC: 6.8 G/DL (ref 6–8.4)
SODIUM SERPL-SCNC: 143 MMOL/L (ref 136–145)
UUN UR-MCNC: 13 MG/DL (ref 7–17)

## 2021-10-13 PROCEDURE — 36415 COLL VENOUS BLD VENIPUNCTURE: CPT | Mod: PO | Performed by: INTERNAL MEDICINE

## 2021-10-13 PROCEDURE — 83880 ASSAY OF NATRIURETIC PEPTIDE: CPT | Mod: PO | Performed by: INTERNAL MEDICINE

## 2021-10-13 PROCEDURE — 80053 COMPREHEN METABOLIC PANEL: CPT | Mod: PO | Performed by: INTERNAL MEDICINE

## 2021-10-18 ENCOUNTER — PATIENT MESSAGE (OUTPATIENT)
Dept: TRANSPLANT | Facility: CLINIC | Age: 46
End: 2021-10-18
Payer: MEDICARE

## 2021-10-20 ENCOUNTER — TELEPHONE (OUTPATIENT)
Dept: ELECTROPHYSIOLOGY | Facility: CLINIC | Age: 46
End: 2021-10-20

## 2021-10-20 ENCOUNTER — RESEARCH ENCOUNTER (OUTPATIENT)
Dept: RESEARCH | Facility: HOSPITAL | Age: 46
End: 2021-10-20

## 2021-10-21 ENCOUNTER — RESEARCH ENCOUNTER (OUTPATIENT)
Dept: RESEARCH | Facility: HOSPITAL | Age: 46
End: 2021-10-21

## 2021-10-22 ENCOUNTER — RESEARCH ENCOUNTER (OUTPATIENT)
Dept: RESEARCH | Facility: HOSPITAL | Age: 46
End: 2021-10-22

## 2021-10-22 ENCOUNTER — HOSPITAL ENCOUNTER (OUTPATIENT)
Facility: HOSPITAL | Age: 46
Discharge: HOME | End: 2021-10-22
Attending: INTERNAL MEDICINE | Admitting: INTERNAL MEDICINE
Payer: MEDICARE

## 2021-10-22 VITALS
RESPIRATION RATE: 17 BRPM | OXYGEN SATURATION: 99 % | SYSTOLIC BLOOD PRESSURE: 115 MMHG | BODY MASS INDEX: 36.96 KG/M2 | HEART RATE: 77 BPM | DIASTOLIC BLOOD PRESSURE: 69 MMHG | TEMPERATURE: 98 F | HEIGHT: 69 IN | WEIGHT: 249.56 LBS

## 2021-10-22 DIAGNOSIS — I50.42 CHRONIC COMBINED SYSTOLIC AND DIASTOLIC HEART FAILURE: ICD-10-CM

## 2021-10-22 DIAGNOSIS — I50.42 CHRONIC COMBINED SYSTOLIC (CONGESTIVE) AND DIASTOLIC (CONGESTIVE) HEART FAILURE: ICD-10-CM

## 2021-10-22 DIAGNOSIS — I50.42 CHRONIC COMBINED SYSTOLIC AND DIASTOLIC CONGESTIVE HEART FAILURE: Primary | ICD-10-CM

## 2021-10-22 DIAGNOSIS — Z00.6 EXAMINATION OF PARTICIPANT IN CLINICAL TRIAL: ICD-10-CM

## 2021-10-22 LAB
ANION GAP SERPL CALC-SCNC: 6 MMOL/L (ref 8–16)
BASOPHILS # BLD AUTO: 0.02 K/UL (ref 0–0.2)
BASOPHILS NFR BLD: 0.4 % (ref 0–1.9)
BNP SERPL-MCNC: 429 PG/ML (ref 0–99)
BUN SERPL-MCNC: 17 MG/DL (ref 6–20)
CALCIUM SERPL-MCNC: 8.9 MG/DL (ref 8.7–10.5)
CHLORIDE SERPL-SCNC: 108 MMOL/L (ref 95–110)
CO2 SERPL-SCNC: 25 MMOL/L (ref 23–29)
CREAT SERPL-MCNC: 0.9 MG/DL (ref 0.5–1.4)
DIFFERENTIAL METHOD: ABNORMAL
EOSINOPHIL # BLD AUTO: 0.2 K/UL (ref 0–0.5)
EOSINOPHIL NFR BLD: 3.8 % (ref 0–8)
ERYTHROCYTE [DISTWIDTH] IN BLOOD BY AUTOMATED COUNT: 12.5 % (ref 11.5–14.5)
EST. GFR  (AFRICAN AMERICAN): >60 ML/MIN/1.73 M^2
EST. GFR  (NON AFRICAN AMERICAN): >60 ML/MIN/1.73 M^2
GLUCOSE SERPL-MCNC: 87 MG/DL (ref 70–110)
HCT VFR BLD AUTO: 34.9 % (ref 37–48.5)
HGB BLD-MCNC: 11.5 G/DL (ref 12–16)
IMM GRANULOCYTES # BLD AUTO: 0.01 K/UL (ref 0–0.04)
IMM GRANULOCYTES NFR BLD AUTO: 0.2 % (ref 0–0.5)
INR PPP: 1 (ref 0.8–1.2)
LYMPHOCYTES # BLD AUTO: 1.3 K/UL (ref 1–4.8)
LYMPHOCYTES NFR BLD: 23 % (ref 18–48)
MCH RBC QN AUTO: 32.3 PG (ref 27–31)
MCHC RBC AUTO-ENTMCNC: 33 G/DL (ref 32–36)
MCV RBC AUTO: 98 FL (ref 82–98)
MONOCYTES # BLD AUTO: 0.4 K/UL (ref 0.3–1)
MONOCYTES NFR BLD: 6.8 % (ref 4–15)
NEUTROPHILS # BLD AUTO: 3.6 K/UL (ref 1.8–7.7)
NEUTROPHILS NFR BLD: 65.8 % (ref 38–73)
NRBC BLD-RTO: 0 /100 WBC
PLATELET # BLD AUTO: 186 K/UL (ref 150–450)
PMV BLD AUTO: 11.4 FL (ref 9.2–12.9)
POTASSIUM SERPL-SCNC: 3.8 MMOL/L (ref 3.5–5.1)
PROTHROMBIN TIME: 10.9 SEC (ref 9–12.5)
RBC # BLD AUTO: 3.56 M/UL (ref 4–5.4)
SODIUM SERPL-SCNC: 139 MMOL/L (ref 136–145)
WBC # BLD AUTO: 5.48 K/UL (ref 3.9–12.7)

## 2021-10-22 PROCEDURE — 27201423 OPTIME MED/SURG SUP & DEVICES STERILE SUPPLY: Performed by: INTERNAL MEDICINE

## 2021-10-22 PROCEDURE — 99152 MOD SED SAME PHYS/QHP 5/>YRS: CPT | Mod: Q1,Q0,, | Performed by: INTERNAL MEDICINE

## 2021-10-22 PROCEDURE — 63600175 PHARM REV CODE 636 W HCPCS: Performed by: INTERNAL MEDICINE

## 2021-10-22 PROCEDURE — 83880 ASSAY OF NATRIURETIC PEPTIDE: CPT | Mod: Q1 | Performed by: INTERNAL MEDICINE

## 2021-10-22 PROCEDURE — 25500020 PHARM REV CODE 255: Mod: Q1 | Performed by: INTERNAL MEDICINE

## 2021-10-22 PROCEDURE — 63600175 PHARM REV CODE 636 W HCPCS: Mod: Q1 | Performed by: INTERNAL MEDICINE

## 2021-10-22 PROCEDURE — 25000003 PHARM REV CODE 250: Performed by: INTERNAL MEDICINE

## 2021-10-22 PROCEDURE — 85025 COMPLETE CBC W/AUTO DIFF WBC: CPT | Mod: Q1 | Performed by: INTERNAL MEDICINE

## 2021-10-22 PROCEDURE — C1769 GUIDE WIRE: HCPCS | Mod: Q1 | Performed by: INTERNAL MEDICINE

## 2021-10-22 PROCEDURE — 25000003 PHARM REV CODE 250: Mod: Q1 | Performed by: INTERNAL MEDICINE

## 2021-10-22 PROCEDURE — C2624 WIRELESS PRESSURE SENSOR: HCPCS | Performed by: INTERNAL MEDICINE

## 2021-10-22 PROCEDURE — C1894 INTRO/SHEATH, NON-LASER: HCPCS | Mod: Q1 | Performed by: INTERNAL MEDICINE

## 2021-10-22 PROCEDURE — 99153 MOD SED SAME PHYS/QHP EA: CPT | Mod: Q1 | Performed by: INTERNAL MEDICINE

## 2021-10-22 PROCEDURE — 27000221 HC OXYGEN, UP TO 24 HOURS: Mod: Q1

## 2021-10-22 PROCEDURE — 85610 PROTHROMBIN TIME: CPT | Mod: Q1 | Performed by: INTERNAL MEDICINE

## 2021-10-22 PROCEDURE — C1751 CATH, INF, PER/CENT/MIDLINE: HCPCS | Mod: Q1 | Performed by: INTERNAL MEDICINE

## 2021-10-22 PROCEDURE — 33289 TCAT IMPL WRLS P-ART PRS SNR: CPT | Mod: Q0,Q1 | Performed by: INTERNAL MEDICINE

## 2021-10-22 PROCEDURE — 33289 PR TRANSCATH IMPLANT, WIRELESS PULM-ART PRESSURE SENSR: ICD-10-PCS | Mod: Q1,Q0,, | Performed by: INTERNAL MEDICINE

## 2021-10-22 PROCEDURE — 99152 PR MOD CONSCIOUS SEDATION, SAME PHYS, 5+ YRS, FIRST 15 MIN: ICD-10-PCS | Mod: Q1,Q0,, | Performed by: INTERNAL MEDICINE

## 2021-10-22 PROCEDURE — 99152 MOD SED SAME PHYS/QHP 5/>YRS: CPT | Mod: Q1 | Performed by: INTERNAL MEDICINE

## 2021-10-22 PROCEDURE — 80048 BASIC METABOLIC PNL TOTAL CA: CPT | Performed by: INTERNAL MEDICINE

## 2021-10-22 PROCEDURE — 33289 TCAT IMPL WRLS P-ART PRS SNR: CPT | Mod: Q1,Q0,, | Performed by: INTERNAL MEDICINE

## 2021-10-22 DEVICE — PA SENSOR AND DELIVERY SYSTEM
Type: IMPLANTABLE DEVICE | Site: HEART | Status: FUNCTIONAL
Brand: CARDIOMEMS™

## 2021-10-22 RX ORDER — BUMETANIDE 1 MG/1
TABLET ORAL
Qty: 90 TABLET | Refills: 11 | Status: SHIPPED | OUTPATIENT
Start: 2021-10-22 | End: 2021-12-16 | Stop reason: SDUPTHER

## 2021-10-22 RX ORDER — BUMETANIDE 1 MG/1
TABLET ORAL
Qty: 90 TABLET | Refills: 11 | Status: SHIPPED | OUTPATIENT
Start: 2021-10-22 | End: 2021-10-22 | Stop reason: SDUPTHER

## 2021-10-22 RX ORDER — FUROSEMIDE 10 MG/ML
80 INJECTION INTRAMUSCULAR; INTRAVENOUS ONCE
Status: COMPLETED | OUTPATIENT
Start: 2021-10-22 | End: 2021-10-22

## 2021-10-22 RX ORDER — FENTANYL CITRATE 50 UG/ML
INJECTION, SOLUTION INTRAMUSCULAR; INTRAVENOUS
Status: DISCONTINUED | OUTPATIENT
Start: 2021-10-22 | End: 2021-10-22 | Stop reason: HOSPADM

## 2021-10-22 RX ORDER — HEPARIN SOD,PORCINE/0.9 % NACL 1000/500ML
INTRAVENOUS SOLUTION INTRAVENOUS
Status: DISCONTINUED | OUTPATIENT
Start: 2021-10-22 | End: 2021-10-22 | Stop reason: HOSPADM

## 2021-10-22 RX ORDER — ACETAMINOPHEN 325 MG/1
650 TABLET ORAL EVERY 4 HOURS PRN
Status: DISCONTINUED | OUTPATIENT
Start: 2021-10-22 | End: 2021-10-22 | Stop reason: HOSPADM

## 2021-10-22 RX ORDER — LIDOCAINE HYDROCHLORIDE 20 MG/ML
INJECTION, SOLUTION INFILTRATION; PERINEURAL
Status: DISCONTINUED | OUTPATIENT
Start: 2021-10-22 | End: 2021-10-22 | Stop reason: HOSPADM

## 2021-10-22 RX ORDER — POTASSIUM CHLORIDE 20 MEQ/1
40 TABLET, EXTENDED RELEASE ORAL ONCE
Status: COMPLETED | OUTPATIENT
Start: 2021-10-22 | End: 2021-10-22

## 2021-10-22 RX ORDER — ASPIRIN 81 MG/1
81 TABLET ORAL DAILY
Qty: 90 TABLET | Refills: 3 | Status: SHIPPED | OUTPATIENT
Start: 2021-10-22 | End: 2021-10-22 | Stop reason: SDUPTHER

## 2021-10-22 RX ORDER — CLOPIDOGREL BISULFATE 75 MG/1
TABLET ORAL
Qty: 30 TABLET | Refills: 0 | Status: SHIPPED | OUTPATIENT
Start: 2021-10-22 | End: 2021-10-22 | Stop reason: SDUPTHER

## 2021-10-22 RX ORDER — ONDANSETRON 8 MG/1
8 TABLET, ORALLY DISINTEGRATING ORAL EVERY 8 HOURS PRN
Status: DISCONTINUED | OUTPATIENT
Start: 2021-10-22 | End: 2021-10-22

## 2021-10-22 RX ORDER — CLOPIDOGREL BISULFATE 75 MG/1
TABLET ORAL
Qty: 30 TABLET | Refills: 0 | Status: SHIPPED | OUTPATIENT
Start: 2021-10-22 | End: 2022-09-29

## 2021-10-22 RX ORDER — DIPHENHYDRAMINE HCL 25 MG
25 CAPSULE ORAL ONCE
Status: COMPLETED | OUTPATIENT
Start: 2021-10-22 | End: 2021-10-22

## 2021-10-22 RX ORDER — MIDAZOLAM HYDROCHLORIDE 1 MG/ML
INJECTION, SOLUTION INTRAMUSCULAR; INTRAVENOUS
Status: DISCONTINUED | OUTPATIENT
Start: 2021-10-22 | End: 2021-10-22 | Stop reason: HOSPADM

## 2021-10-22 RX ORDER — ASPIRIN 81 MG/1
81 TABLET ORAL DAILY
Qty: 90 TABLET | Refills: 3 | Status: SHIPPED | OUTPATIENT
Start: 2021-10-22

## 2021-10-22 RX ADMIN — POTASSIUM CHLORIDE 40 MEQ: 1500 TABLET, EXTENDED RELEASE ORAL at 01:10

## 2021-10-22 RX ADMIN — FUROSEMIDE 80 MG: 10 INJECTION, SOLUTION INTRAMUSCULAR; INTRAVENOUS at 01:10

## 2021-10-22 RX ADMIN — DIPHENHYDRAMINE HYDROCHLORIDE 25 MG: 25 CAPSULE ORAL at 08:10

## 2021-10-25 ENCOUNTER — DOCUMENTATION ONLY (OUTPATIENT)
Dept: TRANSPLANT | Facility: CLINIC | Age: 46
End: 2021-10-25
Payer: MEDICARE

## 2021-10-26 ENCOUNTER — LAB VISIT (OUTPATIENT)
Dept: LAB | Facility: HOSPITAL | Age: 46
End: 2021-10-26
Attending: INTERNAL MEDICINE
Payer: MEDICARE

## 2021-10-26 ENCOUNTER — RESEARCH ENCOUNTER (OUTPATIENT)
Dept: RESEARCH | Facility: HOSPITAL | Age: 46
End: 2021-10-26
Payer: MEDICARE

## 2021-10-26 DIAGNOSIS — I50.42 CHRONIC COMBINED SYSTOLIC AND DIASTOLIC CHF (CONGESTIVE HEART FAILURE): Primary | ICD-10-CM

## 2021-10-26 DIAGNOSIS — I50.42 CHRONIC COMBINED SYSTOLIC (CONGESTIVE) AND DIASTOLIC (CONGESTIVE) HEART FAILURE: ICD-10-CM

## 2021-10-26 DIAGNOSIS — I50.42 CHRONIC COMBINED SYSTOLIC (CONGESTIVE) AND DIASTOLIC (CONGESTIVE) HEART FAILURE: Primary | ICD-10-CM

## 2021-10-26 LAB
ANION GAP SERPL CALC-SCNC: 9 MMOL/L (ref 8–16)
CALCIUM SERPL-MCNC: 8.5 MG/DL (ref 8.7–10.5)
CHLORIDE SERPL-SCNC: 102 MMOL/L (ref 95–110)
CO2 SERPL-SCNC: 30 MMOL/L (ref 23–29)
CREAT SERPL-MCNC: 1.19 MG/DL (ref 0.5–1.4)
EST. GFR  (AFRICAN AMERICAN): >60 ML/MIN/1.73 M^2
EST. GFR  (NON AFRICAN AMERICAN): 56 ML/MIN/1.73 M^2
GLUCOSE SERPL-MCNC: 87 MG/DL (ref 70–110)
MAGNESIUM SERPL-MCNC: 1.7 MG/DL (ref 1.6–2.6)
POTASSIUM SERPL-SCNC: 3.8 MMOL/L (ref 3.5–5.1)
SODIUM SERPL-SCNC: 141 MMOL/L (ref 136–145)
UUN UR-MCNC: 18 MG/DL (ref 7–17)

## 2021-10-26 PROCEDURE — 83735 ASSAY OF MAGNESIUM: CPT | Mod: PO | Performed by: INTERNAL MEDICINE

## 2021-10-26 PROCEDURE — 36415 COLL VENOUS BLD VENIPUNCTURE: CPT | Mod: PO | Performed by: INTERNAL MEDICINE

## 2021-10-26 PROCEDURE — 80048 BASIC METABOLIC PNL TOTAL CA: CPT | Mod: PO | Performed by: INTERNAL MEDICINE

## 2021-10-26 RX ORDER — POTASSIUM CHLORIDE 20 MEQ/1
40 TABLET, EXTENDED RELEASE ORAL DAILY
Qty: 60 TABLET | Refills: 3 | Status: SHIPPED | OUTPATIENT
Start: 2021-10-26 | End: 2022-02-01

## 2021-10-26 RX ORDER — POTASSIUM CHLORIDE 20 MEQ/1
40 TABLET, EXTENDED RELEASE ORAL DAILY
Qty: 60 TABLET | Refills: 3 | Status: CANCELLED | OUTPATIENT
Start: 2021-10-26

## 2021-10-27 ENCOUNTER — RESEARCH ENCOUNTER (OUTPATIENT)
Dept: RESEARCH | Facility: HOSPITAL | Age: 46
End: 2021-10-27
Payer: MEDICARE

## 2021-10-28 ENCOUNTER — DOCUMENTATION ONLY (OUTPATIENT)
Dept: TRANSPLANT | Facility: CLINIC | Age: 46
End: 2021-10-28
Payer: MEDICARE

## 2021-11-01 ENCOUNTER — DOCUMENTATION ONLY (OUTPATIENT)
Dept: TRANSPLANT | Facility: CLINIC | Age: 46
End: 2021-11-01
Payer: MEDICARE

## 2021-11-04 ENCOUNTER — DOCUMENTATION ONLY (OUTPATIENT)
Dept: TRANSPLANT | Facility: CLINIC | Age: 46
End: 2021-11-04
Payer: MEDICARE

## 2021-11-08 ENCOUNTER — DOCUMENTATION ONLY (OUTPATIENT)
Dept: TRANSPLANT | Facility: CLINIC | Age: 46
End: 2021-11-08
Payer: MEDICARE

## 2021-11-11 ENCOUNTER — DOCUMENTATION ONLY (OUTPATIENT)
Dept: TRANSPLANT | Facility: CLINIC | Age: 46
End: 2021-11-11
Payer: MEDICARE

## 2021-11-16 ENCOUNTER — HOSPITAL ENCOUNTER (OUTPATIENT)
Dept: RADIOLOGY | Facility: HOSPITAL | Age: 46
Discharge: HOME | End: 2021-11-16
Attending: STUDENT IN AN ORGANIZED HEALTH CARE EDUCATION/TRAINING PROGRAM
Payer: MEDICARE

## 2021-11-16 DIAGNOSIS — Z12.31 SCREENING MAMMOGRAM FOR HIGH-RISK PATIENT: ICD-10-CM

## 2021-11-16 PROCEDURE — 77067 SCR MAMMO BI INCL CAD: CPT | Mod: TC,PO

## 2021-12-03 ENCOUNTER — DOCUMENTATION ONLY (OUTPATIENT)
Dept: TRANSPLANT | Facility: CLINIC | Age: 46
End: 2021-12-03
Payer: MEDICARE

## 2021-12-09 ENCOUNTER — DOCUMENTATION ONLY (OUTPATIENT)
Dept: TRANSPLANT | Facility: CLINIC | Age: 46
End: 2021-12-09
Payer: MEDICARE

## 2021-12-13 ENCOUNTER — PATIENT MESSAGE (OUTPATIENT)
Dept: TRANSPLANT | Facility: CLINIC | Age: 46
End: 2021-12-13
Payer: MEDICARE

## 2021-12-14 ENCOUNTER — PATIENT MESSAGE (OUTPATIENT)
Dept: TRANSPLANT | Facility: CLINIC | Age: 46
End: 2021-12-14
Payer: MEDICARE

## 2021-12-14 ENCOUNTER — DOCUMENTATION ONLY (OUTPATIENT)
Dept: TRANSPLANT | Facility: CLINIC | Age: 46
End: 2021-12-14
Payer: MEDICARE

## 2021-12-15 ENCOUNTER — TELEPHONE (OUTPATIENT)
Dept: ELECTROPHYSIOLOGY | Facility: CLINIC | Age: 46
End: 2021-12-15
Payer: MEDICARE

## 2021-12-16 ENCOUNTER — DOCUMENTATION ONLY (OUTPATIENT)
Dept: TRANSPLANT | Facility: CLINIC | Age: 46
End: 2021-12-16
Payer: MEDICARE

## 2021-12-16 DIAGNOSIS — I50.42 CHRONIC COMBINED SYSTOLIC AND DIASTOLIC CONGESTIVE HEART FAILURE: ICD-10-CM

## 2021-12-19 RX ORDER — BUMETANIDE 1 MG/1
TABLET ORAL
Qty: 90 TABLET | Refills: 11 | Status: SHIPPED | OUTPATIENT
Start: 2021-12-19 | End: 2023-01-11 | Stop reason: SDUPTHER

## 2021-12-21 ENCOUNTER — DOCUMENTATION ONLY (OUTPATIENT)
Dept: TRANSPLANT | Facility: CLINIC | Age: 46
End: 2021-12-21
Payer: MEDICARE

## 2021-12-21 ENCOUNTER — TELEPHONE (OUTPATIENT)
Dept: TRANSPLANT | Facility: CLINIC | Age: 46
End: 2021-12-21
Payer: MEDICARE

## 2021-12-23 ENCOUNTER — LAB VISIT (OUTPATIENT)
Dept: LAB | Facility: HOSPITAL | Age: 46
End: 2021-12-23
Attending: INTERNAL MEDICINE
Payer: MEDICARE

## 2021-12-23 ENCOUNTER — DOCUMENTATION ONLY (OUTPATIENT)
Dept: TRANSPLANT | Facility: CLINIC | Age: 46
End: 2021-12-23
Payer: MEDICARE

## 2021-12-23 DIAGNOSIS — I50.42 CHRONIC COMBINED SYSTOLIC AND DIASTOLIC CONGESTIVE HEART FAILURE: ICD-10-CM

## 2021-12-23 LAB
ALBUMIN SERPL BCP-MCNC: 3.9 G/DL (ref 3.5–5.2)
ALP SERPL-CCNC: 42 U/L (ref 38–126)
ALT SERPL W/O P-5'-P-CCNC: 14 U/L (ref 10–44)
ANION GAP SERPL CALC-SCNC: 7 MMOL/L (ref 8–16)
AST SERPL-CCNC: 29 U/L (ref 15–46)
BILIRUB SERPL-MCNC: 1.4 MG/DL (ref 0.1–1)
CALCIUM SERPL-MCNC: 8.7 MG/DL (ref 8.7–10.5)
CHLORIDE SERPL-SCNC: 101 MMOL/L (ref 95–110)
CO2 SERPL-SCNC: 31 MMOL/L (ref 23–29)
CREAT SERPL-MCNC: 1.07 MG/DL (ref 0.5–1.4)
EST. GFR  (AFRICAN AMERICAN): >60 ML/MIN/1.73 M^2
EST. GFR  (NON AFRICAN AMERICAN): >60 ML/MIN/1.73 M^2
GLUCOSE SERPL-MCNC: 101 MG/DL (ref 70–110)
POTASSIUM SERPL-SCNC: 3.6 MMOL/L (ref 3.5–5.1)
PROT SERPL-MCNC: 7.4 G/DL (ref 6–8.4)
SODIUM SERPL-SCNC: 139 MMOL/L (ref 136–145)
UUN UR-MCNC: 15 MG/DL (ref 7–17)

## 2021-12-23 PROCEDURE — 36415 COLL VENOUS BLD VENIPUNCTURE: CPT | Mod: PO | Performed by: INTERNAL MEDICINE

## 2021-12-23 PROCEDURE — 80053 COMPREHEN METABOLIC PANEL: CPT | Mod: PO | Performed by: INTERNAL MEDICINE

## 2021-12-27 ENCOUNTER — PATIENT MESSAGE (OUTPATIENT)
Dept: TRANSPLANT | Facility: CLINIC | Age: 46
End: 2021-12-27
Payer: MEDICARE

## 2021-12-28 ENCOUNTER — TELEPHONE (OUTPATIENT)
Dept: TRANSPLANT | Facility: CLINIC | Age: 46
End: 2021-12-28
Payer: MEDICARE

## 2021-12-30 ENCOUNTER — DOCUMENTATION ONLY (OUTPATIENT)
Dept: TRANSPLANT | Facility: CLINIC | Age: 46
End: 2021-12-30
Payer: MEDICARE

## 2022-01-06 ENCOUNTER — DOCUMENTATION ONLY (OUTPATIENT)
Dept: TRANSPLANT | Facility: CLINIC | Age: 47
End: 2022-01-06
Payer: MEDICARE

## 2022-01-06 NOTE — PROGRESS NOTES
Pt CardioMems transmission received and review.      CardioMEMS Transmission  1/6/2022 12/30/2021 12/23/2021 12/21/2021   Transmission Date 1/4/2022 12/29/2021 12/21/2021 12/20/2021   Transmission Type Maintenance Maintenance Maintenance Maintenance   PAS 39 33 30 54   LEELA 25 20 20 38   PAD  15 12 14 25   Medication Adjustments  No No No No   Some recent data might be hidden         Pressures are stable.    Medications changed? no    Patient contacted? no    Will continue to monitor.

## 2022-01-11 ENCOUNTER — DOCUMENTATION ONLY (OUTPATIENT)
Dept: TRANSPLANT | Facility: CLINIC | Age: 47
End: 2022-01-11
Payer: MEDICARE

## 2022-01-11 NOTE — PROGRESS NOTES
Pt CardioMems transmission received and review.      CardioMEMS Transmission  1/11/2022 1/6/2022 12/30/2021 12/23/2021   Transmission Date 1/9/2022 1/4/2022 12/29/2021 12/21/2021   Transmission Type Maintenance Maintenance Maintenance Maintenance   PAS 41 39 33 30   LEELA 28 25 20 20   PAD  18 15 12 14   Medication Adjustments  No No No No   Some recent data might be hidden         Pressures are stable.    Medications changed? no    Patient contacted? no    Will continue to monitor.

## 2022-01-14 ENCOUNTER — DOCUMENTATION ONLY (OUTPATIENT)
Dept: TRANSPLANT | Facility: CLINIC | Age: 47
End: 2022-01-14
Payer: MEDICARE

## 2022-01-14 NOTE — PROGRESS NOTES
Pt CardioMems transmission received and review.      CardioMEMS Transmission  1/14/2022 1/11/2022 1/6/2022 12/30/2021   Transmission Date 1/13/2022 1/9/2022 1/4/2022 12/29/2021   Transmission Type Maintenance Maintenance Maintenance Maintenance   PAS 33 41 39 33   LEELA 20 28 25 20   PAD  14 18 15 12   Medication Adjustments  No No No No   Some recent data might be hidden         Pressures are stable.    Medications changed? no    Patient contacted? no    Will continue to monitor.

## 2022-01-18 ENCOUNTER — DOCUMENTATION ONLY (OUTPATIENT)
Dept: TRANSPLANT | Facility: CLINIC | Age: 47
End: 2022-01-18
Payer: MEDICARE

## 2022-01-18 NOTE — PROGRESS NOTES
Pt CardioMems transmission received and review.      CardioMEMS Transmission  1/18/2022 1/14/2022 1/11/2022 1/6/2022   Transmission Date 1/17/2022 1/13/2022 1/9/2022 1/4/2022   Transmission Type Maintenance Maintenance Maintenance Maintenance   PAS 35 33 41 39   LEELA 22 20 28 25   PAD  13 14 18 15   Medication Adjustments  No No No No   Some recent data might be hidden         Pressures are stable.    Medications changed? no    Patient contacted? no    Will continue to monitor.

## 2022-01-20 ENCOUNTER — OFFICE VISIT (OUTPATIENT)
Dept: FAMILY MEDICINE | Facility: CLINIC | Age: 47
End: 2022-01-20
Payer: MEDICARE

## 2022-01-20 VITALS
OXYGEN SATURATION: 98 % | HEIGHT: 69 IN | BODY MASS INDEX: 38.65 KG/M2 | DIASTOLIC BLOOD PRESSURE: 59 MMHG | HEART RATE: 73 BPM | WEIGHT: 260.94 LBS | TEMPERATURE: 98 F | SYSTOLIC BLOOD PRESSURE: 120 MMHG

## 2022-01-20 DIAGNOSIS — M54.50 CHRONIC LOW BACK PAIN WITHOUT SCIATICA, UNSPECIFIED BACK PAIN LATERALITY: ICD-10-CM

## 2022-01-20 DIAGNOSIS — G89.29 CHRONIC PAIN OF LEFT KNEE: Primary | ICD-10-CM

## 2022-01-20 DIAGNOSIS — G89.29 CHRONIC LOW BACK PAIN WITHOUT SCIATICA, UNSPECIFIED BACK PAIN LATERALITY: ICD-10-CM

## 2022-01-20 DIAGNOSIS — Z72.820 SLEEP DEPRIVATION: ICD-10-CM

## 2022-01-20 DIAGNOSIS — M25.562 CHRONIC PAIN OF LEFT KNEE: Primary | ICD-10-CM

## 2022-01-20 DIAGNOSIS — F41.9 ANXIETY: ICD-10-CM

## 2022-01-20 DIAGNOSIS — R51.9 CHRONIC NONINTRACTABLE HEADACHE, UNSPECIFIED HEADACHE TYPE: ICD-10-CM

## 2022-01-20 DIAGNOSIS — G89.29 CHRONIC NONINTRACTABLE HEADACHE, UNSPECIFIED HEADACHE TYPE: ICD-10-CM

## 2022-01-20 DIAGNOSIS — R53.83 FATIGUE, UNSPECIFIED TYPE: ICD-10-CM

## 2022-01-20 PROCEDURE — 3008F BODY MASS INDEX DOCD: CPT | Mod: CPTII,S$GLB,, | Performed by: FAMILY MEDICINE

## 2022-01-20 PROCEDURE — 99214 OFFICE O/P EST MOD 30 MIN: CPT | Mod: S$GLB,,, | Performed by: FAMILY MEDICINE

## 2022-01-20 PROCEDURE — 3008F PR BODY MASS INDEX (BMI) DOCUMENTED: ICD-10-PCS | Mod: CPTII,S$GLB,, | Performed by: FAMILY MEDICINE

## 2022-01-20 PROCEDURE — 1159F PR MEDICATION LIST DOCUMENTED IN MEDICAL RECORD: ICD-10-PCS | Mod: CPTII,S$GLB,, | Performed by: FAMILY MEDICINE

## 2022-01-20 PROCEDURE — 1160F RVW MEDS BY RX/DR IN RCRD: CPT | Mod: CPTII,S$GLB,, | Performed by: FAMILY MEDICINE

## 2022-01-20 PROCEDURE — 99214 PR OFFICE/OUTPT VISIT, EST, LEVL IV, 30-39 MIN: ICD-10-PCS | Mod: S$GLB,,, | Performed by: FAMILY MEDICINE

## 2022-01-20 PROCEDURE — 3074F SYST BP LT 130 MM HG: CPT | Mod: CPTII,S$GLB,, | Performed by: FAMILY MEDICINE

## 2022-01-20 PROCEDURE — 3074F PR MOST RECENT SYSTOLIC BLOOD PRESSURE < 130 MM HG: ICD-10-PCS | Mod: CPTII,S$GLB,, | Performed by: FAMILY MEDICINE

## 2022-01-20 PROCEDURE — 1159F MED LIST DOCD IN RCRD: CPT | Mod: CPTII,S$GLB,, | Performed by: FAMILY MEDICINE

## 2022-01-20 PROCEDURE — 3078F DIAST BP <80 MM HG: CPT | Mod: CPTII,S$GLB,, | Performed by: FAMILY MEDICINE

## 2022-01-20 PROCEDURE — 1160F PR REVIEW ALL MEDS BY PRESCRIBER/CLIN PHARMACIST DOCUMENTED: ICD-10-PCS | Mod: CPTII,S$GLB,, | Performed by: FAMILY MEDICINE

## 2022-01-20 PROCEDURE — 3078F PR MOST RECENT DIASTOLIC BLOOD PRESSURE < 80 MM HG: ICD-10-PCS | Mod: CPTII,S$GLB,, | Performed by: FAMILY MEDICINE

## 2022-01-20 RX ORDER — AMITRIPTYLINE HYDROCHLORIDE 10 MG/1
10 TABLET, FILM COATED ORAL NIGHTLY
Qty: 30 TABLET | Refills: 11 | Status: SHIPPED | OUTPATIENT
Start: 2022-01-20 | End: 2022-12-15
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
## 2022-01-24 ENCOUNTER — HOSPITAL ENCOUNTER (OUTPATIENT)
Dept: RADIOLOGY | Facility: HOSPITAL | Age: 47
Discharge: HOME | End: 2022-01-24
Attending: FAMILY MEDICINE
Payer: MEDICARE

## 2022-01-24 ENCOUNTER — DOCUMENTATION ONLY (OUTPATIENT)
Dept: BARIATRICS | Facility: CLINIC | Age: 47
End: 2022-01-24
Payer: MEDICARE

## 2022-01-24 ENCOUNTER — DOCUMENTATION ONLY (OUTPATIENT)
Dept: TRANSPLANT | Facility: CLINIC | Age: 47
End: 2022-01-24
Payer: MEDICARE

## 2022-01-24 DIAGNOSIS — G89.29 CHRONIC PAIN OF LEFT KNEE: ICD-10-CM

## 2022-01-24 DIAGNOSIS — M25.562 CHRONIC PAIN OF LEFT KNEE: ICD-10-CM

## 2022-01-24 PROCEDURE — 73562 X-RAY EXAM OF KNEE 3: CPT | Mod: TC,FY,PO,LT

## 2022-01-24 NOTE — PROGRESS NOTES
Pt CardioMems transmission received and review.      CardioMEMS Transmission  1/24/2022 1/18/2022 1/14/2022 1/11/2022   Transmission Date 1/23/2022 1/17/2022 1/13/2022 1/9/2022   Transmission Type Maintenance Maintenance Maintenance Maintenance   PAS 35 35 33 41   LEELA 24 22 20 28   PAD  16 13 14 18   Medication Adjustments  No No No No   Some recent data might be hidden         Pressures are stable.    Medications changed? no    Patient contacted? no    Will continue to monitor.

## 2022-01-24 NOTE — PROGRESS NOTES
Bariatric dashboard updated from workup in progress to pt on hold due to initial consult date of 9/12/2019 and patient pending Heart Transplant clearance.

## 2022-01-26 ENCOUNTER — TELEPHONE (OUTPATIENT)
Dept: CARDIOLOGY | Facility: HOSPITAL | Age: 47
End: 2022-01-26
Payer: MEDICARE

## 2022-01-26 DIAGNOSIS — I42.0 DILATED CARDIOMYOPATHY: Primary | ICD-10-CM

## 2022-01-26 DIAGNOSIS — I50.42 CHRONIC COMBINED SYSTOLIC AND DIASTOLIC CONGESTIVE HEART FAILURE: ICD-10-CM

## 2022-01-26 DIAGNOSIS — Z95.810 PRESENCE OF AUTOMATIC (IMPLANTABLE) CARDIAC DEFIBRILLATOR: ICD-10-CM

## 2022-01-26 DIAGNOSIS — I47.20 VENTRICULAR TACHYCARDIA: ICD-10-CM

## 2022-01-26 NOTE — TELEPHONE ENCOUNTER
I contacted patient regarding alert received re: high RV lead impedance at 2000 ohms. I explained to her that her device is working but we need to assess her lead in clinic. Appt made for tomorrow.

## 2022-01-27 ENCOUNTER — TELEPHONE (OUTPATIENT)
Dept: FAMILY MEDICINE | Facility: CLINIC | Age: 47
End: 2022-01-27
Payer: MEDICARE

## 2022-01-27 ENCOUNTER — PATIENT OUTREACH (OUTPATIENT)
Dept: ADMINISTRATIVE | Facility: OTHER | Age: 47
End: 2022-01-27
Payer: MEDICARE

## 2022-01-27 ENCOUNTER — TELEPHONE (OUTPATIENT)
Dept: ELECTROPHYSIOLOGY | Facility: CLINIC | Age: 47
End: 2022-01-27
Payer: MEDICARE

## 2022-01-27 ENCOUNTER — CLINICAL SUPPORT (OUTPATIENT)
Dept: CARDIOLOGY | Facility: HOSPITAL | Age: 47
End: 2022-01-27
Attending: INTERNAL MEDICINE
Payer: MEDICARE

## 2022-01-27 ENCOUNTER — DOCUMENTATION ONLY (OUTPATIENT)
Dept: TRANSPLANT | Facility: CLINIC | Age: 47
End: 2022-01-27
Payer: MEDICARE

## 2022-01-27 DIAGNOSIS — I47.20 VENTRICULAR TACHYCARDIA: ICD-10-CM

## 2022-01-27 DIAGNOSIS — Z95.810 PRESENCE OF AUTOMATIC (IMPLANTABLE) CARDIAC DEFIBRILLATOR: ICD-10-CM

## 2022-01-27 DIAGNOSIS — I50.42 CHRONIC COMBINED SYSTOLIC AND DIASTOLIC CONGESTIVE HEART FAILURE: ICD-10-CM

## 2022-01-27 DIAGNOSIS — I42.0 DILATED CARDIOMYOPATHY: ICD-10-CM

## 2022-01-27 PROCEDURE — 93282 PRGRMG EVAL IMPLANTABLE DFB: CPT

## 2022-01-27 PROCEDURE — 93282 PRGRMG EVAL IMPLANTABLE DFB: CPT | Mod: 26,,, | Performed by: INTERNAL MEDICINE

## 2022-01-27 PROCEDURE — 93282 CARDIAC DEVICE CHECK - IN CLINIC & HOSPITAL: ICD-10-PCS | Mod: 26,,, | Performed by: INTERNAL MEDICINE

## 2022-01-27 NOTE — PROGRESS NOTES
Health Maintenance Due   Topic Date Due    Influenza Vaccine (1) 09/01/2021    COVID-19 Vaccine (3 - Booster for Pfizer series) 09/18/2021     Updates were requested from care everywhere.  Chart was reviewed for overdue Proactive [facility] Encounters (JOSE) topics (CRS, Breast Cancer Screening, Eye exam)  Health Maintenance has been updated.  LINKS immunization registry triggered.  Immunizations were reconciled.

## 2022-01-27 NOTE — TELEPHONE ENCOUNTER
----- Message from Jaime Ruano MD sent at 1/25/2022  8:11 AM CST -----  Fluid in knee seen on xray; needs to see orthopedist

## 2022-01-27 NOTE — TELEPHONE ENCOUNTER
Patient with single chamber ICD for secondary prevention (implanted in 2016) now with evidence of lead fracture. Dr. Tay asked me to discuss option of extraction-reimplant. Discussed implanted cardiac device lead extraction, including all risks and benefits at length. The risks discussed included but were not limited to death, MI, CVA, pneumothorax, severe bleeding, perforation with need for surgical repair, infection, tamponade. Discussed possible/likely need for laser sheath removal of leads and/or the use of other specialized tools for extraction.  I discussed with patient risks, indications, benefits, and alternatives of the planned procedure.     Alternative option of abandoning and implanting a new lead offered however less favorable due to patient's young age and lead age of only 5 years. All questions were answered. Patient understands and wishes to proceed with extraction-reimplantation.    Plan  ICD lead extraction/reimplantation with generator change (less than 50% battery life remaining)  [facility]  Anest[facility]  [facility]  CTS back-up  VASQUEZ  Type and cross 4u PRBCs and hold  Hold bumetanide, entresto, spironolactone day of procedure

## 2022-01-27 NOTE — PROGRESS NOTES
Pt CardioMems transmission received and review.      CardioMEMS Transmission  1/27/2022 1/24/2022 1/18/2022 1/14/2022   Transmission Date 1/27/2022 1/23/2022 1/17/2022 1/13/2022   Transmission Type Maintenance Maintenance Maintenance Maintenance   PAS 32 35 35 33   LEELA 20 24 22 20   PAD  12 16 13 14   Medication Adjustments  No No No No   Some recent data might be hidden         Pressures are stable.    Medications changed? no    Patient contacted? no    Will continue to monitor.

## 2022-01-28 ENCOUNTER — CLINICAL SUPPORT (OUTPATIENT)
Dept: CARDIOLOGY | Facility: HOSPITAL | Age: 47
End: 2022-01-28
Payer: MEDICARE

## 2022-01-28 DIAGNOSIS — Z95.810 PRESENCE OF AUTOMATIC (IMPLANTABLE) CARDIAC DEFIBRILLATOR: ICD-10-CM

## 2022-01-28 DIAGNOSIS — I42.5 OTHER RESTRICTIVE CARDIOMYOPATHY: ICD-10-CM

## 2022-01-28 PROCEDURE — 93295 DEV INTERROG REMOTE 1/2/MLT: CPT | Mod: ,,, | Performed by: INTERNAL MEDICINE

## 2022-01-28 PROCEDURE — 93296 REM INTERROG EVL PM/IDS: CPT | Performed by: INTERNAL MEDICINE

## 2022-01-28 PROCEDURE — 93295 CARDIAC DEVICE CHECK - REMOTE: ICD-10-PCS | Mod: ,,, | Performed by: INTERNAL MEDICINE

## 2022-01-31 ENCOUNTER — DOCUMENTATION ONLY (OUTPATIENT)
Dept: TRANSPLANT | Facility: CLINIC | Age: 47
End: 2022-01-31
Payer: MEDICARE

## 2022-01-31 ENCOUNTER — HOSPITAL ENCOUNTER (OUTPATIENT)
Dept: RADIOLOGY | Facility: HOSPITAL | Age: 47
Discharge: HOME | End: 2022-01-31
Attending: PAIN MEDICINE
Payer: MEDICARE

## 2022-01-31 ENCOUNTER — OFFICE VISIT (OUTPATIENT)
Dept: PAIN MEDICINE | Facility: CLINIC | Age: 47
End: 2022-01-31
Payer: MEDICARE

## 2022-01-31 VITALS — SYSTOLIC BLOOD PRESSURE: 104 MMHG | HEART RATE: 67 BPM | DIASTOLIC BLOOD PRESSURE: 69 MMHG

## 2022-01-31 DIAGNOSIS — G89.29 CHRONIC LOW BACK PAIN WITHOUT SCIATICA, UNSPECIFIED BACK PAIN LATERALITY: ICD-10-CM

## 2022-01-31 DIAGNOSIS — M79.18 MYOFASCIAL PAIN: ICD-10-CM

## 2022-01-31 DIAGNOSIS — M54.50 CHRONIC LOW BACK PAIN WITHOUT SCIATICA, UNSPECIFIED BACK PAIN LATERALITY: ICD-10-CM

## 2022-01-31 DIAGNOSIS — Z91.89 SEDENTARY LIFESTYLE: ICD-10-CM

## 2022-01-31 DIAGNOSIS — M79.18 MYOFASCIAL PAIN: Primary | ICD-10-CM

## 2022-01-31 PROCEDURE — 3074F PR MOST RECENT SYSTOLIC BLOOD PRESSURE < 130 MM HG: ICD-10-PCS | Mod: CPTII,S$GLB,, | Performed by: PAIN MEDICINE

## 2022-01-31 PROCEDURE — 99999 PR PBB SHADOW E&M-EST. PATIENT-LVL IV: ICD-10-PCS | Mod: PBBFAC,,, | Performed by: PAIN MEDICINE

## 2022-01-31 PROCEDURE — 1159F PR MEDICATION LIST DOCUMENTED IN MEDICAL RECORD: ICD-10-PCS | Mod: CPTII,S$GLB,, | Performed by: PAIN MEDICINE

## 2022-01-31 PROCEDURE — 72110 XR LUMBAR SPINE COMPLETE 5 VIEW: ICD-10-PCS | Mod: 26,,, | Performed by: RADIOLOGY

## 2022-01-31 PROCEDURE — 99204 OFFICE O/P NEW MOD 45 MIN: CPT | Mod: S$GLB,,, | Performed by: PAIN MEDICINE

## 2022-01-31 PROCEDURE — 3078F DIAST BP <80 MM HG: CPT | Mod: CPTII,S$GLB,, | Performed by: PAIN MEDICINE

## 2022-01-31 PROCEDURE — 1159F MED LIST DOCD IN RCRD: CPT | Mod: CPTII,S$GLB,, | Performed by: PAIN MEDICINE

## 2022-01-31 PROCEDURE — 99204 PR OFFICE/OUTPT VISIT, NEW, LEVL IV, 45-59 MIN: ICD-10-PCS | Mod: S$GLB,,, | Performed by: PAIN MEDICINE

## 2022-01-31 PROCEDURE — 99999 PR PBB SHADOW E&M-EST. PATIENT-LVL IV: CPT | Mod: PBBFAC,,, | Performed by: PAIN MEDICINE

## 2022-01-31 PROCEDURE — 72110 X-RAY EXAM L-2 SPINE 4/>VWS: CPT | Mod: TC,FY

## 2022-01-31 PROCEDURE — 72110 X-RAY EXAM L-2 SPINE 4/>VWS: CPT | Mod: 26,,, | Performed by: RADIOLOGY

## 2022-01-31 PROCEDURE — 3078F PR MOST RECENT DIASTOLIC BLOOD PRESSURE < 80 MM HG: ICD-10-PCS | Mod: CPTII,S$GLB,, | Performed by: PAIN MEDICINE

## 2022-01-31 PROCEDURE — 3074F SYST BP LT 130 MM HG: CPT | Mod: CPTII,S$GLB,, | Performed by: PAIN MEDICINE

## 2022-01-31 NOTE — PROGRESS NOTES
[facility]    Referred by: Dr. Jaime Runao   Reason for referral: Chronic low back pain without sciatica, unspecified back pain laterality     CC:   Chief Complaint   Patient presents with    Low-back Pain    Knee Pain     left     Subjective:   Mario Mahajan is a 46 y.o. female who has a past medical history of Asthma, Chronic back pain, Chronic combined systolic and diastolic congestive heart failure, Encounter for blood transfusion, ICD (implantable cardioverter-defibrillator) in place 12/01/15, Menorrhagia, premenopausal, Microcytic anemia, Non-rheumatic mitral regurgitation, Nonischemic dilated cardiomyopathy, Sleep apnea, Syncope and collapse, and Ventricular tachycardia, polymorphic. She complains of pain as described below.    Location: low back - years ago advised that she has scoliosis  Onset: 6 mos  Radiation: front of knee, left   Timing: intermittent  Current Pain Score: 4/10  Weekly Pain Range: 4-6/10  Quality: Aching and Dull  Worsened by: nothing in particular  Improved by: ice and rest    Previous Therapies:  PT/OT: around 2018-19 with no relief  HEP: Yes - occasional stretching and just started back at the gym  Interventions: None  Surgery: None  Opioids:  Adjuvants:     Current Pain Medications:  1. Tylenol 650 mg - 3-4x/week     Assessment & Plan:  Problem List Items Addressed This Visit     Chronic low back pain without sciatica    Relevant Orders    Ambulatory referral/consult to Physical/Occupational Therapy    X-Ray Lumbar Spine 5 View    Ambulatory referral/consult to Cardiac Rehab    Myofascial pain - Primary    Relevant Orders    Ambulatory referral/consult to Physical/Occupational Therapy    X-Ray Lumbar Spine 5 View    Ambulatory referral/consult to Cardiac Rehab    Sedentary lifestyle    Relevant Orders    Ambulatory referral/consult to Physical/Occupational Therapy    X-Ray Lumbar Spine 5 View    Ambulatory referral/consult to Cardiac Rehab         Very poor posture and obesity with sedentary lifestyle.  Scoliosis is mild and likely irrelevant.    1. Patient is quite sedentary with poor posture- referral to PT for posture and core strength.  2. Consider interventions only upon completion of PT  3. I stressed the imp[ortance of PT and HE.  She was previous referred to PT but did not show for the appointments.  4. Weight loss advised  5. Discussion with cardiology re: appropriate target heart rate for exercises advised  6. Consider Cardiac rehab program    Follow Up: 8 weeks    Disclaimer: This note was partly generated using dictation software which may occasionally result in transcription errors.    [email or web address]  X-Ray Knee 3 View Left  FINDINGS:  There is no fracture. There is no dislocation.  There is a small joint effusion in the left knee.  Impression:  There is a small joint effusion in the left knee.      Review of Systems:  Review of Systems   Constitutional: Negative for chills and fever.   HENT: Negative for nosebleeds.    Eyes: Negative for blurred vision and pain.   Respiratory: Negative for hemoptysis.    Cardiovascular: Negative for chest pain and palpitations.   Gastrointestinal: Negative for heartburn, nausea and vomiting.   Genitourinary: Negative for dysuria and hematuria.   Musculoskeletal: Positive for back pain, joint pain, myalgias and neck pain.   Skin: Negative for rash.   Neurological: Negative for seizures and loss of consciousness.   Endo/Heme/Allergies: Does not bruise/bleed easily.   Psychiatric/Behavioral: Negative for hallucinations.       Physical Exam:  Vitals:    22 1454   BP: 104/69   Pulse: 67   PainSc:   4     General    Nursing note and vitals reviewed.  Constitutional: She is oriented to person, place, and time. She appears well-developed and well-nourished. No distress.   HENT:   Head: Normocephalic and atraumatic.   Nose: Nose normal.   Eyes: Conjunctivae and EOM are normal. Pupils are equal, round, and  reactive to light. Right eye exhibits no discharge. Left eye exhibits no discharge. No scleral icterus.   Neck: No JVD present.   Cardiovascular: Intact distal pulses.    Pulmonary/Chest: Effort normal. No respiratory distress.   Abdominal: She exhibits no distension.   Neurological: She is alert and oriented to person, place, and time. Coordination normal.   Psychiatric: She has a normal mood and affect. Her behavior is normal. Judgment and thought content normal.     General Musculoskeletal Exam   Gait: normal     Back (L-Spine & T-Spine) / Neck (C-Spine) Exam     Tenderness Posterior midline palpation reveals tenderness of the Upper C-Spine and Lower C-Spine. Right paramedian tenderness of the Lower L-Spine. Left paramedian tenderness of the Lower L-Spine.     Back (L-Spine & T-Spine) Range of Motion   Extension: abnormal Back extension: facet loading is positive and exacerabtes/reproduces the patient's typical low back pain    Flexion: abnormal Back flexion: limited ROM but partial relief of low back pain noted.     Neck (C-Spine) Range of Motion   Flexion:     Limited  Extension: Limited  Right Lateral Bend: abnormal  Left Lateral Bend: abnormal  Right Rotation: abnormal  Left Rotation: abnormal    Spinal Sensation   Right Side Sensation  C-Spine Level: normal   L-Spine Level: normal  Left Side Sensation  C-Spine Level: normal  L-Spine Level: normal    Neck (C-Spine) Tests   Spurling's Test   Left:  Negative  Right: negative    Other She has no scoliosis .      Muscle Strength   Right Upper Extremity   Biceps: 5/5   Deltoid:  5/5  Triceps:  5/5  Wrist extension: 5/5   Wrist flexion: 5/5   Finger Flexors:  5/5  Finger Extensors:  5/5  Left Upper Extremity  Biceps: 5/5   Deltoid:  5/5  Triceps:  5/5  Wrist extension: 5/5   Wrist flexion: 5/5   Finger Flexors:  5/5  Finger Extensors:  5/5  Right Lower Extremity   Hip Flexion: 5/5   Hip Extensors: 5/5  Quadriceps:  5/5   Hamstrin/5   Gastrocsoleus:  5/5   Left  Lower Extremity   Hip Flexion: 5/5   Hip Extensors: 5/5  Quadriceps:  5/5   Hamstrin/5   Gastrocsoleus:  5/5     Reflexes     Left Side  Biceps:  2+  Achilles:  2+  Quadriceps:  2+    Right Side   Biceps:  2+  Achilles:  2+  Quadriceps:  2+

## 2022-01-31 NOTE — PROGRESS NOTES
Pt CardioMems transmission received and review.      CardioMEMS Transmission  1/31/2022 1/27/2022 1/24/2022 1/18/2022   Transmission Date 1/30/2022 1/27/2022 1/23/2022 1/17/2022   Transmission Type Maintenance Maintenance Maintenance Maintenance   PAS 44 32 35 35   LEELA 29 20 24 22   PAD  19 12 16 13   Medication Adjustments  No No No No   Some recent data might be hidden         Pressures are elevated but coming down.    Medications changed? no    Patient contacted? no    Will continue to monitor.

## 2022-02-03 ENCOUNTER — PATIENT MESSAGE (OUTPATIENT)
Dept: TRANSPLANT | Facility: CLINIC | Age: 47
End: 2022-02-03
Payer: MEDICARE

## 2022-02-07 ENCOUNTER — PATIENT MESSAGE (OUTPATIENT)
Dept: TRANSPLANT | Facility: CLINIC | Age: 47
End: 2022-02-07
Payer: MEDICARE

## 2022-02-07 ENCOUNTER — CLINICAL SUPPORT (OUTPATIENT)
Dept: REHABILITATION | Facility: HOSPITAL | Age: 47
End: 2022-02-07
Attending: PAIN MEDICINE
Payer: MEDICARE

## 2022-02-07 DIAGNOSIS — R29.3 POOR POSTURE: Primary | ICD-10-CM

## 2022-02-07 DIAGNOSIS — M79.18 MYOFASCIAL PAIN: ICD-10-CM

## 2022-02-07 DIAGNOSIS — G89.29 CHRONIC LOW BACK PAIN WITHOUT SCIATICA, UNSPECIFIED BACK PAIN LATERALITY: ICD-10-CM

## 2022-02-07 DIAGNOSIS — M54.50 CHRONIC LOW BACK PAIN WITHOUT SCIATICA, UNSPECIFIED BACK PAIN LATERALITY: ICD-10-CM

## 2022-02-07 DIAGNOSIS — R29.898 WEAKNESS OF HIP, UNSPECIFIED LATERALITY: ICD-10-CM

## 2022-02-07 DIAGNOSIS — Z91.89 SEDENTARY LIFESTYLE: ICD-10-CM

## 2022-02-07 PROCEDURE — 97162 PT EVAL MOD COMPLEX 30 MIN: CPT | Mod: PO

## 2022-02-10 ENCOUNTER — DOCUMENTATION ONLY (OUTPATIENT)
Dept: TRANSPLANT | Facility: CLINIC | Age: 47
End: 2022-02-10
Payer: MEDICARE

## 2022-02-10 PROBLEM — R29.3 POOR POSTURE: Status: ACTIVE | Noted: 2022-02-10

## 2022-02-10 PROBLEM — R29.898 HIP WEAKNESS: Status: ACTIVE | Noted: 2022-02-10

## 2022-02-10 NOTE — PROGRESS NOTES
Pt CardioMems transmission received and review.      CardioMEMS Transmission  2/10/2022 1/31/2022 1/27/2022 1/24/2022   Transmission Date 2/8/2022 1/30/2022 1/27/2022 1/23/2022   Transmission Type Maintenance Maintenance Maintenance Maintenance   PAS 39 44 32 35   LEELA 25 29 20 24   PAD  16 19 12 16   Medication Adjustments  No No No No   Some recent data might be hidden         Pressures are stable.    Medications changed? no    Patient contacted? no    Will continue to monitor.
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
## 2022-02-14 ENCOUNTER — DOCUMENTATION ONLY (OUTPATIENT)
Dept: TRANSPLANT | Facility: CLINIC | Age: 47
End: 2022-02-14
Payer: MEDICARE

## 2022-02-17 ENCOUNTER — PATIENT MESSAGE (OUTPATIENT)
Dept: ELECTROPHYSIOLOGY | Facility: CLINIC | Age: 47
End: 2022-02-17
Payer: MEDICARE

## 2022-02-21 ENCOUNTER — PATIENT MESSAGE (OUTPATIENT)
Dept: TRANSPLANT | Facility: CLINIC | Age: 47
End: 2022-02-21
Payer: MEDICARE

## 2022-02-22 ENCOUNTER — CLINICAL SUPPORT (OUTPATIENT)
Dept: REHABILITATION | Facility: HOSPITAL | Age: 47
End: 2022-02-22
Attending: PAIN MEDICINE
Payer: MEDICARE

## 2022-02-22 DIAGNOSIS — R29.898 WEAKNESS OF HIP, UNSPECIFIED LATERALITY: Primary | ICD-10-CM

## 2022-02-22 DIAGNOSIS — R29.3 POOR POSTURE: ICD-10-CM

## 2022-02-22 PROCEDURE — 97110 THERAPEUTIC EXERCISES: CPT | Mod: PO

## 2022-02-22 NOTE — PROGRESS NOTES
[facility]   Physical Therapy Treatment Note     [facility]: [id number]    Therapy Diagnosis:   Encounter Diagnoses   Name Primary?    Weakness of hip, unspecified laterality Yes    Poor posture      Physician: Brie Angeles Jr.,*    Visit Date: 2/22/2022    Physician Orders: PT Eval and Treat-and mid back; please assess for posture and train for core stretching and strengthening to treat whole spine pain. Neck  Medical Diagnosis from Referral:   M54.50,G89.29 (ICD-10-CM) - Chronic low back pain without sciatica, unspecified back pain laterality   M79.18 (ICD-10-CM) - Myofascial pain   Z91.89 (ICD-10-CM) - Sedentary lifestyle    Evaluation Date: 2/7/2022  Authorization Period Expiration: 4/5/2022  Plan of Care Expiration: 4/7/2022  Progress Note Due: 2/7/2022  Visit # / Visits authorized: 1/ 18  FOTO: 1/5     Precautions: Standard, CHF and ICD       PTA Visit #: 0/5     Time In: 2:48  Time Out: 3:25  Total Billable Time: 37 minutes    SUBJECTIVE     Pt reports: has been having a lot of trouble with her neck lately. Has a burn on her stomach from the monitor, but otherwise the heart is doing ok.  She was compliant with home exercise program.  Response to previous treatment: favorable- felt ok  Functional change: none at the moment     Pain: 8/10  Location: bilateral neck      OBJECTIVE     Cervical AROM:   -flexion: 30 deg  -extension: 35 deg  - R SB: 45 deg  -L SB: 40 deg  -R rotation: 45 deg (45 deg supine)  -L rotation: 45 deg (40 deg supine)    Treatment     Mario received the treatments listed below:      therapeutic exercises to develop strength, ROM, posture and core stabilization for 30 minutes including:  Date  2/22/2022 2/7/2022   VISIT 1/18 4/5/2022 1/1   FOTO 1/5 0/5   POC EXP. DATE 4/7/2022 4/7/2022   FACE-TO-FACE 3/7/2022 3/7/2022          TABLE:      LTR      Supine pec str w/ towel      TA      TA w/march      deadbugs       chin tucks 10x  "2" hold      Cervical AROM 10x flex/ext,  & rotation                   SEATED:      Upper trap stretch      Levator stretch      TA on physioball      physioball rollouts      Postural exercises:  Scapula squeezes  B ER    1 x 10  1 x 15 RTB                   STANDING:      Doorway stretch      palloff press      Theraband  - T's  - I's  - Rows       1 x 15 RTB                   Initials LT LT      manual therapy techniques: Soft tissue Mobilization were applied to the: neck for 7 minutes, including:  -STM and trigger point release to B upper traps    Patient Education and Home Exercises     Home Exercises Provided and Patient Education Provided     Education provided:   - importance of continuing HEP    Written Home Exercises Provided: Patient instructed to cont prior HEP. Exercises were reviewed and Mario was able to demonstrate them prior to the end of the session.  Mario demonstrated good  understanding of the education provided. See EMR under Patient Instructions for exercises provided during therapy sessions    ASSESSMENT     Mario arrived to therapy reporting increased pain in her neck today. She was noted to have decreased and painful mobility in her neck both in a seated position and lying supine. She tolerated all exercises well with no reports of increased symptoms, and focused primarily on posture today to decrease strain on the neck.    Mario Is progressing slowly towards her goals.   Pt prognosis is Fair.     Pt will continue to benefit from skilled outpatient physical therapy to address the deficits listed in the problem list box on initial evaluation, provide pt/family education and to maximize pt's level of independence in the home and community environment.     Pt's spiritual, cultural and educational needs considered and pt agreeable to plan of care and goals.     Anticipated barriers to physical therapy: cardiac condition    Goals:   Short Term Goals: 4 weeks   1. Pt will be compliant with HEP to promote " independent management of current diagnosis.  2. Pt will have increased B hip strength by at least 1/2 grade to improve ability to perform sit to stand transfers without UE assist.  3. Pt will report subjective pain ratings no greater than 6/10 with ADLs.     Long Term Goals: 8 weeks   1. Pt will have reduced FOTO limitation score from 53% to no greater than 40% to indicate improved functional mobility.  2. Pt will have improved core strength to at least good to improve stability with ADLs.  3. Pt will be able to stand for at least 30 mins without pain to improve ability to perform ADLs like washing dishes.  4. Pt will report subjective pain ratings no greater than 2/10 with ADLs.    PLAN     Continue with PT POC, progressing as tolerated.    Sanjana Reyes, PT

## 2022-02-24 ENCOUNTER — TELEPHONE (OUTPATIENT)
Dept: TRANSPLANT | Facility: CLINIC | Age: 47
End: 2022-02-24
Payer: MEDICARE

## 2022-02-24 NOTE — TELEPHONE ENCOUNTER
Called patient to remind her to transmit her cardiomems numbers since there has been no new readings since 2/17. Patient stated that she has been transmitting. Provided her with tech support phone number to call and get assistance.

## 2022-02-28 ENCOUNTER — DOCUMENTATION ONLY (OUTPATIENT)
Dept: TRANSPLANT | Facility: CLINIC | Age: 47
End: 2022-02-28
Payer: MEDICARE

## 2022-02-28 NOTE — PROGRESS NOTES
Pt CardioMems transmission received and review.      CardioMEMS Transmission  2/28/2022 2/14/2022 2/10/2022 1/31/2022   Transmission Date 2/26/2022 2/12/2022 2/8/2022 1/30/2022   Transmission Type Maintenance Maintenance Maintenance Maintenance   PAS 28 30 39 44   LEELA 16 18 25 29   PAD  10 11 16 19   Medication Adjustments  No No No No   Some recent data might be hidden         Pressures are stable.    Medications changed? no    Patient contacted? no    Will continue to monitor.

## 2022-03-03 ENCOUNTER — DOCUMENTATION ONLY (OUTPATIENT)
Dept: TRANSPLANT | Facility: CLINIC | Age: 47
End: 2022-03-03
Payer: MEDICARE

## 2022-03-03 ENCOUNTER — TELEPHONE (OUTPATIENT)
Dept: PAIN MEDICINE | Facility: CLINIC | Age: 47
End: 2022-03-03
Payer: MEDICARE

## 2022-03-03 DIAGNOSIS — Z91.89 SEDENTARY LIFESTYLE: Primary | ICD-10-CM

## 2022-03-03 DIAGNOSIS — I50.42 CHRONIC COMBINED SYSTOLIC AND DIASTOLIC CONGESTIVE HEART FAILURE: ICD-10-CM

## 2022-03-03 NOTE — PROGRESS NOTES
Pt CardioMems transmission received and review.      CardioMEMS Transmission  3/3/2022 2/28/2022 2/14/2022 2/10/2022   Transmission Date 3/3/2022 2/26/2022 2/12/2022 2/8/2022   Transmission Type Maintenance Maintenance Maintenance Maintenance   PAS 34 28 30 39   LEELA 20 16 18 25   PAD  11 10 11 16   Medication Adjustments  No No No No   Some recent data might be hidden         Pressures are stable.    Medications changed? no    Patient contacted? no    Will continue to monitor.

## 2022-03-04 ENCOUNTER — TELEPHONE (OUTPATIENT)
Dept: CARDIAC REHAB | Facility: CLINIC | Age: 47
End: 2022-03-04
Payer: MEDICARE

## 2022-03-04 NOTE — TELEPHONE ENCOUNTER
"Information packet sent to patient, which includes "Your Guide to Living with Heart Disease".  Letter was also sent to patient, along with telephone # for SCP.  Patient lives in [place].    Joseline Monzon RN  Cardiac Rehab Nurse    "

## 2022-03-07 ENCOUNTER — CLINICAL SUPPORT (OUTPATIENT)
Dept: REHABILITATION | Facility: HOSPITAL | Age: 47
End: 2022-03-07
Payer: MEDICARE

## 2022-03-07 ENCOUNTER — PATIENT MESSAGE (OUTPATIENT)
Dept: TRANSPLANT | Facility: CLINIC | Age: 47
End: 2022-03-07
Payer: MEDICARE

## 2022-03-07 DIAGNOSIS — R29.3 POOR POSTURE: ICD-10-CM

## 2022-03-07 DIAGNOSIS — R29.898 WEAKNESS OF HIP, UNSPECIFIED LATERALITY: Primary | ICD-10-CM

## 2022-03-07 PROCEDURE — 97110 THERAPEUTIC EXERCISES: CPT | Mod: PO,CQ

## 2022-03-07 NOTE — PROGRESS NOTES
"  [facility]   Physical Therapy Treatment Note     [facility]  [facility] [id number]    Therapy Diagnosis:   Encounter Diagnoses   Name Primary?    Weakness of hip, unspecified laterality Yes    Poor posture      Physician: Brie Angeles Jr.,*    Visit Date: 3/7/2022    Physician Orders: PT Eval and Treat-and mid back; please assess for posture and train for core stretching and strengthening to treat whole spine pain. Neck  Medical Diagnosis from Referral:   M54.50,G89.29 (ICD-10-CM) - Chronic low back pain without sciatica, unspecified back pain laterality   M79.18 (ICD-10-CM) - Myofascial pain   Z91.89 (ICD-10-CM) - Sedentary lifestyle    Evaluation Date: 2/7/2022  Authorization Period Expiration: 4/5/2022  Plan of Care Expiration: 4/7/2022  Progress Note Due: 2/7/2022  Visit # / Visits authorized: 2/ 18  FOTO: 2/5     Precautions: Standard, CHF and ICD       PTA Visit #: 1/5     Time In: 3:05  Time Out: 3:30  Total Billable Time: 35 minutes    SUBJECTIVE     Pt reports: neck is fine its my back today   She was compliant with home exercise program.  Response to previous treatment: favorable- felt ok  Functional change: none at the moment     Pain: 6/10  Location: back    OBJECTIVE       Treatment     Mario received the treatments listed below:      therapeutic exercises to develop strength, ROM, posture and core stabilization for 30 minutes including:  Date  3/7/2022 2/22/2022 2/7/2022   VISIT 2/18 1/18 4/5/2022 1/1   FOTO 2/5 1/5 0/5   POC EXP. DATE 4/7/2022 4/7/2022 4/7/2022   FACE-TO-FACE 3/7/2022 3/7/2022 3/7/2022           TABLE:       LTR 2 min w/MH       Supine pec str w/ towel       TA       TA w/march       deadbugs        chin tucks  10x 2" hold      Cervical AROM  10x flex/ext,  & rotation      Piriformis str 3 x 20"  B      Iso abs 1 min x 2" hold      TAs 1 min x 2" hold      bridges 1 min x 2" hold       SKC 10 x 5" hold B       SEATED:       Upper " trap stretch       Levator stretch       TA on physioball       physioball rollouts       Postural exercises:  Scapula squeezes  B ER     1 x 10  1 x 15 RTB      thoracic extension 1 min w/bolster               STANDING:       Doorway stretch       palloff press       Theraband  - T's  - I's  - Rows        1 x 15 RTB                     Initials EM LT LT      manual therapy techniques: Soft tissue Mobilization were applied to the: neck for 0 minutes, including:  -STM and trigger point release to B upper traps    Patient Education and Home Exercises     Home Exercises Provided and Patient Education Provided     Education provided:   - importance of continuing HEP    Written Home Exercises Provided: Patient instructed to cont prior HEP. Exercises were reviewed and Mario was able to demonstrate them prior to the end of the session.  Mario demonstrated good  understanding of the education provided. See EMR under Patient Instructions for exercises provided during therapy sessions    ASSESSMENT     Mario arrived to therapy reporting decreased pain in the neck and now the lower back is bothering her. Patient had moist heat with supine exercises to help with muscle spasms and pain. Patient had noted lordosis in supine, patient felt stretches were helping with back pain especially back extension. Patient ended session stating she felt better than when she arrived.     Mario Is progressing slowly towards her goals.   Pt prognosis is Fair.     Pt will continue to benefit from skilled outpatient physical therapy to address the deficits listed in the problem list box on initial evaluation, provide pt/family education and to maximize pt's level of independence in the home and community environment.     Pt's spiritual, cultural and educational needs considered and pt agreeable to plan of care and goals.     Anticipated barriers to physical therapy: cardiac condition    Goals:   Short Term Goals: 4 weeks   1. Pt will be compliant with HEP  to promote independent management of current diagnosis.  2. Pt will have increased B hip strength by at least 1/2 grade to improve ability to perform sit to stand transfers without UE assist.  3. Pt will report subjective pain ratings no greater than 6/10 with ADLs.     Long Term Goals: 8 weeks   1. Pt will have reduced FOTO limitation score from 53% to no greater than 40% to indicate improved functional mobility.  2. Pt will have improved core strength to at least good to improve stability with ADLs.  3. Pt will be able to stand for at least 30 mins without pain to improve ability to perform ADLs like washing dishes.  4. Pt will report subjective pain ratings no greater than 2/10 with ADLs.    PLAN     Continue with PT POC, progressing as tolerated.    Natanael Asher, PTA

## 2022-03-09 ENCOUNTER — CLINICAL SUPPORT (OUTPATIENT)
Dept: REHABILITATION | Facility: HOSPITAL | Age: 47
End: 2022-03-09
Attending: PAIN MEDICINE
Payer: MEDICARE

## 2022-03-09 DIAGNOSIS — R29.898 WEAKNESS OF HIP, UNSPECIFIED LATERALITY: Primary | ICD-10-CM

## 2022-03-09 DIAGNOSIS — R29.3 POOR POSTURE: ICD-10-CM

## 2022-03-09 PROCEDURE — 97110 THERAPEUTIC EXERCISES: CPT | Mod: PO,CQ

## 2022-03-09 NOTE — PROGRESS NOTES
"  [facility]   Physical Therapy Treatment Note     [facility] [id number]    Therapy Diagnosis:   Encounter Diagnoses   Name Primary?    Weakness of hip, unspecified laterality Yes    Poor posture      Physician: Brie Angeles Jr.,*    Visit Date: 3/9/2022    Physician Orders: PT Eval and Treat-and mid back; please assess for posture and train for core stretching and strengthening to treat whole spine pain. Neck  Medical Diagnosis from Referral:   M54.50,G89.29 (ICD-10-CM) - Chronic low back pain without sciatica, unspecified back pain laterality   M79.18 (ICD-10-CM) - Myofascial pain   Z91.89 (ICD-10-CM) - Sedentary lifestyle    Evaluation Date: 2/7/2022  Authorization Period Expiration: 4/5/2022  Plan of Care Expiration: 4/7/2022  Progress Note Due: 2/7/2022  Visit # / Visits authorized: 3/ 18  FOTO: 3/5     Precautions: Standard, CHF and ICD       PTA Visit #: 2/5     Time In: 2:55  Time Out: 3:30  Total Billable Time: 35 minutes    SUBJECTIVE     Pt reports: felt better after last time   She was compliant with home exercise program.  Response to previous treatment: favorable- felt ok  Functional change: none at the moment     Pain: 6/10  Location: back    OBJECTIVE       Treatment     Mario received the treatments listed below:      therapeutic exercises to develop strength, ROM, posture and core stabilization for 35 minutes including:  Date  3/9/2022 3/7/2022 2/22/2022 2/7/2022   VISIT 3/18 2/18 1/18  4/5/2022 1/1   FOTO 3/5 2/5 1/5 0/5   POC EXP. DATE 4/7/2022 4/7/2022 4/7/2022 4/7/2022   FACE-TO-FACE 3/7/2022 3/7/2022 3/7/2022 3/7/2022    nustep 5 min L1        TABLE:        LTR 2 min w/ MH 2 min w/MH       Supine pec str w/ towel        TA        TA w/march        deadbugs         chin tucks   10x 2" hold      Cervical AROM   10x flex/ext,  & rotation      Piriformis str  3 x 20"  B      Iso abs 1 min x 2" hold  1 min x 2" hold      TAs  1 min x 2" " "hold      bridges  1 min x 2" hold       SKC 10 x 5" hold B  10 x 5" hold B       SEATED:        Upper trap stretch        Levator stretch        TA on physioball        physioball rollouts        Postural exercises:  Scapula squeezes  B ER      1 x 10  1 x 15 RTB      thoracic extension 1 min   w/bolster 1 min w/bolster        prayer stretch 10 x 5" hold ea  3 way        STANDING:        Doorway stretch        palloff press        Theraband  - T's  - I's  - Rows     2 x 10 RTB          1 x 15 RTB                       Initials EM EM LT LT      manual therapy techniques: Soft tissue Mobilization were applied to the: neck for 0 minutes, including:  -STM and trigger point release to B upper traps    Patient Education and Home Exercises     Home Exercises Provided and Patient Education Provided     Education provided:   - importance of continuing HEP    Written Home Exercises Provided: Patient instructed to cont prior HEP. Exercises were reviewed and Mario was able to demonstrate them prior to the end of the session.  Mario demonstrated good  understanding of the education provided. See EMR under Patient Instructions for exercises provided during therapy sessions    ASSESSMENT     Omeagan arrived to therapy reporting decreased pain as compared to last session. Patient stated she had some stiffness yesterday and stretches did help somewhat. Patient was able to progress through exercises with no increases in pain. Patient ended session stating she felt fine.     Mario Is progressing slowly towards her goals.   Pt prognosis is Fair.     Pt will continue to benefit from skilled outpatient physical therapy to address the deficits listed in the problem list box on initial evaluation, provide pt/family education and to maximize pt's level of independence in the home and community environment.     Pt's spiritual, cultural and educational needs considered and pt agreeable to plan of care and goals.     Anticipated barriers to physical " therapy: cardiac condition    Goals:   Short Term Goals: 4 weeks   1. Pt will be compliant with HEP to promote independent management of current diagnosis.  2. Pt will have increased B hip strength by at least 1/2 grade to improve ability to perform sit to stand transfers without UE assist.  3. Pt will report subjective pain ratings no greater than 6/10 with ADLs.     Long Term Goals: 8 weeks   1. Pt will have reduced FOTO limitation score from 53% to no greater than 40% to indicate improved functional mobility.  2. Pt will have improved core strength to at least good to improve stability with ADLs.  3. Pt will be able to stand for at least 30 mins without pain to improve ability to perform ADLs like washing dishes.  4. Pt will report subjective pain ratings no greater than 2/10 with ADLs.    PLAN     Continue with PT POC, progressing as tolerated.    Natanael Asher, PTA

## 2022-03-10 ENCOUNTER — TELEPHONE (OUTPATIENT)
Dept: CARDIOLOGY | Facility: HOSPITAL | Age: 47
End: 2022-03-10
Payer: MEDICARE

## 2022-03-10 ENCOUNTER — DOCUMENTATION ONLY (OUTPATIENT)
Dept: TRANSPLANT | Facility: CLINIC | Age: 47
End: 2022-03-10
Payer: MEDICARE

## 2022-03-10 NOTE — PROGRESS NOTES
Pt CardioMems transmission received and reviewed.      CardioMEMS Transmission  3/10/2022 3/3/2022 2/28/2022 2/14/2022   Transmission Date 3/10/2022 3/3/2022 2/26/2022 2/12/2022   Transmission Type Maintenance Maintenance Maintenance Maintenance   PAS 34 34 28 30   LEELA 20 20 16 18   PAD  12 11 10 11   Medication Adjustments  No No No No   Some recent data might be hidden         Pressures are stable.    Medication Interventions? no    Lab Interventions? no    Patient contacted? no      Will continue to monitor.

## 2022-03-10 NOTE — TELEPHONE ENCOUNTER
----- Message from Karis Phelan RN sent at 3/10/2022  9:30 AM CST -----  Regarding: Extraction  No date for procedure at this time. Patient is on the list for patient's awaiting dates for Extractions. Anticipated to be approximately mid to late April. She will be contacted as soon as a date is secured.   ----- Message -----  From: Yumiko Montgomery  Sent: 3/10/2022   9:25 AM CST  To: Karis Phelan RN    Just wanted to f/u on this patient with tachy therapies OFF, wearing LifeVest, plan for extraction and reimplant.  See phone note from Raul 1/27/22    Thanks,  Yumiko

## 2022-03-14 ENCOUNTER — DOCUMENTATION ONLY (OUTPATIENT)
Dept: REHABILITATION | Facility: HOSPITAL | Age: 47
End: 2022-03-14
Payer: MEDICARE

## 2022-03-14 NOTE — PROGRESS NOTES
PT/PTA met face to face to discuss pt's treatment plan and progress towards established goals. Pt will be seen by a physical therapist minimally every 6th visit or every 30 days.    Sanjana Reyes PT  3/14/2022    Face to Face PTA Conference performed with Sanjana Reyes PT regarding patient's current status, overall progress, and plan of care. Pt will be seen by a physical therapist minimally every 6th visit or every 30 days.    Natanael Asher, PTA   3/14/2022

## 2022-03-15 ENCOUNTER — DOCUMENTATION ONLY (OUTPATIENT)
Dept: TRANSPLANT | Facility: CLINIC | Age: 47
End: 2022-03-15
Payer: MEDICARE

## 2022-03-15 NOTE — PROGRESS NOTES
Pt CardioMems transmission received and review.      CardioMEMS Transmission  3/15/2022 3/10/2022 3/3/2022 2/28/2022   Transmission Date 3/14/2022 3/10/2022 3/3/2022 2/26/2022   Transmission Type Maintenance Maintenance Maintenance Maintenance   PAS 42 34 34 28   LEELA 28 20 20 16   PAD  19 12 11 10   Medication Adjustments  No No No No   Some recent data might be hidden         Pressures are trending up, watch close.    Medications changed? no    Patient contacted? no    Will continue to monitor.

## 2022-03-17 ENCOUNTER — TELEPHONE (OUTPATIENT)
Dept: CARDIAC REHAB | Facility: CLINIC | Age: 47
End: 2022-03-17
Payer: MEDICARE

## 2022-03-18 ENCOUNTER — PATIENT MESSAGE (OUTPATIENT)
Dept: TRANSPLANT | Facility: CLINIC | Age: 47
End: 2022-03-18
Payer: MEDICARE

## 2022-03-18 NOTE — TRANSFER OF CARE
"Anesthesia Transfer of Care Note    Patient: Mario Mahajan    Procedure(s) Performed: Procedure(s) (LRB):  HYSTERECTOMY, TOTAL, ABDOMINAL (N/A)    Patient location: PACU    Anesthesia Type: general    Transport from OR: Transported from OR on 6-10 L/min O2 by face mask with adequate spontaneous ventilation    Post pain: adequate analgesia    Post assessment: no apparent anesthetic complications and tolerated procedure well    Post vital signs: stable    Level of consciousness: responds to stimulation and awake    Nausea/Vomiting: no nausea/vomiting    Complications: none    Transfer of care protocol was followed      Last vitals:   Visit Vitals  BP (!) 120/56   Pulse 76   Temp 36.6 °C (97.9 °F) (Skin)   Resp 18   Ht 5' 9" (1.753 m)   Wt 116.6 kg (257 lb)   LMP 03/01/2019   SpO2 98%   Breastfeeding? No   BMI 37.95 kg/m²     " Ablation Location: Right Atrium.

## 2022-03-22 ENCOUNTER — PATIENT MESSAGE (OUTPATIENT)
Dept: TRANSPLANT | Facility: CLINIC | Age: 47
End: 2022-03-22
Payer: MEDICARE

## 2022-03-25 ENCOUNTER — DOCUMENTATION ONLY (OUTPATIENT)
Dept: TRANSPLANT | Facility: CLINIC | Age: 47
End: 2022-03-25
Payer: MEDICARE

## 2022-03-25 ENCOUNTER — TELEPHONE (OUTPATIENT)
Dept: ELECTROPHYSIOLOGY | Facility: CLINIC | Age: 47
End: 2022-03-25
Payer: MEDICARE

## 2022-03-25 ENCOUNTER — TELEPHONE (OUTPATIENT)
Dept: FAMILY MEDICINE | Facility: CLINIC | Age: 47
End: 2022-03-25
Payer: MEDICARE

## 2022-03-25 NOTE — TELEPHONE ENCOUNTER
Spoke with patient and offered date of 4/13/2022 for Extraction procedure. Patient states that her daughter is graduating at that time with events surrounding the graduation and would be too difficult to be under restrictions during that time. Patient request to be called with next available date after the end of April, and understands that we cannot guarantee a time frame for when the next available may be. Patient confirms that she is compliant with her Life Vest and verbalizes the importance of wearing the Life Vest at all times until procedure.

## 2022-03-25 NOTE — PROGRESS NOTES
Pt CardioMems transmission received and review.      CardioMEMS Transmission  3/25/2022 3/15/2022 3/10/2022 3/3/2022   Transmission Date 3/23/2022 3/14/2022 3/10/2022 3/3/2022   Transmission Type Maintenance Maintenance Maintenance Maintenance   PAS 50 42 34 34   LEELA 36 28 20 20   PAD  25 19 12 11   Medication Adjustments  No No No No   Some recent data might be hidden         Pressures are elevated, Reviewed with Dr. Hurd who ordered the instructions below. Information communicated to patient    Medications changed? yes- Patient to take Bumex 2 mg BID until Monday and 60 meq of K as well.     Patient contacted? yes- patient has not changed her medicine.    Will continue to monitor.

## 2022-03-28 ENCOUNTER — PATIENT MESSAGE (OUTPATIENT)
Dept: TRANSPLANT | Facility: CLINIC | Age: 47
End: 2022-03-28
Payer: MEDICARE

## 2022-03-31 ENCOUNTER — DOCUMENTATION ONLY (OUTPATIENT)
Dept: TRANSPLANT | Facility: CLINIC | Age: 47
End: 2022-03-31
Payer: MEDICARE

## 2022-03-31 ENCOUNTER — PATIENT OUTREACH (OUTPATIENT)
Dept: ADMINISTRATIVE | Facility: OTHER | Age: 47
End: 2022-03-31
Payer: MEDICARE

## 2022-03-31 NOTE — PROGRESS NOTES
Pt CardioMems transmission received and review.      CardioMEMS Transmission  3/31/2022 3/25/2022 3/15/2022 3/10/2022   Transmission Date 3/28/2022 3/23/2022 3/14/2022 3/10/2022   Transmission Type Maintenance Maintenance Maintenance Maintenance   PAS 43 50 42 34   LEELA 30 36 28 20   PAD  19 25 19 12   Medication Adjustments  No No No No   Some recent data might be hidden         Pressures are improved.    Medications changed? no    Patient contacted? no    Will continue to monitor.

## 2022-03-31 NOTE — PROGRESS NOTES
Health Maintenance Due   Topic Date Due    COVID-19 Vaccine (3 - Booster for Pfizer series) 08/18/2021    Influenza Vaccine (1) 09/01/2021     Updates were requested from care everywhere.  Chart was reviewed for overdue Proactive [facility] Encounters (JOSE) topics (CRS, Breast Cancer Screening, Eye exam)  Health Maintenance has been updated.  LINKS immunization registry triggered.  Immunizations were reconciled.

## 2022-04-05 ENCOUNTER — PATIENT MESSAGE (OUTPATIENT)
Dept: TRANSPLANT | Facility: CLINIC | Age: 47
End: 2022-04-05
Payer: MEDICARE

## 2022-04-07 ENCOUNTER — RESEARCH ENCOUNTER (OUTPATIENT)
Dept: RESEARCH | Facility: HOSPITAL | Age: 47
End: 2022-04-07
Payer: MEDICARE

## 2022-04-07 DIAGNOSIS — Z00.6 EXAMINATION OF PARTICIPANT IN CLINICAL TRIAL: ICD-10-CM

## 2022-04-07 DIAGNOSIS — I50.42 CHRONIC COMBINED SYSTOLIC (CONGESTIVE) AND DIASTOLIC (CONGESTIVE) HEART FAILURE: Primary | ICD-10-CM

## 2022-04-07 NOTE — PROGRESS NOTES
Contacted patient today 4/7/2022 regarding scheduling her Month 6 GUIDE-HF research study follow-up visit. Patient agreeable to appointments on 4/28/2022 starting at 1:30 PM. Instructed patient that questionnaires, HF exam with NYHA, HF medication review, labs, six minute walk, and adverse event assessment are to be performed. Labs scheduled at 1:30 PM, research visit for questionnaires and six minute walk scheduled at 2:00 PM, and clinic visit with Dr. Hurd scheduled at 2:30 PM. Complete understanding verbalized. Patient denies any questions or concerns at this time. Appointment slip mailed. Will follow.

## 2022-04-12 ENCOUNTER — DOCUMENTATION ONLY (OUTPATIENT)
Dept: TRANSPLANT | Facility: CLINIC | Age: 47
End: 2022-04-12
Payer: MEDICARE

## 2022-04-12 ENCOUNTER — TELEPHONE (OUTPATIENT)
Dept: TRANSPLANT | Facility: CLINIC | Age: 47
End: 2022-04-12
Payer: MEDICARE

## 2022-04-12 ENCOUNTER — PATIENT MESSAGE (OUTPATIENT)
Dept: TRANSPLANT | Facility: CLINIC | Age: 47
End: 2022-04-12
Payer: MEDICARE

## 2022-04-14 ENCOUNTER — PATIENT MESSAGE (OUTPATIENT)
Dept: TRANSPLANT | Facility: CLINIC | Age: 47
End: 2022-04-14
Payer: MEDICARE

## 2022-04-19 ENCOUNTER — TELEPHONE (OUTPATIENT)
Dept: TRANSPLANT | Facility: CLINIC | Age: 47
End: 2022-04-19
Payer: MEDICARE

## 2022-04-19 NOTE — TELEPHONE ENCOUNTER
Called patient and reminded her to transmit her cardiomems numbers. Patient verbalized that she will but she is overwhelmed with all the things that she has going on. Verbalized empathy and understanding. Will look for transmission.

## 2022-04-20 ENCOUNTER — DOCUMENTATION ONLY (OUTPATIENT)
Dept: TRANSPLANT | Facility: CLINIC | Age: 47
End: 2022-04-20
Payer: MEDICARE

## 2022-04-20 ENCOUNTER — PATIENT MESSAGE (OUTPATIENT)
Dept: ELECTROPHYSIOLOGY | Facility: CLINIC | Age: 47
End: 2022-04-20
Payer: MEDICARE

## 2022-04-20 NOTE — PROGRESS NOTES
Pt CardioMems transmission received and review.      CardioMEMS Transmission  4/20/2022 3/31/2022 3/25/2022 3/15/2022   Transmission Date 4/19/2022 3/28/2022 3/23/2022 3/14/2022   Transmission Type Maintenance Maintenance Maintenance Maintenance   PAS 36 43 50 42   LEELA 22 30 36 28   PAD  14 19 25 19   Medication Adjustments  No No No No   Some recent data might be hidden         Pressures are stable.    Medications changed? no    Patient contacted? no    Will continue to monitor.

## 2022-04-25 ENCOUNTER — PATIENT MESSAGE (OUTPATIENT)
Dept: TRANSPLANT | Facility: CLINIC | Age: 47
End: 2022-04-25
Payer: MEDICARE

## 2022-04-25 NOTE — TELEPHONE ENCOUNTER
Reminded patient to transmit cardiomems numbers  
No school, swimming or bike riding today, may resume activities 1/17/19

## 2022-04-27 ENCOUNTER — RESEARCH ENCOUNTER (OUTPATIENT)
Dept: RESEARCH | Facility: HOSPITAL | Age: 47
End: 2022-04-27
Payer: MEDICARE

## 2022-04-27 NOTE — PROGRESS NOTES
Contacted patient today 4/27/2022 regarding appointments tomorrow Thursday 4/28/2022 for the GUIDE-HF Month 6 research study visit. Instructed patient that questionnaires, HF exam with NYHA, HF medication review, labs, six minute walk, and adverse event assessment are to be performed. Patient to have labs at 1:30 PM, research visit for questionnaires and six minute leal walk at 2:00 PM, and a HF clinic visit with Dr. Hurd at 2:30 PM. Patient verbalized complete understanding and confirmed she will be at appointments. Patient denies any questions or concerns at this time. Will follow.

## 2022-04-28 ENCOUNTER — CLINICAL SUPPORT (OUTPATIENT)
Dept: CARDIOLOGY | Facility: HOSPITAL | Age: 47
End: 2022-04-28
Payer: MEDICARE

## 2022-04-28 ENCOUNTER — OFFICE VISIT (OUTPATIENT)
Dept: TRANSPLANT | Facility: CLINIC | Age: 47
End: 2022-04-28
Payer: MEDICARE

## 2022-04-28 ENCOUNTER — RESEARCH ENCOUNTER (OUTPATIENT)
Dept: RESEARCH | Facility: HOSPITAL | Age: 47
End: 2022-04-28
Payer: MEDICARE

## 2022-04-28 VITALS
SYSTOLIC BLOOD PRESSURE: 129 MMHG | WEIGHT: 268.75 LBS | HEART RATE: 57 BPM | BODY MASS INDEX: 39.8 KG/M2 | HEIGHT: 69 IN | DIASTOLIC BLOOD PRESSURE: 62 MMHG

## 2022-04-28 DIAGNOSIS — E66.9 OBESITY (BMI 35.0-39.9 WITHOUT COMORBIDITY): ICD-10-CM

## 2022-04-28 DIAGNOSIS — Z95.810 PRESENCE OF AUTOMATIC (IMPLANTABLE) CARDIAC DEFIBRILLATOR: ICD-10-CM

## 2022-04-28 DIAGNOSIS — I42.8 CARDIOMYOPATHY, NONISCHEMIC: ICD-10-CM

## 2022-04-28 DIAGNOSIS — I47.29 POLYMORPHIC VENTRICULAR TACHYCARDIA: ICD-10-CM

## 2022-04-28 DIAGNOSIS — I50.42 CHRONIC COMBINED SYSTOLIC AND DIASTOLIC HEART FAILURE: Primary | ICD-10-CM

## 2022-04-28 PROCEDURE — 3008F BODY MASS INDEX DOCD: CPT | Mod: CPTII,S$GLB,, | Performed by: INTERNAL MEDICINE

## 2022-04-28 PROCEDURE — 93296 REM INTERROG EVL PM/IDS: CPT | Performed by: INTERNAL MEDICINE

## 2022-04-28 PROCEDURE — 3078F PR MOST RECENT DIASTOLIC BLOOD PRESSURE < 80 MM HG: ICD-10-PCS | Mod: CPTII,S$GLB,, | Performed by: INTERNAL MEDICINE

## 2022-04-28 PROCEDURE — 1159F PR MEDICATION LIST DOCUMENTED IN MEDICAL RECORD: ICD-10-PCS | Mod: CPTII,S$GLB,, | Performed by: INTERNAL MEDICINE

## 2022-04-28 PROCEDURE — 3008F PR BODY MASS INDEX (BMI) DOCUMENTED: ICD-10-PCS | Mod: CPTII,S$GLB,, | Performed by: INTERNAL MEDICINE

## 2022-04-28 PROCEDURE — 1159F MED LIST DOCD IN RCRD: CPT | Mod: CPTII,S$GLB,, | Performed by: INTERNAL MEDICINE

## 2022-04-28 PROCEDURE — 3074F PR MOST RECENT SYSTOLIC BLOOD PRESSURE < 130 MM HG: ICD-10-PCS | Mod: CPTII,S$GLB,, | Performed by: INTERNAL MEDICINE

## 2022-04-28 PROCEDURE — 3074F SYST BP LT 130 MM HG: CPT | Mod: CPTII,S$GLB,, | Performed by: INTERNAL MEDICINE

## 2022-04-28 PROCEDURE — 4010F PR ACE/ARB THEARPY RXD/TAKEN: ICD-10-PCS | Mod: CPTII,S$GLB,, | Performed by: INTERNAL MEDICINE

## 2022-04-28 PROCEDURE — 4010F ACE/ARB THERAPY RXD/TAKEN: CPT | Mod: CPTII,S$GLB,, | Performed by: INTERNAL MEDICINE

## 2022-04-28 PROCEDURE — 3078F DIAST BP <80 MM HG: CPT | Mod: CPTII,S$GLB,, | Performed by: INTERNAL MEDICINE

## 2022-04-28 PROCEDURE — 99999 PR PBB SHADOW E&M-EST. PATIENT-LVL III: CPT | Mod: PBBFAC,,, | Performed by: INTERNAL MEDICINE

## 2022-04-28 PROCEDURE — 99215 OFFICE O/P EST HI 40 MIN: CPT | Mod: S$GLB,,, | Performed by: INTERNAL MEDICINE

## 2022-04-28 PROCEDURE — 99215 PR OFFICE/OUTPT VISIT, EST, LEVL V, 40-54 MIN: ICD-10-PCS | Mod: S$GLB,,, | Performed by: INTERNAL MEDICINE

## 2022-04-28 PROCEDURE — 99999 PR PBB SHADOW E&M-EST. PATIENT-LVL III: ICD-10-PCS | Mod: PBBFAC,,, | Performed by: INTERNAL MEDICINE

## 2022-04-28 NOTE — PROGRESS NOTES
Subjective:     HPI:  Mrs. Mahajan is a very pleasant  46 y.o. black female with stage C HFrEF, NICMP,  With a Hx of VF in  2017 (no events since), s/p CardioMEMS. She is enrolled in the GUIDE HF study and comes for her 6th month post CardioMEMS implant follow-up visit.  From a  HF standpoint, she is doing well. Reports NYHA class II symptoms. No PND or orthopnea. No HF admissions or ED visits. Most recent cardioMEMS readings showed PA diastolic at goal (see below). Of note, her ICD therapy was turned off by EP due to risks of inappropriate shocks. She is wearing a LifeVest now.  She follows with Dr. Srivastava who has her on a excellent HF regimen that includes; entresto 97/103 mg BID, carvedilol 256 mg BID, dapagliflozin 10 mg daily and aldactone 25 mg daily. She in on Bumex.     CardioMEMS Transmission  4/20/2022 3/31/2022 3/25/2022 3/15/2022   Transmission Date 4/19/2022 3/28/2022 3/23/2022 3/14/2022   Transmission Type Maintenance Maintenance Maintenance Maintenance   PAS 36 43 50 42   LEELA 22 30 36 28   PAD  14 19 25 19   Medication Adjustments  No No No        Past Medical History:   Diagnosis Date    Asthma     Chronic back pain 7/1/2014    Chronic combined systolic and diastolic congestive heart failure 4/28/2015     2-10-17   1 - Severely depressed left ventricular systolic function (EF 20-25%).    2 - Severe left ventricular enlargement.    3 - Severe left atrial enlargement.    4 - Left ventricular diastolic dysfunction.    5 - The estimated PA systolic pressure is 18 mmHg.    6 - Mild mitral regurgitation.     Encounter for blood transfusion     ICD (implantable cardioverter-defibrillator) in place 12/01/15 3/3/2016    Menorrhagia, premenopausal 2/10/2017    Microcytic anemia 2/9/2015    Non-rheumatic mitral regurgitation 3/5/2015    Nonischemic dilated cardiomyopathy 12/1/2015    Sleep apnea     Syncope and collapse 2/9/2017    Ventricular tachycardia, polymorphic      Past Surgical History:  "  Procedure Laterality Date    CARDIAC DEFIBRILLATOR PLACEMENT  2015     SECTION      INSERTION, WIRELESS SENSOR, FOR PULMONARY ARTERIAL PRESSURE MONITORING N/A 10/22/2021    Procedure: INSERTION, WIRELESS SENSOR, FOR PULMONARY ARTERIAL PRESSURE MONITORING;  Surgeon: Erika Hurd MD;  Location: [facility] CATH LAB;  Service: Cardiology;  Laterality: N/A;    OOPHORECTOMY      PERICARDIOCENTESIS N/A 2020    Procedure: Pericardiocentesis;  Surgeon: Memo Luis MD;  Location: [facility] CATH LAB;  Service: Cardiology;  Laterality: N/A;    PULMONARY ANGIOGRAPHY N/A 10/22/2021    Procedure: ANGIOGRAM, PULMONARY;  Surgeon: Erika Hurd MD;  Location: [facility] CATH LAB;  Service: Cardiology;  Laterality: N/A;    RIGHT HEART CATHETERIZATION      TOTAL ABDOMINAL HYSTERECTOMY N/A 3/26/2019    Procedure: HYSTERECTOMY, TOTAL, ABDOMINAL;  Surgeon: Victorino Rose MD;  Location: [facility] OR;  Service: OB/GYN;  Laterality: N/A;    TUBAL LIGATION       OB History        2    Para   2    Term   2            AB        Living   2       SAB        IAB        Ectopic        Multiple        Live Births   2               Review of Systems   Constitutional: Positive for night sweats. Negative for chills, decreased appetite, diaphoresis, fever, malaise/fatigue, weight gain and weight loss.   Eyes: Negative.    Cardiovascular: Negative for chest pain, claudication, cyanosis, dyspnea on exertion, irregular heartbeat, leg swelling, near-syncope, orthopnea, palpitations, paroxysmal nocturnal dyspnea and syncope.   Respiratory: Negative for cough, hemoptysis and shortness of breath.    Endocrine: Negative.    Hematologic/Lymphatic: Negative.    Skin: Negative for color change, dry skin and nail changes.   Musculoskeletal: Negative.    Gastrointestinal: Negative.    Genitourinary: Negative.    Neurological: Negative for weakness.       Objective:   Blood pressure 129/62, pulse (!) 57, height 5' 9" (1.753 m), weight " 121.9 kg (268 lb 11.9 oz), last menstrual period 03/01/2019.body mass index is 39.69 kg/m².  Physical Exam  Vitals and nursing note reviewed.   Constitutional:       Appearance: She is well-developed.   HENT:      Head: Normocephalic.   Eyes:      Pupils: Pupils are equal, round, and reactive to light.   Neck:      Vascular: No JVD.   Cardiovascular:      Rate and Rhythm: Normal rate and regular rhythm.      Chest Wall: PMI is displaced.      Pulses: Intact distal pulses.      Heart sounds: Normal heart sounds. No murmur heard.    No friction rub. No gallop.   Pulmonary:      Effort: Pulmonary effort is normal.      Breath sounds: Normal breath sounds. No wheezing or rales.   Abdominal:      General: Bowel sounds are normal.      Palpations: Abdomen is soft.   Musculoskeletal:      Cervical back: Neck supple.   Neurological:      Mental Status: She is alert and oriented to person, place, and time.         Labs:    Chemistry        Component Value Date/Time     12/23/2021 1010    K 3.6 12/23/2021 1010     12/23/2021 1010    CO2 31 (H) 12/23/2021 1010    BUN 15 12/23/2021 1010    BUN 12 06/19/2015 0950    CREATININE 1.07 12/23/2021 1010     12/23/2021 1010        Component Value Date/Time    CALCIUM 8.7 12/23/2021 1010    ALKPHOS 42 12/23/2021 1010    AST 29 12/23/2021 1010    ALT 14 12/23/2021 1010    BILITOT 1.4 (H) 12/23/2021 1010    ESTGFRAFRICA >60.0 12/23/2021 1010    EGFRNONAA >60.0 12/23/2021 1010          Magnesium   Date Value Ref Range Status   10/26/2021 1.7 1.6 - 2.6 mg/dL Final     Lab Results   Component Value Date    WBC 5.48 10/22/2021    HGB 11.5 (L) 10/22/2021    HCT 34.9 (L) 10/22/2021     10/22/2021     Lab Results   Component Value Date    INR 1.0 10/22/2021    INR 1.1 06/17/2020    INR 1.0 06/09/2020     BNP   Date Value Ref Range Status   10/22/2021 429 (H) 0 - 99 pg/mL Final     Comment:     Values of less than 100 pg/ml are consistent with non-CHF populations.    07/30/2021 407 (H) 0 - 99 pg/mL Final     Comment:     Values of less than 100 pg/ml are consistent with non-CHF populations.   04/16/2021 563 (H) 0 - 99 pg/mL Final     Comment:     Values of less than 100 pg/ml are consistent with non-CHF populations.     LD   Date Value Ref Range Status   01/12/2018 180 110 - 260 U/L Final     Comment:     Results are increased in hemolyzed samples.   03/21/2017 190 110 - 260 U/L Final     Comment:     Results are increased in hemolyzed samples.       Assessment:      1. Chronic combined systolic and diastolic heart failure    2. Cardiomyopathy, nonischemic    3. Polymorphic ventricular tachycardia    4. Obesity (BMI 35.0-39.9 without comorbidity)        Plan:   Stage C HFrEF. NYHA class II symptoms. Here for her 6th month follow-up visit (Guide HF study)   Continue current HF regimen  Continue to monitor her PA pressures via the CardioMEMS device   Recommend 2 gram sodium restriction and 1500cc fluid restriction.  Encourage physical activity with graded exercise program.  Requested patient to weigh themselves daily, and to notify us if their weight increases by more than 3 lbs in 1 day or 5 lbs in 1 week.   F/U with EP for recent issues with her LifeVest and ICD  Repeat 2D echo with CFD to reassess for LV recovery now that she has been on optimal GDMT  RTC in 6 months     Erika Hurd MD

## 2022-04-28 NOTE — PROGRESS NOTES
Study: GUIDE-HF  Sponsor: [employer]  Follow-up Visit: Month 6 Visit  Date of Visit: 4/28/2022     Patient wishes to continue in study: Yes  All study protocol required CRFs completed: Yes     Ms. Mahajan is here for the Month 6 follow up visit. EQ-5D-5L and KCCQ questionnaires completed prior to all other study procedures per protocol. Physical exam and NYHA assessment performed per Dr. Hurd. Heart failure medications reviewed and confirmed. Labs performed per protocol. Six minute leal walk test conducted by CRC per protocol. Patient walked a total of 323 meters with no stops noted. Patient denied complaints before, during, and after test and vital signs remained stable at patient's baseline (Before: BP 99/49 HR 51 SaO2 95% RA; After: BP 94/50 HR 64 SaO2 97% RA). Patient denies any hospitalizations, ED visits, or any other reportable adverse events since implant visit. Patient has been intermittently compliant with daily pressure transmissions (last reading received 4/19/2022). Patient encouraged to take daily pressure measurements. Explained importance of transmitting daily pressures and that, if patient is having issues transmitting, Tech Support would be able to either resolve issue or send a new unit if necessary. Complete understanding verbalized. Patient denied any current issues with the electronics system and reported that she hasn't taken pressure readings recently due to her schedule. Patient reported that she will start taking her pressure readings again at a better time of day. All questions answered to patient's satisfaction, and patient will contact research staff if she has any questions or concerns. Next follow up visit is in 6 months. Complete understanding verbalized. Patient denied any questions, concerns, or symptoms at this time. Will follow.

## 2022-04-28 NOTE — Clinical Note
April 28, 2022        Juanjose Nuñez  [address]  [facility] [address]  Phone: [phone]  Fax: [phone]             [id number]  [address]  [facility] [address]  Phone: [phone]   Patient: Mario Mahajan   MR Number: [id number]   YOB: 1975   Date of Visit: 4/28/2022       Dear Dr. Juanjose Nuñez    Thank you for referring Mario Mahajan to me for evaluation. Attached you will find relevant portions of my assessment and plan of care.    If you have questions, please do not hesitate to call me. I look forward to following Mario Mahajan along with you.    Sincerely,    Erika Hurd MD    Enclosure    If you would like to receive this communication electronically, please contact [email or web address] or [phone] to request timeplazza Link access.    timeplazza Link is a tool which provides read-only access to select patient information with whom you have a relationship. Its easy to use and provides real time access to review your patients record including encounter summaries, notes, results, and demographic information.    If you feel you have received this communication in error or would no longer like to receive these types of communications, please e-mail [email or web address]

## 2022-04-29 NOTE — PROGRESS NOTES
Patient, Mario Mahajan (MRN #[id number]), presented with a recorded BMI of 39.69 kg/m^2 and a documented comorbidity(s):  - Atrial Fibrillation  to which the severe obesity is a contributing factor. This is consistent with the definition of severe obesity (BMI 35.0-39.9) with comorbidity (ICD-10 E66.01, Z68.35). The patient's severe obesity was monitored, evaluated, addressed and/or treated. This addendum to the medical record is made on 04/29/2022.

## 2022-05-03 ENCOUNTER — DOCUMENTATION ONLY (OUTPATIENT)
Dept: TRANSPLANT | Facility: CLINIC | Age: 47
End: 2022-05-03
Payer: MEDICARE

## 2022-05-03 NOTE — PROGRESS NOTES
Pt CardioMems transmission received and review.      CardioMEMS Transmission  5/3/2022 4/20/2022 3/31/2022 3/25/2022   Transmission Date 5/3/2022 4/19/2022 3/28/2022 3/23/2022   Transmission Type Maintenance Maintenance Maintenance Maintenance   PAS 35 36 43 50   LEELA 21 22 30 36   PAD  12 14 19 25   Medication Adjustments  No No No No   Some recent data might be hidden         Pressures are stable.    Medications changed? no    Patient contacted? no    Will continue to monitor.

## 2022-05-06 ENCOUNTER — DOCUMENTATION ONLY (OUTPATIENT)
Dept: TRANSPLANT | Facility: CLINIC | Age: 47
End: 2022-05-06
Payer: MEDICARE

## 2022-05-06 NOTE — PROGRESS NOTES
Pt CardioMems transmission received and review.      CardioMEMS Transmission  5/6/2022 5/3/2022 4/20/2022 3/31/2022   Transmission Date 5/6/2022 5/3/2022 4/19/2022 3/28/2022   Transmission Type Maintenance Maintenance Maintenance Maintenance   PAS 42 35 36 43   LEELA 30 21 22 30   PAD  20 12 14 19   Medication Adjustments  No No No No   Some recent data might be hidden         Pressures are trending up, monitoring close.    Medications changed? no    Patient contacted? no    Will continue to monitor.

## 2022-05-09 ENCOUNTER — DOCUMENTATION ONLY (OUTPATIENT)
Dept: TRANSPLANT | Facility: CLINIC | Age: 47
End: 2022-05-09
Payer: MEDICARE

## 2022-05-09 NOTE — PROGRESS NOTES
Pt CardioMems transmission received and review.      CardioMEMS Transmission  5/9/2022 5/6/2022 5/3/2022 4/20/2022   Transmission Date 5/7/2022 5/6/2022 5/3/2022 4/19/2022   Transmission Type Maintenance Maintenance Maintenance Maintenance   PAS 37 42 35 36   LEELA 24 30 21 22   PAD  16 20 12 14   Medication Adjustments  No No No No   Some recent data might be hidden         Pressures are stable.    Medications changed? no    Patient contacted? no    Will continue to monitor.

## 2022-05-16 ENCOUNTER — HOSPITAL ENCOUNTER (OUTPATIENT)
Dept: CARDIOLOGY | Facility: HOSPITAL | Age: 47
Discharge: HOME | End: 2022-05-16
Attending: INTERNAL MEDICINE
Payer: MEDICARE

## 2022-05-16 ENCOUNTER — TELEPHONE (OUTPATIENT)
Dept: ELECTROPHYSIOLOGY | Facility: CLINIC | Age: 47
End: 2022-05-16
Payer: MEDICARE

## 2022-05-16 ENCOUNTER — DOCUMENTATION ONLY (OUTPATIENT)
Dept: TRANSPLANT | Facility: CLINIC | Age: 47
End: 2022-05-16
Payer: MEDICARE

## 2022-05-16 VITALS
HEIGHT: 69 IN | DIASTOLIC BLOOD PRESSURE: 62 MMHG | BODY MASS INDEX: 39.69 KG/M2 | HEART RATE: 70 BPM | SYSTOLIC BLOOD PRESSURE: 129 MMHG | WEIGHT: 268 LBS

## 2022-05-16 DIAGNOSIS — I50.42 CHRONIC COMBINED SYSTOLIC AND DIASTOLIC HEART FAILURE: ICD-10-CM

## 2022-05-16 DIAGNOSIS — I42.8 CARDIOMYOPATHY, NONISCHEMIC: ICD-10-CM

## 2022-05-16 LAB
ASCENDING AORTA: 3 CM
AV INDEX (PROSTH): 0.75
AV MEAN GRADIENT: 4 MMHG
AV PEAK GRADIENT: 7 MMHG
AV VALVE AREA: 2.86 CM2
AV VELOCITY RATIO: 0.74
BSA FOR ECHO PROCEDURE: 2.43 M2
CV ECHO LV RWT: 0.27 CM
DOP CALC AO PEAK VEL: 1.36 M/S
DOP CALC AO VTI: 28.79 CM
DOP CALC LVOT AREA: 3.8 CM2
DOP CALC LVOT DIAMETER: 2.21 CM
DOP CALC LVOT PEAK VEL: 1.01 M/S
DOP CALC LVOT STROKE VOLUME: 82.32 CM3
DOP CALCLVOT PEAK VEL VTI: 21.47 CM
E WAVE DECELERATION TIME: 314.48 MSEC
E/A RATIO: 1.27
E/E' RATIO: 9.9 M/S
ECHO LV POSTERIOR WALL: 1.03 CM (ref 0.6–1.1)
EJECTION FRACTION: 30 %
FRACTIONAL SHORTENING: 13 % (ref 28–44)
INTERVENTRICULAR SEPTUM: 0.73 CM (ref 0.6–1.1)
IVRT: 99.9 MSEC
LA MAJOR: 6.76 CM
LA MINOR: 6.81 CM
LA WIDTH: 6.72 CM
LEFT ATRIUM SIZE: 6.08 CM
LEFT ATRIUM VOLUME INDEX MOD: 92.5 ML/M2
LEFT ATRIUM VOLUME INDEX: 100.7 ML/M2
LEFT ATRIUM VOLUME MOD: 216.47 CM3
LEFT ATRIUM VOLUME: 235.63 CM3
LEFT INTERNAL DIMENSION IN SYSTOLE: 6.67 CM (ref 2.1–4)
LEFT VENTRICLE DIASTOLIC VOLUME INDEX: 134.17 ML/M2
LEFT VENTRICLE DIASTOLIC VOLUME: 313.95 ML
LEFT VENTRICLE MASS INDEX: 138 G/M2
LEFT VENTRICLE SYSTOLIC VOLUME INDEX: 97.8 ML/M2
LEFT VENTRICLE SYSTOLIC VOLUME: 228.83 ML
LEFT VENTRICULAR INTERNAL DIMENSION IN DIASTOLE: 7.67 CM (ref 3.5–6)
LEFT VENTRICULAR MASS: 322.87 G
LV LATERAL E/E' RATIO: 9.9 M/S
LV SEPTAL E/E' RATIO: 9.9 M/S
MV A" WAVE DURATION": 23.41 MSEC
MV PEAK A VEL: 0.78 M/S
MV PEAK E VEL: 0.99 M/S
MV STENOSIS PRESSURE HALF TIME: 91.2 MS
MV VALVE AREA P 1/2 METHOD: 2.41 CM2
PISA TR MAX VEL: 2.6 M/S
PULM VEIN S/D RATIO: 1.27
PV PEAK D VEL: 0.44 M/S
PV PEAK S VEL: 0.56 M/S
RA MAJOR: 3.86 CM
RA PRESSURE: 3 MMHG
RA WIDTH: 4.06 CM
RIGHT VENTRICULAR END-DIASTOLIC DIMENSION: 3.24 CM
RV TISSUE DOPPLER FREE WALL SYSTOLIC VELOCITY 1 (APICAL 4 CHAMBER VIEW): 11.6 CM/S
SINUS: 2.85 CM
STJ: 2.68 CM
TDI LATERAL: 0.1 M/S
TDI SEPTAL: 0.1 M/S
TDI: 0.1 M/S
TR MAX PG: 27 MMHG
TRICUSPID ANNULAR PLANE SYSTOLIC EXCURSION: 2.74 CM
TV REST PULMONARY ARTERY PRESSURE: 30 MMHG

## 2022-05-16 PROCEDURE — 93306 TTE W/DOPPLER COMPLETE: CPT | Mod: 26,,, | Performed by: INTERNAL MEDICINE

## 2022-05-16 PROCEDURE — 93306 ECHO (CUPID ONLY): ICD-10-PCS | Mod: 26,,, | Performed by: INTERNAL MEDICINE

## 2022-05-16 PROCEDURE — C8929 TTE W OR WO FOL WCON,DOPPLER: HCPCS

## 2022-05-16 NOTE — TELEPHONE ENCOUNTER
Spoke with patient in regards to scheduling Device Extraction. Procedure date of 5/30/2022 offered, however patient refused, stating that she is unable to take this date as her daughter's graduation has been postponed to June. Patient states that she will also have to move her daughter out of state at the end of June and now requests that her procedure be postponed until July. Patient understands that she will not be be contacted until a procedure date is available in July as requested. Understanding verbalized.

## 2022-05-16 NOTE — PROGRESS NOTES
Pt CardioMems transmission received and review.      CardioMEMS Transmission  5/16/2022 5/9/2022 5/6/2022 5/3/2022   Transmission Date 5/14/2022 5/7/2022 5/6/2022 5/3/2022   Transmission Type Maintenance Maintenance Maintenance Maintenance   PAS 34 37 42 35   LEELA 23 24 30 21   PAD  15 16 20 12   Medication Adjustments  No No No No   Some recent data might be hidden         Pressures are stable.    Medications changed? no    Patient contacted? no    Will continue to monitor.

## 2022-05-20 ENCOUNTER — DOCUMENTATION ONLY (OUTPATIENT)
Dept: TRANSPLANT | Facility: CLINIC | Age: 47
End: 2022-05-20
Payer: MEDICARE

## 2022-05-20 NOTE — PROGRESS NOTES
Pt CardioMems transmission received and review.      CardioMEMS Transmission  5/20/2022 5/16/2022 5/9/2022 5/6/2022   Transmission Date 5/16/2022 5/14/2022 5/7/2022 5/6/2022   Transmission Type Maintenance Maintenance Maintenance Maintenance   PAS 26 34 37 42   LEELA 15 23 24 30   PAD  9 15 16 20   Medication Adjustments  No No No No   Some recent data might be hidden         Pressures are stable.    Medications changed? no    Patient contacted? no    Will continue to monitor.

## 2022-05-24 ENCOUNTER — PATIENT MESSAGE (OUTPATIENT)
Dept: TRANSPLANT | Facility: CLINIC | Age: 47
End: 2022-05-24
Payer: MEDICARE

## 2022-05-24 ENCOUNTER — TELEPHONE (OUTPATIENT)
Dept: TRANSPLANT | Facility: CLINIC | Age: 47
End: 2022-05-24
Payer: MEDICARE

## 2022-05-26 ENCOUNTER — DOCUMENTATION ONLY (OUTPATIENT)
Dept: TRANSPLANT | Facility: CLINIC | Age: 47
End: 2022-05-26
Payer: MEDICARE

## 2022-05-26 ENCOUNTER — PATIENT MESSAGE (OUTPATIENT)
Dept: TRANSPLANT | Facility: CLINIC | Age: 47
End: 2022-05-26
Payer: MEDICARE

## 2022-05-26 DIAGNOSIS — I50.22 NYHA CLASS 2 AND ACC/AHA STAGE C CHRONIC SYSTOLIC CONGESTIVE HEART FAILURE: ICD-10-CM

## 2022-05-27 RX ORDER — SACUBITRIL AND VALSARTAN 97; 103 MG/1; MG/1
1 TABLET, FILM COATED ORAL 2 TIMES DAILY
Qty: 60 TABLET | Refills: 11 | Status: SHIPPED | OUTPATIENT
Start: 2022-05-27 | End: 2023-06-21

## 2022-05-31 ENCOUNTER — DOCUMENTATION ONLY (OUTPATIENT)
Dept: TRANSPLANT | Facility: CLINIC | Age: 47
End: 2022-05-31
Payer: MEDICARE

## 2022-05-31 NOTE — PROGRESS NOTES
Pt CardioMems transmission received and review.      CardioMEMS Transmission  5/31/2022 5/20/2022 5/16/2022 5/9/2022   Transmission Date 5/31/2022 5/16/2022 5/14/2022 5/7/2022   Transmission Type Maintenance Maintenance Maintenance Maintenance   PAS 44 26 34 37   LEELA 29 15 23 24   PAD  18 9 15 16   Medication Adjustments  No No No No   Some recent data might be hidden         Pressures are stable.    Medications changed? no    Patient contacted? no    Will continue to monitor.

## 2022-06-02 ENCOUNTER — TELEPHONE (OUTPATIENT)
Dept: TRANSPLANT | Facility: CLINIC | Age: 47
End: 2022-06-02
Payer: MEDICARE

## 2022-06-13 ENCOUNTER — DOCUMENTATION ONLY (OUTPATIENT)
Dept: TRANSPLANT | Facility: CLINIC | Age: 47
End: 2022-06-13
Payer: MEDICARE

## 2022-06-16 ENCOUNTER — DOCUMENTATION ONLY (OUTPATIENT)
Dept: TRANSPLANT | Facility: CLINIC | Age: 47
End: 2022-06-16
Payer: MEDICARE

## 2022-06-16 NOTE — PROGRESS NOTES
Pt CardioMems transmission received and review.      CardioMEMS Transmission  6/16/2022 5/31/2022 5/20/2022 5/16/2022   Transmission Date 6/14/2022 5/31/2022 5/16/2022 5/14/2022   Transmission Type Maintenance Maintenance Maintenance Maintenance   PAS 49 44 26 34   LEELA 34 29 15 23   PAD  22 18 9 15   Medication Adjustments  No No No No   Some recent data might be hidden         Pressures are slightly elevated, monitoring closely.    Medications changed? no    Patient contacted? no    Will continue to monitor.

## 2022-06-20 ENCOUNTER — DOCUMENTATION ONLY (OUTPATIENT)
Dept: TRANSPLANT | Facility: CLINIC | Age: 47
End: 2022-06-20
Payer: MEDICARE

## 2022-06-20 NOTE — PROGRESS NOTES
Pt CardioMems transmission received and review.      CardioMEMS Transmission  6/20/2022 6/16/2022 5/31/2022 5/20/2022   Transmission Date 6/20/2022 6/14/2022 5/31/2022 5/16/2022   Transmission Type Maintenance Maintenance Maintenance Maintenance   PAS 45 49 44 26   LEELA 32 34 29 15   PAD  21 22 18 9   Medication Adjustments  No No No No   Some recent data might be hidden         Pressures are stabl.    Medications changed? no    Patient contacted? no    Will continue to monitor.

## 2022-06-22 ENCOUNTER — PATIENT MESSAGE (OUTPATIENT)
Dept: FAMILY MEDICINE | Facility: CLINIC | Age: 47
End: 2022-06-22
Payer: MEDICARE

## 2022-06-23 ENCOUNTER — DOCUMENTATION ONLY (OUTPATIENT)
Dept: TRANSPLANT | Facility: CLINIC | Age: 47
End: 2022-06-23
Payer: MEDICARE

## 2022-06-23 ENCOUNTER — PATIENT MESSAGE (OUTPATIENT)
Dept: FAMILY MEDICINE | Facility: CLINIC | Age: 47
End: 2022-06-23
Payer: MEDICARE

## 2022-06-24 ENCOUNTER — PATIENT MESSAGE (OUTPATIENT)
Dept: FAMILY MEDICINE | Facility: CLINIC | Age: 47
End: 2022-06-24
Payer: MEDICARE

## 2022-06-24 DIAGNOSIS — J45.20 MILD INTERMITTENT ASTHMA WITHOUT COMPLICATION: ICD-10-CM

## 2022-06-24 RX ORDER — ALBUTEROL SULFATE 90 UG/1
2 AEROSOL, METERED RESPIRATORY (INHALATION) EVERY 6 HOURS PRN
Qty: 18 G | Refills: 6 | Status: SHIPPED | OUTPATIENT
Start: 2022-06-24

## 2022-06-27 ENCOUNTER — DOCUMENTATION ONLY (OUTPATIENT)
Dept: TRANSPLANT | Facility: CLINIC | Age: 47
End: 2022-06-27
Payer: MEDICARE

## 2022-06-27 NOTE — PROGRESS NOTES
Pt CardioMems transmission received and review.      CardioMEMS Transmission  6/27/2022 6/20/2022 6/16/2022 5/31/2022   Transmission Date 6/25/2022 6/20/2022 6/14/2022 5/31/2022   Transmission Type Maintenance Maintenance Maintenance Maintenance   PAS 35 45 49 44   LEELA 22 32 34 29   PAD  14 21 22 18   Medication Adjustments  No No No No   Some recent data might be hidden         Pressures are stable.    Medications changed? no    Patient contacted? no    Will continue to monitor.

## 2022-06-29 ENCOUNTER — PATIENT MESSAGE (OUTPATIENT)
Dept: ELECTROPHYSIOLOGY | Facility: CLINIC | Age: 47
End: 2022-06-29
Payer: MEDICARE

## 2022-06-29 ENCOUNTER — TELEPHONE (OUTPATIENT)
Dept: ELECTROPHYSIOLOGY | Facility: CLINIC | Age: 47
End: 2022-06-29
Payer: MEDICARE

## 2022-06-29 NOTE — TELEPHONE ENCOUNTER
Attempted to contact patient to offer date to schedule Extraction procedure with Dr Carter, but no answer received.   Voicemail left requesting return call to discuss potential date for Extraction procedure.

## 2022-06-30 ENCOUNTER — PATIENT MESSAGE (OUTPATIENT)
Dept: TRANSPLANT | Facility: CLINIC | Age: 47
End: 2022-06-30
Payer: MEDICARE

## 2022-06-30 ENCOUNTER — TELEPHONE (OUTPATIENT)
Dept: ELECTROPHYSIOLOGY | Facility: CLINIC | Age: 47
End: 2022-06-30
Payer: MEDICARE

## 2022-06-30 DIAGNOSIS — I49.9 CARDIAC ARRHYTHMIA, UNSPECIFIED CARDIAC ARRHYTHMIA TYPE: ICD-10-CM

## 2022-06-30 DIAGNOSIS — Z01.818 PRE-OP TESTING: ICD-10-CM

## 2022-06-30 DIAGNOSIS — I50.42 CHRONIC COMBINED SYSTOLIC (CONGESTIVE) AND DIASTOLIC (CONGESTIVE) HEART FAILURE: Primary | ICD-10-CM

## 2022-06-30 DIAGNOSIS — T82.190A IMPLANTED DEFIBRILLATOR ELECTRODE LEAD FRACTURE, INITIAL ENCOUNTER: ICD-10-CM

## 2022-06-30 DIAGNOSIS — Z45.02 IMPLANTABLE CARDIOVERTER-DEFIBRILLATOR (ICD) GENERATOR END OF LIFE: ICD-10-CM

## 2022-06-30 DIAGNOSIS — Z79.01 CHRONIC ANTICOAGULATION: ICD-10-CM

## 2022-06-30 DIAGNOSIS — I42.0 NONISCHEMIC DILATED CARDIOMYOPATHY: ICD-10-CM

## 2022-06-30 NOTE — TELEPHONE ENCOUNTER
Attempted to contact patient to offer date to schedule Extraction procedure with Dr Carter, but no answer received.   Voicemail left requesting return call to discuss potential date for Extraction procedure.       Spoke with patient's mother, who states that patient is currently out of town but will return this weekend, and will have the patient contact the office as soon as she returns.

## 2022-07-01 RX ORDER — CEFAZOLIN SODIUM/D5W 2 G/100 ML
2 PLASTIC BAG, INJECTION (ML) INTRAVENOUS ONCE
Status: CANCELLED | OUTPATIENT
Start: 2022-07-01

## 2022-07-05 ENCOUNTER — PATIENT MESSAGE (OUTPATIENT)
Dept: ELECTROPHYSIOLOGY | Facility: CLINIC | Age: 47
End: 2022-07-05
Payer: MEDICARE

## 2022-07-05 NOTE — TELEPHONE ENCOUNTER
Spoke with Patient and scheduled her procedure for ICD Extraction/Reimplant ( [facility]) on 7/20/2022 with Dr Carter. Procedure details reviewed and Patient advised that instructions will be sent via the patient portal as requested. Patient verbalized understanding.

## 2022-07-06 ENCOUNTER — DOCUMENTATION ONLY (OUTPATIENT)
Dept: TRANSPLANT | Facility: CLINIC | Age: 47
End: 2022-07-06
Payer: MEDICARE

## 2022-07-06 NOTE — PROGRESS NOTES
Pt CardioMems transmission received and review.      CardioMEMS Transmission  7/6/2022 6/27/2022 6/20/2022 6/16/2022   Transmission Date 7/5/2022 6/25/2022 6/20/2022 6/14/2022   Transmission Type Maintenance Maintenance Maintenance Maintenance   PAS 44 35 45 49   LEELA 32 22 32 34   PAD  22 14 21 22   Medication Adjustments  No No No No   Some recent data might be hidden         Pressures are up and down but stable.    Medications changed? no    Patient contacted? no    Will continue to monitor.

## 2022-07-08 ENCOUNTER — PATIENT MESSAGE (OUTPATIENT)
Dept: TRANSPLANT | Facility: CLINIC | Age: 47
End: 2022-07-08
Payer: MEDICARE

## 2022-07-08 ENCOUNTER — PATIENT MESSAGE (OUTPATIENT)
Dept: ELECTROPHYSIOLOGY | Facility: CLINIC | Age: 47
End: 2022-07-08
Payer: MEDICARE

## 2022-07-08 ENCOUNTER — TELEPHONE (OUTPATIENT)
Dept: TRANSPLANT | Facility: CLINIC | Age: 47
End: 2022-07-08
Payer: MEDICARE

## 2022-07-11 ENCOUNTER — DOCUMENTATION ONLY (OUTPATIENT)
Dept: TRANSPLANT | Facility: CLINIC | Age: 47
End: 2022-07-11
Payer: MEDICARE

## 2022-07-11 NOTE — PROGRESS NOTES
Pt CardioMems transmission received and review.      CardioMEMS Transmission  7/11/2022 7/6/2022 6/27/2022 6/20/2022   Transmission Date 7/11/2022 7/5/2022 6/25/2022 6/20/2022   Transmission Type Maintenance Maintenance Maintenance Maintenance   PAS 44 44 35 45   LEELA 29 32 22 32   PAD  20 22 14 21   Medication Adjustments  No No No No   Some recent data might be hidden         Pressures are stable.    Medications changed? no    Patient contacted? no    Will continue to monitor.

## 2022-07-14 ENCOUNTER — DOCUMENTATION ONLY (OUTPATIENT)
Dept: TRANSPLANT | Facility: CLINIC | Age: 47
End: 2022-07-14
Payer: MEDICARE

## 2022-07-14 NOTE — PROGRESS NOTES
Pt CardioMems transmission received and review.      CardioMEMS Transmission  7/14/2022 7/11/2022 7/6/2022 6/27/2022   Transmission Date 7/13/2022 7/11/2022 7/5/2022 6/25/2022   Transmission Type Maintenance Maintenance Maintenance Maintenance   PAS 34 44 44 35   LEELA 22 29 32 22   PAD  14 20 22 14   Medication Adjustments  No No No No   Some recent data might be hidden         Pressures are stable.    Medications changed? no    Patient contacted? no    Will continue to monitor.

## 2022-07-15 ENCOUNTER — LAB VISIT (OUTPATIENT)
Dept: LAB | Facility: HOSPITAL | Age: 47
End: 2022-07-15
Attending: INTERNAL MEDICINE
Payer: MEDICARE

## 2022-07-15 DIAGNOSIS — Z45.02 IMPLANTABLE CARDIOVERTER-DEFIBRILLATOR (ICD) GENERATOR END OF LIFE: ICD-10-CM

## 2022-07-15 DIAGNOSIS — T82.190A IMPLANTED DEFIBRILLATOR ELECTRODE LEAD FRACTURE, INITIAL ENCOUNTER: ICD-10-CM

## 2022-07-15 DIAGNOSIS — I50.42 CHRONIC COMBINED SYSTOLIC (CONGESTIVE) AND DIASTOLIC (CONGESTIVE) HEART FAILURE: ICD-10-CM

## 2022-07-15 DIAGNOSIS — Z79.01 CHRONIC ANTICOAGULATION: ICD-10-CM

## 2022-07-15 DIAGNOSIS — I42.0 NONISCHEMIC DILATED CARDIOMYOPATHY: ICD-10-CM

## 2022-07-15 DIAGNOSIS — Z01.818 PRE-OP TESTING: ICD-10-CM

## 2022-07-15 LAB
ABO + RH BLD: NORMAL
ANION GAP SERPL CALC-SCNC: 7 MMOL/L (ref 8–16)
APTT BLDCRRT: 25.1 SEC (ref 21–32)
BASOPHILS # BLD AUTO: 0.01 K/UL (ref 0–0.2)
BASOPHILS NFR BLD: 0.2 % (ref 0–1.9)
BLD GP AB SCN CELLS X3 SERPL QL: NORMAL
BUN SERPL-MCNC: 12 MG/DL (ref 6–20)
CALCIUM SERPL-MCNC: 9.1 MG/DL (ref 8.7–10.5)
CHLORIDE SERPL-SCNC: 105 MMOL/L (ref 95–110)
CO2 SERPL-SCNC: 28 MMOL/L (ref 23–29)
CREAT SERPL-MCNC: 1 MG/DL (ref 0.5–1.4)
DIFFERENTIAL METHOD: ABNORMAL
EOSINOPHIL # BLD AUTO: 0.2 K/UL (ref 0–0.5)
EOSINOPHIL NFR BLD: 2.6 % (ref 0–8)
ERYTHROCYTE [DISTWIDTH] IN BLOOD BY AUTOMATED COUNT: 12.1 % (ref 11.5–14.5)
EST. GFR  (AFRICAN AMERICAN): >60 ML/MIN/1.73 M^2
EST. GFR  (NON AFRICAN AMERICAN): >60 ML/MIN/1.73 M^2
GLUCOSE SERPL-MCNC: 64 MG/DL (ref 70–110)
HCT VFR BLD AUTO: 36.5 % (ref 37–48.5)
HGB BLD-MCNC: 12.3 G/DL (ref 12–16)
IMM GRANULOCYTES # BLD AUTO: 0.01 K/UL (ref 0–0.04)
IMM GRANULOCYTES NFR BLD AUTO: 0.2 % (ref 0–0.5)
INR PPP: 1 (ref 0.8–1.2)
LYMPHOCYTES # BLD AUTO: 1.5 K/UL (ref 1–4.8)
LYMPHOCYTES NFR BLD: 24.2 % (ref 18–48)
MCH RBC QN AUTO: 35 PG (ref 27–31)
MCHC RBC AUTO-ENTMCNC: 33.7 G/DL (ref 32–36)
MCV RBC AUTO: 104 FL (ref 82–98)
MONOCYTES # BLD AUTO: 0.5 K/UL (ref 0.3–1)
MONOCYTES NFR BLD: 7.3 % (ref 4–15)
NEUTROPHILS # BLD AUTO: 4 K/UL (ref 1.8–7.7)
NEUTROPHILS NFR BLD: 65.5 % (ref 38–73)
NRBC BLD-RTO: 0 /100 WBC
PLATELET # BLD AUTO: 242 K/UL (ref 150–450)
PMV BLD AUTO: 11.6 FL (ref 9.2–12.9)
POTASSIUM SERPL-SCNC: 3.4 MMOL/L (ref 3.5–5.1)
PROTHROMBIN TIME: 10.5 SEC (ref 9–12.5)
RBC # BLD AUTO: 3.51 M/UL (ref 4–5.4)
SODIUM SERPL-SCNC: 140 MMOL/L (ref 136–145)
WBC # BLD AUTO: 6.16 K/UL (ref 3.9–12.7)

## 2022-07-15 PROCEDURE — 85610 PROTHROMBIN TIME: CPT | Performed by: INTERNAL MEDICINE

## 2022-07-15 PROCEDURE — 85025 COMPLETE CBC W/AUTO DIFF WBC: CPT | Performed by: INTERNAL MEDICINE

## 2022-07-15 PROCEDURE — 86901 BLOOD TYPING SEROLOGIC RH(D): CPT | Performed by: INTERNAL MEDICINE

## 2022-07-15 PROCEDURE — 80048 BASIC METABOLIC PNL TOTAL CA: CPT | Performed by: INTERNAL MEDICINE

## 2022-07-15 PROCEDURE — 85730 THROMBOPLASTIN TIME PARTIAL: CPT | Performed by: INTERNAL MEDICINE

## 2022-07-19 ENCOUNTER — DOCUMENTATION ONLY (OUTPATIENT)
Dept: TRANSPLANT | Facility: CLINIC | Age: 47
End: 2022-07-19
Payer: MEDICARE

## 2022-07-19 ENCOUNTER — TELEPHONE (OUTPATIENT)
Dept: ELECTROPHYSIOLOGY | Facility: CLINIC | Age: 47
End: 2022-07-19
Payer: MEDICARE

## 2022-07-19 NOTE — PROGRESS NOTES
Pt CardioMems transmission received and review.      CardioMEMS Transmission  7/19/2022 7/14/2022 7/11/2022 7/6/2022   Transmission Date 7/17/2022 7/13/2022 7/11/2022 7/5/2022   Transmission Type Maintenance Maintenance Maintenance Maintenance   PAS 49 34 44 44   LEELA 35 22 29 32   PAD  23 14 20 22   Medication Adjustments  No No No No   Some recent data might be hidden         Pressures are up and down. Monitoring closely.    Medications changed? no    Patient contacted? no    Will continue to monitor.

## 2022-07-19 NOTE — TELEPHONE ENCOUNTER
Attempted to contact patient to confirm procedure details. No answer. Left detailed voicemail including: arrival time, NPO after midnight and medication instructions, along with callback number.     Labs done, repeat K+ on admit    Covid n/a, vaccinated

## 2022-07-19 NOTE — TELEPHONE ENCOUNTER
Spoke to Ms. Mahajan    CONFIRMED procedure arrival time of 0900    Reiterated instructions including:  -Directions to check in desk  -NPO after midnight night prior to procedure  -High importance of HOLDING Bumex, Entresto, Aldactone, Farxiga am of procedure   -Pre-procedure LABS done, repeat K+ on admit  -COVID test n/a, vaccinated  -Confirmed no fever, cough, or shortness of breath in the past 30 days  -Confirmed no redness, rash, irritation, or yeast infection to groin area.   -Bathe night prior and morning prior to procedure with Hibiclens solution or an antibacterial soap      Patient verbalizes understanding of above and appreciates call.

## 2022-07-20 ENCOUNTER — HOSPITAL ENCOUNTER (OUTPATIENT)
Facility: HOSPITAL | Age: 47
Discharge: HOME | End: 2022-07-21
Attending: INTERNAL MEDICINE | Admitting: INTERNAL MEDICINE
Payer: MEDICARE

## 2022-07-20 ENCOUNTER — ANESTHESIA (OUTPATIENT)
Dept: MEDSURG UNIT | Facility: HOSPITAL | Age: 47
End: 2022-07-20
Payer: MEDICARE

## 2022-07-20 ENCOUNTER — ANESTHESIA EVENT (OUTPATIENT)
Dept: MEDSURG UNIT | Facility: HOSPITAL | Age: 47
End: 2022-07-20
Payer: MEDICARE

## 2022-07-20 ENCOUNTER — HOSPITAL ENCOUNTER (OUTPATIENT)
Dept: CARDIOLOGY | Facility: HOSPITAL | Age: 47
Discharge: HOME | End: 2022-07-20
Attending: INTERNAL MEDICINE | Admitting: INTERNAL MEDICINE
Payer: MEDICARE

## 2022-07-20 VITALS
BODY MASS INDEX: 38.95 KG/M2 | HEIGHT: 69 IN | SYSTOLIC BLOOD PRESSURE: 106 MMHG | DIASTOLIC BLOOD PRESSURE: 57 MMHG | WEIGHT: 263 LBS

## 2022-07-20 DIAGNOSIS — I42.0 NONISCHEMIC DILATED CARDIOMYOPATHY: ICD-10-CM

## 2022-07-20 DIAGNOSIS — I50.42 CHRONIC COMBINED SYSTOLIC (CONGESTIVE) AND DIASTOLIC (CONGESTIVE) HEART FAILURE: ICD-10-CM

## 2022-07-20 DIAGNOSIS — T82.190A IMPLANTED DEFIBRILLATOR ELECTRODE LEAD FRACTURE, INITIAL ENCOUNTER: ICD-10-CM

## 2022-07-20 DIAGNOSIS — Z45.02 IMPLANTABLE CARDIOVERTER-DEFIBRILLATOR (ICD) GENERATOR END OF LIFE: ICD-10-CM

## 2022-07-20 DIAGNOSIS — I50.9 HEART FAILURE: ICD-10-CM

## 2022-07-20 DIAGNOSIS — I49.9 ARRHYTHMIA: ICD-10-CM

## 2022-07-20 LAB
ABO + RH BLD: NORMAL
ANION GAP SERPL CALC-SCNC: 8 MMOL/L (ref 8–16)
BLD GP AB SCN CELLS X3 SERPL QL: NORMAL
BLD PROD TYP BPU: NORMAL
BLOOD UNIT EXPIRATION DATE: NORMAL
BLOOD UNIT TYPE CODE: 5100
BLOOD UNIT TYPE: NORMAL
BUN SERPL-MCNC: 15 MG/DL (ref 6–20)
CALCIUM SERPL-MCNC: 9 MG/DL (ref 8.7–10.5)
CHLORIDE SERPL-SCNC: 106 MMOL/L (ref 95–110)
CO2 SERPL-SCNC: 24 MMOL/L (ref 23–29)
CODING SYSTEM: NORMAL
CREAT SERPL-MCNC: 1 MG/DL (ref 0.5–1.4)
DISPENSE STATUS: NORMAL
EST. GFR  (AFRICAN AMERICAN): >60 ML/MIN/1.73 M^2
EST. GFR  (NON AFRICAN AMERICAN): >60 ML/MIN/1.73 M^2
GLUCOSE SERPL-MCNC: 94 MG/DL (ref 70–110)
POTASSIUM SERPL-SCNC: 4.7 MMOL/L (ref 3.5–5.1)
SODIUM SERPL-SCNC: 138 MMOL/L (ref 136–145)
TRANS ERYTHROCYTES VOL PATIENT: NORMAL ML

## 2022-07-20 PROCEDURE — 93005 ELECTROCARDIOGRAM TRACING: CPT

## 2022-07-20 PROCEDURE — 27201423 OPTIME MED/SURG SUP & DEVICES STERILE SUPPLY: Performed by: INTERNAL MEDICINE

## 2022-07-20 PROCEDURE — 63600175 PHARM REV CODE 636 W HCPCS: Performed by: INTERNAL MEDICINE

## 2022-07-20 PROCEDURE — C1777 LEAD, AICD, ENDO SINGLE COIL: HCPCS | Performed by: INTERNAL MEDICINE

## 2022-07-20 PROCEDURE — 93325 DOPPLER ECHO COLOR FLOW MAPG: CPT | Mod: 26,,, | Performed by: INTERNAL MEDICINE

## 2022-07-20 PROCEDURE — 37000009 HC ANESTHESIA EA ADD 15 MINS: Performed by: INTERNAL MEDICINE

## 2022-07-20 PROCEDURE — 33249 INSJ/RPLCMT DEFIB W/LEAD(S): CPT | Performed by: INTERNAL MEDICINE

## 2022-07-20 PROCEDURE — 99900035 HC TECH TIME PER 15 MIN (STAT)

## 2022-07-20 PROCEDURE — 33241 REMOVE PULSE GENERATOR: CPT | Performed by: INTERNAL MEDICINE

## 2022-07-20 PROCEDURE — 63600175 PHARM REV CODE 636 W HCPCS: Performed by: ANESTHESIOLOGY

## 2022-07-20 PROCEDURE — D9220A PRA ANESTHESIA: Mod: ANES,,, | Performed by: ANESTHESIOLOGY

## 2022-07-20 PROCEDURE — 33241 REMOVE PULSE GENERATOR: CPT | Mod: 51,,, | Performed by: INTERNAL MEDICINE

## 2022-07-20 PROCEDURE — 93010 ELECTROCARDIOGRAM REPORT: CPT | Mod: ,,, | Performed by: INTERNAL MEDICINE

## 2022-07-20 PROCEDURE — D9220A PRA ANESTHESIA: ICD-10-PCS | Mod: ANES,,, | Performed by: ANESTHESIOLOGY

## 2022-07-20 PROCEDURE — 80048 BASIC METABOLIC PNL TOTAL CA: CPT | Performed by: INTERNAL MEDICINE

## 2022-07-20 PROCEDURE — 27000239 HC STAND-BY BYPASS PUMP

## 2022-07-20 PROCEDURE — 33244 REMOVE ELCTRD TRANSVENOUSLY: CPT | Performed by: INTERNAL MEDICINE

## 2022-07-20 PROCEDURE — 37000008 HC ANESTHESIA 1ST 15 MINUTES: Performed by: INTERNAL MEDICINE

## 2022-07-20 PROCEDURE — C1751 CATH, INF, PER/CENT/MIDLINE: HCPCS | Performed by: ANESTHESIOLOGY

## 2022-07-20 PROCEDURE — 33249 PR ICD INSERT SNGL/DUAL W/LEADS: ICD-10-PCS | Mod: 22,,, | Performed by: INTERNAL MEDICINE

## 2022-07-20 PROCEDURE — 94761 N-INVAS EAR/PLS OXIMETRY MLT: CPT

## 2022-07-20 PROCEDURE — 86901 BLOOD TYPING SEROLOGIC RH(D): CPT | Performed by: INTERNAL MEDICINE

## 2022-07-20 PROCEDURE — 27800903 OPTIME MED/SURG SUP & DEVICES OTHER IMPLANTS: Performed by: INTERNAL MEDICINE

## 2022-07-20 PROCEDURE — 25000003 PHARM REV CODE 250: Performed by: STUDENT IN AN ORGANIZED HEALTH CARE EDUCATION/TRAINING PROGRAM

## 2022-07-20 PROCEDURE — 33241 PR RMV PULSE GENERATOR,SNGL/DUAL: ICD-10-PCS | Mod: 51,,, | Performed by: INTERNAL MEDICINE

## 2022-07-20 PROCEDURE — 93320 TRANSESOPHAGEAL ECHO (TEE) (CUPID ONLY): ICD-10-PCS | Mod: 26,,, | Performed by: INTERNAL MEDICINE

## 2022-07-20 PROCEDURE — 93325 DOPPLER ECHO COLOR FLOW MAPG: CPT

## 2022-07-20 PROCEDURE — 33249 INSJ/RPLCMT DEFIB W/LEAD(S): CPT | Mod: 22,,, | Performed by: INTERNAL MEDICINE

## 2022-07-20 PROCEDURE — C1769 GUIDE WIRE: HCPCS | Performed by: INTERNAL MEDICINE

## 2022-07-20 PROCEDURE — 36620 PR INSERT CATH,ART,PERCUT,SHORTTERM: ICD-10-PCS | Mod: 59,,, | Performed by: ANESTHESIOLOGY

## 2022-07-20 PROCEDURE — 36620 INSERTION CATHETER ARTERY: CPT | Mod: 59,,, | Performed by: ANESTHESIOLOGY

## 2022-07-20 PROCEDURE — 93010 EKG 12-LEAD: ICD-10-PCS | Mod: ,,, | Performed by: INTERNAL MEDICINE

## 2022-07-20 PROCEDURE — C1722 AICD, SINGLE CHAMBER: HCPCS | Performed by: INTERNAL MEDICINE

## 2022-07-20 PROCEDURE — 93312 TRANSESOPHAGEAL ECHO (TEE) (CUPID ONLY): ICD-10-PCS | Mod: 26,,, | Performed by: INTERNAL MEDICINE

## 2022-07-20 PROCEDURE — 93312 ECHO TRANSESOPHAGEAL: CPT | Mod: 26,,, | Performed by: INTERNAL MEDICINE

## 2022-07-20 PROCEDURE — 33244 PR RMV PACER/ELECTRD,TRANSVEN EXTRCT: ICD-10-PCS | Mod: 22,51,, | Performed by: INTERNAL MEDICINE

## 2022-07-20 PROCEDURE — 63600175 PHARM REV CODE 636 W HCPCS: Performed by: NURSE ANESTHETIST, CERTIFIED REGISTERED

## 2022-07-20 PROCEDURE — 25000003 PHARM REV CODE 250: Performed by: INTERNAL MEDICINE

## 2022-07-20 PROCEDURE — D9220A PRA ANESTHESIA: Mod: CRNA,,, | Performed by: NURSE ANESTHETIST, CERTIFIED REGISTERED

## 2022-07-20 PROCEDURE — 33244 REMOVE ELCTRD TRANSVENOUSLY: CPT | Mod: 22,51,, | Performed by: INTERNAL MEDICINE

## 2022-07-20 PROCEDURE — 86920 COMPATIBILITY TEST SPIN: CPT | Performed by: INTERNAL MEDICINE

## 2022-07-20 PROCEDURE — C2628 CATHETER, OCCLUSION: HCPCS | Performed by: INTERNAL MEDICINE

## 2022-07-20 PROCEDURE — 25000242 PHARM REV CODE 250 ALT 637 W/ HCPCS: Performed by: ANESTHESIOLOGY

## 2022-07-20 PROCEDURE — 93325 TRANSESOPHAGEAL ECHO (TEE) (CUPID ONLY): ICD-10-PCS | Mod: 26,,, | Performed by: INTERNAL MEDICINE

## 2022-07-20 PROCEDURE — 25000003 PHARM REV CODE 250: Performed by: NURSE ANESTHETIST, CERTIFIED REGISTERED

## 2022-07-20 PROCEDURE — C1894 INTRO/SHEATH, NON-LASER: HCPCS | Performed by: INTERNAL MEDICINE

## 2022-07-20 PROCEDURE — C2629 INTRO/SHEATH, LASER: HCPCS | Performed by: INTERNAL MEDICINE

## 2022-07-20 PROCEDURE — D9220A PRA ANESTHESIA: ICD-10-PCS | Mod: CRNA,,, | Performed by: NURSE ANESTHETIST, CERTIFIED REGISTERED

## 2022-07-20 PROCEDURE — 93320 DOPPLER ECHO COMPLETE: CPT | Mod: 26,,, | Performed by: INTERNAL MEDICINE

## 2022-07-20 DEVICE — IMPLANTABLE CARDIOVERTER DEFIBRILLATOR
Type: IMPLANTABLE DEVICE | Site: CHEST | Status: FUNCTIONAL
Brand: GALLANT™

## 2022-07-20 DEVICE — ENVELOPE CMRM6122 ABSORB MED MR
Type: IMPLANTABLE DEVICE | Site: CHEST | Status: FUNCTIONAL
Brand: TYRX™

## 2022-07-20 DEVICE — DEFIBRILLATION LEAD
Type: IMPLANTABLE DEVICE | Site: HEART | Status: FUNCTIONAL
Brand: DURATA™

## 2022-07-20 RX ORDER — AMITRIPTYLINE HYDROCHLORIDE 10 MG/1
10 TABLET, FILM COATED ORAL NIGHTLY
Status: DISCONTINUED | OUTPATIENT
Start: 2022-07-20 | End: 2022-07-21 | Stop reason: HOSPADM

## 2022-07-20 RX ORDER — ROCURONIUM BROMIDE 10 MG/ML
INJECTION, SOLUTION INTRAVENOUS
Status: DISCONTINUED | OUTPATIENT
Start: 2022-07-20 | End: 2022-07-20

## 2022-07-20 RX ORDER — CEFAZOLIN SODIUM/D5W 2 G/100 ML
2 PLASTIC BAG, INJECTION (ML) INTRAVENOUS
Status: COMPLETED | OUTPATIENT
Start: 2022-07-20 | End: 2022-07-21

## 2022-07-20 RX ORDER — SODIUM CHLORIDE 0.9 G/100ML
IRRIGANT IRRIGATION
Status: DISCONTINUED | OUTPATIENT
Start: 2022-07-20 | End: 2022-07-21 | Stop reason: HOSPADM

## 2022-07-20 RX ORDER — LIDOCAINE 50 MG/G
1 PATCH TOPICAL
Status: DISCONTINUED | OUTPATIENT
Start: 2022-07-20 | End: 2022-07-21 | Stop reason: HOSPADM

## 2022-07-20 RX ORDER — BUPIVACAINE HYDROCHLORIDE 2.5 MG/ML
INJECTION, SOLUTION EPIDURAL; INFILTRATION; INTRACAUDAL
Status: DISCONTINUED | OUTPATIENT
Start: 2022-07-20 | End: 2022-07-21 | Stop reason: HOSPADM

## 2022-07-20 RX ORDER — CEFAZOLIN SODIUM 1 G/3ML
INJECTION, POWDER, FOR SOLUTION INTRAMUSCULAR; INTRAVENOUS
Status: DISCONTINUED | OUTPATIENT
Start: 2022-07-20 | End: 2022-07-20

## 2022-07-20 RX ORDER — SPIRONOLACTONE 25 MG/1
25 TABLET ORAL DAILY
Status: DISCONTINUED | OUTPATIENT
Start: 2022-07-21 | End: 2022-07-21 | Stop reason: HOSPADM

## 2022-07-20 RX ORDER — EPINEPHRINE 1 MG/ML
INJECTION, SOLUTION INTRACARDIAC; INTRAMUSCULAR; INTRAVENOUS; SUBCUTANEOUS
Status: DISCONTINUED | OUTPATIENT
Start: 2022-07-20 | End: 2022-07-20

## 2022-07-20 RX ORDER — CARVEDILOL 25 MG/1
25 TABLET ORAL 2 TIMES DAILY
Status: DISCONTINUED | OUTPATIENT
Start: 2022-07-20 | End: 2022-07-21 | Stop reason: HOSPADM

## 2022-07-20 RX ORDER — PROPOFOL 10 MG/ML
VIAL (ML) INTRAVENOUS
Status: DISCONTINUED | OUTPATIENT
Start: 2022-07-20 | End: 2022-07-20

## 2022-07-20 RX ORDER — BUMETANIDE 1 MG/1
1 TABLET ORAL DAILY
Status: DISCONTINUED | OUTPATIENT
Start: 2022-07-20 | End: 2022-07-21 | Stop reason: HOSPADM

## 2022-07-20 RX ORDER — LIDOCAINE HYDROCHLORIDE 20 MG/ML
INJECTION INTRAVENOUS
Status: DISCONTINUED | OUTPATIENT
Start: 2022-07-20 | End: 2022-07-20

## 2022-07-20 RX ORDER — HYDROCODONE BITARTRATE AND ACETAMINOPHEN 500; 5 MG/1; MG/1
TABLET ORAL
Status: DISCONTINUED | OUTPATIENT
Start: 2022-07-20 | End: 2022-07-21 | Stop reason: HOSPADM

## 2022-07-20 RX ORDER — ONDANSETRON 2 MG/ML
4 INJECTION INTRAMUSCULAR; INTRAVENOUS DAILY PRN
Status: DISCONTINUED | OUTPATIENT
Start: 2022-07-20 | End: 2022-07-21 | Stop reason: HOSPADM

## 2022-07-20 RX ORDER — VANCOMYCIN HYDROCHLORIDE 1 G/20ML
INJECTION, POWDER, LYOPHILIZED, FOR SOLUTION INTRAVENOUS
Status: DISCONTINUED | OUTPATIENT
Start: 2022-07-20 | End: 2022-07-21 | Stop reason: HOSPADM

## 2022-07-20 RX ORDER — LIDOCAINE HYDROCHLORIDE 20 MG/ML
INJECTION, SOLUTION INFILTRATION; PERINEURAL
Status: DISCONTINUED | OUTPATIENT
Start: 2022-07-20 | End: 2022-07-21 | Stop reason: HOSPADM

## 2022-07-20 RX ORDER — MIDAZOLAM HYDROCHLORIDE 1 MG/ML
INJECTION, SOLUTION INTRAMUSCULAR; INTRAVENOUS
Status: DISCONTINUED | OUTPATIENT
Start: 2022-07-20 | End: 2022-07-20

## 2022-07-20 RX ORDER — IPRATROPIUM BROMIDE AND ALBUTEROL SULFATE 2.5; .5 MG/3ML; MG/3ML
3 SOLUTION RESPIRATORY (INHALATION) ONCE
Status: COMPLETED | OUTPATIENT
Start: 2022-07-20 | End: 2022-07-20

## 2022-07-20 RX ORDER — FENTANYL CITRATE 50 UG/ML
INJECTION, SOLUTION INTRAMUSCULAR; INTRAVENOUS
Status: DISCONTINUED | OUTPATIENT
Start: 2022-07-20 | End: 2022-07-20

## 2022-07-20 RX ORDER — FENTANYL CITRATE 50 UG/ML
25 INJECTION, SOLUTION INTRAMUSCULAR; INTRAVENOUS EVERY 5 MIN PRN
Status: DISCONTINUED | OUTPATIENT
Start: 2022-07-20 | End: 2022-07-21 | Stop reason: HOSPADM

## 2022-07-20 RX ORDER — DEXAMETHASONE SODIUM PHOSPHATE 4 MG/ML
INJECTION, SOLUTION INTRA-ARTICULAR; INTRALESIONAL; INTRAMUSCULAR; INTRAVENOUS; SOFT TISSUE
Status: DISCONTINUED | OUTPATIENT
Start: 2022-07-20 | End: 2022-07-20

## 2022-07-20 RX ORDER — AMIODARONE HYDROCHLORIDE 200 MG/1
200 TABLET ORAL DAILY
Status: DISCONTINUED | OUTPATIENT
Start: 2022-07-21 | End: 2022-07-21 | Stop reason: HOSPADM

## 2022-07-20 RX ORDER — ACETAMINOPHEN 500 MG
1000 TABLET ORAL ONCE
Status: COMPLETED | OUTPATIENT
Start: 2022-07-20 | End: 2022-07-20

## 2022-07-20 RX ORDER — ACETAMINOPHEN 325 MG/1
650 TABLET ORAL EVERY 4 HOURS PRN
Status: DISCONTINUED | OUTPATIENT
Start: 2022-07-20 | End: 2022-07-21 | Stop reason: HOSPADM

## 2022-07-20 RX ADMIN — ROCURONIUM BROMIDE 10 MG: 10 INJECTION, SOLUTION INTRAVENOUS at 11:07

## 2022-07-20 RX ADMIN — SUGAMMADEX 200 MG: 100 INJECTION, SOLUTION INTRAVENOUS at 03:07

## 2022-07-20 RX ADMIN — CARVEDILOL 25 MG: 25 TABLET, FILM COATED ORAL at 08:07

## 2022-07-20 RX ADMIN — FENTANYL CITRATE 25 MCG: 50 INJECTION INTRAMUSCULAR; INTRAVENOUS at 04:07

## 2022-07-20 RX ADMIN — BUMETANIDE 1 MG: 1 TABLET ORAL at 08:07

## 2022-07-20 RX ADMIN — ACETAMINOPHEN 1000 MG: 500 TABLET ORAL at 08:07

## 2022-07-20 RX ADMIN — NOREPINEPHRINE BITARTRATE 0.02 MCG/KG/MIN: 1 INJECTION, SOLUTION, CONCENTRATE INTRAVENOUS at 11:07

## 2022-07-20 RX ADMIN — MIDAZOLAM HYDROCHLORIDE 2 MG: 1 INJECTION, SOLUTION INTRAMUSCULAR; INTRAVENOUS at 10:07

## 2022-07-20 RX ADMIN — ROCURONIUM BROMIDE 30 MG: 10 INJECTION, SOLUTION INTRAVENOUS at 01:07

## 2022-07-20 RX ADMIN — EPINEPHRINE 10 MCG: 1 INJECTION INTRAMUSCULAR; INTRAVENOUS; SUBCUTANEOUS at 10:07

## 2022-07-20 RX ADMIN — SODIUM CHLORIDE, SODIUM GLUCONATE, SODIUM ACETATE, POTASSIUM CHLORIDE, MAGNESIUM CHLORIDE, SODIUM PHOSPHATE, DIBASIC, AND POTASSIUM PHOSPHATE: .53; .5; .37; .037; .03; .012; .00082 INJECTION, SOLUTION INTRAVENOUS at 10:07

## 2022-07-20 RX ADMIN — ROCURONIUM BROMIDE 50 MG: 10 INJECTION, SOLUTION INTRAVENOUS at 10:07

## 2022-07-20 RX ADMIN — AMITRIPTYLINE HYDROCHLORIDE 10 MG: 10 TABLET, FILM COATED ORAL at 08:07

## 2022-07-20 RX ADMIN — FENTANYL CITRATE 100 MCG: 50 INJECTION, SOLUTION INTRAMUSCULAR; INTRAVENOUS at 10:07

## 2022-07-20 RX ADMIN — PROPOFOL 80 MG: 10 INJECTION, EMULSION INTRAVENOUS at 10:07

## 2022-07-20 RX ADMIN — IPRATROPIUM BROMIDE AND ALBUTEROL SULFATE 3 ML: .5; 3 SOLUTION RESPIRATORY (INHALATION) at 04:07

## 2022-07-20 RX ADMIN — SODIUM CHLORIDE: 9 INJECTION, SOLUTION INTRAVENOUS at 10:07

## 2022-07-20 RX ADMIN — Medication 2 G: at 08:07

## 2022-07-20 RX ADMIN — DEXAMETHASONE SODIUM PHOSPHATE 4 MG: 4 INJECTION, SOLUTION INTRAMUSCULAR; INTRAVENOUS at 12:07

## 2022-07-20 RX ADMIN — CEFAZOLIN SODIUM 2 G: 1 INJECTION, POWDER, FOR SOLUTION INTRAMUSCULAR; INTRAVENOUS at 11:07

## 2022-07-20 RX ADMIN — LIDOCAINE HYDROCHLORIDE 100 MG: 20 INJECTION, SOLUTION INTRAVENOUS at 10:07

## 2022-07-20 RX ADMIN — ROCURONIUM BROMIDE 10 MG: 10 INJECTION, SOLUTION INTRAVENOUS at 12:07

## 2022-07-20 RX ADMIN — LIDOCAINE 1 PATCH: 50 PATCH CUTANEOUS at 08:07

## 2022-07-20 NOTE — Clinical Note
The groin and left chest was prepped. The site was prepped with ChloraPrep and Ioban. The site was clipped. The patient was draped. The patient was positioned supine.

## 2022-07-20 NOTE — TRANSFER OF CARE
"Anesthesia Transfer of Care Note    Patient: Mario Mahajan    Procedure(s) Performed: Procedure(s) (LRB):  EXTRACTION, ELECTRODE LEAD (N/A)  ECHOCARDIOGRAM, TRANSESOPHAGEAL (N/A)  INSERTION, ICD GENERATOR, SINGLE CHAMBER    Patient location: PACU    Anesthesia Type: general    Transport from OR: Transported from OR on 6-10 L/min O2 by face mask with adequate spontaneous ventilation    Post pain: adequate analgesia    Post assessment: no apparent anesthetic complications    Post vital signs: stable    Level of consciousness: awake and alert    Nausea/Vomiting: no nausea/vomiting    Complications: none    Transfer of care protocol was followed      Last vitals:   Visit Vitals  /67 (BP Location: Right arm, Patient Position: Lying)   Pulse 70   Temp 36.2 °C (97.2 °F) (Temporal)   Resp (!) 21   Ht 5' 9" (1.753 m)   Wt 119.6 kg (263 lb 10.7 oz)   LMP 03/01/2019   SpO2 (!) 94%   Breastfeeding No   BMI 38.94 kg/m²     "

## 2022-07-20 NOTE — ASSESSMENT & PLAN NOTE
Pt with ICD lead fracture here today ICD extraction and re-implantation with [facility]AS[facility]Z guidance.    -No absolute contraindications of esophageal stricture, tumor, perforation, laceration,or diverticulum and/or active GI bleed  -The risks, benefits & alternatives of the procedure were explained to the patient.   -The risks of transesophageal echo include but are not limited to:  Dental trauma, esophageal trauma/perforation, bleeding, laryngospasm/brochospasm, aspiration, sore throat/hoarseness, & dislodgement of the endotracheal tube/nasogastric tube (where applicable).    -The risks of ANES monitored sedation include hypotension, respiratory depression, arrhythmias, bronchospasm, & death.    -Informed consent was obtained. The patient is agreeable to proceed with the procedure and all questions and concerns addressed.    Case discussed with an attending in echocardiography lab.    Further recommendations per attending addendum

## 2022-07-20 NOTE — ANESTHESIA PREPROCEDURE EVALUATION
07/20/2022  Pre-operative evaluation for Procedure(s) (LRB):  EXTRACTION, ELECTRODE LEAD (N/A)  REPLACEMENT, ICD GENERATOR (Left)  ECHOCARDIOGRAM, TRANSESOPHAGEAL (N/A)    Mario Mahajan is a 46 y.o. female     · The left ventricle is severely, globularly enlarged with eccentric hypertrophy and moderately decreased systolic function. The estimated ejection fraction is 30%.  · Normal right ventricular size with normal right ventricular systolic function.  · Grade I left ventricular diastolic dysfunction.  · Severe left atrial enlargement.  · Moderate-to-severe functional mitral regurgitation.  · The estimated PA systolic pressure is 30 mmHg.  · Normal central venous pressure (3 mmHg).    RHC performed via the right CFV. Patient tolerated the procedure well. Elevated left and right side filling pressures (RA= 14, PCWP=23 mm of Hg). Severe pulmonary HTN  (PA=78/30 mm of Hg, PA mean=52 mm of Hg) in the setting of elevated left sided filling pressures. Normal cardiac index and output  (CI=2.5 L/min/m2, CO=5.7 L/min ). Selective PA angiogram was performed in the left PA and the CardioMEMS device was successfully deployed under fluoroscopy without complications. Calibration was performed int he cathlab.     Normal sinus rhythm   Left bundle branch block   Abnormal ECG   When compared with ECG of 09-APR-2021 13:07,   No significant change was found   Confirmed by Maria Del Carmen Osuna MD ([id number]) on 4/16/2021 9:53:54 AM      Patient Active Problem List   Diagnosis    Microcytic anemia    Non-rheumatic mitral regurgitation    Chronic combined systolic and diastolic congestive heart failure    Nonischemic dilated cardiomyopathy    ICD (implantable cardioverter-defibrillator) in place 12/01/15    Polymorphic ventricular tachycardia    Sleep stage dysfunction    On amiodarone therapy    Mild intermittent asthma  without complication    Obesity (BMI 35.0-39.9 without comorbidity)    Chronic pain of left knee    Left shoulder pain    Hemiplegic migraine without status migrainosus, not intractable    B12 deficiency    Acute pericarditis    Chronic combined systolic (congestive) and diastolic (congestive) heart failure    Chronic low back pain without sciatica    Myofascial pain    Sedentary lifestyle    Hip weakness    Poor posture       Review of patient's allergies indicates:   Allergen Reactions    Ace inhibitors      Cough       No current facility-administered medications on file prior to encounter.     Current Outpatient Medications on File Prior to Encounter   Medication Sig Dispense Refill    albuterol (PROVENTIL/VENTOLIN HFA) 90 mcg/actuation inhaler Inhale 2 puffs into the lungs every 6 (six) hours as needed for Wheezing. 18 g 6    amiodarone (PACERONE) 200 MG Tab Take 1 tablet (200 mg total) by mouth once daily. 90 tablet 3    ascorbic acid, vitamin C, (VITAMIN C) 250 MG tablet Take 1 tablet (250 mg) by mouth twice daily. Take with the iron tablets. 60 tablet 3    aspirin (ECOTRIN) 81 MG EC tablet Take 1 tablet (81 mg total) by mouth once daily. 90 tablet 3    bumetanide (BUMEX) 1 MG tablet Take 2 tablets (2 mg) every morning and 1 tablet (1 mg) every evening. 90 tablet 11    carvediloL (COREG) 25 MG tablet Take 1 tablet (25 mg total) by mouth 2 (two) times daily. 180 tablet 3    ferrous sulfate 325 (65 FE) MG EC tablet Take 1 tablet (325 mg total) by mouth 2 (two) times daily. Take with vitamin C. 60 tablet 3    FLOVENT HFA 44 mcg/actuation inhaler 1 puff 2 (two) times daily.      ipratropium-albuteroL (COMBIVENT)  mcg/actuation inhaler Inhale 1 puff into the lungs 4 (four) times daily. Rescue      potassium chloride SA (K-DUR,KLOR-CON) 20 MEQ tablet TAKE 2 TABLETS(40 MEQ) BY MOUTH EVERY DAY 60 tablet 3    sacubitriL-valsartan (ENTRESTO)  mg per tablet Take 1 tablet by mouth 2  (two) times daily. 60 tablet 11    spironolactone (ALDACTONE) 25 MG tablet TAKE 1 TABLET(25 MG) BY MOUTH EVERY DAY 30 tablet 11    timolol maleate 0.5% (TIMOPTIC) 0.5 % Drop INSTILL 1 DROP INTO BOTH EYES EVERY 12 HOURS      amitriptyline (ELAVIL) 10 MG tablet Take 1 tablet (10 mg total) by mouth every evening. 30 tablet 11    b complex vitamins tablet Take 1 tablet by mouth once daily.      clopidogreL (PLAVIX) 75 mg tablet Take 1 tablet by mouth daily for 30 days only. (Patient taking differently: Take 1 tablet by mouth daily for 30 days only.) 30 tablet 0    dapagliflozin (FARXIGA) 10 mg tablet Take 1 tablet (10 mg total) by mouth once daily. 90 tablet 3    loratadine (CLARITIN) 10 mg tablet Take 1 tablet (10 mg total) by mouth once daily. 30 tablet 0       Past Surgical History:   Procedure Laterality Date    CARDIAC DEFIBRILLATOR PLACEMENT  2015     SECTION      INSERTION, WIRELESS SENSOR, FOR PULMONARY ARTERIAL PRESSURE MONITORING N/A 10/22/2021    Procedure: INSERTION, WIRELESS SENSOR, FOR PULMONARY ARTERIAL PRESSURE MONITORING;  Surgeon: Erika Hurd MD;  Location: [facility] CATH LAB;  Service: Cardiology;  Laterality: N/A;    OOPHORECTOMY      PERICARDIOCENTESIS N/A 2020    Procedure: Pericardiocentesis;  Surgeon: Memo Luis MD;  Location: [facility] CATH LAB;  Service: Cardiology;  Laterality: N/A;    PULMONARY ANGIOGRAPHY N/A 10/22/2021    Procedure: ANGIOGRAM, PULMONARY;  Surgeon: Erika Hurd MD;  Location: [facility] CATH LAB;  Service: Cardiology;  Laterality: N/A;    RIGHT HEART CATHETERIZATION      TOTAL ABDOMINAL HYSTERECTOMY N/A 3/26/2019    Procedure: HYSTERECTOMY, TOTAL, ABDOMINAL;  Surgeon: Victorino Rose MD;  Location: [facility] OR;  Service: OB/GYN;  Laterality: N/A;    TUBAL LIGATION         Social History     Socioeconomic History    Marital status: Single    Number of children: 2   Occupational History    Occupation: Unemployed     Employer: [employer]    Tobacco Use    Smoking status: Never Smoker    Smokeless tobacco: Never Used   Substance and Sexual Activity    Alcohol use: Not Currently     Comment: 1 glass per 3 months    Drug use: No    Sexual activity: Yes     Partners: Male     Comment: one male partner (boyfriend)         CBC: No results for input(s): WBC, RBC, HGB, HCT, PLT, MCV, MCH, MCHC in the last 72 hours.    CMP: No results for input(s): NA, K, CL, CO2, BUN, CREATININE, GLU, MG, PHOS, CALCIUM, ALBUMIN, PROT, ALKPHOS, ALT, AST, BILITOT in the last 72 hours.    INR  No results for input(s): PT, INR, PROTIME, APTT in the last 72 hours.        Diagnostic Studies:      EKD Echo:  Results for orders placed or performed during the hospital encounter of 04/10/18   2D Echo w/ Color Flow Doppler   Result Value Ref Range    EF + QEF 18 (A) 55 - 65    Mitral Valve Regurgitation SEVERE (A)     Est. PA Systolic Pressure 25.66     Tricuspid Valve Regurgitation TRIVIAL            Pre-op Assessment    I have reviewed the Patient Summary Reports.     I have reviewed the Nursing Notes. I have reviewed the NPO Status.   I have reviewed the Medications.     Review of Systems  Anesthesia Hx:  No problems with previous Anesthesia  History of prior surgery of interest to airway management or planning: Denies Family Hx of Anesthesia complications.   Denies Personal Hx of Anesthesia complications.   Hematology/Oncology:         -- Anemia:   Cardiovascular:   Exercise tolerance: poor Pacemaker CHF ECG has been reviewed.    Pulmonary:   Denies COPD. Asthma Sleep Apnea    Renal/:   Denies Chronic Renal Disease.     Hepatic/GI:   Denies GERD. Denies Liver Disease.    Neurological:   Denies CVA. Denies Seizures.    Endocrine:   Denies Diabetes.        Physical Exam  General: Well nourished and Cooperative    Airway:  Mallampati: III / II  Mouth Opening: Normal  TM Distance: Normal  Tongue: Normal  Neck ROM: Normal ROM    Dental:  Intact    Chest/Lungs:  Clear to  auscultation, Normal Respiratory Rate    Heart:  Rate: Normal  Rhythm: Regular Rhythm  Sounds: Normal        Anesthesia Plan  Type of Anesthesia, risks & benefits discussed:    Anesthesia Type: Gen ETT  Intra-op Monitoring Plan: Standard ASA Monitors, Central Line and Art Line  Post Op Pain Control Plan: multimodal analgesia  Induction:  IV  Airway Plan: Direct, Post-Induction  Informed Consent: Informed consent signed with the Patient and all parties understand the risks and agree with anesthesia plan.  All questions answered.   ASA Score: 4  Day of Surgery Review of History & Physical: H&P Update referred to the surgeon/provider.    Ready For Surgery From Anesthesia Perspective.     .

## 2022-07-20 NOTE — Clinical Note
There was an existing generator. The generator was removed. The leads were disconnected. The generator was returned to . The generator was returned due to GLENN/EOL

## 2022-07-20 NOTE — NURSING TRANSFER
Nursing Transfer Note      7/20/2022     Reason patient is being transferred: Meets criteria for transfer    Transfer To: 353    Transfer via stretcher    Transfer with cardiac monitoring, 2 plastic bags with patient belongings, clothing    Transported by PCT    Medicines sent: None    Any special needs or follow-up needed: None    Chart send with patient: Yes    Notified: mother    Patient reassessed at: 7/20

## 2022-07-20 NOTE — BRIEF OP NOTE
: Dr. Wilmer Carter MD  Date of procedure: 7/20/2022  Post-operative Diagnosis: Heart failure and RV ICD Lead fracture    Procedure Performed: total system extraction of cardiac implantable device    Description of Procedure: The patient was brought to the EP lab in the fasting state. Prepped and draped in sterile fashion. Safety timeout was performed. Sedation administered by anesthesia staff. Ultrasound guided access of the right femoral vein x2 and left femoral vein x1. Bridge balloon through 12 Fr sheath and wire in right femoral vein. 9 Fr sheath for central access for anesthesia and wire in left femoral vein. Lidocaine for local anesthetic at device site. Incision made. Blunt and electrosurgical dissection to the level of the existing generator. Fluoroscopic guidance to obtain access x2. Wire confirmed in IVC. Generator and leads dissected free and removed from pocket. Pocket washed with antibiotic solution. Leads prepped for extraction. Laser lead extraction performed. Lead removed in tact. At this point proceeded with reimplant. RV lead advanced with fluoroscopic guidance into septal apex. R waves, thresholds and impedances within acceptable range with good current of injury. Lead sewn to floor of pocket. Pocket washed with antibiotic solution. Lead connected to generator and inserted into pocket with tyrex patch Hemostasis achieved. Pocket closed.     EBL: <30 mL    Specimens Removed: None  Complications: no immediate    Sling to left arm - wear for 24 hours, then only at night for 6 weeks.  NO HEPARIN PRODUCTS  Doxycycline 100 mg PO BID for 5 days at discharge  No lifting left elbow above shoulder height  No lifting over 5 pounds  No driving for 1 week and for 4 weeks if patient uses left arm to make turns  Patient may shower in 24 hours, do not let beam of shower hit site directly and no scrubbing in area  Follow up in device clinic in 1 week and with Dr. Carter in 4 months.    Dr Carter,  the attending electrophysiologist, was present for the entirety of this procedure.    Eric Lora MD PGY8

## 2022-07-20 NOTE — Clinical Note
The laser sheath unit was calibrated. The laser sheath was inserted over RV lead. Laser sheath usage began at 7/20/2022 1:39 PM.    The laser sheath was advanced over lead using counter traction methods. Laser sheath usage ended at 7/20/2022 1:40 PM.    The laser sheath was used for 833 pulsations and 20 second(s).

## 2022-07-20 NOTE — HPI
Ms. Mahajan is a 46 y.o. female with NICM, VT/VF (on amiodarone), s/p CardioMEMS, and s/p ICD. Ms. Mahajan  has a hx of persistently depressed LVEF despite GDMT, and underwent successful ICD placement (12/01/15). Her course was complicated however, by inappropriate shock 2/2 to subsequent lead displacement. She underwent a successful lead revision (01/05/16). In February of 2017 Ms Mahajan presented to [facility] ED following an episode of syncope and was admitted for further evaluation after VT/VF events had been detected on her ICD. She was started on Amiodarone. On 6/17/2020, she was brought to the cath lab for MitraClip, however, due to perforation of the LA free wall, an 8-mm Amplatzer was placed in the lesion and a pericardiocentesis was performed. Patient recently found to have lead fracture. She presents today for device extraction and reimplantation with VASQUEZ guidance.     TTE 5/16/22  The left ventricle is severely, globularly enlarged with eccentric hypertrophy and moderately decreased systolic function. The estimated ejection fraction is 30%.  Normal right ventricular size with normal right ventricular systolic function.  Grade I left ventricular diastolic dysfunction.  Severe left atrial enlargement.  Moderate-to-severe functional mitral regurgitation.  The estimated PA systolic pressure is 30 mmHg.  Normal central venous pressure (3 mmHg).    VASQUEZ 6/17/20  Severe left ventricular enlargement. Severely decreased left ventricular systolic function. The estimated ejection fraction is 20%.  Severe mitral regurgitation.  Normal appearing left atrial thrombus. Low velocities 0.4m/s. No thrombus visualized.  Normal right ventricular systolic function.  MitraClip procedure complicated by atrial perforation resulting in a moderate sized circumferential pericardial effusion. RA collapse consistent with tamponade physiology.  Amplatzer ASO 8mm device used to plug leak.  Trivial pericardial effusion with no right atrial  collapse after drainage.    Anticoagulant/antiplatelets: ASA and Plavix   ECG: NSR with LBBB    Dysphagia or odynophagia:  No  Liver Disease, esophageal disease, or known varices:  No  Upper GI Bleeding: No  Snoring:  No  Sleep Apnea:  No  Prior neck surgery or radiation:  No  History of anesthetic difficulties:  No  Family history of anesthetic difficulties:  No  Last oral intake: yesterday before midnight  Able to move neck in all directions:  Yes  Platelet count: 242k  INR: 1.0

## 2022-07-20 NOTE — ANESTHESIA PROCEDURE NOTES
Arterial    Diagnosis: heart failure  Doctor requesting consult: cathy    Patient location during procedure: done in OR    Staffing  Authorizing Provider: Tam Armijo Jr., MD  Performing Provider: Stephany Austin CRNA    Anesthesiologist was present at the time of the procedure.    Preanesthetic Checklist  Completed: patient identified, IV checked, site marked, risks and benefits discussed, surgical consent, monitors and equipment checked, pre-op evaluation, timeout performed and anesthesia consent givenArterial  Skin Prep: chlorhexidine gluconate and isopropyl alcohol  Local Infiltration: none  Orientation: right  Location: radial    Catheter Size: 20 G  Catheter placement by Ultrasound guidance. Heme positive aspiration all ports.   Vessel Caliber: small, medium, patent, compressibility normal  Vascular Doppler:  not done  Needle advanced into vessel with real time Ultrasound guidance.  Guidewire confirmed in vessel.Insertion Attempts: 1  Assessment  Dressing: secured with tape and tegaderm  Patient: Tolerated well

## 2022-07-20 NOTE — ANESTHESIA POSTPROCEDURE EVALUATION
Anesthesia Post Evaluation    Patient: Mario Mahajan    Procedure(s) Performed: Procedure(s) (LRB):  EXTRACTION, ELECTRODE LEAD (N/A)  ECHOCARDIOGRAM, TRANSESOPHAGEAL (N/A)  INSERTION, ICD GENERATOR, SINGLE CHAMBER    Final Anesthesia Type: general      Patient location during evaluation: PACU  Patient participation: Yes- Able to Participate  Level of consciousness: awake and alert  Post-procedure vital signs: reviewed and stable  Pain management: adequate  Airway patency: patent    PONV status at discharge: No PONV  Anesthetic complications: no      Cardiovascular status: hemodynamically stable  Respiratory status: spontaneous ventilation  Follow-up not needed.          Vitals Value Taken Time   /62 07/20/22 1547   Temp 36.2 °C (97.2 °F) 07/20/22 1535   Pulse 68 07/20/22 1549   Resp 14 07/20/22 1549   SpO2 96 % 07/20/22 1549   Vitals shown include unvalidated device data.      No case tracking events are documented in the log.      Pain/[place] Score: [place] Score: 8 (7/20/2022  3:35 PM)

## 2022-07-20 NOTE — H&P
Electrophysiology Pre Procedure H&P    HPI:   Mario Mahajan is a 45 y.o. female who presents for follow-up of Pacemaker Check  .      HPI:     Ms. Mahajan is a 45 y.o. female with NICM, VT/VF (on amiodarone), ICD here for annual follow up.      Background:     Patient was diagnosed with NICM early 2015 and was started on goal-directed medical therapy. .She had persistent LV dysfunction despite her medical treatment. A PET Stress Test at the time (06/2015)  revealed no ischemia. Her EF at the time was 20%. At her initial [facility] EP office visit, Ms. Mahajan reported having a brother and father with history of ICD implantation; no known SCD in the family. She denied a history of syncope, near syncope, or palpitations. She reported a hx of DELGADO.       Ms. Mahajan underwent successful ICD placement (12/01/15). Her course was complicated however, by inappropriate shock 2/2 to subsequent lead displacement. She underwent a successful lead revision (01/05/16). Per her request, tachytherapies were turned off until 6-weeks post-revision. At the 6-week point (02/24/16), her tachytherapies were turned on. Her device interrogation at the time, revealed normal SC ICD function and no arrhythmias. She feels fatigued and is sluggish at times. She has developed cough that has been chronic.      In February of 2017 Ms Mahajan presented to [facility] ED following an episode of syncope and was admitted for further evaluation after VT/VF events had been detected on her ICD; two VT episodes 02/05/17 and 02/10/17, neither of which required shocks and a VF episode 02/09/17 corresponding with her syncopal event, and requiring a shock. Amiodarone 400 mg QD was initiated, and was later decreased to 200 mg once a day.      5/2019: No ER visits or hospitalizations. No VT/VF events. Remains on amiodarone 200 mg qd. PFT 10/18. She saw ophtho 2 weeks ago, she has glaucoma- now on drops.      Update (04/09/2021):     6/17/2020: The patient was brought  to the cath lab for MitraClip, however, due to perforation of the LA free wall, an 8-mm Amplatzer was placed in the lesion and a pericardiocentesis was performed.   Patient will continue to follow with Dr. Nereida Hatch and may consider MitraClip if she becomes more symptomatic     Today she says she has baseline DELGADO that has not worsened. Denies CP, palpitations, LH, syncope.  She is on GDMT: carvedilol, entresto, spironolactone. Creatinine 1 on 2/10/2021.  She is currently on amiodarone 200mg daily. LFTs are WNL 1/21/2021. TSH is WNL 1/2020. Last PFT on 10/2018 showed a DLCO ratio of 111. She has regular annual eye examinations.      Device Interrogation (4/9/2021) reveals an intrinsic SR with stable lead and device function. RV lead impedence has been high since 6/2020 but is stable at 1600. Thresholds higher but stable and capturing. No arrhythmias or treated episodes were noted.  She paces 0% in the RV. Estimated battery longevity 4.5 years.      I have personally reviewed the patient's EKG today, which shows sinus rhythm with QRS widening at 62bpm. W[place] interval is 186. QRS is 158. QTc is 470.      Interval History:  Patient found to have lead fracture. Patient denies fevers chills SOB. Planned for device extraction    Past Medical History:   Diagnosis Date    Asthma     Chronic back pain 7/1/2014    Chronic combined systolic and diastolic congestive heart failure 4/28/2015     2-10-17   1 - Severely depressed left ventricular systolic function (EF 20-25%).    2 - Severe left ventricular enlargement.    3 - Severe left atrial enlargement.    4 - Left ventricular diastolic dysfunction.    5 - The estimated PA systolic pressure is 18 mmHg.    6 - Mild mitral regurgitation.     Encounter for blood transfusion     ICD (implantable cardioverter-defibrillator) in place 12/01/15 3/3/2016    Menorrhagia, premenopausal 2/10/2017    Microcytic anemia 2/9/2015    Non-rheumatic mitral regurgitation 3/5/2015     "Nonischemic dilated cardiomyopathy 12/1/2015    Sleep apnea     Syncope and collapse 2/9/2017    Ventricular tachycardia, polymorphic         Social History     Socioeconomic History    Marital status: Single    Number of children: 2   Occupational History    Occupation: Unemployed     Employer: [employer]   Tobacco Use    Smoking status: Never Smoker    Smokeless tobacco: Never Used   Substance and Sexual Activity    Alcohol use: Not Currently     Comment: 1 glass per 3 months    Drug use: No    Sexual activity: Yes     Partners: Male     Comment: one male partner (boyfriend)        Family History   Problem Relation Age of Onset    Diabetes Father     Pancreatic cancer Father     Heart failure Father     Heart failure Brother     No Known Problems Daughter     No Known Problems Son     Lung cancer Paternal Grandmother     Heart disease Brother         Current Facility-Administered Medications   Medication Dose Route Frequency Provider Last Rate Last Admin    0.9%  NaCl infusion (for blood administration)   Intravenous Q24H PRN Wilmer Carter MD          ROS:  Constitutional: (-)fevers, (-)chills, (-)night sweats  HEENT: (-) headaches (-) lightheadedness (-) blurry vision  Cardiovascular: (-)chest pain (-)paroxysmal nocturnal dyspnea (-)orthopnea  Respiratory: (-)shortness of breath, (-)dyspnea on exertion (-)hemoptysis  Gastrointestinal: (-)abdominal pain (-)nausea (-)vomiting (-)tarry stools  Musculoskeletal: (-)arthralgias (-)limited range of motion  Neurologic: (-)parasthesias (-)mood disorder (-)anxiety  Endo: (-)polyuria (-)polydipsia (-)heat/cold intolerance  Skin: (-)rash     Vitals:    07/20/22 1000 07/20/22 1001   BP: (!) 102/57 (!) 106/57   BP Location: Left arm Right arm   Patient Position: Lying Lying   Pulse: 62    Resp: 18    Temp: 97.9 °F (36.6 °C)    TempSrc: Temporal    SpO2: 95%    Weight: 119.6 kg (263 lb 10.7 oz)    Height: 5' 9" (1.753 m)      Physical Exam:   Gen: " no acute distress, pleasant patient answering questions appropriately  HEENT: extra-ocular muscles intact, normocephalic-atraumatic  Neck: no jugular venous distension, no lymphadenopathy, no thyromegaly, no carotid bruits  CVS: regular rate and rhythm, S1S2 normal, no murmurs/rubs/gallops  CHEST: clear to auscultation bilaterally, no wheezes/rales/ronchi  ABD: Soft, non-tender, nondistended, bowel sounds present  EXT: No Edema, 2+ pulses bilaterally (radial, femoral, dorsales pedis, posterior tibial)  NEURO: awake, alert, and oriented   Skin: No rash     Review of Labs:   WNL    Most recent ECG  Sinus  Rhythm lbbb    Assessment/Plan:  Mario Mahajan is a 46 y.o. female with NICM, VT/VF (on amiodarone), ICD here with lead fracture here for device extraction and reimplant  -discussed risks and benefits of biventricular system vs single lead ICD. Patient agreed that given that heart failure not secondary to conduction system disease and is well compensated will implant single lead ICD for now  -extraction, reimplant    Eric Lora MD PGY 8

## 2022-07-20 NOTE — Clinical Note
A generator pocket was revised at the left chest with blunt dissection, electrocautery, plasma blade and sharp dissection.

## 2022-07-20 NOTE — ANESTHESIA PROCEDURE NOTES
Intubation    Date/Time: 7/20/2022 10:50 AM  Performed by: Stephany Austin CRNA  Authorized by: Tam Armijo Jr., MD     Intubation:     Intubated:  Postinduction    Mask Ventilation:  Easy with oral airway    Attempts:  1    Attempted By:  CRNA    Method of Intubation:  Video laryngoscopy    Blade:  Paredes 3    Laryngeal View Grade: Grade I - full view of cords      Difficult Airway Encountered?: No      Complications:  None    Airway Device:  Oral endotracheal tube    Airway Device Size:  7.0    Style/Cuff Inflation:  Cuffed    Inflation Amount (mL):  3    Tube secured:  22    Secured at:  The teeth    Placement Verified By:  Capnometry    Complicating Factors:  None    Findings Post-Intubation:  BS equal bilateral and atraumatic/condition of teeth unchanged

## 2022-07-20 NOTE — SUBJECTIVE & OBJECTIVE
Past Medical History:   Diagnosis Date    Asthma     Chronic back pain 2014    Chronic combined systolic and diastolic congestive heart failure 2015     2-10-17   1 - Severely depressed left ventricular systolic function (EF 20-25%).    2 - Severe left ventricular enlargement.    3 - Severe left atrial enlargement.    4 - Left ventricular diastolic dysfunction.    5 - The estimated PA systolic pressure is 18 mmHg.    6 - Mild mitral regurgitation.     Encounter for blood transfusion     ICD (implantable cardioverter-defibrillator) in place 12/01/15 3/3/2016    Menorrhagia, premenopausal 2/10/2017    Microcytic anemia 2015    Non-rheumatic mitral regurgitation 3/5/2015    Nonischemic dilated cardiomyopathy 2015    Sleep apnea     Syncope and collapse 2017    Ventricular tachycardia, polymorphic        Past Surgical History:   Procedure Laterality Date    CARDIAC DEFIBRILLATOR PLACEMENT  2015     SECTION      INSERTION, WIRELESS SENSOR, FOR PULMONARY ARTERIAL PRESSURE MONITORING N/A 10/22/2021    Procedure: INSERTION, WIRELESS SENSOR, FOR PULMONARY ARTERIAL PRESSURE MONITORING;  Surgeon: Erika Hurd MD;  Location: [facility] CATH LAB;  Service: Cardiology;  Laterality: N/A;    OOPHORECTOMY      PERICARDIOCENTESIS N/A 2020    Procedure: Pericardiocentesis;  Surgeon: Memo Luis MD;  Location: [facility] CATH LAB;  Service: Cardiology;  Laterality: N/A;    PULMONARY ANGIOGRAPHY N/A 10/22/2021    Procedure: ANGIOGRAM, PULMONARY;  Surgeon: Erika Hurd MD;  Location: [facility] CATH LAB;  Service: Cardiology;  Laterality: N/A;    RIGHT HEART CATHETERIZATION      TOTAL ABDOMINAL HYSTERECTOMY N/A 3/26/2019    Procedure: HYSTERECTOMY, TOTAL, ABDOMINAL;  Surgeon: Victorino Rose MD;  Location: [facility] OR;  Service: OB/GYN;  Laterality: N/A;    TUBAL LIGATION         Review of patient's allergies indicates:   Allergen Reactions    Ace inhibitors      Cough       No current facility-administered  medications on file prior to encounter.     Current Outpatient Medications on File Prior to Encounter   Medication Sig    albuterol (PROVENTIL/VENTOLIN HFA) 90 mcg/actuation inhaler Inhale 2 puffs into the lungs every 6 (six) hours as needed for Wheezing.    amiodarone (PACERONE) 200 MG Tab Take 1 tablet (200 mg total) by mouth once daily.    amitriptyline (ELAVIL) 10 MG tablet Take 1 tablet (10 mg total) by mouth every evening.    ascorbic acid, vitamin C, (VITAMIN C) 250 MG tablet Take 1 tablet (250 mg) by mouth twice daily. Take with the iron tablets.    aspirin (ECOTRIN) 81 MG EC tablet Take 1 tablet (81 mg total) by mouth once daily.    b complex vitamins tablet Take 1 tablet by mouth once daily.    bumetanide (BUMEX) 1 MG tablet Take 2 tablets (2 mg) every morning and 1 tablet (1 mg) every evening.    carvediloL (COREG) 25 MG tablet Take 1 tablet (25 mg total) by mouth 2 (two) times daily.    clopidogreL (PLAVIX) 75 mg tablet Take 1 tablet by mouth daily for 30 days only. (Patient taking differently: Take 1 tablet by mouth daily for 30 days only.)    dapagliflozin (FARXIGA) 10 mg tablet Take 1 tablet (10 mg total) by mouth once daily.    ferrous sulfate 325 (65 FE) MG EC tablet Take 1 tablet (325 mg total) by mouth 2 (two) times daily. Take with vitamin C.    FLOVENT HFA 44 mcg/actuation inhaler 1 puff 2 (two) times daily.    ipratropium-albuteroL (COMBIVENT)  mcg/actuation inhaler Inhale 1 puff into the lungs 4 (four) times daily. Rescue    loratadine (CLARITIN) 10 mg tablet Take 1 tablet (10 mg total) by mouth once daily.    potassium chloride SA (K-DUR,KLOR-CON) 20 MEQ tablet TAKE 2 TABLETS(40 MEQ) BY MOUTH EVERY DAY    sacubitriL-valsartan (ENTRESTO)  mg per tablet Take 1 tablet by mouth 2 (two) times daily.    spironolactone (ALDACTONE) 25 MG tablet TAKE 1 TABLET(25 MG) BY MOUTH EVERY DAY    timolol maleate 0.5% (TIMOPTIC) 0.5 % Drop INSTILL 1 DROP INTO BOTH EYES EVERY 12 HOURS     Family  History       Problem Relation (Age of Onset)    Diabetes Father    Heart disease Brother    Heart failure Father, Brother    Lung cancer Paternal Grandmother    No Known Problems Daughter, Son    Pancreatic cancer Father          Tobacco Use    Smoking status: Never Smoker    Smokeless tobacco: Never Used   Substance and Sexual Activity    Alcohol use: Not Currently     Comment: 1 glass per 3 months    Drug use: No    Sexual activity: Yes     Partners: Male     Comment: one male partner (boyfriend)     Review of Systems   Constitutional: Negative for diaphoresis, malaise/fatigue, weight gain and weight loss.   HENT:  Negative for nosebleeds.    Eyes:  Negative for vision loss in left eye, vision loss in right eye and visual disturbance.   Cardiovascular:  Negative for chest pain, claudication, dyspnea on exertion, irregular heartbeat, leg swelling, near-syncope, orthopnea, palpitations, paroxysmal nocturnal dyspnea and syncope.   Respiratory:  Negative for cough, shortness of breath, sleep disturbances due to breathing, snoring and wheezing.    Hematologic/Lymphatic: Negative for bleeding problem. Does not bruise/bleed easily.   Skin:  Negative for poor wound healing and rash.   Musculoskeletal:  Negative for muscle cramps and myalgias.   Gastrointestinal:  Negative for bloating, abdominal pain, diarrhea, heartburn, melena, nausea and vomiting.   Genitourinary:  Negative for hematuria and nocturia.   Neurological:  Negative for brief paralysis, dizziness, headaches, light-headedness, numbness and weakness.   Psychiatric/Behavioral:  Negative for depression.    Allergic/Immunologic: Negative for hives.   Objective:     Vital Signs (Most Recent):    Vital Signs (24h Range):           There is no height or weight on file to calculate BMI.            No intake or output data in the 24 hours ending 07/20/22 0955    Lines/Drains/Airways       None                   Physical Exam  Vitals and nursing note reviewed.    Constitutional:       Appearance: She is well-developed. She is not diaphoretic.   HENT:      Head: Normocephalic and atraumatic.   Eyes:      Conjunctiva/sclera: Conjunctivae normal.   Neck:      Vascular: No carotid bruit or JVD.   Cardiovascular:      Rate and Rhythm: Normal rate and regular rhythm.      Pulses: Normal pulses and intact distal pulses.      Heart sounds: Normal heart sounds. No murmur heard.    No friction rub. No gallop.   Pulmonary:      Effort: Pulmonary effort is normal.      Breath sounds: Normal breath sounds. No rales.   Chest:      Chest wall: No tenderness.   Abdominal:      General: There is no distension.      Palpations: Abdomen is soft. There is no mass.      Tenderness: There is no abdominal tenderness.   Skin:     General: Skin is warm and dry.      Coloration: Skin is not pale.   Neurological:      Mental Status: She is alert and oriented to person, place, and time.       Significant Labs: All pertinent lab results from the last 24 hours have been reviewed.

## 2022-07-20 NOTE — ADDENDUM NOTE
Addendum  created 07/20/22 1636 by Chay Mojica MD    Order list changed, Pharmacy for encounter modified

## 2022-07-20 NOTE — CONSULTS
[facility] - [facility] Stay Cardiac [facility]  Cardiology  Consult Note    Patient Name: Mario Mahajan  MRN: [id number]  Admission Date: 7/20/2022  Hospital Length of Stay: 0 days  Code Status: Prior   Attending Provider: Wilmre Carter MD   Consulting Provider: Susana Burgess PA-C  Primary Care Physician: Tejinder Bowling MD  Principal Problem:<principal problem not specified>    Patient information was obtained from patient and past medical records.     Consults  Subjective:     Chief Complaint:  Lead fracture  HPI:   Ms. Mahajan is a 46 y.o. female with NICM, VT/VF (on amiodarone), s/p CardioMEMS, and s/p ICD. Ms. Mahajan  has a hx of persistently depressed LVEF despite GDMT, and underwent successful ICD placement (12/01/15). Her course was complicated however, by inappropriate shock 2/2 to subsequent lead displacement. She underwent a successful lead revision (01/05/16). In February of 2017 Ms Mahajan presented to [facility] ED following an episode of syncope and was admitted for further evaluation after VT/VF events had been detected on her ICD. She was started on Amiodarone. On 6/17/2020, she was brought to the cath lab for MitraClip, however, due to perforation of the LA free wall, an 8-mm Amplatzer was placed in the lesion and a pericardiocentesis was performed. Patient recently found to have lead fracture. She presents today for device extraction and reimplantation with VASQUEZ guidance.     TTE 5/16/22  · The left ventricle is severely, globularly enlarged with eccentric hypertrophy and moderately decreased systolic function. The estimated ejection fraction is 30%.  · Normal right ventricular size with normal right ventricular systolic function.  · Grade I left ventricular diastolic dysfunction.  · Severe left atrial enlargement.  · Moderate-to-severe functional mitral regurgitation.  · The estimated PA systolic pressure is 30 mmHg.  · Normal central venous pressure (3 mmHg).    VASQUEZ 6/17/20  · Severe left ventricular  enlargement. Severely decreased left ventricular systolic function. The estimated ejection fraction is 20%.  · Severe mitral regurgitation.  · Normal appearing left atrial thrombus. Low velocities 0.4m/s. No thrombus visualized.  · Normal right ventricular systolic function.  · MitraClip procedure complicated by atrial perforation resulting in a moderate sized circumferential pericardial effusion. RA collapse consistent with tamponade physiology.  · Amplatzer ASO 8mm device used to plug leak.  · Trivial pericardial effusion with no right atrial collapse after drainage.    Anticoagulant/antiplatelets: ASA and Plavix   ECG: NSR with LBBB    Dysphagia or odynophagia:  No  Liver Disease, esophageal disease, or known varices:  No  Upper GI Bleeding: No  Snoring:  No  Sleep Apnea:  No  Prior neck surgery or radiation:  No  History of anesthetic difficulties:  No  Family history of anesthetic difficulties:  No  Last oral intake: yesterday before midnight  Able to move neck in all directions:  Yes  Platelet count: 242k  INR: 1.0        Past Medical History:   Diagnosis Date    Asthma     Chronic back pain 7/1/2014    Chronic combined systolic and diastolic congestive heart failure 4/28/2015     2-10-17   1 - Severely depressed left ventricular systolic function (EF 20-25%).    2 - Severe left ventricular enlargement.    3 - Severe left atrial enlargement.    4 - Left ventricular diastolic dysfunction.    5 - The estimated PA systolic pressure is 18 mmHg.    6 - Mild mitral regurgitation.     Encounter for blood transfusion     ICD (implantable cardioverter-defibrillator) in place 12/01/15 3/3/2016    Menorrhagia, premenopausal 2/10/2017    Microcytic anemia 2/9/2015    Non-rheumatic mitral regurgitation 3/5/2015    Nonischemic dilated cardiomyopathy 12/1/2015    Sleep apnea     Syncope and collapse 2/9/2017    Ventricular tachycardia, polymorphic        Past Surgical History:   Procedure Laterality Date     CARDIAC DEFIBRILLATOR PLACEMENT  2015     SECTION      INSERTION, WIRELESS SENSOR, FOR PULMONARY ARTERIAL PRESSURE MONITORING N/A 10/22/2021    Procedure: INSERTION, WIRELESS SENSOR, FOR PULMONARY ARTERIAL PRESSURE MONITORING;  Surgeon: Erika Hurd MD;  Location: [facility] CATH LAB;  Service: Cardiology;  Laterality: N/A;    OOPHORECTOMY      PERICARDIOCENTESIS N/A 2020    Procedure: Pericardiocentesis;  Surgeon: Memo Luis MD;  Location: [facility] CATH LAB;  Service: Cardiology;  Laterality: N/A;    PULMONARY ANGIOGRAPHY N/A 10/22/2021    Procedure: ANGIOGRAM, PULMONARY;  Surgeon: Erika Hurd MD;  Location: [facility] CATH LAB;  Service: Cardiology;  Laterality: N/A;    RIGHT HEART CATHETERIZATION      TOTAL ABDOMINAL HYSTERECTOMY N/A 3/26/2019    Procedure: HYSTERECTOMY, TOTAL, ABDOMINAL;  Surgeon: Victorino Rose MD;  Location: [facility] OR;  Service: OB/GYN;  Laterality: N/A;    TUBAL LIGATION         Review of patient's allergies indicates:   Allergen Reactions    Ace inhibitors      Cough       No current facility-administered medications on file prior to encounter.     Current Outpatient Medications on File Prior to Encounter   Medication Sig    albuterol (PROVENTIL/VENTOLIN HFA) 90 mcg/actuation inhaler Inhale 2 puffs into the lungs every 6 (six) hours as needed for Wheezing.    amiodarone (PACERONE) 200 MG Tab Take 1 tablet (200 mg total) by mouth once daily.    amitriptyline (ELAVIL) 10 MG tablet Take 1 tablet (10 mg total) by mouth every evening.    ascorbic acid, vitamin C, (VITAMIN C) 250 MG tablet Take 1 tablet (250 mg) by mouth twice daily. Take with the iron tablets.    aspirin (ECOTRIN) 81 MG EC tablet Take 1 tablet (81 mg total) by mouth once daily.    b complex vitamins tablet Take 1 tablet by mouth once daily.    bumetanide (BUMEX) 1 MG tablet Take 2 tablets (2 mg) every morning and 1 tablet (1 mg) every evening.    carvediloL (COREG) 25 MG tablet Take 1 tablet (25 mg  total) by mouth 2 (two) times daily.    clopidogreL (PLAVIX) 75 mg tablet Take 1 tablet by mouth daily for 30 days only. (Patient taking differently: Take 1 tablet by mouth daily for 30 days only.)    dapagliflozin (FARXIGA) 10 mg tablet Take 1 tablet (10 mg total) by mouth once daily.    ferrous sulfate 325 (65 FE) MG EC tablet Take 1 tablet (325 mg total) by mouth 2 (two) times daily. Take with vitamin C.    FLOVENT HFA 44 mcg/actuation inhaler 1 puff 2 (two) times daily.    ipratropium-albuteroL (COMBIVENT)  mcg/actuation inhaler Inhale 1 puff into the lungs 4 (four) times daily. Rescue    loratadine (CLARITIN) 10 mg tablet Take 1 tablet (10 mg total) by mouth once daily.    potassium chloride SA (K-DUR,KLOR-CON) 20 MEQ tablet TAKE 2 TABLETS(40 MEQ) BY MOUTH EVERY DAY    sacubitriL-valsartan (ENTRESTO)  mg per tablet Take 1 tablet by mouth 2 (two) times daily.    spironolactone (ALDACTONE) 25 MG tablet TAKE 1 TABLET(25 MG) BY MOUTH EVERY DAY    timolol maleate 0.5% (TIMOPTIC) 0.5 % Drop INSTILL 1 DROP INTO BOTH EYES EVERY 12 HOURS     Family History       Problem Relation (Age of Onset)    Diabetes Father    Heart disease Brother    Heart failure Father, Brother    Lung cancer Paternal Grandmother    No Known Problems Daughter, Son    Pancreatic cancer Father          Tobacco Use    Smoking status: Never Smoker    Smokeless tobacco: Never Used   Substance and Sexual Activity    Alcohol use: Not Currently     Comment: 1 glass per 3 months    Drug use: No    Sexual activity: Yes     Partners: Male     Comment: one male partner (boyfriend)     Review of Systems   Constitutional: Negative for diaphoresis, malaise/fatigue, weight gain and weight loss.   HENT:  Negative for nosebleeds.    Eyes:  Negative for vision loss in left eye, vision loss in right eye and visual disturbance.   Cardiovascular:  Negative for chest pain, claudication, dyspnea on exertion, irregular heartbeat, leg swelling,  near-syncope, orthopnea, palpitations, paroxysmal nocturnal dyspnea and syncope.   Respiratory:  Negative for cough, shortness of breath, sleep disturbances due to breathing, snoring and wheezing.    Hematologic/Lymphatic: Negative for bleeding problem. Does not bruise/bleed easily.   Skin:  Negative for poor wound healing and rash.   Musculoskeletal:  Negative for muscle cramps and myalgias.   Gastrointestinal:  Negative for bloating, abdominal pain, diarrhea, heartburn, melena, nausea and vomiting.   Genitourinary:  Negative for hematuria and nocturia.   Neurological:  Negative for brief paralysis, dizziness, headaches, light-headedness, numbness and weakness.   Psychiatric/Behavioral:  Negative for depression.    Allergic/Immunologic: Negative for hives.   Objective:     Vital Signs (Most Recent):    Vital Signs (24h Range):           There is no height or weight on file to calculate BMI.            No intake or output data in the 24 hours ending 07/20/22 0955    Lines/Drains/Airways       None                   Physical Exam  Vitals and nursing note reviewed.   Constitutional:       Appearance: She is well-developed. She is not diaphoretic.   HENT:      Head: Normocephalic and atraumatic.   Eyes:      Conjunctiva/sclera: Conjunctivae normal.   Neck:      Vascular: No carotid bruit or JVD.   Cardiovascular:      Rate and Rhythm: Normal rate and regular rhythm.      Pulses: Normal pulses and intact distal pulses.      Heart sounds: Normal heart sounds. No murmur heard.    No friction rub. No gallop.   Pulmonary:      Effort: Pulmonary effort is normal.      Breath sounds: Normal breath sounds. No rales.   Chest:      Chest wall: No tenderness.   Abdominal:      General: There is no distension.      Palpations: Abdomen is soft. There is no mass.      Tenderness: There is no abdominal tenderness.   Skin:     General: Skin is warm and dry.      Coloration: Skin is not pale.   Neurological:      Mental Status: She is  alert and oriented to person, place, and time.       Significant Labs: All pertinent lab results from the last 24 hours have been reviewed.    Assessment and Plan:     ICD (implantable cardioverter-defibrillator) in place 12/01/15  Pt with ICD lead fracture here today ICD extraction and re-implantation with VASQUEZ guidance.    -No absolute contraindications of esophageal stricture, tumor, perforation, laceration,or diverticulum and/or active GI bleed  -The risks, benefits & alternatives of the procedure were explained to the patient.   -The risks of transesophageal echo include but are not limited to:  Dental trauma, esophageal trauma/perforation, bleeding, laryngospasm/brochospasm, aspiration, sore throat/hoarseness, & dislodgement of the endotracheal tube/nasogastric tube (where applicable).    -The risks of ANES monitored sedation include hypotension, respiratory depression, arrhythmias, bronchospasm, & death.    -Informed consent was obtained. The patient is agreeable to proceed with the procedure and all questions and concerns addressed.    Case discussed with an attending in echocardiography lab.    Further recommendations per attending addendum          VTE Risk Mitigation (From admission, onward)    None          Thank you for your consult. I will sign off. Please contact us if you have any additional questions.    Susana Burgess PA-C  Cardiology   [facility] - Short Stay Cardiac Unit

## 2022-07-21 VITALS
TEMPERATURE: 98 F | HEART RATE: 73 BPM | HEIGHT: 69 IN | BODY MASS INDEX: 39.06 KG/M2 | RESPIRATION RATE: 17 BRPM | SYSTOLIC BLOOD PRESSURE: 97 MMHG | OXYGEN SATURATION: 93 % | DIASTOLIC BLOOD PRESSURE: 53 MMHG | WEIGHT: 263.69 LBS

## 2022-07-21 LAB
BSA FOR ECHO PROCEDURE: 2.41 M2
EJECTION FRACTION: 15 %
RA PRESSURE: 15 MMHG

## 2022-07-21 PROCEDURE — 25000003 PHARM REV CODE 250: Performed by: INTERNAL MEDICINE

## 2022-07-21 RX ORDER — HYDROCODONE BITARTRATE AND ACETAMINOPHEN 5; 325 MG/1; MG/1
1 TABLET ORAL EVERY 6 HOURS PRN
Status: DISCONTINUED | OUTPATIENT
Start: 2022-07-21 | End: 2022-07-21 | Stop reason: HOSPADM

## 2022-07-21 RX ORDER — DOXYCYCLINE 100 MG/1
100 CAPSULE ORAL 2 TIMES DAILY
Qty: 10 CAPSULE | Refills: 0 | Status: SHIPPED | OUTPATIENT
Start: 2022-07-21 | End: 2022-07-26

## 2022-07-21 RX ORDER — HYDROCODONE BITARTRATE AND ACETAMINOPHEN 5; 325 MG/1; MG/1
1 TABLET ORAL EVERY 6 HOURS PRN
Qty: 18 TABLET | Refills: 0 | Status: SHIPPED | OUTPATIENT
Start: 2022-07-21 | End: 2023-08-21

## 2022-07-21 RX ORDER — DOXYCYCLINE 100 MG/1
100 CAPSULE ORAL 2 TIMES DAILY
Qty: 10 CAPSULE | Refills: 0 | Status: SHIPPED | OUTPATIENT
Start: 2022-07-21 | End: 2022-07-21 | Stop reason: HOSPADM

## 2022-07-21 RX ADMIN — Medication 2 G: at 04:07

## 2022-07-21 RX ADMIN — HYDROCODONE BITARTRATE AND ACETAMINOPHEN 1 TABLET: 5; 325 TABLET ORAL at 09:07

## 2022-07-21 RX ADMIN — AMIODARONE HYDROCHLORIDE 200 MG: 200 TABLET ORAL at 08:07

## 2022-07-21 RX ADMIN — BUMETANIDE 1 MG: 1 TABLET ORAL at 08:07

## 2022-07-21 RX ADMIN — CARVEDILOL 25 MG: 25 TABLET, FILM COATED ORAL at 08:07

## 2022-07-21 RX ADMIN — SPIRONOLACTONE 25 MG: 25 TABLET, FILM COATED ORAL at 08:07

## 2022-07-21 NOTE — PLAN OF CARE
Problem: Adult Inpatient Plan of Care  Goal: Plan of Care Review  Outcome: Ongoing, Progressing  Goal: Patient-Specific Goal (Individualized)  Outcome: Ongoing, Progressing     Problem: Infection  Goal: Absence of Infection Signs and Symptoms  Outcome: Ongoing, Progressing     Problem: Fall Injury Risk  Goal: Absence of Fall and Fall-Related Injury  Outcome: Ongoing, Progressing         Pt verbalized understanding of dc paperwork, meds, and follow up appointments. Pt had no further questions or concerns. Pt Ivs and tele box were removed. Pt sling was readjusted to fit her properly. Pt gathered all of her belongings and was wheeled down to her car by transport with her mother at her side.  
Patient arrived to room. PIV placed, labs sent. Admit assessment completed. Plan of care discussed with patient. Will monitor  
Pt IVs patent. A-line removed. Pt reports pain relief following administration of medication. Pt c/o wheezing relief following administration of nebulizer. R and L groin sites clean and intact no drainage. LCW incision under pressure dressing, no drainage clean and intact.  
weight-bearing as tolerated

## 2022-07-21 NOTE — DISCHARGE INSTRUCTIONS
Sling to left arm - wear for 24 hours, then only at night for 6 weeks.  NO HEPARIN PRODUCTS  Doxycycline 100 mg PO BID for 5 days at discharge  No lifting left elbow above shoulder height  No lifting over 5 pounds  No driving for 1 week and for 4 weeks if patient uses left arm to make turns  Patient may shower in 24 hours, do not let beam of shower hit site directly and no scrubbing in area  Follow up in device clinic in 1 week and with Dr. Tay in 4 months.

## 2022-07-21 NOTE — DISCHARGE SUMMARY
[facility] - Cardiology Stepdown  Cardiology  Discharge Summary      Patient Name: Mario Mahajan  MRN: [id number]  Admission Date: 7/20/2022  Hospital Length of Stay: 0 days  Discharge Date and Time:  07/21/2022 12:44 PM  Attending Physician: Wilmer Carter MD  Discharging Provider: Eric Lora MD  Primary Care Physician: Tejinder Bolwing MD    HPI:    Ms. Mahajan is a 45 y.o. female with NICM, VT/VF (on amiodarone), ICD here for annual follow up.      Background:     Patient was diagnosed with NICM early 2015 and was started on goal-directed medical therapy. .She had persistent LV dysfunction despite her medical treatment. A PET Stress Test at the time (06/2015)  revealed no ischemia. Her EF at the time was 20%. At her initial [facility] EP office visit, Ms. Mahajan reported having a brother and father with history of ICD implantation; no known SCD in the family. She denied a history of syncope, near syncope, or palpitations. She reported a hx of DELGADO.       Ms. Mahajan underwent successful ICD placement (12/01/15). Her course was complicated however, by inappropriate shock 2/2 to subsequent lead displacement. She underwent a successful lead revision (01/05/16). Per her request, tachytherapies were turned off until 6-weeks post-revision. At the 6-week point (02/24/16), her tachytherapies were turned on. Her device interrogation at the time, revealed normal SC ICD function and no arrhythmias. She feels fatigued and is sluggish at times. She has developed cough that has been chronic.      In February of 2017 Ms Mahajan presented to [facility] ED following an episode of syncope and was admitted for further evaluation after VT/VF events had been detected on her ICD; two VT episodes 02/05/17 and 02/10/17, neither of which required shocks and a VF episode 02/09/17 corresponding with her syncopal event, and requiring a shock. Amiodarone 400 mg QD was initiated, and was later decreased to 200 mg once a day.      5/2019: No ER visits  or hospitalizations. No VT/VF events. Remains on amiodarone 200 mg qd. PFT 10/18. She saw ophtho 2 weeks ago, she has glaucoma- now on drops.      Update (04/09/2021):     6/17/2020: The patient was brought to the cath lab for MitraClip, however, due to perforation of the LA free wall, an 8-mm Amplatzer was placed in the lesion and a pericardiocentesis was performed.   Patient will continue to follow with Dr. Nereida Hatch and may consider MitraClip if she becomes more symptomatic     Today she says she has baseline DELGADO that has not worsened. Denies CP, palpitations, LH, syncope.  She is on GDMT: carvedilol, entresto, spironolactone. Creatinine 1 on 2/10/2021.  She is currently on amiodarone 200mg daily. LFTs are WNL 1/21/2021. TSH is WNL 1/2020. Last PFT on 10/2018 showed a DLCO ratio of 111. She has regular annual eye examinations.      Device Interrogation (4/9/2021) reveals an intrinsic SR with stable lead and device function. RV lead impedence has been high since 6/2020 but is stable at 1600. Thresholds higher but stable and capturing. No arrhythmias or treated episodes were noted.  She paces 0% in the RV. Estimated battery longevity 4.5 years.      I have personally reviewed the patient's EKG today, which shows sinus rhythm with QRS widening at 62bpm. [place] interval is 186. QRS is 158. QTc is 470.        Interval History:  Patient found to have lead fracture. Patient denies fevers chills SOB. Planned for device extraction    Procedure(s) (LRB):  EXTRACTION, ELECTRODE LEAD (N/A)  ECHOCARDIOGRAM, TRANSESOPHAGEAL (N/A)  INSERTION, ICD GENERATOR, SINGLE CHAMBER     Indwelling Lines/Drains at time of discharge:  Lines/Drains/Airways     None                 Hospital Course (synopsis of major diagnoses, care, treatment, and services provided during the course of the hospital stay): Patient is a 45 yo F arrived for device extraction and reimplantation of ICD> Patient tolerated procedure well with mild pain post op.  Discharged with pain medication        Pending Diagnostic Studies:     None          Final Active Diagnoses:    Diagnosis Date Noted POA    ICD (implantable cardioverter-defibrillator) in place 12/01/15 [Z95.810] 03/03/2016 Yes     Chronic      Problems Resolved During this Admission:       Discharged Condition: good    Follow Up:   Follow-up Information     DEVICE CHECK CLINIC Follow up in 1 week(s).           Victorino Tay MD Follow up in 4 month(s).    Specialties: Electrophysiology, Cardiovascular Disease  Contact information:  [address]  [facility] [id number] [phone]                       Patient Instructions:   Sling to left arm - wear for 24 hours, then only at night for 6 weeks.  NO HEPARIN PRODUCTS  Doxycycline 100 mg PO BID for 5 days at discharge  No lifting left elbow above shoulder height  No lifting over 5 pounds  No driving for 1 week and for 4 weeks if patient uses left arm to make turns  Patient may shower in 24 hours, do not let beam of shower hit site directly and no scrubbing in area  Follow up in device clinic in 1 week and with Dr. aTy in 4 months.    Medications:  Reconciled Home Medications:      Medication List      START taking these medications    doxycycline 100 MG Cap  Commonly known as: VIBRAMYCIN  Take 1 capsule (100 mg total) by mouth 2 (two) times daily. for 5 days     HYDROcodone-acetaminophen 5-325 mg per tablet  Commonly known as: NORCO  Take 1 tablet by mouth every 6 (six) hours as needed for Pain.        CONTINUE taking these medications    albuterol 90 mcg/actuation inhaler  Commonly known as: PROVENTIL/VENTOLIN HFA  Inhale 2 puffs into the lungs every 6 (six) hours as needed for Wheezing.     amiodarone 200 MG Tab  Commonly known as: PACERONE  Take 1 tablet (200 mg total) by mouth once daily.     amitriptyline 10 MG tablet  Commonly known as: ELAVIL  Take 1 tablet (10 mg total) by mouth every evening.     ascorbic acid (vitamin C) 250 MG  tablet  Commonly known as: VITAMIN C  Take 1 tablet (250 mg) by mouth twice daily. Take with the iron tablets.     aspirin 81 MG EC tablet  Commonly known as: ECOTRIN  Take 1 tablet (81 mg total) by mouth once daily.     b complex vitamins tablet  Take 1 tablet by mouth once daily.     bumetanide 1 MG tablet  Commonly known as: BUMEX  Take 2 tablets (2 mg) every morning and 1 tablet (1 mg) every evening.     carvediloL 25 MG tablet  Commonly known as: COREG  Take 1 tablet (25 mg total) by mouth 2 (two) times daily.     clopidogreL 75 mg tablet  Commonly known as: PLAVIX  Take 1 tablet by mouth daily for 30 days only.     ENTRESTO  mg per tablet  Generic drug: sacubitriL-valsartan  Take 1 tablet by mouth 2 (two) times daily.     FARXIGA 10 mg tablet  Generic drug: dapagliflozin  Take 1 tablet (10 mg total) by mouth once daily.     ferrous sulfate 325 (65 FE) MG EC tablet  Take 1 tablet (325 mg total) by mouth 2 (two) times daily. Take with vitamin C.     FLOVENT HFA 44 mcg/actuation inhaler  Generic drug: fluticasone propionate  1 puff 2 (two) times daily.     ipratropium-albuteroL  mcg/actuation inhaler  Commonly known as: CombiVENT  Inhale 1 puff into the lungs 4 (four) times daily. Rescue     loratadine 10 mg tablet  Commonly known as: CLARITIN  Take 1 tablet (10 mg total) by mouth once daily.     potassium chloride SA 20 MEQ tablet  Commonly known as: K-DUR,KLOR-CON  TAKE 2 TABLETS(40 MEQ) BY MOUTH EVERY DAY     spironolactone 25 MG tablet  Commonly known as: ALDACTONE  TAKE 1 TABLET(25 MG) BY MOUTH EVERY DAY     timolol maleate 0.5% 0.5 % Drop  Commonly known as: TIMOPTIC  INSTILL 1 DROP INTO BOTH EYES EVERY 12 HOURS            Time spent on the discharge of patient: 30 minutes    Eric Lora MD  Cardiology  [facility] - Cardiology [facility]

## 2022-07-22 DIAGNOSIS — I50.9 CONGESTIVE HEART FAILURE, UNSPECIFIED HF CHRONICITY, UNSPECIFIED HEART FAILURE TYPE: Primary | ICD-10-CM

## 2022-07-23 ENCOUNTER — PATIENT MESSAGE (OUTPATIENT)
Dept: ELECTROPHYSIOLOGY | Facility: CLINIC | Age: 47
End: 2022-07-23
Payer: MEDICARE

## 2022-07-25 ENCOUNTER — TELEPHONE (OUTPATIENT)
Dept: ELECTROPHYSIOLOGY | Facility: CLINIC | Age: 47
End: 2022-07-25
Payer: MEDICARE

## 2022-07-25 NOTE — TELEPHONE ENCOUNTER
Spoke with patient who states that she is having a persistent cough which has been productive with yellow sputum that is worse when she lays flat. Patient denies any recent fever, chills, nasal congestion, sore throat or any other signs of illness. Patient confirms that she has been compliant with her post op antibiotics, Doxycycline since discharge as well as resumption of medications, including Entresto, Bumex and Aldactone with no missed doses. Patient denies any obvious fluid retention to legs, feet or hands. . Patient states that she typically weighs herself daily ,however has not weighed herself recently so she is unsure of possible weight gain. Patient reports that her left hand has been mildy swollen since Extraction/ Reimplant procedure on 7/20/2022.  Patient denies any concerns with surgical site/dressing and reports that she has discomfort localized to device site, worsened by cough. Patient states that she is unaware of any recent exposure to illness, however does have a COVID 19 home test and will take it to rule out as possible cause of cough and congestion and contact me to report her result. Patient will also weigh herself and include that information so that the information can be discussed with Dr Carter.

## 2022-07-25 NOTE — TELEPHONE ENCOUNTER
----- Message from Ramya Cortes MA sent at 7/25/2022 10:40 AM CDT -----  Karis the patient would like  to talk to you about her fluid build up [phone] . Thank you.

## 2022-07-25 NOTE — TELEPHONE ENCOUNTER
Attempted to contact patient by phone to discuss patient portal message received and obtain further information, but no answer received. Voicemail left requested return call.

## 2022-07-26 ENCOUNTER — PATIENT MESSAGE (OUTPATIENT)
Dept: TRANSPLANT | Facility: CLINIC | Age: 47
End: 2022-07-26
Payer: MEDICARE

## 2022-07-26 ENCOUNTER — TELEPHONE (OUTPATIENT)
Dept: TRANSPLANT | Facility: CLINIC | Age: 47
End: 2022-07-26
Payer: MEDICARE

## 2022-07-26 LAB
GLUCOSE SERPL-MCNC: 116 MG/DL (ref 70–110)
HCO3 UR-SCNC: 26.8 MMOL/L (ref 24–28)
HCT VFR BLD CALC: 32 %PCV (ref 36–54)
PCO2 BLDA: 37.9 MMHG (ref 35–45)
PH SMN: 7.46 [PH] (ref 7.35–7.45)
PO2 BLDA: 261 MMHG (ref 80–100)
POC BE: 3 MMOL/L
POC IONIZED CALCIUM: 1.17 MMOL/L (ref 1.06–1.42)
POC SATURATED O2: 100 % (ref 95–100)
POC TCO2: 28 MMOL/L (ref 23–27)
POTASSIUM BLD-SCNC: 3.5 MMOL/L (ref 3.5–5.1)
SAMPLE: ABNORMAL
SODIUM BLD-SCNC: 141 MMOL/L (ref 136–145)

## 2022-07-27 ENCOUNTER — TELEPHONE (OUTPATIENT)
Dept: TRANSPLANT | Facility: CLINIC | Age: 47
End: 2022-07-27
Payer: MEDICARE

## 2022-07-27 ENCOUNTER — CLINICAL SUPPORT (OUTPATIENT)
Dept: CARDIOLOGY | Facility: HOSPITAL | Age: 47
End: 2022-07-27
Attending: INTERNAL MEDICINE
Payer: MEDICARE

## 2022-07-27 ENCOUNTER — CLINICAL SUPPORT (OUTPATIENT)
Dept: CARDIOLOGY | Facility: HOSPITAL | Age: 47
End: 2022-07-27
Payer: MEDICARE

## 2022-07-27 DIAGNOSIS — Z01.818 PRE-OP TESTING: ICD-10-CM

## 2022-07-27 DIAGNOSIS — I42.0 NONISCHEMIC DILATED CARDIOMYOPATHY: ICD-10-CM

## 2022-07-27 DIAGNOSIS — Z79.01 CHRONIC ANTICOAGULATION: ICD-10-CM

## 2022-07-27 DIAGNOSIS — Z95.810 PRESENCE OF AUTOMATIC (IMPLANTABLE) CARDIAC DEFIBRILLATOR: ICD-10-CM

## 2022-07-27 DIAGNOSIS — I50.42 CHRONIC COMBINED SYSTOLIC (CONGESTIVE) AND DIASTOLIC (CONGESTIVE) HEART FAILURE: ICD-10-CM

## 2022-07-27 DIAGNOSIS — Z45.02 IMPLANTABLE CARDIOVERTER-DEFIBRILLATOR (ICD) GENERATOR END OF LIFE: ICD-10-CM

## 2022-07-27 DIAGNOSIS — T82.190A IMPLANTED DEFIBRILLATOR ELECTRODE LEAD FRACTURE, INITIAL ENCOUNTER: ICD-10-CM

## 2022-07-27 PROCEDURE — 93282 CARDIAC DEVICE CHECK - IN CLINIC & HOSPITAL: ICD-10-PCS | Mod: 26,,, | Performed by: INTERNAL MEDICINE

## 2022-07-27 PROCEDURE — 93295 DEV INTERROG REMOTE 1/2/MLT: CPT | Mod: ,,, | Performed by: INTERNAL MEDICINE

## 2022-07-27 PROCEDURE — 93282 PRGRMG EVAL IMPLANTABLE DFB: CPT

## 2022-07-27 PROCEDURE — 93282 PRGRMG EVAL IMPLANTABLE DFB: CPT | Mod: 26,,, | Performed by: INTERNAL MEDICINE

## 2022-07-27 PROCEDURE — 93295 CARDIAC DEVICE CHECK - REMOTE: ICD-10-PCS | Mod: ,,, | Performed by: INTERNAL MEDICINE

## 2022-07-27 PROCEDURE — 93296 REM INTERROG EVL PM/IDS: CPT | Performed by: INTERNAL MEDICINE

## 2022-07-27 NOTE — TELEPHONE ENCOUNTER
2:50 pm:  Received inner office message from Stefania in device clinic stating pt was reporting fluid & sob when lying down and hoping to speak with a nurse  Asked she obtain pts phone number for me since she is still with pt and let pt know I will call her shortly   I reviewed some of pts chart and noted pt last seen in HTS clinic 4/2022 by Dr. Hurd   Also noted pt is followed in the Cardiomems section and notes by Tammy, RN indicating pt has been having fluctuating readings -noted 7/19 pad 23 and just prior to that 14. As well as her notes to pt requesting pt lay on her pillow for readings    3:25 pm : Called and spoke with pt   HF assessment : pt reports feels sob when lying down and has a cough  Sleeping on 4 pillow -usually 2-3  Slight decrease in U.O., some tightness in her chest ( which she says she gets when she has fluid)    Reports wt today 252, day after hosp d/c last week: 263-so wt is down.  Denies swelling to legs/ankles feet and no abdominal distention.   Said she had a lead extraction early last week and these symptoms started once home    Asked pt about cardiomems and readings Pt said she has not done readings because it was painful after the chest procedure last week, but state she will do so once home today  Pt said she will be home in about 45 minutes     Meds:   Pt reports takes bumex 1 mg tablet: 2 in the am and  1 pm  Potassium 20 meq:  2 in the am  1 in the pm ( chart ndicates 2 am and 1 pm  Entreso 97/103 bid  Aldactone 25 mg once daily     Pt said today in clinic Dr. Carter told her to take an additional 1 mg Bumex this evening    Told pt I would informed Dr. Hurd of all and if he had any additional orders would let her know  Also told pt , he may wait and see her cardiomems readings and that these may not be down loaded until tomorrow am    Pt ok with this     Last bmp in epic 7/20/22 and is stable.

## 2022-07-28 ENCOUNTER — DOCUMENTATION ONLY (OUTPATIENT)
Dept: TRANSPLANT | Facility: CLINIC | Age: 47
End: 2022-07-28
Payer: MEDICARE

## 2022-07-28 ENCOUNTER — TELEPHONE (OUTPATIENT)
Dept: TRANSPLANT | Facility: CLINIC | Age: 47
End: 2022-07-28
Payer: MEDICARE

## 2022-07-28 ENCOUNTER — TELEPHONE (OUTPATIENT)
Dept: ELECTROPHYSIOLOGY | Facility: CLINIC | Age: 47
End: 2022-07-28
Payer: MEDICARE

## 2022-07-28 NOTE — TELEPHONE ENCOUNTER
Pt CardioMems transmission received and reviewed with CHRISTOPH Hurd.      CardioMEMS Transmission  7/28/2022 7/28/2022 7/19/2022 7/14/2022   Transmission Date 7/28/2022 7/27/2022 7/17/2022 7/13/2022   Transmission Type Maintenance Maintenance Maintenance Maintenance   PAS 51 44 49 34   LEELA 38 30 35 22   PAD  26 20 23 14   Medication Adjustments  No Yes No No   Some recent data might be hidden         Pressures are elevated 26. Goal 15.    Medication Interventions? Yes, lets do bumex 2 mg BID x 3 days with extra potassium 4 0mEq.   Lab Interventions? no    Patient contacted? yes, Today patient reports very little SOB, weight stable and no swelling. Bumex 2 mg in the am, 1 mg in the pm. Patient instructed to take an additional Bumex in the pm by Dr Hendrickson.     Patient informed of the above medication dose adjustments. All questions answered and patient verbalized understanding.       Will continue to monitor.

## 2022-07-28 NOTE — TELEPHONE ENCOUNTER
Spoke to patient regarding post op care. Patient denies any complaints. Wound site dry and intact without redness, swelling, hematoma or drainage. Patient denies any fever reports soreness only.  Has been in touch with transplant nursed in regards to some fluid retention.  She was seen in Wound and Device clinic on 7/27/22.     Patient  has resumed medications and completed her antibiotics. Patient verbalizes understanding of all post op instructions.

## 2022-07-28 NOTE — PROGRESS NOTES
2:30 pm:  F/U to my note and route to Dr. Hurd  See Cardiomems, RN , Gabriele documentation this afternoon regarding pt was instructed to take additional Bumex and K.

## 2022-08-02 ENCOUNTER — DOCUMENTATION ONLY (OUTPATIENT)
Dept: TRANSPLANT | Facility: CLINIC | Age: 47
End: 2022-08-02
Payer: MEDICARE

## 2022-08-02 NOTE — PROGRESS NOTES
Pt CardioMems transmission received and review.      CardioMEMS Transmission  8/2/2022 7/28/2022 7/28/2022 7/19/2022   Transmission Date 7/29/2022 7/28/2022 7/27/2022 7/17/2022   Transmission Type Maintenance Maintenance Maintenance Maintenance   PAS 40 51 44 49   LEELA 26 38 30 35   PAD  18 26 20 23   Medication Adjustments  No No Yes No   Some recent data might be hidden         Pressures are stable. Goal 15.    Medications changed? no    Patient contacted? no    Will continue to monitor.

## 2022-08-04 ENCOUNTER — DOCUMENTATION ONLY (OUTPATIENT)
Dept: TRANSPLANT | Facility: CLINIC | Age: 47
End: 2022-08-04
Payer: MEDICARE

## 2022-08-04 NOTE — PROGRESS NOTES
Pt CardioMems transmission received and review.      CardioMEMS Transmission  8/4/2022 8/2/2022 7/28/2022 7/28/2022   Transmission Date 8/4/2022 7/29/2022 7/28/2022 7/27/2022   Transmission Type Maintenance Maintenance Maintenance Maintenance   PAS 39 40 51 44   LEELA 27 26 38 30   PAD  18 18 26 20   Medication Adjustments  No No No Yes   Some recent data might be hidden         Pressures are stable.    Medications changed? no    Patient contacted? no    Will continue to monitor.

## 2022-08-08 ENCOUNTER — CLINICAL SUPPORT (OUTPATIENT)
Dept: CARDIOLOGY | Facility: HOSPITAL | Age: 47
End: 2022-08-08
Payer: MEDICARE

## 2022-08-08 DIAGNOSIS — Z95.810 PRESENCE OF AUTOMATIC (IMPLANTABLE) CARDIAC DEFIBRILLATOR: ICD-10-CM

## 2022-08-08 PROCEDURE — 93296 REM INTERROG EVL PM/IDS: CPT | Performed by: INTERNAL MEDICINE

## 2022-08-09 ENCOUNTER — DOCUMENTATION ONLY (OUTPATIENT)
Dept: TRANSPLANT | Facility: CLINIC | Age: 47
End: 2022-08-09
Payer: MEDICARE

## 2022-08-09 NOTE — PROGRESS NOTES
Pt CardioMems transmission received and review.      CardioMEMS Transmission  8/9/2022 8/4/2022 8/2/2022 7/28/2022 7/28/2022   Transmission Date 8/8/2022 8/4/2022 7/29/2022 7/28/2022 7/27/2022   Transmission Type Maintenance Maintenance Maintenance Maintenance Maintenance   PAS 39 39 40 51 44   LEELA 25 27 26 38 30   PAD  17 18 18 26 20   Medication Adjustments  No No No No Yes   Some recent data might be hidden         Pressures are stable. Goal 15.    Medications changed? no    Patient contacted? no    Will continue to monitor.

## 2022-08-11 ENCOUNTER — DOCUMENTATION ONLY (OUTPATIENT)
Dept: TRANSPLANT | Facility: CLINIC | Age: 47
End: 2022-08-11
Payer: MEDICARE

## 2022-08-11 NOTE — PROGRESS NOTES
Pt CardioMems transmission received and review.      CardioMEMS Transmission  8/11/2022 8/9/2022 8/4/2022 8/2/2022   Transmission Date 8/11/2022 8/8/2022 8/4/2022 7/29/2022   Transmission Type Maintenance Maintenance Maintenance Maintenance   PAS 39 39 39 40   LEELA 26 25 27 26   PAD  17 17 18 18   Medication Adjustments  No No No No   Some recent data might be hidden         Pressures are stable. Goal 15.    Medications changed? no    Patient contacted? no    Will continue to monitor.

## 2022-08-16 ENCOUNTER — TELEPHONE (OUTPATIENT)
Dept: TRANSPLANT | Facility: CLINIC | Age: 47
End: 2022-08-16
Payer: MEDICARE

## 2022-08-16 NOTE — TELEPHONE ENCOUNTER
Spoke with patient and reminded her to transmit cardiomems readings. Patient reports she was out of town and did not bring her pillow. Patient will transmit readings when she makes it home. All questions answered and patient verbalized understanding.

## 2022-08-18 ENCOUNTER — DOCUMENTATION ONLY (OUTPATIENT)
Dept: TRANSPLANT | Facility: CLINIC | Age: 47
End: 2022-08-18
Payer: MEDICARE

## 2022-08-18 NOTE — PROGRESS NOTES
Pt CardioMems transmission received and review.      CardioMEMS Transmission  8/18/2022 8/11/2022 8/9/2022 8/4/2022   Transmission Date 8/17/2022 8/11/2022 8/8/2022 8/4/2022   Transmission Type Maintenance Maintenance Maintenance Maintenance   PAS 42 39 39 39   LEELA 30 26 25 27   PAD  20 17 17 18   Medication Adjustments  No No No No   Some recent data might be hidden         Pressures are stable. Goal 15.    Medications changed? no    Patient contacted? no    Will continue to monitor.

## 2022-08-24 ENCOUNTER — DOCUMENTATION ONLY (OUTPATIENT)
Dept: TRANSPLANT | Facility: CLINIC | Age: 47
End: 2022-08-24
Payer: MEDICARE

## 2022-08-24 NOTE — PROGRESS NOTES
Pt CardioMems transmission received and review.      CardioMEMS Transmission  8/24/2022 8/18/2022 8/11/2022 8/9/2022   Transmission Date 8/23/2022 8/17/2022 8/11/2022 8/8/2022   Transmission Type Maintenance Maintenance Maintenance Maintenance   PAS 40 42 39 39   LEELA 26 30 26 25   PAD  16 20 17 17   Medication Adjustments  No No No No   Some recent data might be hidden         Pressures are stable. Goal 15.     Medications changed? no    Patient contacted? no    Will continue to monitor.

## 2022-08-26 ENCOUNTER — DOCUMENTATION ONLY (OUTPATIENT)
Dept: TRANSPLANT | Facility: CLINIC | Age: 47
End: 2022-08-26
Payer: MEDICARE

## 2022-08-26 NOTE — PROGRESS NOTES
Pt CardioMems transmission received and review.      CardioMEMS Transmission  8/26/2022 8/24/2022 8/18/2022 8/11/2022   Transmission Date 8/25/2022 8/23/2022 8/17/2022 8/11/2022   Transmission Type Maintenance Maintenance Maintenance Maintenance   PAS 33 40 42 39   LEELA 20 26 30 26   PAD  13 16 20 17   Medication Adjustments  No No No No   Some recent data might be hidden         Pressures are stable ks. Goal 15.    Medications changed? no    Patient contacted? no    Will continue to monitor.

## 2022-08-30 ENCOUNTER — TELEPHONE (OUTPATIENT)
Dept: TRANSPLANT | Facility: CLINIC | Age: 47
End: 2022-08-30
Payer: MEDICARE

## 2022-09-01 ENCOUNTER — DOCUMENTATION ONLY (OUTPATIENT)
Dept: TRANSPLANT | Facility: CLINIC | Age: 47
End: 2022-09-01
Payer: MEDICARE

## 2022-09-01 NOTE — PROGRESS NOTES
Pt CardioMems transmission received and review.      CardioMEMS Transmission  9/1/2022 8/26/2022 8/24/2022 8/18/2022   Transmission Date 8/31/2022 8/25/2022 8/23/2022 8/17/2022   Transmission Type Maintenance Maintenance Maintenance Maintenance   PAS 41 33 40 42   LEELA 28 20 26 30   PAD  19 13 16 20   Medication Adjustments  No No No No   Some recent data might be hidden         Pressures are stable. Goal 15.    Medications changed? no    Patient contacted? no    Will continue to monitor.

## 2022-09-07 ENCOUNTER — DOCUMENTATION ONLY (OUTPATIENT)
Dept: TRANSPLANT | Facility: CLINIC | Age: 47
End: 2022-09-07
Payer: MEDICARE

## 2022-09-07 NOTE — PROGRESS NOTES
Pt CardioMems transmission received and review.      CardioMEMS Transmission  9/7/2022 9/1/2022 8/26/2022 8/24/2022   Transmission Date 9/7/2022 8/31/2022 8/25/2022 8/23/2022   Transmission Type Maintenance Maintenance Maintenance Maintenance   PAS 42 41 33 40   LEELA 29 28 20 26   PAD  19 19 13 16   Medication Adjustments  No No No No   Some recent data might be hidden         Pressures are stable. Goal 15.    Medications changed? no    Patient contacted? no    Will continue to monitor.

## 2022-09-08 ENCOUNTER — RESEARCH ENCOUNTER (OUTPATIENT)
Dept: RESEARCH | Facility: HOSPITAL | Age: 47
End: 2022-09-08
Payer: MEDICARE

## 2022-09-08 DIAGNOSIS — I42.0 NONISCHEMIC DILATED CARDIOMYOPATHY: Chronic | ICD-10-CM

## 2022-09-08 DIAGNOSIS — I47.29 POLYMORPHIC VENTRICULAR TACHYCARDIA: ICD-10-CM

## 2022-09-08 DIAGNOSIS — I50.42 CHRONIC COMBINED SYSTOLIC AND DIASTOLIC CONGESTIVE HEART FAILURE: ICD-10-CM

## 2022-09-08 DIAGNOSIS — Z00.6 EXAMINATION OF PARTICIPANT IN CLINICAL TRIAL: ICD-10-CM

## 2022-09-08 DIAGNOSIS — I50.42 CHRONIC COMBINED SYSTOLIC (CONGESTIVE) AND DIASTOLIC (CONGESTIVE) HEART FAILURE: Primary | ICD-10-CM

## 2022-09-08 NOTE — PROGRESS NOTES
Contacted patient today 9/8/2022 regarding scheduling her Month 12 GUIDE-HF research study follow-up visit. Patient agreeable to appointments on 9/29/2022 starting at 2:30 PM. Instructed patient that questionnaires, HF exam with NYHA, HF medication review, labs, six minute walk, and adverse event assessment are to be performed. Labs scheduled at 2:30 PM, research visit for questionnaires and six minute walk scheduled at 3:00 PM, and clinic visit with Dr. Srivastava scheduled at 3:30 PM. Complete understanding verbalized. Patient denies any questions or concerns at this time. Appointment slip mailed. Will follow.

## 2022-09-12 ENCOUNTER — PATIENT MESSAGE (OUTPATIENT)
Dept: TRANSPLANT | Facility: CLINIC | Age: 47
End: 2022-09-12
Payer: MEDICARE

## 2022-09-12 ENCOUNTER — TELEPHONE (OUTPATIENT)
Dept: TRANSPLANT | Facility: CLINIC | Age: 47
End: 2022-09-12
Payer: MEDICARE

## 2022-09-12 RX ORDER — AMIODARONE HYDROCHLORIDE 200 MG/1
TABLET ORAL
Qty: 90 TABLET | Refills: 3 | Status: SHIPPED | OUTPATIENT
Start: 2022-09-12 | End: 2023-01-11 | Stop reason: SDUPTHER

## 2022-09-16 ENCOUNTER — TELEPHONE (OUTPATIENT)
Dept: TRANSPLANT | Facility: CLINIC | Age: 47
End: 2022-09-16
Payer: MEDICARE

## 2022-09-16 NOTE — TELEPHONE ENCOUNTER
Pt CardioMems transmission received and reviewed with CHRISTOPH Edge.    CardioMEMS Transmission  9/16/2022 9/16/2022 9/7/2022 9/1/2022   Transmission Date 9/16/2022 9/13/2022 9/7/2022 8/31/2022   Transmission Type Maintenance Maintenance Maintenance Maintenance   PAS 47 49 42 41   LEELA 33 35 29 28   PAD  22 23 19 19   Medication Adjustments  No No No No   Some recent data might be hidden         Pressures are elevated.    Medication Interventions? yes, Bumex 2mg BID until Monday.    Lab Interventions? no    Patient contacted? yes, Message left for pt to return call. Bumex 2mg in the am, and 1 mg in the pm.     Spoke with patient and she agreed to submit a more recent transmission. Reading for to day was still elevated. Patient reports no swelling, SOB, or weight gain. Patient informed of the above medication dose adjustment. All questions answered and patient verbalized understanding and all questions answered.         Will continue to monitor.

## 2022-09-20 ENCOUNTER — PATIENT MESSAGE (OUTPATIENT)
Dept: ELECTROPHYSIOLOGY | Facility: CLINIC | Age: 47
End: 2022-09-20
Payer: MEDICARE

## 2022-09-21 ENCOUNTER — PATIENT MESSAGE (OUTPATIENT)
Dept: FAMILY MEDICINE | Facility: CLINIC | Age: 47
End: 2022-09-21
Payer: MEDICARE

## 2022-09-21 RX ORDER — TRIAMCINOLONE ACETONIDE 1 MG/G
CREAM TOPICAL 2 TIMES DAILY
Qty: 80 G | Refills: 1 | Status: SHIPPED | OUTPATIENT
Start: 2022-09-21 | End: 2022-11-20 | Stop reason: SDUPTHER

## 2022-09-23 ENCOUNTER — PATIENT MESSAGE (OUTPATIENT)
Dept: TRANSPLANT | Facility: CLINIC | Age: 47
End: 2022-09-23
Payer: MEDICARE

## 2022-09-23 ENCOUNTER — DOCUMENTATION ONLY (OUTPATIENT)
Dept: TRANSPLANT | Facility: CLINIC | Age: 47
End: 2022-09-23
Payer: MEDICARE

## 2022-09-23 ENCOUNTER — TELEPHONE (OUTPATIENT)
Dept: TRANSPLANT | Facility: CLINIC | Age: 47
End: 2022-09-23
Payer: MEDICARE

## 2022-09-23 NOTE — TELEPHONE ENCOUNTER
Spoke with patient and reminded her to submit a more recent cardiomems reading. Patient reports no SOB, swelling or weight gain. Patient was not at home but reports she will transmit return home. All questions answered and patient verbalized understanding.

## 2022-09-23 NOTE — PROGRESS NOTES
Pt CardioMems transmission received and review.      CardioMEMS Transmission  9/23/2022 9/16/2022 9/16/2022 9/7/2022   Transmission Date 9/20/2022 9/16/2022 9/13/2022 9/7/2022   Transmission Type Maintenance Maintenance Maintenance Maintenance   PAS 47 47 49 42   LEELA 34 33 35 29   PAD  23 22 23 19   Medication Adjustments  Yes No No No   Some recent data might be hidden         Pressures are slightly elevated but pt need to submit a more recent reading. Message sent reminding pt to submit cardiomems readings.     Medications changed? no    Patient contacted? no    Will continue to monitor.

## 2022-09-26 ENCOUNTER — DOCUMENTATION ONLY (OUTPATIENT)
Dept: TRANSPLANT | Facility: CLINIC | Age: 47
End: 2022-09-26
Payer: MEDICARE

## 2022-09-26 NOTE — PROGRESS NOTES
Pt CardioMems transmission received and review.      CardioMEMS Transmission  9/26/2022 9/23/2022 9/16/2022 9/16/2022   Transmission Date 9/25/2022 9/20/2022 9/16/2022 9/13/2022   Transmission Type Maintenance Maintenance Maintenance Maintenance   PAS 39 47 47 49   LEELA 25 34 33 35   PAD  16 23 22 23   Medication Adjustments  No Yes No No   Some recent data might be hidden         Pressures are stable. Goal 15.    Medications changed? no    Patient contacted? no    Will continue to monitor.

## 2022-09-28 ENCOUNTER — RESEARCH ENCOUNTER (OUTPATIENT)
Dept: RESEARCH | Facility: HOSPITAL | Age: 47
End: 2022-09-28
Payer: MEDICARE

## 2022-09-28 NOTE — PROGRESS NOTES
Contacted patient today 9/28/2022 regarding appointments tomorrow Thursday 9/29/2022 for the GUIDE-HF Month 12 research study visit. Instructed patient that questionnaires, HF exam with NYHA, HF medication review, labs, six minute walk, and adverse event assessment are to be performed. Patient to have labs at 2:30 PM, research visit for questionnaires and six minute leal walk at 3:00 PM, and a HF clinic visit with Dr. Srivastava at 3:30 PM. Patient verbalized complete understanding and confirmed she will be at appointments. Patient denies any questions or concerns at this time. Will follow.

## 2022-09-29 ENCOUNTER — OFFICE VISIT (OUTPATIENT)
Dept: TRANSPLANT | Facility: CLINIC | Age: 47
End: 2022-09-29
Payer: MEDICARE

## 2022-09-29 ENCOUNTER — RESEARCH ENCOUNTER (OUTPATIENT)
Dept: RESEARCH | Facility: HOSPITAL | Age: 47
End: 2022-09-29
Payer: MEDICARE

## 2022-09-29 ENCOUNTER — DOCUMENTATION ONLY (OUTPATIENT)
Dept: TRANSPLANT | Facility: CLINIC | Age: 47
End: 2022-09-29
Payer: MEDICARE

## 2022-09-29 ENCOUNTER — LAB VISIT (OUTPATIENT)
Dept: LAB | Facility: HOSPITAL | Age: 47
End: 2022-09-29
Payer: MEDICARE

## 2022-09-29 VITALS
DIASTOLIC BLOOD PRESSURE: 67 MMHG | HEART RATE: 61 BPM | WEIGHT: 265.63 LBS | BODY MASS INDEX: 39.34 KG/M2 | SYSTOLIC BLOOD PRESSURE: 104 MMHG | HEIGHT: 69 IN

## 2022-09-29 DIAGNOSIS — E66.9 OBESITY (BMI 35.0-39.9 WITHOUT COMORBIDITY): ICD-10-CM

## 2022-09-29 DIAGNOSIS — I50.42 CHRONIC COMBINED SYSTOLIC (CONGESTIVE) AND DIASTOLIC (CONGESTIVE) HEART FAILURE: ICD-10-CM

## 2022-09-29 DIAGNOSIS — Z00.6 EXAMINATION OF PARTICIPANT IN CLINICAL TRIAL: ICD-10-CM

## 2022-09-29 DIAGNOSIS — I42.0 NONISCHEMIC DILATED CARDIOMYOPATHY: Primary | Chronic | ICD-10-CM

## 2022-09-29 DIAGNOSIS — Z79.899 ON AMIODARONE THERAPY: ICD-10-CM

## 2022-09-29 DIAGNOSIS — I50.42 CHRONIC COMBINED SYSTOLIC AND DIASTOLIC CONGESTIVE HEART FAILURE: ICD-10-CM

## 2022-09-29 DIAGNOSIS — I47.29 POLYMORPHIC VENTRICULAR TACHYCARDIA: ICD-10-CM

## 2022-09-29 LAB
ALBUMIN SERPL BCP-MCNC: 3.8 G/DL (ref 3.5–5.2)
ALP SERPL-CCNC: 43 U/L (ref 55–135)
ALT SERPL W/O P-5'-P-CCNC: 10 U/L (ref 10–44)
ANION GAP SERPL CALC-SCNC: 12 MMOL/L (ref 8–16)
AST SERPL-CCNC: 13 U/L (ref 10–40)
BILIRUB SERPL-MCNC: 1.6 MG/DL (ref 0.1–1)
BNP SERPL-MCNC: 139 PG/ML (ref 0–99)
BUN SERPL-MCNC: 19 MG/DL (ref 6–20)
CALCIUM SERPL-MCNC: 9.4 MG/DL (ref 8.7–10.5)
CHLORIDE SERPL-SCNC: 103 MMOL/L (ref 95–110)
CO2 SERPL-SCNC: 24 MMOL/L (ref 23–29)
CREAT SERPL-MCNC: 1.3 MG/DL (ref 0.5–1.4)
EST. GFR  (NO RACE VARIABLE): 52 ML/MIN/1.73 M^2
GLUCOSE SERPL-MCNC: 59 MG/DL (ref 70–110)
POTASSIUM SERPL-SCNC: 3.7 MMOL/L (ref 3.5–5.1)
PROT SERPL-MCNC: 7.3 G/DL (ref 6–8.4)
SODIUM SERPL-SCNC: 139 MMOL/L (ref 136–145)

## 2022-09-29 PROCEDURE — 36415 COLL VENOUS BLD VENIPUNCTURE: CPT | Performed by: INTERNAL MEDICINE

## 2022-09-29 PROCEDURE — 1159F PR MEDICATION LIST DOCUMENTED IN MEDICAL RECORD: ICD-10-PCS | Mod: CPTII,S$GLB,, | Performed by: INTERNAL MEDICINE

## 2022-09-29 PROCEDURE — 3074F SYST BP LT 130 MM HG: CPT | Mod: CPTII,S$GLB,, | Performed by: INTERNAL MEDICINE

## 2022-09-29 PROCEDURE — 99999 PR PBB SHADOW E&M-EST. PATIENT-LVL IV: CPT | Mod: PBBFAC,,, | Performed by: INTERNAL MEDICINE

## 2022-09-29 PROCEDURE — 3074F PR MOST RECENT SYSTOLIC BLOOD PRESSURE < 130 MM HG: ICD-10-PCS | Mod: CPTII,S$GLB,, | Performed by: INTERNAL MEDICINE

## 2022-09-29 PROCEDURE — 80053 COMPREHEN METABOLIC PANEL: CPT | Performed by: INTERNAL MEDICINE

## 2022-09-29 PROCEDURE — 3008F BODY MASS INDEX DOCD: CPT | Mod: CPTII,S$GLB,, | Performed by: INTERNAL MEDICINE

## 2022-09-29 PROCEDURE — 4010F ACE/ARB THERAPY RXD/TAKEN: CPT | Mod: CPTII,S$GLB,, | Performed by: INTERNAL MEDICINE

## 2022-09-29 PROCEDURE — 3078F DIAST BP <80 MM HG: CPT | Mod: CPTII,S$GLB,, | Performed by: INTERNAL MEDICINE

## 2022-09-29 PROCEDURE — 3078F PR MOST RECENT DIASTOLIC BLOOD PRESSURE < 80 MM HG: ICD-10-PCS | Mod: CPTII,S$GLB,, | Performed by: INTERNAL MEDICINE

## 2022-09-29 PROCEDURE — 99214 PR OFFICE/OUTPT VISIT, EST, LEVL IV, 30-39 MIN: ICD-10-PCS | Mod: S$GLB,,, | Performed by: INTERNAL MEDICINE

## 2022-09-29 PROCEDURE — 99214 OFFICE O/P EST MOD 30 MIN: CPT | Mod: S$GLB,,, | Performed by: INTERNAL MEDICINE

## 2022-09-29 PROCEDURE — 83880 ASSAY OF NATRIURETIC PEPTIDE: CPT | Performed by: INTERNAL MEDICINE

## 2022-09-29 PROCEDURE — 99999 PR PBB SHADOW E&M-EST. PATIENT-LVL IV: ICD-10-PCS | Mod: PBBFAC,,, | Performed by: INTERNAL MEDICINE

## 2022-09-29 PROCEDURE — 3008F PR BODY MASS INDEX (BMI) DOCUMENTED: ICD-10-PCS | Mod: CPTII,S$GLB,, | Performed by: INTERNAL MEDICINE

## 2022-09-29 PROCEDURE — 1159F MED LIST DOCD IN RCRD: CPT | Mod: CPTII,S$GLB,, | Performed by: INTERNAL MEDICINE

## 2022-09-29 PROCEDURE — 4010F PR ACE/ARB THEARPY RXD/TAKEN: ICD-10-PCS | Mod: CPTII,S$GLB,, | Performed by: INTERNAL MEDICINE

## 2022-09-29 NOTE — PROGRESS NOTES
Subjective:     HPI:  Mrs. Mahajan is a very pleasant  46 y.o. black female with stage C HFrEF, NICMP,  With a Hx of VF in  2017 (no events since), s/p CardioMEMS. She is enrolled in the GUIDE HF study and is here for routine followup visit. She has been doing great with cardiomems, feels well. Volume well controlled. 22 had fractured ICD lead therefore had to get device extracted and have ICD single chamber replaced. Was discharged home without issues. Cardiomems numbers seem to have bee stable. Patient denies N/V/F/C, lightheadedness, dizziness, PND, orthopnea, LE edema, abdominal pain, abdominal pressure, chest pain, chest pressure, syncope, pre-syncope. NYHA FC II.     Past Medical History:   Diagnosis Date    Asthma     Chronic back pain 2014    Chronic combined systolic and diastolic congestive heart failure 2015     2-10-17   1 - Severely depressed left ventricular systolic function (EF 20-25%).    2 - Severe left ventricular enlargement.    3 - Severe left atrial enlargement.    4 - Left ventricular diastolic dysfunction.    5 - The estimated PA systolic pressure is 18 mmHg.    6 - Mild mitral regurgitation.     Encounter for blood transfusion     ICD (implantable cardioverter-defibrillator) in place 12/01/15 3/3/2016    Menorrhagia, premenopausal 2/10/2017    Microcytic anemia 2015    Non-rheumatic mitral regurgitation 3/5/2015    Nonischemic dilated cardiomyopathy 2015    Sleep apnea     Syncope and collapse 2017    Ventricular tachycardia, polymorphic      Past Surgical History:   Procedure Laterality Date    CARDIAC DEFIBRILLATOR PLACEMENT  2015     SECTION      INSERTION, WIRELESS SENSOR, FOR PULMONARY ARTERIAL PRESSURE MONITORING N/A 10/22/2021    Procedure: INSERTION, WIRELESS SENSOR, FOR PULMONARY ARTERIAL PRESSURE MONITORING;  Surgeon: Erika Hurd MD;  Location: [facility] CATH LAB;  Service: Cardiology;  Laterality: N/A;    OOPHORECTOMY      PERICARDIOCENTESIS  "N/A 2020    Procedure: Pericardiocentesis;  Surgeon: Memo Luis MD;  Location: [facility] CATH LAB;  Service: Cardiology;  Laterality: N/A;    PULMONARY ANGIOGRAPHY N/A 10/22/2021    Procedure: ANGIOGRAM, PULMONARY;  Surgeon: Erika Hurd MD;  Location: [facility] CATH LAB;  Service: Cardiology;  Laterality: N/A;    RIGHT HEART CATHETERIZATION      TOTAL ABDOMINAL HYSTERECTOMY N/A 3/26/2019    Procedure: HYSTERECTOMY, TOTAL, ABDOMINAL;  Surgeon: Victorino Rose MD;  Location: [facility] OR;  Service: OB/GYN;  Laterality: N/A;    TRANSESOPHAGEAL ECHOCARDIOGRAPHY N/A 2022    Procedure: ECHOCARDIOGRAM, TRANSESOPHAGEAL;  Surgeon: Phan Powell MD;  Location: [facility] EP LAB;  Service: Cardiology;  Laterality: N/A;    TUBAL LIGATION       OB History          2    Para   2    Term   2            AB        Living   2         SAB        IAB        Ectopic        Multiple        Live Births   2               Review of Systems   Constitutional: Positive for night sweats. Negative for chills, decreased appetite, diaphoresis, fever, malaise/fatigue, weight gain and weight loss.   Eyes: Negative.    Cardiovascular:  Negative for chest pain, claudication, cyanosis, dyspnea on exertion, irregular heartbeat, leg swelling, near-syncope, orthopnea, palpitations, paroxysmal nocturnal dyspnea and syncope.   Respiratory:  Negative for cough, hemoptysis and shortness of breath.    Endocrine: Negative.    Hematologic/Lymphatic: Negative.    Skin:  Negative for color change, dry skin and nail changes.   Musculoskeletal: Negative.    Gastrointestinal: Negative.    Genitourinary: Negative.    Neurological:  Negative for weakness.     Objective:   Blood pressure 104/67, pulse 61, height 5' 9" (1.753 m), weight 120.5 kg (265 lb 10.5 oz), last menstrual period 2019.body mass index is 39.23 kg/m².  Physical Exam  Vitals and nursing note reviewed.   Constitutional:       Appearance: She is well-developed.   HENT:      " Head: Normocephalic.   Eyes:      Pupils: Pupils are equal, round, and reactive to light.   Neck:      Vascular: No JVD.   Cardiovascular:      Rate and Rhythm: Normal rate and regular rhythm.      Chest Wall: PMI is displaced.      Pulses: Intact distal pulses.      Heart sounds: Normal heart sounds. No murmur heard.    No friction rub. No gallop.   Pulmonary:      Effort: Pulmonary effort is normal.      Breath sounds: Normal breath sounds. No wheezing or rales.   Abdominal:      General: Bowel sounds are normal.      Palpations: Abdomen is soft.   Musculoskeletal:      Cervical back: Neck supple.   Neurological:      Mental Status: She is alert and oriented to person, place, and time.       Labs:    Chemistry        Component Value Date/Time     07/20/2022 0951    K 4.7 07/20/2022 0951     07/20/2022 0951    CO2 24 07/20/2022 0951    BUN 15 07/20/2022 0951    BUN 12 06/19/2015 0950    CREATININE 1.0 07/20/2022 0951    GLU 94 07/20/2022 0951        Component Value Date/Time    CALCIUM 9.0 07/20/2022 0951    ALKPHOS 37 (L) 04/28/2022 1348    AST 10 04/28/2022 1348    ALT 8 (L) 04/28/2022 1348    BILITOT 1.7 (H) 04/28/2022 1348    ESTGFRAFRICA >60.0 07/20/2022 0951    EGFRNONAA >60.0 07/20/2022 0951          Magnesium   Date Value Ref Range Status   10/26/2021 1.7 1.6 - 2.6 mg/dL Final     Lab Results   Component Value Date    WBC 6.16 07/15/2022    HGB 12.3 07/15/2022    HCT 32 (L) 07/20/2022     07/15/2022     Lab Results   Component Value Date    INR 1.0 07/15/2022    INR 1.0 10/22/2021    INR 1.1 06/17/2020     BNP   Date Value Ref Range Status   04/28/2022 176 (H) 0 - 99 pg/mL Final     Comment:     Values of less than 100 pg/ml are consistent with non-CHF populations.   10/22/2021 429 (H) 0 - 99 pg/mL Final     Comment:     Values of less than 100 pg/ml are consistent with non-CHF populations.   07/30/2021 407 (H) 0 - 99 pg/mL Final     Comment:     Values of less than 100 pg/ml are  consistent with non-CHF populations.     LD   Date Value Ref Range Status   01/12/2018 180 110 - 260 U/L Final     Comment:     Results are increased in hemolyzed samples.   03/21/2017 190 110 - 260 U/L Final     Comment:     Results are increased in hemolyzed samples.       Assessment:      1. Nonischemic dilated cardiomyopathy    2. Chronic combined systolic (congestive) and diastolic (congestive) heart failure    3. Obesity (BMI 35.0-39.9 without comorbidity)    4. Polymorphic ventricular tachycardia    5. On amiodarone therapy        Plan:   Stage C HFrEF. NYHA class II symptoms. Patient continues to do well.   Today is her last visit for GUIDE.   Recommend 2 gram sodium restriction and 1500cc fluid restriction.  Encourage physical activity with graded exercise program.  Requested patient to weigh themselves daily, and to notify us if their weight increases by more than 3 lbs in 1 day or 5 lbs in 1 week.  Discussed weight loss, will stick to 6 month clinic visit, hope to see her lose some weight.   RTC 6 months.

## 2022-09-29 NOTE — PROGRESS NOTES
Pt CardioMems transmission received and review.      CardioMEMS Transmission  9/29/2022 9/26/2022 9/23/2022 9/16/2022 9/16/2022   Transmission Date 9/29/2022 9/25/2022 9/20/2022 9/16/2022 9/13/2022   Transmission Type Maintenance Maintenance Maintenance Maintenance Maintenance   PAS 40 39 47 47 49   LEELA 27 25 34 33 35   PAD  18 16 23 22 23   Medication Adjustments  No No Yes No No   Some recent data might be hidden         Pressures are stable.    Medications changed? no    Patient contacted? no    Will continue to monitor.

## 2022-09-29 NOTE — Clinical Note
October 3, 2022        Juanjose Nuñez  [address]  [facility] [address]  Phone: [phone]  Fax: [phone]             [id number]  [address]  [facility] [address]  Phone: [phone]   Patient: Mario Mahajan   MR Number: [id number]   YOB: 1975   Date of Visit: 9/29/2022       Dear Dr. Juanjose Nuñez    Thank you for referring Mario Mahajan to me for evaluation. Attached you will find relevant portions of my assessment and plan of care.    If you have questions, please do not hesitate to call me. I look forward to following Mario Mahajan along with you.    Sincerely,    Jeimy Srivastava MD    Enclosure    If you would like to receive this communication electronically, please contact [email or web address] or [phone] to request [employer] access.    [employer] Link is a tool which provides read-only access to select patient information with whom you have a relationship. Its easy to use and provides real time access to review your patients record including encounter summaries, notes, results, and demographic information.    If you feel you have received this communication in error or would no longer like to receive these types of communications, please e-mail [email or web address]

## 2022-09-29 NOTE — PROGRESS NOTES
Study: GUIDE-HF  Sponsor: [employer]  Follow-up Visit: Month 12 Visit  Date of Visit: 9/29/2022     Patient wishes to continue in study: Yes  All study protocol required CRFs completed: Yes     Ms. Mahajan is here for the Month 12 follow up visit. EQ-5D-5L and KCCQ-12 questionnaires completed prior to all other study procedures per protocol. Physical exam and NYHA assessment performed per Dr. Srivastava. Heart failure medications reviewed and confirmed. Labs performed per protocol. Six minute leal walk test conducted by CRC per protocol. Patient walked for a total of 322 meters with no stops noted. Vital signs slightly below patient's baseline (Before: BP 86/52 HR 57 SaO2 99% RA; After: BP 99/46 HR 66 SaO2 99% RA), however, patient denied complaints before, during, and after test. 7/20/2022 outpatient planned cardiovascular procedure visit noted since last study visit and was previously reported to [employer] per protocol. Patient denies any other hospitalizations, reportable ED visits, or any other reportable adverse events since last study visit. Patient remains intermittently compliant with daily pressure transmissions and was once again encouraged to take pressure measurements daily. Explained importance of transmitting daily pressures and that, if patient is having issues transmitting, Tech Support would be able to either resolve issue or send a new unit if necessary. Complete understanding verbalized. Patient denied any current issues with the electronics system and reported that she just has trouble remembering to take her readings. Patient instructed that all study follow-up has been completed at this time. Following the completion of all study required procedures, patient notified that the patient's pressure management responsibility will be transferred to the HF team to continue treatment and management with the use of CardioMEMS pressure readings. Complete understanding verbalized. All questions answered to  patient's satisfaction. GUIDE-HF Request for [email or web address] Transfer form submitted to [facility] at study completion. Study completion entered into Epic and [employer].

## 2022-10-04 ENCOUNTER — TELEPHONE (OUTPATIENT)
Dept: TRANSPLANT | Facility: CLINIC | Age: 47
End: 2022-10-04
Payer: MEDICARE

## 2022-10-06 ENCOUNTER — DOCUMENTATION ONLY (OUTPATIENT)
Dept: TRANSPLANT | Facility: CLINIC | Age: 47
End: 2022-10-06
Payer: MEDICARE

## 2022-10-06 NOTE — PROGRESS NOTES
Pt CardioMems transmission received and review.      CardioMEMS Transmission  10/6/2022 9/29/2022 9/26/2022 9/23/2022   Transmission Date 10/5/2022 9/29/2022 9/25/2022 9/20/2022   Transmission Type Maintenance Maintenance Maintenance Maintenance   PAS 39 40 39 47   LEELA 25 27 25 34   PAD  16 18 16 23   Medication Adjustments  No No No Yes   Some recent data might be hidden         Pressures are stable.    Medications changed? no    Patient contacted? no    Will continue to monitor.

## 2022-10-11 ENCOUNTER — DOCUMENTATION ONLY (OUTPATIENT)
Dept: TRANSPLANT | Facility: CLINIC | Age: 47
End: 2022-10-11
Payer: MEDICARE

## 2022-10-11 NOTE — PROGRESS NOTES
Pt CardioMems transmission received and review.      CardioMEMS Transmission  10/11/2022 10/6/2022 9/29/2022 9/26/2022   Transmission Date 10/10/2022 10/5/2022 9/29/2022 9/25/2022   Transmission Type Maintenance Maintenance Maintenance Maintenance   PAS 37 39 40 39   LEELA 24 25 27 25   PAD  16 16 18 16   Medication Adjustments  No No No No   Some recent data might be hidden         Pressures are stable.    Medications changed? no    Patient contacted? no    Will continue to monitor.

## 2022-10-13 ENCOUNTER — DOCUMENTATION ONLY (OUTPATIENT)
Dept: TRANSPLANT | Facility: CLINIC | Age: 47
End: 2022-10-13
Payer: MEDICARE

## 2022-10-13 NOTE — PROGRESS NOTES
Pt CardioMems transmission received and review.      CardioMEMS Transmission  10/13/2022 10/11/2022 10/6/2022 9/29/2022   Transmission Date 10/12/2022 10/10/2022 10/5/2022 9/29/2022   Transmission Type Maintenance Maintenance Maintenance Maintenance   PAS 32 37 39 40   LEELA 20 24 25 27   PAD  13 16 16 18   Medication Adjustments  No No No No   Some recent data might be hidden         Pressures are stable.    Medications changed? no    Patient contacted? no    Will continue to monitor.

## 2022-10-18 ENCOUNTER — TELEPHONE (OUTPATIENT)
Dept: TRANSPLANT | Facility: CLINIC | Age: 47
End: 2022-10-18
Payer: MEDICARE

## 2022-10-21 ENCOUNTER — DOCUMENTATION ONLY (OUTPATIENT)
Dept: TRANSPLANT | Facility: CLINIC | Age: 47
End: 2022-10-21
Payer: MEDICARE

## 2022-10-21 NOTE — PROGRESS NOTES
Pt CardioMems transmission received and review.      CardioMEMS Transmission  10/21/2022 10/13/2022 10/11/2022 10/6/2022   Transmission Date 10/18/2022 10/12/2022 10/10/2022 10/5/2022   Transmission Type Maintenance Maintenance Maintenance Maintenance   PAS 45 32 37 39   LEELA 32 20 24 25   PAD  22 13 16 16   Medication Adjustments  No No No No   Some recent data might be hidden         Pressures are slightly elevated but patient needs to submit a more recent reading. Message sent via [employer] for patient to submit a reading.     Medications changed? no    Patient contacted? no    Will continue to monitor.

## 2022-10-25 ENCOUNTER — TELEPHONE (OUTPATIENT)
Dept: TRANSPLANT | Facility: CLINIC | Age: 47
End: 2022-10-25
Payer: MEDICARE

## 2022-10-25 NOTE — TELEPHONE ENCOUNTER
Pt CardioMems transmission received and reviewed.      CardioMEMS Transmission  10/25/2022 10/21/2022 10/13/2022 10/11/2022   Transmission Date 10/24/2022 10/18/2022 10/12/2022 10/10/2022   Transmission Type Maintenance Maintenance Maintenance Maintenance   PAS 45 45 32 37   LEELA 32 32 20 24   PAD  22 22 13 16   Medication Adjustments  No No No No   Some recent data might be hidden         Pressures are slightly elevated but stable. Will continue to monitor.    Medication Interventions? no    Lab Interventions? no    Patient contacted? yes, pt reports no swelling, SOB, or weight gain.       Will continue to monitor.

## 2022-10-26 DIAGNOSIS — I50.42 CHRONIC COMBINED SYSTOLIC AND DIASTOLIC CONGESTIVE HEART FAILURE: ICD-10-CM

## 2022-10-26 RX ORDER — CARVEDILOL 25 MG/1
25 TABLET ORAL 2 TIMES DAILY
Qty: 180 TABLET | Refills: 3 | Status: CANCELLED | OUTPATIENT
Start: 2022-10-26 | End: 2023-10-26

## 2022-10-27 ENCOUNTER — DOCUMENTATION ONLY (OUTPATIENT)
Dept: TRANSPLANT | Facility: CLINIC | Age: 47
End: 2022-10-27
Payer: MEDICARE

## 2022-10-27 NOTE — PROGRESS NOTES
Pt CardioMems transmission received and review.      CardioMEMS Transmission  10/27/2022 10/25/2022 10/21/2022 10/13/2022   Transmission Date 10/25/2022 10/24/2022 10/18/2022 10/12/2022   Transmission Type Maintenance Maintenance Maintenance Maintenance   PAS 43 45 45 32   LEELA 28 32 32 20   PAD  20 22 22 13   Medication Adjustments  No No No No   Some recent data might be hidden         Pressures are stable.    Medications changed? no    Patient contacted? no    Will continue to monitor.

## 2022-10-31 ENCOUNTER — PATIENT MESSAGE (OUTPATIENT)
Dept: TRANSPLANT | Facility: CLINIC | Age: 47
End: 2022-10-31
Payer: MEDICARE

## 2022-11-01 ENCOUNTER — TELEPHONE (OUTPATIENT)
Dept: TRANSPLANT | Facility: CLINIC | Age: 47
End: 2022-11-01
Payer: MEDICARE

## 2022-11-03 ENCOUNTER — DOCUMENTATION ONLY (OUTPATIENT)
Dept: TRANSPLANT | Facility: CLINIC | Age: 47
End: 2022-11-03
Payer: MEDICARE

## 2022-11-03 NOTE — PROGRESS NOTES
Pt CardioMems transmission received and review.      CardioMEMS Transmission  11/3/2022 10/27/2022 10/25/2022 10/21/2022   Transmission Date 11/1/2022 10/25/2022 10/24/2022 10/18/2022   Transmission Type Maintenance Maintenance Maintenance Maintenance   PAS 34 43 45 45   LEELA 22 28 32 32   PAD  15 20 22 22   Medication Adjustments  No No No No   Some recent data might be hidden         Pressures are stable.    Medications changed? no    Patient contacted? no    Will continue to monitor.

## 2022-11-06 ENCOUNTER — CLINICAL SUPPORT (OUTPATIENT)
Dept: CARDIOLOGY | Facility: HOSPITAL | Age: 47
End: 2022-11-06
Payer: MEDICARE

## 2022-11-06 DIAGNOSIS — Z95.810 PRESENCE OF AUTOMATIC (IMPLANTABLE) CARDIAC DEFIBRILLATOR: ICD-10-CM

## 2022-11-06 PROCEDURE — 93296 REM INTERROG EVL PM/IDS: CPT | Performed by: INTERNAL MEDICINE

## 2022-11-06 PROCEDURE — 93295 DEV INTERROG REMOTE 1/2/MLT: CPT | Mod: ,,, | Performed by: INTERNAL MEDICINE

## 2022-11-06 PROCEDURE — 93295 CARDIAC DEVICE CHECK - REMOTE: ICD-10-PCS | Mod: ,,, | Performed by: INTERNAL MEDICINE

## 2022-11-08 ENCOUNTER — DOCUMENTATION ONLY (OUTPATIENT)
Dept: TRANSPLANT | Facility: CLINIC | Age: 47
End: 2022-11-08
Payer: MEDICARE

## 2022-11-08 NOTE — PROGRESS NOTES
Pt CardioMems transmission received and review.      CardioMEMS Transmission  11/8/2022 11/3/2022 10/27/2022 10/25/2022   Transmission Date 11/8/2022 11/1/2022 10/25/2022 10/24/2022   Transmission Type Maintenance Maintenance Maintenance Maintenance   PAS 36 34 43 45   LEELA 24 22 28 32   PAD  17 15 20 22   Medication Adjustments  No No No No   Some recent data might be hidden         Pressures are stable.    Medications changed? no    Patient contacted? no    Will continue to monitor.

## 2022-11-11 ENCOUNTER — TELEPHONE (OUTPATIENT)
Dept: TRANSPLANT | Facility: CLINIC | Age: 47
End: 2022-11-11
Payer: MEDICARE

## 2022-11-14 ENCOUNTER — OFFICE VISIT (OUTPATIENT)
Dept: FAMILY MEDICINE | Facility: CLINIC | Age: 47
End: 2022-11-14
Payer: MEDICARE

## 2022-11-14 ENCOUNTER — DOCUMENTATION ONLY (OUTPATIENT)
Dept: TRANSPLANT | Facility: CLINIC | Age: 47
End: 2022-11-14
Payer: MEDICARE

## 2022-11-14 ENCOUNTER — PATIENT OUTREACH (OUTPATIENT)
Dept: ADMINISTRATIVE | Facility: OTHER | Age: 47
End: 2022-11-14
Payer: MEDICARE

## 2022-11-14 VITALS
WEIGHT: 275.13 LBS | HEIGHT: 69 IN | OXYGEN SATURATION: 97 % | DIASTOLIC BLOOD PRESSURE: 82 MMHG | HEART RATE: 71 BPM | SYSTOLIC BLOOD PRESSURE: 130 MMHG | BODY MASS INDEX: 40.75 KG/M2 | TEMPERATURE: 97 F

## 2022-11-14 DIAGNOSIS — Z12.11 COLON CANCER SCREENING: ICD-10-CM

## 2022-11-14 DIAGNOSIS — Z86.79 HISTORY OF VENTRICULAR TACHYCARDIA: ICD-10-CM

## 2022-11-14 DIAGNOSIS — R52 PAINFUL SCAR: ICD-10-CM

## 2022-11-14 DIAGNOSIS — I47.20 VENTRICULAR TACHYARRHYTHMIA: ICD-10-CM

## 2022-11-14 DIAGNOSIS — L90.5 PAINFUL SCAR: ICD-10-CM

## 2022-11-14 DIAGNOSIS — Z86.79 PERSONAL HISTORY OF VENTRICULAR TACHYCARDIA: ICD-10-CM

## 2022-11-14 DIAGNOSIS — E66.01 CLASS 3 SEVERE OBESITY DUE TO EXCESS CALORIES WITH SERIOUS COMORBIDITY AND BODY MASS INDEX (BMI) OF 40.0 TO 44.9 IN ADULT: Primary | ICD-10-CM

## 2022-11-14 DIAGNOSIS — Z23 NEED FOR INFLUENZA VACCINATION: ICD-10-CM

## 2022-11-14 DIAGNOSIS — Z12.31 SCREENING MAMMOGRAM FOR HIGH-RISK PATIENT: ICD-10-CM

## 2022-11-14 PROCEDURE — G0008 FLU VACCINE (QUAD) GREATER THAN OR EQUAL TO 3YO PRESERVATIVE FREE IM: ICD-10-PCS | Mod: S$GLB,,, | Performed by: STUDENT IN AN ORGANIZED HEALTH CARE EDUCATION/TRAINING PROGRAM

## 2022-11-14 PROCEDURE — 99214 OFFICE O/P EST MOD 30 MIN: CPT | Mod: 25,S$GLB,, | Performed by: STUDENT IN AN ORGANIZED HEALTH CARE EDUCATION/TRAINING PROGRAM

## 2022-11-14 PROCEDURE — 3075F SYST BP GE 130 - 139MM HG: CPT | Mod: CPTII,S$GLB,, | Performed by: STUDENT IN AN ORGANIZED HEALTH CARE EDUCATION/TRAINING PROGRAM

## 2022-11-14 PROCEDURE — 90686 IIV4 VACC NO PRSV 0.5 ML IM: CPT | Mod: S$GLB,,, | Performed by: STUDENT IN AN ORGANIZED HEALTH CARE EDUCATION/TRAINING PROGRAM

## 2022-11-14 PROCEDURE — 90686 FLU VACCINE (QUAD) GREATER THAN OR EQUAL TO 3YO PRESERVATIVE FREE IM: ICD-10-PCS | Mod: S$GLB,,, | Performed by: STUDENT IN AN ORGANIZED HEALTH CARE EDUCATION/TRAINING PROGRAM

## 2022-11-14 PROCEDURE — 99214 PR OFFICE/OUTPT VISIT, EST, LEVL IV, 30-39 MIN: ICD-10-PCS | Mod: 25,S$GLB,, | Performed by: STUDENT IN AN ORGANIZED HEALTH CARE EDUCATION/TRAINING PROGRAM

## 2022-11-14 PROCEDURE — 3008F BODY MASS INDEX DOCD: CPT | Mod: CPTII,S$GLB,, | Performed by: STUDENT IN AN ORGANIZED HEALTH CARE EDUCATION/TRAINING PROGRAM

## 2022-11-14 PROCEDURE — 1160F RVW MEDS BY RX/DR IN RCRD: CPT | Mod: CPTII,S$GLB,, | Performed by: STUDENT IN AN ORGANIZED HEALTH CARE EDUCATION/TRAINING PROGRAM

## 2022-11-14 PROCEDURE — 1159F MED LIST DOCD IN RCRD: CPT | Mod: CPTII,S$GLB,, | Performed by: STUDENT IN AN ORGANIZED HEALTH CARE EDUCATION/TRAINING PROGRAM

## 2022-11-14 PROCEDURE — 4010F ACE/ARB THERAPY RXD/TAKEN: CPT | Mod: CPTII,S$GLB,, | Performed by: STUDENT IN AN ORGANIZED HEALTH CARE EDUCATION/TRAINING PROGRAM

## 2022-11-14 PROCEDURE — 3079F PR MOST RECENT DIASTOLIC BLOOD PRESSURE 80-89 MM HG: ICD-10-PCS | Mod: CPTII,S$GLB,, | Performed by: STUDENT IN AN ORGANIZED HEALTH CARE EDUCATION/TRAINING PROGRAM

## 2022-11-14 PROCEDURE — 4010F PR ACE/ARB THEARPY RXD/TAKEN: ICD-10-PCS | Mod: CPTII,S$GLB,, | Performed by: STUDENT IN AN ORGANIZED HEALTH CARE EDUCATION/TRAINING PROGRAM

## 2022-11-14 PROCEDURE — 3079F DIAST BP 80-89 MM HG: CPT | Mod: CPTII,S$GLB,, | Performed by: STUDENT IN AN ORGANIZED HEALTH CARE EDUCATION/TRAINING PROGRAM

## 2022-11-14 PROCEDURE — G0008 ADMIN INFLUENZA VIRUS VAC: HCPCS | Mod: S$GLB,,, | Performed by: STUDENT IN AN ORGANIZED HEALTH CARE EDUCATION/TRAINING PROGRAM

## 2022-11-14 PROCEDURE — 1159F PR MEDICATION LIST DOCUMENTED IN MEDICAL RECORD: ICD-10-PCS | Mod: CPTII,S$GLB,, | Performed by: STUDENT IN AN ORGANIZED HEALTH CARE EDUCATION/TRAINING PROGRAM

## 2022-11-14 PROCEDURE — 3008F PR BODY MASS INDEX (BMI) DOCUMENTED: ICD-10-PCS | Mod: CPTII,S$GLB,, | Performed by: STUDENT IN AN ORGANIZED HEALTH CARE EDUCATION/TRAINING PROGRAM

## 2022-11-14 PROCEDURE — 3075F PR MOST RECENT SYSTOLIC BLOOD PRESS GE 130-139MM HG: ICD-10-PCS | Mod: CPTII,S$GLB,, | Performed by: STUDENT IN AN ORGANIZED HEALTH CARE EDUCATION/TRAINING PROGRAM

## 2022-11-14 PROCEDURE — 1160F PR REVIEW ALL MEDS BY PRESCRIBER/CLIN PHARMACIST DOCUMENTED: ICD-10-PCS | Mod: CPTII,S$GLB,, | Performed by: STUDENT IN AN ORGANIZED HEALTH CARE EDUCATION/TRAINING PROGRAM

## 2022-11-14 NOTE — PROGRESS NOTES
Pt CardioMems transmission received and review.      CardioMEMS Transmission  11/14/2022 11/8/2022 11/3/2022 10/27/2022   Transmission Date 11/14/2022 11/8/2022 11/1/2022 10/25/2022   Transmission Type Maintenance Maintenance Maintenance Maintenance   PAS 32 36 34 43   LEELA 21 24 22 28   PAD  14 17 15 20   Medication Adjustments  No No No No   Some recent data might be hidden         Pressures are stable.    Medications changed? no    Patient contacted? no    Will continue to monitor.

## 2022-11-14 NOTE — PROGRESS NOTES
CHW - Initial Contact    This Community Health Worker completed the Social Determinant of Health questionnaire with MRN [id number] in person today.    Pt identified barriers of most importance are: Financial assistance   Referrals to community agencies completed with patient/caregiver consent outside of [facility] include: No  Referrals were put through [facility] - No  Support and Services: [facility]- Utility assistance   Other information discussed the patient needs / wants help with: Patient stated she has a light bill that is currently past due and may not be able to afford to pay the bill due to being on a fixed income. Patient was given the contact information for [facility] and [facility] for assistance. Patient declined any additional assistance. No further outreach required at this time.   Follow up required: No  No future outreach task assigned

## 2022-11-16 ENCOUNTER — TELEPHONE (OUTPATIENT)
Dept: FAMILY MEDICINE | Facility: CLINIC | Age: 47
End: 2022-11-16
Payer: MEDICARE

## 2022-11-17 ENCOUNTER — TELEPHONE (OUTPATIENT)
Dept: FAMILY MEDICINE | Facility: CLINIC | Age: 47
End: 2022-11-17
Payer: MEDICARE

## 2022-11-18 ENCOUNTER — PATIENT MESSAGE (OUTPATIENT)
Dept: TRANSPLANT | Facility: CLINIC | Age: 47
End: 2022-11-18
Payer: MEDICARE

## 2022-11-20 ENCOUNTER — PATIENT MESSAGE (OUTPATIENT)
Dept: FAMILY MEDICINE | Facility: CLINIC | Age: 47
End: 2022-11-20
Payer: MEDICARE

## 2022-11-20 PROBLEM — Z86.79 HISTORY OF VENTRICULAR TACHYCARDIA: Status: ACTIVE | Noted: 2022-11-20

## 2022-11-20 PROBLEM — I47.20 VENTRICULAR TACHYARRHYTHMIA: Status: ACTIVE | Noted: 2022-11-20

## 2022-11-20 RX ORDER — TRIAMCINOLONE ACETONIDE 1 MG/G
CREAM TOPICAL 2 TIMES DAILY
Qty: 80 G | Refills: 1 | Status: SHIPPED | OUTPATIENT
Start: 2022-11-20 | End: 2023-08-21

## 2022-11-21 ENCOUNTER — DOCUMENTATION ONLY (OUTPATIENT)
Dept: TRANSPLANT | Facility: CLINIC | Age: 47
End: 2022-11-21
Payer: MEDICARE

## 2022-11-21 NOTE — PROGRESS NOTES
Patient ID: Mario Mahajan is a 47 y.o. female.     Chief Complaint: scar Pain    Chest Pain   This is a chronic problem. The current episode started more than 1 year ago. The onset quality is gradual. The problem occurs constantly. The problem has been unchanged. Pain location: on the scar from previous surgery. The pain is at a severity of 7/10. The pain is moderate. The quality of the pain is described as sharp. The pain does not radiate. Pertinent negatives include no back pain, claudication, cough, fever, headaches, hemoptysis, irregular heartbeat, leg pain, lower extremity edema, malaise/fatigue, nausea, palpitations, PND, shortness of breath, sputum production, vomiting or weakness. Associated with: palpation. She has tried nothing for the symptoms.        Review of Systems  Review of Systems   Constitutional:  Negative for fever and malaise/fatigue.   HENT:  Negative for ear pain and sinus pain.    Eyes:  Negative for discharge.   Respiratory:  Negative for cough, hemoptysis, sputum production and shortness of breath.    Cardiovascular:  Positive for chest pain. Negative for palpitations, claudication, leg swelling and PND.   Gastrointestinal:  Negative for diarrhea, nausea and vomiting.   Genitourinary:  Negative for urgency.   Musculoskeletal:  Negative for back pain and myalgias.   Skin:  Negative for rash.   Neurological:  Negative for weakness and headaches.   Psychiatric/Behavioral:  Negative for depression.    All other systems reviewed and are negative.    Currently Medications  Current Outpatient Medications on File Prior to Visit   Medication Sig Dispense Refill    albuterol (PROVENTIL/VENTOLIN HFA) 90 mcg/actuation inhaler Inhale 2 puffs into the lungs every 6 (six) hours as needed for Wheezing. 18 g 6    amiodarone (PACERONE) 200 MG Tab TAKE 1 TABLET(200 MG) BY MOUTH EVERY DAY 90 tablet 3    amitriptyline (ELAVIL) 10 MG tablet Take 1 tablet (10 mg total) by mouth every evening. 30 tablet 11     ascorbic acid, vitamin C, (VITAMIN C) 250 MG tablet Take 1 tablet (250 mg) by mouth twice daily. Take with the iron tablets. 60 tablet 3    b complex vitamins tablet Take 1 tablet by mouth once daily.      bumetanide (BUMEX) 1 MG tablet Take 2 tablets (2 mg) every morning and 1 tablet (1 mg) every evening. 90 tablet 11    carvediloL (COREG) 25 MG tablet TAKE 1 TABLET(25 MG) BY MOUTH TWICE DAILY 180 tablet 3    dapagliflozin (FARXIGA) 10 mg tablet Take 1 tablet (10 mg total) by mouth once daily. 90 tablet 3    ferrous sulfate 325 (65 FE) MG EC tablet Take 1 tablet (325 mg total) by mouth 2 (two) times daily. Take with vitamin C. 60 tablet 3    FLOVENT HFA 44 mcg/actuation inhaler 1 puff 2 (two) times daily.      HYDROcodone-acetaminophen (NORCO) 5-325 mg per tablet Take 1 tablet by mouth every 6 (six) hours as needed for Pain. 18 tablet 0    ipratropium-albuteroL (COMBIVENT)  mcg/actuation inhaler Inhale 1 puff into the lungs 4 (four) times daily. Rescue      potassium chloride SA (K-DUR,KLOR-CON) 20 MEQ tablet TAKE 2 TABLETS(40 MEQ) BY MOUTH EVERY DAY 60 tablet 3    sacubitriL-valsartan (ENTRESTO)  mg per tablet Take 1 tablet by mouth 2 (two) times daily. 60 tablet 11    spironolactone (ALDACTONE) 25 MG tablet TAKE 1 TABLET(25 MG) BY MOUTH EVERY DAY 30 tablet 11    timolol maleate 0.5% (TIMOPTIC) 0.5 % Drop INSTILL 1 DROP INTO BOTH EYES EVERY 12 HOURS      [DISCONTINUED] triamcinolone acetonide 0.1% (KENALOG) 0.1 % cream Apply topically 2 (two) times daily. 80 g 1    aspirin (ECOTRIN) 81 MG EC tablet Take 1 tablet (81 mg total) by mouth once daily. 90 tablet 3    loratadine (CLARITIN) 10 mg tablet Take 1 tablet (10 mg total) by mouth once daily. 30 tablet 0     No current facility-administered medications on file prior to visit.       Physical  Exam  Vitals:    11/14/22 1512   BP: 130/82   BP Location: Right arm   Patient Position: Sitting   Pulse: 71   Temp: 97.4 °F (36.3 °C)   SpO2: 97%   Weight:  "124.8 kg (275 lb 2.2 oz)   Height: 5' 9" (1.753 m)      Body mass index is 40.63 kg/m².    Physical Exam  Vitals and nursing note reviewed.   Constitutional:       General: She is not in acute distress.     Appearance: She is not ill-appearing.   HENT:      Head: Normocephalic and atraumatic.      Right Ear: External ear normal.      Left Ear: External ear normal.      Nose: Nose normal.      Mouth/Throat:      Mouth: Mucous membranes are moist.   Eyes:      Extraocular Movements: Extraocular movements intact.      Conjunctiva/sclera: Conjunctivae normal.   Cardiovascular:      Rate and Rhythm: Normal rate and regular rhythm.      Pulses: Normal pulses.      Heart sounds: No murmur heard.     Comments: Well healed linear surgical scar over left chest. Tender to palpation. No surrounding erythema or edema.  Pulmonary:      Effort: Pulmonary effort is normal. No respiratory distress.      Breath sounds: No wheezing.   Abdominal:      General: There is no distension.      Palpations: Abdomen is soft. There is no mass.      Tenderness: There is no abdominal tenderness.   Musculoskeletal:         General: No swelling.      Cervical back: Normal range of motion.   Skin:     Coloration: Skin is not jaundiced.      Findings: No rash.   Neurological:      General: No focal deficit present.      Mental Status: She is alert and oriented to person, place, and time.   Psychiatric:         Mood and Affect: Mood normal.         Thought Content: Thought content normal.       Labs:    Complete Blood Count  Lab Results   Component Value Date    RBC 3.51 (L) 07/15/2022    HGB 12.3 07/15/2022    HCT 32 (L) 07/20/2022     (H) 07/15/2022    MCH 35.0 (H) 07/15/2022    MCHC 33.7 07/15/2022    RDW 12.1 07/15/2022     07/15/2022    MPV 11.6 07/15/2022    GRAN 4.0 07/15/2022    GRAN 65.5 07/15/2022    LYMPH 1.5 07/15/2022    LYMPH 24.2 07/15/2022    MONO 0.5 07/15/2022    MONO 7.3 07/15/2022    EOS 0.2 07/15/2022    BASO 0.01 " 07/15/2022    EOSINOPHIL 2.6 07/15/2022    BASOPHIL 0.2 07/15/2022    DIFFMETHOD Automated 07/15/2022       Comprehensive Metabolic Panel  Lab Results   Component Value Date    GLU 59 (L) 09/29/2022    BUN 19 09/29/2022    CREATININE 1.3 09/29/2022     09/29/2022    K 3.7 09/29/2022     09/29/2022    PROT 7.3 09/29/2022    ALBUMIN 3.8 09/29/2022    BILITOT 1.6 (H) 09/29/2022    AST 13 09/29/2022    ALKPHOS 43 (L) 09/29/2022    CO2 24 09/29/2022    ALT 10 09/29/2022    ANIONGAP 12 09/29/2022    EGFRNONAA >60.0 07/20/2022    ESTGFRAFRICA >60.0 07/20/2022       TSH  No results found for: TSH    Imaging:  Electrophysiology Procedure  · Successful complex ICD lead extraction.  · Successful implantation of ICD Single with new generator (<30% life left   on chronic generator) for secondary prevention.  · Complex modifier requested due to need for advanced extraction tools   (laser sheath)  · Insertion and removal of bifemoral central venous access.  · Patient life expectancy greater than or equal to 1 year.         Assessment/Plan:    1. Class 3 severe obesity due to excess calories with serious comorbidity and body mass index (BMI) of 40.0 to 44.9 in adult  -     Ambulatory referral/consult to Bariatric Medicine; Future; Expected date: 11/21/2022    2. Screening mammogram for high-risk patient  -     Mammo Digital Screening Bilat w/ [facility]; Future; Expected date: 11/14/2022    3. Colon cancer screening  -     Cancel: Ambulatory referral/consult to Endo Procedure ; Future; Expected date: 11/15/2022  -     Cologuard Screening (Multitarget Stool DNA); Future; Expected date: 11/14/2022    4. Need for influenza vaccination  -     Influenza - Quadrivalent *Preferred* (6 months+) (PF)    5. Painful scar  -     triamcinolone acetonide 0.1% (KENALOG) 0.1 % cream; Apply topically 2 (two) times daily. Apply to scar  Dispense: 80 g; Refill: 1    6. History of ventricular tachycardia  Assessment & Plan:  - no recent  episodes. Now with ICd in place      7. Ventricular tachyarrhythmia  Assessment & Plan:  - no recent episodes      8. Personal history of ventricular tachycardia       Discussed how to stay healthy including: diet, exercise, refraining from smoking and discussed screening exams / tests needed for age, sex and family Hx.    No concern for infection of previous surgical site. Will try topical steroids to reduce inflammation    Tejinder Bowling MD

## 2022-11-21 NOTE — PROGRESS NOTES
Pt CardioMems transmission received and review.      CardioMEMS Transmission  11/21/2022 11/14/2022 11/8/2022 11/3/2022   Transmission Date 11/21/2022 11/14/2022 11/8/2022 11/1/2022   Transmission Type Maintenance Maintenance Maintenance Maintenance   PAS 34 32 36 34   LEELA 20 21 24 22   PAD  11 14 17 15   Medication Adjustments  No No No No   Some recent data might be hidden         Pressures are stable.    Medications changed? no    Patient contacted? no    Will continue to monitor.

## 2022-11-25 ENCOUNTER — TELEPHONE (OUTPATIENT)
Dept: TRANSPLANT | Facility: CLINIC | Age: 47
End: 2022-11-25
Payer: MEDICARE

## 2022-11-28 ENCOUNTER — PATIENT MESSAGE (OUTPATIENT)
Dept: FAMILY MEDICINE | Facility: CLINIC | Age: 47
End: 2022-11-28
Payer: MEDICARE

## 2022-11-28 DIAGNOSIS — G62.9 NEUROPATHY: Primary | ICD-10-CM

## 2022-11-29 ENCOUNTER — DOCUMENTATION ONLY (OUTPATIENT)
Dept: TRANSPLANT | Facility: CLINIC | Age: 47
End: 2022-11-29
Payer: MEDICARE

## 2022-11-29 NOTE — PROGRESS NOTES
Pt CardioMems transmission received and review.      CardioMEMS Transmission  11/29/2022 11/21/2022 11/14/2022 11/8/2022   Transmission Date 11/28/2022 11/21/2022 11/14/2022 11/8/2022   Transmission Type Maintenance Maintenance Maintenance Maintenance   PAS 33 34 32 36   LEELA 19 20 21 24   PAD  12 11 14 17   Medication Adjustments  No No No No   Some recent data might be hidden         Pressures are stable.    Medications changed? no    Patient contacted? no    Will continue to monitor.

## 2022-12-02 ENCOUNTER — TELEPHONE (OUTPATIENT)
Dept: TRANSPLANT | Facility: CLINIC | Age: 47
End: 2022-12-02
Payer: MEDICARE

## 2022-12-06 ENCOUNTER — PATIENT MESSAGE (OUTPATIENT)
Dept: FAMILY MEDICINE | Facility: CLINIC | Age: 47
End: 2022-12-06
Payer: MEDICARE

## 2022-12-06 NOTE — TELEPHONE ENCOUNTER
Patient was not aware that she had the neurology referral placed please contact the patient about scheduling.

## 2022-12-07 ENCOUNTER — PATIENT MESSAGE (OUTPATIENT)
Dept: TRANSPLANT | Facility: CLINIC | Age: 47
End: 2022-12-07
Payer: MEDICARE

## 2022-12-07 ENCOUNTER — TELEPHONE (OUTPATIENT)
Dept: TRANSPLANT | Facility: CLINIC | Age: 47
End: 2022-12-07
Payer: MEDICARE

## 2022-12-08 ENCOUNTER — PATIENT MESSAGE (OUTPATIENT)
Dept: TRANSPLANT | Facility: CLINIC | Age: 47
End: 2022-12-08
Payer: MEDICARE

## 2022-12-08 ENCOUNTER — DOCUMENTATION ONLY (OUTPATIENT)
Dept: TRANSPLANT | Facility: CLINIC | Age: 47
End: 2022-12-08
Payer: MEDICARE

## 2022-12-08 NOTE — PROGRESS NOTES
Pt Card[facility]Mems transmission received and review.      CardioMEMS Transmission  12/8/2022 11/29/2022 11/21/2022 11/14/2022   Transmission Date 12/7/2022 11/28/2022 11/21/2022 11/14/2022   Transmission Type Maintenance Maintenance Maintenance Maintenance   PAS 39 33 34 32   LEELA 26 19 20 21   PAD  18 12 11 14   Medication Adjustments  No No No No   Some recent data might be hidden         Pressures are stable.    Medications changed? no    Patient contacted? no    Will continue to monitor.

## 2022-12-12 ENCOUNTER — HOSPITAL ENCOUNTER (OUTPATIENT)
Dept: RADIOLOGY | Facility: HOSPITAL | Age: 47
Discharge: HOME | End: 2022-12-12
Attending: STUDENT IN AN ORGANIZED HEALTH CARE EDUCATION/TRAINING PROGRAM
Payer: MEDICARE

## 2022-12-12 DIAGNOSIS — Z12.31 SCREENING MAMMOGRAM FOR HIGH-RISK PATIENT: ICD-10-CM

## 2022-12-12 PROCEDURE — 77067 SCR MAMMO BI INCL CAD: CPT | Mod: TC,PO

## 2022-12-12 PROCEDURE — 77063 BREAST TOMOSYNTHESIS BI: CPT | Mod: TC,PO

## 2022-12-14 ENCOUNTER — TELEPHONE (OUTPATIENT)
Dept: TRANSPLANT | Facility: CLINIC | Age: 47
End: 2022-12-14
Payer: MEDICARE

## 2022-12-14 ENCOUNTER — HOSPITAL ENCOUNTER (EMERGENCY)
Facility: HOSPITAL | Age: 47
Discharge: HOME | End: 2022-12-14
Attending: EMERGENCY MEDICINE
Payer: MEDICARE

## 2022-12-14 VITALS
HEIGHT: 69 IN | RESPIRATION RATE: 16 BRPM | DIASTOLIC BLOOD PRESSURE: 68 MMHG | TEMPERATURE: 98 F | OXYGEN SATURATION: 98 % | BODY MASS INDEX: 34.36 KG/M2 | WEIGHT: 232 LBS | SYSTOLIC BLOOD PRESSURE: 117 MMHG | HEART RATE: 75 BPM

## 2022-12-14 DIAGNOSIS — M79.672 LEFT FOOT PAIN: ICD-10-CM

## 2022-12-14 PROCEDURE — 99283 EMERGENCY DEPT VISIT LOW MDM: CPT | Mod: ER

## 2022-12-14 RX ORDER — DICLOFENAC SODIUM 10 MG/G
2 GEL TOPICAL 4 TIMES DAILY
Qty: 200 G | Refills: 0 | Status: SHIPPED | OUTPATIENT
Start: 2022-12-14 | End: 2023-08-21

## 2022-12-14 NOTE — ED PROVIDER NOTES
"Encounter Date: 2022       History     Chief Complaint   Patient presents with    Foot Pain     Pt C/O L foot pain X 1 week.  Pt denies injury.  No obvious abnormalities noted.      Patient is a 47-year-old female presents to ED with left foot pain onset 1 week ago.  Patient complains of pain to the dorsal side of her foot that is worse with movement.  Patient complains of a "bump" on top of her foot that she noticed recently.  She denies any injury or trauma.  She denies numbness or tingling.  Patient is able to ambulate.    A ten point review of systems was completed and is negative except as documented above.  Patient denies any other acute medical complaint.    The patients available PMH, PSH, Social History, medications, allergies, and triage vital signs were reviewed just prior to their medical evaluation.      The history is provided by the patient.   Review of patient's allergies indicates:   Allergen Reactions    Ace inhibitors      Cough     Past Medical History:   Diagnosis Date    Asthma     Chronic back pain 2014    Chronic combined systolic and diastolic congestive heart failure 2015     2-10-17   1 - Severely depressed left ventricular systolic function (EF 20-25%).    2 - Severe left ventricular enlargement.    3 - Severe left atrial enlargement.    4 - Left ventricular diastolic dysfunction.    5 - The estimated PA systolic pressure is 18 mmHg.    6 - Mild mitral regurgitation.     Encounter for blood transfusion     ICD (implantable cardioverter-defibrillator) in place 12/01/15 3/3/2016    Menorrhagia, premenopausal 2/10/2017    Microcytic anemia 2015    Non-rheumatic mitral regurgitation 3/5/2015    Nonischemic dilated cardiomyopathy 2015    Sleep apnea     Syncope and collapse 2017    Ventricular tachycardia, polymorphic      Past Surgical History:   Procedure Laterality Date    CARDIAC DEFIBRILLATOR PLACEMENT  2015     SECTION      INSERTION, WIRELESS " SENSOR, FOR PULMONARY ARTERIAL PRESSURE MONITORING N/A 10/22/2021    Procedure: INSERTION, WIRELESS SENSOR, FOR PULMONARY ARTERIAL PRESSURE MONITORING;  Surgeon: Erika Hurd MD;  Location: [facility] CATH LAB;  Service: Cardiology;  Laterality: N/A;    OOPHORECTOMY      PERICARDIOCENTESIS N/A 6/17/2020    Procedure: Pericardiocentesis;  Surgeon: Memo Luis MD;  Location: [facility] CATH LAB;  Service: Cardiology;  Laterality: N/A;    PULMONARY ANGIOGRAPHY N/A 10/22/2021    Procedure: ANGIOGRAM, PULMONARY;  Surgeon: Erika Hurd MD;  Location: [facility] CATH LAB;  Service: Cardiology;  Laterality: N/A;    RIGHT HEART CATHETERIZATION      TOTAL ABDOMINAL HYSTERECTOMY N/A 3/26/2019    Procedure: HYSTERECTOMY, TOTAL, ABDOMINAL;  Surgeon: Victorino Rose MD;  Location: [facility] OR;  Service: OB/GYN;  Laterality: N/A;    TRANSESOPHAGEAL ECHOCARDIOGRAPHY N/A 7/20/2022    Procedure: ECHOCARDIOGRAM, TRANSESOPHAGEAL;  Surgeon: Phan Powell MD;  Location: [facility] EP LAB;  Service: Cardiology;  Laterality: N/A;    TUBAL LIGATION       Family History   Problem Relation Age of Onset    Diabetes Father     Pancreatic cancer Father     Heart failure Father     Heart failure Brother     No Known Problems Daughter     No Known Problems Son     Lung cancer Paternal Grandmother     Heart disease Brother      Social History     Tobacco Use    Smoking status: Never    Smokeless tobacco: Never   Substance Use Topics    Alcohol use: Not Currently     Comment: 1 glass per 3 months    Drug use: No     Review of Systems   Constitutional:  Negative for fever.   HENT:  Negative for sore throat.    Respiratory:  Negative for shortness of breath.    Cardiovascular:  Negative for chest pain.   Gastrointestinal:  Negative for nausea.   Genitourinary:  Negative for dysuria.   Musculoskeletal:  Negative for back pain.        Left foot pain   Skin:  Negative for rash.   Neurological:  Negative for weakness.   Hematological:  Does not bruise/bleed easily.      Physical Exam     Initial Vitals [12/14/22 1013]   BP Pulse Resp Temp SpO2   117/68 75 16 97.8 °F (36.6 °C) 98 %      MAP       --         Physical Exam    Nursing note and vitals reviewed.  Constitutional: She appears well-developed and well-nourished. No distress.   HENT:   Head: Normocephalic and atraumatic.   Eyes: EOM are normal. Pupils are equal, round, and reactive to light. Right eye exhibits no discharge. Left eye exhibits no discharge.   Neck: Neck supple.   Normal range of motion.  Musculoskeletal:         General: Normal range of motion.      Cervical back: Normal range of motion and neck supple.      Comments: Full range of motion of the left foot, no tenderness to palpation, no swelling, no bruising, sensation intact, distal pulses 2+  Small bony abnormality palpated on dorsum of the foot     Neurological: She is alert and oriented to person, place, and time.   Skin: Skin is warm and dry.   Psychiatric: She has a normal mood and affect. Her behavior is normal.       ED Course   Procedures  Labs Reviewed - No data to display       Imaging Results              X-Ray Foot Complete Left (Final result)  Result time 12/14/22 11:02:50      Final result by Oscar Howell MD (12/14/22 11:02:50)                   Impression:      1.  Negative for acute process.    2.  Incidental findings as noted above.      Electronically signed by: Oscar Howell MD  Date:    12/14/2022  Time:    11:02               Narrative:    EXAMINATION:  XR FOOT COMPLETE 3 VIEW LEFT    CLINICAL HISTORY:  .  Pain in left foot    TECHNIQUE:  AP, lateral and oblique views of the left foot were performed.    COMPARISON:  None    FINDINGS:  There is fusion of the middle distal phalanges of the 5th toe.  There are mild degenerative changes of the interphalangeal joints.  Plantar and Achilles calcaneal spurs.  Benign-appearing calcifications are seen anterior to the distal calf and dorsal to the mid talus.  There is an accessory ossicle  adjacent to the cuboid bone.    Negative for fracture or dislocation.  Negative for soft tissue abnormalities.                                       Medications - No data to display  Medical Decision Making:   Initial Assessment:   Left foot pain  Differential Diagnosis:   Musculoskeletal pain, muscle strain, ligament tear/sprain, fracture, dislocation     ED Management:  Left foot pain  -Afebrile, vital signs stable, no apparent distress  -Left foot xray negative for fracture or dislocation, plantar and Achilles calcaneal spurs, fusion of the middle distal phalanges of the 5th toe, accessory ossicle adjacent to the cuboid    Plan:  -follow up with Podiatry, referral sent  -Voltaren gel as needed  -rest, ice, elevate, compression  -Patient is in stable condition to be discharged home. Advised patient to follow up with primary care doctor and return to the ED if experiencing any worsening of symptoms.                          Clinical Impression:   Final diagnoses:  [M79.672] Left foot pain        ED Disposition Condition    Discharge Stable          ED Prescriptions       Medication Sig Dispense Start Date End Date Auth. Provider    diclofenac sodium (VOLTAREN) 1 % Gel Apply 2 g topically 4 (four) times daily. for 15 days 200 g 12/14/2022 12/29/2022 Faiza Fischer PA-C          Follow-up Information       Follow up With Specialties Details Why Contact Info    Tejinder Bowling MD Internal Medicine   [address] [place] [id number] [phone]               Faiza Fischer PA-C  12/14/22 0713

## 2022-12-14 NOTE — Clinical Note
"Omeagan "WESLEYadilene Mahajan was seen and treated in our emergency department on 12/14/2022.  She may return to work on 12/15/2022.       If you have any questions or concerns, please don't hesitate to call.      Faiza Fischer PA-C"

## 2022-12-14 NOTE — DISCHARGE INSTRUCTIONS
-Follow up with primary care doctor and return to the ER if you are experiencing any worsening of symptoms    Thank you for letting myself and our team take care for you today! It was nice meeting you, and I hope you feel better very soon. Please come back to [facility] for all of your future medical needs.   Our goal in the ER is to always give you outstanding care and exceptional service. You may receive a survey by mail or email in the next week about your experience in our ED. We would greatly appreciate you completing and returning the survey. Your feedback provides us with a way to recognize our staff who give very good care and it helps us learn how to improve when your experience was below our aspiration of excellence.     Sincerely,     Faiza Fischer PA-C  Emergency Room Physician Assistant  [facility]

## 2022-12-15 ENCOUNTER — HOSPITAL ENCOUNTER (OUTPATIENT)
Dept: RADIOLOGY | Facility: HOSPITAL | Age: 47
Discharge: HOME | End: 2022-12-15
Attending: REGISTERED NURSE
Payer: MEDICARE

## 2022-12-15 ENCOUNTER — TELEPHONE (OUTPATIENT)
Dept: ORTHOPEDICS | Facility: CLINIC | Age: 47
End: 2022-12-15
Payer: MEDICARE

## 2022-12-15 ENCOUNTER — OFFICE VISIT (OUTPATIENT)
Dept: ORTHOPEDICS | Facility: CLINIC | Age: 47
End: 2022-12-15
Payer: MEDICARE

## 2022-12-15 ENCOUNTER — TELEPHONE (OUTPATIENT)
Dept: PODIATRY | Facility: CLINIC | Age: 47
End: 2022-12-15
Payer: MEDICARE

## 2022-12-15 VITALS — BODY MASS INDEX: 34.25 KG/M2 | WEIGHT: 231.94 LBS

## 2022-12-15 DIAGNOSIS — M51.36 DDD (DEGENERATIVE DISC DISEASE), LUMBAR: ICD-10-CM

## 2022-12-15 DIAGNOSIS — M51.36 DDD (DEGENERATIVE DISC DISEASE), LUMBAR: Primary | ICD-10-CM

## 2022-12-15 DIAGNOSIS — M41.115 JUVENILE IDIOPATHIC SCOLIOSIS OF THORACOLUMBAR REGION: Primary | ICD-10-CM

## 2022-12-15 PROCEDURE — 1160F RVW MEDS BY RX/DR IN RCRD: CPT | Mod: CPTII,S$GLB,, | Performed by: REGISTERED NURSE

## 2022-12-15 PROCEDURE — 3008F BODY MASS INDEX DOCD: CPT | Mod: CPTII,S$GLB,, | Performed by: REGISTERED NURSE

## 2022-12-15 PROCEDURE — 1160F PR REVIEW ALL MEDS BY PRESCRIBER/CLIN PHARMACIST DOCUMENTED: ICD-10-PCS | Mod: CPTII,S$GLB,, | Performed by: REGISTERED NURSE

## 2022-12-15 PROCEDURE — 72110 X-RAY EXAM L-2 SPINE 4/>VWS: CPT | Mod: TC

## 2022-12-15 PROCEDURE — 99204 OFFICE O/P NEW MOD 45 MIN: CPT | Mod: S$GLB,,, | Performed by: REGISTERED NURSE

## 2022-12-15 PROCEDURE — 99999 PR PBB SHADOW E&M-EST. PATIENT-LVL IV: CPT | Mod: PBBFAC,,, | Performed by: REGISTERED NURSE

## 2022-12-15 PROCEDURE — 4010F ACE/ARB THERAPY RXD/TAKEN: CPT | Mod: CPTII,S$GLB,, | Performed by: REGISTERED NURSE

## 2022-12-15 PROCEDURE — 99999 PR PBB SHADOW E&M-EST. PATIENT-LVL IV: ICD-10-PCS | Mod: PBBFAC,,, | Performed by: REGISTERED NURSE

## 2022-12-15 PROCEDURE — 1159F PR MEDICATION LIST DOCUMENTED IN MEDICAL RECORD: ICD-10-PCS | Mod: CPTII,S$GLB,, | Performed by: REGISTERED NURSE

## 2022-12-15 PROCEDURE — 4010F PR ACE/ARB THEARPY RXD/TAKEN: ICD-10-PCS | Mod: CPTII,S$GLB,, | Performed by: REGISTERED NURSE

## 2022-12-15 PROCEDURE — 72110 XR LUMBAR SPINE AP AND LAT WITH FLEX/EXT: ICD-10-PCS | Mod: 26,,, | Performed by: RADIOLOGY

## 2022-12-15 PROCEDURE — 99204 PR OFFICE/OUTPT VISIT, NEW, LEVL IV, 45-59 MIN: ICD-10-PCS | Mod: S$GLB,,, | Performed by: REGISTERED NURSE

## 2022-12-15 PROCEDURE — 3008F PR BODY MASS INDEX (BMI) DOCUMENTED: ICD-10-PCS | Mod: CPTII,S$GLB,, | Performed by: REGISTERED NURSE

## 2022-12-15 PROCEDURE — 72110 X-RAY EXAM L-2 SPINE 4/>VWS: CPT | Mod: 26,,, | Performed by: RADIOLOGY

## 2022-12-15 PROCEDURE — 1159F MED LIST DOCD IN RCRD: CPT | Mod: CPTII,S$GLB,, | Performed by: REGISTERED NURSE

## 2022-12-15 RX ORDER — METHYLPREDNISOLONE 4 MG/1
TABLET ORAL
Qty: 1 EACH | Refills: 0 | Status: SHIPPED | OUTPATIENT
Start: 2022-12-15 | End: 2023-01-05

## 2022-12-15 RX ORDER — METHOCARBAMOL 750 MG/1
750 TABLET, FILM COATED ORAL 3 TIMES DAILY PRN
Qty: 30 TABLET | Refills: 0 | Status: SHIPPED | OUTPATIENT
Start: 2022-12-15 | End: 2022-12-25

## 2022-12-15 NOTE — TELEPHONE ENCOUNTER
----- Message from Alexsander Velasco MA sent at 12/15/2022  3:22 PM CST -----  Regarding: Scheduling  Hi,  Can ya'll help met get this patient scheduled with one of your providers. She has been having pain in her foot and wanted to see podiatry with regard to it. Thanks!  Sincerely,  Reinaldo BIANCHI

## 2022-12-15 NOTE — PROGRESS NOTES
DATE: 12/15/2022  PATIENT: Mario Mahajan    Supervising Physician: Wilmer Steiner M.D.    CHIEF COMPLAINT: back pain    HISTORY:  Mario Mahajan is a 47 y.o. female with pmhx of scoliosis here for initial evaluation of low back pain (Back - 4). The pain has been present for years but increased in severity over the past few weeks. The patient describes the pain as aching.  The pain is worse with heavy lifting and standing up and improved by massage. There is associated numbness and tingling. There is no subjective weakness. Prior treatments have included tylenol and PT in the past, but no BETTY or surgery.    The patient denies myelopathic symptoms such as handwriting changes or difficulty with buttons/coins/keys. Denies perineal paresthesias, bowel/bladder dysfunction.    PAST MEDICAL/SURGICAL HISTORY:  Past Medical History:   Diagnosis Date    Asthma     Chronic back pain 2014    Chronic combined systolic and diastolic congestive heart failure 2015     2-10-17   1 - Severely depressed left ventricular systolic function (EF 20-25%).    2 - Severe left ventricular enlargement.    3 - Severe left atrial enlargement.    4 - Left ventricular diastolic dysfunction.    5 - The estimated PA systolic pressure is 18 mmHg.    6 - Mild mitral regurgitation.     Encounter for blood transfusion     ICD (implantable cardioverter-defibrillator) in place 12/01/15 3/3/2016    Menorrhagia, premenopausal 2/10/2017    Microcytic anemia 2015    Non-rheumatic mitral regurgitation 3/5/2015    Nonischemic dilated cardiomyopathy 2015    Sleep apnea     Syncope and collapse 2017    Ventricular tachycardia, polymorphic      Past Surgical History:   Procedure Laterality Date    CARDIAC DEFIBRILLATOR PLACEMENT  2015     SECTION      INSERTION, WIRELESS SENSOR, FOR PULMONARY ARTERIAL PRESSURE MONITORING N/A 10/22/2021    Procedure: INSERTION, WIRELESS SENSOR, FOR PULMONARY ARTERIAL PRESSURE  MONITORING;  Surgeon: Erika Hurd MD;  Location: [facility] CATH LAB;  Service: Cardiology;  Laterality: N/A;    OOPHORECTOMY      PERICARDIOCENTESIS N/A 6/17/2020    Procedure: Pericardiocentesis;  Surgeon: Memo Luis MD;  Location: [facility] CATH LAB;  Service: Cardiology;  Laterality: N/A;    PULMONARY ANGIOGRAPHY N/A 10/22/2021    Procedure: ANGIOGRAM, PULMONARY;  Surgeon: Erika Hurd MD;  Location: [facility] CATH LAB;  Service: Cardiology;  Laterality: N/A;    RIGHT HEART CATHETERIZATION      TOTAL ABDOMINAL HYSTERECTOMY N/A 3/26/2019    Procedure: HYSTERECTOMY, TOTAL, ABDOMINAL;  Surgeon: Victorino Rose MD;  Location: [facility] OR;  Service: OB/GYN;  Laterality: N/A;    TRANSESOPHAGEAL ECHOCARDIOGRAPHY N/A 7/20/2022    Procedure: ECHOCARDIOGRAM, TRANSESOPHAGEAL;  Surgeon: Phan Powell MD;  Location: [facility] EP LAB;  Service: Cardiology;  Laterality: N/A;    TUBAL LIGATION         Current Medications:   Current Outpatient Medications:     albuterol (PROVENTIL/VENTOLIN HFA) 90 mcg/actuation inhaler, Inhale 2 puffs into the lungs every 6 (six) hours as needed for Wheezing., Disp: 18 g, Rfl: 6    amiodarone (PACERONE) 200 MG Tab, TAKE 1 TABLET(200 MG) BY MOUTH EVERY DAY, Disp: 90 tablet, Rfl: 3    ascorbic acid, vitamin C, (VITAMIN C) 250 MG tablet, Take 1 tablet (250 mg) by mouth twice daily. Take with the iron tablets., Disp: 60 tablet, Rfl: 3    b complex vitamins tablet, Take 1 tablet by mouth once daily., Disp: , Rfl:     bumetanide (BUMEX) 1 MG tablet, Take 2 tablets (2 mg) every morning and 1 tablet (1 mg) every evening., Disp: 90 tablet, Rfl: 11    carvediloL (COREG) 25 MG tablet, TAKE 1 TABLET(25 MG) BY MOUTH TWICE DAILY, Disp: 180 tablet, Rfl: 3    dapagliflozin (FARXIGA) 10 mg tablet, Take 1 tablet (10 mg total) by mouth once daily., Disp: 90 tablet, Rfl: 3    diclofenac sodium (VOLTAREN) 1 % Gel, Apply 2 g topically 4 (four) times daily. for 15 days, Disp: 200 g, Rfl: 0    ferrous sulfate 325 (65 FE) MG  EC tablet, Take 1 tablet (325 mg total) by mouth 2 (two) times daily. Take with vitamin C., Disp: 60 tablet, Rfl: 3    FLOVENT HFA 44 mcg/actuation inhaler, 1 puff 2 (two) times daily., Disp: , Rfl:     HYDROcodone-acetaminophen (NORCO) 5-325 mg per tablet, Take 1 tablet by mouth every 6 (six) hours as needed for Pain., Disp: 18 tablet, Rfl: 0    ipratropium-albuteroL (COMBIVENT)  mcg/actuation inhaler, Inhale 1 puff into the lungs 4 (four) times daily. Rescue, Disp: , Rfl:     potassium chloride SA (K-DUR,KLOR-CON) 20 MEQ tablet, TAKE 2 TABLETS(40 MEQ) BY MOUTH EVERY DAY, Disp: 60 tablet, Rfl: 3    sacubitriL-valsartan (ENTRESTO)  mg per tablet, Take 1 tablet by mouth 2 (two) times daily., Disp: 60 tablet, Rfl: 11    spironolactone (ALDACTONE) 25 MG tablet, TAKE 1 TABLET(25 MG) BY MOUTH EVERY DAY, Disp: 30 tablet, Rfl: 11    timolol maleate 0.5% (TIMOPTIC) 0.5 % Drop, INSTILL 1 DROP INTO BOTH EYES EVERY 12 HOURS, Disp: , Rfl:     triamcinolone acetonide 0.1% (KENALOG) 0.1 % cream, Apply topically 2 (two) times daily. Apply to scar, Disp: 80 g, Rfl: 1    amitriptyline (ELAVIL) 10 MG tablet, Take 1 tablet (10 mg total) by mouth every evening., Disp: 30 tablet, Rfl: 11    aspirin (ECOTRIN) 81 MG EC tablet, Take 1 tablet (81 mg total) by mouth once daily., Disp: 90 tablet, Rfl: 3    loratadine (CLARITIN) 10 mg tablet, Take 1 tablet (10 mg total) by mouth once daily., Disp: 30 tablet, Rfl: 0    methocarbamoL (ROBAXIN) 750 MG Tab, Take 1 tablet (750 mg total) by mouth 3 (three) times daily as needed (muscle spasms)., Disp: 30 tablet, Rfl: 0    methylPREDNISolone (MEDROL DOSEPACK) 4 mg tablet, use as directed, Disp: 1 each, Rfl: 0    Social History:   Social History     Socioeconomic History    Marital status: Single    Number of children: 2   Occupational History    Occupation: Unemployed     Employer: [employer]   Tobacco Use    Smoking status: Never    Smokeless tobacco: Never   Substance and  Sexual Activity    Alcohol use: Not Currently     Comment: 1 glass per 3 months    Drug use: No    Sexual activity: Yes     Partners: Male     Comment: one male partner (boyfriend)     Social Determinants of Health     Financial Resource Strain: Low Risk     Difficulty of Paying Living Expenses: Not very hard   Food Insecurity: No Food Insecurity    Worried About Running Out of Food in the Last Year: Never true    Ran Out of Food in the Last Year: Never true   Transportation Needs: Unmet Transportation Needs    Lack of Transportation (Medical): Yes    Lack of Transportation (Non-Medical): Yes   Physical Activity: Insufficiently Active    Days of Exercise per Week: 2 days    Minutes of Exercise per Session: 20 min   Stress: Stress Concern Present    Feeling of Stress : To some extent   Social Connections: Moderately Integrated    Frequency of Communication with Friends and Family: More than three times a week    Frequency of Social Gatherings with Friends and Family: More than three times a week    Attends Judaism Services: More than 4 times per year    Active Member of Clubs or Organizations: No    Attends Club or Organization Meetings: More than 4 times per year    Marital Status:    Housing Stability: High Risk    Unable to Pay for Housing in the Last Year: No    Number of Places Lived in the Last Year: 2    Unstable Housing in the Last Year: Yes       REVIEW OF SYSTEMS:  Constitution: Negative. Negative for chills, fever and night sweats.   Cardiovascular: Negative for chest pain and syncope.   Respiratory: Negative for cough and shortness of breath.   Gastrointestinal: See HPI. Negative for nausea/vomiting. Negative for abdominal pain.  Genitourinary: See HPI. Negative for discoloration or dysuria.  Skin: Negative for dry skin, itching and rash.   Hematologic/Lymphatic: Negative for bleeding problem. Does not bruise/bleed easily.   Musculoskeletal: Negative for falls and muscle weakness.    Neurological: See HPI. No seizures.   Endocrine: Negative for polydipsia, polyphagia and polyuria.   Allergic/Immunologic: Negative for hives and persistent infections.    PHYSICAL EXAMINATION:    Wt 105.2 kg (231 lb 14.8 oz)   LMP 03/01/2019   BMI 34.25 kg/m²     General: The patient is a very pleasant 47 y.o. female in no apparent distress, the patient is oriented to person, place and time.   Psych: Normal mood and affect  HEENT: Vision grossly intact, hearing intact to the spoken word.  Lungs: Respirations unlabored.  Gait: Normal station and gait, no difficulty with toe or heel walk.   Skin: Dorsal lumbar skin negative for rashes, lesions, hairy patches and surgical scars.  Range of motion: Lumbar range of motion is acceptable. There is mild lumbar tenderness to palpation.  Spinal Balance: Global saggital and coronal spinal balance acceptable, no significant for scoliosis and kyphosis.  Musculoskeletal: No pain with the range of motion of the bilateral hips. No trochanteric tenderness to palpation.  Vascular: Bilateral lower extremities warm and well perfused, Dorsalis pedis pulses 2+ bilaterally.  Neurological: Normal strength and tone in all major motor groups in the bilateral lower extremities. Normal sensation to light touch in the L2-S1 dermatomes bilaterally.  Deep tendon reflexes symmetric 2+ in the bilateral lower extremities.  Negative Babinski bilaterally.  Straight leg raise negative bilaterally.     IMAGING:   Today I personally reviewed AP, Lat and Flex/Ex upright L-spine films that demonstrate moderate levoscoliosis in the mid lumbar region.      Body mass index is 34.25 kg/m².    Hemoglobin A1C   Date Value Ref Range Status   09/16/2019 4.8 4.0 - 5.6 % Final     Comment:     ADA Screening Guidelines:  5.7-6.4%  Consistent with prediabetes  >or=6.5%  Consistent with diabetes  High levels of fetal hemoglobin interfere with the HbA1C  assay. Heterozygous hemoglobin variants (HbS, HgC, etc)do  not  significantly interfere with this assay.   However, presence of multiple variants may affect accuracy.     03/21/2017 4.4 (L) 4.5 - 6.2 % Final     Comment:     According to ADA guidelines, hemoglobin A1C <7.0% represents  optimal control in non-pregnant diabetic patients.  Different  metrics may apply to specific populations.   Standards of Medical Care in Diabetes - 2016.  For the purpose of screening for the presence of diabetes:  <5.7%     Consistent with the absence of diabetes  5.7-6.4%  Consistent with increasing risk for diabetes   (prediabetes)  >or=6.5%  Consistent with diabetes  Currently no consensus exists for use of hemoglobin A1C  for diagnosis of diabetes for children.           ASSESSMENT/PLAN:    Mario was seen today for low-back pain.    Diagnoses and all orders for this visit:    Juvenile idiopathic scoliosis of thoracolumbar region  -     methocarbamoL (ROBAXIN) 750 MG Tab; Take 1 tablet (750 mg total) by mouth 3 (three) times daily as needed (muscle spasms).  -     methylPREDNISolone (MEDROL DOSEPACK) 4 mg tablet; use as directed  -     Ambulatory referral/consult to Physical/Occupational Therapy; Future      Today we discussed at length all of the different treatment options including anti-inflammatories, acetaminophen, rest, ice, heat, physical therapy including strengthening and stretching exercises, home exercises, ROM, aerobic conditioning, aqua therapy, other modalities including ultrasound, massage, and dry needling, epidural steroid injections and finally surgical intervention.    Medrol dose pack and robaxin sent to the pharmacy. PT orders placed. The patient may follow up as needed. I will likely order a MRI at her next visit.   I provided the patient with a home exercise program. It is the [employer] spine conditioning program. Exercises include head rolls, kneeling back extension, sitting rotation stretch, modified seated side straddle, knee to chest, bird dog, plank, modified seated plank,  hip bridges, abdominal bracing, and abdominal crunch. Pt will complete each exercise 5 times daily for 6-8 weeks.

## 2022-12-16 ENCOUNTER — OFFICE VISIT (OUTPATIENT)
Dept: PODIATRY | Facility: CLINIC | Age: 47
End: 2022-12-16
Payer: MEDICARE

## 2022-12-16 ENCOUNTER — PATIENT MESSAGE (OUTPATIENT)
Dept: TRANSPLANT | Facility: CLINIC | Age: 47
End: 2022-12-16
Payer: MEDICARE

## 2022-12-16 ENCOUNTER — HOSPITAL ENCOUNTER (OUTPATIENT)
Dept: RADIOLOGY | Facility: HOSPITAL | Age: 47
Discharge: HOME | End: 2022-12-16
Attending: PODIATRIST
Payer: MEDICARE

## 2022-12-16 VITALS
DIASTOLIC BLOOD PRESSURE: 71 MMHG | WEIGHT: 274.81 LBS | BODY MASS INDEX: 40.7 KG/M2 | HEART RATE: 73 BPM | RESPIRATION RATE: 18 BRPM | SYSTOLIC BLOOD PRESSURE: 113 MMHG | HEIGHT: 69 IN

## 2022-12-16 DIAGNOSIS — M79.672 LEFT FOOT PAIN: Primary | ICD-10-CM

## 2022-12-16 DIAGNOSIS — M79.672 FOOT PAIN, LEFT: Primary | ICD-10-CM

## 2022-12-16 DIAGNOSIS — M79.89 MASS OF SOFT TISSUE OF FOOT: ICD-10-CM

## 2022-12-16 DIAGNOSIS — M79.672 FOOT PAIN, LEFT: ICD-10-CM

## 2022-12-16 DIAGNOSIS — R60.0 LOCALIZED EDEMA: ICD-10-CM

## 2022-12-16 PROCEDURE — 4010F PR ACE/ARB THEARPY RXD/TAKEN: ICD-10-PCS | Mod: CPTII,S$GLB,, | Performed by: PODIATRIST

## 2022-12-16 PROCEDURE — 3078F DIAST BP <80 MM HG: CPT | Mod: CPTII,S$GLB,, | Performed by: PODIATRIST

## 2022-12-16 PROCEDURE — 1159F MED LIST DOCD IN RCRD: CPT | Mod: CPTII,S$GLB,, | Performed by: PODIATRIST

## 2022-12-16 PROCEDURE — 1160F PR REVIEW ALL MEDS BY PRESCRIBER/CLIN PHARMACIST DOCUMENTED: ICD-10-PCS | Mod: CPTII,S$GLB,, | Performed by: PODIATRIST

## 2022-12-16 PROCEDURE — 3074F SYST BP LT 130 MM HG: CPT | Mod: CPTII,S$GLB,, | Performed by: PODIATRIST

## 2022-12-16 PROCEDURE — 99203 OFFICE O/P NEW LOW 30 MIN: CPT | Mod: 25,S$GLB,, | Performed by: PODIATRIST

## 2022-12-16 PROCEDURE — 20612 ASPIRATE/INJ GANGLION CYST: CPT | Mod: LT,S$GLB,, | Performed by: PODIATRIST

## 2022-12-16 PROCEDURE — 3078F PR MOST RECENT DIASTOLIC BLOOD PRESSURE < 80 MM HG: ICD-10-PCS | Mod: CPTII,S$GLB,, | Performed by: PODIATRIST

## 2022-12-16 PROCEDURE — 99999 PR PBB SHADOW E&M-EST. PATIENT-LVL V: CPT | Mod: PBBFAC,,, | Performed by: PODIATRIST

## 2022-12-16 PROCEDURE — 3074F PR MOST RECENT SYSTOLIC BLOOD PRESSURE < 130 MM HG: ICD-10-PCS | Mod: CPTII,S$GLB,, | Performed by: PODIATRIST

## 2022-12-16 PROCEDURE — 20612 GANGLION CYST: ICD-10-PCS | Mod: LT,S$GLB,, | Performed by: PODIATRIST

## 2022-12-16 PROCEDURE — 3008F BODY MASS INDEX DOCD: CPT | Mod: CPTII,S$GLB,, | Performed by: PODIATRIST

## 2022-12-16 PROCEDURE — 99203 PR OFFICE/OUTPT VISIT, NEW, LEVL III, 30-44 MIN: ICD-10-PCS | Mod: 25,S$GLB,, | Performed by: PODIATRIST

## 2022-12-16 PROCEDURE — 1159F PR MEDICATION LIST DOCUMENTED IN MEDICAL RECORD: ICD-10-PCS | Mod: CPTII,S$GLB,, | Performed by: PODIATRIST

## 2022-12-16 PROCEDURE — 4010F ACE/ARB THERAPY RXD/TAKEN: CPT | Mod: CPTII,S$GLB,, | Performed by: PODIATRIST

## 2022-12-16 PROCEDURE — 99999 PR PBB SHADOW E&M-EST. PATIENT-LVL V: ICD-10-PCS | Mod: PBBFAC,,, | Performed by: PODIATRIST

## 2022-12-16 PROCEDURE — 3008F PR BODY MASS INDEX (BMI) DOCUMENTED: ICD-10-PCS | Mod: CPTII,S$GLB,, | Performed by: PODIATRIST

## 2022-12-16 PROCEDURE — 1160F RVW MEDS BY RX/DR IN RCRD: CPT | Mod: CPTII,S$GLB,, | Performed by: PODIATRIST

## 2022-12-16 PROCEDURE — 73630 X-RAY EXAM OF FOOT: CPT | Mod: TC,FY,PO,LT

## 2022-12-16 RX ORDER — DEXAMETHASONE SODIUM PHOSPHATE 4 MG/ML
4 INJECTION, SOLUTION INTRA-ARTICULAR; INTRALESIONAL; INTRAMUSCULAR; INTRAVENOUS; SOFT TISSUE
Status: DISCONTINUED | OUTPATIENT
Start: 2022-12-16 | End: 2022-12-16 | Stop reason: HOSPADM

## 2022-12-16 RX ADMIN — DEXAMETHASONE SODIUM PHOSPHATE 4 MG: 4 INJECTION, SOLUTION INTRA-ARTICULAR; INTRALESIONAL; INTRAMUSCULAR; INTRAVENOUS; SOFT TISSUE at 01:12

## 2022-12-16 NOTE — PROGRESS NOTES
[facility] PODIATRY  [address] [place] [address], [address]  [place] [address]  Dept: [phone]  Dept Fax: [phone]    Austen Anderson Jr., DPM     Assessment:   MDM    Coding  1. Left foot pain  Ambulatory referral/consult to Podiatry      2. Peroneal tendonitis, left        3. Localized edema            Plan:     Ganglion Cyst    Date/Time: 12/16/2022 1:48 PM  Performed by: Austen Anderson Jr., DPM  Authorized by: Austen Anderson Jr., DPM     Consent Done?:  Yes (Verbal)  Indications:  Pain  Site marked: the procedure site was marked    Timeout: prior to procedure the correct patient, procedure, and site was verified    Prep: patient was prepped and draped in usual sterile fashion      Local anesthesia used?: Yes    Anesthesia:  Local infiltration  Local anesthetic:  Bupivacaine 0.25% without epinephrine  Anesthetic total (ml):  2    Location:  Foot  Ultrasonic Guidance for Needle Placement?: No    Needle size:  25 G  Approach:  Dorsal  Medications:  4 mg dexAMETHasone 4 mg/mL  Patient tolerance:  Patient tolerated the procedure well with no immediate complications  Site:  Left foot  1. Left foot pain  -     Ambulatory referral/consult to Podiatry    2. Peroneal tendonitis, left    3. Localized edema      Oja was seen today for foot pain.    Diagnoses and all orders for this visit:    Left foot pain  -     Ambulatory referral/consult to Podiatry    Peroneal tendonitis, left    Localized edema        -pt seen, evaluated, and managed  -dx discussed in detail. All questions/concerns addressed  -all tx options discussed. All alternatives, risks, benefits of all txs discussed  -We discussed conservative care options possible including but not limited to shoe wear and/or padding, bracing/strapping, at home ROM, formal PT, medical therapy, injection therapy  - The utilization of NSAIDs can be considered but their benefit has to be tempered against the risk of GI/ concerns  - A steroid injection can  be undertaken.  We did discuss the potential mechanism of action of this shot.  Understanding that multiple injections at the same anatomic site do have deleterious effects on the soft tissue.  Generic risks include: steroid flare (advised to ice if necessary), skin hypo-pgimentation (which can be permanent and unsightly), elevation of blood sugar, subcutaneous atrophy (can be permanent) and infection.   -XR/imaging reviewed by me: no acute osseous abnormality noted  -labs reviewed by me: ok for vgel  -implemented icing/stretching regimen  -offloading pads dispensed  - cont medrol from PCP      -rxs dispensed: none  -referrals: none  -WB: wbat      Follow up in about 6 weeks (around 1/27/2023).    Subjective:      Patient ID: Mario Mahajan is a 47 y.o. female.    Chief Complaint:   Chief Complaint   Patient presents with    Foot Pain     Left foot        CC - foot pain: patient presents to the podiatry clinic  with complaint of  left foot pain. Onset of the symptoms was several weeks ago. Precipitating event: unk. Current symptoms include: ability to bear weight, but with some pain, growning bump on foot, swelling and worsening symptoms after a period of activity. Aggravating factors: walking and certain shoegear. Symptoms have gradually worsened. Patient has had no prior foot problems. Evaluation to date: none. Treatment to date: avoidance of offending activity. Patients rates pain 8/10 on pain scale.      Foot Pain      Last Podiatry Enc: Visit date not found  Last Enc w/ Me: Visit date not found    Outside reports reviewed: historical medical records.  Family hx: as below  Past Medical History:   Diagnosis Date    Asthma     Chronic back pain 7/1/2014    Chronic combined systolic and diastolic congestive heart failure 4/28/2015     2-10-17   1 - Severely depressed left ventricular systolic function (EF 20-25%).    2 - Severe left ventricular enlargement.    3 - Severe left atrial enlargement.    4 - Left  ventricular diastolic dysfunction.    5 - The estimated PA systolic pressure is 18 mmHg.    6 - Mild mitral regurgitation.     Encounter for blood transfusion     ICD (implantable cardioverter-defibrillator) in place 12/01/15 3/3/2016    Menorrhagia, premenopausal 2/10/2017    Microcytic anemia 2015    Non-rheumatic mitral regurgitation 3/5/2015    Nonischemic dilated cardiomyopathy 2015    Sleep apnea     Syncope and collapse 2017    Ventricular tachycardia, polymorphic      Past Surgical History:   Procedure Laterality Date    CARDIAC DEFIBRILLATOR PLACEMENT  2015     SECTION      INSERTION, WIRELESS SENSOR, FOR PULMONARY ARTERIAL PRESSURE MONITORING N/A 10/22/2021    Procedure: INSERTION, WIRELESS SENSOR, FOR PULMONARY ARTERIAL PRESSURE MONITORING;  Surgeon: Erika Hurd MD;  Location: [facility] CATH LAB;  Service: Cardiology;  Laterality: N/A;    OOPHORECTOMY      PERICARDIOCENTESIS N/A 2020    Procedure: Pericardiocentesis;  Surgeon: Memo Luis MD;  Location: [facility] CATH LAB;  Service: Cardiology;  Laterality: N/A;    PULMONARY ANGIOGRAPHY N/A 10/22/2021    Procedure: ANGIOGRAM, PULMONARY;  Surgeon: Erika Hurd MD;  Location: [facility] CATH LAB;  Service: Cardiology;  Laterality: N/A;    RIGHT HEART CATHETERIZATION      TOTAL ABDOMINAL HYSTERECTOMY N/A 3/26/2019    Procedure: HYSTERECTOMY, TOTAL, ABDOMINAL;  Surgeon: Victorino Rose MD;  Location: [facility] OR;  Service: OB/GYN;  Laterality: N/A;    TRANSESOPHAGEAL ECHOCARDIOGRAPHY N/A 2022    Procedure: ECHOCARDIOGRAM, TRANSESOPHAGEAL;  Surgeon: Phan Powell MD;  Location: [facility] EP LAB;  Service: Cardiology;  Laterality: N/A;    TUBAL LIGATION       Family History   Problem Relation Age of Onset    Diabetes Father     Pancreatic cancer Father     Heart failure Father     Heart failure Brother     No Known Problems Daughter     No Known Problems Son     Lung cancer Paternal Grandmother     Heart disease Brother       Current Outpatient Medications   Medication Sig Dispense Refill    albuterol (PROVENTIL/VENTOLIN HFA) 90 mcg/actuation inhaler Inhale 2 puffs into the lungs every 6 (six) hours as needed for Wheezing. 18 g 6    amiodarone (PACERONE) 200 MG Tab TAKE 1 TABLET(200 MG) BY MOUTH EVERY DAY 90 tablet 3    ascorbic acid, vitamin C, (VITAMIN C) 250 MG tablet Take 1 tablet (250 mg) by mouth twice daily. Take with the iron tablets. 60 tablet 3    b complex vitamins tablet Take 1 tablet by mouth once daily.      bumetanide (BUMEX) 1 MG tablet Take 2 tablets (2 mg) every morning and 1 tablet (1 mg) every evening. 90 tablet 11    carvediloL (COREG) 25 MG tablet TAKE 1 TABLET(25 MG) BY MOUTH TWICE DAILY 180 tablet 3    dapagliflozin (FARXIGA) 10 mg tablet Take 1 tablet (10 mg total) by mouth once daily. 90 tablet 3    diclofenac sodium (VOLTAREN) 1 % Gel Apply 2 g topically 4 (four) times daily. for 15 days 200 g 0    ferrous sulfate 325 (65 FE) MG EC tablet Take 1 tablet (325 mg total) by mouth 2 (two) times daily. Take with vitamin C. 60 tablet 3    FLOVENT HFA 44 mcg/actuation inhaler 1 puff 2 (two) times daily.      HYDROcodone-acetaminophen (NORCO) 5-325 mg per tablet Take 1 tablet by mouth every 6 (six) hours as needed for Pain. 18 tablet 0    ipratropium-albuteroL (COMBIVENT)  mcg/actuation inhaler Inhale 1 puff into the lungs 4 (four) times daily. Rescue      methocarbamoL (ROBAXIN) 750 MG Tab Take 1 tablet (750 mg total) by mouth 3 (three) times daily as needed (muscle spasms). 30 tablet 0    methylPREDNISolone (MEDROL DOSEPACK) 4 mg tablet use as directed 1 each 0    potassium chloride SA (K-DUR,KLOR-CON) 20 MEQ tablet TAKE 2 TABLETS(40 MEQ) BY MOUTH EVERY DAY 60 tablet 3    sacubitriL-valsartan (ENTRESTO)  mg per tablet Take 1 tablet by mouth 2 (two) times daily. 60 tablet 11    spironolactone (ALDACTONE) 25 MG tablet TAKE 1 TABLET(25 MG) BY MOUTH EVERY DAY 30 tablet 11    timolol maleate 0.5% (TIMOPTIC)  0.5 % Drop INSTILL 1 DROP INTO BOTH EYES EVERY 12 HOURS      triamcinolone acetonide 0.1% (KENALOG) 0.1 % cream Apply topically 2 (two) times daily. Apply to scar 80 g 1    aspirin (ECOTRIN) 81 MG EC tablet Take 1 tablet (81 mg total) by mouth once daily. 90 tablet 3    loratadine (CLARITIN) 10 mg tablet Take 1 tablet (10 mg total) by mouth once daily. 30 tablet 0     No current facility-administered medications for this visit.     Review of patient's allergies indicates:   Allergen Reactions    Ace inhibitors      Cough     Social History     Socioeconomic History    Marital status: Single    Number of children: 2   Occupational History    Occupation: Unemployed     Employer: [employer]   Tobacco Use    Smoking status: Never    Smokeless tobacco: Never   Substance and Sexual Activity    Alcohol use: Not Currently     Comment: 1 glass per 3 months    Drug use: No    Sexual activity: Yes     Partners: Male     Comment: one male partner (boyfriend)     Social Determinants of Health     Financial Resource Strain: Low Risk     Difficulty of Paying Living Expenses: Not very hard   Food Insecurity: No Food Insecurity    Worried About Running Out of Food in the Last Year: Never true    Ran Out of Food in the Last Year: Never true   Transportation Needs: Unmet Transportation Needs    Lack of Transportation (Medical): Yes    Lack of Transportation (Non-Medical): Yes   Physical Activity: Insufficiently Active    Days of Exercise per Week: 2 days    Minutes of Exercise per Session: 20 min   Stress: Stress Concern Present    Feeling of Stress : To some extent   Social Connections: Moderately Integrated    Frequency of Communication with Friends and Family: More than three times a week    Frequency of Social Gatherings with Friends and Family: More than three times a week    Attends Evangelical Services: More than 4 times per year    Active Member of Clubs or Organizations: No    Attends Club or Organization Meetings:  "More than 4 times per year    Marital Status:    Housing Stability: High Risk    Unable to Pay for Housing in the Last Year: No    Number of Places Lived in the Last Year: 2    Unstable Housing in the Last Year: Yes       ROS    REVIEW OF SYSTEMS: Negative as documented below as well as positive findings in bold.       Constitutional  Respiratory  Gastrointestinal  Skin   - Fever - Cough - Heartburn - Rash   - Chills - Spit blood - Nausea - Itching   - Weight Loss - Shortness of breath - Vomiting - Nail pain   - Malaise/Fatigue - Wheezing - Abdominal Pain  Wound/Ulcer   - Weight Gain   - Blood in Stool  Poor wound healing       - Diarrhea          Cardiovascular  Genitourinary  Neurological  HEENT   - Chest Pain - Dysuria - Burning Sensation of feet - Headache   - Palpitations - Hematuria - Tingling / Paresthesia - Congestion   - Pain at night in legs - Flank Pain - Dizziness - Sore Throat   - Cramping   - Tremor - Blurred Vision   - Leg Swelling   - Sensory Change - Double Vision   - Dizzy when standing   - Speech Change - Eye Redness       - Focal Weakness - Dry Eyes       - Loss of Consciousness          Endocrine  Musculoskeletal  Psychiatric   - Cold intolerance - Muscle Pain - Depression   - Heat intolerance - Neck Pain - Insomnia   - Anemia - Joint Pain - Memory Loss   -  Easy bruising, bleeding - Heel pain - Anxiety      Toe Pain        Leg/Ankle/Foot Pain         Objective:     /71 (BP Location: Left arm, Patient Position: Sitting, BP Method: X-Large (Automatic))   Pulse 73   Resp 18   Ht 5' 9" (1.753 m)   Wt 124.6 kg (274 lb 12.9 oz)   LMP 03/01/2019   BMI 40.58 kg/m²   Vitals:    12/16/22 1322   BP: 113/71   Pulse: 73   Resp: 18   Weight: 124.6 kg (274 lb 12.9 oz)   Height: 5' 9" (1.753 m)   PainSc:   5   PainLoc: Foot       Physical Exam    General Appearance:   Patient appears well developed, well nourished  Patient appears stated age    Psychiatric:   Patient is oriented to time, " place, and person.  Patient has appropriate mood and affect    Neck:  Trachea Midline  No visible masses    Respiratory/Ears:  No distress or labored breathing.  Able to differentiate between normal talking voice and whisper.  Able to follow commands    Eyes:  Visual Acuity intact  Lids and conjunctivae normal. No discoloration noted.    Foot Exam  Physical Exam  Ortho Exam  Ortho/SPM Exam  Physical Exam  Foot/Ankle Musculoskeletal Exam    L LE exam con't:  V:  DP 2/4, PT 2/4   CRT< 3s to all digits tested   Tibial and popliteal lymph nodes are w/o abnormality   Edema: absent, varicosities: absent    N:  Patient displays normal ankle reflexes   SILT in SP/DP/T/Reynaldo/Saph distributions    Ortho: +Motor EHL/FHL/TA/GA   no deformity present   There is no decreased ROM at midfoot or rearfoot joints  There is moderate pain with palpation of lateral dorsal foot in area of STM  Compartments soft/compressible. No pain on passive stretch of big toe. No calf  Pain.    Derm:  skin intact, skin warm and dry, skin without ulcers or lesions, skin without induration, nails normal,  +palpable STM: freely movable, non-adherent, well circumscribed      Imaging / Labs:      X-Ray Foot Complete 3 view Left    Result Date: 12/16/2022  EXAMINATION: XR FOOT COMPLETE 3 VIEW LEFT CLINICAL HISTORY: Pain in left foot TECHNIQUE: Standard radiography performed. COMPARISON: 12/14/2022. FINDINGS: Weightbearing dam demonstrates a small heel spur.  No fracture or dislocation.  Mild arthritic change involving the 1st metatarsophalangeal joint.     See above. Electronically signed by: Giovani Yost Date:    12/16/2022 Time:    12:00    X-Ray Foot Complete Left    Result Date: 12/14/2022  EXAMINATION: XR FOOT COMPLETE 3 VIEW LEFT CLINICAL HISTORY: .  Pain in left foot TECHNIQUE: AP, lateral and oblique views of the left foot were performed. COMPARISON: None FINDINGS: There is fusion of the middle distal phalanges of the 5th toe.  There are mild  degenerative changes of the interphalangeal joints.  Plantar and Achilles calcaneal spurs.  Benign-appearing calcifications are seen anterior to the distal calf and dorsal to the mid talus.  There is an accessory ossicle adjacent to the cuboid bone. Negative for fracture or dislocation.  Negative for soft tissue abnormalities.     1.  Negative for acute process. 2.  Incidental findings as noted above. Electronically signed by: Oscar Howell MD Date:    12/14/2022 Time:    11:02          Note: This was dictated using a computer transcription program. Although proofread, it may contain computer transcription errors and phonetic errors. Other human proofreading errors may also exist. Corrections may be performed at a later time. Please contact us for any clarification if needed.    Austen Anderson DPM  [facility]

## 2022-12-16 NOTE — PATIENT INSTRUCTIONS
Ganglion Cyst          What Is a Ganglion Cyst?    A ganglion cyst is a sac filled with a jellylike fluid that originates from a tendon sheath or joint capsule. The word ganglion means knot and is used to describe the knot-like mass or lump that forms below the surface of the skin.     Ganglion cyst on top of foot    Ganglion cysts are among the most common benign soft-tissue masses. Although they most often occur on the wrist, they also frequently develop on the foot--usually on the top, but elsewhere as well. Ganglion cysts vary in size, may get smaller and larger and may even disappear completely, only to return later.    Causes  Although the exact cause of ganglion cysts is unknown, they may arise from trauma--whether a single event or repetitive microtrauma.    Symptoms  A ganglion cyst is associated with one or more of the following symptoms:    A noticeable lump--often this is the only symptom experienced  Tingling or burning, if the cyst is touching a nerve  Dull pain or ache, which may indicate the cyst is pressing against a tendon or joint  Difficulty wearing shoes due to irritation between the lump and the shoe     Diagnosis  To diagnose a ganglion cyst, the foot and ankle surgeon will perform a thorough examination of the foot. The lump will be visually apparent, and, when pressed in a certain way, it should move freely underneath the skin. Sometimes the surgeon will shine a light through the cyst or remove a small amount of fluid from the cyst for evaluation. Your doctor may take an x-ray, and in some cases, additional imaging studies may be ordered.    Nonsurgical Treatment  There are various options for treating a ganglion cyst on the foot:    Monitoring but no treatment. If the cyst causes no pain and does not interfere with walking, the surgeon may decide it is best to carefully watch the cyst over a period of time.  Shoe modifications. Wearing shoes that do not rub the cyst or cause irritation  may be advised. In addition, placing a pad inside the shoe may help reduce pressure against the cyst.  Aspiration and injection. This technique involves draining the fluid and then injecting a steroid medication into the mass. More than one session may be needed. Although this approach is successful in some cases, in many others, the cyst returns.     When Is Surgery Needed?  When other treatment options fail or are not appropriate, the cyst may need to be surgically removed. While the recurrence rate associated with surgery is much lower than that experienced with aspiration and injection therapy, there are nevertheless cases in which the ganglion cyst returns.

## 2022-12-21 ENCOUNTER — PATIENT MESSAGE (OUTPATIENT)
Dept: TRANSPLANT | Facility: CLINIC | Age: 47
End: 2022-12-21
Payer: MEDICARE

## 2022-12-21 ENCOUNTER — TELEPHONE (OUTPATIENT)
Dept: TRANSPLANT | Facility: CLINIC | Age: 47
End: 2022-12-21
Payer: MEDICARE

## 2022-12-26 ENCOUNTER — PATIENT MESSAGE (OUTPATIENT)
Dept: FAMILY MEDICINE | Facility: CLINIC | Age: 47
End: 2022-12-26
Payer: MEDICARE

## 2022-12-26 DIAGNOSIS — M79.641 RIGHT HAND PAIN: Primary | ICD-10-CM

## 2022-12-27 ENCOUNTER — PATIENT MESSAGE (OUTPATIENT)
Dept: TRANSPLANT | Facility: CLINIC | Age: 47
End: 2022-12-27
Payer: MEDICARE

## 2022-12-27 DIAGNOSIS — E66.9 OBESITY (BMI 35.0-39.9 WITHOUT COMORBIDITY): Primary | ICD-10-CM

## 2022-12-29 ENCOUNTER — DOCUMENTATION ONLY (OUTPATIENT)
Dept: TRANSPLANT | Facility: CLINIC | Age: 47
End: 2022-12-29
Payer: MEDICARE

## 2022-12-29 NOTE — PROGRESS NOTES
Pt CardioMems transmission received and review.      CardioMEMS Transmission  12/29/2022 12/8/2022 11/29/2022 11/21/2022   Transmission Date 12/28/2022 12/7/2022 11/28/2022 11/21/2022   Transmission Type Maintenance Maintenance Maintenance Maintenance   PAS 35 39 33 34   LEELA 23 26 19 20   PAD  17 18 12 11   Medication Adjustments  No No No No   Some recent data might be hidden         Pressures are stable.    Medications changed? no    Patient contacted? no    Will continue to monitor.

## 2023-01-04 ENCOUNTER — TELEPHONE (OUTPATIENT)
Dept: TRANSPLANT | Facility: CLINIC | Age: 48
End: 2023-01-04
Payer: MEDICARE

## 2023-01-06 ENCOUNTER — DOCUMENTATION ONLY (OUTPATIENT)
Dept: TRANSPLANT | Facility: CLINIC | Age: 48
End: 2023-01-06
Payer: MEDICARE

## 2023-01-06 NOTE — PROGRESS NOTES
Pt CardioMems transmission received and review.      CardioMEMS Transmission  1/6/2023 12/29/2022 12/8/2022 11/29/2022   Transmission Date 1/5/2023 12/28/2022 12/7/2022 11/28/2022   Transmission Type Maintenance Maintenance Maintenance Maintenance   PAS 41 35 39 33   LEELA 29 23 26 19   PAD  20 17 18 12   Medication Adjustments  No No No No   Some recent data might be hidden         Pressures are stable.    Medications changed? no    Patient contacted? no    Will continue to monitor.

## 2023-01-09 DIAGNOSIS — M79.641 PAIN IN RIGHT HAND: Primary | ICD-10-CM

## 2023-01-09 NOTE — PROGRESS NOTES
Subjective:      Patient ID: Mario Mahajan is a 47 y.o. female.    Chief Complaint: Pain of the Left Hand (Patient presents today as a new patient stating she has been having pain in her left thumb for a few months now. )      HPI  (French)    History of heart failure with implantable defibrillator.     3-6 months of intermittent pain in left thumb with no injury noted. She is right hand dominant. She has pain with lifting and using her thumb. She rates her pain as a 4 on a scale of 1-10. No numbness or tingling.     Also with intermittent triggering of right thumb. Not bothering her now.     Some relief with OTC spray and tylenol. No OT, injections, bracing, or surgery on her hands.       Past Medical History:   Diagnosis Date    Asthma     Chronic back pain 7/1/2014    Chronic combined systolic and diastolic congestive heart failure 4/28/2015     2-10-17   1 - Severely depressed left ventricular systolic function (EF 20-25%).    2 - Severe left ventricular enlargement.    3 - Severe left atrial enlargement.    4 - Left ventricular diastolic dysfunction.    5 - The estimated PA systolic pressure is 18 mmHg.    6 - Mild mitral regurgitation.     Encounter for blood transfusion     ICD (implantable cardioverter-defibrillator) in place 12/01/15 3/3/2016    Menorrhagia, premenopausal 2/10/2017    Microcytic anemia 2/9/2015    Non-rheumatic mitral regurgitation 3/5/2015    Nonischemic dilated cardiomyopathy 12/1/2015    Sleep apnea     Syncope and collapse 2/9/2017    Ventricular tachycardia, polymorphic          Current Outpatient Medications:     albuterol (PROVENTIL/VENTOLIN HFA) 90 mcg/actuation inhaler, Inhale 2 puffs into the lungs every 6 (six) hours as needed for Wheezing., Disp: 18 g, Rfl: 6    amiodarone (PACERONE) 200 MG Tab, TAKE 1 TABLET(200 MG) BY MOUTH EVERY DAY, Disp: 90 tablet, Rfl: 3    ascorbic acid, vitamin C, (VITAMIN C) 250 MG tablet, Take 1 tablet (250 mg) by mouth twice daily. Take with  the iron tablets., Disp: 60 tablet, Rfl: 3    b complex vitamins tablet, Take 1 tablet by mouth once daily., Disp: , Rfl:     bumetanide (BUMEX) 1 MG tablet, Take 2 tablets (2 mg) every morning and 1 tablet (1 mg) every evening., Disp: 90 tablet, Rfl: 11    carvediloL (COREG) 25 MG tablet, TAKE 1 TABLET(25 MG) BY MOUTH TWICE DAILY, Disp: 180 tablet, Rfl: 3    dapagliflozin (FARXIGA) 10 mg tablet, Take 1 tablet (10 mg total) by mouth once daily., Disp: 90 tablet, Rfl: 3    ferrous sulfate 325 (65 FE) MG EC tablet, Take 1 tablet (325 mg total) by mouth 2 (two) times daily. Take with vitamin C., Disp: 60 tablet, Rfl: 3    FLOVENT HFA 44 mcg/actuation inhaler, 1 puff 2 (two) times daily., Disp: , Rfl:     HYDROcodone-acetaminophen (NORCO) 5-325 mg per tablet, Take 1 tablet by mouth every 6 (six) hours as needed for Pain., Disp: 18 tablet, Rfl: 0    ipratropium-albuteroL (COMBIVENT)  mcg/actuation inhaler, Inhale 1 puff into the lungs 4 (four) times daily. Rescue, Disp: , Rfl:     potassium chloride SA (K-DUR,KLOR-CON) 20 MEQ tablet, TAKE 2 TABLETS(40 MEQ) BY MOUTH EVERY DAY, Disp: 60 tablet, Rfl: 3    sacubitriL-valsartan (ENTRESTO)  mg per tablet, Take 1 tablet by mouth 2 (two) times daily., Disp: 60 tablet, Rfl: 11    spironolactone (ALDACTONE) 25 MG tablet, TAKE 1 TABLET(25 MG) BY MOUTH EVERY DAY, Disp: 30 tablet, Rfl: 11    timolol maleate 0.5% (TIMOPTIC) 0.5 % Drop, INSTILL 1 DROP INTO BOTH EYES EVERY 12 HOURS, Disp: , Rfl:     triamcinolone acetonide 0.1% (KENALOG) 0.1 % cream, Apply topically 2 (two) times daily. Apply to scar, Disp: 80 g, Rfl: 1    aspirin (ECOTRIN) 81 MG EC tablet, Take 1 tablet (81 mg total) by mouth once daily., Disp: 90 tablet, Rfl: 3    diclofenac sodium (VOLTAREN) 1 % Gel, Apply 2 g topically 4 (four) times daily. for 15 days, Disp: 200 g, Rfl: 0    loratadine (CLARITIN) 10 mg tablet, Take 1 tablet (10 mg total) by mouth once daily., Disp: 30 tablet, Rfl: 0    Review of  "patient's allergies indicates:   Allergen Reactions    Ace inhibitors      Cough       Review of Systems   Constitutional: Negative for chills, fever, night sweats and weight gain.   Gastrointestinal:  Negative for bowel incontinence, nausea and vomiting.   Genitourinary:  Negative for bladder incontinence.   Neurological:  Negative for disturbances in coordination and loss of balance.         Objective:        Ht 5' 9" (1.753 m)   Wt 122.5 kg (270 lb)   LMP 03/01/2019   BMI 39.87 kg/m²     Ortho/SPM Exam    RIGHT Hand/Wrist Examination:    Observation/Inspection:  Swelling  none    Deformity  none  Discoloration  none     Scars   none    Atrophy  none    HAND/WRIST EXAMINATION:  Finkelstein's Test   Neg  WHAT Test    Neg  Snuff box tenderness   Neg  Hook of Hamate Tenderness  Neg  CMC grind    Neg    Neurovascular Exam:  Digits WWP, brisk CR < 3s throughout  NVI motor/LTS to M/R/U nerves, radial pulse 2+  Tinel's Test - Carpal Tunnel  Neg  Tinel's Test - Cubital Tunnel  Neg  Phalen's Test    Neg  Median Nerve Compression Test Neg    ROM hand/wrist/elbow full, painless    No current triggering of right thumb. Minimal tenderness over A1 pulley.       LEFT Hand/Wrist Examination:    Observation/Inspection:  Swelling  none    Deformity  none  Discoloration  none     Scars   none    Atrophy  none    HAND/WRIST EXAMINATION:  Finkelstein's Test   Neg  WHAT Test    Neg  Snuff box tenderness   Neg  Hook of Hamate Tenderness  Neg  CMC grind    Neg    Neurovascular Exam:  Digits WWP, brisk CR < 3s throughout  NVI motor/LTS to M/R/U nerves, radial pulse 2+  Tinel's Test - Carpal Tunnel  Neg  Tinel's Test - Cubital Tunnel  Neg  Phalen's Test    Neg  Median Nerve Compression Test Neg    ROM hand/wrist/elbow full, painless    No tenderness in first dorsal compartment. She has tenderness in webspace between thumb and index finger. Good ROM of thumb with no tenderness. No triggering noted.       XRAY INTERPRETATION:   X-rays of " left hand dated 1/10/23 are personally reviewed and show no fracture or dislocation.           Assessment:       Encounter Diagnoses   Name Primary?    Pain of left thumb Yes    Trigger thumb of right hand           Plan:       Mario was seen today for pain.    Diagnoses and all orders for this visit:    Pain of left thumb  -     Ambulatory referral/consult to Orthopedics  -     Cancel: Ambulatory referral/consult to Physical/Occupational Therapy; Future  -     Ambulatory referral/consult to Physical/Occupational Therapy; Future    Trigger thumb of right hand  -     Ambulatory referral/consult to Physical/Occupational Therapy; Future      3-6 months of intermittent pain in left thumb with no injury noted. She is right hand dominant. She has pain with lifting and using her thumb.  No numbness or tingling.     Also with intermittent triggering of right thumb. Not bothering her now.     Left hand XRs look good. She has tenderness in webspace of left thumb and index finger. No active triggering right thumb.     Treatment options reviewed with patient along with above left hand xrays. Following plan made:     - OT orders for both hands sent to [facility].   - Continue with OTC anti-inflammatory spray. Use as directed on bottle.    - Given black thumb brace for left hand to use prn.   - Discussed right thumb injection for trigger thumb. She wants to hold off. Consider right hand XRs as some point.      Follow up in about 2 months (around 3/11/2023).

## 2023-01-10 ENCOUNTER — PATIENT MESSAGE (OUTPATIENT)
Dept: FAMILY MEDICINE | Facility: CLINIC | Age: 48
End: 2023-01-10
Payer: MEDICARE

## 2023-01-10 ENCOUNTER — TELEPHONE (OUTPATIENT)
Dept: TRANSPLANT | Facility: CLINIC | Age: 48
End: 2023-01-10
Payer: MEDICARE

## 2023-01-10 ENCOUNTER — HOSPITAL ENCOUNTER (OUTPATIENT)
Dept: RADIOLOGY | Facility: HOSPITAL | Age: 48
Discharge: HOME | End: 2023-01-10
Attending: PHYSICIAN ASSISTANT
Payer: MEDICARE

## 2023-01-10 DIAGNOSIS — M79.641 PAIN IN RIGHT HAND: ICD-10-CM

## 2023-01-10 DIAGNOSIS — M79.642 PAIN IN LEFT HAND: ICD-10-CM

## 2023-01-10 PROCEDURE — 73130 X-RAY EXAM OF HAND: CPT | Mod: 26,LT,, | Performed by: RADIOLOGY

## 2023-01-10 PROCEDURE — 73130 XR HAND COMPLETE 3 VIEW LEFT: ICD-10-PCS | Mod: 26,LT,, | Performed by: RADIOLOGY

## 2023-01-10 PROCEDURE — 73130 X-RAY EXAM OF HAND: CPT | Mod: TC,FY,PO,LT

## 2023-01-11 ENCOUNTER — PATIENT MESSAGE (OUTPATIENT)
Dept: TRANSPLANT | Facility: CLINIC | Age: 48
End: 2023-01-11
Payer: MEDICARE

## 2023-01-11 ENCOUNTER — OFFICE VISIT (OUTPATIENT)
Dept: ORTHOPEDICS | Facility: CLINIC | Age: 48
End: 2023-01-11
Payer: MEDICARE

## 2023-01-11 VITALS — HEIGHT: 69 IN | WEIGHT: 270 LBS | BODY MASS INDEX: 39.99 KG/M2

## 2023-01-11 DIAGNOSIS — I42.0 NONISCHEMIC DILATED CARDIOMYOPATHY: Chronic | ICD-10-CM

## 2023-01-11 DIAGNOSIS — I47.29 POLYMORPHIC VENTRICULAR TACHYCARDIA: ICD-10-CM

## 2023-01-11 DIAGNOSIS — M79.645 PAIN OF LEFT THUMB: Primary | ICD-10-CM

## 2023-01-11 DIAGNOSIS — M65.311 TRIGGER THUMB OF RIGHT HAND: ICD-10-CM

## 2023-01-11 DIAGNOSIS — I50.42 CHRONIC COMBINED SYSTOLIC AND DIASTOLIC CONGESTIVE HEART FAILURE: ICD-10-CM

## 2023-01-11 PROCEDURE — 3008F BODY MASS INDEX DOCD: CPT | Mod: CPTII,S$GLB,, | Performed by: PHYSICIAN ASSISTANT

## 2023-01-11 PROCEDURE — 4010F ACE/ARB THERAPY RXD/TAKEN: CPT | Mod: CPTII,S$GLB,, | Performed by: PHYSICIAN ASSISTANT

## 2023-01-11 PROCEDURE — 99213 PR OFFICE/OUTPT VISIT, EST, LEVL III, 20-29 MIN: ICD-10-PCS | Mod: S$GLB,,, | Performed by: PHYSICIAN ASSISTANT

## 2023-01-11 PROCEDURE — 1159F PR MEDICATION LIST DOCUMENTED IN MEDICAL RECORD: ICD-10-PCS | Mod: CPTII,S$GLB,, | Performed by: PHYSICIAN ASSISTANT

## 2023-01-11 PROCEDURE — 99213 OFFICE O/P EST LOW 20 MIN: CPT | Mod: S$GLB,,, | Performed by: PHYSICIAN ASSISTANT

## 2023-01-11 PROCEDURE — 99999 PR PBB SHADOW E&M-EST. PATIENT-LVL V: ICD-10-PCS | Mod: PBBFAC,,, | Performed by: PHYSICIAN ASSISTANT

## 2023-01-11 PROCEDURE — 99999 PR PBB SHADOW E&M-EST. PATIENT-LVL V: CPT | Mod: PBBFAC,,, | Performed by: PHYSICIAN ASSISTANT

## 2023-01-11 PROCEDURE — 4010F PR ACE/ARB THEARPY RXD/TAKEN: ICD-10-PCS | Mod: CPTII,S$GLB,, | Performed by: PHYSICIAN ASSISTANT

## 2023-01-11 PROCEDURE — 1160F PR REVIEW ALL MEDS BY PRESCRIBER/CLIN PHARMACIST DOCUMENTED: ICD-10-PCS | Mod: CPTII,S$GLB,, | Performed by: PHYSICIAN ASSISTANT

## 2023-01-11 PROCEDURE — 3008F PR BODY MASS INDEX (BMI) DOCUMENTED: ICD-10-PCS | Mod: CPTII,S$GLB,, | Performed by: PHYSICIAN ASSISTANT

## 2023-01-11 PROCEDURE — 1159F MED LIST DOCD IN RCRD: CPT | Mod: CPTII,S$GLB,, | Performed by: PHYSICIAN ASSISTANT

## 2023-01-11 PROCEDURE — 1160F RVW MEDS BY RX/DR IN RCRD: CPT | Mod: CPTII,S$GLB,, | Performed by: PHYSICIAN ASSISTANT

## 2023-01-11 RX ORDER — BUMETANIDE 1 MG/1
TABLET ORAL
Qty: 90 TABLET | Refills: 0 | Status: SHIPPED | OUTPATIENT
Start: 2023-01-11 | End: 2023-02-22

## 2023-01-11 NOTE — PATIENT INSTRUCTIONS
It was nice to meet you today!     Your xrays look okay.     Wear the black thumb brace as needed for pain.     Let me know if trigger thumb on right gets worse, we can do an injection.     You can continue with over the counter spray for inflammation. Use as directed on bottle.     I sent occupational therapy orders to [facility]. They should call you to set up, but if not you can call [phone].     I will see you back in 2 months, but please stay in touch and call me if you need anything. You can also send me a message in [facility].     Phyllis   [phone]

## 2023-01-12 ENCOUNTER — PATIENT MESSAGE (OUTPATIENT)
Dept: TRANSPLANT | Facility: CLINIC | Age: 48
End: 2023-01-12
Payer: MEDICARE

## 2023-01-12 RX ORDER — AMIODARONE HYDROCHLORIDE 200 MG/1
200 TABLET ORAL DAILY
Qty: 90 TABLET | Refills: 3 | Status: SHIPPED | OUTPATIENT
Start: 2023-01-12 | End: 2023-10-17 | Stop reason: SDUPTHER

## 2023-01-18 ENCOUNTER — PATIENT MESSAGE (OUTPATIENT)
Dept: TRANSPLANT | Facility: CLINIC | Age: 48
End: 2023-01-18
Payer: MEDICARE

## 2023-01-18 ENCOUNTER — TELEPHONE (OUTPATIENT)
Dept: TRANSPLANT | Facility: CLINIC | Age: 48
End: 2023-01-18
Payer: MEDICARE

## 2023-01-25 ENCOUNTER — PATIENT MESSAGE (OUTPATIENT)
Dept: TRANSPLANT | Facility: CLINIC | Age: 48
End: 2023-01-25
Payer: MEDICARE

## 2023-01-26 ENCOUNTER — PATIENT MESSAGE (OUTPATIENT)
Dept: FAMILY MEDICINE | Facility: CLINIC | Age: 48
End: 2023-01-26
Payer: MEDICARE

## 2023-01-27 ENCOUNTER — TELEPHONE (OUTPATIENT)
Dept: BARIATRICS | Facility: CLINIC | Age: 48
End: 2023-01-27
Payer: MEDICARE

## 2023-01-31 ENCOUNTER — PATIENT MESSAGE (OUTPATIENT)
Dept: ADMINISTRATIVE | Facility: HOSPITAL | Age: 48
End: 2023-01-31
Payer: MEDICARE

## 2023-01-31 ENCOUNTER — PATIENT MESSAGE (OUTPATIENT)
Dept: ELECTROPHYSIOLOGY | Facility: CLINIC | Age: 48
End: 2023-01-31
Payer: MEDICARE

## 2023-01-31 ENCOUNTER — TELEPHONE (OUTPATIENT)
Dept: BARIATRICS | Facility: CLINIC | Age: 48
End: 2023-01-31
Payer: MEDICARE

## 2023-02-01 ENCOUNTER — TELEPHONE (OUTPATIENT)
Dept: ELECTROPHYSIOLOGY | Facility: CLINIC | Age: 48
End: 2023-02-01
Payer: MEDICARE

## 2023-02-01 ENCOUNTER — DOCUMENTATION ONLY (OUTPATIENT)
Dept: TRANSPLANT | Facility: CLINIC | Age: 48
End: 2023-02-01
Payer: MEDICARE

## 2023-02-01 NOTE — PROGRESS NOTES
Pt CardioMems transmission received and review.      CardioMEMS Transmission  2/1/2023 1/6/2023 12/29/2022 12/8/2022   Transmission Date 2/1/2023 1/5/2023 12/28/2022 12/7/2022   Transmission Type Maintenance Maintenance Maintenance Maintenance   PAS 33 [phone]   LEELA 21 29 23 26   PAD  14 20 17 18   Medication Adjustments  No No No No   Some recent data might be hidden         Pressures are stable.    Medications changed? no    Patient contacted? no    Will continue to monitor.

## 2023-02-01 NOTE — TELEPHONE ENCOUNTER
"Contacted the pt on this am in relation to message received via the pt portal (please see portal message from this am).  Pt stated when she lays on her right side, "my device goes all the way up to my chin and I just usually push it back down."  Patient then stated this is occurring more frequently and I get a really "sharp pain" at the implant site. Confirmed with the pt that there was no swelling, redness and/or open skin, drainage at or near the implant site. Informed the pt that this would need to be assessed in the clinic.  Scheduled an appt with the pt for tomorrow 2/2/23 @ 2:00 pm.  Pt was agreeable with this date and time. Understanding was verbalized.  Pt appreciated the call.   "

## 2023-02-02 ENCOUNTER — CLINICAL SUPPORT (OUTPATIENT)
Dept: CARDIOLOGY | Facility: HOSPITAL | Age: 48
End: 2023-02-02
Attending: INTERNAL MEDICINE
Payer: MEDICARE

## 2023-02-02 DIAGNOSIS — I50.42 CHRONIC COMBINED SYSTOLIC AND DIASTOLIC CONGESTIVE HEART FAILURE: ICD-10-CM

## 2023-02-02 DIAGNOSIS — Z95.810 PRESENCE OF AUTOMATIC (IMPLANTABLE) CARDIAC DEFIBRILLATOR: ICD-10-CM

## 2023-02-02 DIAGNOSIS — I42.0 DILATED CARDIOMYOPATHY: ICD-10-CM

## 2023-02-02 DIAGNOSIS — I47.20 VENTRICULAR TACHYCARDIA: ICD-10-CM

## 2023-02-02 PROCEDURE — 93282 PRGRMG EVAL IMPLANTABLE DFB: CPT | Mod: 26,,, | Performed by: INTERNAL MEDICINE

## 2023-02-02 PROCEDURE — 93282 PRGRMG EVAL IMPLANTABLE DFB: CPT

## 2023-02-02 PROCEDURE — 93282 CARDIAC DEVICE CHECK - IN CLINIC & HOSPITAL: ICD-10-PCS | Mod: 26,,, | Performed by: INTERNAL MEDICINE

## 2023-02-03 ENCOUNTER — DOCUMENTATION ONLY (OUTPATIENT)
Dept: TRANSPLANT | Facility: CLINIC | Age: 48
End: 2023-02-03
Payer: MEDICARE

## 2023-02-03 ENCOUNTER — OFFICE VISIT (OUTPATIENT)
Dept: PODIATRY | Facility: CLINIC | Age: 48
End: 2023-02-03
Payer: MEDICARE

## 2023-02-03 VITALS
BODY MASS INDEX: 39.99 KG/M2 | HEART RATE: 73 BPM | SYSTOLIC BLOOD PRESSURE: 113 MMHG | DIASTOLIC BLOOD PRESSURE: 79 MMHG | WEIGHT: 270 LBS | HEIGHT: 69 IN

## 2023-02-03 DIAGNOSIS — M76.72 PERONEAL TENDONITIS, LEFT: ICD-10-CM

## 2023-02-03 DIAGNOSIS — M79.89 MASS OF SOFT TISSUE OF FOOT: ICD-10-CM

## 2023-02-03 DIAGNOSIS — M19.079 ARTHRITIS OF FOOT: ICD-10-CM

## 2023-02-03 DIAGNOSIS — M79.672 LEFT FOOT PAIN: Primary | ICD-10-CM

## 2023-02-03 DIAGNOSIS — R22.42 LOCALIZED SWELLING, MASS, OR LUMP OF LEFT LOWER EXTREMITY: ICD-10-CM

## 2023-02-03 PROCEDURE — 1159F PR MEDICATION LIST DOCUMENTED IN MEDICAL RECORD: ICD-10-PCS | Mod: CPTII,S$GLB,, | Performed by: PODIATRIST

## 2023-02-03 PROCEDURE — 3074F SYST BP LT 130 MM HG: CPT | Mod: CPTII,S$GLB,, | Performed by: PODIATRIST

## 2023-02-03 PROCEDURE — 3074F PR MOST RECENT SYSTOLIC BLOOD PRESSURE < 130 MM HG: ICD-10-PCS | Mod: CPTII,S$GLB,, | Performed by: PODIATRIST

## 2023-02-03 PROCEDURE — 1159F MED LIST DOCD IN RCRD: CPT | Mod: CPTII,S$GLB,, | Performed by: PODIATRIST

## 2023-02-03 PROCEDURE — 1160F PR REVIEW ALL MEDS BY PRESCRIBER/CLIN PHARMACIST DOCUMENTED: ICD-10-PCS | Mod: CPTII,S$GLB,, | Performed by: PODIATRIST

## 2023-02-03 PROCEDURE — 99999 PR PBB SHADOW E&M-EST. PATIENT-LVL V: CPT | Mod: PBBFAC,,, | Performed by: PODIATRIST

## 2023-02-03 PROCEDURE — 3008F BODY MASS INDEX DOCD: CPT | Mod: CPTII,S$GLB,, | Performed by: PODIATRIST

## 2023-02-03 PROCEDURE — 99213 OFFICE O/P EST LOW 20 MIN: CPT | Mod: S$GLB,,, | Performed by: PODIATRIST

## 2023-02-03 PROCEDURE — 3078F DIAST BP <80 MM HG: CPT | Mod: CPTII,S$GLB,, | Performed by: PODIATRIST

## 2023-02-03 PROCEDURE — 4010F PR ACE/ARB THEARPY RXD/TAKEN: ICD-10-PCS | Mod: CPTII,S$GLB,, | Performed by: PODIATRIST

## 2023-02-03 PROCEDURE — 1160F RVW MEDS BY RX/DR IN RCRD: CPT | Mod: CPTII,S$GLB,, | Performed by: PODIATRIST

## 2023-02-03 PROCEDURE — 99999 PR PBB SHADOW E&M-EST. PATIENT-LVL V: ICD-10-PCS | Mod: PBBFAC,,, | Performed by: PODIATRIST

## 2023-02-03 PROCEDURE — 4010F ACE/ARB THERAPY RXD/TAKEN: CPT | Mod: CPTII,S$GLB,, | Performed by: PODIATRIST

## 2023-02-03 PROCEDURE — 99213 PR OFFICE/OUTPT VISIT, EST, LEVL III, 20-29 MIN: ICD-10-PCS | Mod: S$GLB,,, | Performed by: PODIATRIST

## 2023-02-03 PROCEDURE — 3078F PR MOST RECENT DIASTOLIC BLOOD PRESSURE < 80 MM HG: ICD-10-PCS | Mod: CPTII,S$GLB,, | Performed by: PODIATRIST

## 2023-02-03 PROCEDURE — 3008F PR BODY MASS INDEX (BMI) DOCUMENTED: ICD-10-PCS | Mod: CPTII,S$GLB,, | Performed by: PODIATRIST

## 2023-02-03 NOTE — PROGRESS NOTES
Pt CardioMems transmission received and review.      CardioMEMS Transmission  2/3/2023 2/1/2023 1/6/2023 12/29/2022   Transmission Date 2/3/2023 2/1/2023 1/5/2023 12/28/2022   Transmission Type Maintenance Maintenance Maintenance Maintenance   PAS 45 [phone]   LEELA 32 21 29 23   PAD  22 14 20 17   Medication Adjustments  No No No No   Some recent data might be hidden         Pressures are stable.    Medications changed? no    Patient contacted? no    Will continue to monitor.

## 2023-02-03 NOTE — PROGRESS NOTES
[facility] PODIATRY  [address] [place] [address], [address]  [place] [address]  Dept: [phone]  Dept Fax: [phone]    Austen Anderson Jr., NATALIYA     Assessment:   MDM    Coding  1. Left foot pain  CT Foot W W/O Contrast Left    Ambulatory referral/consult to Physical/Occupational Therapy      2. Mass of soft tissue of foot  CT Foot W W/O Contrast Left      3. Localized swelling, mass, or lump of left lower extremity  CT Foot W W/O Contrast Left      4. Arthritis of foot  CT Foot W W/O Contrast Left    Ambulatory referral/consult to Physical/Occupational Therapy      5. Peroneal tendonitis, left  CT Foot W W/O Contrast Left    Ambulatory referral/consult to Physical/Occupational Therapy          Plan:     Procedures  1. Left foot pain  -     CT Foot W W/O Contrast Left; Future; Expected date: 02/03/2023  -     Ambulatory referral/consult to Physical/Occupational Therapy; Future; Expected date: 02/10/2023    2. Mass of soft tissue of foot  -     CT Foot W W/O Contrast Left; Future; Expected date: 02/03/2023    3. Localized swelling, mass, or lump of left lower extremity  -     CT Foot W W/O Contrast Left; Future; Expected date: 02/03/2023    4. Arthritis of foot  -     CT Foot W W/O Contrast Left; Future; Expected date: 02/03/2023  -     Ambulatory referral/consult to Physical/Occupational Therapy; Future; Expected date: 02/10/2023    5. Peroneal tendonitis, left  -     CT Foot W W/O Contrast Left; Future; Expected date: 02/03/2023  -     Ambulatory referral/consult to Physical/Occupational Therapy; Future; Expected date: 02/10/2023      Oja was seen today for foot pain.    Diagnoses and all orders for this visit:    Left foot pain  -     CT Foot W W/O Contrast Left; Future  -     Ambulatory referral/consult to Physical/Occupational Therapy; Future    Mass of soft tissue of foot  -     CT Foot W W/O Contrast Left; Future    Localized swelling, mass, or lump of left lower extremity  -     CT Foot W  W/O Contrast Left; Future    Arthritis of foot  -     CT Foot W W/O Contrast Left; Future  -     Ambulatory referral/consult to Physical/Occupational Therapy; Future    Peroneal tendonitis, left  -     CT Foot W W/O Contrast Left; Future  -     Ambulatory referral/consult to Physical/Occupational Therapy; Future        -pt seen, evaluated, and managed  -dx discussed in detail. All questions/concerns addressed  -all tx options discussed. All alternatives, risks, benefits of all txs discussed  -We discussed conservative care options possible including but not limited to shoe wear and/or padding, bracing/strapping, at home ROM, formal PT, medical therapy, injection therapy  - The utilization of NSAIDs can be considered but their benefit has to be tempered against the risk of GI/ concerns  - A steroid injection can be undertaken.  We did discuss the potential mechanism of action of this shot.  Understanding that multiple injections at the same anatomic site do have deleterious effects on the soft tissue.  Generic risks include: steroid flare (advised to ice if necessary), skin hypo-pgimentation (which can be permanent and unsightly), elevation of blood sugar, subcutaneous atrophy (can be permanent) and infection.   -XR/imaging reviewed by me: no acute osseous abnormality noted  -labs reviewed by me: ok for vgel  -swelling/soft tissue mass improved but still has pain  -CT ordered to better characterize  -cont icing/stretching regimen  -ASO dispensed    -rxs dispensed: none  -referrals: PT  -WB: wbat      Follow up in about 8 weeks (around 3/31/2023).    Subjective:      Patient ID: Mario Mahajan is a 47 y.o. female.    Chief Complaint:   Chief Complaint   Patient presents with    Foot Pain     Left foot pain       CC - foot pain: patient presents to the podiatry clinic  with complaint of  left foot pain. Onset of the symptoms was several weeks ago. Precipitating event: unk. Current symptoms include: ability to  bear weight, but with some pain, growning bump on foot, swelling and worsening symptoms after a period of activity. Aggravating factors: walking and certain shoegear. Symptoms have gradually worsened. Patient has had no prior foot problems. Evaluation to date: none. Treatment to date: avoidance of offending activity. Patients rates pain 8/10 on pain scale.      2/3/23:  Hx as above. Pain not improved after dex inj last visit. Still has some pain but bump is gone.     Foot Pain      Last Podiatry Enc: Visit date not found  Last Enc w/ Me: Visit date not found    Outside reports reviewed: historical medical records.  Family hx: as below  Past Medical History:   Diagnosis Date    Asthma     Chronic back pain 2014    Chronic combined systolic and diastolic congestive heart failure 2015     2-10-17   1 - Severely depressed left ventricular systolic function (EF 20-25%).    2 - Severe left ventricular enlargement.    3 - Severe left atrial enlargement.    4 - Left ventricular diastolic dysfunction.    5 - The estimated PA systolic pressure is 18 mmHg.    6 - Mild mitral regurgitation.     Encounter for blood transfusion     ICD (implantable cardioverter-defibrillator) in place 12/01/15 3/3/2016    Menorrhagia, premenopausal 2/10/2017    Microcytic anemia 2015    Non-rheumatic mitral regurgitation 3/5/2015    Nonischemic dilated cardiomyopathy 2015    Sleep apnea     Syncope and collapse 2017    Ventricular tachycardia, polymorphic      Past Surgical History:   Procedure Laterality Date    CARDIAC DEFIBRILLATOR PLACEMENT  2015     SECTION      INSERTION, WIRELESS SENSOR, FOR PULMONARY ARTERIAL PRESSURE MONITORING N/A 10/22/2021    Procedure: INSERTION, WIRELESS SENSOR, FOR PULMONARY ARTERIAL PRESSURE MONITORING;  Surgeon: Erika Hurd MD;  Location: [facility] CATH LAB;  Service: Cardiology;  Laterality: N/A;    OOPHORECTOMY      PERICARDIOCENTESIS N/A 2020    Procedure:  Pericardiocentesis;  Surgeon: Memo Luis MD;  Location: [facility] CATH LAB;  Service: Cardiology;  Laterality: N/A;    PULMONARY ANGIOGRAPHY N/A 10/22/2021    Procedure: ANGIOGRAM, PULMONARY;  Surgeon: Erika Hurd MD;  Location: [facility] CATH LAB;  Service: Cardiology;  Laterality: N/A;    RIGHT HEART CATHETERIZATION      TOTAL ABDOMINAL HYSTERECTOMY N/A 3/26/2019    Procedure: HYSTERECTOMY, TOTAL, ABDOMINAL;  Surgeon: Victorino Rose MD;  Location: [facility] OR;  Service: OB/GYN;  Laterality: N/A;    TRANSESOPHAGEAL ECHOCARDIOGRAPHY N/A 7/20/2022    Procedure: ECHOCARDIOGRAM, TRANSESOPHAGEAL;  Surgeon: Phan Powell MD;  Location: [facility] EP LAB;  Service: Cardiology;  Laterality: N/A;    TUBAL LIGATION       Family History   Problem Relation Age of Onset    Diabetes Father     Pancreatic cancer Father     Heart failure Father     Heart failure Brother     No Known Problems Daughter     No Known Problems Son     Lung cancer Paternal Grandmother     Heart disease Brother      Current Outpatient Medications   Medication Sig Dispense Refill    albuterol (PROVENTIL/VENTOLIN HFA) 90 mcg/actuation inhaler Inhale 2 puffs into the lungs every 6 (six) hours as needed for Wheezing. 18 g 6    amiodarone (PACERONE) 200 MG Tab Take 1 tablet (200 mg total) by mouth once daily. 90 tablet 3    ascorbic acid, vitamin C, (VITAMIN C) 250 MG tablet Take 1 tablet (250 mg) by mouth twice daily. Take with the iron tablets. 60 tablet 3    b complex vitamins tablet Take 1 tablet by mouth once daily.      bumetanide (BUMEX) 1 MG tablet Take 2 tablets (2 mg) every morning and 1 tablet (1 mg) every evening. 90 tablet 0    carvediloL (COREG) 25 MG tablet TAKE 1 TABLET(25 MG) BY MOUTH TWICE DAILY 180 tablet 3    dapagliflozin (FARXIGA) 10 mg tablet Take 1 tablet (10 mg total) by mouth once daily. 90 tablet 3    ferrous sulfate 325 (65 FE) MG EC tablet Take 1 tablet (325 mg total) by mouth 2 (two) times daily. Take with  vitamin C. 60 tablet 3    FLOVENT HFA 44 mcg/actuation inhaler 1 puff 2 (two) times daily.      HYDROcodone-acetaminophen (NORCO) 5-325 mg per tablet Take 1 tablet by mouth every 6 (six) hours as needed for Pain. 18 tablet 0    ipratropium-albuteroL (COMBIVENT)  mcg/actuation inhaler Inhale 1 puff into the lungs 4 (four) times daily. Rescue      potassium chloride SA (K-DUR,KLOR-CON) 20 MEQ tablet TAKE 2 TABLETS(40 MEQ) BY MOUTH EVERY DAY 60 tablet 3    sacubitriL-valsartan (ENTRESTO)  mg per tablet Take 1 tablet by mouth 2 (two) times daily. 60 tablet 11    spironolactone (ALDACTONE) 25 MG tablet TAKE 1 TABLET(25 MG) BY MOUTH EVERY DAY 30 tablet 11    timolol maleate 0.5% (TIMOPTIC) 0.5 % Drop INSTILL 1 DROP INTO BOTH EYES EVERY 12 HOURS      triamcinolone acetonide 0.1% (KENALOG) 0.1 % cream Apply topically 2 (two) times daily. Apply to scar 80 g 1    aspirin (ECOTRIN) 81 MG EC tablet Take 1 tablet (81 mg total) by mouth once daily. 90 tablet 3    diclofenac sodium (VOLTAREN) 1 % Gel Apply 2 g topically 4 (four) times daily. for 15 days 200 g 0    loratadine (CLARITIN) 10 mg tablet Take 1 tablet (10 mg total) by mouth once daily. 30 tablet 0     No current facility-administered medications for this visit.     Review of patient's allergies indicates:   Allergen Reactions    Ace inhibitors      Cough     Social History     Socioeconomic History    Marital status: Single    Number of children: 2   Occupational History    Occupation: Unemployed     Employer: [employer]   Tobacco Use    Smoking status: Never    Smokeless tobacco: Never   Substance and Sexual Activity    Alcohol use: Not Currently     Comment: 1 glass per 3 months    Drug use: No    Sexual activity: Yes     Partners: Male     Comment: one male partner (boyfriend)     Social Determinants of Health     Financial Resource Strain: Low Risk     Difficulty of Paying Living Expenses: Not very hard   Food Insecurity:  No Food Insecurity    Worried About Running Out of Food in the Last Year: Never true    Ran Out of Food in the Last Year: Never true   Transportation Needs: Unmet Transportation Needs    Lack of Transportation (Medical): Yes    Lack of Transportation (Non-Medical): Yes   Physical Activity: Insufficiently Active    Days of Exercise per Week: 2 days    Minutes of Exercise per Session: 20 min   Stress: Stress Concern Present    Feeling of Stress : To some extent   Social Connections: Moderately Integrated    Frequency of Communication with Friends and Family: More than three times a week    Frequency of Social Gatherings with Friends and Family: More than three times a week    Attends Tenriism Services: More than 4 times per year    Active Member of Clubs or Organizations: No    Attends Club or Organization Meetings: More than 4 times per year    Marital Status:    Housing Stability: High Risk    Unable to Pay for Housing in the Last Year: No    Number of Places Lived in the Last Year: 2    Unstable Housing in the Last Year: Yes       ROS    REVIEW OF SYSTEMS: Negative as documented below as well as positive findings in bold.       Constitutional  Respiratory  Gastrointestinal  Skin   - Fever - Cough - Heartburn - Rash   - Chills - Spit blood - Nausea - Itching   - Weight Loss - Shortness of breath - Vomiting - Nail pain   - Malaise/Fatigue - Wheezing - Abdominal Pain  Wound/Ulcer   - Weight Gain   - Blood in Stool  Poor wound healing       - Diarrhea          Cardiovascular  Genitourinary  Neurological  HEENT   - Chest Pain - Dysuria - Burning Sensation of feet - Headache   - Palpitations - Hematuria - Tingling / Paresthesia - Congestion   - Pain at night in legs - Flank Pain - Dizziness - Sore Throat   - Cramping   - Tremor - Blurred Vision   - Leg Swelling   - Sensory Change - Double Vision   - Dizzy when standing   - Speech Change - Eye Redness       - Focal Weakness - Dry Eyes       - Loss  "of Consciousness          Endocrine  Musculoskeletal  Psychiatric   - Cold intolerance - Muscle Pain - Depression   - Heat intolerance - Neck Pain - Insomnia   - Anemia - Joint Pain - Memory Loss   -  Easy bruising, bleeding - Heel pain - Anxiety      Toe Pain        Leg/Ankle/Foot Pain         Objective:     /79 (BP Location: Left arm, Patient Position: Sitting, BP Method: Large (Automatic))   Pulse 73   Ht 5' 9" (1.753 m)   Wt 122.5 kg (270 lb)   LMP 03/01/2019   BMI 39.87 kg/m²   Vitals:    02/03/23 1409   BP: 113/79   Pulse: 73   Weight: 122.5 kg (270 lb)   Height: 5' 9" (1.753 m)   PainSc:   4   PainLoc: Foot       Physical Exam    General Appearance:   Patient appears well developed, well nourished  Patient appears stated age    Psychiatric:   Patient is oriented to time, place, and person.  Patient has appropriate mood and affect    Neck:  Trachea Midline  No visible masses    Respiratory/Ears:  No distress or labored breathing.  Able to differentiate between normal talking voice and whisper.  Able to follow commands    Eyes:  Visual Acuity intact  Lids and conjunctivae normal. No discoloration noted.    Foot Exam  Physical Exam  Ortho Exam  Ortho/SPM Exam  Physical Exam  Foot/Ankle Musculoskeletal Exam    L LE exam con't:  V:  DP 2/4, PT 2/4   CRT< 3s to all digits tested   Tibial and popliteal lymph nodes are w/o abnormality   Edema: absent, varicosities: absent    N:  Patient displays normal ankle reflexes   SILT in SP/DP/T/Reynaldo/Saph distributions    Ortho: +Motor EHL/FHL/TA/GA   no deformity present   There is no decreased ROM at midfoot or rearfoot joints  There is moderate pain with palpation of lateral dorsal foot in area of STM  There is bony deformity in area of TMTJ 4-5 area  There is mild to moderate pain of peroneal tendons in retromal area  Compartments soft/compressible. No pain on passive stretch of big toe. No calf  Pain.    Derm:  skin intact, skin warm and dry, skin without ulcers " or lesions, skin without induration, nails normal,  +palpable STM: freely movable, non-adherent, well circumscribed      Imaging / Labs:      X-Ray Foot Complete 3 view Left    Result Date: 12/16/2022  EXAMINATION: XR FOOT COMPLETE 3 VIEW LEFT CLINICAL HISTORY: Pain in left foot TECHNIQUE: Standard radiography performed. COMPARISON: 12/14/2022. FINDINGS: Weightbearing dam demonstrates a small heel spur.  No fracture or dislocation.  Mild arthritic change involving the 1st metatarsophalangeal joint.     See above. Electronically signed by: Giovani Yost Date:    12/16/2022 Time:    12:00    X-Ray Foot Complete Left    Result Date: 12/14/2022  EXAMINATION: XR FOOT COMPLETE 3 VIEW LEFT CLINICAL HISTORY: .  Pain in left foot TECHNIQUE: AP, lateral and oblique views of the left foot were performed. COMPARISON: None FINDINGS: There is fusion of the middle distal phalanges of the 5th toe.  There are mild degenerative changes of the interphalangeal joints.  Plantar and Achilles calcaneal spurs.  Benign-appearing calcifications are seen anterior to the distal calf and dorsal to the mid talus.  There is an accessory ossicle adjacent to the cuboid bone. Negative for fracture or dislocation.  Negative for soft tissue abnormalities.     1.  Negative for acute process. 2.  Incidental findings as noted above. Electronically signed by: Osacr Howell MD Date:    12/14/2022 Time:    11:02          Note: This was dictated using a computer transcription program. Although proofread, it may contain computer transcription errors and phonetic errors. Other human proofreading errors may also exist. Corrections may be performed at a later time. Please contact us for any clarification if needed.    Austen Anderson DPM  [facility]

## 2023-02-04 LAB — NONINV COLON CA DNA+OCC BLD SCRN STL QL: NEGATIVE

## 2023-02-13 ENCOUNTER — TELEPHONE (OUTPATIENT)
Dept: TRANSPLANT | Facility: CLINIC | Age: 48
End: 2023-02-13
Payer: MEDICARE

## 2023-02-13 NOTE — TELEPHONE ENCOUNTER
Pt CardioMems transmission received and reviewed with CHRISTOPH Edge.      CardioMEMS Transmission  2/13/2023 2/3/2023 2/1/2023 1/6/2023   Transmission Date 2/13/2023 2/3/2023 2/1/2023 1/5/2023   Transmission Type Maintenance Maintenance Maintenance Maintenance   PAS 50 45 33 41   LEELA 36 32 21 29   PAD  24 22 14 20   Medication Adjustments  No No No No   Some recent data might be hidden       Pressures are elevated.    Medication Interventions? yes, Bumex 2mg bid and 40 meq kcl bid x 3 days.    Lab Interventions? no    Patient contacted? yes, Pt reports some tightness in her chest, no swelling and no weight gain. Bumex 2am 1 pm, KCL 40 meq daily.     Patient informed of the above medication dose adjustments. All questions naswered and patient verbalzied understanding.     Will continue to monitor.

## 2023-02-15 ENCOUNTER — DOCUMENTATION ONLY (OUTPATIENT)
Dept: CARDIOLOGY | Facility: HOSPITAL | Age: 48
End: 2023-02-15
Payer: MEDICARE

## 2023-02-15 ENCOUNTER — HOSPITAL ENCOUNTER (EMERGENCY)
Facility: HOSPITAL | Age: 48
Discharge: HOME | End: 2023-02-15
Attending: EMERGENCY MEDICINE
Payer: MEDICARE

## 2023-02-15 VITALS
HEIGHT: 69 IN | RESPIRATION RATE: 18 BRPM | DIASTOLIC BLOOD PRESSURE: 56 MMHG | SYSTOLIC BLOOD PRESSURE: 101 MMHG | WEIGHT: 270.06 LBS | HEART RATE: 65 BPM | TEMPERATURE: 98 F | BODY MASS INDEX: 40 KG/M2 | OXYGEN SATURATION: 98 %

## 2023-02-15 DIAGNOSIS — R07.9 CHEST PAIN: ICD-10-CM

## 2023-02-15 DIAGNOSIS — R07.89 CHEST TIGHTNESS: ICD-10-CM

## 2023-02-15 DIAGNOSIS — I50.42 CHRONIC COMBINED SYSTOLIC AND DIASTOLIC CONGESTIVE HEART FAILURE: Primary | ICD-10-CM

## 2023-02-15 PROBLEM — I50.43 ACUTE ON CHRONIC COMBINED SYSTOLIC (CONGESTIVE) AND DIASTOLIC (CONGESTIVE) HEART FAILURE: Status: ACTIVE | Noted: 2021-10-22

## 2023-02-15 PROBLEM — R79.89 ELEVATED TROPONIN: Status: ACTIVE | Noted: 2023-02-15

## 2023-02-15 LAB
ALBUMIN SERPL BCP-MCNC: 3.6 G/DL (ref 3.5–5.2)
ALP SERPL-CCNC: 44 U/L (ref 55–135)
ALT SERPL W/O P-5'-P-CCNC: 12 U/L (ref 10–44)
ANION GAP SERPL CALC-SCNC: 9 MMOL/L (ref 8–16)
AST SERPL-CCNC: 14 U/L (ref 10–40)
BASOPHILS # BLD AUTO: 0.02 K/UL (ref 0–0.2)
BASOPHILS NFR BLD: 0.3 % (ref 0–1.9)
BILIRUB SERPL-MCNC: 1 MG/DL (ref 0.1–1)
BNP SERPL-MCNC: 436 PG/ML (ref 0–99)
BUN SERPL-MCNC: 14 MG/DL (ref 6–20)
CALCIUM SERPL-MCNC: 8.8 MG/DL (ref 8.7–10.5)
CHLORIDE SERPL-SCNC: 105 MMOL/L (ref 95–110)
CO2 SERPL-SCNC: 26 MMOL/L (ref 23–29)
CREAT SERPL-MCNC: 1 MG/DL (ref 0.5–1.4)
DIFFERENTIAL METHOD: ABNORMAL
EOSINOPHIL # BLD AUTO: 0.2 K/UL (ref 0–0.5)
EOSINOPHIL NFR BLD: 3.4 % (ref 0–8)
ERYTHROCYTE [DISTWIDTH] IN BLOOD BY AUTOMATED COUNT: 12.9 % (ref 11.5–14.5)
EST. GFR  (NO RACE VARIABLE): >60 ML/MIN/1.73 M^2
GLUCOSE SERPL-MCNC: 93 MG/DL (ref 70–110)
HCT VFR BLD AUTO: 34.1 % (ref 37–48.5)
HGB BLD-MCNC: 11.2 G/DL (ref 12–16)
IMM GRANULOCYTES # BLD AUTO: 0.01 K/UL (ref 0–0.04)
IMM GRANULOCYTES NFR BLD AUTO: 0.2 % (ref 0–0.5)
INFLUENZA A, MOLECULAR: NOT DETECTED
INFLUENZA B, MOLECULAR: NOT DETECTED
LACTATE SERPL-SCNC: 0.6 MMOL/L (ref 0.5–2.2)
LYMPHOCYTES # BLD AUTO: 1.7 K/UL (ref 1–4.8)
LYMPHOCYTES NFR BLD: 28.6 % (ref 18–48)
MCH RBC QN AUTO: 34.7 PG (ref 27–31)
MCHC RBC AUTO-ENTMCNC: 32.8 G/DL (ref 32–36)
MCV RBC AUTO: 106 FL (ref 82–98)
MONOCYTES # BLD AUTO: 0.5 K/UL (ref 0.3–1)
MONOCYTES NFR BLD: 8 % (ref 4–15)
NEUTROPHILS # BLD AUTO: 3.6 K/UL (ref 1.8–7.7)
NEUTROPHILS NFR BLD: 59.5 % (ref 38–73)
NRBC BLD-RTO: 0 /100 WBC
PLATELET # BLD AUTO: 248 K/UL (ref 150–450)
PMV BLD AUTO: 10.5 FL (ref 9.2–12.9)
POC CARDIAC TROPONIN I: 0.01 NG/ML (ref 0–0.08)
POTASSIUM SERPL-SCNC: 3.9 MMOL/L (ref 3.5–5.1)
PROT SERPL-MCNC: 6.9 G/DL (ref 6–8.4)
RBC # BLD AUTO: 3.23 M/UL (ref 4–5.4)
RSV AG BY MOLECULAR METHOD: NOT DETECTED
SAMPLE: NORMAL
SARS-COV-2 RNA RESP QL NAA+PROBE: NOT DETECTED
SODIUM SERPL-SCNC: 140 MMOL/L (ref 136–145)
TROPONIN I SERPL DL<=0.01 NG/ML-MCNC: 0.21 NG/ML (ref 0–0.03)
TROPONIN I SERPL DL<=0.01 NG/ML-MCNC: 0.22 NG/ML (ref 0–0.03)
WBC # BLD AUTO: 5.97 K/UL (ref 3.9–12.7)

## 2023-02-15 PROCEDURE — 96374 THER/PROPH/DIAG INJ IV PUSH: CPT

## 2023-02-15 PROCEDURE — 83880 ASSAY OF NATRIURETIC PEPTIDE: CPT | Performed by: EMERGENCY MEDICINE

## 2023-02-15 PROCEDURE — 93005 ELECTROCARDIOGRAM TRACING: CPT

## 2023-02-15 PROCEDURE — 93010 ELECTROCARDIOGRAM REPORT: CPT | Mod: 76,,, | Performed by: INTERNAL MEDICINE

## 2023-02-15 PROCEDURE — 99284 EMERGENCY DEPT VISIT MOD MDM: CPT | Mod: CS,,, | Performed by: EMERGENCY MEDICINE

## 2023-02-15 PROCEDURE — 80053 COMPREHEN METABOLIC PANEL: CPT | Performed by: EMERGENCY MEDICINE

## 2023-02-15 PROCEDURE — 25000003 PHARM REV CODE 250: Performed by: EMERGENCY MEDICINE

## 2023-02-15 PROCEDURE — 99284 PR EMERGENCY DEPT VISIT,LEVEL IV: ICD-10-PCS | Mod: CS,,, | Performed by: EMERGENCY MEDICINE

## 2023-02-15 PROCEDURE — 25000003 PHARM REV CODE 250: Performed by: STUDENT IN AN ORGANIZED HEALTH CARE EDUCATION/TRAINING PROGRAM

## 2023-02-15 PROCEDURE — 63600175 PHARM REV CODE 636 W HCPCS: Performed by: STUDENT IN AN ORGANIZED HEALTH CARE EDUCATION/TRAINING PROGRAM

## 2023-02-15 PROCEDURE — 0241U SARS-COV2 (COVID) WITH FLU/RSV BY PCR: CPT | Performed by: EMERGENCY MEDICINE

## 2023-02-15 PROCEDURE — 94761 N-INVAS EAR/PLS OXIMETRY MLT: CPT

## 2023-02-15 PROCEDURE — 84484 ASSAY OF TROPONIN QUANT: CPT | Mod: 91 | Performed by: EMERGENCY MEDICINE

## 2023-02-15 PROCEDURE — 93010 ELECTROCARDIOGRAM REPORT: CPT | Mod: ,,, | Performed by: INTERNAL MEDICINE

## 2023-02-15 PROCEDURE — 93010 EKG 12-LEAD: ICD-10-PCS | Mod: ,,, | Performed by: INTERNAL MEDICINE

## 2023-02-15 PROCEDURE — 85025 COMPLETE CBC W/AUTO DIFF WBC: CPT | Performed by: EMERGENCY MEDICINE

## 2023-02-15 PROCEDURE — 83605 ASSAY OF LACTIC ACID: CPT | Performed by: EMERGENCY MEDICINE

## 2023-02-15 PROCEDURE — 99285 EMERGENCY DEPT VISIT HI MDM: CPT | Mod: 25

## 2023-02-15 RX ORDER — FUROSEMIDE 10 MG/ML
120 INJECTION INTRAMUSCULAR; INTRAVENOUS ONCE
Status: COMPLETED | OUTPATIENT
Start: 2023-02-15 | End: 2023-02-15

## 2023-02-15 RX ORDER — ASPIRIN 325 MG
325 TABLET ORAL
Status: COMPLETED | OUTPATIENT
Start: 2023-02-15 | End: 2023-02-15

## 2023-02-15 RX ORDER — POTASSIUM CHLORIDE 20 MEQ/1
40 TABLET, EXTENDED RELEASE ORAL ONCE
Status: COMPLETED | OUTPATIENT
Start: 2023-02-15 | End: 2023-02-15

## 2023-02-15 RX ORDER — METOLAZONE 2.5 MG/1
5 TABLET ORAL ONCE
Qty: 2 TABLET | Refills: 0 | Status: SHIPPED | OUTPATIENT
Start: 2023-02-15 | End: 2023-02-20

## 2023-02-15 RX ADMIN — FUROSEMIDE 120 MG: 10 INJECTION, SOLUTION INTRAMUSCULAR; INTRAVENOUS at 01:02

## 2023-02-15 RX ADMIN — POTASSIUM CHLORIDE 40 MEQ: 1500 TABLET, EXTENDED RELEASE ORAL at 01:02

## 2023-02-15 RX ADMIN — ASPIRIN 325 MG: 325 TABLET ORAL at 09:02

## 2023-02-15 NOTE — HPI
Mrs. Mario Mahajan is a 47 y.o Female with NICM HFrEF (EF 15%) s/p ICD placement 12/2015, VT/VF on Amiodarone, presenting with chest pressure/DELGADO that has been slowly getting worse over the past few weeks before acutely worsening 2 days ago. She describes feeling full and bloated and has had associated DELGADO/orthponea. She has been unable to sleep due to orthopnea despite using 4 pillows. She has been transmitting her cardiomems and had her bumex dose doubled 3d ago which has not brought significant relief. She then came to the ED today. Denies any other symptoms.      Upon arrival to the Emergency Department, she was afebrile, /61, and saturating 99% on room air. . Troponin 0.222. ECG with LBBB and otherwise similar to prior ECG. Cardiology was the consulted for evaluation.

## 2023-02-15 NOTE — SUBJECTIVE & OBJECTIVE
Past Medical History:   Diagnosis Date    Asthma     Chronic back pain 2014    Chronic combined systolic and diastolic congestive heart failure 2015     2-10-17   1 - Severely depressed left ventricular systolic function (EF 20-25%).    2 - Severe left ventricular enlargement.    3 - Severe left atrial enlargement.    4 - Left ventricular diastolic dysfunction.    5 - The estimated PA systolic pressure is 18 mmHg.    6 - Mild mitral regurgitation.     Encounter for blood transfusion     ICD (implantable cardioverter-defibrillator) in place 12/01/15 3/3/2016    Menorrhagia, premenopausal 2/10/2017    Microcytic anemia 2015    Non-rheumatic mitral regurgitation 3/5/2015    Nonischemic dilated cardiomyopathy 2015    Sleep apnea     Syncope and collapse 2017    Ventricular tachycardia, polymorphic        Past Surgical History:   Procedure Laterality Date    CARDIAC DEFIBRILLATOR PLACEMENT  2015     SECTION      INSERTION, WIRELESS SENSOR, FOR PULMONARY ARTERIAL PRESSURE MONITORING N/A 10/22/2021    Procedure: INSERTION, WIRELESS SENSOR, FOR PULMONARY ARTERIAL PRESSURE MONITORING;  Surgeon: Erika Hurd MD;  Location: [facility] CATH LAB;  Service: Cardiology;  Laterality: N/A;    OOPHORECTOMY      PERICARDIOCENTESIS N/A 2020    Procedure: Pericardiocentesis;  Surgeon: Memo Luis MD;  Location: [facility] CATH LAB;  Service: Cardiology;  Laterality: N/A;    PULMONARY ANGIOGRAPHY N/A 10/22/2021    Procedure: ANGIOGRAM, PULMONARY;  Surgeon: Erika Hurd MD;  Location: [facility] CATH LAB;  Service: Cardiology;  Laterality: N/A;    RIGHT HEART CATHETERIZATION      TOTAL ABDOMINAL HYSTERECTOMY N/A 3/26/2019    Procedure: HYSTERECTOMY, TOTAL, ABDOMINAL;  Surgeon: Victorino Rose MD;  Location: [facility] OR;  Service: OB/GYN;  Laterality: N/A;    TRANSESOPHAGEAL ECHOCARDIOGRAPHY N/A 2022    Procedure: ECHOCARDIOGRAM, TRANSESOPHAGEAL;  Surgeon: Phan Powell MD;  Location: [facility] EP LAB;   Service: Cardiology;  Laterality: N/A;    TUBAL LIGATION         Review of patient's allergies indicates:   Allergen Reactions    Ace inhibitors      Cough       No current facility-administered medications on file prior to encounter.     Current Outpatient Medications on File Prior to Encounter   Medication Sig    albuterol (PROVENTIL/VENTOLIN HFA) 90 mcg/actuation inhaler Inhale 2 puffs into the lungs every 6 (six) hours as needed for Wheezing.    amiodarone (PACERONE) 200 MG Tab Take 1 tablet (200 mg total) by mouth once daily.    ascorbic acid, vitamin C, (VITAMIN C) 250 MG tablet Take 1 tablet (250 mg) by mouth twice daily. Take with the iron tablets.    bumetanide (BUMEX) 1 MG tablet Take 2 tablets (2 mg) every morning and 1 tablet (1 mg) every evening.    carvediloL (COREG) 25 MG tablet TAKE 1 TABLET(25 MG) BY MOUTH TWICE DAILY    dapagliflozin (FARXIGA) 10 mg tablet Take 1 tablet (10 mg total) by mouth once daily.    ferrous sulfate 325 (65 FE) MG EC tablet Take 1 tablet (325 mg total) by mouth 2 (two) times daily. Take with vitamin C.    potassium chloride SA (K-DUR,KLOR-CON) 20 MEQ tablet TAKE 2 TABLETS(40 MEQ) BY MOUTH EVERY DAY    sacubitriL-valsartan (ENTRESTO)  mg per tablet Take 1 tablet by mouth 2 (two) times daily.    spironolactone (ALDACTONE) 25 MG tablet TAKE 1 TABLET(25 MG) BY MOUTH EVERY DAY    aspirin (ECOTRIN) 81 MG EC tablet Take 1 tablet (81 mg total) by mouth once daily.    b complex vitamins tablet Take 1 tablet by mouth once daily.    diclofenac sodium (VOLTAREN) 1 % Gel Apply 2 g topically 4 (four) times daily. for 15 days    FLOVENT HFA 44 mcg/actuation inhaler 1 puff 2 (two) times daily.    HYDROcodone-acetaminophen (NORCO) 5-325 mg per tablet Take 1 tablet by mouth every 6 (six) hours as needed for Pain.    ipratropium-albuteroL (COMBIVENT)  mcg/actuation inhaler Inhale 1 puff into the lungs 4 (four) times daily. Rescue    loratadine (CLARITIN) 10 mg tablet Take 1 tablet  (10 mg total) by mouth once daily.    timolol maleate 0.5% (TIMOPTIC) 0.5 % Drop INSTILL 1 DROP INTO BOTH EYES EVERY 12 HOURS    triamcinolone acetonide 0.1% (KENALOG) 0.1 % cream Apply topically 2 (two) times daily. Apply to scar     Family History       Problem Relation (Age of Onset)    Diabetes Father    Heart disease Brother    Heart failure Father, Brother    Lung cancer Paternal Grandmother    No Known Problems Daughter, Son    Pancreatic cancer Father          Tobacco Use    Smoking status: Never    Smokeless tobacco: Never   Substance and Sexual Activity    Alcohol use: Not Currently     Comment: 1 glass per 3 months    Drug use: No    Sexual activity: Yes     Partners: Male     Comment: one male partner (boyfriend)     Review of Systems   Cardiovascular:  Positive for dyspnea on exertion, orthopnea and paroxysmal nocturnal dyspnea.   All other systems reviewed and are negative.  Objective:     Vital Signs (Most Recent):  Temp: 98 °F (36.7 °C) (02/15/23 1331)  Pulse: 65 (02/15/23 1331)  Resp: 18 (02/15/23 1331)  BP: (!) 101/56 (02/15/23 1331)  SpO2: 98 % (02/15/23 1331)   Vital Signs (24h Range):  Temp:  [97.3 °F (36.3 °C)-98.6 °F (37 °C)] 98 °F (36.7 °C)  Pulse:  [60-75] 65  Resp:  [16-18] 18  SpO2:  [98 %-99 %] 98 %  BP: (101-122)/(56-61) 101/56     Weight: 122.5 kg (270 lb 1 oz)  Body mass index is 39.88 kg/m².    SpO2: 98 %         Intake/Output Summary (Last 24 hours) at 2/15/2023 1445  Last data filed at 2/15/2023 1413  Gross per 24 hour   Intake --   Output 700 ml   Net -700 ml       Lines/Drains/Airways       Peripheral Intravenous Line  Duration                  Peripheral IV - Single Lumen 02/15/23 0939 20 G Left Antecubital <1 day                    Physical Exam  Vitals and nursing note reviewed.   Constitutional:       Appearance: She is obese.   HENT:      Head: Normocephalic and atraumatic.      Right Ear: External ear normal.      Left Ear: External ear normal.      Nose: Nose normal.       Mouth/Throat:      Mouth: Mucous membranes are moist.   Eyes:      Pupils: Pupils are equal, round, and reactive to light.   Neck:      Comments: Elevated JVD   Cardiovascular:      Rate and Rhythm: Normal rate and regular rhythm.      Pulses: Normal pulses.   Pulmonary:      Effort: Pulmonary effort is normal.   Abdominal:      General: Abdomen is flat.   Musculoskeletal:         General: Normal range of motion.      Cervical back: Normal range of motion.      Right lower leg: No edema.      Left lower leg: No edema.   Skin:     General: Skin is warm and dry.      Capillary Refill: Capillary refill takes less than 2 seconds.   Neurological:      General: No focal deficit present.      Mental Status: She is alert and oriented to person, place, and time. Mental status is at baseline.       Significant Labs: All pertinent lab results from the last 24 hours have been reviewed.    Significant Imaging:  reviewed

## 2023-02-15 NOTE — ED NOTES
Patient identifiers verified and correct for Mario Mahajan  LOC: The patient is awake, alert and aware of environment with an appropriate affect, the patient is oriented x 3 and speaking appropriately.   APPEARANCE: Patient appears uncomfortable and in slight distress, patient is clean and well groomed.  SKIN: The skin is warm and dry, color consistent with ethnicity, patient has normal skin turgor and moist mucus membranes, skin intact, no breakdown or bruising noted.   MUSCULOSKELETAL: Patient moving all extremities spontaneously, no swelling noted.  RESPIRATORY: Airway is open and patent, respirations are spontaneous, patient has a normal effort and rate, no accessory muscle use noted, pt pulse ox with O2 sats noted at 99% on room air. Pt stated feels like stabbing in the chest on inhalation.  CARDIAC: Patient has a normal rate and regular rhythm, no edema noted, capillary refill < 3 seconds.   GASTRO: Soft and non tender to palpation, no distention noted, normoactive bowel sounds present in all four quadrants. Pt states bowel movements have been regular.  : Pt denies any pain or frequency with urination.  NEURO: Pt opens eyes spontaneously, behavior appropriate to situation, follows commands, facial expression symmetrical, bilateral hand grasp equal and even, purposeful motor response noted, normal sensation in all extremities when touched with a finger.

## 2023-02-15 NOTE — PROGRESS NOTES
Cardiac device interrogation and/or reprogramming completed by industry representative, EARLE with [employer]; please refer to report located in the Media tab.

## 2023-02-15 NOTE — PROVIDER PROGRESS NOTES - EMERGENCY DEPT.
Encounter Date: 2/15/2023    ED Physician Progress Notes         EKG - STEMI Decision  Initial Reading: No STEMI present.  Response: No Action Needed.

## 2023-02-15 NOTE — ED PROVIDER NOTES
Encounter Date: 2/15/2023    SCRIBE #1 NOTE: I, Patricia Day, am scribing for, and in the presence of,  Kia Michele MD. I have scribed the following portions of the note - the EKG reading.     History     Chief Complaint   Patient presents with    Chest Pain     L side c/p and palpitations. Hx of CHF. Took potassium and fluid pills this AM.      Time patient was seen by the provider: 9:23 AM    The patient is a 47 y.o. female with past medical history of CHF and cardiac defibrillator placement (12/1/2015) who presents to the ED with a complaint of chest pain onset last night. Her symptoms worsened today. Therefore, she came to the ED. She describes her chest pain as a squeezing sensation. Associated symptoms include SOB, cough, postnasal drip, and headache. The patient has been taking Mucinex. She denies leg swelling today. However, she had leg swelling in the past and was told to double her medication. The patient took 2 doses of fluid pills and potassium with no relief. She has been up since 4 am secondary to chest pain. Normally, she sleeps on 3 pillows. Last night, she had to sleep on 4 pillows. The patient takes Aspirin. Denies history of PE/DVT. She denies fever, chills, and myalgias. The patient follow-up with heart transplant services.    The history is provided by the patient and medical records. No  was used.   Review of patient's allergies indicates:   Allergen Reactions    Ace inhibitors      Cough     Past Medical History:   Diagnosis Date    Asthma     Chronic back pain 7/1/2014    Chronic combined systolic and diastolic congestive heart failure 4/28/2015     2-10-17   1 - Severely depressed left ventricular systolic function (EF 20-25%).    2 - Severe left ventricular enlargement.    3 - Severe left atrial enlargement.    4 - Left ventricular diastolic dysfunction.    5 - The estimated PA systolic pressure is 18 mmHg.    6 - Mild mitral regurgitation.     Encounter for  blood transfusion     ICD (implantable cardioverter-defibrillator) in place 12/01/15 3/3/2016    Menorrhagia, premenopausal 2/10/2017    Microcytic anemia 2015    Non-rheumatic mitral regurgitation 3/5/2015    Nonischemic dilated cardiomyopathy 2015    Sleep apnea     Syncope and collapse 2017    Ventricular tachycardia, polymorphic      Past Surgical History:   Procedure Laterality Date    CARDIAC DEFIBRILLATOR PLACEMENT  2015     SECTION      INSERTION, WIRELESS SENSOR, FOR PULMONARY ARTERIAL PRESSURE MONITORING N/A 10/22/2021    Procedure: INSERTION, WIRELESS SENSOR, FOR PULMONARY ARTERIAL PRESSURE MONITORING;  Surgeon: Erika Hurd MD;  Location: [facility] CATH LAB;  Service: Cardiology;  Laterality: N/A;    OOPHORECTOMY      PERICARDIOCENTESIS N/A 2020    Procedure: Pericardiocentesis;  Surgeon: Memo Luis MD;  Location: [facility] CATH LAB;  Service: Cardiology;  Laterality: N/A;    PULMONARY ANGIOGRAPHY N/A 10/22/2021    Procedure: ANGIOGRAM, PULMONARY;  Surgeon: Erika Hurd MD;  Location: [facility] CATH LAB;  Service: Cardiology;  Laterality: N/A;    RIGHT HEART CATHETERIZATION      TOTAL ABDOMINAL HYSTERECTOMY N/A 3/26/2019    Procedure: HYSTERECTOMY, TOTAL, ABDOMINAL;  Surgeon: Victorino Rose MD;  Location: [facility] OR;  Service: OB/GYN;  Laterality: N/A;    TRANSESOPHAGEAL ECHOCARDIOGRAPHY N/A 2022    Procedure: ECHOCARDIOGRAM, TRANSESOPHAGEAL;  Surgeon: Phan Powell MD;  Location: [facility] EP LAB;  Service: Cardiology;  Laterality: N/A;    TUBAL LIGATION       Family History   Problem Relation Age of Onset    Diabetes Father     Pancreatic cancer Father     Heart failure Father     Heart failure Brother     No Known Problems Daughter     No Known Problems Son     Lung cancer Paternal Grandmother     Heart disease Brother      Social History     Tobacco Use    Smoking status: Never    Smokeless tobacco: Never   Substance Use Topics    Alcohol use: Not Currently      Comment: 1 glass per 3 months    Drug use: No         Physical Exam     Initial Vitals [02/15/23 0852]   BP Pulse Resp Temp SpO2   122/61 75 16 97.3 °F (36.3 °C) 99 %      MAP       --         Physical Exam    Nursing note and vitals reviewed.  Constitutional: Vital signs are normal. She appears well-developed and well-nourished.  Non-toxic appearance. She does not appear ill.   HENT:   Head: Normocephalic and atraumatic.   Mouth/Throat: Mucous membranes are normal. Mucous membranes are not dry.   Eyes: Conjunctivae and lids are normal.   Neck: Neck supple. No JVD (bilaterally) present.   Normal range of motion.  Cardiovascular:  Normal rate.           Pulmonary/Chest: Breath sounds normal. No respiratory distress. She has no wheezes. She has no rhonchi. She has no rales.   Musculoskeletal:         General: No edema.      Cervical back: Normal range of motion and neck supple.      Comments: No pitting edema to lower extremities.     Neurological: She is alert and oriented to person, place, and time.   Skin: Skin is dry and intact. No pallor.   Psychiatric: She has a normal mood and affect. Her speech is normal and behavior is normal.       ED Course   Procedures  Labs Reviewed   CBC W/ AUTO DIFFERENTIAL - Abnormal; Notable for the following components:       Result Value    RBC 3.23 (*)     Hemoglobin 11.2 (*)     Hematocrit 34.1 (*)      (*)     MCH 34.7 (*)     All other components within normal limits   COMPREHENSIVE METABOLIC PANEL - Abnormal; Notable for the following components:    Alkaline Phosphatase 44 (*)     All other components within normal limits   TROPONIN I - Abnormal; Notable for the following components:    Troponin I 0.222 (*)     All other components within normal limits   B-TYPE NATRIURETIC PEPTIDE - Abnormal; Notable for the following components:     (*)     All other components within normal limits   TROPONIN I - Abnormal; Notable for the following components:    Troponin I 0.206  (*)     All other components within normal limits   SARS-COV2 (COVID) WITH FLU/RSV BY PCR   TROPONIN ISTAT   LACTIC ACID, PLASMA     EKG Readings: (Independently Interpreted)   Rhythm: Normal Sinus Rhythm. Heart Rate: 67. Conduction: LBBB.   Done at 8:56 am.   T wave inversions in leads II and III, AVF, V5, V6. Compared with EKG from 07/20/22, T wave inversions appear new.    ECG Results              EKG 12-lead (Final result)  Result time 02/15/23 10:40:18      Final result by Interface, Lab In [facility] (02/15/23 10:40:18)                   Narrative:    Test Reason : R07.9,    Vent. Rate : 067 BPM     Atrial Rate : 067 BPM     P-R Int : 200 ms          QRS Dur : 168 ms      QT Int : 504 ms       P-R-T Axes : 044 -19 -38 degrees     QTc Int : 532 ms    Normal sinus rhythm with 1st degree A-V block  Left bundle branch block  Abnormal ECG  When compared with ECG of 15-FEB-2023 08:55,  No significant change was found  Confirmed by Erick Poole MD ([id number]) on 2/15/2023 10:40:06 AM    Referred By: [email or web address]   SELF           Confirmed By:Erick Poole MD                                     EKG 12-lead (Final result)  Result time 02/15/23 10:40:10      Final result by Interface, Lab In [facility] (02/15/23 10:40:10)                   Narrative:    Test Reason : R07.9,    Vent. Rate : 067 BPM     Atrial Rate : 067 BPM     P-R Int : 202 ms          QRS Dur : 170 ms      QT Int : 490 ms       P-R-T Axes : 061 -26 -42 degrees     QTc Int : 517 ms    Normal sinus rhythm with 1st degree A-V block  Left bundle branch block  Abnormal ECG  When compared with ECG of 20-JUL-2022 15:35,  No significant change was found  Confirmed by Erick Poole MD ([id number]) on 2/15/2023 10:39:59 AM    Referred By: [email or web address]   SELF           Confirmed By:Erick Poole MD                                  Imaging Results               X-Ray Chest PA And Lateral (Final result)  Result time 02/15/23 11:36:19      Final result by Brian Grover MD  (02/15/23 11:36:19)                   Impression:      No new active process.    New tiny metallic opacity in the left lower lobe inferior pulmonary artery region and clinical correlation requested.    This report was flagged in Epic as abnormal.      Electronically signed by: Brian Grover MD  Date:    02/15/2023  Time:    11:36               Narrative:    EXAMINATION:  XR CHEST PA AND LATERAL    CLINICAL HISTORY:  Chest Pain;    TECHNIQUE:  PA and lateral views of the chest were performed.    COMPARISON:  07/20/2022 and dating back 2019    FINDINGS:  Cardiomegaly, status post left subclavian pacing defibrillator device therapy.  Improved inspiration.  The tiny metal device overlying right paracentral heart atrial septal region stable.  New tiny metallic device overlies left hilum on frontal view and lower lobe inferior pulmonary artery branch on lateral view and clinical correlation requested.  Cardiomegaly stable.  Pulmonary vascular tree remains asymmetrical prominent on right.  No new infiltrate or effusion.  Increased AP diameter chest, mild flattening hemidiaphragms.                                       Medications   aspirin tablet 325 mg (325 mg Oral Given 2/15/23 0948)   furosemide injection 120 mg (120 mg Intravenous Given 2/15/23 1302)   potassium chloride SA CR tablet 40 mEq (40 mEq Oral Given 2/15/23 1302)     Medical Decision Making:   History:   Old Medical Records: I decided to obtain old medical records.  Initial Assessment:   Patient with severely decreased ejection fraction, most concerning for ACS as exacerbation.   Differential Diagnosis:   ACS, CHF, PE, pneumonia, aortic dissection, GERD, esophageal spasm, costochondritis, pericarditis, myocarditis   Independently Interpreted Test(s):   I have ordered and independently interpreted EKG Reading(s) - see prior notes  Clinical Tests:   Lab Tests: Ordered and Reviewed  Radiological Study: Ordered and Reviewed  Medical Tests: Ordered and  Reviewed  ED Management:  Plan: Will check blood, X-ray, EKG, and check bases with Cardiology since the patient follows-up closely with transplant service.    1:49 PM Spoke with Dr. Small with Cardiology and he recommended discharging her after urinating after she receives a dose of Lasix. He is ordering Metolazone and sending it to the pharmacy, which she is to take 30 minutes before Bumex.     I considered admission but given patient is urinating well and has close follow up. She is well appearing with stable vitals  Discharged to home in stable condition, return to ED warnings given, follow up and patient care instructions given.        Other:   I have discussed this case with another health care provider.       <> Summary of the Discussion: Cardiology         Scribe Attestation:   Scribe #1: I performed the above scribed service and the documentation accurately describes the services I performed. I attest to the accuracy of the note.      ED Course as of 02/24/23 2048   Wed Feb 15, 2023   1140 X-Ray Chest PA And Lateral(!) [GK]   1335 Cardiology ordered lasix, will monitor her urine output [GK]      ED Course User Index  [GK] Kia Michele MD                 Clinical Impression:   Final diagnoses:  [R07.89] Chest tightness        ED Disposition Condition    Discharge Stable          ED Prescriptions       Medication Sig Dispense Start Date End Date Auth. Provider    metOLazone (ZAROXOLYN) 2.5 MG tablet () Take 2 tablets (5 mg total) by mouth once. 30 minutes before bumex dose. for 1 dose 2 tablet 2/15/2023 2023 Moris Small MD          Follow-up Information       Follow up With Specialties Details Why Contact Info    Jeimy Srivastava MD Transplant, Cardiology   [facility] [facility] [id number]  [phone]               Kia Michele MD  23

## 2023-02-15 NOTE — CONSULTS
[facility] - Emergency Dept  Cardiology  Consult Note    Patient Name: Mario Mahajan  MRN: [id number]  Admission Date: 2/15/2023  Hospital Length of Stay: 0 days  Code Status: Prior   Attending Provider: No att. providers found   Consulting Provider: Moris Small MD  Primary Care Physician: Tejinder Bowling MD  Principal Problem:<principal problem not specified>    Patient information was obtained from patient and past medical records.     Inpatient consult to Cardiology  Consult performed by: Moris Small MD  Consult ordered by: Kia Michele MD        Subjective:     Chief Complaint:  DELGADO     HPI:   Mrs. Mario Mahajan is a 47 y.o Female with NICM HFrEF (EF 15%) s/p ICD placement 12/2015, VT/VF on Amiodarone, presenting with chest pressure/DELGADO that has been slowly getting worse over the past few weeks before acutely worsening 2 days ago. She describes feeling full and bloated and has had associated DELGADO/orthponea. She has been unable to sleep due to orthopnea despite using 4 pillows. She has been transmitting her cardiomems and had her bumex dose doubled 3d ago which has not brought significant relief. She then came to the ED today. Denies any other symptoms.      Upon arrival to the Emergency Department, she was afebrile, /61, and saturating 99% on room air. . Troponin 0.222. ECG with LBBB and otherwise similar to prior ECG. Cardiology was the consulted for evaluation.       Past Medical History:   Diagnosis Date    Asthma     Chronic back pain 7/1/2014    Chronic combined systolic and diastolic congestive heart failure 4/28/2015     2-10-17   1 - Severely depressed left ventricular systolic function (EF 20-25%).    2 - Severe left ventricular enlargement.    3 - Severe left atrial enlargement.    4 - Left ventricular diastolic dysfunction.    5 - The estimated PA systolic pressure is 18 mmHg.    6 - Mild mitral regurgitation.     Encounter for blood transfusion     ICD (implantable  cardioverter-defibrillator) in place 12/01/15 3/3/2016    Menorrhagia, premenopausal 2/10/2017    Microcytic anemia 2015    Non-rheumatic mitral regurgitation 3/5/2015    Nonischemic dilated cardiomyopathy 2015    Sleep apnea     Syncope and collapse 2017    Ventricular tachycardia, polymorphic        Past Surgical History:   Procedure Laterality Date    CARDIAC DEFIBRILLATOR PLACEMENT  2015     SECTION      INSERTION, WIRELESS SENSOR, FOR PULMONARY ARTERIAL PRESSURE MONITORING N/A 10/22/2021    Procedure: INSERTION, WIRELESS SENSOR, FOR PULMONARY ARTERIAL PRESSURE MONITORING;  Surgeon: Erika Hurd MD;  Location: [facility] CATH LAB;  Service: Cardiology;  Laterality: N/A;    OOPHORECTOMY      PERICARDIOCENTESIS N/A 2020    Procedure: Pericardiocentesis;  Surgeon: Memo Luis MD;  Location: [facility] CATH LAB;  Service: Cardiology;  Laterality: N/A;    PULMONARY ANGIOGRAPHY N/A 10/22/2021    Procedure: ANGIOGRAM, PULMONARY;  Surgeon: Erika Hurd MD;  Location: [facility] CATH LAB;  Service: Cardiology;  Laterality: N/A;    RIGHT HEART CATHETERIZATION      TOTAL ABDOMINAL HYSTERECTOMY N/A 3/26/2019    Procedure: HYSTERECTOMY, TOTAL, ABDOMINAL;  Surgeon: Victorino Rose MD;  Location: [facility] OR;  Service: OB/GYN;  Laterality: N/A;    TRANSESOPHAGEAL ECHOCARDIOGRAPHY N/A 2022    Procedure: ECHOCARDIOGRAM, TRANSESOPHAGEAL;  Surgeon: Phan Powell MD;  Location: [facility] EP LAB;  Service: Cardiology;  Laterality: N/A;    TUBAL LIGATION         Review of patient's allergies indicates:   Allergen Reactions    Ace inhibitors      Cough       No current facility-administered medications on file prior to encounter.     Current Outpatient Medications on File Prior to Encounter   Medication Sig    albuterol (PROVENTIL/VENTOLIN HFA) 90 mcg/actuation inhaler Inhale 2 puffs into the lungs every 6 (six) hours as needed for Wheezing.    amiodarone (PACERONE) 200 MG Tab Take 1  tablet (200 mg total) by mouth once daily.    ascorbic acid, vitamin C, (VITAMIN C) 250 MG tablet Take 1 tablet (250 mg) by mouth twice daily. Take with the iron tablets.    bumetanide (BUMEX) 1 MG tablet Take 2 tablets (2 mg) every morning and 1 tablet (1 mg) every evening.    carvediloL (COREG) 25 MG tablet TAKE 1 TABLET(25 MG) BY MOUTH TWICE DAILY    dapagliflozin (FARXIGA) 10 mg tablet Take 1 tablet (10 mg total) by mouth once daily.    ferrous sulfate 325 (65 FE) MG EC tablet Take 1 tablet (325 mg total) by mouth 2 (two) times daily. Take with vitamin C.    potassium chloride SA (K-DUR,KLOR-CON) 20 MEQ tablet TAKE 2 TABLETS(40 MEQ) BY MOUTH EVERY DAY    sacubitriL-valsartan (ENTRESTO)  mg per tablet Take 1 tablet by mouth 2 (two) times daily.    spironolactone (ALDACTONE) 25 MG tablet TAKE 1 TABLET(25 MG) BY MOUTH EVERY DAY    aspirin (ECOTRIN) 81 MG EC tablet Take 1 tablet (81 mg total) by mouth once daily.    b complex vitamins tablet Take 1 tablet by mouth once daily.    diclofenac sodium (VOLTAREN) 1 % Gel Apply 2 g topically 4 (four) times daily. for 15 days    FLOVENT HFA 44 mcg/actuation inhaler 1 puff 2 (two) times daily.    HYDROcodone-acetaminophen (NORCO) 5-325 mg per tablet Take 1 tablet by mouth every 6 (six) hours as needed for Pain.    ipratropium-albuteroL (COMBIVENT)  mcg/actuation inhaler Inhale 1 puff into the lungs 4 (four) times daily. Rescue    loratadine (CLARITIN) 10 mg tablet Take 1 tablet (10 mg total) by mouth once daily.    timolol maleate 0.5% (TIMOPTIC) 0.5 % Drop INSTILL 1 DROP INTO BOTH EYES EVERY 12 HOURS    triamcinolone acetonide 0.1% (KENALOG) 0.1 % cream Apply topically 2 (two) times daily. Apply to scar     Family History       Problem Relation (Age of Onset)    Diabetes Father    Heart disease Brother    Heart failure Father, Brother    Lung cancer Paternal Grandmother    No Known Problems Daughter, Son    Pancreatic cancer Father           Tobacco Use    Smoking status: Never    Smokeless tobacco: Never   Substance and Sexual Activity    Alcohol use: Not Currently     Comment: 1 glass per 3 months    Drug use: No    Sexual activity: Yes     Partners: Male     Comment: one male partner (boyfriend)     Review of Systems   Cardiovascular:  Positive for dyspnea on exertion, orthopnea and paroxysmal nocturnal dyspnea.   All other systems reviewed and are negative.  Objective:     Vital Signs (Most Recent):  Temp: 98 °F (36.7 °C) (02/15/23 1331)  Pulse: 65 (02/15/23 1331)  Resp: 18 (02/15/23 1331)  BP: (!) 101/56 (02/15/23 1331)  SpO2: 98 % (02/15/23 1331)   Vital Signs (24h Range):  Temp:  [97.3 °F (36.3 °C)-98.6 °F (37 °C)] 98 °F (36.7 °C)  Pulse:  [60-75] 65  Resp:  [16-18] 18  SpO2:  [98 %-99 %] 98 %  BP: (101-122)/(56-61) 101/56     Weight: 122.5 kg (270 lb 1 oz)  Body mass index is 39.88 kg/m².    SpO2: 98 %         Intake/Output Summary (Last 24 hours) at 2/15/2023 1445  Last data filed at 2/15/2023 1413  Gross per 24 hour   Intake --   Output 700 ml   Net -700 ml       Lines/Drains/Airways       Peripheral Intravenous Line  Duration                  Peripheral IV - Single Lumen 02/15/23 0939 20 G Left Antecubital <1 day                    Physical Exam  Vitals and nursing note reviewed.   Constitutional:       Appearance: She is obese.   HENT:      Head: Normocephalic and atraumatic.      Right Ear: External ear normal.      Left Ear: External ear normal.      Nose: Nose normal.      Mouth/Throat:      Mouth: Mucous membranes are moist.   Eyes:      Pupils: Pupils are equal, round, and reactive to light.   Neck:      Comments: Elevated JVD   Cardiovascular:      Rate and Rhythm: Normal rate and regular rhythm.      Pulses: Normal pulses.   Pulmonary:      Effort: Pulmonary effort is normal.   Abdominal:      General: Abdomen is flat.   Musculoskeletal:         General: Normal range of motion.      Cervical back: Normal range of motion.       Right lower leg: No edema.      Left lower leg: No edema.   Skin:     General: Skin is warm and dry.      Capillary Refill: Capillary refill takes less than 2 seconds.   Neurological:      General: No focal deficit present.      Mental Status: She is alert and oriented to person, place, and time. Mental status is at baseline.       Significant Labs: All pertinent lab results from the last 24 hours have been reviewed.    Significant Imaging:  reviewed    Assessment and Plan:     Elevated troponin  Suspect demand ischemia.    Acute on chronic combined systolic (congestive) and diastolic (congestive) heart failure  Symptoms consistent with CHF and correlate with increasing PAP on cardiomems.    Plan:   -ok with discharge home with close monitoring  -lasix 120mg x1   -metolazone 5mg prior to home bumex tomorrow am. Continue to take potassium supplementation   -bmp/bnp Friday and continue to transmit cardiomems    Obesity (BMI 35.0-39.9 without comorbidity)  -diet and exercise     Polymorphic ventricular tachycardia  -continue amiodarone     ICD (implantable cardioverter-defibrillator) in place 12/01/15  -stable     Nonischemic dilated cardiomyopathy  -continue gdmt         VTE Risk Mitigation (From admission, onward)    None          Thank you for your consult. I will sign off. Please contact us if you have any additional questions.    Moris Small MD  Cardiology   [facility] - Emergency Dept

## 2023-02-15 NOTE — ED NOTES
ED charge aware need for room with continuous monitoring. Pt placed on 5 lead bedside box for cardiac monitoring.

## 2023-02-15 NOTE — ASSESSMENT & PLAN NOTE
Symptoms consistent with CHF and correlate with increasing PAP on cardiomems.    Plan:   -ok with discharge home with close monitoring  -lasix 120mg x1   -metolazone 5mg prior to home bumex tomorrow am. Continue to take potassium supplementation   -bmp/bnp Friday and continue to transmit cardiomems

## 2023-02-16 ENCOUNTER — TELEPHONE (OUTPATIENT)
Dept: TRANSPLANT | Facility: CLINIC | Age: 48
End: 2023-02-16
Payer: MEDICARE

## 2023-02-16 DIAGNOSIS — I42.0 NONISCHEMIC DILATED CARDIOMYOPATHY: Primary | ICD-10-CM

## 2023-02-16 DIAGNOSIS — I50.42 CHRONIC COMBINED SYSTOLIC AND DIASTOLIC CONGESTIVE HEART FAILURE: Primary | ICD-10-CM

## 2023-02-16 NOTE — TELEPHONE ENCOUNTER
Informed yesterday afternoon by [facility] nurse, Tammy, RN: she had been notified pt was in the ED or chest discomfort that seems to be fluid related , was given Lasix IVP and being released for recommendation of close follow up  She also informed me pt had elevated PADs and earlier this week Dr. Hurd had pt double up on Bumex    Told her I would see about having pt scheduled for follow up in HF clinic early next week    Later yesterday afternoon I reviewed the ED notes and MD note was not yet in and pt was still there    2/16/23  0930 am: Just reviewed ED notes and noted this as part of the plan   ok with discharge home with close monitoring  -lasix 120mg x1   -metolazone 5mg prior to home bumex tomorrow am. Continue to take potassium supplementation   -bmp/bnp Friday and continue to transmit cardiomems     I see lab orders entered by a Dr Moris Small from ED Card not yet scheduled   and since pt managed by Dr. Hurd for HF and cardiomems and I dont see cardiology notes  I will enter these lab orders under Dr. Hurd and ask our [facility] coordinators to schedule labs tomorrow as recommended in ED note above     Will also ask HTS schedulers:   To schedule pt w/ repeat labs and visit next Monday 2/20 or Wednesday 2/22 with any provider in hts available ( 2/21 local holiday)

## 2023-02-17 ENCOUNTER — DOCUMENTATION ONLY (OUTPATIENT)
Dept: TRANSPLANT | Facility: CLINIC | Age: 48
End: 2023-02-17
Payer: MEDICARE

## 2023-02-17 ENCOUNTER — TELEPHONE (OUTPATIENT)
Dept: TRANSPLANT | Facility: CLINIC | Age: 48
End: 2023-02-17
Payer: MEDICARE

## 2023-02-17 ENCOUNTER — LAB VISIT (OUTPATIENT)
Dept: LAB | Facility: HOSPITAL | Age: 48
End: 2023-02-17
Attending: INTERNAL MEDICINE
Payer: MEDICARE

## 2023-02-17 DIAGNOSIS — I42.0 NONISCHEMIC DILATED CARDIOMYOPATHY: ICD-10-CM

## 2023-02-17 LAB
ANION GAP SERPL CALC-SCNC: 8 MMOL/L (ref 8–16)
CALCIUM SERPL-MCNC: 9.5 MG/DL (ref 8.7–10.5)
CHLORIDE SERPL-SCNC: 100 MMOL/L (ref 95–110)
CO2 SERPL-SCNC: 30 MMOL/L (ref 23–29)
CREAT SERPL-MCNC: 1.4 MG/DL (ref 0.5–1.4)
EST. GFR  (NO RACE VARIABLE): 47.3 ML/MIN/1.73 M^2
GLUCOSE SERPL-MCNC: 91 MG/DL (ref 70–110)
NT-PROBNP SERPL-MCNC: 249 PG/ML (ref 5–450)
POTASSIUM SERPL-SCNC: 3.2 MMOL/L (ref 3.5–5.1)
SODIUM SERPL-SCNC: 138 MMOL/L (ref 136–145)
UUN UR-MCNC: 18 MG/DL (ref 7–17)

## 2023-02-17 PROCEDURE — 80048 BASIC METABOLIC PNL TOTAL CA: CPT | Mod: PO | Performed by: INTERNAL MEDICINE

## 2023-02-17 PROCEDURE — 83880 ASSAY OF NATRIURETIC PEPTIDE: CPT | Mod: PO | Performed by: INTERNAL MEDICINE

## 2023-02-17 PROCEDURE — 36415 COLL VENOUS BLD VENIPUNCTURE: CPT | Mod: PO | Performed by: INTERNAL MEDICINE

## 2023-02-17 NOTE — TELEPHONE ENCOUNTER
2/17/23 - Received lab results from today, K+ 3.2.  Called and confirmed patient takes potassium 40 meq daily.  Discussed/Reviewed with TONY Orellana: Take extra 40 meq potassium today and tomorrow and patient has appt with labs scheduled Monday, 2/20.  Above instructions given to patient and patient repeated instructions back correctly and verbalized understanding.

## 2023-02-17 NOTE — PROGRESS NOTES
Pt Card[facility] transmission received and review with Dr Hurd.    CardioMEMS Transmission  2/17/2023 2/13/2023 2/3/2023 2/1/2023   Transmission Date 2/16/2023 2/13/2023 2/3/2023 2/1/2023   Transmission Type Maintenance Maintenance Maintenance Maintenance   PAS 38 50 45 33   LEELA 26 36 32 21   PAD  18 24 22 14   Medication Adjustments  No No No No   Some recent data might be hidden         Pressures are stable.    Medications changed? no    Patient contacted? no    Medical records reviewed with Dr Hurd. Patient cardioMEMS numbers are stable. Patient is schedule for labs 2/17. If stable patient does not have to be seen next week. Will continue to monitor. Message sent to the heart failure nurses regarding the above information.     Will continue to monitor.

## 2023-02-20 ENCOUNTER — TELEPHONE (OUTPATIENT)
Dept: ORTHOPEDICS | Facility: CLINIC | Age: 48
End: 2023-02-20
Payer: MEDICARE

## 2023-02-20 ENCOUNTER — LAB VISIT (OUTPATIENT)
Dept: LAB | Facility: HOSPITAL | Age: 48
End: 2023-02-20
Attending: INTERNAL MEDICINE
Payer: MEDICARE

## 2023-02-20 ENCOUNTER — OFFICE VISIT (OUTPATIENT)
Dept: TRANSPLANT | Facility: CLINIC | Age: 48
End: 2023-02-20
Payer: MEDICARE

## 2023-02-20 VITALS
HEIGHT: 69 IN | SYSTOLIC BLOOD PRESSURE: 119 MMHG | DIASTOLIC BLOOD PRESSURE: 56 MMHG | BODY MASS INDEX: 40.42 KG/M2 | WEIGHT: 272.94 LBS | HEART RATE: 66 BPM

## 2023-02-20 DIAGNOSIS — E66.9 OBESITY (BMI 35.0-39.9 WITHOUT COMORBIDITY): ICD-10-CM

## 2023-02-20 DIAGNOSIS — I50.42 CHRONIC COMBINED SYSTOLIC AND DIASTOLIC CONGESTIVE HEART FAILURE: ICD-10-CM

## 2023-02-20 DIAGNOSIS — Z86.79 HISTORY OF VENTRICULAR TACHYCARDIA: ICD-10-CM

## 2023-02-20 DIAGNOSIS — I42.0 NONISCHEMIC DILATED CARDIOMYOPATHY: Chronic | ICD-10-CM

## 2023-02-20 DIAGNOSIS — Z79.899 ON AMIODARONE THERAPY: ICD-10-CM

## 2023-02-20 DIAGNOSIS — Z95.810 ICD (IMPLANTABLE CARDIOVERTER-DEFIBRILLATOR) IN PLACE: Chronic | ICD-10-CM

## 2023-02-20 DIAGNOSIS — I50.42 CHRONIC COMBINED SYSTOLIC AND DIASTOLIC CONGESTIVE HEART FAILURE: Primary | ICD-10-CM

## 2023-02-20 PROBLEM — I47.20 VENTRICULAR TACHYARRHYTHMIA: Status: RESOLVED | Noted: 2022-11-20 | Resolved: 2023-02-20

## 2023-02-20 PROBLEM — I50.43 ACUTE ON CHRONIC COMBINED SYSTOLIC (CONGESTIVE) AND DIASTOLIC (CONGESTIVE) HEART FAILURE: Status: RESOLVED | Noted: 2021-10-22 | Resolved: 2023-02-20

## 2023-02-20 PROBLEM — I47.29 POLYMORPHIC VENTRICULAR TACHYCARDIA: Status: RESOLVED | Noted: 2017-03-08 | Resolved: 2023-02-20

## 2023-02-20 PROBLEM — R79.89 ELEVATED TROPONIN: Status: RESOLVED | Noted: 2023-02-15 | Resolved: 2023-02-20

## 2023-02-20 PROBLEM — I30.9 ACUTE PERICARDITIS: Status: RESOLVED | Noted: 2020-06-18 | Resolved: 2023-02-20

## 2023-02-20 LAB
ANION GAP SERPL CALC-SCNC: 8 MMOL/L (ref 8–16)
BNP SERPL-MCNC: 68 PG/ML (ref 0–99)
BUN SERPL-MCNC: 43 MG/DL (ref 6–20)
CALCIUM SERPL-MCNC: 9.6 MG/DL (ref 8.7–10.5)
CHLORIDE SERPL-SCNC: 100 MMOL/L (ref 95–110)
CO2 SERPL-SCNC: 29 MMOL/L (ref 23–29)
CREAT SERPL-MCNC: 2.1 MG/DL (ref 0.5–1.4)
EST. GFR  (NO RACE VARIABLE): 29.1 ML/MIN/1.73 M^2
GLUCOSE SERPL-MCNC: 109 MG/DL (ref 70–110)
POTASSIUM SERPL-SCNC: 4.6 MMOL/L (ref 3.5–5.1)
SODIUM SERPL-SCNC: 137 MMOL/L (ref 136–145)

## 2023-02-20 PROCEDURE — 4010F ACE/ARB THERAPY RXD/TAKEN: CPT | Mod: CPTII,S$GLB,, | Performed by: INTERNAL MEDICINE

## 2023-02-20 PROCEDURE — 3008F BODY MASS INDEX DOCD: CPT | Mod: CPTII,S$GLB,, | Performed by: INTERNAL MEDICINE

## 2023-02-20 PROCEDURE — 80048 BASIC METABOLIC PNL TOTAL CA: CPT | Performed by: INTERNAL MEDICINE

## 2023-02-20 PROCEDURE — 3078F DIAST BP <80 MM HG: CPT | Mod: CPTII,S$GLB,, | Performed by: INTERNAL MEDICINE

## 2023-02-20 PROCEDURE — 3074F SYST BP LT 130 MM HG: CPT | Mod: CPTII,S$GLB,, | Performed by: INTERNAL MEDICINE

## 2023-02-20 PROCEDURE — 99999 PR PBB SHADOW E&M-EST. PATIENT-LVL IV: CPT | Mod: PBBFAC,,, | Performed by: INTERNAL MEDICINE

## 2023-02-20 PROCEDURE — 1159F MED LIST DOCD IN RCRD: CPT | Mod: CPTII,S$GLB,, | Performed by: INTERNAL MEDICINE

## 2023-02-20 PROCEDURE — 99214 OFFICE O/P EST MOD 30 MIN: CPT | Mod: S$GLB,,, | Performed by: INTERNAL MEDICINE

## 2023-02-20 PROCEDURE — 99214 PR OFFICE/OUTPT VISIT, EST, LEVL IV, 30-39 MIN: ICD-10-PCS | Mod: S$GLB,,, | Performed by: INTERNAL MEDICINE

## 2023-02-20 PROCEDURE — 3008F PR BODY MASS INDEX (BMI) DOCUMENTED: ICD-10-PCS | Mod: CPTII,S$GLB,, | Performed by: INTERNAL MEDICINE

## 2023-02-20 PROCEDURE — 83880 ASSAY OF NATRIURETIC PEPTIDE: CPT | Performed by: INTERNAL MEDICINE

## 2023-02-20 PROCEDURE — 36415 COLL VENOUS BLD VENIPUNCTURE: CPT | Performed by: INTERNAL MEDICINE

## 2023-02-20 PROCEDURE — 1159F PR MEDICATION LIST DOCUMENTED IN MEDICAL RECORD: ICD-10-PCS | Mod: CPTII,S$GLB,, | Performed by: INTERNAL MEDICINE

## 2023-02-20 PROCEDURE — 4010F PR ACE/ARB THEARPY RXD/TAKEN: ICD-10-PCS | Mod: CPTII,S$GLB,, | Performed by: INTERNAL MEDICINE

## 2023-02-20 PROCEDURE — 3078F PR MOST RECENT DIASTOLIC BLOOD PRESSURE < 80 MM HG: ICD-10-PCS | Mod: CPTII,S$GLB,, | Performed by: INTERNAL MEDICINE

## 2023-02-20 PROCEDURE — 99999 PR PBB SHADOW E&M-EST. PATIENT-LVL IV: ICD-10-PCS | Mod: PBBFAC,,, | Performed by: INTERNAL MEDICINE

## 2023-02-20 PROCEDURE — 3074F PR MOST RECENT SYSTOLIC BLOOD PRESSURE < 130 MM HG: ICD-10-PCS | Mod: CPTII,S$GLB,, | Performed by: INTERNAL MEDICINE

## 2023-02-20 NOTE — TELEPHONE ENCOUNTER
----- Message from Phyllis Rondon PA-C sent at 2/20/2023  2:46 PM CST -----  She has f/u on Thursday. If right thumb is still bothering her, she needs right hand XRs prior to seeing me.     Thanks.

## 2023-02-20 NOTE — PATIENT INSTRUCTIONS
Please adhere to a low sodium diet (no more than 1.5 grams of sodium in 24h).  2.   Follow fluid restriction of  2. no more than 1.5 liters in 24 hours..   3. Follow up in 1 months with echo.  4. Please transmit cardiomem data today.  5. No changes on medications today.  6. Follow up blood test Thursday.

## 2023-02-20 NOTE — PROGRESS NOTES
[facility] Follow up.      Attending Physician: Memo Reyes MD.  The patient's last visit with me was on Visit date not found.         HPI.  Mrs. Mahajan is a very pleasant  47 y.o. black female with stage C HFrEF, NICMP,  With a Hx of VF in  2017 (no events since), s/p CardioMEMS.  07/21/22 had fractured ICD lead therefore had to get device extracted and have ICD single chamber replaced.     Patient comes today after visiting ER on February 15, 2023 (5 days ago). She was having congestive symptoms that started after URI. She was doing well before this. She was given IV lasix and discharged. She had her bumex dose increased from 2 mg/1 mg to 2 mg/2mg x 3 days. Today she reports low energy, chronic orthopnea (improved from 4 pillows to 3 pillows).  Decreased appetite.    Review of Systems   Constitutional:  Positive for fatigue. Negative for chills, diaphoresis, fever and unexpected weight change.   HENT:  Negative for nasal congestion, rhinorrhea and sore throat.    Eyes:  Negative for visual disturbance.   Respiratory:  Positive for cough. Negative for choking, chest tightness, shortness of breath and stridor.    Cardiovascular:  Negative for chest pain, palpitations and leg swelling.   Gastrointestinal:  Negative for abdominal distention, abdominal pain, diarrhea, nausea and vomiting.   Genitourinary:  Negative for difficulty urinating, dysuria and hematuria.   Integumentary:  Negative for rash.   Neurological:  Negative for seizures, syncope and light-headedness.   Psychiatric/Behavioral:  Negative for agitation and hallucinations.       Past Medical History:   Diagnosis Date    Asthma     Chronic back pain 7/1/2014    Chronic combined systolic and diastolic congestive heart failure 4/28/2015     2-10-17   1 - Severely depressed left ventricular systolic function (EF 20-25%).    2  - Severe left ventricular enlargement.    3 - Severe left atrial enlargement.    4 - Left ventricular diastolic dysfunction.    5 - The estimated PA systolic pressure is 18 mmHg.    6 - Mild mitral regurgitation.     Encounter for blood transfusion     ICD (implantable cardioverter-defibrillator) in place 12/01/15 3/3/2016    Menorrhagia, premenopausal 2/10/2017    Microcytic anemia 2015    Non-rheumatic mitral regurgitation 3/5/2015    Nonischemic dilated cardiomyopathy 2015    Sleep apnea     Syncope and collapse 2017    Ventricular tachycardia, polymorphic         Past Surgical History:   Procedure Laterality Date    CARDIAC DEFIBRILLATOR PLACEMENT  2015     SECTION      INSERTION, WIRELESS SENSOR, FOR PULMONARY ARTERIAL PRESSURE MONITORING N/A 10/22/2021    Procedure: INSERTION, WIRELESS SENSOR, FOR PULMONARY ARTERIAL PRESSURE MONITORING;  Surgeon: Erika Hurd MD;  Location: [facility] CATH LAB;  Service: Cardiology;  Laterality: N/A;    OOPHORECTOMY      PERICARDIOCENTESIS N/A 2020    Procedure: Pericardiocentesis;  Surgeon: Memo Luis MD;  Location: [facility] CATH LAB;  Service: Cardiology;  Laterality: N/A;    PULMONARY ANGIOGRAPHY N/A 10/22/2021    Procedure: ANGIOGRAM, PULMONARY;  Surgeon: Erika Hurd MD;  Location: [facility] CATH LAB;  Service: Cardiology;  Laterality: N/A;    RIGHT HEART CATHETERIZATION      TOTAL ABDOMINAL HYSTERECTOMY N/A 3/26/2019    Procedure: HYSTERECTOMY, TOTAL, ABDOMINAL;  Surgeon: Victorino Rose MD;  Location: [facility] OR;  Service: OB/GYN;  Laterality: N/A;    TRANSESOPHAGEAL ECHOCARDIOGRAPHY N/A 2022    Procedure: ECHOCARDIOGRAM, TRANSESOPHAGEAL;  Surgeon: Phan Powell MD;  Location: [facility] EP LAB;  Service: Cardiology;  Laterality: N/A;    TUBAL LIGATION          Family History   Problem Relation Age of Onset    Diabetes Father     Pancreatic cancer Father     Heart failure Father     Heart failure Brother     No Known Problems Daughter      No Known Problems Son     Lung cancer Paternal Grandmother     Heart disease Brother         Review of patient's allergies indicates:   Allergen Reactions    Ace inhibitors      Cough        Current Outpatient Medications   Medication Instructions    albuterol (PROVENTIL/VENTOLIN HFA) 90 mcg/actuation inhaler 2 puffs, Inhalation, Every 6 hours PRN    amiodarone (PACERONE) 200 mg, Oral, Daily    ascorbic acid, vitamin C, (VITAMIN C) 250 MG tablet Take 1 tablet (250 mg) by mouth twice daily. Take with the iron tablets.    aspirin (ECOTRIN) 81 mg, Oral, Daily    b complex vitamins tablet 1 tablet, Oral, Daily    bumetanide (BUMEX) 1 MG tablet Take 2 tablets (2 mg) every morning and 1 tablet (1 mg) every evening.    carvediloL (COREG) 25 MG tablet TAKE 1 TABLET(25 MG) BY MOUTH TWICE DAILY    diclofenac sodium (VOLTAREN) 2 g, Topical (Top), 4 times daily    FARXIGA 10 mg, Oral, Daily    ferrous sulfate 325 (65 FE) MG EC tablet Take 1 tablet (325 mg total) by mouth 2 (two) times daily. Take with vitamin C.    FLOVENT HFA 44 mcg/actuation inhaler 1 puff, 2 times daily    HYDROcodone-acetaminophen (NORCO) 5-325 mg per tablet 1 tablet, Oral, Every 6 hours PRN    ipratropium-albuteroL (COMBIVENT)  mcg/actuation inhaler 1 puff, Inhalation, 4 times daily, Rescue     loratadine (CLARITIN) 10 mg, Oral, Daily    potassium chloride SA (K-DUR,KLOR-CON) 20 MEQ tablet TAKE 2 TABLETS(40 MEQ) BY MOUTH EVERY DAY    sacubitriL-valsartan (ENTRESTO)  mg per tablet 1 tablet, Oral, 2 times daily    spironolactone (ALDACTONE) 25 MG tablet TAKE 1 TABLET(25 MG) BY MOUTH EVERY DAY    timolol maleate 0.5% (TIMOPTIC) 0.5 % Drop INSTILL 1 DROP INTO BOTH EYES EVERY 12 HOURS    triamcinolone acetonide 0.1% (KENALOG) 0.1 % cream Topical (Top), 2 times daily, Apply to scar        Vitals:    02/20/23 0815   BP: (!) 119/56   Pulse: 66        Wt Readings from Last 3 Encounters:   02/20/23 123.8 kg (272 lb 14.9 oz)   02/15/23 122.5 kg (270 lb 1  "oz)   02/03/23 122.5 kg (270 lb)     Temp Readings from Last 3 Encounters:   02/15/23 98 °F (36.7 °C) (Oral)   12/14/22 97.8 °F (36.6 °C) (Oral)   11/14/22 97.4 °F (36.3 °C)     BP Readings from Last 3 Encounters:   02/20/23 (!) 119/56   02/15/23 (!) 101/56   02/03/23 113/79     Pulse Readings from Last 3 Encounters:   02/20/23 66   02/15/23 65   02/03/23 73        Body mass index is 40.3 kg/m². Estimated body surface area is 2.45 meters squared as calculated from the following:    Height as of this encounter: 5' 9" (1.753 m).    Weight as of this encounter: 123.8 kg (272 lb 14.9 oz).     Physical Exam  Constitutional:       Appearance: She is well-developed.   HENT:      Head: Normocephalic and atraumatic.      Right Ear: External ear normal.      Left Ear: External ear normal.   Eyes:      Conjunctiva/sclera: Conjunctivae normal.      Pupils: Pupils are equal, round, and reactive to light.   Neck:      Vascular: No hepatojugular reflux or JVD.   Cardiovascular:      Rate and Rhythm: Normal rate and regular rhythm.      Pulses: Intact distal pulses.      Heart sounds: S1 normal and S2 normal. No murmur heard.    No friction rub. No gallop.   Pulmonary:      Effort: Pulmonary effort is normal.      Breath sounds: Normal breath sounds.   Abdominal:      General: Bowel sounds are normal. There is no distension.      Palpations: Abdomen is soft.      Tenderness: There is no abdominal tenderness. There is no guarding or rebound.   Musculoskeletal:      Cervical back: Normal range of motion and neck supple.      Right lower leg: No edema.      Left lower leg: No edema.   Neurological:      Mental Status: She is alert and oriented to person, place, and time.        Lab Results   Component Value Date    BNP 68 02/20/2023     02/20/2023    K 4.6 02/20/2023    MG 1.7 10/26/2021     02/20/2023    CO2 29 02/20/2023    BUN 43 (H) 02/20/2023    BUN 12 06/19/2015    CREATININE 2.1 (H) 02/20/2023     02/20/2023 "    HGBA1C 4.8 09/16/2019    AST 14 02/15/2023    ALT 12 02/15/2023    ALBUMIN 3.6 02/15/2023    PROT 6.9 02/15/2023    BILITOT 1.0 02/15/2023    WBC 5.97 02/15/2023    HGB 11.2 (L) 02/15/2023    HCT 34.1 (L) 02/15/2023    HCT 32 (L) 07/20/2022     02/15/2023    INR 1.0 07/15/2022     01/12/2018    TSH 0.516 01/02/2020    CHOL 98 (L) 09/16/2019    HDL 40 09/16/2019    LDLCALC 46.2 (L) 09/16/2019    TRIG 59 09/16/2019    C5YKNEF 14.9 (H) 09/16/2019    FREET4 2.49 (H) 09/16/2019       @LABRCNTIP(cpk,cpkmb,troponini,mb)@     No results found for this visit on 02/20/23.       Results for orders placed during the hospital encounter of 05/16/22    Echo    Interpretation Summary  · The left ventricle is severely, globularly enlarged with eccentric hypertrophy and moderately decreased systolic function. The estimated ejection fraction is 30%.  · Normal right ventricular size with normal right ventricular systolic function.  · Grade I left ventricular diastolic dysfunction.  · Severe left atrial enlargement.  · Moderate-to-severe functional mitral regurgitation.  · The estimated PA systolic pressure is 30 mmHg.  · Normal central venous pressure (3 mmHg).        Results for orders placed during the hospital encounter of 10/22/21    Cardiac catheterization    Conclusion  · The estimated blood loss was <50 mL.  · RHC performed via the right CFV. Patient tolerated the procedure well. Elevated left and right side filling pressures (RA= 14, PCWP=23 mm of Hg). Severe pulmonary HTN (PA=78/30 mm of Hg, PA mean=52 mm of Hg) in the setting of elevated left sided filling pressures. Normal cardiac index and output (CI=2.5 L/min/m2, CO=5.7 L/min ). Selective PA angiogram was performed in the left PA and the CardioMEMS device was successfully deployed under fluoroscopy without complications. Calibration was performed int  cathlab.  · Bed rest for 3 hours  · Patient is enrolled in the GUIDE HF study    The procedure log was  documented by Documenter: Camille Wilkes, RN; Elizabet Phillips RT and verified by Erika Hurd MD.    Date: 10/22/2021  Time: 11:15 AM         Assessment and Plan:  Chronic combined systolic and diastolic congestive heart failure    History of ventricular tachycardia    ICD (implantable cardioverter-defibrillator) in place 12/01/15    Nonischemic dilated cardiomyopathy    On amiodarone therapy    Obesity (BMI 35.0-39.9 without comorbidity)        Chronic systolic HF, NYHA class III, stage C.  Etiology: NICM.  Devices: ICD.  Medications: high dose sacubitril-valsartan, farxiga, spironolactone, carvedilol.  Hemodynamic status: warm, normotensive, large body habitus. + bendopnea.  Plan:  -patient to adhere to a fluid restriction of NMT 1.5 liters/24h.  -patient to adhere to  low sodium diet, NMT 1.5 grams of sodium in a day.  -Follow up blood test Thursday.  -continue monitoring through cardiomems.  -Follow up in 1 months with echo and labs.    2. SONAL.  May be due to higher dose of bumex. Now she is back to usual dose. Plan for follow up blood test Thursday.

## 2023-02-20 NOTE — TELEPHONE ENCOUNTER
Spoke to patient. Patient was asked if her thumb is still botheringh her patient replied. No itshasn't been bothering her lately.

## 2023-02-20 NOTE — Clinical Note
February 20, 2023        Juanjose Nuñez  [address]  [facility] [address]  Phone: [phone]  Fax: [phone]             [id number]  [address]  [facility] [address]  Phone: [phone]   Patient: Mario Mahajan   MR Number: [id number]   YOB: 1975   Date of Visit: 2/20/2023       Dear Dr. Juanjose Nuñez    Thank you for referring Mario Mahajan to me for evaluation. Attached you will find relevant portions of my assessment and plan of care.    If you have questions, please do not hesitate to call me. I look forward to following Mario Mahajan along with you.    Sincerely,    Memo Reyes MD    Enclosure    If you would like to receive this communication electronically, please contact [email or web address] or [phone] to request [employer] access.    [employer] is a tool which provides read-only access to select patient information with whom you have a relationship. Its easy to use and provides real time access to review your patients record including encounter summaries, notes, results, and demographic information.    If you feel you have received this communication in error or would no longer like to receive these types of communications, please e-mail [email or web address]

## 2023-02-23 ENCOUNTER — LAB VISIT (OUTPATIENT)
Dept: LAB | Facility: HOSPITAL | Age: 48
End: 2023-02-23
Attending: INTERNAL MEDICINE
Payer: MEDICARE

## 2023-02-23 DIAGNOSIS — I50.42 CHRONIC COMBINED SYSTOLIC AND DIASTOLIC CONGESTIVE HEART FAILURE: ICD-10-CM

## 2023-02-23 LAB
ANION GAP SERPL CALC-SCNC: 6 MMOL/L (ref 8–16)
CALCIUM SERPL-MCNC: 8.9 MG/DL (ref 8.7–10.5)
CHLORIDE SERPL-SCNC: 103 MMOL/L (ref 95–110)
CO2 SERPL-SCNC: 29 MMOL/L (ref 23–29)
CREAT SERPL-MCNC: 1.55 MG/DL (ref 0.5–1.4)
EST. GFR  (NO RACE VARIABLE): 41.8 ML/MIN/1.73 M^2
GLUCOSE SERPL-MCNC: 102 MG/DL (ref 70–110)
POTASSIUM SERPL-SCNC: 4.3 MMOL/L (ref 3.5–5.1)
SODIUM SERPL-SCNC: 138 MMOL/L (ref 136–145)
UUN UR-MCNC: 34 MG/DL (ref 7–17)

## 2023-02-23 PROCEDURE — 36415 COLL VENOUS BLD VENIPUNCTURE: CPT | Mod: PO | Performed by: INTERNAL MEDICINE

## 2023-02-23 PROCEDURE — 80048 BASIC METABOLIC PNL TOTAL CA: CPT | Mod: PO | Performed by: INTERNAL MEDICINE

## 2023-02-24 ENCOUNTER — DOCUMENTATION ONLY (OUTPATIENT)
Dept: TRANSPLANT | Facility: CLINIC | Age: 48
End: 2023-02-24
Payer: MEDICARE

## 2023-02-24 NOTE — PROGRESS NOTES
Pt CardioMems transmission received and review.      CardioMEMS Transmission  2/24/2023 2/17/2023 2/13/2023 2/3/2023   Transmission Date 2/23/2023 2/16/2023 2/13/2023 2/3/2023   Transmission Type Maintenance Maintenance Maintenance Maintenance   PAS 38 38 50 45   LEELA 24 26 36 32   PAD  16 18 24 22   Medication Adjustments  No No No No   Some recent data might be hidden         Pressures are stable.    Medications changed? no    Patient contacted? no    Will continue to monitor.

## 2023-02-28 ENCOUNTER — TELEPHONE (OUTPATIENT)
Dept: TRANSPLANT | Facility: CLINIC | Age: 48
End: 2023-02-28
Payer: MEDICARE

## 2023-03-02 ENCOUNTER — OFFICE VISIT (OUTPATIENT)
Dept: ELECTROPHYSIOLOGY | Facility: CLINIC | Age: 48
End: 2023-03-02
Payer: MEDICARE

## 2023-03-02 VITALS — HEART RATE: 65 BPM | SYSTOLIC BLOOD PRESSURE: 111 MMHG | DIASTOLIC BLOOD PRESSURE: 64 MMHG

## 2023-03-02 DIAGNOSIS — Z95.810 ICD (IMPLANTABLE CARDIOVERTER-DEFIBRILLATOR) IN PLACE: Chronic | ICD-10-CM

## 2023-03-02 DIAGNOSIS — E66.01 CLASS 3 SEVERE OBESITY DUE TO EXCESS CALORIES WITH SERIOUS COMORBIDITY AND BODY MASS INDEX (BMI) OF 40.0 TO 44.9 IN ADULT: ICD-10-CM

## 2023-03-02 DIAGNOSIS — I50.9 CONGESTIVE HEART FAILURE, UNSPECIFIED HF CHRONICITY, UNSPECIFIED HEART FAILURE TYPE: ICD-10-CM

## 2023-03-02 DIAGNOSIS — I42.0 NONISCHEMIC DILATED CARDIOMYOPATHY: Primary | Chronic | ICD-10-CM

## 2023-03-02 PROCEDURE — 99214 PR OFFICE/OUTPT VISIT, EST, LEVL IV, 30-39 MIN: ICD-10-PCS | Mod: S$GLB,,, | Performed by: INTERNAL MEDICINE

## 2023-03-02 PROCEDURE — 3078F DIAST BP <80 MM HG: CPT | Mod: CPTII,S$GLB,, | Performed by: INTERNAL MEDICINE

## 2023-03-02 PROCEDURE — 4010F ACE/ARB THERAPY RXD/TAKEN: CPT | Mod: CPTII,S$GLB,, | Performed by: INTERNAL MEDICINE

## 2023-03-02 PROCEDURE — 3074F SYST BP LT 130 MM HG: CPT | Mod: CPTII,S$GLB,, | Performed by: INTERNAL MEDICINE

## 2023-03-02 PROCEDURE — 93005 RHYTHM STRIP: ICD-10-PCS | Mod: S$GLB,,, | Performed by: INTERNAL MEDICINE

## 2023-03-02 PROCEDURE — 93005 ELECTROCARDIOGRAM TRACING: CPT | Mod: S$GLB,,, | Performed by: INTERNAL MEDICINE

## 2023-03-02 PROCEDURE — 4010F PR ACE/ARB THEARPY RXD/TAKEN: ICD-10-PCS | Mod: CPTII,S$GLB,, | Performed by: INTERNAL MEDICINE

## 2023-03-02 PROCEDURE — 93010 ELECTROCARDIOGRAM REPORT: CPT | Mod: S$GLB,,, | Performed by: INTERNAL MEDICINE

## 2023-03-02 PROCEDURE — 3074F PR MOST RECENT SYSTOLIC BLOOD PRESSURE < 130 MM HG: ICD-10-PCS | Mod: CPTII,S$GLB,, | Performed by: INTERNAL MEDICINE

## 2023-03-02 PROCEDURE — 93010 RHYTHM STRIP: ICD-10-PCS | Mod: S$GLB,,, | Performed by: INTERNAL MEDICINE

## 2023-03-02 PROCEDURE — 99999 PR PBB SHADOW E&M-EST. PATIENT-LVL III: CPT | Mod: PBBFAC,,, | Performed by: INTERNAL MEDICINE

## 2023-03-02 PROCEDURE — 1159F PR MEDICATION LIST DOCUMENTED IN MEDICAL RECORD: ICD-10-PCS | Mod: CPTII,S$GLB,, | Performed by: INTERNAL MEDICINE

## 2023-03-02 PROCEDURE — 1159F MED LIST DOCD IN RCRD: CPT | Mod: CPTII,S$GLB,, | Performed by: INTERNAL MEDICINE

## 2023-03-02 PROCEDURE — 99999 PR PBB SHADOW E&M-EST. PATIENT-LVL III: ICD-10-PCS | Mod: PBBFAC,,, | Performed by: INTERNAL MEDICINE

## 2023-03-02 PROCEDURE — 99214 OFFICE O/P EST MOD 30 MIN: CPT | Mod: S$GLB,,, | Performed by: INTERNAL MEDICINE

## 2023-03-02 PROCEDURE — 3078F PR MOST RECENT DIASTOLIC BLOOD PRESSURE < 80 MM HG: ICD-10-PCS | Mod: CPTII,S$GLB,, | Performed by: INTERNAL MEDICINE

## 2023-03-02 NOTE — PROGRESS NOTES
Subjective:    Patient ID:  Mario Mahajan is a 47 y.o. female who presents for follow-up of Cardiomyopathy      47 yoF NICM,  NYHA Class II-III here for ICD Check. She was diagnosed with NICM early 2015 and was started on goal-directed medical therapy. .She had persistent LV dysfunction despite her medical treatment. A PET Stress Test at the time (06/2015)  revealed no ischemia. Her EF at the time was 20%. At her initial [facility] EP office visit, Ms. Mahajan reported having a brother and father with history of ICD implantation; no known SCD in the family. She denied a history of syncope, near syncope, or palpitations. She reported a hx of DELGADO.    Ms. Mahajan underwent successful ICD placement (12/01/15). Her course was complicated however, by inappropriate shock 2/2 to subsequent lead displacement. She underwent a successful lead revision (01/05/16). Per her request, tachytherapies were turned off until 6-weeks post-revision. At the 6-week point (02/24/16), her tachytherapies were turned on. Her device interrogation at the time, revealed normal SC ICD function and no arrhythmias. She feels fatigued and is sluggish at times. She has developed cough that has been chronic.   In February of 2017 Ms Mahajan presented to [facility] ED following an episode of syncope and was admitted for further evaluation after VT/VF events had been detected on her ICD; two VT episodes 02/05/17 and 02/10/17, neither of which required shocks and a VF episode 02/09/17 corresponding with her syncopal event, and requiring a shock. Amiodarone 400 mg QD was initiated, and was later decreased to 200 mg once a day.     2019: No ER visits or hospitalizations. No VT/VF events. Remains on amiodarone 200 mg qd. PFT 10/18. She saw ophtho 2 weeks ago, she has glaucoma- now on drops.     Interval history: Lead fracture noted early 2022. Extraction and repeat insertion performed by Dr Carter 7/20/22. Normal SC ICD function today. Some pocket paresthesias,  improving.     Past Medical History:  No date: Asthma  2014: Chronic back pain  2015: Chronic combined systolic and diastolic congestive heart   failure      Comment:   2-10-17   1 - Severely depressed left ventricular                systolic function (EF 20-25%).    2 - Severe left                ventricular enlargement.    3 - Severe left atrial                enlargement.    4 - Left ventricular diastolic                dysfunction.    5 - The estimated PA systolic pressure is               18 mmHg.    6 - Mild mitral regurgitation.   No date: Encounter for blood transfusion  3/3/2016: ICD (implantable cardioverter-defibrillator) in place 12/01/  15  2/10/2017: Menorrhagia, premenopausal  2015: Microcytic anemia  3/5/2015: Non-rheumatic mitral regurgitation  2015: Nonischemic dilated cardiomyopathy  No date: Sleep apnea  2017: Syncope and collapse  No date: Ventricular tachycardia, polymorphic    Past Surgical History:  2015: CARDIAC DEFIBRILLATOR PLACEMENT  No date:  SECTION  10/22/2021: INSERTION, WIRELESS SENSOR, FOR PULMONARY ARTERIAL   PRESSURE MONITORING; N/A      Comment:  Procedure: INSERTION, WIRELESS SENSOR, FOR PULMONARY                ARTERIAL PRESSURE MONITORING;  Surgeon: Erika Hurd MD;  Location: [facility] CATH LAB;  Service: Cardiology;                 Laterality: N/A;  No date: OOPHORECTOMY  2020: PERICARDIOCENTESIS; N/A      Comment:  Procedure: Pericardiocentesis;  Surgeon: Memo Luis MD;  Location: [facility] CATH LAB;  Service:                Cardiology;  Laterality: N/A;  10/22/2021: PULMONARY ANGIOGRAPHY; N/A      Comment:  Procedure: ANGIOGRAM, PULMONARY;  Surgeon: Erika Hurd MD;  Location: [facility] CATH LAB;  Service: Cardiology;               Laterality: N/A;  No date: RIGHT HEART CATHETERIZATION  3/26/2019: TOTAL ABDOMINAL HYSTERECTOMY; N/A      Comment:  Procedure: HYSTERECTOMY, TOTAL, ABDOMINAL;   Surgeon:                Victorino Rose MD;  Location: [facility] OR;  Service:                OB/GYN;  Laterality: N/A;  7/20/2022: TRANSESOPHAGEAL ECHOCARDIOGRAPHY; N/A      Comment:  Procedure: ECHOCARDIOGRAM, TRANSESOPHAGEAL;  Surgeon:                Phan Powell MD;  Location: [facility] EP LAB;  Service:               Cardiology;  Laterality: N/A;  No date: TUBAL LIGATION    Social History    Socioeconomic History      Marital status: Single      Number of children: 2    Occupational History      Occupation: Unemployed        Employer: [employer]    Tobacco Use      Smoking status: Never      Smokeless tobacco: Never    Substance and Sexual Activity      Alcohol use: Not Currently        Comment: 1 glass per 3 months      Drug use: No      Sexual activity: Yes        Partners: Male        Comment: one male partner (boyfriend)    Social Determinants of Health  Financial Resource Strain: Low Risk       Difficulty of Paying Living Expenses: Not very hard  Food Insecurity: No Food Insecurity      Worried About Running Out of Food in the Last Year: Never true      Ran Out of Food in the Last Year: Never true  Transportation Needs: Unmet Transportation Needs      Lack of Transportation (Medical): Yes      Lack of Transportation (Non-Medical): Yes  Physical Activity: Insufficiently Active      Days of Exercise per Week: 2 days      Minutes of Exercise per Session: 20 min  Stress: Stress Concern Present      Feeling of Stress : To some extent  Social Connections: Moderately Integrated      Frequency of Communication with Friends and Family: More than three times a week      Frequency of Social Gatherings with Friends and Family: More than three times a week      Attends Voodoo Services: More than 4 times per year      Active Member of Clubs or Organizations: No      Attends Club or Organization Meetings: More than 4 times per year      Marital Status:   Housing Stability: High Risk      Unable to Pay  for Housing in the Last Year: No      Number of Places Lived in the Last Year: 2      Unstable Housing in the Last Year: Yes    Review of patient's family history indicates:      Current Outpatient Medications:  albuterol (PROVENTIL/VENTOLIN HFA) 90 mcg/actuation inhaler, Inhale 2 puffs into the lungs every 6 (six) hours as needed for Wheezing., Disp: 18 g, Rfl: 6  amiodarone (PACERONE) 200 MG Tab, Take 1 tablet (200 mg total) by mouth once daily., Disp: 90 tablet, Rfl: 3  ascorbic acid, vitamin C, (VITAMIN C) 250 MG tablet, Take 1 tablet (250 mg) by mouth twice daily. Take with the iron tablets., Disp: 60 tablet, Rfl: 3  aspirin (ECOTRIN) 81 MG EC tablet, Take 1 tablet (81 mg total) by mouth once daily., Disp: 90 tablet, Rfl: 3  b complex vitamins tablet, Take 1 tablet by mouth once daily., Disp: , Rfl:    bumetanide (BUMEX) 1 MG tablet, TAKE 2 TABLETS BY MOUTH EVERY MORNING AND 1 TABLET EVERY EVENING, Disp: 90 tablet, Rfl: 0  carvediloL (COREG) 25 MG tablet, TAKE 1 TABLET(25 MG) BY MOUTH TWICE DAILY, Disp: 180 tablet, Rfl: 3  dapagliflozin (FARXIGA) 10 mg tablet, Take 1 tablet (10 mg total) by mouth once daily., Disp: 90 tablet, Rfl: 3  ferrous sulfate 325 (65 FE) MG EC tablet, Take 1 tablet (325 mg total) by mouth 2 (two) times daily. Take with vitamin C., Disp: 60 tablet, Rfl: 3  FLOVENT HFA 44 mcg/actuation inhaler, 1 puff 2 (two) times daily., Disp: , Rfl:   ipratropium-albuteroL (COMBIVENT)  mcg/actuation inhaler, Inhale 1 puff into the lungs 4 (four) times daily. Rescue, Disp: , Rfl:   loratadine (CLARITIN) 10 mg tablet, Take 1 tablet (10 mg total) by mouth once daily., Disp: 30 tablet, Rfl: 0  potassium chloride SA (K-DUR,KLOR-CON) 20 MEQ tablet, TAKE 2 TABLETS(40 MEQ) BY MOUTH EVERY DAY, Disp: 60 tablet, Rfl: 3  sacubitriL-valsartan (ENTRESTO)  mg per tablet, Take 1 tablet by mouth 2 (two) times daily., Disp: 60 tablet, Rfl: 11  spironolactone (ALDACTONE) 25 MG tablet, TAKE 1 TABLET(25 MG) BY  MOUTH EVERY DAY, Disp: 30 tablet, Rfl: 11  timolol maleate 0.5% (TIMOPTIC) 0.5 % Drop, INSTILL 1 DROP INTO BOTH EYES EVERY 12 HOURS, Disp: , Rfl:   diclofenac sodium (VOLTAREN) 1 % Gel, Apply 2 g topically 4 (four) times daily. for 15 days, Disp: 200 g, Rfl: 0  HYDROcodone-acetaminophen (NORCO) 5-325 mg per tablet, Take 1 tablet by mouth every 6 (six) hours as needed for Pain. (Patient not taking: Reported on 3/2/2023), Disp: 18 tablet, Rfl: 0  triamcinolone acetonide 0.1% (KENALOG) 0.1 % cream, Apply topically 2 (two) times daily. Apply to scar (Patient not taking: Reported on 3/2/2023), Disp: 80 g, Rfl: 1    No current facility-administered medications for this visit.          Review of Systems   Constitutional: Negative for malaise/fatigue.   Cardiovascular:  Positive for dyspnea on exertion. Negative for chest pain, irregular heartbeat, leg swelling and palpitations.   Respiratory:  Negative for shortness of breath.    Hematologic/Lymphatic: Negative for bleeding problem.   Skin:  Negative for rash.   Musculoskeletal:  Negative for myalgias.   Gastrointestinal:  Negative for hematemesis, hematochezia and nausea.   Genitourinary:  Negative for hematuria.   Neurological:  Negative for light-headedness.   Psychiatric/Behavioral:  Negative for altered mental status.    Allergic/Immunologic: Negative for persistent infections.      Objective:    Physical Exam  Vitals and nursing note reviewed.   Constitutional:       Appearance: Normal appearance. She is well-developed.   HENT:      Head: Normocephalic.      Nose: Nose normal.   Eyes:      Pupils: Pupils are equal, round, and reactive to light.   Cardiovascular:      Rate and Rhythm: Normal rate and regular rhythm.   Pulmonary:      Effort: No respiratory distress.      Breath sounds: Normal breath sounds.   Chest:      Comments: Device to LUCW. Incision and pocket in good repair.  Musculoskeletal:         General: Normal range of motion.   Skin:     General: Skin is  warm and dry.      Findings: No erythema.   Neurological:      Mental Status: She is alert and oriented to person, place, and time.   Psychiatric:         Speech: Speech normal.         Behavior: Behavior normal.         Assessment:       1. Nonischemic dilated cardiomyopathy    2. Class 3 severe obesity due to excess calories with serious comorbidity and body mass index (BMI) of 40.0 to 44.9 in adult    3. ICD (implantable cardioverter-defibrillator) in place 12/01/15         Plan:       47 yoF here for follow up. Normal SC ICD function with no sustained arrhythmias. Paresthesias likely due to incision. No signs of pocket infection or erosion. I discussed routine device follow up including quarterly to bi-annual device checks for device function as well as yearly follow up in the EP clinic. The patient  was advised to call with any concerns regarding their device. Device clinic follow up as scheduled. RTC 1y

## 2023-03-03 ENCOUNTER — DOCUMENTATION ONLY (OUTPATIENT)
Dept: TRANSPLANT | Facility: CLINIC | Age: 48
End: 2023-03-03
Payer: MEDICARE

## 2023-03-03 NOTE — PROGRESS NOTES
Pt CardioMems transmission received and review.      CardioMEMS Transmission  3/3/2023 2/24/2023 2/17/2023 2/13/2023   Transmission Date 2/28/2023 2/23/2023 2/16/2023 2/13/2023   Transmission Type Maintenance Maintenance Maintenance Maintenance   PAS 39 38 38 50   LEELA 26 24 26 36   PAD  19 16 18 24   Medication Adjustments  No No No No   Some recent data might be hidden         Pressures are stable.    Medications changed? no    Patient contacted? no    Will continue to monitor.

## 2023-03-06 ENCOUNTER — DOCUMENTATION ONLY (OUTPATIENT)
Dept: TRANSPLANT | Facility: CLINIC | Age: 48
End: 2023-03-06
Payer: MEDICARE

## 2023-03-06 NOTE — PROGRESS NOTES
Pt CardioMems transmission received and review.      CardioMEMS Transmission  3/6/2023 3/3/2023 2/24/2023 2/17/2023   Transmission Date 3/6/2023 2/28/2023 2/23/2023 2/16/2023   Transmission Type Maintenance Maintenance Maintenance Maintenance   PAS 29 39 38 38   LEELA 19 26 24 26   PAD  12 19 16 18   Medication Adjustments  No No No No   Some recent data might be hidden         Pressures are stable.    Medications changed? no    Patient contacted? no    Will continue to monitor.

## 2023-03-10 ENCOUNTER — TELEPHONE (OUTPATIENT)
Dept: NEUROLOGY | Facility: CLINIC | Age: 48
End: 2023-03-10
Payer: MEDICARE

## 2023-03-14 ENCOUNTER — TELEPHONE (OUTPATIENT)
Dept: TRANSPLANT | Facility: CLINIC | Age: 48
End: 2023-03-14
Payer: MEDICARE

## 2023-03-14 ENCOUNTER — PATIENT MESSAGE (OUTPATIENT)
Dept: TRANSPLANT | Facility: CLINIC | Age: 48
End: 2023-03-14
Payer: MEDICARE

## 2023-03-14 NOTE — TELEPHONE ENCOUNTER
Pt CardioMems transmission received and reviewed with CHRISTOPH Hurd.      CardioMEMS Transmission  3/14/2023 3/6/2023 3/3/2023 2/24/2023   Transmission Date 3/14/2023 3/6/2023 2/28/2023 2/23/2023   Transmission Type Maintenance Maintenance Maintenance Maintenance   PAS 49 29 39 38   LEELA 35 19 26 24   PAD  24 12 19 16   Medication Adjustments  No No No No   Some recent data might be hidden         Pressures are elevated.    Medication Interventions? yes, Bumex 2mg BID X 4 days only    Lab Interventions? no    Patient contacted? yes, Attempt made to contact patient no answer. Message left with the above medication dose adjustment. Call back name and number provided.     Zapper message sent to patient.     Any medication and/or lab instructions are ordered by the provider that reviewed and assessed these numbers and the instructions have been communicated to the patient.    Will continue to monitor.

## 2023-03-24 ENCOUNTER — TELEPHONE (OUTPATIENT)
Dept: TRANSPLANT | Facility: CLINIC | Age: 48
End: 2023-03-24
Payer: MEDICARE

## 2023-03-31 ENCOUNTER — TELEPHONE (OUTPATIENT)
Dept: PODIATRY | Facility: CLINIC | Age: 48
End: 2023-03-31
Payer: MEDICARE

## 2023-04-13 NOTE — TELEPHONE ENCOUNTER
Notified patient of the date & time of financial phone call appt.  Pt aware appt is a telephone call.    Dashboard updated  Appt. 04/20/2023  
Xray Humerus, Right

## 2023-04-20 ENCOUNTER — TELEPHONE (OUTPATIENT)
Dept: FAMILY MEDICINE | Facility: CLINIC | Age: 48
End: 2023-04-20
Payer: MEDICARE

## 2023-04-20 ENCOUNTER — OFFICE VISIT (OUTPATIENT)
Dept: BARIATRICS | Facility: CLINIC | Age: 48
End: 2023-04-20
Payer: MEDICARE

## 2023-04-20 VITALS
BODY MASS INDEX: 41.5 KG/M2 | WEIGHT: 280.19 LBS | HEIGHT: 69 IN | SYSTOLIC BLOOD PRESSURE: 117 MMHG | OXYGEN SATURATION: 96 % | DIASTOLIC BLOOD PRESSURE: 65 MMHG | HEART RATE: 69 BPM

## 2023-04-20 DIAGNOSIS — E66.01 CLASS 3 SEVERE OBESITY DUE TO EXCESS CALORIES WITH SERIOUS COMORBIDITY AND BODY MASS INDEX (BMI) OF 40.0 TO 44.9 IN ADULT: Primary | ICD-10-CM

## 2023-04-20 DIAGNOSIS — Z71.3 ENCOUNTER FOR WEIGHT LOSS COUNSELING: ICD-10-CM

## 2023-04-20 PROCEDURE — 3074F PR MOST RECENT SYSTOLIC BLOOD PRESSURE < 130 MM HG: ICD-10-PCS | Mod: CPTII,S$GLB,, | Performed by: STUDENT IN AN ORGANIZED HEALTH CARE EDUCATION/TRAINING PROGRAM

## 2023-04-20 PROCEDURE — 3074F SYST BP LT 130 MM HG: CPT | Mod: CPTII,S$GLB,, | Performed by: STUDENT IN AN ORGANIZED HEALTH CARE EDUCATION/TRAINING PROGRAM

## 2023-04-20 PROCEDURE — 99999 PR PBB SHADOW E&M-EST. PATIENT-LVL V: ICD-10-PCS | Mod: PBBFAC,,, | Performed by: STUDENT IN AN ORGANIZED HEALTH CARE EDUCATION/TRAINING PROGRAM

## 2023-04-20 PROCEDURE — 1159F PR MEDICATION LIST DOCUMENTED IN MEDICAL RECORD: ICD-10-PCS | Mod: CPTII,S$GLB,, | Performed by: STUDENT IN AN ORGANIZED HEALTH CARE EDUCATION/TRAINING PROGRAM

## 2023-04-20 PROCEDURE — 3078F PR MOST RECENT DIASTOLIC BLOOD PRESSURE < 80 MM HG: ICD-10-PCS | Mod: CPTII,S$GLB,, | Performed by: STUDENT IN AN ORGANIZED HEALTH CARE EDUCATION/TRAINING PROGRAM

## 2023-04-20 PROCEDURE — 4010F ACE/ARB THERAPY RXD/TAKEN: CPT | Mod: CPTII,S$GLB,, | Performed by: STUDENT IN AN ORGANIZED HEALTH CARE EDUCATION/TRAINING PROGRAM

## 2023-04-20 PROCEDURE — 99204 PR OFFICE/OUTPT VISIT, NEW, LEVL IV, 45-59 MIN: ICD-10-PCS | Mod: S$GLB,,, | Performed by: STUDENT IN AN ORGANIZED HEALTH CARE EDUCATION/TRAINING PROGRAM

## 2023-04-20 PROCEDURE — 99204 OFFICE O/P NEW MOD 45 MIN: CPT | Mod: S$GLB,,, | Performed by: STUDENT IN AN ORGANIZED HEALTH CARE EDUCATION/TRAINING PROGRAM

## 2023-04-20 PROCEDURE — 3008F BODY MASS INDEX DOCD: CPT | Mod: CPTII,S$GLB,, | Performed by: STUDENT IN AN ORGANIZED HEALTH CARE EDUCATION/TRAINING PROGRAM

## 2023-04-20 PROCEDURE — 3008F PR BODY MASS INDEX (BMI) DOCUMENTED: ICD-10-PCS | Mod: CPTII,S$GLB,, | Performed by: STUDENT IN AN ORGANIZED HEALTH CARE EDUCATION/TRAINING PROGRAM

## 2023-04-20 PROCEDURE — 1159F MED LIST DOCD IN RCRD: CPT | Mod: CPTII,S$GLB,, | Performed by: STUDENT IN AN ORGANIZED HEALTH CARE EDUCATION/TRAINING PROGRAM

## 2023-04-20 PROCEDURE — 99999 PR PBB SHADOW E&M-EST. PATIENT-LVL V: CPT | Mod: PBBFAC,,, | Performed by: STUDENT IN AN ORGANIZED HEALTH CARE EDUCATION/TRAINING PROGRAM

## 2023-04-20 PROCEDURE — 3078F DIAST BP <80 MM HG: CPT | Mod: CPTII,S$GLB,, | Performed by: STUDENT IN AN ORGANIZED HEALTH CARE EDUCATION/TRAINING PROGRAM

## 2023-04-20 PROCEDURE — 1160F PR REVIEW ALL MEDS BY PRESCRIBER/CLIN PHARMACIST DOCUMENTED: ICD-10-PCS | Mod: CPTII,S$GLB,, | Performed by: STUDENT IN AN ORGANIZED HEALTH CARE EDUCATION/TRAINING PROGRAM

## 2023-04-20 PROCEDURE — 4010F PR ACE/ARB THEARPY RXD/TAKEN: ICD-10-PCS | Mod: CPTII,S$GLB,, | Performed by: STUDENT IN AN ORGANIZED HEALTH CARE EDUCATION/TRAINING PROGRAM

## 2023-04-20 PROCEDURE — 1160F RVW MEDS BY RX/DR IN RCRD: CPT | Mod: CPTII,S$GLB,, | Performed by: STUDENT IN AN ORGANIZED HEALTH CARE EDUCATION/TRAINING PROGRAM

## 2023-04-20 RX ORDER — SEMAGLUTIDE 0.68 MG/ML
0.5 INJECTION, SOLUTION SUBCUTANEOUS
Qty: 3 ML | Refills: 2 | Status: SHIPPED | OUTPATIENT
Start: 2023-04-20 | End: 2023-09-14

## 2023-04-20 NOTE — PROGRESS NOTES
Subjective     Patient ID: Mario Mahajan is a 47 y.o. female.    Chief Complaint: Consult and Obesity    Patient presents for treatment of obesity.     Co-morbidities  Migraines  Nonischemic dilated cardiomyopathy with ICD    Negative for thyroid cancer    Weight History  Lowest adult weight: 190 lbs  Highest adult weight: 280 lbs       Current Physical Activity  No regular exercise routine    Current Eating Habits  Breakfast - skips  Lunch - sandwich, chicken wings  Dinner - salmon, green beans, starch  Snacks - chips, microwave popcorn  Beverages - no soft drinks    Medical Weight Loss - has ICD, no InBody  4/20/2023: 280 lbs 3.3 oz, BMI 41.38    Review of Systems   Constitutional:  Negative for chills and fever.   Respiratory:  Negative for shortness of breath.    Cardiovascular:  Negative for chest pain.   Gastrointestinal:  Negative for abdominal pain, nausea and vomiting.   Neurological:  Negative for dizziness and light-headedness.        Objective    Latest Reference Range & Units 09/29/22 14:33 02/15/23 09:39 02/15/23 12:14 02/17/23 14:34 02/20/23 07:36 02/23/23 14:21   Sodium 136 - 145 mmol/L 139 140  138 137 138   Potassium 3.5 - 5.1 mmol/L 3.7 3.9  3.2 (L) 4.6 4.3   Chloride 95 - 110 mmol/L 103 105  100 100 103   CO2 23 - 29 mmol/L 24 26  30 (H) 29 29   Anion Gap 8 - 16 mmol/L 12 9  8 8 6 (L)   BUN 7 - 17 mg/dL 19 14  18 (H) 43 (H) 34 (H)   Creatinine 0.50 - 1.40 mg/dL 1.3 1.0  1.40 2.1 (H) 1.55 (H)   eGFR >60 mL/min/1.73 m^2 52.0 ! >60.0  47.3 ! 29.1 ! 41.8 !   Glucose 70 - 110 mg/dL 59 (L) 93  91 109 102   Calcium 8.7 - 10.5 mg/dL 9.4 8.8  9.5 9.6 8.9   Alkaline Phosphatase 55 - 135 U/L 43 (L) 44 (L)       PROTEIN TOTAL 6.0 - 8.4 g/dL 7.3 6.9       Albumin 3.5 - 5.2 g/dL 3.8 3.6       BILIRUBIN TOTAL 0.1 - 1.0 mg/dL 1.6 (H) 1.0       AST 10 - 40 U/L 13 14       ALT 10 - 44 U/L 10 12       BNP 0 - 99 pg/mL 139 (H) 436 (H)   68    NT-proBNP 5 - 450 pg/mL    249     Troponin I 0.000 - 0.026 ng/mL   0.222 (H) 0.206 (H)      (L): Data is abnormally low  (H): Data is abnormally high  !: Data is abnormal    Vitals:    04/20/23 1406   BP: 117/65   Pulse: 69       Physical Exam  Vitals reviewed.   Constitutional:       General: She is not in acute distress.     Appearance: Normal appearance. She is obese. She is not ill-appearing, toxic-appearing or diaphoretic.   HENT:      Head: Normocephalic and atraumatic.   Cardiovascular:      Rate and Rhythm: Normal rate.   Pulmonary:      Effort: Pulmonary effort is normal. No respiratory distress.   Skin:     General: Skin is warm and dry.   Neurological:      Mental Status: She is alert and oriented to person, place, and time.          Assessment and Plan     Problem List Items Addressed This Visit       Class 3 severe obesity due to excess calories with serious comorbidity and body mass index (BMI) of 40.0 to 44.9 in adult - Primary     Other Visit Diagnoses       Encounter for weight loss counseling                - Start Ozempic once a week. Start with 0.25mg once a week x 4 weeks, then 0.5 mg weekly.     - Log all food and beverage intake with a daily calorie goal of 7390-0140 calories per day    - Activity as tolerated

## 2023-04-20 NOTE — PATIENT INSTRUCTIONS
Start Ozempic once a week. Start with 0.25mg once a week x 4 weeks, then 0.5 mg weekly.     Decrease portions as soon as you start Ozempic. Avoid fried, greasy, fatty foods.     Some nausea in the first 2 weeks is not unusual.     If you get pain across the upper abdomen and around to your back, please call the office.               Copyright © 2011, [facility]. For more information about The Healthy Eating Plate, please see The Nutrition Source, Department of Nutrition, [employer], www.thenutritionsource.org, and [employer], www.health.[employer].edu.      Meal Planning & Grocery Shopping    Meal planning builds the foundation for healthy eating. When you have structured ideas for healthy meals and foods available at home to prepare those meals, weight control becomes easier.  If only healthy foods are available at home, then you will be much more likely to eat healthy foods. And you will be less likely to go to a restaurant or  a fast food meal, which tend to be unhealthy and higher in calories than meals prepared at home.      Take 5-10 minutes each week to plan meals for the next 7 days.  Make a grocery list based on the meal plan.    Grocery Shopping Tips:  Shop on a full stomach.  Schedule your shopping for times when you are most motivated and able to be disciplined about your purchases. For example, after a stressful day at work it may be difficult to make the healthiest choices. Shopping at other times, such as early in the morning or after dinner, may be easier.  Focus your shopping on the outside aisles of the store, which tend to contain more fresh foods and lower calorie foods. The inside aisles tend to have more processed foods.  Stick to your list. Avoid buying unhealthy items just because they are on sale.   Compare nutrition labels to check the number of calories and percentage of fat.      What to buy:    Vegetables  Fresh  vegetables  Frozen vegetables with no sauce or added salt  Canned vegetables with no sauce or added salt    Protein  Lean meats, such as chicken and turkey  Limit red meats, such as beef to no more than 1x/week  Limit processed meats, such as cold cuts, bishop, sausage, and hot dogs. Look for brands that have no nitrites and are minimally processed. Consider turkey sausage or turkey bishop.  Fish and Shellfish  Eggs  Dried beans  Canned beans (reduced sodium)    Fat  Use healthy oils, such as olive oil or canola oil, for cooking, salad dressings, etc.  Unflavored nuts and seeds  Nut butters (no added sugar)    Dairy  Yogurt (no sugar added)  Cheese  Low-fat milk  Unsweetened nondairy milks (almond milk, soy milk, etc)    Fruit  Fresh Fruit  Frozen fruit with no added sugar  Canned fruit with no added sugar  Dried fruit with no added sugar  100% fruit juice    Whole Grains  Single ingredient grains, such as oats, quinoa, brown rice  Whole-wheat pasta  Sprouted whole-grain bread    What to avoid:  - Avoid fried foods  - Avoid foods with added sugar  - Avoid sugar-sweetened beverages  - Avoid ultra-processed foods        Snacks: (100-200 calories; >5g protein)     - 1 low-fat cheese stick with 8 cherry tomatoes or 1 serving fresh fruit  - 4 thin slices fat-free turkey breast and 1 slice low-fat cheese  - 4 thin slices fat-free honey ham with wedge of melon  - 2 slices of turkey bishop  - Boiled eggs (can buy at [employer] already boiled w/ shell removed)  - 6-8 edamame pods (equivalent to about 1/4 cup edamame without pods).   - 1/4 cup unsalted nuts with ½ cup fruit  - 6-oz container Dannon Light n Fit vanilla yogurt, topped with 1oz unsalted nuts         - apple, celery or baby carrots spread with 2 Tbsp PB2  - apple slices with 1 oz slice low-fat cheese  - Apple slices dipped in 2 Tbsp of PB2  - 2 Tbsp PB2 mixed in light or greek yogurt or sugar-free pudding  - celery, cucumber, bell pepper or baby carrots dipped in ¼ cup  hummus bean spread   - celery, cucumber, baby carrots dipped in high protein greek yogurt (Mix 16 oz plain greek yogurt + 1 packet of hidden valley ranch dip mix)  - Noman Links Beef Steak - 14g protein! (similar to beef jerky but very lean)  - 2 wedges Laughing Cow - Light Herb & Garlic Cheese with sliced cucumber or green bell pepper  - 1/2 cup low-fat cottage cheese with ¼ cup fruit or ¼ cup salsa  - 1/2 cup low fat cottage cheese with 10-15 cherry tomatoes  - 8 oz glass of FAIRLIFE fat-free or low-fat milk (13 g protein)      ** Be CREATIVE. You can always snack on bites of grilled chicken or tuna salad made with low fat bird, if needed!

## 2023-04-24 ENCOUNTER — HOSPITAL ENCOUNTER (OUTPATIENT)
Dept: CARDIOLOGY | Facility: HOSPITAL | Age: 48
Discharge: HOME | End: 2023-04-24
Attending: INTERNAL MEDICINE
Payer: MEDICARE

## 2023-04-24 VITALS
WEIGHT: 272 LBS | DIASTOLIC BLOOD PRESSURE: 68 MMHG | BODY MASS INDEX: 40.29 KG/M2 | HEIGHT: 69 IN | SYSTOLIC BLOOD PRESSURE: 116 MMHG | HEART RATE: 68 BPM

## 2023-04-24 DIAGNOSIS — I50.42 CHRONIC COMBINED SYSTOLIC AND DIASTOLIC CONGESTIVE HEART FAILURE: ICD-10-CM

## 2023-04-24 LAB
ASCENDING AORTA: 2.46 CM
AV INDEX (PROSTH): 0.46
AV MEAN GRADIENT: 4 MMHG
AV PEAK GRADIENT: 6 MMHG
AV VALVE AREA: 1.7 CM2
AV VELOCITY RATIO: 0.49
BSA FOR ECHO PROCEDURE: 2.45 M2
CV ECHO LV RWT: 0.2 CM
DOP CALC AO PEAK VEL: 1.25 M/S
DOP CALC AO VTI: 24.94 CM
DOP CALC LVOT AREA: 3.7 CM2
DOP CALC LVOT DIAMETER: 2.17 CM
DOP CALC LVOT PEAK VEL: 0.61 M/S
DOP CALC LVOT STROKE VOLUME: 42.29 CM3
DOP CALCLVOT PEAK VEL VTI: 11.44 CM
E WAVE DECELERATION TIME: 195.89 MSEC
E/A RATIO: 1.79
E/E' RATIO: 18.11 M/S
ECHO LV POSTERIOR WALL: 0.84 CM (ref 0.6–1.1)
EJECTION FRACTION: 25 %
FRACTIONAL SHORTENING: 9 % (ref 28–44)
INTERVENTRICULAR SEPTUM: 0.73 CM (ref 0.6–1.1)
LA MAJOR: 7.53 CM
LA MINOR: 7.6 CM
LA WIDTH: 6.72 CM
LEFT ATRIUM SIZE: 6.76 CM
LEFT ATRIUM VOLUME INDEX MOD: 77.8 ML/M2
LEFT ATRIUM VOLUME INDEX: 124.3 ML/M2
LEFT ATRIUM VOLUME MOD: 182.72 CM3
LEFT ATRIUM VOLUME: 292.1 CM3
LEFT INTERNAL DIMENSION IN SYSTOLE: 7.49 CM (ref 2.1–4)
LEFT VENTRICLE DIASTOLIC VOLUME INDEX: 157.15 ML/M2
LEFT VENTRICLE DIASTOLIC VOLUME: 369.3 ML
LEFT VENTRICLE MASS INDEX: 137 G/M2
LEFT VENTRICLE SYSTOLIC VOLUME INDEX: 126.6 ML/M2
LEFT VENTRICLE SYSTOLIC VOLUME: 297.47 ML
LEFT VENTRICULAR INTERNAL DIMENSION IN DIASTOLE: 8.25 CM (ref 3.5–6)
LEFT VENTRICULAR MASS: 321.29 G
LV LATERAL E/E' RATIO: 13.23 M/S
LV SEPTAL E/E' RATIO: 28.67 M/S
MV A" WAVE DURATION": 9.71 MSEC
MV PEAK A VEL: 0.96 M/S
MV PEAK E VEL: 1.72 M/S
MV STENOSIS PRESSURE HALF TIME: 56.81 MS
MV VALVE AREA P 1/2 METHOD: 3.87 CM2
PISA MRMAX VEL: 0.06 M/S
PISA TR MAX VEL: 3.32 M/S
PULM VEIN S/D RATIO: 0.8
PV PEAK D VEL: 0.7 M/S
PV PEAK S VEL: 0.56 M/S
RA MAJOR: 4.61 CM
RA PRESSURE: 8 MMHG
RA WIDTH: 4.48 CM
RIGHT VENTRICULAR END-DIASTOLIC DIMENSION: 3.44 CM
RV TISSUE DOPPLER FREE WALL SYSTOLIC VELOCITY 1 (APICAL 4 CHAMBER VIEW): 12.3 CM/S
SINUS: 2.28 CM
STJ: 2.24 CM
TDI LATERAL: 0.13 M/S
TDI SEPTAL: 0.06 M/S
TDI: 0.1 M/S
TR MAX PG: 44 MMHG
TRICUSPID ANNULAR PLANE SYSTOLIC EXCURSION: 2.66 CM
TV REST PULMONARY ARTERY PRESSURE: 52 MMHG

## 2023-04-24 PROCEDURE — 25500020 PHARM REV CODE 255: Performed by: INTERNAL MEDICINE

## 2023-04-24 PROCEDURE — C8929 TTE W OR WO FOL WCON,DOPPLER: HCPCS

## 2023-04-24 PROCEDURE — 93306 ECHO (CUPID ONLY): ICD-10-PCS | Mod: 26,,, | Performed by: INTERNAL MEDICINE

## 2023-04-24 PROCEDURE — 93306 TTE W/DOPPLER COMPLETE: CPT | Mod: 26,,, | Performed by: INTERNAL MEDICINE

## 2023-04-24 RX ADMIN — HUMAN ALBUMIN MICROSPHERES AND PERFLUTREN 0.11 MG: 10; .22 INJECTION, SOLUTION INTRAVENOUS at 02:04

## 2023-04-26 ENCOUNTER — TELEPHONE (OUTPATIENT)
Dept: TRANSPLANT | Facility: CLINIC | Age: 48
End: 2023-04-26
Payer: MEDICARE

## 2023-05-02 ENCOUNTER — OFFICE VISIT (OUTPATIENT)
Dept: TRANSPLANT | Facility: CLINIC | Age: 48
End: 2023-05-02
Payer: MEDICARE

## 2023-05-02 ENCOUNTER — LAB VISIT (OUTPATIENT)
Dept: LAB | Facility: HOSPITAL | Age: 48
End: 2023-05-02
Attending: INTERNAL MEDICINE
Payer: MEDICARE

## 2023-05-02 VITALS
WEIGHT: 281.31 LBS | HEART RATE: 65 BPM | HEIGHT: 69 IN | SYSTOLIC BLOOD PRESSURE: 96 MMHG | BODY MASS INDEX: 41.67 KG/M2 | DIASTOLIC BLOOD PRESSURE: 52 MMHG

## 2023-05-02 DIAGNOSIS — Z95.810 ICD (IMPLANTABLE CARDIOVERTER-DEFIBRILLATOR) IN PLACE: Chronic | ICD-10-CM

## 2023-05-02 DIAGNOSIS — E66.01 CLASS 3 SEVERE OBESITY DUE TO EXCESS CALORIES WITH SERIOUS COMORBIDITY AND BODY MASS INDEX (BMI) OF 40.0 TO 44.9 IN ADULT: ICD-10-CM

## 2023-05-02 DIAGNOSIS — I50.42 CHRONIC COMBINED SYSTOLIC AND DIASTOLIC CONGESTIVE HEART FAILURE: ICD-10-CM

## 2023-05-02 DIAGNOSIS — I50.42 CHRONIC COMBINED SYSTOLIC AND DIASTOLIC CONGESTIVE HEART FAILURE: Primary | ICD-10-CM

## 2023-05-02 DIAGNOSIS — I42.0 NONISCHEMIC DILATED CARDIOMYOPATHY: Chronic | ICD-10-CM

## 2023-05-02 PROBLEM — E66.9 OBESITY (BMI 35.0-39.9 WITHOUT COMORBIDITY): Status: RESOLVED | Noted: 2018-01-23 | Resolved: 2023-05-02

## 2023-05-02 LAB
ALBUMIN SERPL BCP-MCNC: 3.6 G/DL (ref 3.5–5.2)
ALP SERPL-CCNC: 44 U/L (ref 55–135)
ALT SERPL W/O P-5'-P-CCNC: 12 U/L (ref 10–44)
ANION GAP SERPL CALC-SCNC: 6 MMOL/L (ref 8–16)
AST SERPL-CCNC: 13 U/L (ref 10–40)
BASOPHILS # BLD AUTO: 0.02 K/UL (ref 0–0.2)
BASOPHILS NFR BLD: 0.3 % (ref 0–1.9)
BILIRUB SERPL-MCNC: 1.4 MG/DL (ref 0.1–1)
BUN SERPL-MCNC: 13 MG/DL (ref 6–20)
CALCIUM SERPL-MCNC: 9.1 MG/DL (ref 8.7–10.5)
CHLORIDE SERPL-SCNC: 105 MMOL/L (ref 95–110)
CO2 SERPL-SCNC: 27 MMOL/L (ref 23–29)
CREAT SERPL-MCNC: 1.1 MG/DL (ref 0.5–1.4)
DIFFERENTIAL METHOD: ABNORMAL
EOSINOPHIL # BLD AUTO: 0.2 K/UL (ref 0–0.5)
EOSINOPHIL NFR BLD: 3.8 % (ref 0–8)
ERYTHROCYTE [DISTWIDTH] IN BLOOD BY AUTOMATED COUNT: 13.2 % (ref 11.5–14.5)
EST. GFR  (NO RACE VARIABLE): >60 ML/MIN/1.73 M^2
GLUCOSE SERPL-MCNC: 85 MG/DL (ref 70–110)
HCT VFR BLD AUTO: 35.6 % (ref 37–48.5)
HGB BLD-MCNC: 11.8 G/DL (ref 12–16)
IMM GRANULOCYTES # BLD AUTO: 0.01 K/UL (ref 0–0.04)
IMM GRANULOCYTES NFR BLD AUTO: 0.2 % (ref 0–0.5)
LYMPHOCYTES # BLD AUTO: 1.7 K/UL (ref 1–4.8)
LYMPHOCYTES NFR BLD: 28.1 % (ref 18–48)
MCH RBC QN AUTO: 34.6 PG (ref 27–31)
MCHC RBC AUTO-ENTMCNC: 33.1 G/DL (ref 32–36)
MCV RBC AUTO: 104 FL (ref 82–98)
MONOCYTES # BLD AUTO: 0.5 K/UL (ref 0.3–1)
MONOCYTES NFR BLD: 7.3 % (ref 4–15)
NEUTROPHILS # BLD AUTO: 3.7 K/UL (ref 1.8–7.7)
NEUTROPHILS NFR BLD: 60.3 % (ref 38–73)
NRBC BLD-RTO: 0 /100 WBC
PLATELET # BLD AUTO: 295 K/UL (ref 150–450)
PMV BLD AUTO: 10.8 FL (ref 9.2–12.9)
POTASSIUM SERPL-SCNC: 3.5 MMOL/L (ref 3.5–5.1)
PROT SERPL-MCNC: 7 G/DL (ref 6–8.4)
RBC # BLD AUTO: 3.41 M/UL (ref 4–5.4)
SODIUM SERPL-SCNC: 138 MMOL/L (ref 136–145)
WBC # BLD AUTO: 6.13 K/UL (ref 3.9–12.7)

## 2023-05-02 PROCEDURE — 1159F MED LIST DOCD IN RCRD: CPT | Mod: CPTII,S$GLB,, | Performed by: INTERNAL MEDICINE

## 2023-05-02 PROCEDURE — 3078F PR MOST RECENT DIASTOLIC BLOOD PRESSURE < 80 MM HG: ICD-10-PCS | Mod: CPTII,S$GLB,, | Performed by: INTERNAL MEDICINE

## 2023-05-02 PROCEDURE — 99999 PR PBB SHADOW E&M-EST. PATIENT-LVL IV: ICD-10-PCS | Mod: PBBFAC,,, | Performed by: INTERNAL MEDICINE

## 2023-05-02 PROCEDURE — 3008F BODY MASS INDEX DOCD: CPT | Mod: CPTII,S$GLB,, | Performed by: INTERNAL MEDICINE

## 2023-05-02 PROCEDURE — 4010F PR ACE/ARB THEARPY RXD/TAKEN: ICD-10-PCS | Mod: CPTII,S$GLB,, | Performed by: INTERNAL MEDICINE

## 2023-05-02 PROCEDURE — 99999 PR PBB SHADOW E&M-EST. PATIENT-LVL IV: CPT | Mod: PBBFAC,,, | Performed by: INTERNAL MEDICINE

## 2023-05-02 PROCEDURE — 80053 COMPREHEN METABOLIC PANEL: CPT | Performed by: INTERNAL MEDICINE

## 2023-05-02 PROCEDURE — 99214 PR OFFICE/OUTPT VISIT, EST, LEVL IV, 30-39 MIN: ICD-10-PCS | Mod: S$GLB,,, | Performed by: INTERNAL MEDICINE

## 2023-05-02 PROCEDURE — 3074F SYST BP LT 130 MM HG: CPT | Mod: CPTII,S$GLB,, | Performed by: INTERNAL MEDICINE

## 2023-05-02 PROCEDURE — 4010F ACE/ARB THERAPY RXD/TAKEN: CPT | Mod: CPTII,S$GLB,, | Performed by: INTERNAL MEDICINE

## 2023-05-02 PROCEDURE — 3008F PR BODY MASS INDEX (BMI) DOCUMENTED: ICD-10-PCS | Mod: CPTII,S$GLB,, | Performed by: INTERNAL MEDICINE

## 2023-05-02 PROCEDURE — 99214 OFFICE O/P EST MOD 30 MIN: CPT | Mod: S$GLB,,, | Performed by: INTERNAL MEDICINE

## 2023-05-02 PROCEDURE — 85025 COMPLETE CBC W/AUTO DIFF WBC: CPT | Performed by: INTERNAL MEDICINE

## 2023-05-02 PROCEDURE — 3078F DIAST BP <80 MM HG: CPT | Mod: CPTII,S$GLB,, | Performed by: INTERNAL MEDICINE

## 2023-05-02 PROCEDURE — 83880 ASSAY OF NATRIURETIC PEPTIDE: CPT | Performed by: INTERNAL MEDICINE

## 2023-05-02 PROCEDURE — 3074F PR MOST RECENT SYSTOLIC BLOOD PRESSURE < 130 MM HG: ICD-10-PCS | Mod: CPTII,S$GLB,, | Performed by: INTERNAL MEDICINE

## 2023-05-02 PROCEDURE — 1159F PR MEDICATION LIST DOCUMENTED IN MEDICAL RECORD: ICD-10-PCS | Mod: CPTII,S$GLB,, | Performed by: INTERNAL MEDICINE

## 2023-05-02 NOTE — Clinical Note
May 2, 2023        Juanjose Nuñez  [address]  [facility] [address]  Phone: [phone]  Fax: [phone]             [id number]  [address]  [facility] [address]  Phone: [phone]   Patient: Mario Mahajan   MR Number: [id number]   YOB: 1975   Date of Visit: 5/2/2023       Dear Dr. Juanjose Nuñez    Thank you for referring Mario Mahajan to me for evaluation. Attached you will find relevant portions of my assessment and plan of care.    If you have questions, please do not hesitate to call me. I look forward to following Mario Mahajan along with you.    Sincerely,    Memo Reyes MD    Enclosure    If you would like to receive this communication electronically, please contact [email or web address] or [phone] to request [employer] access.    [employer] is a tool which provides read-only access to select patient information with whom you have a relationship. Its easy to use and provides real time access to review your patients record including encounter summaries, notes, results, and demographic information.    If you feel you have received this communication in error or would no longer like to receive these types of communications, please e-mail [email or web address]

## 2023-05-02 NOTE — PROGRESS NOTES
[facility] Follow up.        Attending Physician: Memo Reyes MD.  The patient's last visit with me was on 2/20/2023.         HPI.  Mrs. Mahajan is a very pleasant  47 y.o. black female with stage C HFrEF, NICMP,  With a Hx of VF in  2017 (no events since), s/p CardioMEMS.  07/21/22 had fractured ICD lead therefore had to get device extracted and have ICD single chamber replaced.      Patient comes today after visiting ER on February 15, 2023 (5 days ago). She was having congestive symptoms that started after URI. She was doing well before this. She was given IV lasix and discharged. She had her bumex dose increased from 2 mg/1 mg to 2 mg/2mg x 3 days. Today she reports low energy, chronic orthopnea (improved from 4 pillows to 3 pillows).  Decreased appetite.    Today May 2, 2023, patient feels well.       Review of Systems   Constitutional:  Negative for activity change, appetite change, chills, diaphoresis and fever.   HENT:  Negative for nasal congestion, rhinorrhea and sore throat.    Eyes:  Negative for visual disturbance.   Respiratory:  Negative for shortness of breath and stridor.    Cardiovascular:  Negative for chest pain.   Gastrointestinal:  Negative for abdominal pain, diarrhea, nausea and vomiting.   Genitourinary:  Negative for difficulty urinating, dysuria and hematuria.   Integumentary:  Negative for rash.   Neurological:  Negative for seizures, syncope and light-headedness.   Psychiatric/Behavioral:  Negative for agitation and hallucinations.       Past Medical History:   Diagnosis Date    Asthma     Chronic back pain 7/1/2014    Chronic combined systolic and diastolic congestive heart failure 4/28/2015     2-10-17   1 - Severely depressed left ventricular systolic function (EF 20-25%).    2 - Severe left ventricular enlargement.    3 - Severe left atrial  enlargement.    4 - Left ventricular diastolic dysfunction.    5 - The estimated PA systolic pressure is 18 mmHg.    6 - Mild mitral regurgitation.     Encounter for blood transfusion     ICD (implantable cardioverter-defibrillator) in place 12/01/15 3/3/2016    Menorrhagia, premenopausal 2/10/2017    Microcytic anemia 2015    Non-rheumatic mitral regurgitation 3/5/2015    Nonischemic dilated cardiomyopathy 2015    Sleep apnea     Syncope and collapse 2017    Ventricular tachycardia, polymorphic         Past Surgical History:   Procedure Laterality Date    CARDIAC DEFIBRILLATOR PLACEMENT  2015     SECTION      INSERTION, WIRELESS SENSOR, FOR PULMONARY ARTERIAL PRESSURE MONITORING N/A 10/22/2021    Procedure: INSERTION, WIRELESS SENSOR, FOR PULMONARY ARTERIAL PRESSURE MONITORING;  Surgeon: Erika Hurd MD;  Location: [facility] CATH LAB;  Service: Cardiology;  Laterality: N/A;    OOPHORECTOMY      PERICARDIOCENTESIS N/A 2020    Procedure: Pericardiocentesis;  Surgeon: Memo Luis MD;  Location: [facility] CATH LAB;  Service: Cardiology;  Laterality: N/A;    PULMONARY ANGIOGRAPHY N/A 10/22/2021    Procedure: ANGIOGRAM, PULMONARY;  Surgeon: Erika Hurd MD;  Location: [facility] CATH LAB;  Service: Cardiology;  Laterality: N/A;    RIGHT HEART CATHETERIZATION      TOTAL ABDOMINAL HYSTERECTOMY N/A 3/26/2019    Procedure: HYSTERECTOMY, TOTAL, ABDOMINAL;  Surgeon: Victorino Rose MD;  Location: [facility] OR;  Service: OB/GYN;  Laterality: N/A;    TRANSESOPHAGEAL ECHOCARDIOGRAPHY N/A 2022    Procedure: ECHOCARDIOGRAM, TRANSESOPHAGEAL;  Surgeon: Phan Powell MD;  Location: [facility] EP LAB;  Service: Cardiology;  Laterality: N/A;    TUBAL LIGATION          Family History   Problem Relation Age of Onset    Diabetes Father     Pancreatic cancer Father     Heart failure Father     Heart failure Brother     No Known Problems Daughter     No Known Problems Son     Lung cancer Paternal Grandmother      Heart disease Brother         Review of patient's allergies indicates:   Allergen Reactions    Ace inhibitors      Cough        Current Outpatient Medications   Medication Instructions    albuterol (PROVENTIL/VENTOLIN HFA) 90 mcg/actuation inhaler 2 puffs, Inhalation, Every 6 hours PRN    amiodarone (PACERONE) 200 mg, Oral, Daily    ascorbic acid, vitamin C, (VITAMIN C) 250 MG tablet Take 1 tablet (250 mg) by mouth twice daily. Take with the iron tablets.    aspirin (ECOTRIN) 81 mg, Oral, Daily    b complex vitamins tablet 1 tablet, Oral, Daily    bumetanide (BUMEX) 1 MG tablet TAKE 2 TABLETS BY MOUTH EVERY MORNING AND 1 TABLET EVERY EVENING    carvediloL (COREG) 25 MG tablet TAKE 1 TABLET(25 MG) BY MOUTH TWICE DAILY    diclofenac sodium (VOLTAREN) 2 g, Topical (Top), 4 times daily    FARXIGA 10 mg, Oral, Daily    ferrous sulfate 325 (65 FE) MG EC tablet Take 1 tablet (325 mg total) by mouth 2 (two) times daily. Take with vitamin C.    FLOVENT HFA 44 mcg/actuation inhaler 1 puff, 2 times daily    HYDROcodone-acetaminophen (NORCO) 5-325 mg per tablet 1 tablet, Oral, Every 6 hours PRN    ipratropium-albuteroL (COMBIVENT)  mcg/actuation inhaler 1 puff, Inhalation, 4 times daily, Rescue     loratadine (CLARITIN) 10 mg, Oral, Daily    OZEMPIC 0.5 mg, Subcutaneous, Every 7 days, Start with 0.25 mg weekly for 4 weeks, then increase to 0.5 mg weekly    potassium chloride SA (K-DUR,KLOR-CON) 20 MEQ tablet TAKE 2 TABLETS(40 MEQ) BY MOUTH EVERY DAY    sacubitriL-valsartan (ENTRESTO)  mg per tablet 1 tablet, Oral, 2 times daily    spironolactone (ALDACTONE) 25 MG tablet TAKE 1 TABLET(25 MG) BY MOUTH EVERY DAY    timolol maleate 0.5% (TIMOPTIC) 0.5 % Drop INSTILL 1 DROP INTO BOTH EYES EVERY 12 HOURS    triamcinolone acetonide 0.1% (KENALOG) 0.1 % cream Topical (Top), 2 times daily, Apply to scar        There were no vitals filed for this visit.     Wt Readings from Last 3 Encounters:   05/02/23 127.6 kg (281 lb 4.9  "oz)   04/24/23 123.4 kg (272 lb)   04/20/23 127.1 kg (280 lb 3.3 oz)     Temp Readings from Last 3 Encounters:   02/15/23 98 °F (36.7 °C) (Oral)   12/14/22 97.8 °F (36.6 °C) (Oral)   11/14/22 97.4 °F (36.3 °C)     BP Readings from Last 3 Encounters:   04/24/23 116/68   04/20/23 117/65   03/02/23 111/64     Pulse Readings from Last 3 Encounters:   04/24/23 68   04/20/23 69   03/02/23 65        Body mass index is 41.54 kg/m². Estimated body surface area is 2.49 meters squared as calculated from the following:    Height as of this encounter: 5' 9" (1.753 m).    Weight as of this encounter: 127.6 kg (281 lb 4.9 oz).     Physical Exam  Constitutional:       Appearance: She is well-developed.   HENT:      Head: Normocephalic and atraumatic.      Right Ear: External ear normal.      Left Ear: External ear normal.   Eyes:      Conjunctiva/sclera: Conjunctivae normal.      Pupils: Pupils are equal, round, and reactive to light.   Neck:      Vascular: No JVD.   Cardiovascular:      Rate and Rhythm: Normal rate and regular rhythm.      Pulses: Intact distal pulses.      Heart sounds: S1 normal and S2 normal. No murmur heard.    No friction rub. No gallop.   Pulmonary:      Effort: Pulmonary effort is normal.      Breath sounds: Normal breath sounds.   Abdominal:      General: Bowel sounds are normal. There is no distension.      Palpations: Abdomen is soft.      Tenderness: There is no abdominal tenderness. There is no guarding or rebound.   Musculoskeletal:      Cervical back: Normal range of motion and neck supple.      Right lower leg: No edema.      Left lower leg: No edema.   Neurological:      Mental Status: She is alert and oriented to person, place, and time.        Lab Results   Component Value Date    BNP 68 02/20/2023     02/23/2023    K 4.3 02/23/2023    MG 1.7 10/26/2021     02/23/2023    CO2 29 02/23/2023    BUN 34 (H) 02/23/2023    BUN 12 06/19/2015    CREATININE 1.55 (H) 02/23/2023     " 02/23/2023    HGBA1C 4.8 09/16/2019    AST 14 02/15/2023    ALT 12 02/15/2023    ALBUMIN 3.6 02/15/2023    PROT 6.9 02/15/2023    BILITOT 1.0 02/15/2023    WBC 5.97 02/15/2023    HGB 11.2 (L) 02/15/2023    HCT 34.1 (L) 02/15/2023    HCT 32 (L) 07/20/2022     02/15/2023    INR 1.0 07/15/2022     01/12/2018    TSH 0.516 01/02/2020    CHOL 98 (L) 09/16/2019    HDL 40 09/16/2019    LDLCALC 46.2 (L) 09/16/2019    TRIG 59 09/16/2019    K3WPPGV 14.9 (H) 09/16/2019    FREET4 2.49 (H) 09/16/2019         Results for orders placed during the hospital encounter of 04/24/23    Echo    Interpretation Summary  · The left ventricle is severely enlarged with eccentric hypertrophy and severely decreased systolic function. The estimated ejection fraction is 25%.  · Normal right ventricular size with normal right ventricular systolic function.  · Severe left atrial enlargement.  · Severe functional mitral regurgitation.  · The estimated PA systolic pressure is 52 mmHg.  · Intermediate central venous pressure (8 mmHg).        Results for orders placed during the hospital encounter of 10/22/21    Cardiac catheterization    Conclusion  · The estimated blood loss was <50 mL.  · RHC performed via the right CFV. Patient tolerated the procedure well. Elevated left and right side filling pressures (RA= 14, PCWP=23 mm of Hg). Severe pulmonary HTN (PA=78/30 mm of Hg, PA mean=52 mm of Hg) in the setting of elevated left sided filling pressures. Normal cardiac index and output (CI=2.5 L/min/m2, CO=5.7 L/min ). Selective PA angiogram was performed in the left PA and the CardioMEMS device was successfully deployed under fluoroscopy without complications. Calibration was performed int  cathlab.  · Bed rest for 3 hours  · Patient is enrolled in the GUIDE HF study    The procedure log was documented by Documenter: Camille Wilkes RN; Elizabet Phillips RT and verified by Erika Hurd MD.    Date: 10/22/2021  Time: 11:15 AM          Assessment and Plan:  Chronic combined systolic and diastolic congestive heart failure  -     CBC Auto Differential; Future; Expected date: 05/02/2023  -     Comprehensive Metabolic Panel; Future; Expected date: 05/02/2023  -     NT-Pro Natriuretic Peptide; Future; Expected date: 05/02/2023  -     CBC Auto Differential; Future; Expected date: 08/02/2023  -     Comprehensive Metabolic Panel; Future; Expected date: 08/02/2023  -     NT-Pro Natriuretic Peptide; Future; Expected date: 08/02/2023    ICD (implantable cardioverter-defibrillator) in place 12/01/15    Nonischemic dilated cardiomyopathy    Class 3 severe obesity due to excess calories with serious comorbidity and body mass index (BMI) of 40.0 to 44.9 in adult           Chronic systolic HF, NYHA class III, stage C.  Etiology: NICM.  Devices: ICD.  Medications: high dose sacubitril-valsartan, farxiga, spironolactone, carvedilol.  Hemodynamic status: warm, normotensive, euvolemic on exam.  Plan:  No changes on meds.  -continue monitoring with cardiomems program.  F/u in 3 months

## 2023-05-03 ENCOUNTER — PATIENT MESSAGE (OUTPATIENT)
Dept: ELECTROPHYSIOLOGY | Facility: CLINIC | Age: 48
End: 2023-05-03
Payer: MEDICARE

## 2023-05-04 LAB — NT-PROBNP SERPL IA-MCNC: 269 PG/ML

## 2023-05-14 DIAGNOSIS — I50.42 CHRONIC COMBINED SYSTOLIC AND DIASTOLIC CONGESTIVE HEART FAILURE: ICD-10-CM

## 2023-05-15 RX ORDER — BUMETANIDE 1 MG/1
TABLET ORAL
Qty: 270 TABLET | Refills: 0 | Status: SHIPPED | OUTPATIENT
Start: 2023-05-15 | End: 2023-08-21

## 2023-05-29 ENCOUNTER — PATIENT MESSAGE (OUTPATIENT)
Dept: CARDIOLOGY | Facility: HOSPITAL | Age: 48
End: 2023-05-29
Payer: MEDICARE

## 2023-06-05 ENCOUNTER — CLINICAL SUPPORT (OUTPATIENT)
Dept: TRANSPLANT | Facility: CLINIC | Age: 48
End: 2023-06-05
Payer: MEDICARE

## 2023-06-05 DIAGNOSIS — I50.42 CHRONIC COMBINED SYSTOLIC AND DIASTOLIC CHF, NYHA CLASS 3: Primary | ICD-10-CM

## 2023-06-05 PROCEDURE — 99213 OFFICE O/P EST LOW 20 MIN: CPT | Mod: S$GLB,,, | Performed by: INTERNAL MEDICINE

## 2023-06-05 PROCEDURE — 99213 PR OFFICE/OUTPT VISIT, EST, LEVL III, 20-29 MIN: ICD-10-PCS | Mod: S$GLB,,, | Performed by: INTERNAL MEDICINE

## 2023-06-05 PROCEDURE — 93264 PR REMOTE MONTR, WIRELESS PULM-ART PRESS SENSOR, < 30 DAYS: ICD-10-PCS | Mod: S$GLB,,, | Performed by: NURSE PRACTITIONER

## 2023-06-05 PROCEDURE — 93264 REM MNTR WRLS P-ART PRS SNR: CPT | Mod: S$GLB,,, | Performed by: NURSE PRACTITIONER

## 2023-06-08 NOTE — PROGRESS NOTES
Pt CardioMems transmission received and reviewed.      CardioMEMS Transmission  3/14/2023 3/6/2023 3/3/2023 2/24/2023   Transmission Date 3/14/2023 3/6/2023 2/28/2023 2/23/2023   Transmission Type Maintenance Maintenance Maintenance Maintenance   PAS 49 29 39 38   LEELA 35 19 26 24   PAD  24 12 19 16   Medication Adjustments  No No No No   Some recent data might be hidden         Cardiomems waveform interpretations, trends, and reports are monitored weekly via [email or web address]. Adjustments made per documentation. Will continue to monitor.

## 2023-06-16 ENCOUNTER — PATIENT MESSAGE (OUTPATIENT)
Dept: PODIATRY | Facility: CLINIC | Age: 48
End: 2023-06-16
Payer: MEDICARE

## 2023-06-19 ENCOUNTER — TELEPHONE (OUTPATIENT)
Dept: TRANSPLANT | Facility: CLINIC | Age: 48
End: 2023-06-19
Payer: MEDICARE
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
## 2023-06-20 DIAGNOSIS — I50.22 NYHA CLASS 2 AND ACC/AHA STAGE C CHRONIC SYSTOLIC CONGESTIVE HEART FAILURE: ICD-10-CM

## 2023-06-21 RX ORDER — SACUBITRIL AND VALSARTAN 97; 103 MG/1; MG/1
TABLET, FILM COATED ORAL
Qty: 60 TABLET | Refills: 11 | Status: SHIPPED | OUTPATIENT
Start: 2023-06-21 | End: 2023-10-17 | Stop reason: SDUPTHER

## 2023-06-28 ENCOUNTER — TELEPHONE (OUTPATIENT)
Dept: TRANSPLANT | Facility: CLINIC | Age: 48
End: 2023-06-28
Payer: MEDICARE

## 2023-06-28 NOTE — TELEPHONE ENCOUNTER
Pt CardioMems transmission received and reviewed with CHRISTOPH Hurd.      CardioMEMS Transmission  6/28/2023 3/14/2023 3/6/2023 3/3/2023   Transmission Date 6/28/2023 3/14/2023 3/6/2023 2/28/2023   Transmission Type Maintenance Maintenance Maintenance Maintenance   PAS 46 49 29 39   LEELA 34 35 19 26   PAD  23 24 12 19   Medication Adjustments  No No No No   Some recent data might be hidden         Pressures are elevated.    Medication Interventions? Yes,  Bumex 2 Mg bid until Monday.    Lab Interventions? No    Patient contacted? Yes, Patient reports no swelling, SOB and weight is stable. Bumex 2mg am/1 mg pm, KCL 20 daily.      Any medication and/or lab instructions are ordered by the provider that reviewed and assessed these numbers and the instructions have been communicated to the patient.    Will continue to monitor.

## 2023-07-07 ENCOUNTER — TELEPHONE (OUTPATIENT)
Dept: TRANSPLANT | Facility: CLINIC | Age: 48
End: 2023-07-07
Payer: MEDICARE

## 2023-07-10 ENCOUNTER — CLINICAL SUPPORT (OUTPATIENT)
Dept: TRANSPLANT | Facility: CLINIC | Age: 48
End: 2023-07-10
Payer: MEDICARE

## 2023-07-10 DIAGNOSIS — I50.42 CHRONIC COMBINED SYSTOLIC AND DIASTOLIC CHF, NYHA CLASS 3: Primary | ICD-10-CM

## 2023-07-10 PROCEDURE — 93264 PR REMOTE MONTR, WIRELESS PULM-ART PRESS SENSOR, < 30 DAYS: ICD-10-PCS | Mod: S$GLB,,, | Performed by: NURSE PRACTITIONER

## 2023-07-10 PROCEDURE — 93264 REM MNTR WRLS P-ART PRS SNR: CPT | Mod: S$GLB,,, | Performed by: NURSE PRACTITIONER

## 2023-07-10 PROCEDURE — 99213 OFFICE O/P EST LOW 20 MIN: CPT | Mod: S$GLB,,, | Performed by: INTERNAL MEDICINE

## 2023-07-10 PROCEDURE — 99213 PR OFFICE/OUTPT VISIT, EST, LEVL III, 20-29 MIN: ICD-10-PCS | Mod: S$GLB,,, | Performed by: INTERNAL MEDICINE

## 2023-07-10 NOTE — PROGRESS NOTES
Pt CardioMems transmission received and reviewed.      CardioMEMS Transmission  6/28/2023 3/14/2023 3/6/2023 3/3/2023   Transmission Date 6/28/2023 3/14/2023 3/6/2023 2/28/2023   Transmission Type Maintenance Maintenance Maintenance Maintenance   PAS 46 49 29 39   LEELA 34 35 19 26   PAD  23 24 12 19   Medication Adjustments  No No No No   Some recent data might be hidden         Cardiomems waveform interpretations, trends, and reports are monitored weekly via [email or web address]. Adjustments made per documentation. Will continue to monitor.

## 2023-08-04 ENCOUNTER — CLINICAL SUPPORT (OUTPATIENT)
Dept: TRANSPLANT | Facility: CLINIC | Age: 48
End: 2023-08-04
Payer: MEDICARE

## 2023-08-04 DIAGNOSIS — I42.8 CARDIOMYOPATHY, NONISCHEMIC: Primary | ICD-10-CM

## 2023-08-04 PROCEDURE — 93264 REM MNTR WRLS P-ART PRS SNR: CPT | Mod: S$GLB,,, | Performed by: NURSE PRACTITIONER

## 2023-08-04 PROCEDURE — 93264 PR REMOTE MONTR, WIRELESS PULM-ART PRESS SENSOR, < 30 DAYS: ICD-10-PCS | Mod: S$GLB,,, | Performed by: NURSE PRACTITIONER

## 2023-08-04 PROCEDURE — 99213 PR OFFICE/OUTPT VISIT, EST, LEVL III, 20-29 MIN: ICD-10-PCS | Mod: S$GLB,,, | Performed by: INTERNAL MEDICINE

## 2023-08-04 PROCEDURE — 99213 OFFICE O/P EST LOW 20 MIN: CPT | Mod: S$GLB,,, | Performed by: INTERNAL MEDICINE

## 2023-08-07 NOTE — PROGRESS NOTES
Pt CardioMems transmission received and reviewed.      CardioMEMS Transmission  6/28/2023 3/14/2023 3/6/2023 3/3/2023   Transmission Date 6/28/2023 3/14/2023 3/6/2023 2/28/2023   Transmission Type Maintenance Maintenance Maintenance Maintenance   PAS 46 49 29 39   LEELA 34 35 19 26   PAD  23 24 12 19   Medication Adjustments  No No No No   Some recent data might be hidden       Pt noncompliant with remote transmissions. Will continue to reach out regarding disruption/continuation of readings.    Cardiomems waveform interpretations, trends, and reports are monitored weekly via [email or web address]. Adjustments made per documentation. Will continue to monitor.

## 2023-08-12 ENCOUNTER — HOSPITAL ENCOUNTER (EMERGENCY)
Facility: HOSPITAL | Age: 48
Discharge: HOME | End: 2023-08-13
Attending: EMERGENCY MEDICINE
Payer: MEDICARE

## 2023-08-12 DIAGNOSIS — R00.2 PALPITATIONS: ICD-10-CM

## 2023-08-12 DIAGNOSIS — F12.90 USE OF CANNABINOID EDIBLES: Primary | ICD-10-CM

## 2023-08-12 LAB
ALBUMIN SERPL BCP-MCNC: 4.1 G/DL (ref 3.5–5.2)
ALP SERPL-CCNC: 39 U/L (ref 38–126)
ALT SERPL W/O P-5'-P-CCNC: 15 U/L (ref 10–44)
ANION GAP SERPL CALC-SCNC: 10 MMOL/L (ref 8–16)
AST SERPL-CCNC: 28 U/L (ref 15–46)
BASOPHILS # BLD AUTO: 0.02 K/UL (ref 0–0.2)
BASOPHILS NFR BLD: 0.2 % (ref 0–1.9)
BILIRUB SERPL-MCNC: 1.3 MG/DL (ref 0.1–1)
CALCIUM SERPL-MCNC: 8.9 MG/DL (ref 8.7–10.5)
CHLORIDE SERPL-SCNC: 106 MMOL/L (ref 95–110)
CO2 SERPL-SCNC: 23 MMOL/L (ref 23–29)
CREAT SERPL-MCNC: 1.53 MG/DL (ref 0.5–1.4)
DIFFERENTIAL METHOD: ABNORMAL
EOSINOPHIL # BLD AUTO: 0.3 K/UL (ref 0–0.5)
EOSINOPHIL NFR BLD: 3.7 % (ref 0–8)
ERYTHROCYTE [DISTWIDTH] IN BLOOD BY AUTOMATED COUNT: 12.1 % (ref 11.5–14.5)
EST. GFR  (NO RACE VARIABLE): 42.5 ML/MIN/1.73 M^2
GLUCOSE SERPL-MCNC: 105 MG/DL (ref 70–110)
HCT VFR BLD AUTO: 35.8 % (ref 37–48.5)
HGB BLD-MCNC: 12.3 G/DL (ref 12–16)
IMM GRANULOCYTES # BLD AUTO: 0.02 K/UL (ref 0–0.04)
IMM GRANULOCYTES NFR BLD AUTO: 0.2 % (ref 0–0.5)
LYMPHOCYTES # BLD AUTO: 3.5 K/UL (ref 1–4.8)
LYMPHOCYTES NFR BLD: 39.3 % (ref 18–48)
MCH RBC QN AUTO: 35.2 PG (ref 27–31)
MCHC RBC AUTO-ENTMCNC: 34.4 G/DL (ref 32–36)
MCV RBC AUTO: 103 FL (ref 82–98)
MONOCYTES # BLD AUTO: 0.7 K/UL (ref 0.3–1)
MONOCYTES NFR BLD: 7.9 % (ref 4–15)
NEUTROPHILS # BLD AUTO: 4.4 K/UL (ref 1.8–7.7)
NEUTROPHILS NFR BLD: 48.7 % (ref 38–73)
NRBC BLD-RTO: 0 /100 WBC
PLATELET # BLD AUTO: 322 K/UL (ref 150–450)
PMV BLD AUTO: 10.6 FL (ref 9.2–12.9)
POTASSIUM SERPL-SCNC: 3.6 MMOL/L (ref 3.5–5.1)
PROT SERPL-MCNC: 7.8 G/DL (ref 6–8.4)
RBC # BLD AUTO: 3.49 M/UL (ref 4–5.4)
SODIUM SERPL-SCNC: 139 MMOL/L (ref 136–145)
TROPONIN I SERPL-MCNC: 0.05 NG/ML (ref 0.01–0.03)
UUN UR-MCNC: 18 MG/DL (ref 7–17)
WBC # BLD AUTO: 8.95 K/UL (ref 3.9–12.7)

## 2023-08-12 PROCEDURE — 93005 ELECTROCARDIOGRAM TRACING: CPT | Mod: ER

## 2023-08-12 PROCEDURE — 80053 COMPREHEN METABOLIC PANEL: CPT | Mod: ER | Performed by: EMERGENCY MEDICINE

## 2023-08-12 PROCEDURE — 96374 THER/PROPH/DIAG INJ IV PUSH: CPT | Mod: ER

## 2023-08-12 PROCEDURE — 93010 ELECTROCARDIOGRAM REPORT: CPT | Mod: ,,, | Performed by: INTERNAL MEDICINE

## 2023-08-12 PROCEDURE — 99285 EMERGENCY DEPT VISIT HI MDM: CPT | Mod: 25,ER

## 2023-08-12 PROCEDURE — 63600175 PHARM REV CODE 636 W HCPCS: Mod: ER | Performed by: EMERGENCY MEDICINE

## 2023-08-12 PROCEDURE — 84484 ASSAY OF TROPONIN QUANT: CPT | Mod: ER | Performed by: EMERGENCY MEDICINE

## 2023-08-12 PROCEDURE — 93010 EKG 12-LEAD: ICD-10-PCS | Mod: ,,, | Performed by: INTERNAL MEDICINE

## 2023-08-12 PROCEDURE — 85025 COMPLETE CBC W/AUTO DIFF WBC: CPT | Mod: ER | Performed by: EMERGENCY MEDICINE

## 2023-08-12 RX ORDER — ONDANSETRON 2 MG/ML
4 INJECTION INTRAMUSCULAR; INTRAVENOUS
Status: COMPLETED | OUTPATIENT
Start: 2023-08-12 | End: 2023-08-12

## 2023-08-12 RX ADMIN — ONDANSETRON 4 MG: 2 INJECTION INTRAMUSCULAR; INTRAVENOUS at 09:08

## 2023-08-13 VITALS
RESPIRATION RATE: 21 BRPM | OXYGEN SATURATION: 98 % | DIASTOLIC BLOOD PRESSURE: 57 MMHG | SYSTOLIC BLOOD PRESSURE: 107 MMHG | WEIGHT: 293 LBS | HEART RATE: 68 BPM | TEMPERATURE: 98 F | BODY MASS INDEX: 43.56 KG/M2

## 2023-08-13 LAB — TROPONIN I SERPL-MCNC: 0.04 NG/ML (ref 0.01–0.03)

## 2023-08-13 NOTE — ED PROVIDER NOTES
ED Provider Note - 2023    History     Chief Complaint   Patient presents with    Palpitations     Reports palpitations and nausea x 1 hour  +dizziness Pt has ICD     Patient currently presents with concern regarding palpitations.  This onset about 1 hour prior to arrival.  Patient does note that the symptoms began after she ingested which she believes to be edible cannabis.  She describes the sensation as a racing heartbeat.  Notably the patient does have a history of nonischemic dilated cardiomyopathy and has a defibrillator in place.      Review of patient's allergies indicates:   Allergen Reactions    Ace inhibitors      Cough     Past Medical History:   Diagnosis Date    Asthma     Chronic back pain 2014    Chronic combined systolic and diastolic congestive heart failure 2015     2-10-17   1 - Severely depressed left ventricular systolic function (EF 20-25%).    2 - Severe left ventricular enlargement.    3 - Severe left atrial enlargement.    4 - Left ventricular diastolic dysfunction.    5 - The estimated PA systolic pressure is 18 mmHg.    6 - Mild mitral regurgitation.     Encounter for blood transfusion     ICD (implantable cardioverter-defibrillator) in place 12/01/15 3/3/2016    Menorrhagia, premenopausal 2/10/2017    Microcytic anemia 2015    Non-rheumatic mitral regurgitation 3/5/2015    Nonischemic dilated cardiomyopathy 2015    Sleep apnea     Syncope and collapse 2017    Ventricular tachycardia, polymorphic      Past Surgical History:   Procedure Laterality Date    CARDIAC DEFIBRILLATOR PLACEMENT  2015     SECTION      INSERTION, WIRELESS SENSOR, FOR PULMONARY ARTERIAL PRESSURE MONITORING N/A 10/22/2021    Procedure: INSERTION, WIRELESS SENSOR, FOR PULMONARY ARTERIAL PRESSURE MONITORING;  Surgeon: Erika Hurd MD;  Location: [facility] CATH LAB;  Service: Cardiology;  Laterality: N/A;    OOPHORECTOMY      PERICARDIOCENTESIS N/A 2020    Procedure:  Pericardiocentesis;  Surgeon: Memo Luis MD;  Location: [facility] CATH LAB;  Service: Cardiology;  Laterality: N/A;    PULMONARY ANGIOGRAPHY N/A 10/22/2021    Procedure: ANGIOGRAM, PULMONARY;  Surgeon: Erika Hurd MD;  Location: [facility] CATH LAB;  Service: Cardiology;  Laterality: N/A;    RIGHT HEART CATHETERIZATION      TOTAL ABDOMINAL HYSTERECTOMY N/A 3/26/2019    Procedure: HYSTERECTOMY, TOTAL, ABDOMINAL;  Surgeon: Victorino Rose MD;  Location: [facility] OR;  Service: OB/GYN;  Laterality: N/A;    TRANSESOPHAGEAL ECHOCARDIOGRAPHY N/A 7/20/2022    Procedure: ECHOCARDIOGRAM, TRANSESOPHAGEAL;  Surgeon: Phan Powell MD;  Location: [facility] EP LAB;  Service: Cardiology;  Laterality: N/A;    TUBAL LIGATION       Family History   Problem Relation Age of Onset    Diabetes Father     Pancreatic cancer Father     Heart failure Father     Heart failure Brother     No Known Problems Daughter     No Known Problems Son     Lung cancer Paternal Grandmother     Heart disease Brother      Social History     Tobacco Use    Smoking status: Never    Smokeless tobacco: Never   Substance Use Topics    Alcohol use: Not Currently     Comment: 1 glass per 3 months    Drug use: No     Review of Systems   Constitutional:  Negative for chills and fever.   HENT:  Negative for congestion and rhinorrhea.    Respiratory:  Negative for cough and shortness of breath.    Cardiovascular:  Positive for palpitations. Negative for chest pain.   Gastrointestinal:  Positive for nausea. Negative for abdominal pain, diarrhea and vomiting.   Genitourinary:  Negative for difficulty urinating and dysuria.   Skin:  Negative for color change and rash.   Neurological:  Positive for dizziness. Negative for light-headedness.   Hematological:  Negative for adenopathy. Does not bruise/bleed easily.       Physical Exam     Initial Vitals [08/12/23 2055]   BP Pulse Resp Temp SpO2   118/66 85 18 97.8 °F (36.6 °C) 100 %      MAP       --         Vitals:    08/12/23  2055 08/12/23 2107 08/12/23 2127 08/12/23 2201   BP: 118/66   (!) 108/58   Pulse: 85 81 77 69   Resp: 18 19 20 (!) 25   Temp: 97.8 °F (36.6 °C)      TempSrc:       SpO2: 100% 98% 95% 96%   Weight: 133.8 kg (295 lb)       08/12/23 2248 08/12/23 2318 08/13/23 0001 08/13/23 0048   BP:  111/62 (!) 107/57    Pulse: 62 64 65 67   Resp: 17 20 15 (!) 24   Temp:  97.8 °F (36.6 °C)     TempSrc:  Oral     SpO2: 98% 100% 97% 97%   Weight:        08/13/23 0055   BP:    Pulse: 68   Resp: (!) 21   Temp:    TempSrc:    SpO2: 98%   Weight:      Physical Exam    Nursing note and vitals reviewed.  Constitutional: She appears well-developed and well-nourished. She is not diaphoretic. No distress.   HENT:   Head: Normocephalic and atraumatic.   Nose: Nose normal.   Mouth/Throat: Oropharynx is clear and moist.   Eyes: Conjunctivae are normal. No scleral icterus.   Cardiovascular:  Normal rate, regular rhythm, normal heart sounds and intact distal pulses.           Pulmonary/Chest: Breath sounds normal. No respiratory distress.   Abdominal: Abdomen is soft. She exhibits no distension. There is no abdominal tenderness.   Musculoskeletal:         General: No edema. Normal range of motion.     Neurological: She is alert and oriented to person, place, and time. She has normal strength.   Skin: Skin is warm and dry.   Psychiatric: Her mood appears anxious.         ED Course   Procedures                   MDM  Differential Diagnoses   Based on available history, the working differential diagnoses considered during this evaluation include but are not limited to  toxidrome, arrhythmia, anxiety .      LABS     Labs Reviewed   CBC W/ AUTO DIFFERENTIAL - Abnormal; Notable for the following components:       Result Value    RBC 3.49 (*)     Hematocrit 35.8 (*)      (*)     MCH 35.2 (*)     All other components within normal limits   COMPREHENSIVE METABOLIC PANEL - Abnormal; Notable for the following components:    BUN 18 (*)     Creatinine  1.53 (*)     Total Bilirubin 1.3 (*)     eGFR 42.5 (*)     All other components within normal limits   TROPONIN I - Abnormal; Notable for the following components:    Troponin I 0.051 (*)     All other components within normal limits   TROPONIN I - Abnormal; Notable for the following components:    Troponin I 0.045 (*)     All other components within normal limits     All available results from the labs ordered were independently reviewed.  CBC unremarkable, CMP notable for creatinine of 1.53, and Troponin RESULT :  Initial level elevated at 0.051 improved on repeat 3 hour draw at 0.045    Imaging     Imaging Results              X-Ray Chest AP Portable (Final result)  Result time 08/12/23 21:33:33      Final result by Linda Mason MD (08/12/23 21:33:33)                   Impression:      No active or adverse finding      Electronically signed by: Linda Mason  Date:    08/12/2023  Time:    21:33               Narrative:    EXAMINATION:  XR CHEST AP PORTABLE    CLINICAL HISTORY:  Chest Pain;    TECHNIQUE:  Single frontal portable view of the chest was performed.    COMPARISON:  February 15, 2023    FINDINGS:  No new pulmonary consolidation or pleural effusion.  Cardiac silhouette enlarged pacemaker stable                                         EKG   EKG Readings: (Independently Interpreted)   Initial Reading: No STEMI. Rhythm: Normal Sinus Rhythm. Heart Rate: 86. Ectopy: No Ectopy. T Waves Flipped: II, III, AVF, V5 and V6. Axis: Normal. Clinical Impression: Left Ventricular Hypertrophy (LDH)       ED Management/Discussion     Medications   ondansetron injection 4 mg (4 mg Intravenous Given 8/12/23 2117)                   The patient's list of active medical problems, social history, medications, and allergies as documented per RN staff has been reviewed.                  At arrival, the patient continues to report palpitations despite regular pulse and normal sinus rhythm per cardiac monitor.  EKG  shows no acute findings.  It appears that her symptoms are likely the result of recent cannabis ingestion.  We have made attempts to have the defibrillator device interrogated without success unfortunately.  Patient is feeling better on reassessment however and I feel she is suitable for outpatient follow-up.    On final assessment, the patient appears well and comfortable for discharge.  I see no indication of an emergent process beyond that addressed during our encounter but have duly counseled the patient/family regarding the need for prompt follow-up as well as the indications that should prompt immediate return to the emergency room should new or worrisome developments occur.  The patient/family has been provided with verbal and printed direction regarding our final diagnosis(es) as well as instructions regarding use of OTC and/or Rx medications intended to manage the patient's aforementioned conditions including:  ED Prescriptions    None           Patient has been advised of the following recommended follow-up instructions:  Follow-up Information       Follow up With Specialties Details Why Contact Info    Tejinder Bowling MD Internal Medicine Schedule an appointment as soon as possible for a visit in 1 day for reassessment [address] [address] [phone]      [facility] - Emergency Dept Emergency Medicine Go to  As needed, If symptoms worsen [address] [facility] [address] [phone]          The patient/family communicates understanding of all this information and all remaining questions and concerns were addressed at this time.      Referrals:  No orders of the defined types were placed in this encounter.      CLINICAL IMPRESSION    ICD-10-CM ICD-9-CM   1. Use of cannabinoid edibles  F12.90 305.20   2. Palpitations  R00.2 785.1          ED Disposition Condition    Discharge Stable               Kei Ibrahim MD  08/13/23 [id number]

## 2023-08-13 NOTE — ED NOTES
Attempted to interrogate ICD but pt's ICD is not compatible with it. She has one that she is able to use the [employer] to look up information but has not since 12/2022. Dr. Ibrahim notified. Per [facility] if they would like it interrogated to call for [facility] representative to come out to .
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
## 2023-08-13 NOTE — ED NOTES
Interrogator rep told this RN and MD it would be better if she could log into her moisés and to f/u with cardiology on Monday. Instead of waiting three hours for the interrogator.

## 2023-08-20 DIAGNOSIS — I50.42 CHRONIC COMBINED SYSTOLIC AND DIASTOLIC CONGESTIVE HEART FAILURE: ICD-10-CM

## 2023-08-21 ENCOUNTER — HOSPITAL ENCOUNTER (INPATIENT)
Facility: HOSPITAL | Age: 48
LOS: 1 days | Discharge: HOME | DRG: 092 | End: 2023-08-22
Attending: EMERGENCY MEDICINE | Admitting: INTERNAL MEDICINE
Payer: MEDICARE

## 2023-08-21 ENCOUNTER — PATIENT MESSAGE (OUTPATIENT)
Dept: FAMILY MEDICINE | Facility: CLINIC | Age: 48
End: 2023-08-21
Payer: MEDICARE

## 2023-08-21 DIAGNOSIS — I34.0 SEVERE MITRAL REGURGITATION: ICD-10-CM

## 2023-08-21 DIAGNOSIS — I50.9 HEART FAILURE: ICD-10-CM

## 2023-08-21 DIAGNOSIS — R20.0 NUMBNESS AND TINGLING IN LEFT HAND: ICD-10-CM

## 2023-08-21 DIAGNOSIS — I42.0 NONISCHEMIC DILATED CARDIOMYOPATHY: Chronic | ICD-10-CM

## 2023-08-21 DIAGNOSIS — F80.81 STUTTERING: ICD-10-CM

## 2023-08-21 DIAGNOSIS — Z86.79 HISTORY OF VENTRICULAR TACHYCARDIA: ICD-10-CM

## 2023-08-21 DIAGNOSIS — R47.01 APHASIA: Primary | ICD-10-CM

## 2023-08-21 DIAGNOSIS — E53.8 B12 DEFICIENCY: ICD-10-CM

## 2023-08-21 DIAGNOSIS — I27.20 PULMONARY HYPERTENSION: ICD-10-CM

## 2023-08-21 DIAGNOSIS — R20.2 NUMBNESS AND TINGLING IN LEFT HAND: ICD-10-CM

## 2023-08-21 DIAGNOSIS — I63.9 STROKE: ICD-10-CM

## 2023-08-21 PROBLEM — G45.9 TIA (TRANSIENT ISCHEMIC ATTACK): Status: ACTIVE | Noted: 2023-08-21

## 2023-08-21 LAB
ALBUMIN SERPL BCP-MCNC: 4 G/DL (ref 3.5–5.2)
ALP SERPL-CCNC: 44 U/L (ref 38–126)
ALT SERPL W/O P-5'-P-CCNC: 20 U/L (ref 10–44)
ANION GAP SERPL CALC-SCNC: 12 MMOL/L (ref 8–16)
AST SERPL-CCNC: 22 U/L (ref 15–46)
BASOPHILS # BLD AUTO: 0.01 K/UL (ref 0–0.2)
BASOPHILS NFR BLD: 0.2 % (ref 0–1.9)
BILIRUB SERPL-MCNC: 1.4 MG/DL (ref 0.1–1)
BILIRUB UR QL STRIP: NEGATIVE
CALCIUM SERPL-MCNC: 8.9 MG/DL (ref 8.7–10.5)
CHLORIDE SERPL-SCNC: 107 MMOL/L (ref 95–110)
CHOLEST SERPL-MCNC: 126 MG/DL (ref 120–199)
CHOLEST/HDLC SERPL: 3 {RATIO} (ref 2–5)
CLARITY UR REFRACT.AUTO: CLEAR
CO2 SERPL-SCNC: 23 MMOL/L (ref 23–29)
COLOR UR AUTO: YELLOW
CREAT SERPL-MCNC: 1.16 MG/DL (ref 0.5–1.4)
DIFFERENTIAL METHOD: ABNORMAL
EOSINOPHIL # BLD AUTO: 0.2 K/UL (ref 0–0.5)
EOSINOPHIL NFR BLD: 4.4 % (ref 0–8)
ERYTHROCYTE [DISTWIDTH] IN BLOOD BY AUTOMATED COUNT: 12.4 % (ref 11.5–14.5)
EST. GFR  (NO RACE VARIABLE): 59.3 ML/MIN/1.73 M^2
ESTIMATED AVG GLUCOSE: 85 MG/DL (ref 68–131)
GLUCOSE SERPL-MCNC: 114 MG/DL (ref 70–110)
GLUCOSE UR QL STRIP: NEGATIVE
HBA1C MFR BLD: 4.6 % (ref 4–5.6)
HCT VFR BLD AUTO: 36.7 % (ref 37–48.5)
HDLC SERPL-MCNC: 42 MG/DL (ref 40–75)
HDLC SERPL: 33.3 % (ref 20–50)
HGB BLD-MCNC: 12.4 G/DL (ref 12–16)
HGB UR QL STRIP: NEGATIVE
IMM GRANULOCYTES # BLD AUTO: 0.01 K/UL (ref 0–0.04)
IMM GRANULOCYTES NFR BLD AUTO: 0.2 % (ref 0–0.5)
KETONES UR QL STRIP: NEGATIVE
LDLC SERPL CALC-MCNC: 73.6 MG/DL (ref 63–159)
LEUKOCYTE ESTERASE UR QL STRIP: NEGATIVE
LYMPHOCYTES # BLD AUTO: 1.5 K/UL (ref 1–4.8)
LYMPHOCYTES NFR BLD: 28.7 % (ref 18–48)
MAGNESIUM SERPL-MCNC: 1.9 MG/DL (ref 1.6–2.6)
MCH RBC QN AUTO: 35.1 PG (ref 27–31)
MCHC RBC AUTO-ENTMCNC: 33.8 G/DL (ref 32–36)
MCV RBC AUTO: 104 FL (ref 82–98)
MONOCYTES # BLD AUTO: 0.2 K/UL (ref 0.3–1)
MONOCYTES NFR BLD: 3.8 % (ref 4–15)
NEUTROPHILS # BLD AUTO: 3.3 K/UL (ref 1.8–7.7)
NEUTROPHILS NFR BLD: 62.7 % (ref 38–73)
NITRITE UR QL STRIP: NEGATIVE
NONHDLC SERPL-MCNC: 84 MG/DL
NRBC BLD-RTO: 0 /100 WBC
PH UR STRIP: 7 [PH] (ref 5–8)
PLATELET # BLD AUTO: 284 K/UL (ref 150–450)
PMV BLD AUTO: 10.4 FL (ref 9.2–12.9)
POCT GLUCOSE: 129 MG/DL (ref 70–110)
POTASSIUM SERPL-SCNC: 3.7 MMOL/L (ref 3.5–5.1)
PROT SERPL-MCNC: 7.6 G/DL (ref 6–8.4)
PROT UR QL STRIP: NEGATIVE
RBC # BLD AUTO: 3.53 M/UL (ref 4–5.4)
SODIUM SERPL-SCNC: 142 MMOL/L (ref 136–145)
SP GR UR STRIP: 1.01 (ref 1–1.03)
T4 FREE SERPL-MCNC: 1.36 NG/DL (ref 0.71–1.51)
TRIGL SERPL-MCNC: 52 MG/DL (ref 30–150)
TSH SERPL DL<=0.005 MIU/L-ACNC: 0.27 UIU/ML (ref 0.4–4)
URN SPEC COLLECT METH UR: NORMAL
UROBILINOGEN UR STRIP-ACNC: NEGATIVE EU/DL
UUN UR-MCNC: 13 MG/DL (ref 7–17)
WBC # BLD AUTO: 5.2 K/UL (ref 3.9–12.7)

## 2023-08-21 PROCEDURE — 80061 LIPID PANEL: CPT | Performed by: STUDENT IN AN ORGANIZED HEALTH CARE EDUCATION/TRAINING PROGRAM

## 2023-08-21 PROCEDURE — 96372 THER/PROPH/DIAG INJ SC/IM: CPT | Mod: 59 | Performed by: STUDENT IN AN ORGANIZED HEALTH CARE EDUCATION/TRAINING PROGRAM

## 2023-08-21 PROCEDURE — 94761 N-INVAS EAR/PLS OXIMETRY MLT: CPT

## 2023-08-21 PROCEDURE — 96361 HYDRATE IV INFUSION ADD-ON: CPT | Mod: ER

## 2023-08-21 PROCEDURE — 83036 HEMOGLOBIN GLYCOSYLATED A1C: CPT | Performed by: STUDENT IN AN ORGANIZED HEALTH CARE EDUCATION/TRAINING PROGRAM

## 2023-08-21 PROCEDURE — 80053 COMPREHEN METABOLIC PANEL: CPT | Mod: ER | Performed by: EMERGENCY MEDICINE

## 2023-08-21 PROCEDURE — G0378 HOSPITAL OBSERVATION PER HR: HCPCS

## 2023-08-21 PROCEDURE — 25000003 PHARM REV CODE 250: Performed by: STUDENT IN AN ORGANIZED HEALTH CARE EDUCATION/TRAINING PROGRAM

## 2023-08-21 PROCEDURE — 93005 ELECTROCARDIOGRAM TRACING: CPT | Mod: ER

## 2023-08-21 PROCEDURE — 96361 HYDRATE IV INFUSION ADD-ON: CPT

## 2023-08-21 PROCEDURE — 84443 ASSAY THYROID STIM HORMONE: CPT | Performed by: STUDENT IN AN ORGANIZED HEALTH CARE EDUCATION/TRAINING PROGRAM

## 2023-08-21 PROCEDURE — 93010 EKG 12-LEAD: ICD-10-PCS | Mod: ,,, | Performed by: INTERNAL MEDICINE

## 2023-08-21 PROCEDURE — 36415 COLL VENOUS BLD VENIPUNCTURE: CPT | Performed by: STUDENT IN AN ORGANIZED HEALTH CARE EDUCATION/TRAINING PROGRAM

## 2023-08-21 PROCEDURE — 25000003 PHARM REV CODE 250

## 2023-08-21 PROCEDURE — 83735 ASSAY OF MAGNESIUM: CPT | Mod: ER | Performed by: EMERGENCY MEDICINE

## 2023-08-21 PROCEDURE — 99900035 HC TECH TIME PER 15 MIN (STAT)

## 2023-08-21 PROCEDURE — 25000003 PHARM REV CODE 250: Mod: ER | Performed by: EMERGENCY MEDICINE

## 2023-08-21 PROCEDURE — 85025 COMPLETE CBC W/AUTO DIFF WBC: CPT | Mod: ER | Performed by: EMERGENCY MEDICINE

## 2023-08-21 PROCEDURE — 99285 EMERGENCY DEPT VISIT HI MDM: CPT | Mod: 25,ER

## 2023-08-21 PROCEDURE — 81003 URINALYSIS AUTO W/O SCOPE: CPT | Mod: ER | Performed by: EMERGENCY MEDICINE

## 2023-08-21 PROCEDURE — 96360 HYDRATION IV INFUSION INIT: CPT | Mod: ER

## 2023-08-21 PROCEDURE — 82607 VITAMIN B-12: CPT

## 2023-08-21 PROCEDURE — 25500020 PHARM REV CODE 255: Mod: ER | Performed by: EMERGENCY MEDICINE

## 2023-08-21 PROCEDURE — 63600175 PHARM REV CODE 636 W HCPCS: Performed by: STUDENT IN AN ORGANIZED HEALTH CARE EDUCATION/TRAINING PROGRAM

## 2023-08-21 PROCEDURE — 36415 COLL VENOUS BLD VENIPUNCTURE: CPT

## 2023-08-21 PROCEDURE — 82746 ASSAY OF FOLIC ACID SERUM: CPT

## 2023-08-21 PROCEDURE — 93010 ELECTROCARDIOGRAM REPORT: CPT | Mod: ,,, | Performed by: INTERNAL MEDICINE

## 2023-08-21 PROCEDURE — 84439 ASSAY OF FREE THYROXINE: CPT | Performed by: STUDENT IN AN ORGANIZED HEALTH CARE EDUCATION/TRAINING PROGRAM

## 2023-08-21 PROCEDURE — 99900035 HC TECH TIME PER 15 MIN (STAT): Mod: ER

## 2023-08-21 PROCEDURE — 82962 GLUCOSE BLOOD TEST: CPT | Mod: ER

## 2023-08-21 RX ORDER — AMIODARONE HYDROCHLORIDE 200 MG/1
200 TABLET ORAL DAILY
Status: DISCONTINUED | OUTPATIENT
Start: 2023-08-22 | End: 2023-08-22 | Stop reason: HOSPADM

## 2023-08-21 RX ORDER — BUMETANIDE 1 MG/1
TABLET ORAL
Qty: 270 TABLET | Refills: 3 | Status: SHIPPED | OUTPATIENT
Start: 2023-08-21 | End: 2024-01-03 | Stop reason: SDUPTHER

## 2023-08-21 RX ORDER — ATORVASTATIN CALCIUM 40 MG/1
40 TABLET, FILM COATED ORAL DAILY
Status: DISCONTINUED | OUTPATIENT
Start: 2023-08-22 | End: 2023-08-22 | Stop reason: HOSPADM

## 2023-08-21 RX ORDER — BUMETANIDE 1 MG/1
1 TABLET ORAL 2 TIMES DAILY
Status: DISCONTINUED | OUTPATIENT
Start: 2023-08-22 | End: 2023-08-22 | Stop reason: HOSPADM

## 2023-08-21 RX ORDER — TIMOLOL MALEATE 5 MG/ML
1 SOLUTION/ DROPS OPHTHALMIC 2 TIMES DAILY
Status: DISCONTINUED | OUTPATIENT
Start: 2023-08-21 | End: 2023-08-22 | Stop reason: HOSPADM

## 2023-08-21 RX ORDER — CYCLOBENZAPRINE HCL 10 MG
10 TABLET ORAL EVERY 8 HOURS PRN
Status: ON HOLD | COMMUNITY
Start: 2023-06-08 | End: 2023-08-22 | Stop reason: HOSPADM

## 2023-08-21 RX ORDER — ACETAMINOPHEN 325 MG/1
650 TABLET ORAL
Status: COMPLETED | OUTPATIENT
Start: 2023-08-21 | End: 2023-08-21

## 2023-08-21 RX ORDER — SODIUM CHLORIDE 9 MG/ML
1000 INJECTION, SOLUTION INTRAVENOUS CONTINUOUS
Status: DISCONTINUED | OUTPATIENT
Start: 2023-08-21 | End: 2023-08-21

## 2023-08-21 RX ORDER — CARVEDILOL 25 MG/1
25 TABLET ORAL 2 TIMES DAILY
Status: DISCONTINUED | OUTPATIENT
Start: 2023-08-21 | End: 2023-08-22 | Stop reason: HOSPADM

## 2023-08-21 RX ORDER — ACETAMINOPHEN 500 MG
1000 TABLET ORAL ONCE
Status: COMPLETED | OUTPATIENT
Start: 2023-08-21 | End: 2023-08-21

## 2023-08-21 RX ORDER — CLOPIDOGREL BISULFATE 75 MG/1
300 TABLET ORAL ONCE
Status: COMPLETED | OUTPATIENT
Start: 2023-08-21 | End: 2023-08-21

## 2023-08-21 RX ORDER — SODIUM CHLORIDE 9 MG/ML
500 INJECTION, SOLUTION INTRAVENOUS
Status: DISCONTINUED | OUTPATIENT
Start: 2023-08-21 | End: 2023-08-21

## 2023-08-21 RX ORDER — SODIUM CHLORIDE 9 MG/ML
500 INJECTION, SOLUTION INTRAVENOUS
Status: COMPLETED | OUTPATIENT
Start: 2023-08-21 | End: 2023-08-21

## 2023-08-21 RX ORDER — CETIRIZINE HYDROCHLORIDE 10 MG/1
10 TABLET ORAL DAILY PRN
COMMUNITY
Start: 2023-05-15

## 2023-08-21 RX ORDER — PROCHLORPERAZINE EDISYLATE 5 MG/ML
5 INJECTION INTRAMUSCULAR; INTRAVENOUS ONCE
Status: DISCONTINUED | OUTPATIENT
Start: 2023-08-21 | End: 2023-08-22

## 2023-08-21 RX ORDER — ENOXAPARIN SODIUM 100 MG/ML
40 INJECTION SUBCUTANEOUS EVERY 24 HOURS
Status: DISCONTINUED | OUTPATIENT
Start: 2023-08-21 | End: 2023-08-22 | Stop reason: HOSPADM

## 2023-08-21 RX ORDER — TOBRAMYCIN 3 MG/ML
SOLUTION/ DROPS OPHTHALMIC
COMMUNITY
Start: 2023-05-15 | End: 2023-08-21

## 2023-08-21 RX ORDER — SODIUM CHLORIDE 0.9 % (FLUSH) 0.9 %
10 SYRINGE (ML) INJECTION
Status: DISCONTINUED | OUTPATIENT
Start: 2023-08-21 | End: 2023-08-22 | Stop reason: HOSPADM

## 2023-08-21 RX ORDER — SPIRONOLACTONE 25 MG/1
25 TABLET ORAL DAILY
Status: DISCONTINUED | OUTPATIENT
Start: 2023-08-22 | End: 2023-08-22 | Stop reason: HOSPADM

## 2023-08-21 RX ORDER — NITROFURANTOIN 25; 75 MG/1; MG/1
100 CAPSULE ORAL 2 TIMES DAILY
COMMUNITY
Start: 2023-06-08 | End: 2023-08-21

## 2023-08-21 RX ORDER — ASPIRIN 81 MG/1
81 TABLET ORAL DAILY
Status: DISCONTINUED | OUTPATIENT
Start: 2023-08-22 | End: 2023-08-22 | Stop reason: HOSPADM

## 2023-08-21 RX ADMIN — ACETAMINOPHEN 1000 MG: 500 TABLET ORAL at 08:08

## 2023-08-21 RX ADMIN — ACETAMINOPHEN 650 MG: 325 TABLET ORAL at 03:08

## 2023-08-21 RX ADMIN — TIMOLOL MALEATE 1 DROP: 5 SOLUTION OPHTHALMIC at 10:08

## 2023-08-21 RX ADMIN — CLOPIDOGREL BISULFATE 300 MG: 75 TABLET ORAL at 10:08

## 2023-08-21 RX ADMIN — ENOXAPARIN SODIUM 40 MG: 40 INJECTION SUBCUTANEOUS at 08:08

## 2023-08-21 RX ADMIN — SODIUM CHLORIDE 500 ML: 9 INJECTION, SOLUTION INTRAVENOUS at 03:08

## 2023-08-21 RX ADMIN — SODIUM CHLORIDE 1000 ML: 9 INJECTION, SOLUTION INTRAVENOUS at 05:08

## 2023-08-21 RX ADMIN — CARVEDILOL 25 MG: 25 TABLET, FILM COATED ORAL at 08:08

## 2023-08-21 RX ADMIN — SACUBITRIL AND VALSARTAN 1 TABLET: 97; 103 TABLET, FILM COATED ORAL at 08:08

## 2023-08-21 RX ADMIN — IOHEXOL 100 ML: 350 INJECTION, SOLUTION INTRAVENOUS at 02:08

## 2023-08-21 NOTE — PHARMACY MED REC
"Admission Medication History     The home medication history was taken by Sandra Martin CPhT.    Medication history obtained from, Patient Verified    You may go to "Admission" then "Reconcile Home Medications" tabs to review and/or act upon these items.     The home medication list has been updated by the Pharmacy department.   Please read ALL comments highlighted in yellow.   Please address this information as you see fit.    Feel free to contact us if you have any questions or require assistance.      The medications listed below were removed from the home medication list.  Please reorder if appropriate:  Patient reports no longer taking the following medication(s):  Vitamin C 250 mg  Diclofenac 1% gel  Ferrous Sulfate 325 mg  Flovent HFA 44 mcg Inhaler  Norco 5-325 mg  Combivent  mcg Inhaler  Loratadine 10 mg  Macrobid 100 mg  Tobrex 0.3% eye drop  Triamcinolone 0.1% cream  Vitamin B-complex      Sandra Martin CPhT.  Ext [phone]               .          "

## 2023-08-21 NOTE — Clinical Note
Diagnosis: Stroke [[id number]]   Future Attending Provider: BIANCA MELGOZA [[id number]]   Admitting Provider:: BIANCA MELGOZA [[id number]]

## 2023-08-21 NOTE — ED PROVIDER NOTES
Encounter Date: 8/21/2023       History     Chief Complaint   Patient presents with    Aphasia     Reports difficulty with words that started at 1830 yesterday. Symptoms come and go. No facial droop. No slurred speech. No obvious extremity weakness.      47-year-old female with a history of CHF, nonischemic cardiomyopathy status post AICD presents with intermittent difficulty speaking that started at 6:30 p.m. yesterday.  Patient says that she had headache and some left arm shaking during the event.  At this time she says her symptoms have improved.  She had another headache this morning but feels like it is subsiding.  Currently denies any focal weakness, facial droop.  She still has intermittent difficulty getting words out.  No chest pain, shortness of breath, abdominal pain, vomiting or diarrhea.  Patient did not come to the ER yesterday because she thought maybe she just got overheated.  She came today because a family member commenced her.      Review of patient's allergies indicates:   Allergen Reactions    Ace inhibitors      Cough     Past Medical History:   Diagnosis Date    Asthma     Chronic back pain 7/1/2014    Chronic combined systolic and diastolic congestive heart failure 4/28/2015     2-10-17   1 - Severely depressed left ventricular systolic function (EF 20-25%).    2 - Severe left ventricular enlargement.    3 - Severe left atrial enlargement.    4 - Left ventricular diastolic dysfunction.    5 - The estimated PA systolic pressure is 18 mmHg.    6 - Mild mitral regurgitation.     Encounter for blood transfusion     ICD (implantable cardioverter-defibrillator) in place 12/01/15 3/3/2016    Menorrhagia, premenopausal 2/10/2017    Microcytic anemia 2/9/2015    Non-rheumatic mitral regurgitation 3/5/2015    Nonischemic dilated cardiomyopathy 12/1/2015    Sleep apnea     Syncope and collapse 2/9/2017    Ventricular tachycardia, polymorphic      Past Surgical History:   Procedure Laterality Date     CARDIAC DEFIBRILLATOR PLACEMENT  2015     SECTION      INSERTION, WIRELESS SENSOR, FOR PULMONARY ARTERIAL PRESSURE MONITORING N/A 10/22/2021    Procedure: INSERTION, WIRELESS SENSOR, FOR PULMONARY ARTERIAL PRESSURE MONITORING;  Surgeon: Erika Hurd MD;  Location: [facility] CATH LAB;  Service: Cardiology;  Laterality: N/A;    OOPHORECTOMY      PERICARDIOCENTESIS N/A 2020    Procedure: Pericardiocentesis;  Surgeon: Memo Luis MD;  Location: [facility] CATH LAB;  Service: Cardiology;  Laterality: N/A;    PULMONARY ANGIOGRAPHY N/A 10/22/2021    Procedure: ANGIOGRAM, PULMONARY;  Surgeon: Erika Hurd MD;  Location: [facility] CATH LAB;  Service: Cardiology;  Laterality: N/A;    RIGHT HEART CATHETERIZATION      TOTAL ABDOMINAL HYSTERECTOMY N/A 3/26/2019    Procedure: HYSTERECTOMY, TOTAL, ABDOMINAL;  Surgeon: Victorino Rose MD;  Location: [facility] OR;  Service: OB/GYN;  Laterality: N/A;    TRANSESOPHAGEAL ECHOCARDIOGRAPHY N/A 2022    Procedure: ECHOCARDIOGRAM, TRANSESOPHAGEAL;  Surgeon: Phan Powell MD;  Location: [facility] EP LAB;  Service: Cardiology;  Laterality: N/A;    TUBAL LIGATION       Family History   Problem Relation Age of Onset    Diabetes Father     Pancreatic cancer Father     Heart failure Father     Heart failure Brother     No Known Problems Daughter     No Known Problems Son     Lung cancer Paternal Grandmother     Heart disease Brother      Social History     Tobacco Use    Smoking status: Never    Smokeless tobacco: Never   Substance Use Topics    Alcohol use: Not Currently     Comment: 1 glass per 3 months    Drug use: No     Review of Systems   Constitutional:  Negative for fever.   Eyes:  Negative for pain.   Respiratory:  Negative for shortness of breath.    Cardiovascular:  Negative for chest pain.   Gastrointestinal:  Negative for abdominal pain, nausea and vomiting.   Genitourinary:  Negative for difficulty urinating.   Musculoskeletal:  Negative for back pain and neck pain.    Neurological:  Positive for speech difficulty and headaches.   Psychiatric/Behavioral:  Negative for confusion.        Physical Exam     Initial Vitals [08/21/23 1244]   BP Pulse Resp Temp SpO2   135/62 77 20 98.3 °F (36.8 °C) 99 %      MAP       --         Physical Exam    Nursing note and vitals reviewed.  HENT:   Head: Atraumatic.   Eyes: Conjunctivae and EOM are normal.   Neck:   Normal range of motion.  Cardiovascular:      Exam reveals no gallop and no friction rub.       No murmur heard.  Pulmonary/Chest: Breath sounds normal. No respiratory distress.   Abdominal: Abdomen is soft. There is no abdominal tenderness.   Musculoskeletal:      Cervical back: Normal range of motion.     Neurological: She is alert and oriented to person, place, and time. She has normal strength. No cranial nerve deficit or sensory deficit.   Occasional expressive aphasia   Psychiatric: She has a normal mood and affect.         ED Course   Procedures  Labs Reviewed   CBC W/ AUTO DIFFERENTIAL - Abnormal; Notable for the following components:       Result Value    RBC 3.53 (*)     Hematocrit 36.7 (*)      (*)     MCH 35.1 (*)     Mono # 0.2 (*)     Mono % 3.8 (*)     All other components within normal limits   COMPREHENSIVE METABOLIC PANEL - Abnormal; Notable for the following components:    Glucose 114 (*)     Total Bilirubin 1.4 (*)     eGFR 59.3 (*)     All other components within normal limits   POCT GLUCOSE - Abnormal; Notable for the following components:    POCT Glucose 129 (*)     All other components within normal limits   MAGNESIUM   URINALYSIS, REFLEX TO URINE CULTURE     EKG Readings: (Independently Interpreted)   Initial Reading: No STEMI. Rhythm: Normal Sinus Rhythm. Heart Rate: 70. Ectopy: No Ectopy. Conduction: LBBB.     ECG Results              EKG 12-lead (In process)  Result time 08/21/23 14:06:02      In process by Interface, Lab In [facility] (08/21/23 14:06:02)                   Narrative:    Test Reason :  I63.9,    Vent. Rate : 070 BPM     Atrial Rate : 070 BPM     P-R Int : 208 ms          QRS Dur : 166 ms      QT Int : 470 ms       P-R-T Axes : 050 037 -28 degrees     QTc Int : 507 ms    Normal sinus rhythm  Left bundle branch block  Abnormal ECG  When compared with ECG of 12-AUG-2023 20:56,  Left bundle branch block is now Present    Referred By: System System           Confirmed By:                                   Imaging Results              CTA Head and Neck (xpd) (Final result)  Result time 08/21/23 15:25:39      Final result by Filipe Castillo MD (08/21/23 15:25:39)                   Impression:      No intracranial large vessel occlusion, hemodynamically significant stenosis or aneurysm.    The visualized cervical carotid and vertebral arteries are patent without hemodynamically significant stenosis.    All CT scans at this facility use dose modulation, iterative reconstruction, and/or weight base dosing when appropriate to reduce radiation dose to as low as reasonably achievable.      Electronically signed by: Filipe Castillo  Date:    08/21/2023  Time:    15:25               Narrative:    EXAMINATION:  CTA HEAD AND NECK (XPD)    CLINICAL HISTORY:  Stroke/TIA, determine embolic source;    TECHNIQUE:  Standard CTA of the intracranial and extracranial circulation was performed after the administration of intravenous contrast in the arterial phase. This examination was interpreted using multiplanar and 3D reconstructions.    COMPARISON:  Head CT same date with multiple priors    FINDINGS:  Extracranial:    Left-sided, 2 vessel aortic arch.  Artifact from high-density venous contrast limits evaluation of the great vessels off the arch and the left subclavian artery.  No hemodynamically significant stenosis in the great vessels off the arch.  The bilateral visualized subclavian arteries are patent.    The visualized right common carotid artery is patent without hemodynamically significant stenosis.  No  hemodynamically significant stenosis of the right carotid bifurcation or right cervical ICA.    The visualized left common carotid artery is patent without evidence of hemodynamically significant stenosis.  No hemodynamically significant stenosis of the left carotid bifurcation or left cervical ICA.    Extracranial vertebral arteries: Artifact obscures evaluation of the left vertebral artery origin.  The right vertebral artery origin is patent.  Normal course and caliber of the visualized bilateral vertebral arteries without hemodynamically significant stenosis, aneurysm or evidence of dissection.    Salivary glands appear within normal limits.  Hypodense nodules within the bilateral thyroid lobes.    No pathologic cervical lymphadenopathy.    Straightening of the normal cervical lordosis may be positional.  There are mild degenerative changes.  No acute or concerning osseous abnormalities.    The visualized lung apices are clear.    Intracranial:    The intracranial ICAs are patent without hemodynamically significant stenosis or aneurysm.  Minor bilateral atherosclerotic calcifications.    The right M1 segment is patent without hemodynamically significant stenosis or aneurysm.  The right M2 and proximal M3 branch vessels are patent.    The left M1 segment is patent without hemodynamically significant stenosis or aneurysm.  The left M2 and proximal M3 branch vessels are patent.    The bilateral A1 segments are present and patent.  There appears to be a patent anterior communicating artery.  The A2 and A3 branch vessels are patent.    The intracranial vertebral arteries are patent to the basilar confluence.  The basilar artery is patent without hemodynamically significant stenosis or aneurysm.    There appear to be patent bilateral posterior communicating arteries.  The bilateral P1 segments are present and patent.  The bilateral P2 and visualized P3 branch vessels are patent.    The origins of the superior  cerebellar and posteroinferior cerebral arteries are the normal limits.    The major dural venous sinuses are patent.                                       CT Head Without Contrast (Final result)  Result time 08/21/23 13:52:20      Final result by Filipe Castillo MD (08/21/23 13:52:20)                   Impression:      No CT evidence of acute intracranial abnormality.    Aspects score is 10.    All CT scans at this facility use dose modulation, iterative reconstruction, and/or weight base dosing when appropriate to reduce radiation dose to as low as reasonably achievable.      Electronically signed by: Filipe Castillo  Date:    08/21/2023  Time:    13:52               Narrative:    EXAMINATION:  CT HEAD WITHOUT CONTRAST    CLINICAL HISTORY:  Neuro deficit, acute, stroke suspected;    TECHNIQUE:  Contiguous axial images were obtained from the skull base through the vertex without intravenous contrast.    COMPARISON:  Head CT 03/17/2017    FINDINGS:  No intracranial hemorrhage. No mass effect or midline shift. Normal parenchymal attenuation. The ventricles and sulci are normal in size and configuration. The visualized paranasal sinuses and bilateral mastoid air cells are clear.  No concerning osseous findings.                                       Medications   0.9%  NaCl infusion (500 mLs Intravenous New Bag 8/21/23 1506)   iohexoL (OMNIPAQUE 350) injection 100 mL (100 mLs Intravenous Given 8/21/23 1445)   acetaminophen tablet 650 mg (650 mg Oral Given 8/21/23 1511)     Medical Decision Making  47-year-old female presenting with intermittent word-finding difficulty and headache.  Vital signs unremarkable.  Neuro exam nonfocal.  Patient with multiple risk factors for stroke/TIA.  Given time of onset of symptoms, patient not tPA candidate.  Will get labs and CT.    Amount and/or Complexity of Data Reviewed  Labs: ordered. Decision-making details documented in ED Course.  Radiology: ordered.    Risk  OTC  drugs.  Prescription drug management.               ED Course as of 08/21/23 1536   Mon Aug 21, 2023   1320 CBC Auto Differential(!) [SN]   1356 Magnesium: 1.9 [SN]   1357 CT brain without acute abnormality [SN]   1409 Comprehensive Metabolic Panel(!) [SN]   1409 On re-evaluation, patient admits that she is frustrated because she knows what she wants to say but still has difficulty getting words out.  Blood pressure slightly lower with systolics in the 90s.  Will give IV fluids but will admit for further stroke workup. [SN]   1536 CTA shows no LVO [SN]      ED Course User Index  [SN] Shravan Dya MD                      Clinical Impression:   Final diagnoses:  [I63.9] Stroke  [R47.01] Aphasia (Primary)        ED Disposition Condition    Observation Stable                Shravan Day MD  08/21/23 1536

## 2023-08-21 NOTE — H&P
[facility] Medicine H&P Note     Admitting Team: [facility] Hospitalist Team A  Attending Physician: Lottie Magana MD  Resident: Miguel Angel  Intern: Kaye    Date of Admit: 8/21/2023    Chief Complaint     Word-finding difficulty since 6:30PM on 8/20    Subjective:      History of Present Illness:  Mario Mahajan is a 47 y.o. F with past medical history of non-ischemic cardiomyopathy (EF 25% on 4/2023; normal coronary angiogram 2017), VT s/p AICD 2015, severe functional MR s/p mitral clip 2020, pulmonary HTN who presented to [facility] ED on 8/21/2023 for word-finding difficulty since 6:30PM on 8/20.     The patient was in their usual state of health (independent of all ADLs) until 6:30PM on 8/20 when she was in the kitchen and began to have word-finding difficulty as well as headache and L hand numbness and tremor. She also had L mouth numbness with subsequent drooling. She went to lie down for a few minutes and her symptoms slightly subsided. Her symptoms continued onto today, prompting her to go to the ED.     In ED, vitals wnl. Labs unremarkable. CTH negative. CTA neck with no LVO. Transferred to [facility] for further workup.     Past Medical History:  Non-ischemic cardiomyopathy (EF 25% on 4/2023; normal coronary angiogram 2017)  VT s/p AICD 2015  Severe functional MR   Pulmonary HTN (PASP 52 on 4/2023)  Obesity class 3  Glaucoma    Past Surgical History:  2015 AICD  2015 RHC  2017 LHC  2019 total hysterectomy  2020 mitral clip    Allergies:  Review of patient's allergies indicates:   Allergen Reactions    Ace inhibitors      Cough       Home Medications:  Medications reconciled with pt. Med rec completed by pharmacy.  Current Outpatient Medications   Medication Instructions    albuterol (PROVENTIL/VENTOLIN HFA) 90 mcg/actuation inhaler 2 puffs, Inhalation, Every 6 hours PRN    amiodarone (PACERONE) 200 mg, Oral, Daily    aspirin (ECOTRIN) 81 mg, Oral, Daily    bumetanide (BUMEX) 1 MG tablet TAKE 2 TABLETS BY MOUTH EVERY  MORNING AND 1 TABLET BY MOUTH EVERY EVENING.    carvediloL (COREG) 25 MG tablet TAKE 1 TABLET(25 MG) BY MOUTH TWICE DAILY    cetirizine (ZYRTEC) 10 mg, Oral, Daily PRN    cyclobenzaprine (FLEXERIL) 10 mg, Oral, Every 8 hours PRN    ENTRESTO  mg per tablet TAKE 1 TABLET BY MOUTH TWICE DAILY    FARXIGA 10 mg, Oral, Daily    OZEMPIC 0.5 mg, Subcutaneous, Every 7 days, Start with 0.25 mg weekly for 4 weeks, then increase to 0.5 mg weekly    potassium chloride SA (K-DUR,KLOR-CON) 20 MEQ tablet TAKE 2 TABLETS(40 MEQ) BY MOUTH EVERY DAY    spironolactone (ALDACTONE) 25 MG tablet TAKE 1 TABLET(25 MG) BY MOUTH EVERY DAY    timolol maleate 0.5% (TIMOPTIC) 0.5 % Drop INSTILL 1 DROP INTO BOTH EYES EVERY 12 HOURS        Family History:  Father: HTN, DM, pancreatic cancer  Brother: heart disease    Social History:  Alcohol use: occasional. 1 glass per 3 months  Tobacco use: never  Recreational drug use: denies    Review of Systems:  A comprehensive review of systems was negative unless otherwise stated in the HPI.     Health Maintaince :   Primary Care Physician:  Tejinder Bowling MD     Immunizations:   Influenza:   not UTD  COVID-19:   received 2 doses  Pneumonia:   UTD  TDap:    UTD    Cancer Screening:  Colonoscopy:   cologuard negative 1/2023  PAP:    s/p total hysterectomy  Mammogram:   negative 12/2022     Objective     ED Vitals: T 98.3, HR 77, /62, RR 20, O2 99% on RA, BMI 43.56    ED Intervention: 1.5L NS, tylenol 650mg    Physical Examination:  Exam Vitals: T 97.9, HR 63, /56, RR 20, O2 98% on RA    General: No acute distress, resting comfortably   HEENT: Atraumatic, normocephalic  Cardiovascular: Regular rate, holosystolic murmur best heard at apex  Respiratory: Stable on room air, normal work of breathing, no conversational dyspnea, no accessory muscle use noted, clear to auscultation bilaterally, no wheezes or crackles   Abdominal: Non-distended, soft, non-tender to palpation, no  guarding  Extremities: Atraumatic, non-edematous, moving all four extremities  Skin: Non-diaphoretic, warm, dry  Neurologic: Aox3. Slight word-finding difficulty. EOMI. No visual field deficits. CN XI, XII intact.. 5/5 strength all extremities.    Laboratory:  Recent Labs   Lab 08/21/23  1310   WBC 5.20   HGB 12.4      *      K 3.7      CO2 23   BUN 13   CREATININE 1.16   *   CALCIUM 8.9   PROT 7.6   ALBUMIN 4.0   MG 1.9   AST 22   ALT 20   ALKPHOS 44       All laboratory data reviewed    Microbiology Data: None    EKG Data: NSR, LBBB (on previous EKGs)    Radiology Data:  8/21 CTH:  No CT evidence of acute intracranial abnormality.    8/21 CTA head neck:  No intracranial large vessel occlusion, hemodynamically significant stenosis or aneurysm.  The visualized cervical carotid and vertebral arteries are patent without hemodynamically significant stenosis.    Assessment/Plan     Mario Mahajan is a 47 y.o. F with past medical history of non-ischemic cardiomyopathy (EF 25% on 4/2023; normal coronary angiogram 2017), VT s/p AICD 2015, severe functional MR s/p mitral clip 2020, pulmonary HTN who presented to [facility] ED on 8/21/2023 for word-finding difficulty since 6:30PM on 8/20. CTH negative. CTA neck with no LVO.      Aphasia  Concern for acute CVA vs TIA  - pt with word-finding difficulty and associated headache, L mouth numbness with subsequent drooling, L hand tremor and numbness  - 8/21 CTH negative  - 8/21 CTA head neck with no LVO  - A1c 4.6  Plan:  - continue home ASA  - given NIHSS 2, will give plavix load and start plavix  - not on home statin. Will start atorvastatin 40  - permissive HTN with max /120 for 24hrs  - TTE with bubble pending  - MRI brain and MRI brain perfusion pending  - TSH, lipid panel, B12, folate pending  - PT/OT consulted    Non-ischemic cardiomyopathy (EF 25% on 4/2023; normal coronary angiogram 2017)  VT s/p AICD 2015  Severe functional MR s/p  mitral clip 2020  Pulmonary HTN  - 4/2023 TTE: EF 25%, severe LAE, severe functional MR, PASP 52  - continue home amiodarone, bumex, coreg, entresto, spironolactone  - hold home farxiga while inpatient    Obesity class 3  - follows in bariatric clinic  - home meds: ozempic    Glaucoma  - continue home timolol eye drops    Healthcare maintenance   - primary care provider is Tejinder Bowling MD   - not UTD on flu    Diet: Cardiac  VTE PPx: Lovenox  Code Status: Full    Dispo: pending stroke workup  Estimated LOS: 1-2 days       June Restrepo MD  [facility] Internal [facility] PGY-1      [facility] Medicine Hospitalist Pager numbers:   [facility] Hospitalist Medicine Team A (Jan/[place]): [phone]  [facility] Hospitalist Medicine Team B (Rosi/Ck):  [phone]

## 2023-08-22 VITALS
DIASTOLIC BLOOD PRESSURE: 44 MMHG | RESPIRATION RATE: 18 BRPM | HEART RATE: 64 BPM | WEIGHT: 264 LBS | TEMPERATURE: 98 F | HEIGHT: 69 IN | SYSTOLIC BLOOD PRESSURE: 100 MMHG | BODY MASS INDEX: 39.1 KG/M2 | OXYGEN SATURATION: 96 %

## 2023-08-22 PROBLEM — F80.81 STUTTERING: Status: ACTIVE | Noted: 2023-08-22

## 2023-08-22 PROBLEM — I27.20 PULMONARY HYPERTENSION: Status: ACTIVE | Noted: 2023-08-22

## 2023-08-22 PROBLEM — R20.2 NUMBNESS AND TINGLING IN LEFT HAND: Status: ACTIVE | Noted: 2023-08-22

## 2023-08-22 PROBLEM — R20.0 NUMBNESS AND TINGLING IN LEFT HAND: Status: ACTIVE | Noted: 2023-08-22

## 2023-08-22 PROBLEM — I34.0 SEVERE MITRAL REGURGITATION: Status: ACTIVE | Noted: 2023-08-22

## 2023-08-22 LAB
ALBUMIN SERPL BCP-MCNC: 3.5 G/DL (ref 3.5–5.2)
ALP SERPL-CCNC: 44 U/L (ref 55–135)
ALT SERPL W/O P-5'-P-CCNC: 10 U/L (ref 10–44)
AMPHET+METHAMPHET UR QL: NEGATIVE
ANION GAP SERPL CALC-SCNC: 10 MMOL/L (ref 8–16)
APTT PPP: 28.1 SEC (ref 21–32)
ASCENDING AORTA: 2.31 CM
AST SERPL-CCNC: 12 U/L (ref 10–40)
AV INDEX (PROSTH): 0.63
AV MEAN GRADIENT: 4 MMHG
AV PEAK GRADIENT: 7 MMHG
AV VALVE AREA BY VELOCITY RATIO: 2.69 CM²
AV VALVE AREA: 2.65 CM²
AV VELOCITY RATIO: 0.64
BARBITURATES UR QL SCN>200 NG/ML: NEGATIVE
BASOPHILS # BLD AUTO: 0.01 K/UL (ref 0–0.2)
BASOPHILS NFR BLD: 0.2 % (ref 0–1.9)
BENZODIAZ UR QL SCN>200 NG/ML: NEGATIVE
BILIRUB SERPL-MCNC: 1.4 MG/DL (ref 0.1–1)
BSA FOR ECHO PROCEDURE: 2.41 M2
BUN SERPL-MCNC: 12 MG/DL (ref 6–20)
BZE UR QL SCN: NEGATIVE
CALCIUM SERPL-MCNC: 8.9 MG/DL (ref 8.7–10.5)
CANNABINOIDS UR QL SCN: NEGATIVE
CHLORIDE SERPL-SCNC: 107 MMOL/L (ref 95–110)
CO2 SERPL-SCNC: 23 MMOL/L (ref 23–29)
CREAT SERPL-MCNC: 1.2 MG/DL (ref 0.5–1.4)
CREAT UR-MCNC: 140.5 MG/DL (ref 15–325)
CV ECHO LV RWT: 0.31 CM
DIFFERENTIAL METHOD: ABNORMAL
DOP CALC AO PEAK VEL: 1.31 M/S
DOP CALC AO VTI: 27.3 CM
DOP CALC LVOT AREA: 4.2 CM2
DOP CALC LVOT DIAMETER: 2.31 CM
DOP CALC LVOT PEAK VEL: 0.84 M/S
DOP CALC LVOT STROKE VOLUME: 72.47 CM3
DOP CALC MV VTI: 33.4 CM
DOP CALCLVOT PEAK VEL VTI: 17.3 CM
E WAVE DECELERATION TIME: 226.09 MSEC
E/A RATIO: 1.33
E/E' RATIO: 20.2 M/S
ECHO LV POSTERIOR WALL: 1.19 CM (ref 0.6–1.1)
EOSINOPHIL # BLD AUTO: 0.2 K/UL (ref 0–0.5)
EOSINOPHIL NFR BLD: 3.8 % (ref 0–8)
ERYTHROCYTE [DISTWIDTH] IN BLOOD BY AUTOMATED COUNT: 12.9 % (ref 11.5–14.5)
EST. GFR  (NO RACE VARIABLE): 57 ML/MIN/1.73 M^2
FOLATE SERPL-MCNC: 7.6 NG/ML (ref 4–24)
FRACTIONAL SHORTENING: 9 % (ref 28–44)
GLUCOSE SERPL-MCNC: 92 MG/DL (ref 70–110)
HCT VFR BLD AUTO: 36.4 % (ref 37–48.5)
HGB BLD-MCNC: 12.1 G/DL (ref 12–16)
IMM GRANULOCYTES # BLD AUTO: 0.02 K/UL (ref 0–0.04)
IMM GRANULOCYTES NFR BLD AUTO: 0.3 % (ref 0–0.5)
INR PPP: 1 (ref 0.8–1.2)
INTERVENTRICULAR SEPTUM: 0.83 CM (ref 0.6–1.1)
IVC DIAMETER: 1.31 CM
LA MAJOR: 6.51 CM
LA MINOR: 6.54 CM
LA WIDTH: 5.3 CM
LEFT ATRIUM SIZE: 6.38 CM
LEFT ATRIUM VOLUME INDEX MOD: 58.8 ML/M2
LEFT ATRIUM VOLUME INDEX: 80.8 ML/M2
LEFT ATRIUM VOLUME MOD: 136.53 CM3
LEFT ATRIUM VOLUME: 187.54 CM3
LEFT INTERNAL DIMENSION IN SYSTOLE: 6.91 CM (ref 2.1–4)
LEFT VENTRICLE DIASTOLIC VOLUME INDEX: 132.34 ML/M2
LEFT VENTRICLE DIASTOLIC VOLUME: 307.02 ML
LEFT VENTRICLE MASS INDEX: 162 G/M2
LEFT VENTRICLE SYSTOLIC VOLUME INDEX: 107 ML/M2
LEFT VENTRICLE SYSTOLIC VOLUME: 248.16 ML
LEFT VENTRICULAR INTERNAL DIMENSION IN DIASTOLE: 7.6 CM (ref 3.5–6)
LEFT VENTRICULAR MASS: 376.08 G
LV LATERAL E/E' RATIO: 25.25 M/S
LV SEPTAL E/E' RATIO: 16.83 M/S
LVOT MG: 1.68 MMHG
LVOT MV: 0.61 CM/S
LYMPHOCYTES # BLD AUTO: 2 K/UL (ref 1–4.8)
LYMPHOCYTES NFR BLD: 34 % (ref 18–48)
MAGNESIUM SERPL-MCNC: 1.9 MG/DL (ref 1.6–2.6)
MCH RBC QN AUTO: 34.5 PG (ref 27–31)
MCHC RBC AUTO-ENTMCNC: 33.2 G/DL (ref 32–36)
MCV RBC AUTO: 104 FL (ref 82–98)
METHADONE UR QL SCN>300 NG/ML: NEGATIVE
MONOCYTES # BLD AUTO: 0.4 K/UL (ref 0.3–1)
MONOCYTES NFR BLD: 6.9 % (ref 4–15)
MV A" WAVE DURATION": 159.85 MSEC
MV PEAK A VEL: 0.76 M/S
MV PEAK E VEL: 1.01 M/S
MV PEAK GRADIENT: 4 MMHG
MV STENOSIS PRESSURE HALF TIME: 65.57 MS
MV VALVE AREA BY CONTINUITY EQUATION: 2.17 CM2
MV VALVE AREA P 1/2 METHOD: 3.36 CM2
NEUTROPHILS # BLD AUTO: 3.2 K/UL (ref 1.8–7.7)
NEUTROPHILS NFR BLD: 54.8 % (ref 38–73)
NRBC BLD-RTO: 0 /100 WBC
OHS LV EJECTION FRACTION SIMPSONS BIPLANE MOD: 22 %
OPIATES UR QL SCN: NEGATIVE
PCP UR QL SCN>25 NG/ML: NEGATIVE
PHOSPHATE SERPL-MCNC: 4.6 MG/DL (ref 2.7–4.5)
PISA MRMAX VEL: 5.31 M/S
PISA TR MAX VEL: 2.85 M/S
PLATELET # BLD AUTO: 270 K/UL (ref 150–450)
PMV BLD AUTO: 10.4 FL (ref 9.2–12.9)
POTASSIUM SERPL-SCNC: 3.6 MMOL/L (ref 3.5–5.1)
PROT SERPL-MCNC: 6.8 G/DL (ref 6–8.4)
PROTHROMBIN TIME: 11.1 SEC (ref 9–12.5)
RA MAJOR: 4.23 CM
RA PRESSURE ESTIMATED: 3 MMHG
RA WIDTH: 3.67 CM
RBC # BLD AUTO: 3.51 M/UL (ref 4–5.4)
RV TB RVSP: 6 MMHG
RV TISSUE DOPPLER FREE WALL SYSTOLIC VELOCITY 1 (APICAL 4 CHAMBER VIEW): 10.78 CM/S
SODIUM SERPL-SCNC: 140 MMOL/L (ref 136–145)
STJ: 1.85 CM
TDI LATERAL: 0.04 M/S
TDI SEPTAL: 0.06 M/S
TDI: 0.05 M/S
TOXICOLOGY INFORMATION: NORMAL
TR MAX PG: 32 MMHG
TRICUSPID ANNULAR PLANE SYSTOLIC EXCURSION: 2.7 CM
TV REST PULMONARY ARTERY PRESSURE: 35 MMHG
VIT B12 SERPL-MCNC: <148 PG/ML (ref 210–950)
WBC # BLD AUTO: 5.82 K/UL (ref 3.9–12.7)
Z-SCORE OF LEFT VENTRICULAR DIMENSION IN END DIASTOLE: -1.97
Z-SCORE OF LEFT VENTRICULAR DIMENSION IN END SYSTOLE: 1.69

## 2023-08-22 PROCEDURE — 36415 COLL VENOUS BLD VENIPUNCTURE: CPT | Performed by: STUDENT IN AN ORGANIZED HEALTH CARE EDUCATION/TRAINING PROGRAM

## 2023-08-22 PROCEDURE — 92523 SPEECH SOUND LANG COMPREHEN: CPT

## 2023-08-22 PROCEDURE — 80307 DRUG TEST PRSMV CHEM ANLYZR: CPT | Performed by: STUDENT IN AN ORGANIZED HEALTH CARE EDUCATION/TRAINING PROGRAM

## 2023-08-22 PROCEDURE — 92610 EVALUATE SWALLOWING FUNCTION: CPT

## 2023-08-22 PROCEDURE — 84100 ASSAY OF PHOSPHORUS: CPT | Performed by: STUDENT IN AN ORGANIZED HEALTH CARE EDUCATION/TRAINING PROGRAM

## 2023-08-22 PROCEDURE — 97165 OT EVAL LOW COMPLEX 30 MIN: CPT

## 2023-08-22 PROCEDURE — 25000003 PHARM REV CODE 250

## 2023-08-22 PROCEDURE — 80053 COMPREHEN METABOLIC PANEL: CPT | Performed by: STUDENT IN AN ORGANIZED HEALTH CARE EDUCATION/TRAINING PROGRAM

## 2023-08-22 PROCEDURE — 11000001 HC ACUTE MED/SURG PRIVATE ROOM

## 2023-08-22 PROCEDURE — 85025 COMPLETE CBC W/AUTO DIFF WBC: CPT | Performed by: STUDENT IN AN ORGANIZED HEALTH CARE EDUCATION/TRAINING PROGRAM

## 2023-08-22 PROCEDURE — 99223 PR INITIAL HOSPITAL CARE,LEVL III: ICD-10-PCS | Mod: ,,, | Performed by: PSYCHIATRY & NEUROLOGY

## 2023-08-22 PROCEDURE — 63600175 PHARM REV CODE 636 W HCPCS: Performed by: STUDENT IN AN ORGANIZED HEALTH CARE EDUCATION/TRAINING PROGRAM

## 2023-08-22 PROCEDURE — 85730 THROMBOPLASTIN TIME PARTIAL: CPT | Performed by: STUDENT IN AN ORGANIZED HEALTH CARE EDUCATION/TRAINING PROGRAM

## 2023-08-22 PROCEDURE — 94761 N-INVAS EAR/PLS OXIMETRY MLT: CPT

## 2023-08-22 PROCEDURE — 63600175 PHARM REV CODE 636 W HCPCS

## 2023-08-22 PROCEDURE — 97535 SELF CARE MNGMENT TRAINING: CPT

## 2023-08-22 PROCEDURE — 25000003 PHARM REV CODE 250: Performed by: STUDENT IN AN ORGANIZED HEALTH CARE EDUCATION/TRAINING PROGRAM

## 2023-08-22 PROCEDURE — 85610 PROTHROMBIN TIME: CPT | Performed by: STUDENT IN AN ORGANIZED HEALTH CARE EDUCATION/TRAINING PROGRAM

## 2023-08-22 PROCEDURE — 83735 ASSAY OF MAGNESIUM: CPT | Performed by: STUDENT IN AN ORGANIZED HEALTH CARE EDUCATION/TRAINING PROGRAM

## 2023-08-22 PROCEDURE — 99223 1ST HOSP IP/OBS HIGH 75: CPT | Mod: ,,, | Performed by: PSYCHIATRY & NEUROLOGY

## 2023-08-22 PROCEDURE — 96361 HYDRATE IV INFUSION ADD-ON: CPT

## 2023-08-22 RX ORDER — CYANOCOBALAMIN 1000 UG/ML
1000 INJECTION, SOLUTION INTRAMUSCULAR; SUBCUTANEOUS DAILY
Status: DISCONTINUED | OUTPATIENT
Start: 2023-08-22 | End: 2023-08-22 | Stop reason: HOSPADM

## 2023-08-22 RX ORDER — CLOPIDOGREL BISULFATE 75 MG/1
75 TABLET ORAL DAILY
Status: DISCONTINUED | OUTPATIENT
Start: 2023-08-22 | End: 2023-08-22

## 2023-08-22 RX ORDER — BUTALBITAL, ACETAMINOPHEN AND CAFFEINE 50; 325; 40 MG/1; MG/1; MG/1
1 TABLET ORAL EVERY 6 HOURS PRN
Qty: 10 TABLET | Refills: 0 | Status: SHIPPED | OUTPATIENT
Start: 2023-08-22 | End: 2023-09-21

## 2023-08-22 RX ORDER — BUTALBITAL, ACETAMINOPHEN AND CAFFEINE 50; 325; 40 MG/1; MG/1; MG/1
1 TABLET ORAL ONCE
Status: COMPLETED | OUTPATIENT
Start: 2023-08-22 | End: 2023-08-22

## 2023-08-22 RX ORDER — BUTALBITAL, ACETAMINOPHEN AND CAFFEINE 50; 325; 40 MG/1; MG/1; MG/1
1 TABLET ORAL EVERY 4 HOURS PRN
Status: DISCONTINUED | OUTPATIENT
Start: 2023-08-22 | End: 2023-08-22

## 2023-08-22 RX ORDER — ACETAMINOPHEN 325 MG/1
650 TABLET ORAL ONCE
Status: COMPLETED | OUTPATIENT
Start: 2023-08-22 | End: 2023-08-22

## 2023-08-22 RX ORDER — LANOLIN ALCOHOL/MO/W.PET/CERES
1000 CREAM (GRAM) TOPICAL DAILY
Qty: 100 TABLET | Refills: 3 | Status: SHIPPED | OUTPATIENT
Start: 2023-08-22

## 2023-08-22 RX ORDER — ATORVASTATIN CALCIUM 40 MG/1
40 TABLET, FILM COATED ORAL DAILY
Qty: 90 TABLET | Refills: 3 | Status: SHIPPED | OUTPATIENT
Start: 2023-08-23 | End: 2024-01-03 | Stop reason: SDUPTHER

## 2023-08-22 RX ADMIN — CARVEDILOL 25 MG: 25 TABLET, FILM COATED ORAL at 09:08

## 2023-08-22 RX ADMIN — SODIUM CHLORIDE, POTASSIUM CHLORIDE, SODIUM LACTATE AND CALCIUM CHLORIDE 500 ML: 600; 310; 30; 20 INJECTION, SOLUTION INTRAVENOUS at 06:08

## 2023-08-22 RX ADMIN — CYANOCOBALAMIN 1000 MCG: 1000 INJECTION, SOLUTION INTRAMUSCULAR; SUBCUTANEOUS at 09:08

## 2023-08-22 RX ADMIN — SPIRONOLACTONE 25 MG: 25 TABLET, FILM COATED ORAL at 09:08

## 2023-08-22 RX ADMIN — BUMETANIDE 1 MG: 1 TABLET ORAL at 09:08

## 2023-08-22 RX ADMIN — CLOPIDOGREL BISULFATE 75 MG: 75 TABLET ORAL at 12:08

## 2023-08-22 RX ADMIN — SACUBITRIL AND VALSARTAN 1 TABLET: 97; 103 TABLET, FILM COATED ORAL at 09:08

## 2023-08-22 RX ADMIN — TIMOLOL MALEATE 1 DROP: 5 SOLUTION OPHTHALMIC at 09:08

## 2023-08-22 RX ADMIN — ACETAMINOPHEN 650 MG: 325 TABLET ORAL at 02:08

## 2023-08-22 RX ADMIN — BUMETANIDE 1 MG: 1 TABLET ORAL at 05:08

## 2023-08-22 RX ADMIN — AMIODARONE HYDROCHLORIDE 200 MG: 200 TABLET ORAL at 09:08

## 2023-08-22 RX ADMIN — ATORVASTATIN CALCIUM 40 MG: 40 TABLET, FILM COATED ORAL at 09:08

## 2023-08-22 RX ADMIN — BUTALBITAL, ACETAMINOPHEN, AND CAFFEINE 1 TABLET: 50; 325; 40 TABLET ORAL at 05:08

## 2023-08-22 RX ADMIN — ASPIRIN 81 MG: 81 TABLET, COATED ORAL at 09:08

## 2023-08-22 NOTE — PLAN OF CARE
Oriented pt to room. Vitals taken. Aox4 Ambulated to bathroom. Tele box placed. Pt show no signs of distress.

## 2023-08-22 NOTE — PLAN OF CARE
Problem: Adult Inpatient Plan of Care  Goal: Plan of Care Review  Outcome: Ongoing, Progressing     Problem: Adult Inpatient Plan of Care  Goal: Optimal Comfort and Wellbeing  Outcome: Ongoing, Progressing     Problem: Adjustment to Illness (Stroke, Ischemic/Transient Ischemic Attack)  Goal: Optimal Coping  Outcome: Ongoing, Progressing     Problem: Cerebral Tissue Perfusion (Stroke, Ischemic/Transient Ischemic Attack)  Goal: Optimal Cerebral Tissue Perfusion  Outcome: Ongoing, Progressing

## 2023-08-22 NOTE — PLAN OF CARE
08/21/23 1953   Admission   Initial VN Admission Questions Complete   Communication Issues? None   Shift   Virtual Nurse - Rounding Complete   Pain Management Interventions pain management plan reviewed with patient/caregiver   Virtual Nurse - Patient Verbalized Approval Of Camera Use;VN Rounding   Type of Frequent Check   Type Patient Rounds   Safety/Activity   Patient Rounds bed in low position;call light in patient/parent reach;placement of personal items at bedside;clutter free environment maintained;visualized patient   Safety Promotion/Fall Prevention instructed to call staff for mobility;Fall Risk reviewed with patient/family     Pt arrived to unit. Introduced self as VN for this shift. Admission questions completed by VN. Educated pt on VTE risk, safety precautions, and VN's role in pt care. Opportunity given for pt's questions. All questions answered.

## 2023-08-22 NOTE — PLAN OF CARE
RALF met with pt and pt's mother Mario [phone] at bedside this AM to complete DCA. Pt reported 6/10 pain but agreed to answer sw questions. Pt stated that she lives at home alone but will have family support at time of d/c. Pt does not use any HME and is fully independent in her ADL's. SW requested f/u appts for pt. White board updated with CM name and contact information.  Discharge brochure provided.  Pt encouraged to call with any questions or concerns.  Cm will continue to follow pt through transitions of care and assist with any discharge needs.    Natanael Burns, MSW  [phone]    Future Appointments   Date Time Provider Department Center   8/22/2023  3:15 PM Lissett Gamble MD [facility] BARIAT [facility]   8/23/2023 10:00 AM HOME MONITOR DEVICE CHECK, [facility] ARRHPRO [facility]   9/1/2023 12:15 PM CARDIOMEMS [facility] HEARTTX [facility]   10/6/2023 12:15 PM CARDIOMEMS [facility]   11/3/2023 12:15 PM CARDIOMEMS [facility]   11/7/2023 12:30 PM LAB, APPOINTMENT [facility] LAB VNP [facility]   11/7/2023  2:30 PM Jeimy Srivastava MD [facility] HEARTTX [facility]   12/1/2023 12:15 PM CARDIOMEMS [facility]        08/22/23 1420   Discharge Assessment   Assessment Type Discharge Planning Assessment   Confirmed/corrected address, phone number and insurance Yes   Confirmed Demographics Correct on Facesheet   Source of Information family;patient   When was your last doctors appointment?   (per pt over 3 months)   Communicated ANUPAMA with patient/caregiver Yes   Reason For Admission aphasia   People in Home alone   Facility Arrived From: home   Do you expect to return to your current living situation? Yes   Do you have help at home or someone to help you manage your care at home? Yes   Who are your caregiver(s) and their phone number(s)? mom Mario [phone]   Prior to hospitilization cognitive status: Alert/Oriented   Current cognitive status: Alert/Oriented   Dressing/Bathing bathing difficulty,  requires equipment   Home Accessibility wheelchair accessible   Home Layout Able to live on 1st floor   Equipment Currently Used at Home none   Readmission within 30 days? No   Patient currently being followed by outpatient case management? No   Do you currently have service(s) that help you manage your care at home? No   Do you take prescription medications? Yes   Do you have prescription coverage? Yes   Coverage PHN   Do you have any problems affording any of your prescribed medications? No   Is the patient taking medications as prescribed? yes   Who is going to help you get home at discharge? genesis Martin [phone]   How do you get to doctors appointments? family or friend will provide;car, drives self   Are you on dialysis? No   Do you take coumadin? No   DME Needed Upon Discharge  none   Discharge Plan discussed with: Patient   Transition of Care Barriers None   Discharge Plan A Home with family

## 2023-08-22 NOTE — PLAN OF CARE
SW met with pt and pt's friend at bedside to complete final d/c assessment. Pt will have her friend help transport her home at time of d/c. Pt was not interested in HME. SW requested f/u appts. Pt did not have any additional questions for sw. Rounds completed on pt. All questions addressed. Bedside nurse to discuss d/c medications. Discussed importance to attend all f/u appts and take medications as prescribed. Verbalized understanding. Case Management to sign off.     Natanael Grant, MSW  [phone]    Future Appointments   Date Time Provider Department Center   8/23/2023 10:00 AM HOME MONITOR DEVICE CHECK, [facility]   8/31/2023 11:15 AM Faiza Garsia CCC-SLP [facility] OP [facility] [place] [facility]   8/31/2023  1:45 PM Hailey Mckeon OT [facility] OP [facility] [place] [facility]   9/1/2023 12:15 PM CARDIOMEMS [facility]AR[facility]   10/6/2023 12:15 PM CARDIOMEMS [facility]   11/3/2023 12:15 PM CARDIOMEMS [facility]   11/7/2023 12:30 PM LAB, APPOINTMENT [place] [facility] LAB VNP [facility]   11/7/2023  2:30 PM Jeimy Srivastava MD [facility]   12/1/2023 12:15 PM CARDIOMEMS [facility]        08/22/23 1625   Final Note   Assessment Type Final Discharge Note   Anticipated Discharge Disposition Home   What phone number can be called within the next 1-3 days to see how you are doing after discharge? [phone]   Post-Acute Status   Coverage phn   Discharge Delays None known at this time

## 2023-08-22 NOTE — PLAN OF CARE
VN reviewed discharge instructions with pt. Using teach back method.  AVS printed and handed to pt by bedside nurse.  Reviewed follow-up appointments, medications, diet, and importance of medication compliance.  Reviewed home care instructions, treatment plan, self-management, and when to seek medical attention.  Allowed time for questions.  All questions answered.  Patient verbalized complete understanding of discharge instructions and voices no concerns.     Discharge instructions complete.  Bedside delivery done. .  Bedside nurse notified.

## 2023-08-22 NOTE — DISCHARGE SUMMARY
\[facility]\"" Hospital Medicine Discharge Summary    Primary Team: \[facility]\"" Hospitalist Team A  Attending Physician: Lottie Magana MD  Resident: Miguel Angel  Intern: Kaye    Date of Admit: 8/21/2023  Date of Discharge: 8/22/2023    Discharge to: Home  Condition: Stable    Discharge Diagnoses     Aphasia with stuttering  B12 deficiency  Non-ischemic cardiomyopathy (EF 5-10% on 8/2023; normal coronary angiogram 2017)  VT s/p AICD 2015  Severe functional MR s/p mitral clip 2020  Pulmonary HTN  Subclinical hyperthyroidism  Obesity class 3  Glaucoma    Consultants and Procedures     Consultants:  Neuro    Procedures:   CTA Head and Neck (xpd)   Final Result   No intracranial large vessel occlusion, hemodynamically significant stenosis or aneurysm.      The visualized cervical carotid and vertebral arteries are patent without hemodynamically significant stenosis.      CT Head Without Contrast   Final Result   No CT evidence of acute intracranial abnormality.      8/22 TTE with bubble:   Left Ventricle: The left ventricle is severely dilated. There is severely reduced systolic function with a visually estimated ejection fraction of 5 - 10%.    Left Atrium: Left atrium is severely dilated.    Right Ventricle: Normal right ventricular cavity size. Systolic function is normal. Catheter present in the ventricle.    Aortic Valve: The aortic valve is a trileaflet valve. There is mild aortic regurgitation with a centrally directed jet.    Mitral Valve: There is moderate to severe regurgitation with a posterolateral eccentriccally directed jet.    Tricuspid Valve: There is mild regurgitation with a centrally directed jet.    IVC/SVC: Normal venous pressure at 3 mmHg.    Bubble study was negative for intracardiac shunt    Brief History of Present Illness      HPI: Mario Mahajan is a 47 y.o. F with past medical history of non-ischemic cardiomyopathy (EF 25% on 4/2023; normal coronary angiogram 2017), VT s/p AICD 2015, severe functional MR  s/p mitral clip 2020, pulmonary HTN who presented to [facility] ED on 8/21/2023 for word-finding difficulty since 6:30PM on 8/20.      The patient was in their usual state of health (independent of all ADLs) until 6:30PM on 8/20 when she was in the kitchen and began to have word-finding difficulty as well as headache and L hand numbness and tremor. She also had L mouth numbness with subsequent drooling. She went to lie down for a few minutes and her symptoms slightly subsided. Her symptoms continued onto today, prompting her to go to the ED.      In ED, vitals wnl. Labs unremarkable. CTH negative. CTA neck with no LVO. Transferred to [facility] for further workup.     Hospital course: Neurology consulted - recommended aspirin monotherapy. MRI brain and MRI brain perfusion unable to be completed at this facility due to pt's AICD. Pt instructed to have MRI completed in outpatient setting and f/u with neurology. TTE with bubble showed EF 5-10%, left ventricle and atrium severely dilated, mild AR, mod to severe MR, mild TR. Negative bubble study. She was noted to have B12 deficiency and discharged with PO supplementation. She will need further workup for severe B12 deficiency (consider IF Ab, MMA, HC). Patient's aphasia with stuttering was still present on discharge although slightly improved from admission. PT/OT evaluated patient and recommended outpatient PT.    For the full HPI please refer to the History & Physical from this admission.    Hospital Course By Problem with Pertinent Findings     Aphasia with stuttering  Concern for acute CVA vs TIA  - pt with word-finding difficulty and associated headache, L mouth numbness with subsequent drooling, L hand tremor and numbness  - 8/21 CTH negative  - 8/21 CTA head neck with no LVO  - A1c 4.6  - lipid panel: cholesterol 126, HDL 42, LDL 73, TG 52  - folate wnl  - s/p permissive HTN  - given NIHSS 2, initially started on plavix. However, neurology consulted and recommended ASA  monotherapy  - continue home ASA  - not on home statin. Continue atorvastatin 40  - 8/22 TTE with bubble showed EF 5-10%, left ventricle and atrium severely dilated, mild AR, mod to severe MR, mild TR. Negative bubble study  - Unable to complete MRI brain and MRI brain perfusion at this facility due to pt's AICD. Pt instructed to have MRI completed in outpatient setting and f/u with neurology.  - PT/OT consulted - recommend shower chair and outpatient PT     B12 deficiency  - B12 < 148  - s/p IM B12 supplementation  - discharged with PO supplementation  - will need further workup in outpatient setting for severe B12 deficiency (consider IF Ab, MMA, HC)     Non-ischemic cardiomyopathy (EF 25% on 4/2023; normal coronary angiogram 2017)  VT s/p AICD 2015  Severe functional MR s/p mitral clip 2020  Pulmonary HTN  - 4/2023 TTE: EF 25%, severe LAE, severe functional MR, PASP 52  - 8/22/23 TTE with bubble: EF 5-10%, left ventricle and atrium severely dilated, mild AR, mod to severe MR, mild TR. Negative bubble study.   - continue home amiodarone, bumex, coreg, entresto, spironolactone  - hold home farxiga while inpatient  - follows with cardiology outpatient     Subclinical hyperthyroidism  - on admit, TSH 0.276, free T4 wnl  - defer to PCP to repeat when not acutely ill     Obesity class 3  - follows in bariatric clinic  - home meds: ozempic  - hold home meds while inpatient     Glaucoma  - continue home timolol eye drops     Healthcare maintenance   - primary care provider is Tejinder Bowling MD   - not UTD on flu      Discharge Medications        Medication List        START taking these medications      atorvastatin 40 MG tablet  Commonly known as: LIPITOR  Take 1 tablet (40 mg total) by mouth once daily.  Start taking on: August 23, 2023     cyanocobalamin 1000 MCG tablet  Commonly known as: VITAMIN B-12  Take 1 tablet (1,000 mcg total) by mouth once daily.            CONTINUE taking these medications      albuterol 90  mcg/actuation inhaler  Commonly known as: PROVENTIL/VENTOLIN HFA  Inhale 2 puffs into the lungs every 6 (six) hours as needed for Wheezing.     amiodarone 200 MG Tab  Commonly known as: PACERONE  Take 1 tablet (200 mg total) by mouth once daily.     aspirin 81 MG EC tablet  Commonly known as: ECOTRIN  Take 1 tablet (81 mg total) by mouth once daily.     bumetanide 1 MG tablet  Commonly known as: BUMEX  TAKE 2 TABLETS BY MOUTH EVERY MORNING AND 1 TABLET BY MOUTH EVERY EVENING.     carvediloL 25 MG tablet  Commonly known as: COREG  TAKE 1 TABLET(25 MG) BY MOUTH TWICE DAILY     cetirizine 10 MG tablet  Commonly known as: ZYRTEC     ENTRESTO  mg per tablet  Generic drug: sacubitriL-valsartan  TAKE 1 TABLET BY MOUTH TWICE DAILY     FARXIGA 10 mg tablet  Generic drug: dapagliflozin propanediol  Take 1 tablet (10 mg total) by mouth once daily.     OZEMPIC 0.25 mg or 0.5 mg (2 mg/3 mL) pen injector  Generic drug: semaglutide  Inject 0.5 mg into the skin every 7 days. Start with 0.25 mg weekly for 4 weeks, then increase to 0.5 mg weekly     spironolactone 25 MG tablet  Commonly known as: ALDACTONE  TAKE 1 TABLET(25 MG) BY MOUTH EVERY DAY     timolol maleate 0.5% 0.5 % Drop  Commonly known as: TIMOPTIC            STOP taking these medications      cyclobenzaprine 10 MG tablet  Commonly known as: FLEXERIL     potassium chloride SA 20 MEQ tablet  Commonly known as: K-DUR,KLOR-CON               Where to Get Your Medications        These medications were sent to [facility] [address], [place] [address]      Hours: Mon-Fri, 8a-5:30p Phone: [phone]   atorvastatin 40 MG tablet  cyanocobalamin 1000 MCG tablet          Discharge Information:   Diet:  Cardiac    Physical Activity:  As tolerated             Instructions:  1. Take all medications as prescribed  2. Keep all follow-up appointments  3. Return to the hospital or call your primary care physicians if any worsening symptoms such as fever,  chest pain, shortness of breath, return of symptoms, or any other concerns.    Follow-Up Appointments:  PCP   - Needs further workup for severe B12 deficiency (consider IF Ab, MMA, HC)   - repeat TSH when not acutely ill   - not UTD on flu  Neurology   - MRI brain to be done outpatient and f/u results  Cardiology      June Restrepo MD  [facility] PGY-1

## 2023-08-22 NOTE — PLAN OF CARE
Discharge instructions and education provided by VN. Patient voices understanding. IV sites removed, cath tip intact. Telemetry discontinued. Patient shows no acute distress. Meds delivered to bedside.

## 2023-08-22 NOTE — PROGRESS NOTES
[facility]                    Pharmacy       Discharge Medication Education    Patient ACCEPTED medication education. Pharmacy has provided education on the name, indication, and possible side effects of the medication(s) prescribed, using teach-back method.     The following medications have also been discussed, during this admission.        Medication List        START taking these medications      atorvastatin 40 MG tablet  Commonly known as: LIPITOR  Take 1 tablet (40 mg total) by mouth once daily.  Start taking on: August 23, 2023     butalbital-acetaminophen-caffeine -40 mg -40 mg per tablet  Commonly known as: FIORICET, ESGIC  Take 1 tablet by mouth every 6 (six) hours as needed for Pain.     cyanocobalamin 1000 MCG tablet  Commonly known as: VITAMIN B-12  Take 1 tablet (1,000 mcg total) by mouth once daily.            CONTINUE taking these medications      albuterol 90 mcg/actuation inhaler  Commonly known as: PROVENTIL/VENTOLIN HFA  Inhale 2 puffs into the lungs every 6 (six) hours as needed for Wheezing.     amiodarone 200 MG Tab  Commonly known as: PACERONE  Take 1 tablet (200 mg total) by mouth once daily.     aspirin 81 MG EC tablet  Commonly known as: ECOTRIN  Take 1 tablet (81 mg total) by mouth once daily.     bumetanide 1 MG tablet  Commonly known as: BUMEX  TAKE 2 TABLETS BY MOUTH EVERY MORNING AND 1 TABLET BY MOUTH EVERY EVENING.     carvediloL 25 MG tablet  Commonly known as: COREG  TAKE 1 TABLET(25 MG) BY MOUTH TWICE DAILY     cetirizine 10 MG tablet  Commonly known as: ZYRTEC     ENTRESTO  mg per tablet  Generic drug: sacubitriL-valsartan  TAKE 1 TABLET BY MOUTH TWICE DAILY     FARXIGA 10 mg tablet  Generic drug: dapagliflozin propanediol  Take 1 tablet (10 mg total) by mouth once daily.     OZEMPIC 0.25 mg or 0.5 mg (2 mg/3 mL) pen injector  Generic drug: semaglutide  Inject 0.5 mg into the skin every 7 days. Start with 0.25 mg weekly for 4 weeks, then  increase to 0.5 mg weekly     spironolactone 25 MG tablet  Commonly known as: ALDACTONE  TAKE 1 TABLET(25 MG) BY MOUTH EVERY DAY     timolol maleate 0.5% 0.5 % Drop  Commonly known as: TIMOPTIC            STOP taking these medications      cyclobenzaprine 10 MG tablet  Commonly known as: FLEXERIL     potassium chloride SA 20 MEQ tablet  Commonly known as: K-DUR,KLOR-CON               Where to Get Your Medications        These medications were sent to [facility] [address], [place] [address]      Hours: Mon-Fri, 8a-5:30p Phone: [phone]   atorvastatin 40 MG tablet  butalbital-acetaminophen-caffeine -40 mg -40 mg per tablet  cyanocobalamin 1000 MCG tablet          Thank you  Jacklyn Franz, PharmD  [phone]

## 2023-08-22 NOTE — PLAN OF CARE
"  Problem: Occupational Therapy  Goal: Occupational Therapy Goal  Description: Goals to be met by: 9/22/23     Patient will increase functional independence with ADLs by performing:    LE Dressing with Modified Victor.  Grooming while standing with Modified Victor.  Toileting from toilet with Modified Victor for hygiene and clothing management.   Supine to sit with Modified Victor.  Step transfer with Modified Victor  Toilet transfer to toilet with Modified Victor.  Increased functional strength to WFL for self care skills and functional mobility .  Upper extremity exercise program x10 reps per handout, with independence.    Outcome: Ongoing, Progressing       Pt would benefit from cont OT services in order to maximize functional independence. Recommending OP therapy at d/c. Pt endorses feelings of "body heaviness" throughout session, headache, fatigue, and L hand sensation deficits (~ 10% diminished from baseline).     "

## 2023-08-22 NOTE — PT/OT/SLP EVAL
Speech Language Pathology Evaluation  Cognitive/Bedside Swallow    Patient Name:  Mario Mahajan   MRN:  [id number]  Admitting Diagnosis: Aphasia    Recommendations:                  General Recommendations:  continued ST while inpatient  Diet recommendations:  Regular, Thin   Aspiration Precautions: standard precautions, slow rate and can feed self.    General Precautions: Standard, fall  Communication strategies:  word finding and stuttering     Assessment:     Mario Mahajan is a 47 y.o. female admitted with weakness, r/o TIA vs CVA with an SLP diagnosis of word finding and impacted by dysfluent speech.      History per MD      Word-finding difficulty since 6:30PM on 8/20     Subjective:      History of Present Illness:  Mario Mahajan is a 47 y.o. F with past medical history of non-ischemic cardiomyopathy (EF 25% on 4/2023; normal coronary angiogram 2017), VT s/p AICD 2015, severe functional MR s/p mitral clip 2020, pulmonary HTN who presented to [facility] ED on 8/21/2023 for word-finding difficulty since 6:30PM on 8/20.      The patient was in their usual state of health (independent of all ADLs) until 6:30PM on 8/20 when she was in the kitchen and began to have word-finding difficulty as well as headache and L hand numbness and tremor. She also had L mouth numbness with subsequent drooling. She went to lie down for a few minutes and her symptoms slightly subsided. Her symptoms continued onto today, prompting her to go to the ED.      In ED, vitals wnl. Labs unremarkable. CTH negative. CTA neck with no LVO. Transfer[place] [facility] [place].       Past Medical History:   Diagnosis Date    Asthma     Chronic back pain 7/1/2014    Chronic combined systolic and diastolic congestive heart failure 4/28/2015     2-10-17   1 - Severely depressed left ventricular systolic function (EF 20-25%).    2 - Severe left ventricular enlargement.    3 - Severe left atrial enlargement.    4 - Left ventricular diastolic  dysfunction.    5 - The estimated PA systolic pressure is 18 mmHg.    6 - Mild mitral regurgitation.     Encounter for blood transfusion     ICD (implantable cardioverter-defibrillator) in place 12/01/15 3/3/2016    Menorrhagia, premenopausal 2/10/2017    Microcytic anemia 2015    Non-rheumatic mitral regurgitation 3/5/2015    Nonischemic dilated cardiomyopathy 2015    Sleep apnea     Syncope and collapse 2017    Ventricular tachycardia, polymorphic        Past Surgical History:   Procedure Laterality Date    CARDIAC DEFIBRILLATOR PLACEMENT  2015     SECTION      INSERTION, WIRELESS SENSOR, FOR PULMONARY ARTERIAL PRESSURE MONITORING N/A 10/22/2021    Procedure: INSERTION, WIRELESS SENSOR, FOR PULMONARY ARTERIAL PRESSURE MONITORING;  Surgeon: Erika Hurd MD;  Location: [facility] CATH LAB;  Service: Cardiology;  Laterality: N/A;    OOPHORECTOMY      PERICARDIOCENTESIS N/A 2020    Procedure: Pericardiocentesis;  Surgeon: Memo Luis MD;  Location: [facility] CATH LAB;  Service: Cardiology;  Laterality: N/A;    PULMONARY ANGIOGRAPHY N/A 10/22/2021    Procedure: ANGIOGRAM, PULMONARY;  Surgeon: Erika Hurd MD;  Location: [facility] CATH LAB;  Service: Cardiology;  Laterality: N/A;    RIGHT HEART CATHETERIZATION      TOTAL ABDOMINAL HYSTERECTOMY N/A 3/26/2019    Procedure: HYSTERECTOMY, TOTAL, ABDOMINAL;  Surgeon: Victorino Roes MD;  Location: [facility] OR;  Service: OB/GYN;  Laterality: N/A;    TRANSESOPHAGEAL ECHOCARDIOGRAPHY N/A 2022    Procedure: ECHOCARDIOGRAM, TRANSESOPHAGEAL;  Surgeon: Phan Powell MD;  Location: [facility] EP LAB;  Service: Cardiology;  Laterality: N/A;    TUBAL LIGATION         Social History: Patient lives at home, dtr does live with her but she attends college.     Prior Intubation HX:  n/a    Modified Barium Swallow: no hx of dysphagia    Chest X-Rays: No new pulmonary consolidation or pleural effusion.  Cardiac silhouette enlarged pacemaker stable     CTA of head:  "No intracranial large vessel occlusion, hemodynamically significant stenosis or aneurysm.   The visualized cervical carotid and vertebral arteries are patent without hemodynamically significant stenosis.   All CT scans at this facility use dose modulation, iterative reconstruction, and/or weight base dosing when appropriate to reduce radiation dose to as low as reasonably achievable.     CT of head: No CT evidence of acute intracranial abnormality.     Aspects score is 10.   All CT scans at this facility use dose modulation, iterative reconstruction, and/or weight base dosing when appropriate to reduce radiation dose to as low as reasonably achievable.     Prior diet: reg.thin.    Occupation/hobbies/homemaking: reports that she does some chores at home, highest level of education is 12th    Subjective     Pt seen in room for ST eval. Pt awake and alert. Pt reporting some minimal heart palpations. Mother and friend at bedside during ST eval.   Pt oriented to self, place and address.   Patient goals: "to feel better."     Pain/Comfort:  Pain Rating 1: 0/10    Respiratory Status: room air     Objective:     Cognitive Status:    Awake, alert  Fair attention  Problem solving- intact  Sequencing of basic events and ADLs intact   Recalled personal hx and current events       Receptive Language:   Comprehension:   WFL for following commands and responding to questions.     Pragmatics:    Flat affect noted     Expressive Language:  Verbal:    Word finding in conversation and when expressing immediate wants and needs      Motor Speech:  Dysfluent speech present on initiation of words and syllable prolongations are present     Voice:   Clear voice     Visual-Spatial:  None noted     Reading:   DNT     Written Expression:   DNT, R hand dominant     Oral Musculature Evaluation  Oral Musculature: WFL  Dentition: present and adequate  Mucosal Quality: good, adequate  Mandibular Strength and Mobility: WFL  Oral Labial Strength and " Mobility: WFL  Lingual Strength and Mobility: WFL  Buccal Strength and Mobility: WFL  Volitional Cough: elicitd  Volitional Swallow: timely swallow  Voice Prior to PO Intake: clear voice    Bedside Swallow Eval:   Consistencies Assessed:  Thin liquids apple juice via straw   Puree refused yogurt  Solids eggs       Oral Phase:   WFL    Pharyngeal Phase:   Timely swallow trigger  No coughing or choking and no change in voice across PO trials    Compensatory Strategies  none    Treatment: Reviewed results of ST eval, discussed goals and POC. All questions answered within SLP scope of practice.     Goals:   Multidisciplinary Problems       SLP Goals          Problem: SLP    Goal Priority Disciplines Outcome   SLP Goal     SLP Ongoing, Progressing   Description: Short Term Goals:  1. Pt will participate in swallow eval to determine safest diet level.  2. Pt will participate in speech and language eval to determine deficits related to communication.                        Plan:     Patient to be seen:  2 x/week   Plan of Care expires:  09/20/23  Plan of Care reviewed with:  patient, family, mother   SLP Follow-Up:  Yes       Discharge recommendations:  Discharge Facility/Level of Care Needs:  (TBD; likely home?)   Barriers to Discharge:  none    Time Tracking:     SLP Treatment Date:   08/22/23  Speech Start Time:  0829  Speech Stop Time:  0854     Speech Total Time (min):  25 min    Billable Minutes: Eval 14  and Eval Swallow and Oral Function 11    08/22/2023

## 2023-08-22 NOTE — PROGRESS NOTES
[facility] Medicine Progress Note    Primary Team: [facility] Hospitalist Team A  Attending Physician: Lottie Magana MD  Resident: Miguel Angel  Intern: Kaye    Subjective/Interval History      NAEON. Patient continues to have word-finding difficulty. She says it waxes and wanes. She has another headache today, similar to yesterday. She states tylenol only temporarily relieved it yesterday.     Objective     Physical Examination:  Temp:  [96.7 °F (35.9 °C)-98.3 °F (36.8 °C)] 97.5 °F (36.4 °C)  Pulse:  [60-78] 65  Resp:  [20-23] 20  SpO2:  [97 %-100 %] 99 %  BP: ()/(41-74) 90/58    General: No acute distress, resting comfortably   HEENT: Atraumatic, normocephalic  Cardiovascular: Regular rate, holosystolic murmur best heard at apex  Respiratory: Stable on room air, normal work of breathing, no conversational dyspnea, no accessory muscle use noted, clear to auscultation bilaterally, no wheezes or crackles   Abdominal: Non-distended, soft, non-tender to palpation, no guarding  Extremities: Atraumatic, non-edematous, moving all four extremities  Skin: Non-diaphoretic, warm, dry  Neurologic: Aox3. Slight word-finding difficulty. EOMI. No visual field deficits. CN XI, XII intact.. 5/5 strength all extremities.    Intake/Output:    Intake/Output Summary (Last 24 hours) at 8/22/2023 0659  Last data filed at 8/21/2023 1840  Gross per 24 hour   Intake --   Output 100 ml   Net -100 ml     Net IO Since Admission: -100 mL [08/22/23 0659]    Laboratory:  Recent Labs   Lab 08/21/23  1310 08/22/23  0249   WBC 5.20 5.82   HGB 12.4 12.1    270   * 104*    140   K 3.7 3.6    107   CO2 23 23   BUN 13 12   CREATININE 1.16 1.2   * 92   CALCIUM 8.9 8.9   PROT 7.6 6.8   ALBUMIN 4.0 3.5   PHOS  --  4.6*   MG 1.9 1.9   AST 22 12   ALT 20 10   ALKPHOS 44 44*       All laboratory data reviewed    Microbiology: None    Radiology:   8/21 CTH:  No CT evidence of acute intracranial abnormality.     8/21 CTA  head neck:  No intracranial large vessel occlusion, hemodynamically significant stenosis or aneurysm.  The visualized cervical carotid and vertebral arteries are patent without hemodynamically significant stenosis.     Current Medications:     Infusions:       Scheduled:   amiodarone  200 mg Oral Daily    aspirin  81 mg Oral Daily    atorvastatin  40 mg Oral Daily    bumetanide  1 mg Oral BID loop    carvediloL  25 mg Oral BID    enoxparin  40 mg Subcutaneous Daily    lactated ringers  500 mL Intravenous Once    prochlorperazine  5 mg Intravenous Once    sacubitriL-valsartan  1 tablet Oral BID    spironolactone  25 mg Oral Daily    timolol maleate 0.5%  1 drop Both Eyes BID        PRN:  sodium chloride 0.9%    Antibiotics and Day Number of Therapy:  None    Assessment/Plan:     Mario Mahajan is a 47 y.o. F with past medical history of non-ischemic cardiomyopathy (EF 25% on 4/2023; normal coronary angiogram 2017), VT s/p AICD 2015, severe functional MR s/p mitral clip 2020, pulmonary HTN who presented to [facility] ED on 8/21/2023 for word-finding difficulty since 6:30PM on 8/20. CTH negative. CTA neck with no LVO.      Aphasia  Concern for acute CVA vs TIA  - pt with word-finding difficulty and associated headache, L mouth numbness with subsequent drooling, L hand tremor and numbness  - 8/21 CTH negative  - 8/21 CTA head neck with no LVO  - A1c 4.6  - lipid panel: cholesterol 126, HDL 42, LDL 73, TG 52  - folate wnl  - s/p permissive HTN  Plan:  - continue home ASA  - given NIHSS 2, s/p plavix load. Continue plavix  - not on home statin. Continue atorvastatin 40  - TTE with bubble pending  - MRI brain and MRI brain perfusion pending  - PT/OT consulted    B12 deficiency  - B12 < 148  - start IM B12 supplementation     Non-ischemic cardiomyopathy (EF 25% on 4/2023; normal coronary angiogram 2017)  VT s/p AICD 2015  Severe functional MR s/p mitral clip 2020  Pulmonary HTN  - 4/2023 TTE: EF 25%, severe LAE, severe  functional MR, PASP 52  - continue home amiodarone, bumex, coreg, entresto, spironolactone  - hold home farxiga while inpatient    Subclinical hyperthyroidism  - on admit, TSH 0.276, free T4 wnl  - defer to PCP to repeat when not acutely ill     Obesity class 3  - follows in bariatric clinic  - home meds: ozempic  - hold home meds for now     Glaucoma  - continue home timolol eye drops     Healthcare maintenance   - primary care provider is Tejinder Bowling MD   - not UTD on flu     Diet: Cardiac  VTE PPx: Lovenox  Code Status: Full     Dispo: pending stroke workup      June Restrepo MD  [facility] Internal Medicine PGY-1      [facility] Medicine Hospitalist Pager numbers:   [facility] Hospitalist Medicine Team A (Jan/[place]): [phone]  [facility] Hospitalist Medicine Team B (Rosi/Ck):  [phone]

## 2023-08-22 NOTE — MEDICAL/APP STUDENT
"[facility] Hospital Medicine Progress Note    Primary Team: [facility] Hospitalist Team A  Attending Physician: Lottie Magana MD  Resident: Miguel Angel  Intern: Kaye    Subjective:      Pt endorses headache last night that is improving as well as some palpitations which she mentioned occasionally happens at home. Had some nausea but no vomiting. Denies any fevers, chills, chest pain, SOB, diarrhea. Is very tired and would like to get some sleep.      Objective:     Last 24 Hour Vital Signs:  BP  Min: 85/41  Max: 135/62  Temp  Av.6 °F (36.4 °C)  Min: 96.7 °F (35.9 °C)  Max: 98.3 °F (36.8 °C)  Pulse  Av.7  Min: 60  Max: 78  Resp  Av.5  Min: 20  Max: 23  SpO2  Av.1 %  Min: 97 %  Max: 100 %  Height  Av' 9" (175.3 cm)  Min: 5' 9" (175.3 cm)  Max: 5' 9" (175.3 cm)  Weight  Av.8 kg (264 lb 1.8 oz)  Min: 119.8 kg (264 lb 1.8 oz)  Max: 119.8 kg (264 lb 1.8 oz)  I/O last 3 completed shifts:  In: -   Out: 100 [Urine:100]    Physical Examination:   Examination  General: Patient resting comfortably in NAD  Head: normocephalic, atraumatic  Cardiac: RRR, difficult to auscultate due to body habitus.   Pulmonary/Chest: CTAB, symmetric chest rise, no wheezing or crackles, breathing comfortably on RA  GI: Soft, non tender, non distended, normoactive bowel sounds  Extremities: no edema, clubbing, or cyanosis  Skin: dry, warm, intact. No bruising or rashes.  Neuro: Alert and oriented, moving all extremities    Laboratory:  Laboratory Data   Recent Labs   Lab 23  1310 23  0249   WBC 5.20 5.82   HGB 12.4 12.1   HCT 36.7* 36.4*    270   * 104*    140   K 3.7 3.6    107   CO2 23 23   BUN 13 12   CREATININE 1.16 1.2   * 92   CALCIUM 8.9 8.9   PROT 7.6 6.8   ALBUMIN 4.0 3.5   PHOS  --  4.6*   MG 1.9 1.9   AST 22 12   ALT 20 10   ALKPHOS 44 44*     Invalid input(s): "POCTGLU"  Recent Labs   Lab 23   HGBA1C 4.6       Microbiology Data   UA negative    Other " Results:  EKG (my interpretation): normal sinus rhythm, LBBB.    Radiology Data  CTA Head and Neck (xpd)   Final Result      No intracranial large vessel occlusion, hemodynamically significant stenosis or aneurysm.      The visualized cervical carotid and vertebral arteries are patent without hemodynamically significant stenosis.      All CT scans at this facility use dose modulation, iterative reconstruction, and/or weight base dosing when appropriate to reduce radiation dose to as low as reasonably achievable.         Electronically signed by: Filipe Castillo   Date:    08/21/2023   Time:    15:25      CT Head Without Contrast   Final Result      No CT evidence of acute intracranial abnormality.      Aspects score is 10.      All CT scans at this facility use dose modulation, iterative reconstruction, and/or weight base dosing when appropriate to reduce radiation dose to as low as reasonably achievable.         Electronically signed by: Filipe Castillo   Date:    08/21/2023   Time:    13:52      MRI BRAIN WITHOUT CONTRAST    (Results Pending)   MRI Brain Perfusion    (Results Pending)       Current Medications:     Infusions:       Scheduled:   amiodarone  200 mg Oral Daily    aspirin  81 mg Oral Daily    atorvastatin  40 mg Oral Daily    bumetanide  1 mg Oral BID loop    carvediloL  25 mg Oral BID    cyanocobalamin  1,000 mcg Intramuscular Daily    enoxparin  40 mg Subcutaneous Daily    lactated ringers  500 mL Intravenous Once    prochlorperazine  5 mg Intravenous Once    sacubitriL-valsartan  1 tablet Oral BID    spironolactone  25 mg Oral Daily    timolol maleate 0.5%  1 drop Both Eyes BID        PRN:  sodium chloride 0.9%      Assessment:     Mario Mahajan is a 47 y.o.female with PMHx non ischemic cardiomyopathy (EF 25% on 4/2023; normal coronary angiogram 2017) VT s/p AICD 2015, severe functional MR s/p mitral clip 202, pulmonary HTN who presented to [facility] ED on 8/21 for word finding difficulty since 6:30  pm on 8/20. CTH negative. CTA neck with no LVO. Pt admitted to [facility] medicine for further workup of Aphasia.     Patient Active Problem List    Diagnosis Date Noted    Aphasia 08/21/2023    History of ventricular tachycardia 11/20/2022    Hip weakness 02/10/2022    Poor posture 02/10/2022    Chronic low back pain without sciatica 01/31/2022    Myofascial pain 01/31/2022    Sedentary lifestyle 01/31/2022    B12 deficiency 09/20/2019    Hemiplegic migraine without status migrainosus, not intractable 08/27/2019    Class 3 severe obesity due to excess calories with serious comorbidity and body mass index (BMI) of 40.0 to 44.9 in adult     Mild intermittent asthma without complication 01/23/2018    Chronic pain of left knee 01/23/2018    Left shoulder pain 01/23/2018    On amiodarone therapy 06/28/2017    Sleep stage dysfunction     ICD (implantable cardioverter-defibrillator) in place 12/01/15 03/03/2016    Nonischemic dilated cardiomyopathy 12/01/2015    Chronic combined systolic and diastolic congestive heart failure 04/28/2015    Microcytic anemia 02/09/2015        Plan:     Aphasia  Concern for acute CVA vs TIA  - pt with word-finding difficulty and associated headache, L mouth numbness with subsequent drooling, L hand tremor and numbness  - 8/21 CTH negative  - 8/21 CTA head neck with no LVO  - A1c 4.6  - TSH 0.267, low with T4 wnl  - lipid panel wnl  - folate 7.6  Plan:  - continue home ASA  - given NIHSS 2, will give plavix load and start plavix  - not on home statin. Will start atorvastatin 40  - permissive HTN with max /120 for 24hrs  - TTE with bubble pending  - MRI brain and MRI brain perfusion pending  - PT/OT consulted     Non-ischemic cardiomyopathy (EF 25% on 4/2023; normal coronary angiogram 2017)  VT s/p AICD 2015  Severe functional MR s/p mitral clip 2020  Pulmonary HTN  - 4/2023 TTE: EF 25%, severe LAE, severe functional MR, PASP 52  - continue home amiodarone, bumex, coreg, entresto,  spironolactone  - hold home farxiga while inpatient     Macrocytic Anemia  - on admission  - H/H stable at 12/36  -B12 <148, folate 7.6  - replenish B12, continue to monitor for improvement     Obesity class 3  - follows in bariatric clinic  - home meds: ozempic     Glaucoma  - continue home timolol eye drops     Healthcare maintenance   - primary care provider is Tejinder Bowling MD   - not UTD on flu     Diet: Cardiac  VTE PPx: Lovenox  Code Status: Full     Dispo: pending stroke workup  Estimated LOS: 1-2 days       Rehana Zafar  [facility] MS4  [facility] Hospitalists Team A      [facility] Medicine Hospitalist Pager numbers:   [facility] Hospitalist Medicine Team A (Jan/[place]): [phone]  [facility] Hospitalist Medicine Team B (Rosi/Ck):  [phone]

## 2023-08-22 NOTE — PT/OT/SLP PROGRESS
Physical Therapy Missed Visit      Patient Name:  Mario Mahajan   MRN:  [id number]    Patient not seen today secondary to Patient unwilling to participate, Patient fatigue. At 10:38 per pt and family pt fatigued and needs to eat before participating in therapy; at 11:49 not able to participate per mother due fatigue/pt sleeping; at 13:51 pt not available due to cardiology diagnostics. Will follow-up as able.    8/22/23

## 2023-08-22 NOTE — PT/OT/SLP EVAL
"Occupational Therapy   Evaluation/Treatment     Name: Mario Mahajan  MRN: [id number]  Admitting Diagnosis: Aphasia  Recent Surgery: * No surgery found *      Recommendations:     Discharge Recommendations: outpatient PT  Discharge Equipment Recommendations:  shower chair  Barriers to discharge:  None    Assessment:     Mario Mahajan is a 47 y.o. female with a medical diagnosis of Aphasia.  She presents with The primary encounter diagnosis was Aphasia. Diagnoses of Stroke, Heart failure, Stuttering, B12 deficiency, Nonischemic dilated cardiomyopathy, Pulmonary hypertension, Severe mitral regurgitation, and History of ventricular tachycardia were also pertinent to this visit.  . Performance deficits affecting function: weakness, impaired endurance, impaired sensation, impaired balance, decreased lower extremity function, impaired functional mobility, impaired self care skills.      Pt would benefit from cont OT services in order to maximize functional independence. Recommending OP therapy at d/c. Pt endorses feelings of "body heaviness" throughout session, headache, fatigue, and L hand sensation deficits (~ 10% diminished from baseline).     Rehab Prognosis: Good; patient would benefit from acute skilled OT services to address these deficits and reach maximum level of function.       Plan:     Patient to be seen 5 x/week to address the above listed problems via self-care/home management, therapeutic activities, therapeutic exercises  Plan of Care Expires: 09/22/23  Plan of Care Reviewed with: patient, family    Subjective     Chief Complaint: pt reports, " my whole body feels weak"   Patient/Family Comments/goals: none stated     Occupational Profile:  Living Environment: alone, SSH, 3 steps to enter, R rail, t/s combo  Previous level of function: independent; drives; does not work   Equipment Used at Home: none  Assistance upon Discharge: from family     Pain/Comfort:  Pain Rating 1: 0/10    Patients cultural, " "spiritual, Buddhist conflicts given the current situation:      Objective:     Communicated with: sam prior to session.  Patient found supine with   upon OT entry to room.    General Precautions: Standard, fall  Orthopedic Precautions: N/A  Braces: N/A  Respiratory Status: Room air    Occupational Performance:    Bed Mobility:    Patient completed Scooting/Bridging with modified independence  Patient completed Supine to Sit with modified independence  Patient completed Sit to Supine with modified independence    Functional Mobility/Transfers:  Patient completed Sit <> Stand Transfer with modified independence and supervision  with  no assistive device   Patient completed Toilet Transfer Step Transfer technique with supervision with  no AD  Functional Mobility: supervision /SBA without AD    Activities of Daily Living:  Grooming: supervision    Lower Body Dressing: modified independence    Toileting: modified independence      Cognitive/Visual Perceptual:  WFL   Slight stuttering throughout session     Physical Exam:  Balance:    -       WFL seated; good standing  Postural examination/scapula alignment:    -       Rounded shoulders  Sensation:    -       Intact light touch bilaterally, however, reports ~ 10% diminished in L hand as compared to R   Dominant hand:    -       right  Upper Extremity Range of Motion:   BUE WFL   Upper Extremity Strength:  BUE grossly 4-/5, however, appears with effortful weakness    Strength:  WFL   Fine Motor Coordination:  intact     AMPAC 6 Click ADL:  AMPAC Total Score: 23    Treatment & Education:  Pt found supine   Complaints of generalized body heaviness throughout session   Ax performed as above   Functional mobility in room and hallway- pt reports that her feet feel heavy; with mobility, appears with flat foot contact and "slapping" feet on floor for loudness   Educated on d/c recs and signs/symptoms of CVA     Patient left supine with all lines intact, call button in reach, " bed alarm on, nsg notified, and family  present    GOALS:   Multidisciplinary Problems       Occupational Therapy Goals          Problem: Occupational Therapy    Goal Priority Disciplines Outcome Interventions   Occupational Therapy Goal     OT, PT/OT Ongoing, Progressing    Description: Goals to be met by: 23     Patient will increase functional independence with ADLs by performing:    LE Dressing with Modified [place].  Grooming while standing with Modified [place].  Toileting from toilet with Modified [place] for hygiene and clothing management.   Supine to sit with Modified [place].  Step transfer with Modified [place]  Toilet transfer to toilet with Modified [place].  Increased functional strength to [facility] for self care skills and functional mobility .  Upper extremity exercise program x10 reps per handout, with independence.                         History:     Past Medical History:   Diagnosis Date    Asthma     Chronic back pain 2014    Chronic combined systolic and diastolic congestive heart failure 2015     2-10-17   1 - Severely depressed left ventricular systolic function (EF 20-25%).    2 - Severe left ventricular enlargement.    3 - Severe left atrial enlargement.    4 - Left ventricular diastolic dysfunction.    5 - The estimated PA systolic pressure is 18 mmHg.    6 - Mild mitral regurgitation.     Encounter for blood transfusion     ICD (implantable cardioverter-defibrillator) in place 12/01/15 3/3/2016    Menorrhagia, premenopausal 2/10/2017    Microcytic anemia 2015    Non-rheumatic mitral regurgitation 3/5/2015    Nonischemic dilated cardiomyopathy 2015    Sleep apnea     Syncope and collapse 2017    Ventricular tachycardia, polymorphic          Past Surgical History:   Procedure Laterality Date    CARDIAC DEFIBRILLATOR PLACEMENT  2015     SECTION      INSERTION, WIRELESS SENSOR, FOR PULMONARY ARTERIAL PRESSURE MONITORING N/A  10/22/2021    Procedure: INSERTION, WIRELESS SENSOR, FOR PULMONARY ARTERIAL PRESSURE MONITORING;  Surgeon: Erika Hurd MD;  Location: [facility] CATH LAB;  Service: Cardiology;  Laterality: N/A;    OOPHORECTOMY      PERICARDIOCENTESIS N/A 6/17/2020    Procedure: Pericardiocentesis;  Surgeon: Memo Luis MD;  Location: [facility] CATH LAB;  Service: Cardiology;  Laterality: N/A;    PULMONARY ANGIOGRAPHY N/A 10/22/2021    Procedure: ANGIOGRAM, PULMONARY;  Surgeon: Erika Hurd MD;  Location: [facility] CATH LAB;  Service: Cardiology;  Laterality: N/A;    RIGHT HEART CATHETERIZATION      TOTAL ABDOMINAL HYSTERECTOMY N/A 3/26/2019    Procedure: HYSTERECTOMY, TOTAL, ABDOMINAL;  Surgeon: Victorino Rose MD;  Location: [facility] OR;  Service: OB/GYN;  Laterality: N/A;    TRANSESOPHAGEAL ECHOCARDIOGRAPHY N/A 7/20/2022    Procedure: ECHOCARDIOGRAM, TRANSESOPHAGEAL;  Surgeon: Phan Powell MD;  Location: [facility] EP LAB;  Service: Cardiology;  Laterality: N/A;    TUBAL LIGATION         Time Tracking:     OT Date of Treatment: 08/22/23  OT Start Time: 0919  OT Stop Time: 0943  OT Total Time (min): 24 min    Billable Minutes:Evaluation 10  Self Care/Home Management 14    8/22/2023

## 2023-08-23 ENCOUNTER — CLINICAL SUPPORT (OUTPATIENT)
Dept: CARDIOLOGY | Facility: HOSPITAL | Age: 48
End: 2023-08-23
Payer: MEDICARE

## 2023-08-23 DIAGNOSIS — Z95.810 PRESENCE OF AUTOMATIC (IMPLANTABLE) CARDIAC DEFIBRILLATOR: ICD-10-CM

## 2023-08-23 PROCEDURE — 93296 REM INTERROG EVL PM/IDS: CPT | Performed by: INTERNAL MEDICINE

## 2023-08-23 PROCEDURE — 93295 CARDIAC DEVICE CHECK - REMOTE: ICD-10-PCS | Mod: ,,, | Performed by: INTERNAL MEDICINE

## 2023-08-23 PROCEDURE — 93295 DEV INTERROG REMOTE 1/2/MLT: CPT | Mod: ,,, | Performed by: INTERNAL MEDICINE

## 2023-08-25 ENCOUNTER — PATIENT OUTREACH (OUTPATIENT)
Dept: ADMINISTRATIVE | Facility: CLINIC | Age: 48
End: 2023-08-25
Payer: MEDICARE

## 2023-08-25 ENCOUNTER — PATIENT MESSAGE (OUTPATIENT)
Dept: TRANSPLANT | Facility: CLINIC | Age: 48
End: 2023-08-25
Payer: MEDICARE

## 2023-08-25 DIAGNOSIS — F40.240 CLAUSTROPHOBIA: Primary | ICD-10-CM

## 2023-08-25 NOTE — PROGRESS NOTES
C3 nurse spoke with Mario Mahajan  for a TCC post hospital discharge follow up call. The patient has a scheduled [facility] [place] appointment with [facility], Filipe Clinton NP on 08/30/2023 @ 0800.

## 2023-08-28 ENCOUNTER — TELEPHONE (OUTPATIENT)
Dept: FAMILY MEDICINE | Facility: CLINIC | Age: 48
End: 2023-08-28
Payer: MEDICARE

## 2023-08-28 DIAGNOSIS — Z95.810 ICD (IMPLANTABLE CARDIOVERTER-DEFIBRILLATOR) IN PLACE: Primary | Chronic | ICD-10-CM

## 2023-08-28 NOTE — TELEPHONE ENCOUNTER
----- Message from Jailene Joshi LPN sent at 8/28/2023  5:00 PM CDT -----  Regarding: FW: Mri W/[employer] ICD/ will need an order for cardiac device check in clinic+hospital per protocol    ----- Message -----  From: Yessica Deleon RT  Sent: 8/28/2023   4:37 PM CDT  To: Victorino Michelle RT; Miguel Angel Gleason Staff  Subject: Mri W/[employer] ICD/ will need an order for car#      Good Afternoon  We have patient Mario Mahajan Scheduled for her MRI Appt on August 30 at 10:00 a.m.  And will need an order for cardiac devic check in clinic+hospital order code# ()  This order will help the rep to be able to turn off device prior the MRI.  Can you assist us with this order so we can go forward with her MRI appt.  Thank you in advance    Best regards  Yessica/MRI  [id number]

## 2023-08-29 ENCOUNTER — DOCUMENTATION ONLY (OUTPATIENT)
Dept: CARDIOLOGY | Facility: HOSPITAL | Age: 48
End: 2023-08-29
Payer: MEDICARE

## 2023-08-29 ENCOUNTER — TELEPHONE (OUTPATIENT)
Dept: CARDIOLOGY | Facility: HOSPITAL | Age: 48
End: 2023-08-29
Payer: MEDICARE

## 2023-08-29 NOTE — TELEPHONE ENCOUNTER
[facility] MRI EP Checklist    An MRI has been ordered on this patient with a [employer] implanted cardiac device.    The device system, including pulse generator and leads, has been confirmed as MR conditional.    There are no known lead extenders, lead adaptors, or abandoned leads in place.    Lead impedance measurements are within the programmed lead impedance limits.    The pulse generator is implanted in the pectoral region.    The pulse generator has sufficient battery and is not deemed to be at end of life.    The pacing capture thresholds, when tested by the industry representative just prior to the MRI, should be < 2.5V at a pulse width of 0.50ms for RA and RV leads with devices programmed to an asynchronous pacing mode.    Parameters should be programmed to MRI appropriate settings and pacing mode should programmed as below:    OOO: (Pacing off) to be used     After the scan, the industry representative must perform reprogramming to original settings and verify that programmed pacing amplitudes provide adequate safety margins.

## 2023-08-29 NOTE — PROGRESS NOTES
Received a call/message from Yessica in the MRI Department in relation to this patient needing to be scheduled for a MRI and has a [employer] ICD.  Patient's Device is MRI compatible/conditional.  To meet protocol the Pacemaker/ICD must have been implanted no less than 6 weeks prior to scheduled date of Scan.  ICD/PPM and leads must be from same .  MRI informed ordering MD must input a Cardiac Device Check in clinic and hospital order, patient must have an xray within 6 months or less prior to MRI reprogramming.  Chest xray to be reviewed by Radiologist.       MRI is scheduled on 08/30/2023 @ 10AM.    [employer] Rep has agreed to be available for the MRI.     ICD [employer] [place] VRCDV [id number] Serial# [id number]   V Lead [employer]rea Durata 7122Q/65cm  Serial# [id number]   Implant date Jul-

## 2023-08-30 ENCOUNTER — TELEPHONE (OUTPATIENT)
Dept: FAMILY MEDICINE | Facility: CLINIC | Age: 48
End: 2023-08-30
Payer: MEDICARE

## 2023-08-30 ENCOUNTER — HOSPITAL ENCOUNTER (OUTPATIENT)
Dept: RADIOLOGY | Facility: HOSPITAL | Age: 48
Discharge: HOME | End: 2023-08-30
Attending: STUDENT IN AN ORGANIZED HEALTH CARE EDUCATION/TRAINING PROGRAM
Payer: MEDICARE

## 2023-08-30 ENCOUNTER — CLINICAL SUPPORT (OUTPATIENT)
Dept: REHABILITATION | Facility: HOSPITAL | Age: 48
End: 2023-08-30
Payer: MEDICARE

## 2023-08-30 VITALS — OXYGEN SATURATION: 99 % | HEART RATE: 84 BPM

## 2023-08-30 DIAGNOSIS — R47.01 APHASIA: Primary | ICD-10-CM

## 2023-08-30 DIAGNOSIS — R47.1 DYSARTHRIA: ICD-10-CM

## 2023-08-30 DIAGNOSIS — R47.01 APHASIA: ICD-10-CM

## 2023-08-30 DIAGNOSIS — F80.81 STUTTERING: ICD-10-CM

## 2023-08-30 PROCEDURE — 96105 ASSESSMENT OF APHASIA: CPT | Mod: PN

## 2023-08-30 PROCEDURE — 70551 MRI BRAIN WITHOUT CONTRAST: ICD-10-PCS | Mod: 26,,, | Performed by: RADIOLOGY

## 2023-08-30 PROCEDURE — 70551 MRI BRAIN STEM W/O DYE: CPT | Mod: 26,,, | Performed by: RADIOLOGY

## 2023-08-30 PROCEDURE — 70551 MRI BRAIN STEM W/O DYE: CPT | Mod: TC

## 2023-08-30 NOTE — TELEPHONE ENCOUNTER
----- Message from Janet Dominguez sent at 8/30/2023 12:45 PM CDT -----  Contact: pt  Type:  Call back     Who Called:pt    Does the patient know what this is regarding?:MRI  Would the patient rather a call back or a response via [facility]? Call   Best Call Back Number:[phone]  Additional Information:     Pt stated she could not complete  the MRI due to anxiety and would like to speak with someone regarding it

## 2023-08-30 NOTE — NURSING
Patient arrived to MRI for scheduled MRI.  Patient has defibulator.  Device rep at bedside to turn defib. Off for MRI safety.  Patient placed on cardiac and pulse ox monitor.  Scan started.

## 2023-08-30 NOTE — PROGRESS NOTES
Incomplete MRI due to Pt's anxiety.  Multiple attempts made to calm pt and retry MRI unsuccessful due to claustrophobia.  Pt terminated exam.

## 2023-08-30 NOTE — NURSING
Patient removed from scanner due to anxiety attack.  Patient does not want to try again.  States to nervous to have her defibulator off and the head coil on.  She will reach out to her doctor for anti-anxiety med and reschedule her MRI.  Device rep at bedside to reprogram defib. For D/C.  Patient D/C ed ambulatory with her family.

## 2023-08-30 NOTE — PROGRESS NOTES
[facility]  Speech Therapy Evaluation -Neurological Rehabilitation    Date: 8/30/2023     Name: Mario Mahajan   MRN: [id number]    Therapy Diagnosis:   Encounter Diagnoses   Name Primary?    Aphasia Yes    Stuttering     Dysarthria     Physician: Victorino Michelle*  Physician Orders: Ambulatory Referral to Speech Therapy   Medical Diagnosis: R47.01 (ICD-10-CM) - Aphasia     Visit # / Visits Authorized:  1 / 1   Date of Evaluation:  8/30/2023   Insurance Authorization Period: 8/31/23 to 10/26/23  Plan of Care Certification:    8/30/2023 to 10/25/2023    Time In: 1435   Time Out: 1515   Total time: 40    Procedure   Assessment of aphasia including assessment of expressive and receptive speech and language function, language comprehension, speech production ability, reading, spelling, writing (Total time: 60 minutes)     Precautions: Standard  Subjective   Patient reported attempting to complete MRI today but was unable to do so - she requested medication to calm her which she was unable to receive while there  Date of Onset: 1 week  History of Current Condition:  Mario Mahajan is a 47 y.o. female who presents to [facility] Outpatient Speech Therapy for evaluation secondary to aphasia. Patient was referred to therapy by Victorino Michelle* , which is the patient's internal medicine provider. Patient reports loss of her speech fluency within this last week as well as difficulty finding her words in conversational speech. She also mentions her speech will return to normal approximately 50% of the time, but then it becomes disfluent the other 50% of the time.   Patient was accompanied to the evaluation by friend and mother.   Past Medical History: Mario Mahajan  has a past medical history of Asthma, Chronic back pain (7/1/2014), Chronic combined systolic and diastolic congestive heart failure (4/28/2015), Encounter for blood transfusion, ICD (implantable  "cardioverter-defibrillator) in place 12/01/15 (3/3/2016), Menorrhagia, premenopausal (2/10/2017), Microcytic anemia (2015), Non-rheumatic mitral regurgitation (3/5/2015), Nonischemic dilated cardiomyopathy (2015), Sleep apnea, Syncope and collapse (2017), and Ventricular tachycardia, polymorphic.  Mario Mahajan  has a past surgical history that includes Tubal ligation;  section; Cardiac defibrillator placement (2015); Total abdominal hysterectomy (N/A, 3/26/2019); Oophorectomy; Right heart catheterization; Pericardiocentesis (N/A, 2020); insertion, wireless sensor, for pulmonary arterial pressure monitoring (N/A, 10/22/2021); Pulmonary angiography (N/A, 10/22/2021); and Transesophageal echocardiography (N/A, 2022).  Medical Hx and Allergies: Mario has a current medication list which includes the following prescription(s): albuterol, amiodarone, aspirin, atorvastatin, bumetanide, butalbital-acetaminophen-caffeine -40 mg, carvedilol, cetirizine, cyanocobalamin, entresto, ozempic, spironolactone, and timolol maleate 0.5%.   Review of patient's allergies indicates:   Allergen Reactions    Ace inhibitors      Cough     Prior Therapy:  No prior  Social History:  Patient lives alone in [place]. Patient is not currently driving  Occupation:  not working prior to onset  Prior Level of Function: 100% normal speech; conversational speech mainly   Current Level of Function: 50% normal speech, other half is slurred, slow  Pain Scale: 0/10 on a Visual Analog Scale currently.  Pain Location: n/a  Patient's Therapy Goals:  "to talk regular"  Objective   Formal Assessment:  Western Aphasia Battery - Revised (WAB-R) was administered to evaluate the patient's receptive and expressive language function.The purpose stated in the manual for the WAB-R is to determine the presence, severity, and type of aphasia; measure the patient's level of performance to provide a baseline for detecting " "change over time; provide a comprehensive assessment of the patients language strengths and deficits in order to guide treatment and management; infer the location and etiology of the lesion causing the aphasia.  The following results were revealed:     Spontaneous Speech Score: 15 /   Auditory Comprehension Score: 9.85 / 10  Repetition Score:   9.2 /10  Naming and Word Finding Score: 9.3/ 10  Aphasia Quotient (AQ):   86.7 / 100  Aphasia Classification: Anomic    Subtest Results:   Information Content: 6 / 10  Fluency, Grammatical Competence, and Paraphasias: 9 /10  Yes / No Questions: 57 / 60  Auditory Word Recognition: 60 / 60  Sequential Commands: 80 / 80  Repetition: 92 /100  Object Namin /60  Word Fluency:  20  Sentence Completion: 10 /10  Responsive Speech: 10 / 10    Description: Based on findings from this assessment, patient presents with anomic aphasia. During the spontaneous speech picture description task, patient stated "They are at a park and are having a picnic and flying a kite and sailing." When asked to elaborate further, patient chose not to report any additional findings, thus most likely hindering her scores for this subtest. In regard to receptive language abilities, patient answered only one complex yes/no question incorrectly and otherwise completed all other tasks within functional limits. For expressive language, patient named common objects, answered responsive naming questions, and participated in sentence completion tasks with perfect scores. For generative naming, patient named 13 animals in a 1 minute time constraint, slightly below the normed average of 15-20 items per 1 minute. Patient with 8 point deduction for repetition, only having difficulty with the last sentence which was 10 words in length. Overall, patient's expressive and receptive language abilities appear within functional limits for word finding and comprehension. Patient participated in a basic reading and " reading comprehension subtest of the WAB-R in which she independently read aloud all sentences and chose the correct answer for all questions provided.      MOTOR SPEECH/DYSARTHRIA EVALUATION    Evaluation of Speech Mechanisms  Respiration:  Posture: WNL  Breath Support: WNL  Breath Rate: WNL  Respiratory Features: WNL: Diaphragmatic Breathing    Cranial Nerve Examination  Cranial Nerve 5: Trigeminal Nerve  Motor Jaw Posture at rest: Closed  Mandible Elevation/Depression: WFL  Mandible lateralization: WFL  Abnormal movement: absent Interpretation: Intact bilaterally    Sensory Forehead: WFL  Cheek: WFL  Jaw: WFL  Facial Pain: None noted Interpretation: Intact bilaterally      Cranial Nerve 7: Facial Nerve  Motor Facial Symmetry: WNL  Wrinkle Forehead: WFL  Close eyes tightly: WFL  Labial Protrusion: WFL  Labial Retraction: WFL  Buccal Strength with Labial Seal: WFL  Abnormal movement: absent Interpretation: Intact bilaterally    Sensory Formal testing not completed.       Cranial Nerves IX and X: Glossopharyngeal and Vagus Nerves  Motor Palatal Symmetry (Rest): WNL  Palatal Symmetry (Movement): WNL  Cough: Perceptually strong  Voice Prior to PO intake: Clear  Resonance: Normal  Abnormal movement: absent Interpretation: Intact bilaterally      Cranial Nerve XII: Hypoglossal Nerve  Motor Tongue at rest: WNL  Lingual Protrusion: WNL  Lingual Protrusion against Resistance: WNL  Lingual Lateralization: WNL  Abnormal movement: absent Interpretation: Intact bilaterally      Other information:  Volitional Swallow: Able to palpate laryngeal rise  Mucosal Quality: No abnormal findings  Secretion Management: no overt deficits noted/observed  Dentition: Good condition for speech and mastication    Alternating Motion Rates (AMRs) and Sequential Motion Rates (SMRs)  SMRs /pu/  Rate Fast   Rhythm WNL   Accuracy WNL   Norm 5.0-7.1 Di/Sec WNL, 5.4     SMRs /tu/  Rate Fast   Rhythm WNL   Accuracy WNL   Norm 4.8-7.1 Di/Sec WNL, 5.6      SMRs /ku/  Rate Fast   Rhythm WNL   Accuracy WNL   Norm 4.4-7.5 Di/Sec WNL, 5.2     AMRs /putuku/  Rate Slow   Rhythm Irregular   Accuracy Inaccurate   Norm 3.6-7.5 Di/Sec Below norm, 2.4     Maximum Phonation Timing: average of 5.7 seconds across 2 trials  S/Z Ratio: 11.40:6.92 = 1.65 (typically anything above 1.6 is concerning for vocal fold abnormality or pathology)    Perceptual Ratings  Respiratory Features:  WFL  Respiratory/Phonatory Features:  WFL  Phonatory Features:  WFL  Phonatory Quality: Breathiness, hoarse noted during maximum phonation timing  Resonatory Features:  WFL  Articulatory Features:  WFL  Prosodic Features:  Excess, but then reduced stress with visual groping similar to what would be present for a person who stutters (denies stuttering as a child)  Other Features:  Facial grimacing on initial syllables occasionally and not consistently  Treatment   Total Treatment Time Separate from Evaluation: not applicable   No treatment performed secondary to time to complete evaluation.     Education provided:   -role of Speech Therapy, goals/plan of care, scheduling/cancellations, insurance limitations with patient  -Additional Education provided:   Motor speech strategies  Word finding strategies    Patient and family members expressed understanding.     Home Program: to be initiated at next visit  Assessment     Mario presents to [facility] status post medical diagnosis of aphasia. She completed a variety of assessments today given her chief complaints of slurred speech and word finding impairments. Patient was classified as having anomic aphasia by the Western Aphasia Battery-Revised. However, in conversational speech, there were no significant word finding impairments present. For speech production, patient presents with a raspy and hoarse vocal quality as noted during maximum phonation timing assessment mostly. Her speech is intermittently dysarthric with no obvious duration  of impairments, yet rather scattered throughout the assessment. When speaking, patient does present with some facial grimacing and halting, with effort produced on initial syllables at times. This presentation also continues into conversational speech as well.      Demonstrates impairments including limitations as described in the problem list.     Positive prognostic factors: family support  Negative prognostic factors: unknown cause of symptoms thus far  Barriers to therapy: Barriers to therapy include requires transportation assistance from others      Patient's spiritual, cultural, and educational needs considered and patient agreeable to plan of care and goals.    Patient will benefit from skilled therapy.    Rehab Potential: fair    Short Term Goals: (4 weeks) Current Progress:   Patient will participate in respiratory coordination exercises for phonation in order to increase fluidity in speech production for conversational speech.    Progressing/ Not Met 8/30/2023   Established this date   2. Patient will state speech fluency enhancing strategies (think before you say, diaphragmatic breathing, continuous phonation) with 100% accuracy independently for use in speech production tasks.    Progressing/ Not Met 8/30/2023   Established this date   3. Patient will complete higher level word finding activities for daily tasks (ordering at a restaurant, discussing medical complexities) with no more than 3 hesitations.     Progressing/ Not Met 8/30/2023   Established this date    4. Patient will participate in semantic feature analysis training to self-employ for word finding difficulties without cueing.     Progressing/ Not Met 8/30/2023   Established this date      Long Term Goals: (8 weeks) Current Progress:   Using compensatory strategies for word finding and speech fluency independently, patient will participate in a 10+ minute conversation regarding high complexity information with less than 2 moments of halting  behavior. Established this date       Plan     Recommended Treatment Plan:  Patient will participate in the [facility] program for speech therapy 1 times per week for 8 weeks to address her Communication and Motor Speech deficits, to educate patient and their family, and to participate in a home exercise program.    Other Recommendations:   Referral to Otolaryngology (ENT) - to visualize the integrity and mobility of the vocal folds - patient wishes to hold off on this for now  Need to complete CPIB survery next session    Therapist's Name:   Debby Phillips MS, CCC-SLP, CBIS  Speech-Language Pathologist  Certified Brain Injury Specialist  8/31/2023

## 2023-08-31 ENCOUNTER — OFFICE VISIT (OUTPATIENT)
Dept: HOME HEALTH SERVICES | Facility: CLINIC | Age: 48
End: 2023-08-31
Payer: MEDICARE

## 2023-08-31 ENCOUNTER — PATIENT OUTREACH (OUTPATIENT)
Dept: ADMINISTRATIVE | Facility: OTHER | Age: 48
End: 2023-08-31
Payer: MEDICARE

## 2023-08-31 ENCOUNTER — TELEPHONE (OUTPATIENT)
Dept: ADMINISTRATIVE | Facility: CLINIC | Age: 48
End: 2023-08-31
Payer: MEDICARE

## 2023-08-31 VITALS
RESPIRATION RATE: 18 BRPM | HEART RATE: 68 BPM | SYSTOLIC BLOOD PRESSURE: 102 MMHG | OXYGEN SATURATION: 98 % | DIASTOLIC BLOOD PRESSURE: 66 MMHG

## 2023-08-31 DIAGNOSIS — I50.42 CHRONIC COMBINED SYSTOLIC AND DIASTOLIC CONGESTIVE HEART FAILURE: ICD-10-CM

## 2023-08-31 DIAGNOSIS — R20.2 NUMBNESS AND TINGLING IN LEFT HAND: ICD-10-CM

## 2023-08-31 DIAGNOSIS — Z09 HOSPITAL DISCHARGE FOLLOW-UP: Primary | ICD-10-CM

## 2023-08-31 DIAGNOSIS — R47.1 DYSARTHRIA: ICD-10-CM

## 2023-08-31 DIAGNOSIS — R20.0 NUMBNESS AND TINGLING IN LEFT HAND: ICD-10-CM

## 2023-08-31 DIAGNOSIS — R47.01 APHASIA: ICD-10-CM

## 2023-08-31 PROCEDURE — 3078F DIAST BP <80 MM HG: CPT | Mod: CPTII,S$GLB,,

## 2023-08-31 PROCEDURE — 99350 PR HOME VISIT,ESTAB PATIENT,LEVEL IV: ICD-10-PCS | Mod: S$GLB,,,

## 2023-08-31 PROCEDURE — 3074F PR MOST RECENT SYSTOLIC BLOOD PRESSURE < 130 MM HG: ICD-10-PCS | Mod: CPTII,S$GLB,,

## 2023-08-31 PROCEDURE — 99350 HOME/RES VST EST HIGH MDM 60: CPT | Mod: S$GLB,,,

## 2023-08-31 PROCEDURE — 3078F PR MOST RECENT DIASTOLIC BLOOD PRESSURE < 80 MM HG: ICD-10-PCS | Mod: CPTII,S$GLB,,

## 2023-08-31 PROCEDURE — 3074F SYST BP LT 130 MM HG: CPT | Mod: CPTII,S$GLB,,

## 2023-08-31 PROCEDURE — 4010F ACE/ARB THERAPY RXD/TAKEN: CPT | Mod: CPTII,S$GLB,,

## 2023-08-31 PROCEDURE — 4010F PR ACE/ARB THEARPY RXD/TAKEN: ICD-10-PCS | Mod: CPTII,S$GLB,,

## 2023-08-31 PROCEDURE — 3044F PR MOST RECENT HEMOGLOBIN A1C LEVEL <7.0%: ICD-10-PCS | Mod: CPTII,S$GLB,,

## 2023-08-31 PROCEDURE — 3044F HG A1C LEVEL LT 7.0%: CPT | Mod: CPTII,S$GLB,,

## 2023-08-31 RX ORDER — DIAZEPAM 5 MG/1
5 TABLET ORAL ONCE AS NEEDED
Qty: 1 TABLET | Refills: 0 | Status: SHIPPED | OUTPATIENT
Start: 2023-08-31 | End: 2024-01-03

## 2023-08-31 NOTE — PLAN OF CARE
[facility]  Speech Therapy Evaluation -Neurological Rehabilitation    Date: 8/30/2023     Name: Mario Mahajan   MRN: [id number]    Therapy Diagnosis:   Encounter Diagnoses   Name Primary?    Aphasia Yes    Stuttering     Dysarthria     Physician: Victorino Michelle*  Physician Orders: Ambulatory Referral to Speech Therapy   Medical Diagnosis: R47.01 (ICD-10-CM) - Aphasia     Visit # / Visits Authorized:  1 / 1   Date of Evaluation:  8/30/2023   Insurance Authorization Period: 8/31/23 to 10/26/23  Plan of Care Certification:    8/30/2023 to 10/25/2023    Time In: 1435   Time Out: 1515   Total time: 40    Procedure   Assessment of aphasia including assessment of expressive and receptive speech and language function, language comprehension, speech production ability, reading, spelling, writing (Total time: 60 minutes)     Precautions: Standard  Subjective   Patient reported attempting to complete MRI today but was unable to do so - she requested medication to calm her which she was unable to receive while there  Date of Onset: 1 week  History of Current Condition:  Mario Mahajan is a 47 y.o. female who presents to [facility] Outpatient Speech Therapy for evaluation secondary to aphasia. Patient was referred to therapy by Victorino Michelle* , which is the patient's internal medicine provider. Patient reports loss of her speech fluency within this last week as well as difficulty finding her words in conversational speech. She also mentions her speech will return to normal approximately 50% of the time, but then it becomes disfluent the other 50% of the time.   Patient was accompanied to the evaluation by friend and mother.   Past Medical History: Mario Mahaajn  has a past medical history of Asthma, Chronic back pain (7/1/2014), Chronic combined systolic and diastolic congestive heart failure (4/28/2015), Encounter for blood transfusion, ICD (implantable  "cardioverter-defibrillator) in place 12/01/15 (3/3/2016), Menorrhagia, premenopausal (2/10/2017), Microcytic anemia (2015), Non-rheumatic mitral regurgitation (3/5/2015), Nonischemic dilated cardiomyopathy (2015), Sleep apnea, Syncope and collapse (2017), and Ventricular tachycardia, polymorphic.  Mario Mahajan  has a past surgical history that includes Tubal ligation;  section; Cardiac defibrillator placement (2015); Total abdominal hysterectomy (N/A, 3/26/2019); Oophorectomy; Right heart catheterization; Pericardiocentesis (N/A, 2020); insertion, wireless sensor, for pulmonary arterial pressure monitoring (N/A, 10/22/2021); Pulmonary angiography (N/A, 10/22/2021); and Transesophageal echocardiography (N/A, 2022).  Medical Hx and Allergies: Mario has a current medication list which includes the following prescription(s): albuterol, amiodarone, aspirin, atorvastatin, bumetanide, butalbital-acetaminophen-caffeine -40 mg, carvedilol, cetirizine, cyanocobalamin, entresto, ozempic, spironolactone, and timolol maleate 0.5%.   Review of patient's allergies indicates:   Allergen Reactions    Ace inhibitors      Cough     Prior Therapy:  No prior  Social History:  Patient lives alone in [place]. Patient is not currently driving  Occupation:  not working prior to onset  Prior Level of Function: 100% normal speech; conversational speech mainly   Current Level of Function: 50% normal speech, other half is slurred, slow  Pain Scale: 0/10 on a Visual Analog Scale currently.  Pain Location: n/a  Patient's Therapy Goals:  "to talk regular"  Objective   Formal Assessment:  Western Aphasia Battery - Revised (WAB-R) was administered to evaluate the patient's receptive and expressive language function.The purpose stated in the manual for the WAB-R is to determine the presence, severity, and type of aphasia; measure the patient's level of performance to provide a baseline for detecting " "change over time; provide a comprehensive assessment of the patients language strengths and deficits in order to guide treatment and management; infer the location and etiology of the lesion causing the aphasia.  The following results were revealed:     Spontaneous Speech Score: 15 /   Auditory Comprehension Score: 9.85 / 10  Repetition Score:   9.2 /10  Naming and Word Finding Score: 9.3/ 10  Aphasia Quotient (AQ):   86.7 / 100  Aphasia Classification: Anomic    Subtest Results:   Information Content: 6 / 10  Fluency, Grammatical Competence, and Paraphasias: 9 /10  Yes / No Questions: 57 / 60  Auditory Word Recognition: 60 / 60  Sequential Commands: 80 / 80  Repetition: 92 /100  Object Namin /60  Word Fluency:  20  Sentence Completion: 10 /10  Responsive Speech: 10 / 10    Description: Based on findings from this assessment, patient presents with anomic aphasia. During the spontaneous speech picture description task, patient stated "They are at a park and are having a picnic and flying a kite and sailing." When asked to elaborate further, patient chose not to report any additional findings, thus most likely hindering her scores for this subtest. In regard to receptive language abilities, patient answered only one complex yes/no question incorrectly and otherwise completed all other tasks within functional limits. For expressive language, patient named common objects, answered responsive naming questions, and participated in sentence completion tasks with perfect scores. For generative naming, patient named 13 animals in a 1 minute time constraint, slightly below the normed average of 15-20 items per 1 minute. Patient with 8 point deduction for repetition, only having difficulty with the last sentence which was 10 words in length. Overall, patient's expressive and receptive language abilities appear within functional limits for word finding and comprehension. Patient participated in a basic reading and " reading comprehension subtest of the WAB-R in which she independently read aloud all sentences and chose the correct answer for all questions provided.      MOTOR SPEECH/DYSARTHRIA EVALUATION    Evaluation of Speech Mechanisms  Respiration:  Posture: WNL  Breath Support: WNL  Breath Rate: WNL  Respiratory Features: WNL: Diaphragmatic Breathing    Cranial Nerve Examination  Cranial Nerve 5: Trigeminal Nerve  Motor Jaw Posture at rest: Closed  Mandible Elevation/Depression: WFL  Mandible lateralization: WFL  Abnormal movement: absent Interpretation: Intact bilaterally    Sensory Forehead: WFL  Cheek: WFL  Jaw: WFL  Facial Pain: None noted Interpretation: Intact bilaterally      Cranial Nerve 7: Facial Nerve  Motor Facial Symmetry: WNL  Wrinkle Forehead: WFL  Close eyes tightly: WFL  Labial Protrusion: WFL  Labial Retraction: WFL  Buccal Strength with Labial Seal: WFL  Abnormal movement: absent Interpretation: Intact bilaterally    Sensory Formal testing not completed.       Cranial Nerves IX and X: Glossopharyngeal and Vagus Nerves  Motor Palatal Symmetry (Rest): WNL  Palatal Symmetry (Movement): WNL  Cough: Perceptually strong  Voice Prior to PO intake: Clear  Resonance: Normal  Abnormal movement: absent Interpretation: Intact bilaterally      Cranial Nerve XII: Hypoglossal Nerve  Motor Tongue at rest: WNL  Lingual Protrusion: WNL  Lingual Protrusion against Resistance: WNL  Lingual Lateralization: WNL  Abnormal movement: absent Interpretation: Intact bilaterally      Other information:  Volitional Swallow: Able to palpate laryngeal rise  Mucosal Quality: No abnormal findings  Secretion Management: no overt deficits noted/observed  Dentition: Good condition for speech and mastication    Alternating Motion Rates (AMRs) and Sequential Motion Rates (SMRs)  SMRs /pu/  Rate Fast   Rhythm WNL   Accuracy WNL   Norm 5.0-7.1 Di/Sec WNL, 5.4     SMRs /tu/  Rate Fast   Rhythm WNL   Accuracy WNL   Norm 4.8-7.1 Di/Sec WNL, 5.6      SMRs /ku/  Rate Fast   Rhythm WNL   Accuracy WNL   Norm 4.4-7.5 Di/Sec WNL, 5.2     AMRs /putuku/  Rate Slow   Rhythm Irregular   Accuracy Inaccurate   Norm 3.6-7.5 Di/Sec Below norm, 2.4     Maximum Phonation Timing: average of 5.7 seconds across 2 trials  S/Z Ratio: 11.40:6.92 = 1.65 (typically anything above 1.6 is concerning for vocal fold abnormality or pathology)    Perceptual Ratings  Respiratory Features: WFL  Respiratory/Phonatory Features: WFL  Phonatory Features: WFL  Phonatory Quality: Breathiness, hoarse noted during maximum phonation timing  Resonatory Features: WFL  Articulatory Features: WFL  Prosodic Features: Excess, but then reduced stress with visual groping similar to what would be present for a person who stutters (denies stuttering as a child)  Other Features: Facial grimacing on initial syllables occasionally and not consistently  Treatment   Total Treatment Time Separate from Evaluation: not applicable   No treatment performed secondary to time to complete evaluation.     Education provided:   -role of Speech Therapy, goals/plan of care, scheduling/cancellations, insurance limitations with patient  -Additional Education provided:   Motor speech strategies  Word finding strategies    Patient and family members expressed understanding.     Home Program: to be initiated at next visit  Assessment     Mario presents to [facility] status post medical diagnosis of aphasia. She completed a variety of assessments today given her chief complaints of slurred speech and word finding impairments. Patient was classified as having anomic aphasia by the Western Aphasia Battery-Revised. However, in conversational speech, there were no significant word finding impairments present. For speech production, patient presents with a raspy and hoarse vocal quality as noted during maximum phonation timing assessment mostly. Her speech is intermittently dysarthric with no obvious duration of  impairments, yet rather scattered throughout the assessment. When speaking, patient does present with some facial grimacing and halting, with effort produced on initial syllables at times. This presentation also continues into conversational speech as well.      Demonstrates impairments including limitations as described in the problem list.     Positive prognostic factors: family support  Negative prognostic factors: unknown cause of symptoms thus far  Barriers to therapy: Barriers to therapy include requires transportation assistance from others     Patient's spiritual, cultural, and educational needs considered and patient agreeable to plan of care and goals.    Patient will benefit from skilled therapy.    Rehab Potential: fair    Short Term Goals: (4 weeks) Current Progress:   Patient will participate in respiratory coordination exercises for phonation in order to increase fluidity in speech production for conversational speech.    Progressing/ Not Met 8/30/2023   Established this date   2. Patient will state speech fluency enhancing strategies (think before you say, diaphragmatic breathing, continuous phonation) with 100% accuracy independently for use in speech production tasks.    Progressing/ Not Met 8/30/2023   Established this date   3. Patient will complete higher level word finding activities for daily tasks (ordering at a restaurant, discussing medical complexities) with no more than 3 hesitations.     Progressing/ Not Met 8/30/2023   Established this date    4. Patient will participate in semantic feature analysis training to self-employ for word finding difficulties without cueing.     Progressing/ Not Met 8/30/2023   Established this date      Long Term Goals: (8 weeks) Current Progress:   Using compensatory strategies for word finding and speech fluency independently, patient will participate in a 10+ minute conversation regarding high complexity information with less than 2 moments of halting  behavior. Established this date       Plan     Recommended Treatment Plan:  Patient will participate in the [facility] rehabilitation program for speech therapy 1 times per week for 8 weeks to address her Communication and Motor Speech deficits, to educate patient and their family, and to participate in a home exercise program.    Other Recommendations:   Referral to Otolaryngology (ENT) - to visualize the integrity and mobility of the vocal folds - patient wishes to hold off on this for now  Need to complete CPIB survery next session    Therapist's Name:   Debby Phillips MS, CCC-SLP, CBIS  Speech-Language Pathologist  Certified Brain Injury Specialist  8/31/2023

## 2023-08-31 NOTE — PROGRESS NOTES
Food Insecurity. Has already placed SNAP benefits application. Waiting on answer. Sending pantry food locations because pt doesn't meet age qualifications for other resources. I will follow on [facility] platform.

## 2023-08-31 NOTE — PROGRESS NOTES
[facility]  Transition of Care Home Visit    Visit Date: 8/31/2023  Encounter Provider: Filipe Clinton   PCP:  Tejinder Bowling MD    PRESENTING HISTORY      Patient ID: Mario Mahajan is a 47 y.o. female.    Consult Requested By:  No ref. provider found  Reason for Consult:  Hospital Follow Up.    Mario is being seen at home due to being seen at home due to physical debility that presents a taxing effort to leave the home, to mitigate high risk of hospital readmission and/or due to the limited availability of reliable or safe options for transportation to the point of access to the provider. Prior to treatment on this visit the chart was reviewed and patient verbal consent was obtained.      Chief Complaint: Transitional Care        History of Present Illness: MsAxel Mahajan is a 47 y.o. female who was recently admitted to the hospital.    8/21-8/22 admission with medical diagnoses including non-ischemic cardiomyopathy (EF 25% on 4/2023; normal coronary angiogram 2017), VT s/p AICD 2015, severe functional MR s/p mitral clip 2020, pulmonary HTN who presented to [facility] ED on 8/21/2023 for word-finding difficulty since 6:30PM on 8/20.      The patient was in their usual state of health (independent of all ADLs) until 6:30PM on 8/20 when she was in the kitchen and began to have word-finding difficulty as well as headache and L hand numbness and tremor. She also had L mouth numbness with subsequent drooling. She went to lie down for a few minutes and her symptoms slightly subsided. Her symptoms continued onto today, prompting her to go to the ED.      In ED, vitals wnl. Labs unremarkable. CTH negative. CTA neck with no LVO. Transferred to [facility] for further workup.      Hospital course: Neurology consulted - recommended aspirin monotherapy. MRI brain and MRI brain perfusion unable to be completed at this facility due to pt's AICD. Pt instructed to have MRI completed in outpatient setting and f/u with neurology.  TTE with bubble showed EF 5-10%, left ventricle and atrium severely dilated, mild AR, mod to severe MR, mild TR. Negative bubble study. She was noted to have B12 deficiency and discharged with PO supplementation. She will need further workup for severe B12 deficiency (consider IF Ab, MMA, HC). Patient's aphasia with stuttering was still present on discharge although slightly improved from admission. PT/OT evaluated patient and recommended outpatient PT.  ___________________________________________________________________    Today:    HPI:  Patient is a 47 y.o. female being seen today for a post hospital discharge assessment following a recent admit for evaluation of word finding difficulty. Patient presented with medical diagnoses including non-ischemic cardiomyopathy (EF 25% on 4/2023; normal coronary angiogram 2017), VT s/p AICD 2015, severe functional MR s/p mitral clip 2020, and pulmonary HTN. She was hospitalized 8/21-8/22. See hospital course.   Patient is found in their home today, in no acute distress and agreeable to this visit.  Her vital signs are stable, AAOx3. Patient reports no reoccurrence of symptoms in which prompted the hospital stay.  She reports her symptoms have improved. Reports some fatigue. Reports some speech difficulty which has been improving.  She is working with PT and ST outpatient. Patient reports compliance with all medications.  Patient has no further complaints or concerns at this time.   Questions elicited. Time allowed for questions, all issues addressed. Contact info given for any concerns or changes.            Review of Systems   Constitutional:  Positive for fatigue.   HENT: Negative.     Eyes: Negative.    Respiratory: Negative.  Negative for chest tightness.    Cardiovascular: Negative.  Negative for leg swelling.   Gastrointestinal: Negative.    Endocrine: Negative.    Genitourinary: Negative.    Musculoskeletal: Negative.    Skin: Negative.    Allergic/Immunologic:  Negative.    Neurological:  Positive for speech difficulty.   Hematological: Negative.    Psychiatric/Behavioral: Negative.  Negative for agitation.    All other systems reviewed and are negative.      Assessments:  Environmental: Patient lives in a single story home with steps at the entrance.  There is adequate lighting and the temperature is comfortable.    Functional Status: Ambulates independently.   Safety: General safety precautions.   Nutritional: Adequate food in the home.   Home Health/DME/Supplies: No HH.    PAST HISTORY:     Past Medical History:   Diagnosis Date    Asthma     Chronic back pain 2014    Chronic combined systolic and diastolic congestive heart failure 2015     2-10-17   1 - Severely depressed left ventricular systolic function (EF 20-25%).    2 - Severe left ventricular enlargement.    3 - Severe left atrial enlargement.    4 - Left ventricular diastolic dysfunction.    5 - The estimated PA systolic pressure is 18 mmHg.    6 - Mild mitral regurgitation.     Encounter for blood transfusion     ICD (implantable cardioverter-defibrillator) in place 12/01/15 3/3/2016    Menorrhagia, premenopausal 2/10/2017    Microcytic anemia 2015    Non-rheumatic mitral regurgitation 3/5/2015    Nonischemic dilated cardiomyopathy 2015    Sleep apnea     Syncope and collapse 2017    Ventricular tachycardia, polymorphic        Past Surgical History:   Procedure Laterality Date    CARDIAC DEFIBRILLATOR PLACEMENT  2015     SECTION      INSERTION, WIRELESS SENSOR, FOR PULMONARY ARTERIAL PRESSURE MONITORING N/A 10/22/2021    Procedure: INSERTION, WIRELESS SENSOR, FOR PULMONARY ARTERIAL PRESSURE MONITORING;  Surgeon: Erika Hurd MD;  Location: [facility] CATH LAB;  Service: Cardiology;  Laterality: N/A;    OOPHORECTOMY      PERICARDIOCENTESIS N/A 2020    Procedure: Pericardiocentesis;  Surgeon: Memo Luis MD;  Location: [facility] CATH LAB;  Service: Cardiology;  Laterality:  N/A;    PULMONARY ANGIOGRAPHY N/A 10/22/2021    Procedure: ANGIOGRAM, PULMONARY;  Surgeon: Erika Hurd MD;  Location: [facility] CATH LAB;  Service: Cardiology;  Laterality: N/A;    RIGHT HEART CATHETERIZATION      TOTAL ABDOMINAL HYSTERECTOMY N/A 3/26/2019    Procedure: HYSTERECTOMY, TOTAL, ABDOMINAL;  Surgeon: Victorino Rose MD;  Location: [facility] OR;  Service: OB/GYN;  Laterality: N/A;    TRANSESOPHAGEAL ECHOCARDIOGRAPHY N/A 7/20/2022    Procedure: ECHOCARDIOGRAM, TRANSESOPHAGEAL;  Surgeon: Phan Powell MD;  Location: [facility] EP LAB;  Service: Cardiology;  Laterality: N/A;    TUBAL LIGATION         Family History   Problem Relation Age of Onset    Diabetes Father     Pancreatic cancer Father     Heart failure Father     Heart failure Brother     No Known Problems Daughter     No Known Problems Son     Lung cancer Paternal Grandmother     Heart disease Brother        Social History     Socioeconomic History    Marital status: Single    Number of children: 2   Occupational History    Occupation: Unemployed     Employer: [employer]   Tobacco Use    Smoking status: Never    Smokeless tobacco: Never   Substance and Sexual Activity    Alcohol use: Not Currently     Comment: 1 glass per 3 months    Drug use: No    Sexual activity: Yes     Partners: Male     Comment: one male partner (boyfriend)     Social Determinants of Health     Financial Resource Strain: Medium Risk (8/31/2023)    Overall Financial Resource Strain (CARDIA)     Difficulty of Paying Living Expenses: Somewhat hard   Food Insecurity: Food Insecurity Present (8/31/2023)    Hunger Vital Sign     Worried About Running Out of Food in the Last Year: Sometimes true     Ran Out of Food in the Last Year: Sometimes true   Transportation Needs: No Transportation Needs (8/31/2023)    PRAPARE - Transportation     Lack of Transportation (Medical): No     Lack of Transportation (Non-Medical): No   Physical Activity: Insufficiently Active (11/14/2022)     Exercise Vital Sign     Days of Exercise per Week: 2 days     Minutes of Exercise per Session: 20 min   Stress: Stress Concern Present (11/14/2022)    [employer] [place] [employer] - Occupational Stress Questionnaire     Feeling of Stress : To some extent   Social Connections: Moderately Integrated (11/14/2022)    Social Connection and Isolation Panel [NHANES]     Frequency of Communication with Friends and Family: More than three times a week     Frequency of Social Gatherings with Friends and Family: More than three times a week     Attends Jain Services: More than 4 times per year     Active Member of Clubs or Organizations: No     Attends Club or Organization Meetings: More than 4 times per year     Marital Status:    Housing Stability: Low Risk  (8/31/2023)    Housing Stability Vital Sign     Unable to Pay for Housing in the Last Year: No     Number of Places Lived in the Last Year: 1     Unstable Housing in the Last Year: No       MEDICATIONS & ALLERGIES:     Current Outpatient Medications on File Prior to Visit   Medication Sig Dispense Refill    albuterol (PROVENTIL/VENTOLIN HFA) 90 mcg/actuation inhaler Inhale 2 puffs into the lungs every 6 (six) hours as needed for Wheezing. 18 g 6    amiodarone (PACERONE) 200 MG Tab Take 1 tablet (200 mg total) by mouth once daily. 90 tablet 3    aspirin (ECOTRIN) 81 MG EC tablet Take 1 tablet (81 mg total) by mouth once daily. 90 tablet 3    atorvastatin (LIPITOR) 40 MG tablet Take 1 tablet (40 mg total) by mouth once daily. 90 tablet 3    bumetanide (BUMEX) 1 MG tablet TAKE 2 TABLETS BY MOUTH EVERY MORNING AND 1 TABLET BY MOUTH EVERY EVENING. 270 tablet 3    butalbital-acetaminophen-caffeine -40 mg (FIORICET, ESGIC) -40 mg per tablet Take 1 tablet by mouth every 6 (six) hours as needed for Pain. 10 tablet 0    carvediloL (COREG) 25 MG tablet TAKE 1 TABLET(25 MG) BY MOUTH TWICE DAILY 180 tablet 3    cetirizine (ZYRTEC) 10 MG tablet  Take 10 mg by mouth daily as needed.      cyanocobalamin (VITAMIN B-12) 1000 MCG tablet Take 1 tablet (1,000 mcg total) by mouth once daily. 100 tablet 3    diazePAM (VALIUM) 5 MG tablet Take 1 tablet (5 mg total) by mouth once as needed for Anxiety (take 30 minutes before MRI. do not drive while taking this medication). 1 tablet 0    ENTRESTO  mg per tablet TAKE 1 TABLET BY MOUTH TWICE DAILY 60 tablet 11    semaglutide (OZEMPIC) 0.25 mg or 0.5 mg (2 mg/3 mL) pen injector Inject 0.5 mg into the skin every 7 days. Start with 0.25 mg weekly for 4 weeks, then increase to 0.5 mg weekly (Patient taking differently: Inject 0.5 mg into the skin every Sunday.) 3 mL 2    spironolactone (ALDACTONE) 25 MG tablet TAKE 1 TABLET(25 MG) BY MOUTH EVERY DAY 30 tablet 11    timolol maleate 0.5% (TIMOPTIC) 0.5 % Drop INSTILL 1 DROP INTO BOTH EYES EVERY 12 HOURS      [DISCONTINUED] dapagliflozin (FARXIGA) 10 mg tablet Take 1 tablet (10 mg total) by mouth once daily. (Patient not taking: Reported on 8/25/2023) 90 tablet 3     No current facility-administered medications on file prior to visit.        Review of patient's allergies indicates:   Allergen Reactions    Ace inhibitors      Cough       OBJECTIVE:     Vital Signs:  Vitals:    08/31/23 1037   BP: 102/66   Pulse: 68   Resp: 18     There is no height or weight on file to calculate BMI.     Physical Exam:  Physical Exam  Vitals reviewed.   Constitutional:       General: She is not in acute distress.     Appearance: Normal appearance. She is well-developed.   HENT:      Head: Normocephalic and atraumatic.      Nose: Nose normal.      Mouth/Throat:      Mouth: Mucous membranes are dry.      Pharynx: Oropharynx is clear.   Eyes:      Pupils: Pupils are equal, round, and reactive to light.   Cardiovascular:      Rate and Rhythm: Normal rate and regular rhythm.      Pulses: Normal pulses.      Heart sounds: Normal heart sounds.   Pulmonary:      Effort: Pulmonary effort is normal.  "     Breath sounds: Normal breath sounds.   Abdominal:      General: Bowel sounds are normal.      Palpations: Abdomen is soft.   Musculoskeletal:         General: Normal range of motion.      Cervical back: Normal range of motion and neck supple.   Skin:     General: Skin is warm and dry.   Neurological:      General: No focal deficit present.      Mental Status: She is alert and oriented to person, place, and time. Mental status is at baseline.   Psychiatric:         Mood and Affect: Mood normal.         Behavior: Behavior normal.         Thought Content: Thought content normal.         Judgment: Judgment normal.         Laboratory  Lab Results   Component Value Date    WBC 5.82 08/22/2023    HGB 12.1 08/22/2023    HCT 36.4 (L) 08/22/2023     (H) 08/22/2023     08/22/2023     Lab Results   Component Value Date    INR 1.0 08/22/2023    INR 1.0 07/15/2022    INR 1.0 10/22/2021     Lab Results   Component Value Date    HGBA1C 4.6 08/21/2023     No results for input(s): "POCTGLUCOSE" in the last 72 hours.    Diagnostic Results:      TRANSITION OF CARE:     [facility] Contact Note: 8/25    Family and/or Caretaker present at visit?  No.  Diagnostic tests reviewed/disposition: No diagnosic tests pending after this hospitalization.  Disease/illness education: Take all medication as prescribed. Activity as tolerated. Keep all upcoming appts.   Home health/community services discussion/referrals: Patient does not have home health established from hospital visit.  They do not need home health.  If needed, we will set up home health for the patient.   Establishment or re-establishment of referral orders for community resources: No other necessary community resources.   Discussion with other health care providers: No discussion with other health care providers necessary.     Transition of Care Visit:  I have reviewed and updated the history and problem list.  I have reconciled the medication list.  I have " discussed the hospitalization and current medical issues, prognosis and plans with the patient/family.  I  spent more than 50% of time discussing the care with the patient/family.  Total Face-to-Face Encounter: 60 minutes.    Medications Reconciliation:   I have reconciled the patient's home medications and discharge medications with the patient/family. I have updated all changes.  Refer to After-Visit Medication List.    ASSESSMENT & PLAN:     HIGH RISK CONDITION(S):      Problem List Items Addressed This Visit          Neuro    Aphasia    Current Assessment & Plan     Improved since discharge  Continue ST  Monitor         Numbness and tingling in left hand    Current Assessment & Plan     Resolved since admission  Continue working with PT/OT         Dysarthria    Current Assessment & Plan     Improving each day  Working with ST outpatient   Continue current plan            Cardiac/Vascular    Chronic combined systolic and diastolic congestive heart failure    Overview     Severely decreased left ventricular systolic function. The estimated ejection fraction is 20-25%  Severe left ventricular enlargement.  Moderate-to-severe mitral regurgitation. Appears to be functional  Severe left atrial enlargement.  Local segmental wall motion abnormalities.  Grade I (mild) grade II (moderate) left ventricular diastolic dysfunction consistent with pseudonormalization.              Current Assessment & Plan     Euvolemic on exam  Continue current medication  Monitor          Other Visit Diagnoses       Hospital discharge follow-up    -  Primary             Were controlled substances prescribed?  No    Instructions for the patient:  - Continue all medications, treatments and therapies as ordered.   - Follow all instructions, recommendations as discussed.  - Maintain Safety Precautions at all times.  - Attend all medical appointments as scheduled.  - For worsening symptoms: call Primary Care Physician or Nurse Practitioner.  -  For emergencies, call 911 or immediately report to the nearest emergency room.   Scheduled Follow-up :  Future Appointments   Date Time Provider Department Center   9/1/2023 12:15 PM CARDIOMEMS [facility] HEARTTX [facility]   9/6/2023 11:15 AM Debby Phillips CCC-SLP [facility] OP [facility]   9/13/2023 11:15 AM Rapier, Corinne, OT [facility]   9/13/2023  2:30 PM Debby Phillips CCC-SLP [facility] [place]   9/14/2023  1:00 PM Edu Barrera MD [facility] NEURO [facility]   10/6/2023 12:15 PM CARDIOMEMS [facility] HEARTTX [facility]   11/3/2023 12:15 PM CARDIOMEMS [facility] HEARTTX [facility]   11/7/2023 12:30 PM LAB, APPOINTMENT [facility] LAB VNP [facility]   11/7/2023  2:30 PM Jeimy Srivastava MD [facility] HEARTTX [facility]   11/21/2023 10:00 AM HOME MONITOR DEVICE CHECK, [facility] ARRHPRO [facility]   12/1/2023 12:15 PM CARDIOMEMS [facility] HEARTTX [facility]       After Visit Medication List :     Medication List            Accurate as of August 31, 2023  2:22 PM. If you have any questions, ask your nurse or doctor.                CHANGE how you take these medications      OZEMPIC 0.25 mg or 0.5 mg (2 mg/3 mL) pen injector  Generic drug: semaglutide  Inject 0.5 mg into the skin every 7 days. Start with 0.25 mg weekly for 4 weeks, then increase to 0.5 mg weekly  What changed:   when to take this  additional instructions            CONTINUE taking these medications      albuterol 90 mcg/actuation inhaler  Commonly known as: PROVENTIL/VENTOLIN HFA  Inhale 2 puffs into the lungs every 6 (six) hours as needed for Wheezing.     amiodarone 200 MG Tab  Commonly known as: PACERONE  Take 1 tablet (200 mg total) by mouth once daily.     aspirin 81 MG EC tablet  Commonly known as: ECOTRIN  Take 1 tablet (81 mg total) by mouth once daily.     atorvastatin 40 MG tablet  Commonly known as: LIPITOR  Take 1 tablet (40 mg total) by mouth once daily.     bumetanide 1 MG tablet  Commonly known as: BUMEX  TAKE 2 TABLETS BY MOUTH EVERY MORNING  AND 1 TABLET BY MOUTH EVERY EVENING.     butalbital-acetaminophen-caffeine -40 mg -40 mg per tablet  Commonly known as: FIORICET, ESGIC  Take 1 tablet by mouth every 6 (six) hours as needed for Pain.     carvediloL 25 MG tablet  Commonly known as: COREG  TAKE 1 TABLET(25 MG) BY MOUTH TWICE DAILY     cetirizine 10 MG tablet  Commonly known as: ZYRTEC     cyanocobalamin 1000 MCG tablet  Commonly known as: VITAMIN B-12  Take 1 tablet (1,000 mcg total) by mouth once daily.     diazePAM 5 MG tablet  Commonly known as: VALIUM  Take 1 tablet (5 mg total) by mouth once as needed for Anxiety (take 30 minutes before MRI. do not drive while taking this medication).     ENTRESTO  mg per tablet  Generic drug: sacubitriL-valsartan  TAKE 1 TABLET BY MOUTH TWICE DAILY     spironolactone 25 MG tablet  Commonly known as: ALDACTONE  TAKE 1 TABLET(25 MG) BY MOUTH EVERY DAY     timolol maleate 0.5% 0.5 % Drop  Commonly known as: TIMOPTIC              Signature: Filipe Clinton NP

## 2023-09-01 ENCOUNTER — CLINICAL SUPPORT (OUTPATIENT)
Dept: TRANSPLANT | Facility: CLINIC | Age: 48
End: 2023-09-01
Payer: MEDICARE

## 2023-09-01 ENCOUNTER — DOCUMENTATION ONLY (OUTPATIENT)
Dept: TRANSPLANT | Facility: CLINIC | Age: 48
End: 2023-09-01
Payer: MEDICARE

## 2023-09-01 DIAGNOSIS — I50.42 CHRONIC COMBINED SYSTOLIC AND DIASTOLIC CHF, NYHA CLASS 3: Primary | ICD-10-CM

## 2023-09-01 PROCEDURE — 93264 REM MNTR WRLS P-ART PRS SNR: CPT | Mod: S$GLB,,, | Performed by: NURSE PRACTITIONER

## 2023-09-01 PROCEDURE — 99213 PR OFFICE/OUTPT VISIT, EST, LEVL III, 20-29 MIN: ICD-10-PCS | Mod: S$GLB,,, | Performed by: INTERNAL MEDICINE

## 2023-09-01 PROCEDURE — 99213 OFFICE O/P EST LOW 20 MIN: CPT | Mod: S$GLB,,, | Performed by: INTERNAL MEDICINE

## 2023-09-01 PROCEDURE — 93264 PR REMOTE MONTR, WIRELESS PULM-ART PRESS SENSOR, < 30 DAYS: ICD-10-PCS | Mod: S$GLB,,, | Performed by: NURSE PRACTITIONER

## 2023-09-01 NOTE — PROGRESS NOTES
Pt CardioMems transmission received and reviewed.          9/1/2023    10:23 AM 6/28/2023    11:47 AM 3/14/2023     2:39 PM 3/6/2023     1:50 PM 3/3/2023    11:04 AM 2/24/2023     9:42 AM 2/17/2023     1:03 PM   CardioMEMS Transmission    Transmission Date 9/23/2023 6/28/2023 3/14/2023 3/6/2023 2/28/2023 2/23/2023 2/16/2023   Transmission Type Maintenance Maintenance Maintenance Maintenance Maintenance Maintenance Maintenance   PAS 37 [phone] 29 [phone] 38   LEELA 25 34 35 19 26 24 26   PAD  18 23 24 12 19 16 18   Medication Adjustments  No No No No No No No         Cardiomems waveform interpretations, trends, and reports are monitored weekly via [email or web address]. Adjustments made per documentation. Will continue to monitor.

## 2023-09-01 NOTE — PROGRESS NOTES
Pt CardioMems transmission received and review.          9/1/2023    10:23 AM 6/28/2023    11:47 AM 3/14/2023     2:39 PM 3/6/2023     1:50 PM 3/3/2023    11:04 AM 2/24/2023     9:42 AM 2/17/2023     1:03 PM   CardioMEMS Transmission    Transmission Date 9/23/2023 6/28/2023 3/14/2023 3/6/2023 2/28/2023 2/23/2023 2/16/2023   Transmission Type Maintenance Maintenance Maintenance Maintenance Maintenance Maintenance Maintenance   PAS 37 [phone] 49 29 [phone] 38   LEELA 25 34 35 19 26 24 26   PAD  18 23 24 12 19 16 18   Medication Adjustments  No No No No No No No         Pressures are stable.    Medications changed? No    Patient contacted? No    Will continue to monitor.

## 2023-09-05 ENCOUNTER — PATIENT MESSAGE (OUTPATIENT)
Dept: BARIATRICS | Facility: CLINIC | Age: 48
End: 2023-09-05
Payer: MEDICARE

## 2023-09-06 ENCOUNTER — TELEPHONE (OUTPATIENT)
Dept: FAMILY MEDICINE | Facility: CLINIC | Age: 48
End: 2023-09-06
Payer: MEDICARE

## 2023-09-06 ENCOUNTER — TELEPHONE (OUTPATIENT)
Dept: TRANSPLANT | Facility: CLINIC | Age: 48
End: 2023-09-06
Payer: MEDICARE

## 2023-09-06 ENCOUNTER — CLINICAL SUPPORT (OUTPATIENT)
Dept: REHABILITATION | Facility: HOSPITAL | Age: 48
End: 2023-09-06
Payer: MEDICARE

## 2023-09-06 DIAGNOSIS — R47.01 APHASIA: ICD-10-CM

## 2023-09-06 DIAGNOSIS — R47.1 DYSARTHRIA: Primary | ICD-10-CM

## 2023-09-06 PROCEDURE — 92507 TX SP LANG VOICE COMM INDIV: CPT | Mod: PN

## 2023-09-06 NOTE — PROGRESS NOTES
"[facility]  Speech Therapy Treatment Note- Neurological Rehabilitation  Date: 9/6/2023     Name: Mario Mahajan   MRN: [id number]   Therapy Diagnosis:   Encounter Diagnoses   Name Primary?    Dysarthria Yes    Aphasia    Physician: Victorino Michelle*  Physician Orders: Ambulatory Referral to Speech Therapy   Medical Diagnosis: R47.01 (ICD-10-CM) - Aphasia      Visit #/ Visits Authorized: 1/ 23  Date of Evaluation:  8/30/23  Insurance Authorization Period: 8/31/23 to 10/26/23  Plan of Care Expiration Date:    10/25/23  Extended Plan of Care:  n/a   Progress Note: 9/30/23     Time In:  1050  Time Out:  1142  Total Billable Time: 52     Precautions: Standard  Subjective:   Patient reports: speech deficits improved "the day after I came here" and have since been "normal"   No complaints today - asking about MRI appointment and OT evaluation   She was not compliant to home exercise program. Was not established yet  Response to previous treatment: good participation in evaluation  Pain Scale: 0/10 on a Visual Analog Scale currently.  Pain Location: n/a  Objective:   UNTIMED  Procedure   Speech- Language- Voice Therapy     Short Term Goals: (4 weeks) Current Progress:   Patient will participate in respiratory coordination exercises for phonation in order to increase fluidity in speech production for conversational speech.     Progressing/ Not Met 9/6/2023 Reviewed inhalation/exhalation strategies with phonation on exhalation.   Maximum phonation time with 3 trials was completed today to compare to initial evaluation given overall improvement in performance. Patient achieved 10.66 seconds as compared to 5.7 seconds across 2 trials in initial evaluation.   2. Patient will state speech fluency enhancing strategies (think before you say, diaphragmatic breathing, continuous phonation) with 100% accuracy independently for use in speech production tasks.     Progressing/ Not Met 9/6/2023  Introduced speech " fluency enhancing strategies as mentioned in goal. Patient verbalized understanding. When asked to recall strategies, patient hesitant and did not then complete task.   3. Patient will complete higher level word finding activities for daily tasks (ordering at a restaurant, discussing medical complexities) with no more than 3 hesitations.      Progressing/ Not Met 2023  Reviewed and provided handout on word finding strategies, specifically discussing descriptive features. Patient verbalized understanding.  Focused on lower level categorization tasks via divergent naming. Patient was provided with 60 second time constraint in which she produced the following:  Animals: 20 independently  Foods: 26 independently   4. Patient will participate in semantic feature analysis training to self-employ for word finding difficulties without cueing.      Progressing/ Not Met 2023       Introduced semantic feature analysis to the patient today for use when word finding difficulties are present. Patient completed the words apple, hammer, and pencil. Apple with total assistance from speech-language pathologist as task was new, then patient completed hammer with minimal cueing. Pencil: patient recalled 3/6 feature categories as described previously. With assistance, patient achieved 6/6      Long Term Goals: (8 weeks) Current Progress:   Using compensatory strategies for word finding and speech fluency independently, patient will participate in a 10+ minute conversation regarding high complexity information with less than 2 moments of halting behavior. Established this date         Patient Education/Response:   Patient educated regarding the followin. Word finding strategies  2. Semantic feature analysis rationale and use  3. Home exercise program   4. Possible discharge next session if performance remains stable    Home program established: yes-focus on word finding strategies; read aloud, fluency strategies  Patient  verbalized understanding to all above education provided.     See Electronic Medical Record under Patient Instructions for exercises provided throughout therapy.  Assessment:   Initial treatment session was completed today focusing heavily on educational information surrounding use of word finding and fluency strategies. Patient was responsive to all and verbalized understanding. She then participated in divergent naming tasks with excellent performance. She did require some assistance for use of semantic feature analysis as she was just introduced to this today. Also, noted patient to improve her ability for maximum phonation timing as compared to initial evaluation. Mario is progressing well towards her goals. Current goals remain appropriate. Goals to be updated as necessary.     Patient prognosis is Excellent given spontaneous recovery since initial evaluation. Patient will continue to benefit from skilled outpatient speech and language therapy to address the deficits listed in the problem list on initial evaluation, provide patient/family education and to maximize patient's level of independence in the home and community environment.   Medical necessity is demonstrated by the following IMPAIRMENTS:  Motor speech/Aphasia: Occasional difficulties with hesitation and word finding difficulties which lead to slurred speech  Barriers to Therapy: transportation  Patient's spiritual, cultural and educational needs considered and patient agreeable to plan of care and goals.  Plan:   Continue Plan of Care with focus on rehabilitation and compensation for dysarthria and aphasia    Debby Phillips MS, CCC-SLP, CBIS  Speech-Language Pathologist  Certified Brain Injury Specialist  9/6/2023

## 2023-09-06 NOTE — PATIENT INSTRUCTIONS
STRATEGIES FOR COGNITIVE FUNCTIONS  Attention:   Reduce distractions in the area as much as possible.  Look at the person you are talking to   Write down important pieces of information which you may need to reference later  Ask them to repeat if you find yourself not paying attention  Have visual cues to remind you if you need to do something later.  Attention in Conversation  Listen Actively  Eliminate distractions  Ask Questions   Paraphrase  Processing Speed:   Using multiple ways of learning new information- e.g., listening, writing notes, asking questions, recording  Allowing sufficient time to complete tasks will reduce frustration and help to ensure completion.  Executive Functioning:  Dont attempt to multi-task.  Separate tasks so that each of the tasks has separate, designated times  Consider using a calendar/day planner, as that may be effective to help you plan and stay on track.  Color-coding specific tasks by importance may add additional benefit to your planner.  Break down large projects into smaller tasks and write down the steps to completing the task.    Taking notes while reading can help with recall.  Make the environment the best you can (e.g., turning off the TV, reducing background noise, lighting)  Memory  Rehearse - Immediately after seeing/hearing something, try to recall it.  Wait a few minutes, then check again.  Gradually lengthen the intervals between rehearsals.  Repetition -- reciting the information you heard silently or aloud  Write down important information to improve your attention and focus and to have something to look back on when you need to recall it.  Asking for Clarification - asking the person to repeat or rephrase what they said  Visualization - picturing information in your head to help the brain better organize the information  The weirder, funnier, and more elaborate, the better  Use a cue - Symbolic reminders (the proverbial string around the finger) are helpful.   "So too are memos, timers, calendar notes, etc.--keep them in visible, appropriate places.  Get organized - Have fixed locations for all important papers, key phone numbers, medications, keys, wallet, glasses, tools, etc.  Develop routines - Routines can anchor memories so they do not drift away.    Use a Calendar system - Benefits include:  All information is in one place  You rely less on your memory alone  Will help with establishing routines  Will have a record of what you need to to, but also what you have completed  Word Finding:  Try to think of the first letter  Describe it  Think of related words  State the category or topic  Use a similar word    TIPS FOR CONSERVING ENERGY  Sleep Enough  Take Breaks  Exercise  Pace yourself    Be open to help  Avoid interruptions  Cut distractions  Keep it simple     References:ORLANDO Posey, CHRISTOPH Rasheed, VALERIE Prado, TRACEY Escobedo, &amp; ERENDIRA Lua (2009, July). Cognitive Symptom Management and Rehabilitation Therapy (CogSMART) for Traumatic Brain Injury.     Twelve Simple Tips to Improve Your Sleep  [email or web address]    Falling asleep may seem like an impossible dream when youre awake at 3 a.m., but good sleep is more under your control than you might think. Following healthy sleep habits can make the difference between restlessness and restful slumber. Researchers have identified a variety of practices and habits--known as sleep hygiene"--that can help anyone maximize the hours they spend sleeping, even those whose sleep is affected by insomnia, jet lag, or shift work.    Sleep hygiene may sound unimaginative, but it just may be the best way to get the sleep you need in this 24/7 age. Here are some simple tips for making the sleep of your dreams a nightly reality:    #1 Avoid Caffeine, Alcohol, Nicotine, and Other Chemicals that Interfere with Sleep  Caffeinated products decrease a persons quality of sleep. As any coffee lover " "knows, caffeine is a stimulant that can keep you awake. So avoid caffeine (found in coffee, tea, chocolate, cola, and some pain relievers) for four to six hours before bedtime. Similarly, smokers should refrain from using tobacco products too close to bedtime.    Although alcohol may help bring on sleep, after a few hours it acts as a stimulant, increasing the number of awakenings and generally decreasing the quality of sleep later in the night. It is therefore best to limit alcohol consumption to one to two drinks per day, or less, and to avoid drinking within three hours of bedtime.    #2 Turn Your Bedroom into a Sleep-Inducing Environment  A quiet, dark, and cool environment can help promote sound slumber. Why do you think bats congregate in caves for their daytime sleep? To achieve such an environment, lower the volume of outside noise with earplugs or a "white noise" appliance. Use heavy curtains, blackout shades, or an eye mask to block light, a powerful cue that tells the brain that it's time to wake up. Keep the temperature comfortably cool--between 60 and 75°F--and the room well ventilated. And make sure your bedroom is equipped with a comfortable mattress and pillows. (Remember that most mattresses wear out after ten years.)    Also, if a pet regularly wakes you during the night, you may want to consider keeping it out of your bedroom.     It may help to limit your bedroom activities. Keeping computers, TVs, and work materials out of the room will strengthen the mental association between your bedroom and sleep.    #3 Establish a Soothing Pre-Sleep Routine  Light reading before bed is a good way to prepare yourself for sleep.  Ease the transition from wake time to sleep time with a period of relaxing activities an hour or so before bed. Take a bath (the rise, then fall in body temperature promotes drowsiness), read a book, watch television, or practice relaxation exercises. Avoid stressful, stimulating " "activities--doing work, discussing emotional issues. Physically and psychologically stressful activities can cause the body to secrete the stress hormone cortisol, which is associated with increasing alertness. If you tend to take your problems to bed, try writing them down--and then putting them aside.     #4 Go to Sleep When Youre Truly Tired  Struggling to fall sleep just leads to frustration. If youre not asleep after 20 minutes, get out of bed, go to another room, and do something relaxing, like reading or listening to music until you are tired enough to sleep.    #5 Dont Be a Nighttime Clock-Watcher  Staring at a clock in your bedroom, either when you are trying to fall asleep or when you wake in the middle of the night, can actually increase stress, making it harder to fall asleep. Turn your clocks face away from you.     And if you wake up in the middle of the night and cant get back to sleep in about 20 minutes, get up and engage in a quiet, restful activity such as reading or listening to music. And keep the lights dim; bright light can stimulate your internal clock. When your eyelids are drooping and you are ready to sleep, return to bed.    #6 Use Light to Your Advantage  Natural light keeps your internal clock on a healthy sleep-wake cycle. So let in the light first thing in the morning and get out of the office for a sun break during the day.     #7 Keep Your Internal Clock Set with a Consistent Sleep Schedule  Having a regular sleep schedule helps to ensure better quality and consistent sleep.  Going to bed and waking up at the same time each day sets the bodys "internal clock" to expect sleep at a certain time night after night. Try to stick as closely as possible to your routine on weekends to avoid a Monday morning sleep hangover. Waking up at the same time each day is the very best way to set your clock, and even if you did not sleep well the night before, the extra sleep drive will help you " consolidate sleep the following night. Learn more about the importance of synchronizing the clock in The Drive to Sleep and Our Internal Clock.     #8 Nap Early--Or Not at All  Many people make naps a regular part of their day. However, for those who find falling asleep or staying asleep through the night problematic, afternoon napping may be one of the culprits. This is because late-day naps decrease sleep drive. If you must nap, its better to keep it short and before 5 p.m.    #9 Lighten Up on Evening Meals  Eating a pepperoni pizza at 10 p.m. may be a recipe for insomnia. Finish dinner several hours before bedtime and avoid foods that cause indigestion. If you get hungry at night, snack on foods that (in your experience) won't disturb your sleep, perhaps dairy foods and carbohydrates.    #10 Balance Fluid Intake  Drink enough fluid at night to keep from waking up thirsty--but not so much and so close to bedtime that you will be awakened by the need for a trip to the bathroom.    #11 Exercise Early  Exercise helps promote restful sleep if it is done several hours before you go to bed.  Exercise can help you fall asleep faster and sleep more soundly--as long as it's done at the right time. Exercise stimulates the body to secrete the stress hormone cortisol, which helps activate the alerting mechanism in the brain. This is fine, unless you're trying to fall asleep. Try to finish exercising at least three hours before bed or work out earlier in the day.    #12 Follow Through  Some of these tips will be easier to include in your daily and nightly routine than others. However, if you stick with them, your chances of achieving restful sleep will improve. That said, not all sleep problems are so easily treated and could signify the presence of a sleep disorder such as apnea, restless legs syndrome, narcolepsy, or another clinical sleep problem. If your sleep difficulties dont improve through good sleep hygiene, you may  want to consult your physician or a sleep specialist.

## 2023-09-06 NOTE — TELEPHONE ENCOUNTER
----- Message from Janet Dominguez sent at 8/30/2023 12:45 PM CDT -----  Contact: pt  Type:  Call back      Who Called:pt     Does the patient know what this is regarding?:MRI  Would the patient rather a call back or a response via [facility]? Call   Best Call Back Number:[phone]  Additional Information:      Pt stated she could not complete  the MRI due to anxiety and would like to speak with someone regarding it

## 2023-09-06 NOTE — TELEPHONE ENCOUNTER
See pass MRI that was scheduled today pt has pacemaker but needs MRI done can you help get pt scheduled

## 2023-09-12 ENCOUNTER — TELEPHONE (OUTPATIENT)
Dept: FAMILY MEDICINE | Facility: CLINIC | Age: 48
End: 2023-09-12
Payer: MEDICARE

## 2023-09-12 RX ORDER — HYDROXYZINE HYDROCHLORIDE 25 MG/1
25 TABLET, FILM COATED ORAL 3 TIMES DAILY PRN
Qty: 45 TABLET | Refills: 1 | Status: SHIPPED | OUTPATIENT
Start: 2023-09-12

## 2023-09-12 NOTE — TELEPHONE ENCOUNTER
----- Message from Bushra Brown sent at 9/12/2023  3:23 PM CDT -----  Type:  RX Refill Request    Who Called: pt  Refill or New Rx:new rx  RX Name and Strength:anxiety and panic attack would like provider to send some medication into the pharmacy  Preferred Pharmacy with phone number:[id number] - [address] [facility] AT [facility]  Local or Mail Order:local  Ordering Provider:riley  Would the patient rather a call back or a response via [facility]? call  Best Call Back Number:[phone]  Additional Information:

## 2023-09-13 ENCOUNTER — CLINICAL SUPPORT (OUTPATIENT)
Dept: REHABILITATION | Facility: HOSPITAL | Age: 48
End: 2023-09-13
Payer: MEDICARE

## 2023-09-13 ENCOUNTER — PATIENT MESSAGE (OUTPATIENT)
Dept: BARIATRICS | Facility: CLINIC | Age: 48
End: 2023-09-13
Payer: MEDICARE

## 2023-09-13 ENCOUNTER — PATIENT MESSAGE (OUTPATIENT)
Dept: FAMILY MEDICINE | Facility: CLINIC | Age: 48
End: 2023-09-13
Payer: MEDICARE

## 2023-09-13 DIAGNOSIS — R47.1 DYSARTHRIA: Primary | ICD-10-CM

## 2023-09-13 DIAGNOSIS — R47.01 APHASIA: ICD-10-CM

## 2023-09-13 DIAGNOSIS — R53.1 WEAKNESS: Primary | ICD-10-CM

## 2023-09-13 PROCEDURE — 97165 OT EVAL LOW COMPLEX 30 MIN: CPT | Mod: PN

## 2023-09-13 PROCEDURE — 92507 TX SP LANG VOICE COMM INDIV: CPT | Mod: PN

## 2023-09-13 NOTE — TELEPHONE ENCOUNTER
Patient is requesting a referral for an open MRI.     Pt stated she was unable to complete the closed MRI due to anxiety.

## 2023-09-13 NOTE — TELEPHONE ENCOUNTER
Spoke to pt regarding contacting Dr Michelle to change MRI order.     Pt stated the MRI was ordered when she went to the hospital.

## 2023-09-13 NOTE — PATIENT INSTRUCTIONS
Rules with the putty:   Do not leave it in the sun/car  Do not get it on fabric/clothes, it will not come out  Do not let children/pets play with it because it can be toxic

## 2023-09-13 NOTE — PLAN OF CARE
[facility]  Speech Therapy Discharge Note    Name: Mario Mahajan  Clinic Number: [id number]    Therapy Diagnosis:   Encounter Diagnoses   Name Primary?    Dysarthria Yes    Aphasia      Physician: Victorino Michelle*    Physician Orders: Ambulatory Referral to Speech Therapy   Medical Diagnosis: R47.01 (ICD-10-CM) - Aphasia   Evaluation Date: 8/30/23    Date of Last visit: 9/13/23  Total Visits Received: 2 + initial evaluation    ASSESSMENT    Patient has participated in initial evaluation and two subsequent treatment sessions focusing on fluency and word finding abilities. During initial evaluation, patient presented with halting, effortful speech production and stutter-like behaviors with facial grimacing/groping. She participated in knowledge and use of word finding strategies and fluency shaping strategies, though has not needed to utilize them much in her home environment. Patient describes return to baseline speech function with only occasional word finding impairments a day in conversation. Both patient and speech-language pathologist are in agreement for discharge this date given return to baseline and she is aware to return to clinic as needed.    Discharge reason: Patient has reached the maximum rehab potential for the present time    Goals:   Short Term Goals: (4 weeks)   Patient will participate in respiratory coordination exercises for phonation in order to increase fluidity in speech production for conversational speech.  MET 9/13/23   2. Patient will state speech fluency enhancing strategies (think before you say, diaphragmatic breathing, continuous phonation) with 100% accuracy independently for use in speech production tasks.     NOT MET 9/13/23   3. Patient will complete higher level word finding activities for daily tasks (ordering at a restaurant, discussing medical complexities) with no more than 3 hesitations.   MET 9/13/23   4. Patient will participate in  semantic feature analysis training to self-employ for word finding difficulties without cueing.    MET 9/13/23             Long Term Goals: (8 weeks)   Using compensatory strategies for word finding and speech fluency independently, patient will participate in a 10+ minute conversation regarding high complexity information with less than 2 moments of halting behavior.  MET 9/13/23        PLAN   This patient is discharged from Speech Therapy    Debby Phillips MS, CCC-SLP, CBIS  Speech-Language Pathologist  Certified Brain Injury Specialist  9/13/2023

## 2023-09-13 NOTE — PLAN OF CARE
[facility]   Occupational Therapy Initial Neurological Evaluation     Name: Mario Wade [facility] Number: [id number]    Therapy Diagnosis:   Encounter Diagnoses   Name Primary?    Aphasia     Weakness Yes     Physician: June Restrepo MD    Physician Orders: Eval and Treat  Medical Diagnosis: R47.01 (ICD-10-CM) - Aphasia   Evaluation Date: 9/13/2023  Insurance Authorization Period Expiration: 10/26/2023  Plan of Care Certification Period: 10/13/2023  Visit # / Visits authorized: 1 / 1  FOTO: 1/ 3    Precautions:  Standard and pacemaker    Time In: 11:25  Time Out: 12:00  Total Billable Time: 35 minutes    Subjective     Date of Onset: 8/20/2023    History of Current Condition: Mario reports she was in the kitchen when she experienced a change in status, specifically difficulty speaking, drooling, she became lightheaded and her left arm started tingling. She states she went to sleep but when she woke up the symptoms were worse. Mario went to the hospital where she stayed overnight. Imaging inconclusive due to patient's anxiety but she says she was told she had  TIA. Since returning home, she continues to have some pain in her left hand, balance and extreme fatigue with activity, requiring increased time for all tasks.     Involved Side: left   Dominant Side: Right    Imaging: MRI attempted but not completed due to patient's reported anxiety. See EMR for details     Previous Therapy: Pt reports previous therapy admissions for back pain    Pain:  Functional Pain Scale Rating 0-10:   3/10 on average  0/10 at best  5/10 at worst  Location: left hand   Description: Throbbing and Tight  Aggravating Factors: Night Time, opening her hand   Easing Factors:  closing her hand     Occupation:  unemployed    Functional Limitations/Social History:    Prior Level of Function: Independent with all ADLs.   Current Level of Function: independent     Current Functional Status   Home/Living environment:  "lives alone    - single story home, 3 steps to enter    - DME: none        Limitation of Functional Status as follows:   ADLs/IADLs:     - Feeding: mod I - difficulty with opening containers     - Bathing: independent     - Dressing/Grooming: independent     - Home Management: mod I     - Driving: not currently driving     Leisure: cook for other     Patient's Goals for Therapy: "I feel like I am doing well physically. I just don't like the feeling in my arm"     Past Medical History/Physical Systems Review:     Past Medical History:  Mario Mahajan  has a past medical history of Asthma, Chronic back pain, Chronic combined systolic and diastolic congestive heart failure, Encounter for blood transfusion, ICD (implantable cardioverter-defibrillator) in place 12/01/15, Menorrhagia, premenopausal, Microcytic anemia, Non-rheumatic mitral regurgitation, Nonischemic dilated cardiomyopathy, Sleep apnea, Syncope and collapse, and Ventricular tachycardia, polymorphic.    Past Surgical History:  Mario Mahajan  has a past surgical history that includes Tubal ligation;  section; Cardiac defibrillator placement (2015); Total abdominal hysterectomy (N/A, 3/26/2019); Oophorectomy; Right heart catheterization; Pericardiocentesis (N/A, 2020); insertion, wireless sensor, for pulmonary arterial pressure monitoring (N/A, 10/22/2021); Pulmonary angiography (N/A, 10/22/2021); and Transesophageal echocardiography (N/A, 2022).    Current Medications:  Mario has a current medication list which includes the following prescription(s): albuterol, amiodarone, aspirin, atorvastatin, bumetanide, butalbital-acetaminophen-caffeine -40 mg, carvedilol, cetirizine, cyanocobalamin, diazepam, entresto, hydroxyzine hcl, ozempic, spironolactone, and timolol maleate 0.5%.    Allergies:  Review of patient's allergies indicates:   Allergen Reactions    Ace inhibitors      Cough       Objective       Cognitive " Exam:  Oriented Person, Place, Time, and Situation   Behaviors normal, cooperative   Follows Commands/attention: Follows multistep  commands   Communication clear/fluent   Memory No Deficits noted a   Safety awareness/insight to disability aware of diagnosis, treatment, and prognosis   Coping skills/emotional control Appropriate to situation     Visual/Perceptual:  Comments: wears glasses     Physical Exam:  Postural examination/scapula alignment: Rounded shoulder  Joint integrity: Firm end feeling  Skin integrity: intact  Edema: none    bilateral active range of motion within normal limits.    Joint Evaluation  [facility]   9/13/2023 [facility]   9/13/2023    Right Left   Scapular Elevation 4/5 4/5   Scapular Retraction 4/5 4/5   Shoulder Flex 0-180 4/5 4/5   Shoulder Extension 0-80 4/5 4/5   Shoulder Abd 0-180 4/5 4/5   Shoulder ER 0-90 4/5 4/5   Shoulder IR 0-90 4/5 4/5   Shoulder Horizontal adduction 0-90 4/5 4/5   Elbow Flexion 0-150 5/5 5/5   Elbow Extension 5/5 5/5   Wrist Flex 0-80 5/5 5/5   Wrist Ext 0-70 5/5 5/5   Supination 0-80 5/5 5/5   Pronation 0-80 5/5 5/5   *WNL - Within Normal Limits  *NT = Not Tested    Fist: normal      Strength: (KLEBER Dynamometer in lbs.) Average 3 trials, Position II:     9/13/2023 9/13/2023   Rung II Right Left   Trial 1 50# 30#   Trial 2 31# 25#   Trial 3 25# 30#   Average 35.3# 28.3#     Normal  Average Values  Female Right Left Male: Right Left   20-29 66 lbs 62 lbs         94 lbs 86 lbs   30-39 68 lbs 64 lbs 90 lbs 82 lbs   40-49 64 lbs 62 lbs 80 lbs 74 lbs   50-59 62 lbs 57 lbs 72 lbs 65 lbs   60-69 53 lbs 51 lbs 63 lbs 56 lbs   70+ 44 lbs 42 lbs 54 lbs 48 lbs   SD = +/- 19lbs       Pinch Strength (Measured in lbs)     9/13/2023 9/13/2023    Right Left   Key Pinch 14 # 16 #   3pt Pinch 11 # 10 #   2pt Pinch 8 # 8 #       Fine/Gross Motor Coordination    Assessment  Right   9/13/2023 Left  9/13/2023   9 hole peg test 9 pegs in/out for time :27 :29   JACQUIE (Rapid Alternating  Movements)    intact   intact   Finger to Nose (5 times)  intact intact   Finger Flicks (coordination moving from digit flexion to digit extension)  intact intact   *WNL - Within Normal Limits  *NT = Not Tested    Tone:  Modified Adonay Scale:   0 - No increase in muscle tone    Sensation:  Mario  reports no change in sensation     Sensation Intact  Impaired Comments    [place]  Deep Touch (6.65) [x] []     Protective Sensation (4.56) [x] []     Light Touch (3.61) [x] []           Other Kinesthesia  [x] []     Temperature [x] []     Stereognosis  [] []               Intake Outcome Measure for FOTO  Survey    Therapist reviewed FOTO scores for Mario Mahajan on 9/13/2023.   FOTO report - see Media section or FOTO account episode details.    Intake Score: 55%         Treatment     Total Treatment time separate from Evaluation time:10 minutes     Mario received the treatments listed below:      therapeutic exercises to develop strength, endurance, and ROM for 10 minutes including:  - wrist stretches isolated and prayer/reverse prayer   - pink putty for gross grasp and pinch     Home Exercises and Patient Education Provided     Education provided:   -role of OT, goals for OT, scheduling/cancellations, insurance limitations with patient.  -Additional Education provided: stretching program and /pinch strength    Written Home Exercises Provided: yes.  Exercises were reviewed and Mario was able to demonstrate them prior to the end of the session.    Mario demonstrated good  understanding of the education provided.     See EMR under Patient Instructions for exercises provided 9/13/2023.    Assessment     Mario Mahajan is a 47 y.o. female referred to outpatient occupational therapy and presents with a medical diagnosis of R47.01 (ICD-10-CM) - Aphasia. She reports impaired strength and endurance with activities, specifically with left upper extremity. During evaluation, her coordination and sensation are within  normal limits, however, she does exhibit decreased strength for her age and gender. Home exercise program initiated with stretching program and putty to assist with managing deficits. Following medical record review it is determined that pt will benefit from occupational therapy services in order to maximize pain free and/or functional use of left upper extremity. The following goals were discussed with the patient and patient is in agreement with them as to be addressed in the treatment plan. The patient's rehab potential is Good.     Anticipated barriers to occupational therapy: co-morbidities, transportation     Plan of care discussed with patient: Yes  Patient's spiritual, cultural and educational needs considered and patient is agreeable to the plan of care and goals as stated below:     Medical Necessity is demonstrated by the following  Occupational Profile/History  Co-morbidities and personal factors that may impact the plan of care [] LOW: Brief chart review  [x] MODERATE: Expanded chart review   [] HIGH: Extensive chart review    Moderate / High Support Documentation: reviewed co-morbidities and personal factors that are affected such as driving, instrumental activities of daily living      Examination  Performance deficits relating to physical, cognitive or psychosocial skills that result in activity limitations and/or participation restrictions  [x] LOW: addressing 1-3 Performance deficits  [] MODERATE: 3-5 Performance deficits  [] HIGH: 5+ Performance deficits (please support below)    Moderate / High Support Documentation:    Physical:  Muscle Power/Strength  Muscle Endurance   Strength    Cognitive:  No Deficits    Psychosocial:    Habits  Routines  Rituals     Treatment Options [x] LOW: Limited options  [] MODERATE: Several options  [] HIGH: Multiple options      Decision Making/ Complexity Score: low       The following goals were discussed with the patient and patient is in agreement with them  as to be addressed in the treatment plan.     Goals:  Short Term Goals: 2 weeks   - Pt will report compliance with home exercise program to maintain progress attained in therapy  - Pt will increase bilateral  strength by 5# or better in order to increase ease with cooking tasks.   - Pt will report decreased pain in left upper extremity with daily tasks in order to improve quality of life.     Long Term Goals: 4 weeks   - Pt will report return to all meaningful activities using left upper extremity.  - Pt will improve left  strength to 40# or better in order to increase ease with instrumental activities of daily living   - Pt will report understanding of energy conservation techniques to increase safety with cooking, cleaning and instrumental activities of daily living.     Plan   Certification Period/Plan of care expiration: 9/13/2023 to 10/13/2023.    Outpatient Occupational Therapy 1 times weekly for 4 weeks to include the following interventions: Moist Heat/ Ice, Neuromuscular Re-ed, Patient Education, Self Care, Therapeutic Activities, and Therapeutic Exercise.    Corinne Rapier, OT        Physician's Signature: _________________________________________ Date: ________________

## 2023-09-13 NOTE — TELEPHONE ENCOUNTER
Called pt to advised her that MD Gamble would be able to see her on tomorrow (9/14) after her neuro appt. Her appt had been rescheduled. Pt verbalized appreciation and understanding of new appt time and date and the call was disconnected.

## 2023-09-13 NOTE — PROGRESS NOTES
"[facility]  Speech Therapy Treatment Note- Neurological Rehabilitation  Date: 9/13/2023     Name: Mario Mahajan   MRN: [id number]   Therapy Diagnosis:   Encounter Diagnoses   Name Primary?    Dysarthria Yes    Aphasia    Physician: Victorino Michelle*  Physician Orders: Ambulatory Referral to Speech Therapy   Medical Diagnosis: R47.01 (ICD-10-CM) - Aphasia      Visit #/ Visits Authorized: 2/23  Date of Evaluation:  8/30/23  Insurance Authorization Period: 8/31/23 to 10/26/23  Plan of Care Expiration Date:    10/25/23  Extended Plan of Care:  n/a   Progress Note: 9/30/23     Time In:  1205  Time Out:  1233  Total Billable Time: 28    Precautions: Standard  Subjective:   Patient reports: speech has been good; occasional need to slow down "I know what I need to do"  "I had another panic attack" but unable to describe any precipitating factors  She was not compliant to home exercise program. Was not established yet  Response to previous treatment: good participation in evaluation  Pain Scale: 0/10 on a Visual Analog Scale currently.  Pain Location: n/a  Objective:   UNTIMED  Procedure   Speech- Language- Voice Therapy     Short Term Goals: (4 weeks) Current Progress:   Patient will participate in respiratory coordination exercises for phonation in order to increase fluidity in speech production for conversational speech.  MET 9/13/23 Patient verbalized understanding to use of respiratory coordination exercises for phonation (taking pauses in between). No observable impairments with coordination this date or in previous session.   2. Patient will state speech fluency enhancing strategies (think before you say, diaphragmatic breathing, continuous phonation) with 100% accuracy independently for use in speech production tasks.     NOT MET 9/13/23 Patient unable to recall any fluency enhancing strategies this date, but acknowledged them one speech-language pathologist spoke about them.   3. Patient " "will complete higher level word finding activities for daily tasks (ordering at a restaurant, discussing medical complexities) with no more than 3 hesitations.   MET 9/13/23 "I was diagnosed with aphasia and apparent TIA. My speech was slurred and migraines, um, and issues with hand function."  Mentions word finding 2-3 times a day but works through them rather easily. 2 hesitations noted, but appears do to patient actually thinking of how she wanted to respond   4. Patient will participate in semantic feature analysis training to self-employ for word finding difficulties without cueing.    MET 9/13/23       Patient provided with 1 opportunity for warm up as an example of what is requested of her when describing objects.     Laptop (warmup)  Black, [employer], keyboard, number/letters; holds information, gives information,   Probe - location, then independent  Action - to type (prompted)  Association - book; TV (prompted)    Pencil  (independent)  Yellow, point, write, long, skinny, draw, school or office, bought in a store; Prompt for association (stick)    Apple  (independent)  Red, round, find in a tree/store, teacher, green, cook it, worm in it   Function (prompt)    Patient did not exhibit any word finding impairments during session. She utilized SFA when prompted to do so with mention on 5+ descriptors per object.      Long Term Goals: (8 weeks) Current Progress:   Using compensatory strategies for word finding and speech fluency independently, patient will participate in a 10+ minute conversation regarding high complexity information with less than 2 moments of halting behavior.  MET 9/13/23 Patient participated in 10-15 minute conversational speech where she did not exhibit any word finding impairments. There were also no observed fluency impairments.  Patient self-reported improvements in her speech production since initial evaluation.     Patient Education/Response:   Patient educated regarding the " "following:  Continued use of conversational speech at home  Reading as a means of therapeutic intervention for word knowledge and use of language structures  Return to clinic as needed    Home program established: yes-focus on word finding strategies; read aloud, fluency strategies  Patient verbalized understanding to all above education provided.     See Electronic Medical Record under Patient Instructions for exercises provided throughout therapy.  Assessment:   Treatment session today focused heavily on patient's knowledge and use of speech and fluency enhancing strategies, with patient independently reporting "I already know what I need to do" in regard to slowing down her speech rate in conversation. Patient met several of her short-term goals as well as her long-term goal for word finding and fluency. She mentions near return to baseline with only having occasionally < 3 word finding errors per day with associated headache. Patient and speech-language pathologist both agree discharge from therapy is warranted given her return to baseline speech function.     Patient prognosis is Excellent given spontaneous recovery since initial evaluation. Patient will continue to benefit from skilled outpatient speech and language therapy to address the deficits listed in the problem list on initial evaluation, provide patient/family education and to maximize patient's level of independence in the home and community environment.   Medical necessity is demonstrated by the following IMPAIRMENTS:  Motor speech/Aphasia: Occasional difficulties with hesitation and word finding difficulties which lead to slurred speech  Barriers to Therapy: transportation  Patient's spiritual, cultural and educational needs considered and patient agreeable to plan of care and goals.  Plan:   Discharge from [facility] MS, CCC-SLP, CBIS  Speech-Language Pathologist  Certified Brain Injury Specialist  9/13/2023      "

## 2023-09-14 ENCOUNTER — OFFICE VISIT (OUTPATIENT)
Dept: BARIATRICS | Facility: CLINIC | Age: 48
End: 2023-09-14
Payer: MEDICARE

## 2023-09-14 ENCOUNTER — OFFICE VISIT (OUTPATIENT)
Dept: NEUROLOGY | Facility: CLINIC | Age: 48
End: 2023-09-14
Payer: MEDICARE

## 2023-09-14 VITALS — WEIGHT: 264.56 LBS | BODY MASS INDEX: 39.07 KG/M2

## 2023-09-14 VITALS
WEIGHT: 263.44 LBS | HEIGHT: 69 IN | BODY MASS INDEX: 39.02 KG/M2 | OXYGEN SATURATION: 100 % | HEART RATE: 66 BPM | DIASTOLIC BLOOD PRESSURE: 62 MMHG | SYSTOLIC BLOOD PRESSURE: 111 MMHG

## 2023-09-14 DIAGNOSIS — R47.01 APHASIA: ICD-10-CM

## 2023-09-14 DIAGNOSIS — F41.9 ANXIETY: Primary | ICD-10-CM

## 2023-09-14 DIAGNOSIS — E66.01 CLASS 2 SEVERE OBESITY DUE TO EXCESS CALORIES WITH SERIOUS COMORBIDITY AND BODY MASS INDEX (BMI) OF 38.0 TO 38.9 IN ADULT: Primary | ICD-10-CM

## 2023-09-14 DIAGNOSIS — Z71.3 ENCOUNTER FOR WEIGHT LOSS COUNSELING: ICD-10-CM

## 2023-09-14 PROBLEM — R53.1 WEAKNESS: Status: ACTIVE | Noted: 2023-09-13

## 2023-09-14 PROCEDURE — 99999 PR PBB SHADOW E&M-EST. PATIENT-LVL III: CPT | Mod: PBBFAC,GC,,

## 2023-09-14 PROCEDURE — 3078F PR MOST RECENT DIASTOLIC BLOOD PRESSURE < 80 MM HG: ICD-10-PCS | Mod: CPTII,S$GLB,, | Performed by: STUDENT IN AN ORGANIZED HEALTH CARE EDUCATION/TRAINING PROGRAM

## 2023-09-14 PROCEDURE — 99214 PR OFFICE/OUTPT VISIT, EST, LEVL IV, 30-39 MIN: ICD-10-PCS | Mod: GC,S$GLB,, | Performed by: STUDENT IN AN ORGANIZED HEALTH CARE EDUCATION/TRAINING PROGRAM

## 2023-09-14 PROCEDURE — 3044F PR MOST RECENT HEMOGLOBIN A1C LEVEL <7.0%: ICD-10-PCS | Mod: CPTII,S$GLB,, | Performed by: STUDENT IN AN ORGANIZED HEALTH CARE EDUCATION/TRAINING PROGRAM

## 2023-09-14 PROCEDURE — 1111F DSCHRG MED/CURRENT MED MERGE: CPT | Mod: CPTII,S$GLB,, | Performed by: STUDENT IN AN ORGANIZED HEALTH CARE EDUCATION/TRAINING PROGRAM

## 2023-09-14 PROCEDURE — 1159F PR MEDICATION LIST DOCUMENTED IN MEDICAL RECORD: ICD-10-PCS | Mod: CPTII,S$GLB,, | Performed by: STUDENT IN AN ORGANIZED HEALTH CARE EDUCATION/TRAINING PROGRAM

## 2023-09-14 PROCEDURE — 4010F PR ACE/ARB THEARPY RXD/TAKEN: ICD-10-PCS | Mod: CPTII,GC,S$GLB, | Performed by: STUDENT IN AN ORGANIZED HEALTH CARE EDUCATION/TRAINING PROGRAM

## 2023-09-14 PROCEDURE — 4010F ACE/ARB THERAPY RXD/TAKEN: CPT | Mod: CPTII,GC,S$GLB, | Performed by: STUDENT IN AN ORGANIZED HEALTH CARE EDUCATION/TRAINING PROGRAM

## 2023-09-14 PROCEDURE — 1111F PR DISCHARGE MEDS RECONCILED W/ CURRENT OUTPATIENT MED LIST: ICD-10-PCS | Mod: CPTII,S$GLB,, | Performed by: STUDENT IN AN ORGANIZED HEALTH CARE EDUCATION/TRAINING PROGRAM

## 2023-09-14 PROCEDURE — 1159F MED LIST DOCD IN RCRD: CPT | Mod: CPTII,S$GLB,, | Performed by: STUDENT IN AN ORGANIZED HEALTH CARE EDUCATION/TRAINING PROGRAM

## 2023-09-14 PROCEDURE — 3008F BODY MASS INDEX DOCD: CPT | Mod: CPTII,S$GLB,, | Performed by: STUDENT IN AN ORGANIZED HEALTH CARE EDUCATION/TRAINING PROGRAM

## 2023-09-14 PROCEDURE — 99999 PR PBB SHADOW E&M-EST. PATIENT-LVL III: ICD-10-PCS | Mod: PBBFAC,GC,,

## 2023-09-14 PROCEDURE — 4010F ACE/ARB THERAPY RXD/TAKEN: CPT | Mod: CPTII,S$GLB,, | Performed by: STUDENT IN AN ORGANIZED HEALTH CARE EDUCATION/TRAINING PROGRAM

## 2023-09-14 PROCEDURE — 99213 OFFICE O/P EST LOW 20 MIN: CPT | Mod: S$GLB,,, | Performed by: STUDENT IN AN ORGANIZED HEALTH CARE EDUCATION/TRAINING PROGRAM

## 2023-09-14 PROCEDURE — 3074F SYST BP LT 130 MM HG: CPT | Mod: CPTII,S$GLB,, | Performed by: STUDENT IN AN ORGANIZED HEALTH CARE EDUCATION/TRAINING PROGRAM

## 2023-09-14 PROCEDURE — 99213 PR OFFICE/OUTPT VISIT, EST, LEVL III, 20-29 MIN: ICD-10-PCS | Mod: S$GLB,,, | Performed by: STUDENT IN AN ORGANIZED HEALTH CARE EDUCATION/TRAINING PROGRAM

## 2023-09-14 PROCEDURE — 3044F PR MOST RECENT HEMOGLOBIN A1C LEVEL <7.0%: ICD-10-PCS | Mod: CPTII,GC,S$GLB, | Performed by: STUDENT IN AN ORGANIZED HEALTH CARE EDUCATION/TRAINING PROGRAM

## 2023-09-14 PROCEDURE — 99214 OFFICE O/P EST MOD 30 MIN: CPT | Mod: GC,S$GLB,, | Performed by: STUDENT IN AN ORGANIZED HEALTH CARE EDUCATION/TRAINING PROGRAM

## 2023-09-14 PROCEDURE — 4010F PR ACE/ARB THEARPY RXD/TAKEN: ICD-10-PCS | Mod: CPTII,S$GLB,, | Performed by: STUDENT IN AN ORGANIZED HEALTH CARE EDUCATION/TRAINING PROGRAM

## 2023-09-14 PROCEDURE — 3078F DIAST BP <80 MM HG: CPT | Mod: CPTII,S$GLB,, | Performed by: STUDENT IN AN ORGANIZED HEALTH CARE EDUCATION/TRAINING PROGRAM

## 2023-09-14 PROCEDURE — 1111F PR DISCHARGE MEDS RECONCILED W/ CURRENT OUTPATIENT MED LIST: ICD-10-PCS | Mod: CPTII,GC,S$GLB, | Performed by: STUDENT IN AN ORGANIZED HEALTH CARE EDUCATION/TRAINING PROGRAM

## 2023-09-14 PROCEDURE — 3008F BODY MASS INDEX DOCD: CPT | Mod: CPTII,GC,S$GLB, | Performed by: STUDENT IN AN ORGANIZED HEALTH CARE EDUCATION/TRAINING PROGRAM

## 2023-09-14 PROCEDURE — 3008F PR BODY MASS INDEX (BMI) DOCUMENTED: ICD-10-PCS | Mod: CPTII,S$GLB,, | Performed by: STUDENT IN AN ORGANIZED HEALTH CARE EDUCATION/TRAINING PROGRAM

## 2023-09-14 PROCEDURE — 1160F PR REVIEW ALL MEDS BY PRESCRIBER/CLIN PHARMACIST DOCUMENTED: ICD-10-PCS | Mod: CPTII,S$GLB,, | Performed by: STUDENT IN AN ORGANIZED HEALTH CARE EDUCATION/TRAINING PROGRAM

## 2023-09-14 PROCEDURE — 99999 PR PBB SHADOW E&M-EST. PATIENT-LVL III: ICD-10-PCS | Mod: PBBFAC,,, | Performed by: STUDENT IN AN ORGANIZED HEALTH CARE EDUCATION/TRAINING PROGRAM

## 2023-09-14 PROCEDURE — 3044F HG A1C LEVEL LT 7.0%: CPT | Mod: CPTII,GC,S$GLB, | Performed by: STUDENT IN AN ORGANIZED HEALTH CARE EDUCATION/TRAINING PROGRAM

## 2023-09-14 PROCEDURE — 99999 PR PBB SHADOW E&M-EST. PATIENT-LVL III: CPT | Mod: PBBFAC,,, | Performed by: STUDENT IN AN ORGANIZED HEALTH CARE EDUCATION/TRAINING PROGRAM

## 2023-09-14 PROCEDURE — 1160F RVW MEDS BY RX/DR IN RCRD: CPT | Mod: CPTII,S$GLB,, | Performed by: STUDENT IN AN ORGANIZED HEALTH CARE EDUCATION/TRAINING PROGRAM

## 2023-09-14 PROCEDURE — 3008F PR BODY MASS INDEX (BMI) DOCUMENTED: ICD-10-PCS | Mod: CPTII,GC,S$GLB, | Performed by: STUDENT IN AN ORGANIZED HEALTH CARE EDUCATION/TRAINING PROGRAM

## 2023-09-14 PROCEDURE — 3074F PR MOST RECENT SYSTOLIC BLOOD PRESSURE < 130 MM HG: ICD-10-PCS | Mod: CPTII,S$GLB,, | Performed by: STUDENT IN AN ORGANIZED HEALTH CARE EDUCATION/TRAINING PROGRAM

## 2023-09-14 PROCEDURE — 3044F HG A1C LEVEL LT 7.0%: CPT | Mod: CPTII,S$GLB,, | Performed by: STUDENT IN AN ORGANIZED HEALTH CARE EDUCATION/TRAINING PROGRAM

## 2023-09-14 PROCEDURE — 1111F DSCHRG MED/CURRENT MED MERGE: CPT | Mod: CPTII,GC,S$GLB, | Performed by: STUDENT IN AN ORGANIZED HEALTH CARE EDUCATION/TRAINING PROGRAM

## 2023-09-14 RX ORDER — SEMAGLUTIDE 1.34 MG/ML
1 INJECTION, SOLUTION SUBCUTANEOUS
Qty: 3 ML | Refills: 2 | Status: SHIPPED | OUTPATIENT
Start: 2023-09-14 | End: 2023-12-31 | Stop reason: SDUPTHER

## 2023-09-14 NOTE — PROGRESS NOTES
[facility] - NEUROLOGY [address]TH [place] [facility], [place]    Date: 9/14/23  Patient Name: Mario Mahajan   MRN: [id number]   PCP: Tejinder Bowling.  Referring Provider: Victorino Michelle*    Assessment:   Mario Mahajan is a 47 y.o. female presenting with PMHx of nonischemic dilated cardiomyopathy on GDMT (thought to be related to her pregnancy/childbirth), glaucoma, AICD, h/o non-rheumatic MR, episodes of syncope, pulmonary HTN, obesity, and a reported history of resolved hemiplegic migraines (unconfirmed etiology), presented for follow-up post-ED visit on 8/21/2023. Initial ED presentation included word-finding difficulty, headache, L mouth numbness with drooling, and L hand tremor and numbness. Workup at that time, including CTH and CTA, was negative for acute intracranial pathology.    Currently, the patient experiences intermittent neurologic symptoms of word-finding difficulty, anxiety, headaches (2x weekly, 7/10, sometimes stabbing in L eye, sometimes throbbing, 30-minute duration), intermittent L eye droop, and blurry vision 2-3x weekly. She also experiences lightheadedness 1-2x weekly upon standing, and LLE weakness with exertion, though this has recently improved. Additional symptoms include panic attacks, regular neck pain, and spasms. She's on aspirin monotherapy for stroke prevention and multiple other cardiac medications.    Recent imaging (MRI brain w/o contrast, CTH, CTA) showed no acute or chronic intracranial pathology. TTE with bubble study revealed severe cardiac dysfunction (EF 5-10%) but was negative for intracardiac shunt. Stroke/TIA workup was negative. A1c and lipid panel were within normal limits.    Patient encouraged to seek psychiatric therapy. She is currently followed closely by cardiology.    Patient reports continued intermittent symptoms but given negative MRI, low suspicion for TIA or vascular etiology, but cannot completely exclude such causes.  She is at high risk given her cardiac function and another monitory visit is warranted.    Plan:     -- continue ASA, statin  -- continue device checks with cardiology screen for abnormal rhythm; next appointment 11/21/2023  -- BP goals < 130  -- Refer to psychiatry for anxiety per patient's request  -- RTC the week of Dec 7, 2023    Problem List Items Addressed This Visit          Neuro    Aphasia     Other Visit Diagnoses       Anxiety    -  Primary    Relevant Orders    Ambulatory referral/consult to Psychiatry            09/14/2023  Edu Barrera MD, [facility] [place], PGY-3  Department of Neurology  [facility]    Subjective:     Patient seen in consultation at the request of Victorino Michelel* for the evaluation of stuttering speech.       HPI:   Ms. Mario Mahajan is a 47 y.o. female with a PMHx of nonischemic dilated cardiomyopathy, AICD, pulmonary HTN, reported history migraines that resolved for the past 1-2 years (reportedly hemiplegic migraines with left sided facial droop, but unclear if this was a confirmed etiology and tingling in distal LUE), obesity, presenting for hospital followup after being seen in the ED at [place] on 8/21/2023, where patient presented with word-finding difficulty and associated headache, Left mouth numbness with subsequent drooling, L hand tremor and numbness. At that time, she had a negative CTH, CTA.    Current symptoms: Anxiety, headaches (2 weekly, 7/10, sometimes stabbing with pain in the left eye, sometimes throbbing, lasting 30 minutes), intermittent left eye droop, intermittent blurry vision 2-3 x per week, lightheadedness 1-2 x / week when she gets up, LLE weakness on exertion which has improved in recent months, occasional expressive aphasia that has improved. She recently concluded speech therapy due to improvement. She continues to have some headaches (but much improved from prior), supraorbitally on the Left eye. She suffers from  panic attacks. She has regular neck pain and reported spasms.    Stroke, TIA workup:  - 8/21 CTH negative  - 8/21 CTA head neck with no LVO  - MRI brain w/o contrast: no evidence of recent infarction, but limited due to panic attack  - A1c 4.6  - lipid panel: cholesterol 126, HDL 42, LDL 73, TG 52  - Echo with  EF 5-10%, left ventricle and atrium severely dilated, mild AR, mod to severe MR, mild TR. Negative bubble study    Stroke / cardiac meds:  - Currently on asparin monotherapy  - Also taking atorvastatin 40, amiodarone, carvedilol 25mg, bumetanide, entresto , spironolactone, ozempic for weight loss started in 5/2023 among others.    Imaging and Studies:    MRI Brain w/o contrast 8/22/2023  COMPARISON:  CTA 08/21/2023, CT 08/21/2023, CT 03/17/2017     FINDINGS:  Intracranial compartment:     Ventricles and sulci are stable in size.  No evidence of hydrocephalus.     No diffusion restriction to suggest an acute infarction on today's exam.  No remote major vascular distribution infarct.  No compelling evidence of recent or remote hemorrhage.  No new intracranial mass effect or midline shift.     No extra-axial blood or fluid collections.     Skull/extracranial contents (limited evaluation):     Bone marrow signal intensity is normal.     Impression:     Limited exam, degraded by patient motion and terminated prior to completion.  Consider repeat imaging (possibly with sedation) if/when warranted clinically.     No compelling evidence of recent infarction or other acute intracranial pathology.    CT Head Without Contrast 8/21/2023  COMPARISON:  Head CT 03/17/2017     FINDINGS:  No intracranial hemorrhage. No mass effect or midline shift. Normal parenchymal attenuation. The ventricles and sulci are normal in size and configuration. The visualized paranasal sinuses and bilateral mastoid air cells are clear.  No concerning osseous findings.     Impression:     No CT evidence of acute intracranial  abnormality.    CTA head and neck 8/21/2023  COMPARISON:  Head CT same date with multiple priors     FINDINGS:  Extracranial:     Left-sided, 2 vessel aortic arch.  Artifact from high-density venous contrast limits evaluation of the great vessels off the arch and the left subclavian artery.  No hemodynamically significant stenosis in the great vessels off the arch.  The bilateral visualized subclavian arteries are patent.     The visualized right common carotid artery is patent without hemodynamically significant stenosis.  No hemodynamically significant stenosis of the right carotid bifurcation or right cervical ICA.     The visualized left common carotid artery is patent without evidence of hemodynamically significant stenosis.  No hemodynamically significant stenosis of the left carotid bifurcation or left cervical ICA.     Extracranial vertebral arteries: Artifact obscures evaluation of the left vertebral artery origin.  The right vertebral artery origin is patent.  Normal course and caliber of the visualized bilateral vertebral arteries without hemodynamically significant stenosis, aneurysm or evidence of dissection.     Salivary glands appear within normal limits.  Hypodense nodules within the bilateral thyroid lobes.     No pathologic cervical lymphadenopathy.     Straightening of the normal cervical lordosis may be positional.  There are mild degenerative changes.  No acute or concerning osseous abnormalities.     The visualized lung apices are clear.     Intracranial:     The intracranial ICAs are patent without hemodynamically significant stenosis or aneurysm.  Minor bilateral atherosclerotic calcifications.     The right M1 segment is patent without hemodynamically significant stenosis or aneurysm.  The right M2 and proximal M3 branch vessels are patent.     The left M1 segment is patent without hemodynamically significant stenosis or aneurysm.  The left M2 and proximal M3 branch vessels are patent.      The bilateral A1 segments are present and patent.  There appears to be a patent anterior communicating artery.  The A2 and A3 branch vessels are patent.     The intracranial vertebral arteries are patent to the basilar confluence.  The basilar artery is patent without hemodynamically significant stenosis or aneurysm.     There appear to be patent bilateral posterior communicating arteries.  The bilateral P1 segments are present and patent.  The bilateral P2 and visualized P3 branch vessels are patent.     The origins of the superior cerebellar and posteroinferior cerebral arteries are the normal limits.     The major dural venous sinuses are patent.     Impression:     No intracranial large vessel occlusion, hemodynamically significant stenosis or aneurysm.     The visualized cervical carotid and vertebral arteries are patent without hemodynamically significant stenosis.      8/22 TTE with bubble:   Left Ventricle: The left ventricle is severely dilated. There is severely reduced systolic function with a visually estimated ejection fraction of 5 - 10%.    Left Atrium: Left atrium is severely dilated.    Right Ventricle: Normal right ventricular cavity size. Systolic function is normal. Catheter present in the ventricle.    Aortic Valve: The aortic valve is a trileaflet valve. There is mild aortic regurgitation with a centrally directed jet.    Mitral Valve: There is moderate to severe regurgitation with a posterolateral eccentriccally directed jet.    Tricuspid Valve: There is mild regurgitation with a centrally directed jet.    IVC/SVC: Normal venous pressure at 3 mmHg.    Bubble study was negative for intracardiac shunt      PAST MEDICAL HISTORY:  Past Medical History:   Diagnosis Date    Asthma     Chronic back pain 7/1/2014    Chronic combined systolic and diastolic congestive heart failure 4/28/2015 2-10-17   1 - Severely depressed left ventricular systolic function (EF 20-25%).    2 - Severe left  ventricular enlargement.    3 - Severe left atrial enlargement.    4 - Left ventricular diastolic dysfunction.    5 - The estimated PA systolic pressure is 18 mmHg.    6 - Mild mitral regurgitation.     Encounter for blood transfusion     ICD (implantable cardioverter-defibrillator) in place 12/01/15 3/3/2016    Menorrhagia, premenopausal 2/10/2017    Microcytic anemia 2015    Non-rheumatic mitral regurgitation 3/5/2015    Nonischemic dilated cardiomyopathy 2015    Sleep apnea     Syncope and collapse 2017    Ventricular tachycardia, polymorphic        PAST SURGICAL HISTORY:  Past Surgical History:   Procedure Laterality Date    CARDIAC DEFIBRILLATOR PLACEMENT  2015     SECTION      INSERTION, WIRELESS SENSOR, FOR PULMONARY ARTERIAL PRESSURE MONITORING N/A 10/22/2021    Procedure: INSERTION, WIRELESS SENSOR, FOR PULMONARY ARTERIAL PRESSURE MONITORING;  Surgeon: Erika Hurd MD;  Location: [facility] CATH LAB;  Service: Cardiology;  Laterality: N/A;    OOPHORECTOMY      PERICARDIOCENTESIS N/A 2020    Procedure: Pericardiocentesis;  Surgeon: Memo Luis MD;  Location: [facility] CATH LAB;  Service: Cardiology;  Laterality: N/A;    PULMONARY ANGIOGRAPHY N/A 10/22/2021    Procedure: ANGIOGRAM, PULMONARY;  Surgeon: Erika Hurd MD;  Location: [facility] CATH LAB;  Service: Cardiology;  Laterality: N/A;    RIGHT HEART CATHETERIZATION      TOTAL ABDOMINAL HYSTERECTOMY N/A 3/26/2019    Procedure: HYSTERECTOMY, TOTAL, ABDOMINAL;  Surgeon: Victorino Rose MD;  Location: [facility] OR;  Service: OB/GYN;  Laterality: N/A;    TRANSESOPHAGEAL ECHOCARDIOGRAPHY N/A 2022    Procedure: ECHOCARDIOGRAM, TRANSESOPHAGEAL;  Surgeon: Phan Powell MD;  Location: [facility] EP LAB;  Service: Cardiology;  Laterality: N/A;    TUBAL LIGATION         CURRENT MEDS:  Current Outpatient Medications   Medication Sig Dispense Refill    amiodarone (PACERONE) 200 MG Tab Take 1 tablet (200 mg total) by mouth once daily. 90  tablet 3    atorvastatin (LIPITOR) 40 MG tablet Take 1 tablet (40 mg total) by mouth once daily. 90 tablet 3    bumetanide (BUMEX) 1 MG tablet TAKE 2 TABLETS BY MOUTH EVERY MORNING AND 1 TABLET BY MOUTH EVERY EVENING. 270 tablet 3    butalbital-acetaminophen-caffeine -40 mg (FIORICET, ESGIC) -40 mg per tablet Take 1 tablet by mouth every 6 (six) hours as needed for Pain. 10 tablet 0    carvediloL (COREG) 25 MG tablet TAKE 1 TABLET(25 MG) BY MOUTH TWICE DAILY 180 tablet 3    cetirizine (ZYRTEC) 10 MG tablet Take 10 mg by mouth daily as needed.      cyanocobalamin (VITAMIN B-12) 1000 MCG tablet Take 1 tablet (1,000 mcg total) by mouth once daily. 100 tablet 3    ENTRESTO  mg per tablet TAKE 1 TABLET BY MOUTH TWICE DAILY 60 tablet 11    hydrOXYzine HCL (ATARAX) 25 MG tablet Take 1 tablet (25 mg total) by mouth 3 (three) times daily as needed for Anxiety. 45 tablet 1    semaglutide (OZEMPIC) 0.25 mg or 0.5 mg (2 mg/3 mL) pen injector Inject 0.5 mg into the skin every 7 days. Start with 0.25 mg weekly for 4 weeks, then increase to 0.5 mg weekly (Patient taking differently: Inject 0.5 mg into the skin every Sunday.) 3 mL 2    spironolactone (ALDACTONE) 25 MG tablet TAKE 1 TABLET(25 MG) BY MOUTH EVERY DAY 30 tablet 11    timolol maleate 0.5% (TIMOPTIC) 0.5 % Drop INSTILL 1 DROP INTO BOTH EYES EVERY 12 HOURS      albuterol (PROVENTIL/VENTOLIN HFA) 90 mcg/actuation inhaler Inhale 2 puffs into the lungs every 6 (six) hours as needed for Wheezing. 18 g 6    aspirin (ECOTRIN) 81 MG EC tablet Take 1 tablet (81 mg total) by mouth once daily. 90 tablet 3    diazePAM (VALIUM) 5 MG tablet Take 1 tablet (5 mg total) by mouth once as needed for Anxiety (take 30 minutes before MRI. do not drive while taking this medication). 1 tablet 0     No current facility-administered medications for this visit.       ALLERGIES:  Review of patient's allergies indicates:   Allergen Reactions    Ace inhibitors      Cough        FAMILY HISTORY:  Family History   Problem Relation Age of Onset    Diabetes Father     Pancreatic cancer Father     Heart failure Father     Heart failure Brother     No Known Problems Daughter     No Known Problems Son     Lung cancer Paternal Grandmother     Heart disease Brother        SOCIAL HISTORY:  Social History     Tobacco Use    Smoking status: Never    Smokeless tobacco: Never   Substance Use Topics    Alcohol use: Not Currently     Comment: 1 glass per 3 months    Drug use: No       Review of Systems:  12 system review of systems is negative except for the symptoms mentioned in HPI.        Objective:     Vitals:    09/14/23 1320   Weight: 120 kg (264 lb 8.8 oz)     General: NAD, well nourished   Eyes: no tearing, discharge, no erythema   ENT: moist mucous membranes of the oral cavity, nares patent    Neck: Supple, full range of motion  Cardiovascular: Warm and well perfused, pulses equal and symmetrical  Lungs: Normal work of breathing, normal chest wall excursions  Skin: No rash, lesions, or breakdown on exposed skin  Psychiatry: Mood and affect are appropriate   Abdomen: soft, non tender, non distended  Extremeties: No cyanosis, clubbing or edema.    Neurological Exam:  MENTAL STATUS  Level of consciousness: alert  Orientation: oriented to person, place, and time  Attention normal. Concentration normal.  Speech: normal    CRANIAL NERVES  CN II: Visual fields full to confrontation  CN III, IV, VI: PERRL, EOMI  CN V: Facial sensation intact  CN VII: Facial expression symmetric and full  CN VIII: Hearing intact to finger rub  CN IX, X: Symmetric palate elevation. Phonation normal  CN XI: Shoulder shrug and head turn intact bilaterally  CN XII: Tongue midline with normal movements, no atrophy    MOTOR EXAM  Muscle bulk: normal  Muscle tone: normal  Pronator drift: absent    Strength - Upper Extremities   Arm abduction Elbow flexion Elbow extension Wrist flexion Wrist extension Finger abduction    Right 5/5 5/5 5/5 5/5 5/5 5/5   Left 5/5 5/5 5/5 5/5 5/5 5/5     Strength - Lower Extremities   Hip flexion Knee flexion Knee extension Dorsiflexion Plantarflexion   Right 5/5 5/5 5/5 5/5 5/5   Left 5/5 5/5 5/5 5/5 5/5       REFLEXES   Biceps Triceps Brachioradialis Patellar Achilles   Right +2 +2 +2 +2 +2   Left +2 +2 +2 +2 +2     Toes equivocal on R, down on left    SENSORY EXAM  Light touch: intact in all 4 extremities  Temperature: intact in all 4 extremities    COORDINATION  Finger to nose: normal  Tremor: none  Gait steady with normal arm swing, base, and turning

## 2023-09-14 NOTE — PROGRESS NOTES
Subjective     Patient ID: Mario Mahajan is a 47 y.o. female.    Chief Complaint: Follow-up, Obesity, and Weight Check    Patient presents for treatment of obesity.     Co-morbidities  Migraines  Nonischemic dilated cardiomyopathy with ICD    Negative for thyroid cancer    Weight History  Lowest adult weight: 190 lbs  Highest adult weight: 280 lbs       Current Physical Activity  No regular exercise routine    Current Eating Habits  Breakfast - skips  Lunch - sandwich, chicken wings  Dinner - salmon, green beans, starch  Snacks - chips, microwave popcorn  Beverages - no soft drinks, juice    Medical Weight Loss - has ICD, no InBody  4/20/2023: 280 lbs 3.3 oz, BMI 41.38  9/14/2023: 263 lbs 7.2 oz, BMI 38.40      Review of Systems   Constitutional:  Negative for chills and fever.   Respiratory:  Negative for shortness of breath.    Cardiovascular:  Negative for chest pain.   Gastrointestinal:  Negative for abdominal pain, nausea and vomiting.   Neurological:  Negative for dizziness and light-headedness.          Objective    Latest Reference Range & Units 09/29/22 14:33 02/15/23 09:39 02/15/23 12:14 02/17/23 14:34 02/20/23 07:36 02/23/23 14:21   Sodium 136 - 145 mmol/L 139 140  138 137 138   Potassium 3.5 - 5.1 mmol/L 3.7 3.9  3.2 (L) 4.6 4.3   Chloride 95 - 110 mmol/L 103 105  100 100 103   CO2 23 - 29 mmol/L 24 26  30 (H) 29 29   Anion Gap 8 - 16 mmol/L 12 9  8 8 6 (L)   BUN 7 - 17 mg/dL 19 14  18 (H) 43 (H) 34 (H)   Creatinine 0.50 - 1.40 mg/dL 1.3 1.0  1.40 2.1 (H) 1.55 (H)   eGFR >60 mL/min/1.73 m^2 52.0 ! >60.0  47.3 ! 29.1 ! 41.8 !   Glucose 70 - 110 mg/dL 59 (L) 93  91 109 102   Calcium 8.7 - 10.5 mg/dL 9.4 8.8  9.5 9.6 8.9   Alkaline Phosphatase 55 - 135 U/L 43 (L) 44 (L)       PROTEIN TOTAL 6.0 - 8.4 g/dL 7.3 6.9       Albumin 3.5 - 5.2 g/dL 3.8 3.6       BILIRUBIN TOTAL 0.1 - 1.0 mg/dL 1.6 (H) 1.0       AST 10 - 40 U/L 13 14       ALT 10 - 44 U/L 10 12       BNP 0 - 99 pg/mL 139 (H) 436 (H)   68     NT-proBNP 5 - 450 pg/mL    249     Troponin I 0.000 - 0.026 ng/mL  0.222 (H) 0.206 (H)      (L): Data is abnormally low  (H): Data is abnormally high  !: Data is abnormal    Vitals:    09/14/23 1456   BP: 111/62   Pulse: 66       Physical Exam  Vitals reviewed.   Constitutional:       General: She is not in acute distress.     Appearance: Normal appearance. She is obese. She is not ill-appearing, toxic-appearing or diaphoretic.   HENT:      Head: Normocephalic and atraumatic.   Cardiovascular:      Rate and Rhythm: Normal rate.   Pulmonary:      Effort: Pulmonary effort is normal. No respiratory distress.   Skin:     General: Skin is warm and dry.   Neurological:      Mental Status: She is alert and oriented to person, place, and time.            Assessment and Plan     Problem List Items Addressed This Visit    None  Visit Diagnoses       Class 2 severe obesity due to excess calories with serious comorbidity and body mass index (BMI) of 38.0 to 38.9 in adult    -  Primary    Encounter for weight loss counseling                  - Ozempic 1 mg weekly.     - Log all food and beverage intake with a daily calorie goal of 6295-8929 calories per day    - Activity as tolerated

## 2023-09-15 ENCOUNTER — TELEPHONE (OUTPATIENT)
Dept: FAMILY MEDICINE | Facility: CLINIC | Age: 48
End: 2023-09-15
Payer: MEDICARE

## 2023-09-15 DIAGNOSIS — R53.1 WEAKNESS: ICD-10-CM

## 2023-09-15 DIAGNOSIS — G47.20 SLEEP STAGE DYSFUNCTION: Primary | ICD-10-CM

## 2023-09-15 NOTE — TELEPHONE ENCOUNTER
----- Message from Corinne Rapier, OT sent at 9/13/2023  3:38 PM CDT -----  Good morning Dr. Bowling,     I evaluated Mario Mahajan this morning for Occupational Therapy and she had mentioned some balance and endurance concerns following her recent hospitalization. Would you mind placing physical therapy orders so she can be evaluated?    Thank you for your help with this.   Have a great day!  Corinne Rapier, LOTR

## 2023-09-27 ENCOUNTER — TELEPHONE (OUTPATIENT)
Dept: REHABILITATION | Facility: HOSPITAL | Age: 48
End: 2023-09-27

## 2023-09-27 NOTE — TELEPHONE ENCOUNTER
Phoned pt about today's missed OT appt. No answer to mobile number, voicemail left with review of cancellation/no show policy.

## 2023-10-06 ENCOUNTER — CLINICAL SUPPORT (OUTPATIENT)
Dept: TRANSPLANT | Facility: CLINIC | Age: 48
End: 2023-10-06
Payer: MEDICARE

## 2023-10-06 DIAGNOSIS — I50.42 CHRONIC COMBINED SYSTOLIC AND DIASTOLIC CHF, NYHA CLASS 3: Primary | ICD-10-CM

## 2023-10-06 PROCEDURE — 93264 REM MNTR WRLS P-ART PRS SNR: CPT | Mod: S$GLB,,, | Performed by: NURSE PRACTITIONER

## 2023-10-06 PROCEDURE — 93264 PR REMOTE MONTR, WIRELESS PULM-ART PRESS SENSOR, < 30 DAYS: ICD-10-PCS | Mod: S$GLB,,, | Performed by: NURSE PRACTITIONER

## 2023-10-06 PROCEDURE — 99213 OFFICE O/P EST LOW 20 MIN: CPT | Mod: S$GLB,,, | Performed by: INTERNAL MEDICINE

## 2023-10-06 PROCEDURE — 99213 PR OFFICE/OUTPT VISIT, EST, LEVL III, 20-29 MIN: ICD-10-PCS | Mod: S$GLB,,, | Performed by: INTERNAL MEDICINE

## 2023-10-11 ENCOUNTER — DOCUMENTATION ONLY (OUTPATIENT)
Dept: REHABILITATION | Facility: HOSPITAL | Age: 48
End: 2023-10-11

## 2023-10-11 NOTE — PROGRESS NOTES
Therapist phoned patient about missed Occupational Therapy appointment today at 11:15. She reported she wasn't feeling good. Therapist discussed current plan of care expiration this week. Mario stated she wanted to do physical therapy at a clinic near her home and will ask for Occupational Therapy orders if therapy is warranted in the future. At this time, Mario will be discharged from Occupational Therapy.

## 2023-10-17 ENCOUNTER — OFFICE VISIT (OUTPATIENT)
Dept: FAMILY MEDICINE | Facility: CLINIC | Age: 48
End: 2023-10-17
Payer: MEDICARE

## 2023-10-17 VITALS
BODY MASS INDEX: 39.76 KG/M2 | HEIGHT: 69 IN | TEMPERATURE: 98 F | WEIGHT: 268.44 LBS | DIASTOLIC BLOOD PRESSURE: 64 MMHG | OXYGEN SATURATION: 96 % | HEART RATE: 70 BPM | SYSTOLIC BLOOD PRESSURE: 110 MMHG

## 2023-10-17 DIAGNOSIS — I47.29 POLYMORPHIC VENTRICULAR TACHYCARDIA: ICD-10-CM

## 2023-10-17 DIAGNOSIS — I42.0 NONISCHEMIC DILATED CARDIOMYOPATHY: Chronic | ICD-10-CM

## 2023-10-17 DIAGNOSIS — I50.42 CHRONIC COMBINED SYSTOLIC AND DIASTOLIC CONGESTIVE HEART FAILURE: ICD-10-CM

## 2023-10-17 DIAGNOSIS — I50.22 NYHA CLASS 2 AND ACC/AHA STAGE C CHRONIC SYSTOLIC CONGESTIVE HEART FAILURE: ICD-10-CM

## 2023-10-17 DIAGNOSIS — I27.20 PULMONARY HYPERTENSION: Primary | ICD-10-CM

## 2023-10-17 PROCEDURE — 3074F PR MOST RECENT SYSTOLIC BLOOD PRESSURE < 130 MM HG: ICD-10-PCS | Mod: CPTII,S$GLB,, | Performed by: FAMILY MEDICINE

## 2023-10-17 PROCEDURE — 99213 PR OFFICE/OUTPT VISIT, EST, LEVL III, 20-29 MIN: ICD-10-PCS | Mod: S$GLB,,, | Performed by: FAMILY MEDICINE

## 2023-10-17 PROCEDURE — 3008F BODY MASS INDEX DOCD: CPT | Mod: CPTII,S$GLB,, | Performed by: FAMILY MEDICINE

## 2023-10-17 PROCEDURE — 4010F ACE/ARB THERAPY RXD/TAKEN: CPT | Mod: CPTII,S$GLB,, | Performed by: FAMILY MEDICINE

## 2023-10-17 PROCEDURE — 3044F PR MOST RECENT HEMOGLOBIN A1C LEVEL <7.0%: ICD-10-PCS | Mod: CPTII,S$GLB,, | Performed by: FAMILY MEDICINE

## 2023-10-17 PROCEDURE — 3044F HG A1C LEVEL LT 7.0%: CPT | Mod: CPTII,S$GLB,, | Performed by: FAMILY MEDICINE

## 2023-10-17 PROCEDURE — 3078F DIAST BP <80 MM HG: CPT | Mod: CPTII,S$GLB,, | Performed by: FAMILY MEDICINE

## 2023-10-17 PROCEDURE — 4010F PR ACE/ARB THEARPY RXD/TAKEN: ICD-10-PCS | Mod: CPTII,S$GLB,, | Performed by: FAMILY MEDICINE

## 2023-10-17 PROCEDURE — 3074F SYST BP LT 130 MM HG: CPT | Mod: CPTII,S$GLB,, | Performed by: FAMILY MEDICINE

## 2023-10-17 PROCEDURE — 3008F PR BODY MASS INDEX (BMI) DOCUMENTED: ICD-10-PCS | Mod: CPTII,S$GLB,, | Performed by: FAMILY MEDICINE

## 2023-10-17 PROCEDURE — 3078F PR MOST RECENT DIASTOLIC BLOOD PRESSURE < 80 MM HG: ICD-10-PCS | Mod: CPTII,S$GLB,, | Performed by: FAMILY MEDICINE

## 2023-10-17 PROCEDURE — 1159F MED LIST DOCD IN RCRD: CPT | Mod: CPTII,S$GLB,, | Performed by: FAMILY MEDICINE

## 2023-10-17 PROCEDURE — 99213 OFFICE O/P EST LOW 20 MIN: CPT | Mod: S$GLB,,, | Performed by: FAMILY MEDICINE

## 2023-10-17 PROCEDURE — 1159F PR MEDICATION LIST DOCUMENTED IN MEDICAL RECORD: ICD-10-PCS | Mod: CPTII,S$GLB,, | Performed by: FAMILY MEDICINE

## 2023-10-17 RX ORDER — SACUBITRIL AND VALSARTAN 97; 103 MG/1; MG/1
1 TABLET, FILM COATED ORAL 2 TIMES DAILY
Qty: 60 TABLET | Refills: 11 | Status: SHIPPED | OUTPATIENT
Start: 2023-10-17 | End: 2023-12-08 | Stop reason: SDUPTHER

## 2023-10-17 RX ORDER — AMIODARONE HYDROCHLORIDE 200 MG/1
200 TABLET ORAL DAILY
Qty: 90 TABLET | Refills: 3 | Status: SHIPPED | OUTPATIENT
Start: 2023-10-17 | End: 2024-01-03 | Stop reason: SDUPTHER

## 2023-10-22 NOTE — PROGRESS NOTES
" Patient ID: Mario Mahajan is a 47 y.o. female.    Chief Complaint: Hypertension    HPI      Mario Mahajan is a 47 y.o. female patient with hypertension.  Elevated blood pressure recently.  Today not elevated.  No complaints chest pain or palpitation.    Vitals:    10/17/23 1548   BP: 110/64   BP Location: Left arm   Patient Position: Sitting   Pulse: 70   Temp: 97.7 °F (36.5 °C)   TempSrc: Oral   SpO2: 96%   Weight: 121.7 kg (268 lb 6.6 oz)   Height: 5' 9" (1.753 m)            Review of Symptoms      Physical Exam    Constitutional:  Oriented to person, place, and time.appears well-developed and well-nourished.  No distress.      HENT  Head: Normocephalic and atraumatic  Right Ear: External ear normal.   Left Ear: External ear normal.   Nose: External nose normal.   Mouth:  Moist mucus membranes.    Eyes:  Conjunctivae are normal. Right eye exhibits no discharge.  Left eye exhibits no discharge. No scleral icterus.  No periorbital edema    Cardiovascular:  Regular rate and rhythm with normal S1 and S2     Pulmonary/Chest:   Clear to auscultation bilaterally without wheezes, rhonchi or rales      Musculoskeletal:  No edema. No obvious deformity No wasting       Neurological:  Alert and oriented to person, place, and time.   Coordination normal.     Skin:   Skin is warm and dry.  No diaphoresis.   No rash noted.     Psychiatric: Normal mood and affect. Behavior is normal.  Judgment and thought content normal.     Complete Blood Count  Lab Results   Component Value Date    RBC 3.51 (L) 08/22/2023    HGB 12.1 08/22/2023    HCT 36.4 (L) 08/22/2023     (H) 08/22/2023    MCH 34.5 (H) 08/22/2023    MCHC 33.2 08/22/2023    RDW 12.9 08/22/2023     08/22/2023    MPV 10.4 08/22/2023    GRAN 3.2 08/22/2023    GRAN 54.8 08/22/2023    LYMPH 2.0 08/22/2023    LYMPH 34.0 08/22/2023    MONO 0.4 08/22/2023    MONO 6.9 08/22/2023    EOS 0.2 08/22/2023    BASO 0.01 08/22/2023    EOSINOPHIL 3.8 08/22/2023    " BASOPHIL 0.2 08/22/2023    DIFFMETHOD Automated 08/22/2023       Comprehensive Metabolic Panel  Lab Results   Component Value Date    GLU 92 08/22/2023    BUN 12 08/22/2023    CREATININE 1.2 08/22/2023     08/22/2023    K 3.6 08/22/2023     08/22/2023    PROT 6.8 08/22/2023    ALBUMIN 3.5 08/22/2023    BILITOT 1.4 (H) 08/22/2023    AST 12 08/22/2023    ALKPHOS 44 (L) 08/22/2023    CO2 23 08/22/2023    ALT 10 08/22/2023    ANIONGAP 10 08/22/2023       TSH  Lab Results   Component Value Date    TSH 0.267 (L) 08/21/2023       Assessment / Plan:      ICD-10-CM ICD-9-CM   1. Pulmonary hypertension  I27.20 416.8   2. NYHA class 2 and ACC/AHA stage C chronic systolic congestive heart failure  I50.22 428.22     428.0   3. Nonischemic dilated cardiomyopathy  I42.0 425.4   4. Chronic combined systolic and diastolic congestive heart failure  I50.42 428.42     428.0   5. Polymorphic ventricular tachycardia  I47.29 427.1     Pulmonary hypertension    NYHA class 2 and ACC/AHA stage C chronic systolic congestive heart failure    Nonischemic dilated cardiomyopathy    Chronic combined systolic and diastolic congestive heart failure    Polymorphic ventricular tachycardia    Other orders  -     sacubitriL-valsartan (ENTRESTO)  mg per tablet; Take 1 tablet by mouth 2 (two) times daily.  Dispense: 60 tablet; Refill: 11  -     amiodarone (PACERONE) 200 MG Tab; Take 1 tablet (200 mg total) by mouth once daily.  Dispense: 90 tablet; Refill: 3    Refilled medications needed

## 2023-10-24 ENCOUNTER — HOSPITAL ENCOUNTER (EMERGENCY)
Facility: HOSPITAL | Age: 48
Discharge: HOME | End: 2023-10-24
Attending: EMERGENCY MEDICINE
Payer: MEDICARE

## 2023-10-24 VITALS
WEIGHT: 256 LBS | HEART RATE: 59 BPM | DIASTOLIC BLOOD PRESSURE: 57 MMHG | HEIGHT: 69 IN | TEMPERATURE: 98 F | OXYGEN SATURATION: 100 % | BODY MASS INDEX: 37.92 KG/M2 | RESPIRATION RATE: 19 BRPM | SYSTOLIC BLOOD PRESSURE: 111 MMHG

## 2023-10-24 DIAGNOSIS — I50.9 ACUTE ON CHRONIC CONGESTIVE HEART FAILURE, UNSPECIFIED HEART FAILURE TYPE: Primary | ICD-10-CM

## 2023-10-24 DIAGNOSIS — R06.02 SHORTNESS OF BREATH: ICD-10-CM

## 2023-10-24 LAB
ALBUMIN SERPL BCP-MCNC: 3.6 G/DL (ref 3.5–5.2)
ALP SERPL-CCNC: 46 U/L (ref 38–126)
ALT SERPL W/O P-5'-P-CCNC: 17 U/L (ref 10–44)
ANION GAP SERPL CALC-SCNC: 9 MMOL/L (ref 8–16)
AST SERPL-CCNC: 24 U/L (ref 15–46)
BASOPHILS # BLD AUTO: 0.02 K/UL (ref 0–0.2)
BASOPHILS NFR BLD: 0.4 % (ref 0–1.9)
BILIRUB SERPL-MCNC: 1.4 MG/DL (ref 0.1–1)
CALCIUM SERPL-MCNC: 8.6 MG/DL (ref 8.7–10.5)
CHLORIDE SERPL-SCNC: 110 MMOL/L (ref 95–110)
CO2 SERPL-SCNC: 21 MMOL/L (ref 23–29)
CREAT SERPL-MCNC: 0.85 MG/DL (ref 0.5–1.4)
DIFFERENTIAL METHOD: ABNORMAL
EOSINOPHIL # BLD AUTO: 0.4 K/UL (ref 0–0.5)
EOSINOPHIL NFR BLD: 7.1 % (ref 0–8)
ERYTHROCYTE [DISTWIDTH] IN BLOOD BY AUTOMATED COUNT: 11.5 % (ref 11.5–14.5)
EST. GFR  (NO RACE VARIABLE): >60 ML/MIN/1.73 M^2
GLUCOSE SERPL-MCNC: 102 MG/DL (ref 70–110)
HCT VFR BLD AUTO: 33.7 % (ref 37–48.5)
HGB BLD-MCNC: 11.2 G/DL (ref 12–16)
IMM GRANULOCYTES # BLD AUTO: 0.01 K/UL (ref 0–0.04)
IMM GRANULOCYTES NFR BLD AUTO: 0.2 % (ref 0–0.5)
INFLUENZA A, MOLECULAR: NEGATIVE
INFLUENZA B, MOLECULAR: NEGATIVE
LYMPHOCYTES # BLD AUTO: 1.6 K/UL (ref 1–4.8)
LYMPHOCYTES NFR BLD: 30.3 % (ref 18–48)
MCH RBC QN AUTO: 32.8 PG (ref 27–31)
MCHC RBC AUTO-ENTMCNC: 33.2 G/DL (ref 32–36)
MCV RBC AUTO: 99 FL (ref 82–98)
MONOCYTES # BLD AUTO: 0.4 K/UL (ref 0.3–1)
MONOCYTES NFR BLD: 6.5 % (ref 4–15)
NEUTROPHILS # BLD AUTO: 3 K/UL (ref 1.8–7.7)
NEUTROPHILS NFR BLD: 55.5 % (ref 38–73)
NRBC BLD-RTO: 0 /100 WBC
NT-PROBNP SERPL-MCNC: 1130 PG/ML (ref 5–450)
PLATELET # BLD AUTO: 211 K/UL (ref 150–450)
PMV BLD AUTO: 10.6 FL (ref 9.2–12.9)
POTASSIUM SERPL-SCNC: 3.8 MMOL/L (ref 3.5–5.1)
PROT SERPL-MCNC: 7.1 G/DL (ref 6–8.4)
RBC # BLD AUTO: 3.41 M/UL (ref 4–5.4)
SARS-COV-2 RDRP RESP QL NAA+PROBE: NEGATIVE
SODIUM SERPL-SCNC: 140 MMOL/L (ref 136–145)
SPECIMEN SOURCE: NORMAL
TROPONIN I SERPL-MCNC: 0.03 NG/ML (ref 0.01–0.03)
TROPONIN I SERPL-MCNC: 0.04 NG/ML (ref 0.01–0.03)
UUN UR-MCNC: 10 MG/DL (ref 7–17)
WBC # BLD AUTO: 5.38 K/UL (ref 3.9–12.7)

## 2023-10-24 PROCEDURE — 94760 N-INVAS EAR/PLS OXIMETRY 1: CPT | Mod: ER

## 2023-10-24 PROCEDURE — 80053 COMPREHEN METABOLIC PANEL: CPT | Mod: ER | Performed by: EMERGENCY MEDICINE

## 2023-10-24 PROCEDURE — 99285 EMERGENCY DEPT VISIT HI MDM: CPT | Mod: 25,ER

## 2023-10-24 PROCEDURE — 25000003 PHARM REV CODE 250: Mod: ER | Performed by: EMERGENCY MEDICINE

## 2023-10-24 PROCEDURE — 85025 COMPLETE CBC W/AUTO DIFF WBC: CPT | Mod: ER | Performed by: EMERGENCY MEDICINE

## 2023-10-24 PROCEDURE — 25000242 PHARM REV CODE 250 ALT 637 W/ HCPCS: Mod: ER | Performed by: EMERGENCY MEDICINE

## 2023-10-24 PROCEDURE — 84484 ASSAY OF TROPONIN QUANT: CPT | Mod: 91,ER | Performed by: EMERGENCY MEDICINE

## 2023-10-24 PROCEDURE — U0002 COVID-19 LAB TEST NON-CDC: HCPCS | Mod: ER | Performed by: EMERGENCY MEDICINE

## 2023-10-24 PROCEDURE — 93005 ELECTROCARDIOGRAM TRACING: CPT | Mod: ER

## 2023-10-24 PROCEDURE — 96374 THER/PROPH/DIAG INJ IV PUSH: CPT | Mod: ER

## 2023-10-24 PROCEDURE — 93010 EKG 12-LEAD: ICD-10-PCS | Mod: ,,, | Performed by: INTERNAL MEDICINE

## 2023-10-24 PROCEDURE — 84484 ASSAY OF TROPONIN QUANT: CPT | Mod: ER | Performed by: EMERGENCY MEDICINE

## 2023-10-24 PROCEDURE — 83880 ASSAY OF NATRIURETIC PEPTIDE: CPT | Mod: ER | Performed by: EMERGENCY MEDICINE

## 2023-10-24 PROCEDURE — 93010 ELECTROCARDIOGRAM REPORT: CPT | Mod: ,,, | Performed by: INTERNAL MEDICINE

## 2023-10-24 PROCEDURE — 87502 INFLUENZA DNA AMP PROBE: CPT | Mod: ER | Performed by: EMERGENCY MEDICINE

## 2023-10-24 RX ORDER — NITROGLYCERIN 0.4 MG/1
0.4 TABLET SUBLINGUAL
Status: COMPLETED | OUTPATIENT
Start: 2023-10-24 | End: 2023-10-24

## 2023-10-24 RX ORDER — ACETAMINOPHEN 500 MG
1000 TABLET ORAL
Status: COMPLETED | OUTPATIENT
Start: 2023-10-24 | End: 2023-10-24

## 2023-10-24 RX ORDER — BUMETANIDE 0.25 MG/ML
2 INJECTION INTRAMUSCULAR; INTRAVENOUS
Status: COMPLETED | OUTPATIENT
Start: 2023-10-24 | End: 2023-10-24

## 2023-10-24 RX ORDER — MAG HYDROX/ALUMINUM HYD/SIMETH 200-200-20
30 SUSPENSION, ORAL (FINAL DOSE FORM) ORAL
Status: COMPLETED | OUTPATIENT
Start: 2023-10-24 | End: 2023-10-24

## 2023-10-24 RX ADMIN — ALUMINUM HYDROXIDE, MAGNESIUM HYDROXIDE, AND SIMETHICONE 30 ML: 200; 200; 20 SUSPENSION ORAL at 05:10

## 2023-10-24 RX ADMIN — ACETAMINOPHEN 1000 MG: 500 TABLET ORAL at 06:10

## 2023-10-24 RX ADMIN — NITROGLYCERIN 0.4 MG: 0.4 TABLET, ORALLY DISINTEGRATING SUBLINGUAL at 05:10

## 2023-10-24 RX ADMIN — BUMETANIDE 2 MG: 0.25 INJECTION INTRAMUSCULAR; INTRAVENOUS at 07:10

## 2023-10-24 NOTE — ED TRIAGE NOTES
Reports to ED c c/o chest pain that began this evening. States she could not fall asleep and has to sleep straight up. +Tightness. Significant cardiac hx.

## 2023-10-24 NOTE — FIRST PROVIDER EVALUATION
"Medical screening examination initiated.  I have conducted a focused provider triage encounter, findings are as follows:    Brief history of present illness:  47 y.o.  H/o CHF EF 10% presents with chest pain midsternal nr and sob x tonight  No f/c/cough   No leg pain nor swelling  No self tx    Vitals:    10/24/23 0538   BP: (!) 143/74   Pulse: 91   Resp: 20   Temp: 98.3 °F (36.8 °C)   TempSrc: Oral   SpO2: 100%   Weight: 116.1 kg (256 lb)   Height: 5' 9" (1.753 m)       Pertinent physical exam:  no distress, no calf tenderness, no edema    Brief workup plan:  ECG, CXR, labs    Preliminary workup initiated; this workup will be continued and followed by the daytime physician     Cuate Andersen MD, DINA, [facility]  Department of Emergency Medicine    "

## 2023-10-24 NOTE — ED PROVIDER NOTES
Encounter Date: 10/24/2023       History     Chief Complaint   Patient presents with    Chest Pain     PT to the ER with c/o chest pain that started tonight. Pt reports she cannot lay down, she has to sit straight up.      47-year-old female with a history of CHF, EF 5-10%) status post AICD, anemia, sleep apnea, asthma presents with shortness of breath and chest pain since last night.  Patient also complaining of headache.  Prior to onset of symptoms last night, patient says she was in her usual state of health.  Denies cough, fever, sore throat, abdominal pain, vomiting or diarrhea.  Patient says she is been compliant with her medications.  Lying flat exacerbates her symptoms.      Review of patient's allergies indicates:   Allergen Reactions    Ace inhibitors      Cough     Past Medical History:   Diagnosis Date    Asthma     Chronic back pain 2014    Chronic combined systolic and diastolic congestive heart failure 2015     2-10-17   1 - Severely depressed left ventricular systolic function (EF 20-25%).    2 - Severe left ventricular enlargement.    3 - Severe left atrial enlargement.    4 - Left ventricular diastolic dysfunction.    5 - The estimated PA systolic pressure is 18 mmHg.    6 - Mild mitral regurgitation.     Encounter for blood transfusion     ICD (implantable cardioverter-defibrillator) in place 12/01/15 3/3/2016    Menorrhagia, premenopausal 2/10/2017    Microcytic anemia 2015    Non-rheumatic mitral regurgitation 3/5/2015    Nonischemic dilated cardiomyopathy 2015    Sleep apnea     Syncope and collapse 2017    Ventricular tachycardia, polymorphic      Past Surgical History:   Procedure Laterality Date    CARDIAC DEFIBRILLATOR PLACEMENT  2015     SECTION      INSERTION, WIRELESS SENSOR, FOR PULMONARY ARTERIAL PRESSURE MONITORING N/A 10/22/2021    Procedure: INSERTION, WIRELESS SENSOR, FOR PULMONARY ARTERIAL PRESSURE MONITORING;  Surgeon: Erika Hurd MD;   Location: [facility] CATH LAB;  Service: Cardiology;  Laterality: N/A;    OOPHORECTOMY      PERICARDIOCENTESIS N/A 6/17/2020    Procedure: Pericardiocentesis;  Surgeon: Memo Lius MD;  Location: [facility] CATH LAB;  Service: Cardiology;  Laterality: N/A;    PULMONARY ANGIOGRAPHY N/A 10/22/2021    Procedure: ANGIOGRAM, PULMONARY;  Surgeon: Erika uHrd MD;  Location: [facility] CATH LAB;  Service: Cardiology;  Laterality: N/A;    RIGHT HEART CATHETERIZATION      TOTAL ABDOMINAL HYSTERECTOMY N/A 3/26/2019    Procedure: HYSTERECTOMY, TOTAL, ABDOMINAL;  Surgeon: Victorino Rose MD;  Location: [facility] OR;  Service: OB/GYN;  Laterality: N/A;    TRANSESOPHAGEAL ECHOCARDIOGRAPHY N/A 7/20/2022    Procedure: ECHOCARDIOGRAM, TRANSESOPHAGEAL;  Surgeon: Phan Powell MD;  Location: [facility] EP LAB;  Service: Cardiology;  Laterality: N/A;    TUBAL LIGATION       Family History   Problem Relation Age of Onset    Diabetes Father     Pancreatic cancer Father     Heart failure Father     Heart failure Brother     No Known Problems Daughter     No Known Problems Son     Lung cancer Paternal Grandmother     Heart disease Brother      Social History     Tobacco Use    Smoking status: Never     Passive exposure: Never    Smokeless tobacco: Never   Substance Use Topics    Alcohol use: Not Currently     Comment: 1 glass per 3 months    Drug use: No     Review of Systems   Constitutional:  Negative for fever.   Eyes:  Negative for pain.   Respiratory:  Positive for shortness of breath.    Cardiovascular:  Positive for chest pain.   Gastrointestinal:  Negative for abdominal pain, nausea and vomiting.   Genitourinary:  Negative for difficulty urinating.   Musculoskeletal:  Negative for back pain and neck pain.   Neurological:  Positive for headaches.   Psychiatric/Behavioral:  Negative for confusion.        Physical Exam     Initial Vitals [10/24/23 0538]   BP Pulse Resp Temp SpO2   (!) 143/74 91 20 98.3 °F (36.8 °C) 100 %      MAP       --          Physical Exam    Nursing note and vitals reviewed.  HENT:   Head: Atraumatic.   Eyes: Conjunctivae and EOM are normal.   Neck:   Normal range of motion.  Cardiovascular:      Exam reveals no gallop and no friction rub.       No murmur heard.  Pulmonary/Chest: No respiratory distress. She has rales.   Abdominal: Abdomen is soft. There is no abdominal tenderness.   Musculoskeletal:         General: Edema (Trace) present.      Cervical back: Normal range of motion.     Neurological: She is alert and oriented to person, place, and time.   Psychiatric: She has a normal mood and affect.         ED Course   Procedures  Labs Reviewed   CBC W/ AUTO DIFFERENTIAL - Abnormal; Notable for the following components:       Result Value    RBC 3.41 (*)     Hemoglobin 11.2 (*)     Hematocrit 33.7 (*)     MCV 99 (*)     MCH 32.8 (*)     All other components within normal limits   COMPREHENSIVE METABOLIC PANEL - Abnormal; Notable for the following components:    CO2 21 (*)     Calcium 8.6 (*)     Total Bilirubin 1.4 (*)     All other components within normal limits   TROPONIN I - Abnormal; Notable for the following components:    Troponin I 0.038 (*)     All other components within normal limits   NT-PRO NATRIURETIC PEPTIDE - Abnormal; Notable for the following components:    NT-proBNP 1130 (*)     All other components within normal limits   INFLUENZA A & B BY MOLECULAR   SARS-COV-2 RNA AMPLIFICATION, QUAL    Narrative:     Is the patient symptomatic?->No   TROPONIN I     EKG Readings: (Independently Interpreted)   Initial Reading: No STEMI. Rhythm: Normal Sinus Rhythm. Heart Rate: 75. Ectopy: No Ectopy. Conduction: 1st Degree AV Block and LBBB.       Imaging Results              X-Ray Chest AP Portable (Final result)  Result time 10/24/23 07:49:12      Final result by Alden Roach III, MD (10/24/23 07:49:12)                   Impression:      Unchanged cardiomegaly      Electronically signed by: Victorino  Jodi  Date:    10/24/2023  Time:    07:49               Narrative:    EXAMINATION:  XR CHEST AP PORTABLE    CLINICAL HISTORY:  CHF;    TECHNIQUE:  Single frontal view of the chest was performed.    COMPARISON:  08/12/2023    FINDINGS:  Cardiac assist device overlying the left chest with single lead wire overlying the right ventricle, unchanged.  Unchanged cardiomegaly.  The mediastinum is unremarkable.    No pneumothorax, pleural effusion or acute infiltrate.                                    X-Rays:   Independently Interpreted Readings:   Chest X-Ray: No infiltrates.  No acute abnormalities. Cardiomegaly present.     Medications   nitroGLYCERIN SL tablet 0.4 mg (0.4 mg Sublingual Given 10/24/23 0553)   aluminum-magnesium hydroxide-simethicone 200-200-20 mg/5 mL suspension 30 mL (30 mLs Oral Given 10/24/23 0549)   acetaminophen tablet 1,000 mg (1,000 mg Oral Given 10/24/23 0643)   bumetanide injection 2 mg (2 mg Intravenous Given 10/24/23 0737)     Medical Decision Making  47-year-old female with a history of CHF with severely depressed EF, multivalvular disease presenting with chest pain shortness of breath since last night.  Patient says her chest pain has improved but still feels short of breath.  Vital signs are notable for minimal tachypnea.  She is not hypoxic or hypertensive.  No significant evidence of volume overload.  During initial evaluation, patient had short 7 beat run of V-tach.  Cardiac workup initiated by overnight provider    Amount and/or Complexity of Data Reviewed  Labs: ordered. Decision-making details documented in ED Course.  Radiology: ordered.    Risk  OTC drugs.  Prescription drug management.      Additional MDM:   Differential Diagnosis:   Differential diagnosis includes CHF, pleural effusion, pneumonia, ACS, PE amongst others             ED Course as of 10/24/23 0914   Tue Oct 24, 2023   0630 NT-proBNP(!): 1130  Bumex 2mg ordered for CHF [SN]   0630 Comprehensive metabolic panel(!)  [SN]   0630 Troponin I(!): 0.038  Baseline [SN]   0630 Influenza A & B by Molecular [SN]   0630 COVID-19 Rapid Screening [SN]   0640 CBC auto differential(!) [SN]   0818 Patient says she feels better than when she came in.  Will repeat troponin. [SN]   0913 Troponin I: 0.028  Repeat troponin coming down.  Patient continues to feel improved.  Vital signs remained stable.  She does not want to be admitted.  Encourage patient to continue her medications and follow up with primary care Cardiology as soon as possible. [SN]      ED Course User Index  [SN] Shravan Day MD                      Clinical Impression:   Final diagnoses:  [R06.02] Shortness of breath  [I50.9] Acute on chronic congestive heart failure, unspecified heart failure type (Primary)        ED Disposition Condition    Discharge Stable          ED Prescriptions    None       Follow-up Information       Follow up With Specialties Details Why Contact Info    Tejinder Bowling MD Internal Medicine Schedule an appointment as soon as possible for a visit   [address] [place] [address] [id number] [phone]      Cardiology  Schedule an appointment as soon as possible for a visit                Shravan Day MD  10/24/23 0910

## 2023-11-03 ENCOUNTER — CLINICAL SUPPORT (OUTPATIENT)
Dept: TRANSPLANT | Facility: CLINIC | Age: 48
End: 2023-11-03
Payer: MEDICARE

## 2023-11-03 DIAGNOSIS — I50.42 CHRONIC COMBINED SYSTOLIC AND DIASTOLIC CHF, NYHA CLASS 3: Primary | ICD-10-CM

## 2023-11-03 PROCEDURE — 93264 REM MNTR WRLS P-ART PRS SNR: CPT | Mod: S$GLB,,, | Performed by: NURSE PRACTITIONER

## 2023-11-03 PROCEDURE — 99213 OFFICE O/P EST LOW 20 MIN: CPT | Mod: S$GLB,,, | Performed by: INTERNAL MEDICINE

## 2023-11-03 PROCEDURE — 93264 PR REMOTE MONTR, WIRELESS PULM-ART PRESS SENSOR, < 30 DAYS: ICD-10-PCS | Mod: S$GLB,,, | Performed by: NURSE PRACTITIONER

## 2023-11-03 PROCEDURE — 99213 PR OFFICE/OUTPT VISIT, EST, LEVL III, 20-29 MIN: ICD-10-PCS | Mod: S$GLB,,, | Performed by: INTERNAL MEDICINE

## 2023-11-06 ENCOUNTER — PATIENT OUTREACH (OUTPATIENT)
Dept: ADMINISTRATIVE | Facility: OTHER | Age: 48
End: 2023-11-06
Payer: MEDICARE

## 2023-11-21 ENCOUNTER — CLINICAL SUPPORT (OUTPATIENT)
Dept: CARDIOLOGY | Facility: HOSPITAL | Age: 48
End: 2023-11-21
Payer: MEDICARE

## 2023-11-21 DIAGNOSIS — Z95.810 PRESENCE OF AUTOMATIC (IMPLANTABLE) CARDIAC DEFIBRILLATOR: ICD-10-CM

## 2023-11-21 DIAGNOSIS — I42.0 DILATED CARDIOMYOPATHY: ICD-10-CM

## 2023-11-21 PROCEDURE — 93296 REM INTERROG EVL PM/IDS: CPT | Performed by: INTERNAL MEDICINE

## 2023-11-21 NOTE — PROGRESS NOTES
CHW - Unable to Contact    Community Health Worker to close episode at this time due to two missed attempts for patient contact.

## 2023-12-08 ENCOUNTER — PATIENT MESSAGE (OUTPATIENT)
Dept: FAMILY MEDICINE | Facility: CLINIC | Age: 48
End: 2023-12-08
Payer: MEDICARE

## 2023-12-08 DIAGNOSIS — I50.42 CHRONIC COMBINED SYSTOLIC AND DIASTOLIC CONGESTIVE HEART FAILURE: ICD-10-CM

## 2023-12-08 NOTE — TELEPHONE ENCOUNTER
No care due was identified.  [facility] Embedded Care Due Messages. Reference number: [id number].   12/08/2023 4:23:43 PM CST

## 2023-12-11 RX ORDER — SPIRONOLACTONE 25 MG/1
25 TABLET ORAL DAILY
Qty: 30 TABLET | Refills: 11 | Status: SHIPPED | OUTPATIENT
Start: 2023-12-11 | End: 2024-01-03 | Stop reason: SDUPTHER

## 2023-12-11 RX ORDER — SACUBITRIL AND VALSARTAN 97; 103 MG/1; MG/1
1 TABLET, FILM COATED ORAL 2 TIMES DAILY
Qty: 60 TABLET | Refills: 11 | Status: SHIPPED | OUTPATIENT
Start: 2023-12-11 | End: 2024-01-03 | Stop reason: SDUPTHER

## 2023-12-11 RX ORDER — CARVEDILOL 25 MG/1
25 TABLET ORAL 2 TIMES DAILY
Qty: 180 TABLET | Refills: 3 | Status: SHIPPED | OUTPATIENT
Start: 2023-12-11 | End: 2024-01-03 | Stop reason: SDUPTHER

## 2023-12-14 ENCOUNTER — TELEPHONE (OUTPATIENT)
Dept: TRANSPLANT | Facility: CLINIC | Age: 48
End: 2023-12-14
Payer: MEDICARE

## 2023-12-14 NOTE — TELEPHONE ENCOUNTER
4:00 pm:  See pt calls messaging from [facility] appt coordinatorDeanne  Noted pt last seen 5/2023  missed November 2023 appt and was rescheduled form 8/2023    Just called and spoke w/ pt  Pt said the chest tightness and pain isnt back right now , but was last night , she took 2 fluid pills which seemed to help her  I asked if she has other s/s of fluid: wt gain, swelling anywhere , sob and said she did not  She also said sometimes she cannot tell if its her asthma or HF  I told her of concern with this chest tightness and while it could be asthma or fluid, It could also be something else and more and could be her heart vessels  Advised pt she should go to the nearest emergency room to be evaluated to make sure its not heart vessel related    Told pt we can reschedule her misses November appt -pt said she wa unaware, but the appt does not take the place of her being evaluated now for chest pain    Asked pt what ER she would be going to and she told me [facility], but she may wait a little while  I discouraged this and told her why    Message sent to DAREK IBANEZ appt coordinators letting her know I just spoke w/ pt and pt now available and also the appt she schedules is not for the chest pain/tightness, but for routine follow up only

## 2023-12-27 ENCOUNTER — TELEPHONE (OUTPATIENT)
Dept: FAMILY MEDICINE | Facility: CLINIC | Age: 48
End: 2023-12-27
Payer: MEDICARE

## 2023-12-27 DIAGNOSIS — Z12.31 ENCOUNTER FOR SCREENING MAMMOGRAM FOR BREAST CANCER: Primary | ICD-10-CM

## 2024-01-02 RX ORDER — SEMAGLUTIDE 1.34 MG/ML
1 INJECTION, SOLUTION SUBCUTANEOUS
Qty: 3 ML | Refills: 2 | Status: SHIPPED | OUTPATIENT
Start: 2024-01-02

## 2024-01-03 ENCOUNTER — LAB VISIT (OUTPATIENT)
Dept: LAB | Facility: HOSPITAL | Age: 49
End: 2024-01-03
Attending: INTERNAL MEDICINE
Payer: MEDICARE

## 2024-01-03 ENCOUNTER — OFFICE VISIT (OUTPATIENT)
Dept: TRANSPLANT | Facility: CLINIC | Age: 49
End: 2024-01-03
Payer: MEDICARE

## 2024-01-03 VITALS
HEART RATE: 59 BPM | HEIGHT: 69 IN | BODY MASS INDEX: 40.2 KG/M2 | WEIGHT: 271.38 LBS | DIASTOLIC BLOOD PRESSURE: 52 MMHG | SYSTOLIC BLOOD PRESSURE: 110 MMHG

## 2024-01-03 DIAGNOSIS — I27.20 PULMONARY HYPERTENSION: ICD-10-CM

## 2024-01-03 DIAGNOSIS — I50.42 CHRONIC COMBINED SYSTOLIC AND DIASTOLIC CONGESTIVE HEART FAILURE: ICD-10-CM

## 2024-01-03 DIAGNOSIS — I50.42 CHRONIC COMBINED SYSTOLIC AND DIASTOLIC CONGESTIVE HEART FAILURE: Primary | ICD-10-CM

## 2024-01-03 DIAGNOSIS — I34.0 SEVERE MITRAL REGURGITATION: ICD-10-CM

## 2024-01-03 DIAGNOSIS — I42.0 NONISCHEMIC DILATED CARDIOMYOPATHY: Chronic | ICD-10-CM

## 2024-01-03 DIAGNOSIS — Z95.810 ICD (IMPLANTABLE CARDIOVERTER-DEFIBRILLATOR) IN PLACE: Chronic | ICD-10-CM

## 2024-01-03 LAB
ALBUMIN SERPL BCP-MCNC: 3.5 G/DL (ref 3.5–5.2)
ALP SERPL-CCNC: 50 U/L (ref 55–135)
ALT SERPL W/O P-5'-P-CCNC: 16 U/L (ref 10–44)
ANION GAP SERPL CALC-SCNC: 9 MMOL/L (ref 8–16)
AST SERPL-CCNC: 14 U/L (ref 10–40)
BASOPHILS # BLD AUTO: 0.02 K/UL (ref 0–0.2)
BASOPHILS NFR BLD: 0.3 % (ref 0–1.9)
BILIRUB SERPL-MCNC: 1.5 MG/DL (ref 0.1–1)
BUN SERPL-MCNC: 14 MG/DL (ref 6–20)
CALCIUM SERPL-MCNC: 8.9 MG/DL (ref 8.7–10.5)
CHLORIDE SERPL-SCNC: 104 MMOL/L (ref 95–110)
CO2 SERPL-SCNC: 29 MMOL/L (ref 23–29)
CREAT SERPL-MCNC: 1 MG/DL (ref 0.5–1.4)
DIFFERENTIAL METHOD BLD: ABNORMAL
EOSINOPHIL # BLD AUTO: 0.3 K/UL (ref 0–0.5)
EOSINOPHIL NFR BLD: 4.8 % (ref 0–8)
ERYTHROCYTE [DISTWIDTH] IN BLOOD BY AUTOMATED COUNT: 12.2 % (ref 11.5–14.5)
EST. GFR  (NO RACE VARIABLE): >60 ML/MIN/1.73 M^2
GLUCOSE SERPL-MCNC: 94 MG/DL (ref 70–110)
HCT VFR BLD AUTO: 36 % (ref 37–48.5)
HGB BLD-MCNC: 11.7 G/DL (ref 12–16)
IMM GRANULOCYTES # BLD AUTO: 0.01 K/UL (ref 0–0.04)
IMM GRANULOCYTES NFR BLD AUTO: 0.2 % (ref 0–0.5)
LYMPHOCYTES # BLD AUTO: 1.7 K/UL (ref 1–4.8)
LYMPHOCYTES NFR BLD: 28.9 % (ref 18–48)
MCH RBC QN AUTO: 31.4 PG (ref 27–31)
MCHC RBC AUTO-ENTMCNC: 32.5 G/DL (ref 32–36)
MCV RBC AUTO: 97 FL (ref 82–98)
MONOCYTES # BLD AUTO: 0.5 K/UL (ref 0.3–1)
MONOCYTES NFR BLD: 8.5 % (ref 4–15)
NEUTROPHILS # BLD AUTO: 3.4 K/UL (ref 1.8–7.7)
NEUTROPHILS NFR BLD: 57.3 % (ref 38–73)
NRBC BLD-RTO: 0 /100 WBC
PLATELET # BLD AUTO: 282 K/UL (ref 150–450)
PMV BLD AUTO: 11.9 FL (ref 9.2–12.9)
POTASSIUM SERPL-SCNC: 3.1 MMOL/L (ref 3.5–5.1)
PROT SERPL-MCNC: 7.2 G/DL (ref 6–8.4)
RBC # BLD AUTO: 3.73 M/UL (ref 4–5.4)
SODIUM SERPL-SCNC: 142 MMOL/L (ref 136–145)
WBC # BLD AUTO: 5.98 K/UL (ref 3.9–12.7)

## 2024-01-03 PROCEDURE — 36415 COLL VENOUS BLD VENIPUNCTURE: CPT | Performed by: INTERNAL MEDICINE

## 2024-01-03 PROCEDURE — 99213 OFFICE O/P EST LOW 20 MIN: CPT | Mod: S$GLB,,, | Performed by: INTERNAL MEDICINE

## 2024-01-03 PROCEDURE — 85025 COMPLETE CBC W/AUTO DIFF WBC: CPT | Performed by: INTERNAL MEDICINE

## 2024-01-03 PROCEDURE — 80053 COMPREHEN METABOLIC PANEL: CPT | Performed by: INTERNAL MEDICINE

## 2024-01-03 PROCEDURE — 99999 PR PBB SHADOW E&M-EST. PATIENT-LVL III: CPT | Mod: PBBFAC,,, | Performed by: INTERNAL MEDICINE

## 2024-01-03 PROCEDURE — 83880 ASSAY OF NATRIURETIC PEPTIDE: CPT | Performed by: INTERNAL MEDICINE

## 2024-01-03 RX ORDER — AMIODARONE HYDROCHLORIDE 200 MG/1
200 TABLET ORAL DAILY
Qty: 90 TABLET | Refills: 3 | Status: SHIPPED | OUTPATIENT
Start: 2024-01-03

## 2024-01-03 RX ORDER — SACUBITRIL AND VALSARTAN 97; 103 MG/1; MG/1
1 TABLET, FILM COATED ORAL 2 TIMES DAILY
Qty: 180 TABLET | Refills: 11 | Status: SHIPPED | OUTPATIENT
Start: 2024-01-03

## 2024-01-03 RX ORDER — ATORVASTATIN CALCIUM 40 MG/1
40 TABLET, FILM COATED ORAL DAILY
Qty: 90 TABLET | Refills: 3 | Status: SHIPPED | OUTPATIENT
Start: 2024-01-03 | End: 2025-01-02

## 2024-01-03 RX ORDER — CARVEDILOL 25 MG/1
25 TABLET ORAL 2 TIMES DAILY
Qty: 180 TABLET | Refills: 3 | Status: SHIPPED | OUTPATIENT
Start: 2024-01-03

## 2024-01-03 RX ORDER — BUMETANIDE 1 MG/1
TABLET ORAL
Qty: 270 TABLET | Refills: 3 | Status: SHIPPED | OUTPATIENT
Start: 2024-01-03

## 2024-01-03 RX ORDER — SPIRONOLACTONE 25 MG/1
25 TABLET ORAL DAILY
Qty: 90 TABLET | Refills: 4 | Status: SHIPPED | OUTPATIENT
Start: 2024-01-03 | End: 2025-01-02

## 2024-01-03 NOTE — LETTER
February 27, 2024        Juanjose Nuñez  [address]  [facility] [address]  Phone: [phone]  Fax: [phone]             [id number]  [address]  [facility] [address]  Phone: [phone]   Patient: Mario Mahajan   MR Number: [id number]   YOB: 1975   Date of Visit: 1/3/2024       Dear Dr. Juanjose Nuñez    Thank you for referring Mario Mahajan to me for evaluation. Attached you will find relevant portions of my assessment and plan of care.    If you have questions, please do not hesitate to call me. I look forward to following Mario Mahajan along with you.    Sincerely,    Jeimy Srivastava MD    Enclosure    If you would like to receive this communication electronically, please contact [email or web address] or [phone] to request [employer] access.    [employer] Link is a tool which provides read-only access to select patient information with whom you have a relationship. Its easy to use and provides real time access to review your patients record including encounter summaries, notes, results, and demographic information.    If you feel you have received this communication in error or would no longer like to receive these types of communications, please e-mail [email or web address]

## 2024-01-04 ENCOUNTER — DOCUMENTATION ONLY (OUTPATIENT)
Dept: TRANSPLANT | Facility: CLINIC | Age: 49
End: 2024-01-04
Payer: MEDICARE

## 2024-01-04 LAB — NT-PROBNP SERPL IA-MCNC: 467 PG/ML

## 2024-01-04 NOTE — PROGRESS NOTES
Pt CardioMems transmission received and review.          1/4/2024     3:01 PM 9/1/2023    10:23 AM 6/28/2023    11:47 AM 3/14/2023     2:39 PM 3/6/2023     1:50 PM 3/3/2023    11:04 AM 2/24/2023     9:42 AM   CardioMEMS Transmission    Transmission Date 1/3/2024 9/23/2023 6/28/2023 3/14/2023 3/6/2023 2/28/2023 2/23/2023   Transmission Type Maintenance Maintenance Maintenance Maintenance Maintenance Maintenance Maintenance   PAS 54 37 46 49 29 39 38   LEELA 39 25 34 35 19 26 24   PAD  25 18 23 24 12 19 16   Medication Adjustments  No No No No No No No         Pressures are elevated.    Medications changed? No    Patient contacted? Yes, Nurse spoke with patient and she reports Bumex 2 mg BID. She was seen in the clinic yesterday by Dr Srivastava. She was advised to do cardiomems readings daily so that we can make sure her cardiomems numbers are trending down. All questions answered and patient verbalized understanding.     Will continue to monitor.

## 2024-01-08 ENCOUNTER — DOCUMENTATION ONLY (OUTPATIENT)
Dept: TRANSPLANT | Facility: CLINIC | Age: 49
End: 2024-01-08
Payer: MEDICARE

## 2024-01-08 NOTE — PROGRESS NOTES
Pt CardioMems transmission received and review.          1/8/2024     1:48 PM 1/4/2024     3:01 PM 9/1/2023    10:23 AM 6/28/2023    11:47 AM 3/14/2023     2:39 PM 3/6/2023     1:50 PM 3/3/2023    11:04 AM   CardioMEMS Transmission    Transmission Date 1/7/2024 1/3/2024 9/23/2023 6/28/2023 3/14/2023 3/6/2023 2/28/2023   Transmission Type Maintenance Maintenance Maintenance Maintenance Maintenance Maintenance Maintenance   PAS 55 54 37 46 49 29 39   LEELA 38 39 25 34 35 19 26   PAD  23 25 18 23 24 12 19   Medication Adjustments  No No No No No No No         Pressures are stable.    Medications changed? No    Patient contacted? No    Will continue to monitor.

## 2024-01-11 ENCOUNTER — TELEPHONE (OUTPATIENT)
Dept: TRANSPLANT | Facility: CLINIC | Age: 49
End: 2024-01-11
Payer: MEDICARE

## 2024-01-11 ENCOUNTER — PATIENT MESSAGE (OUTPATIENT)
Dept: TRANSPLANT | Facility: CLINIC | Age: 49
End: 2024-01-11
Payer: MEDICARE

## 2024-01-11 NOTE — TELEPHONE ENCOUNTER
Pt CardioMems transmission received and reviewed with CHRISTOPH Hurd.          1/11/2024    11:11 AM 1/8/2024     1:48 PM 1/4/2024     3:01 PM 9/1/2023    10:23 AM 6/28/2023    11:47 AM 3/14/2023     2:39 PM 3/6/2023     1:50 PM   CardioMEMS Transmission    Transmission Date 1/10/2024 1/7/2024 1/3/2024 9/23/2023 6/28/2023 3/14/2023 3/6/2023   Transmission Type Maintenance Maintenance Maintenance Maintenance Maintenance Maintenance Maintenance   PAS 58 55 54 37 46 49 29   LEELA 41 38 39 25 34 35 19   PAD  26 23 25 18 23 24 12   Medication Adjustments  No No No No No No No         Pressures are elevated.    Medication Interventions? Yes, Bumex 2 mg BID x 3 days.     Lab Interventions? No    Patient contacted? Yes, Bumex 2 mg in the morning and  1 mg in the evenings. Attempt made to contact the patient. No answer. Message left with the above medication dose adjustment. Callback name and number provided. [employer] message sent to the patient.      Any medication and/or lab instructions are ordered by the provider that reviewed and assessed these numbers and the instructions have been communicated to the patient.    Will continue to monitor.

## 2024-01-17 ENCOUNTER — DOCUMENTATION ONLY (OUTPATIENT)
Dept: TRANSPLANT | Facility: CLINIC | Age: 49
End: 2024-01-17
Payer: MEDICARE

## 2024-01-17 NOTE — PROGRESS NOTES
Pt CardioMems transmission received and review.          1/17/2024    10:24 AM 1/11/2024    11:11 AM 1/8/2024     1:48 PM 1/4/2024     3:01 PM 9/1/2023    10:23 AM 6/28/2023    11:47 AM 3/14/2023     2:39 PM   CardioMEMS Transmission    Transmission Date 1/16/2024 1/10/2024 1/7/2024 1/3/2024 9/23/2023 6/28/2023 3/14/2023   Transmission Type Maintenance Maintenance Maintenance Maintenance Maintenance Maintenance Maintenance   PAS 43 58 55 54 37 46 49   LEELA 29 41 38 39 25 34 35   PAD  18 26 23 25 18 23 24   Medication Adjustments  No No No No No No No         Pressures are stable.    Medications changed? No    Patient contacted? No    Will continue to monitor.

## 2024-01-18 ENCOUNTER — HOSPITAL ENCOUNTER (OUTPATIENT)
Dept: RADIOLOGY | Facility: HOSPITAL | Age: 49
Discharge: HOME | End: 2024-01-18
Attending: STUDENT IN AN ORGANIZED HEALTH CARE EDUCATION/TRAINING PROGRAM
Payer: MEDICARE

## 2024-01-18 DIAGNOSIS — Z12.31 ENCOUNTER FOR SCREENING MAMMOGRAM FOR BREAST CANCER: ICD-10-CM

## 2024-01-18 PROCEDURE — 77067 SCR MAMMO BI INCL CAD: CPT | Mod: TC,PN

## 2024-01-18 PROCEDURE — 77063 BREAST TOMOSYNTHESIS BI: CPT | Mod: 26,,, | Performed by: RADIOLOGY

## 2024-01-18 PROCEDURE — 77067 SCR MAMMO BI INCL CAD: CPT | Mod: 26,,, | Performed by: RADIOLOGY

## 2024-01-21 ENCOUNTER — NURSE TRIAGE (OUTPATIENT)
Dept: ADMINISTRATIVE | Facility: CLINIC | Age: 49
End: 2024-01-21
Payer: MEDICARE

## 2024-01-22 NOTE — TELEPHONE ENCOUNTER
"Caller states pt took old rx, Flexeril approx 20 mins prior to call. Caller states pt was told to stop taking this medication in August after being discharged from ER and started on 3 new medications. Pt denies symptoms at this time. Caller concerned about pt hx of CHF. Per protocol, call pcp now. Spoke to answering service at Dr. Bowling office, states Dr. Chauhan on call, paged and awaiting call back. Caller States took flexeril for back pain, moving around a lot today, thinks that is what caused the back pain.Also triaged back pain, states pt has scoliosis and "was doing a lot in the kitchen yesterday and today." Caller denies fall or trauma, denies weakness or fever. Per protocol, home care advised. Discussed cold and heat packs, sleep position, and monitoring of for new or worsening symptoms.  Spoke to James Patel, states if pt is not experiencing symptoms from taking Flexeril, ok to monitor at home and advise pt to not take medication again. Caller verbalizes understanding and advised to call back for any further questions or concerns.   Reason for Disposition   [1] DOUBLE DOSE (an extra dose or lesser amount) of prescription drug AND [2] NO symptoms  (Exception: A double dose of antibiotics.)   [1] Caller has URGENT medicine question about med that PCP or specialist prescribed AND [2] triager unable to answer question   Caused by overuse from recent vigorous activity (e.g., exercise, gardening, lifting and carrying, sports)    Additional Information   Negative: SEVERE difficulty breathing (e.g., struggling for each breath, speaks in single words)   Negative: Bluish (or gray) lips or face now   Negative: Slow, shallow and weak breathing   Negative: Difficult to awaken or acting confused (e.g., disoriented, slurred speech)   Negative: Seizure   Negative: Shock suspected (e.g., cold/pale/clammy skin, too weak to stand, low BP, rapid pulse)   Negative: Suicide attempt, known or suspected   Negative: [1] " Intentional overdose AND [2] suicidal thoughts or ideas   Negative: Sounds like a life-threatening emergency to the triager   Negative: [1] CAUSTIC ACID or ALKALI ingestion (e.g., toilet , drain , lye, Clinitest tablets, ammonia, bleaches) AND [2] any symptoms (e.g., difficulty breathing, drooling, mouth pain, sore throat, stridor)   Negative: [1] HYDROCARBON PRODUCT ingestion (e.g., kerosene, gasoline, benzene, furniture polish, lighter fluid) AND [2] any symptoms (e.g., coughing, difficulty breathing, vomiting)   Negative: [1] Poison Center advised patient to go to ED AND [2] patient or caller seeking second opinion   Negative: Patient sounds very sick or weak to the triager   Negative: [1] CAUSTIC ACID or ALKALI ingestion (e.g., toilet , drain , lye, Clinitest tablets, ammonia, bleaches) AND [2] NO symptoms   Negative: [1] HYDROCARBON PRODUCT ingestion (e.g., kerosene, gasoline, benzene, furniture polish, lighter fluid) AND [2] NO symptoms   Negative: MORE THAN A DOUBLE DOSE of a prescription or over-the-counter (OTC) drug   Negative: [1] DOUBLE DOSE (an extra dose or lesser amount) of prescription drug AND [2] any symptoms (e.g., dizziness, nausea, pain, sleepiness)   Negative: [1] DOUBLE DOSE (an extra dose or lesser amount) of over-the-counter (OTC) drug AND [2] any symptoms (e.g., dizziness, nausea, pain, sleepiness)   Negative: Took another person's prescription drug   Negative: Mercury spill (e.g., broken glass thermometer, broken spiral CFL lightbulb)   Negative: Wild mushroom ingestion, questions about   Negative: All OTHER POTENTIALLY POISONOUS SUBSTANCES (e.g., nearly all chemicals, plants)  (Exception: Known harmless substances or asymptomatic double dose of OTC drug.)   Negative: Caller provides unclear information about type or amount of substance   Negative: Triager unable to answer question   Negative: [1] Intentional drug overdose AND [2] suicidal thoughts or  ideas   Negative: MORE THAN A DOUBLE DOSE of a prescription or over-the-counter (OTC) drug   Negative: [1] DOUBLE DOSE (an extra dose or lesser amount) of prescription drug AND [2] any symptoms (e.g., dizziness, nausea, pain, sleepiness)   Negative: [1] DOUBLE DOSE (an extra dose or lesser amount) of over-the-counter (OTC) drug AND [2] any symptoms (e.g., dizziness, nausea, pain, sleepiness)   Negative: Took another person's prescription drug   Negative: [1] DOUBLE DOSE (an extra dose or lesser amount) of prescription drug AND [2] NO symptoms  (Exception: A double dose of antibiotics.)   Negative: Diabetes drug error or overdose (e.g., took wrong type of insulin or took extra dose)   Negative: [1] Prescription not at pharmacy AND [2] was prescribed by PCP recently (Exception: Triager has access to EMR and prescription is recorded there. Go to Home Care and confirm for pharmacy.)   Negative: [1] Pharmacy calling with prescription question AND [2] triager unable to answer question   Negative: Passed out (i.e., lost consciousness, collapsed and was not responding)   Negative: Shock suspected (e.g., cold/pale/clammy skin, too weak to stand, low BP, rapid pulse)   Negative: Sounds like a life-threatening emergency to the triager   Negative: [1] SEVERE back pain (e.g., excruciating) AND [2] sudden onset AND [3] age > 60 years   Negative: [1] Unable to urinate (or only a few drops) > 4 hours AND [2] bladder feels very full (e.g., palpable bladder or strong urge to urinate)   Negative: [1] Loss of bladder or bowel control (urine or bowel incontinence; wetting self, leaking stool) AND [2] new-onset   Negative: Numbness in groin or rectal area (i.e., loss of sensation)   Negative: [1] SEVERE abdominal pain AND [2] present > 1 hour   Negative: [1] Abdominal pain AND [2] age > 60 years   Negative: Weakness of a leg or foot (e.g., unable to bear weight, dragging foot)   Negative: Unable to walk   Negative: Patient sounds very  "sick or weak to the triager   Negative: [1] SEVERE back pain (e.g., excruciating, unable to do any normal activities) AND [2] not improved 2 hours after pain medicine   Negative: [1] Pain radiates into the thigh or further down the leg AND [2] both legs   Negative: [1] Fever > 100.0 F (37.8 C) AND [2] flank pain (i.e., in side, below ribs and above hip)   Negative: [1] Pain or burning with passing urine (urination) AND [2] flank pain (i.e., in side, below ribs and above hip)   Negative: Numbness in a leg or foot (i.e., loss of sensation)   Negative: [1] Numbness in an arm or hand (i.e., loss of sensation) AND [2] upper back pain   Negative: High-risk adult (e.g., history of cancer, HIV, or IV drug use)   Negative: Soft tissue infection (e.g., abscess, cellulitis) or other serious infection (e.g., bacteremia) in last 2 weeks   Negative: [1] Fever AND [2] no symptoms of UTI  (Exception: Has generalized muscle pains, not localized back pain.)   Negative: Rash in same area as pain (may be described as "small blisters")   Negative: Blood in urine (red, pink, or tea-colored)   Negative: [1] MODERATE back pain (e.g., interferes with normal activities) AND [2] present > 3 days   Negative: [1] Pain radiates into the thigh or further down the leg AND [2] one leg   Negative: [1] Age > 50 AND [2] no history of prior similar back pain   Negative: Back pain present > 2 weeks   Negative: Back pain is a chronic symptom (recurrent or ongoing AND present > 4 weeks)   Negative: Caused by a twisting, bending, or lifting injury    Protocols used: Medication Question Call-A-AH, Poisoning-A-, Back Pain-A-AH    "

## 2024-01-23 ENCOUNTER — TELEPHONE (OUTPATIENT)
Dept: TRANSPLANT | Facility: CLINIC | Age: 49
End: 2024-01-23
Payer: MEDICARE

## 2024-01-23 ENCOUNTER — PATIENT MESSAGE (OUTPATIENT)
Dept: TRANSPLANT | Facility: CLINIC | Age: 49
End: 2024-01-23
Payer: MEDICARE

## 2024-01-30 ENCOUNTER — CLINICAL SUPPORT (OUTPATIENT)
Dept: TRANSPLANT | Facility: CLINIC | Age: 49
End: 2024-01-30
Payer: MEDICARE

## 2024-01-30 DIAGNOSIS — Z95.810 PRESENCE OF AUTOMATIC (IMPLANTABLE) CARDIAC DEFIBRILLATOR: ICD-10-CM

## 2024-01-30 DIAGNOSIS — I42.8 OTHER CARDIOMYOPATHIES: ICD-10-CM

## 2024-01-30 DIAGNOSIS — I50.42 CHRONIC COMBINED SYSTOLIC AND DIASTOLIC CHF, NYHA CLASS 3: Primary | ICD-10-CM

## 2024-01-30 PROCEDURE — 99213 OFFICE O/P EST LOW 20 MIN: CPT | Mod: S$GLB,,, | Performed by: INTERNAL MEDICINE

## 2024-01-30 PROCEDURE — 93264 REM MNTR WRLS P-ART PRS SNR: CPT | Mod: S$GLB,,, | Performed by: NURSE PRACTITIONER

## 2024-01-31 DIAGNOSIS — I50.42 CHRONIC COMBINED SYSTOLIC AND DIASTOLIC CONGESTIVE HEART FAILURE: Primary | ICD-10-CM

## 2024-01-31 RX ORDER — POTASSIUM CHLORIDE 20 MEQ/1
40 TABLET, EXTENDED RELEASE ORAL DAILY PRN
Qty: 60 TABLET | Refills: 1 | Status: SHIPPED | OUTPATIENT
Start: 2024-01-31 | End: 2024-03-31

## 2024-01-31 NOTE — TELEPHONE ENCOUNTER
Pt CardioMems transmission received and reviewed with CHRISTOPH Hurd.          1/17/2024    10:24 AM 1/11/2024    11:11 AM 1/8/2024     1:48 PM 1/4/2024     3:01 PM 9/1/2023    10:23 AM 6/28/2023    11:47 AM   CardioMEMS Transmission    Transmission Date 1/16/2024 1/10/2024 1/7/2024 1/3/2024 9/23/2023 6/28/2023   Transmission Type Maintenance Maintenance Maintenance Maintenance Maintenance Maintenance   PAS 43 58 55 54 37 46   LEELA 29 41 38 39 25 34   PAD  18 26 23 25 18 23   Medication Adjustments  No No No No No No         Pressures are elevated.    Medication Interventions? Yes, Bumex 2 mg BID with Potassium 40 meq X 3 days only then return to original dose.     Lab Interventions? No    Patient contacted? Yes, Bumex 2 mg in the morning and 1 mg in the evenings. 1/3/24 labs done Potassium was 3.1. Patient reports feeling fine, no SOB swelling, and weight is stable. She did have some SOB yesterday and took Bumex 2 mg last night. She was advised continue to take Bumex 2 mg in the evenings for the next 2 days. Patient also advised to take potassium 40 for the next 3 days. Potassium 40 meq called in to local pharmacy per patient's request. All questions answered and patient verbalized understanding.     Any medication and/or lab instructions are ordered by the provider that reviewed and assessed these numbers and the instructions have been communicated to the patient.    Will continue to monitor.

## 2024-02-01 NOTE — PROGRESS NOTES
Pt CardioMems transmission received and reviewed.          1/31/2024     9:52 AM 1/31/2024     9:51 AM 1/31/2024     9:50 AM 1/17/2024    10:24 AM 1/11/2024    11:11 AM 1/8/2024     1:48 PM 1/4/2024     3:01 PM   CardioMEMS Transmission    Transmission Date 1/30/2024 1/23/2024 1/16/2024 1/16/2024 1/10/2024 1/7/2024 1/3/2024   Transmission Type Maintenance Maintenance Maintenance Maintenance Maintenance Maintenance Maintenance   PAS 58 62 43 43 58 55 54   LEELA 45 45 29 29 41 38 39   PAD  28 28 18 18 26 23 25   Medication Adjustments  No No No No No No No         Cardiomems waveform interpretations, trends, and reports are monitored weekly via [email or web address]. Adjustments made per documentation. Will continue to monitor.

## 2024-02-06 ENCOUNTER — DOCUMENTATION ONLY (OUTPATIENT)
Dept: TRANSPLANT | Facility: CLINIC | Age: 49
End: 2024-02-06
Payer: MEDICARE

## 2024-02-06 NOTE — PROGRESS NOTES
Pt CardioMems transmission received and review.          2/6/2024     2:33 PM 2/6/2024     2:32 PM 1/31/2024     9:52 AM 1/31/2024     9:51 AM 1/31/2024     9:50 AM 1/17/2024    10:24 AM   CardioMEMS Transmission    Transmission Date 2/6/2024 2/2/2024 1/30/2024 1/23/2024 1/16/2024 1/16/2024   Transmission Type Maintenance Maintenance Maintenance Maintenance Maintenance Maintenance   PAS 57 61 58 62 43 43   LEELA 39 41 45 45 29 29   PAD  24 26 28 28 18 18   Medication Adjustments  No No No No No No         Pressures are slightly elevated trending down.    Medications changed? No    Patient contacted? No    Will continue to monitor.

## 2024-02-21 ENCOUNTER — CLINICAL SUPPORT (OUTPATIENT)
Dept: CARDIOLOGY | Facility: HOSPITAL | Age: 49
End: 2024-02-21
Attending: INTERNAL MEDICINE
Payer: MEDICARE

## 2024-02-21 DIAGNOSIS — I42.8 OTHER CARDIOMYOPATHIES: ICD-10-CM

## 2024-02-21 DIAGNOSIS — Z95.810 PRESENCE OF AUTOMATIC (IMPLANTABLE) CARDIAC DEFIBRILLATOR: ICD-10-CM

## 2024-02-21 PROCEDURE — 93295 DEV INTERROG REMOTE 1/2/MLT: CPT | Mod: ,,, | Performed by: INTERNAL MEDICINE

## 2024-02-21 PROCEDURE — 93296 REM INTERROG EVL PM/IDS: CPT | Performed by: INTERNAL MEDICINE

## 2024-02-27 NOTE — PROGRESS NOTES
HPI.  Mrs. Mahajan is a very pleasant  47 y.o. black female with stage C HFrEF, NICMP,  With a Hx of VF in  2017 (no events since), s/p CardioMEMS.  07/21/22 had fractured ICD lead therefore had to get device extracted and have ICD single chamber replaced.      Patient last seen about 6 months ago, since then feeling okay, describes NYHA FC III symptoms. Not sure how compliant she is with diet/exercise, here with her good friend who implies she may not be as compliant as she should/could be. Patient denies N/V/F/C, lightheadedness, dizziness, PND, orthopnea, LE edema, abdominal pain, abdominal pressure, chest pain, chest pressure, syncope, pre-syncope. + DELGADO, abdominal swelling.      Review of Systems   Constitutional:  Negative for activity change, appetite change, chills, diaphoresis and fever.   HENT:  Negative for nasal congestion, rhinorrhea and sore throat.    Eyes:  Negative for visual disturbance.   Respiratory:  Negative for shortness of breath and stridor.    Cardiovascular:  Negative for chest pain.   Gastrointestinal:  Negative for abdominal pain, diarrhea, nausea and vomiting.   Genitourinary:  Negative for difficulty urinating, dysuria and hematuria.   Integumentary:  Negative for rash.   Neurological:  Negative for seizures, syncope and light-headedness.   Psychiatric/Behavioral:  Negative for agitation and hallucinations.         Past Medical History:   Diagnosis Date    Asthma     Chronic back pain 7/1/2014    Chronic combined systolic and diastolic congestive heart failure 4/28/2015     2-10-17   1 - Severely depressed left ventricular systolic function (EF 20-25%).    2 - Severe left ventricular enlargement.    3 - Severe left atrial enlargement.    4 - Left ventricular diastolic dysfunction.    5 - The estimated PA systolic pressure is 18 mmHg.    6 - Mild mitral regurgitation.     Encounter for blood transfusion     ICD (implantable cardioverter-defibrillator) in place 12/01/15 3/3/2016     Menorrhagia, premenopausal 2/10/2017    Microcytic anemia 2015    Non-rheumatic mitral regurgitation 3/5/2015    Nonischemic dilated cardiomyopathy 2015    Sleep apnea     Syncope and collapse 2017    Ventricular tachycardia, polymorphic         Past Surgical History:   Procedure Laterality Date    CARDIAC DEFIBRILLATOR PLACEMENT  2015     SECTION      INSERTION, WIRELESS SENSOR, FOR PULMONARY ARTERIAL PRESSURE MONITORING N/A 10/22/2021    Procedure: INSERTION, WIRELESS SENSOR, FOR PULMONARY ARTERIAL PRESSURE MONITORING;  Surgeon: Erika Hurd MD;  Location: [facility] CATH LAB;  Service: Cardiology;  Laterality: N/A;    OOPHORECTOMY      PERICARDIOCENTESIS N/A 2020    Procedure: Pericardiocentesis;  Surgeon: Memo Luis MD;  Location: [facility] CATH LAB;  Service: Cardiology;  Laterality: N/A;    PULMONARY ANGIOGRAPHY N/A 10/22/2021    Procedure: ANGIOGRAM, PULMONARY;  Surgeon: Erika Hurd MD;  Location: [facility] CATH LAB;  Service: Cardiology;  Laterality: N/A;    RIGHT HEART CATHETERIZATION      TOTAL ABDOMINAL HYSTERECTOMY N/A 3/26/2019    Procedure: HYSTERECTOMY, TOTAL, ABDOMINAL;  Surgeon: Victorino Rose MD;  Location: [facility] OR;  Service: OB/GYN;  Laterality: N/A;    TRANSESOPHAGEAL ECHOCARDIOGRAPHY N/A 2022    Procedure: ECHOCARDIOGRAM, TRANSESOPHAGEAL;  Surgeon: Phan Powell MD;  Location: [facility] EP LAB;  Service: Cardiology;  Laterality: N/A;    TUBAL LIGATION          Family History   Problem Relation Age of Onset    Diabetes Father     Pancreatic cancer Father     Heart failure Father     Heart failure Brother     No Known Problems Daughter     No Known Problems Son     Lung cancer Paternal Grandmother     Heart disease Brother         Review of patient's allergies indicates:   Allergen Reactions    Ace inhibitors      Cough        Current Outpatient Medications   Medication Instructions    albuterol (PROVENTIL/VENTOLIN HFA) 90 mcg/actuation inhaler 2 puffs,  "Inhalation, Every 6 hours PRN    amiodarone (PACERONE) 200 mg, Oral, Daily    aspirin (ECOTRIN) 81 mg, Oral, Daily    atorvastatin (LIPITOR) 40 mg, Oral, Daily    bumetanide (BUMEX) 1 MG tablet TAKE 2 TABLETS BY MOUTH EVERY MORNING AND 1 TABLET BY MOUTH EVERY EVENING.    carvediloL (COREG) 25 mg, Oral, 2 times daily    cetirizine (ZYRTEC) 10 mg, Oral, Daily PRN    cyanocobalamin (VITAMIN B-12) 1,000 mcg, Oral, Daily    hydrOXYzine HCL (ATARAX) 25 mg, Oral, 3 times daily PRN    OZEMPIC 1 mg, Subcutaneous, Every 7 days    potassium chloride SA (K-DUR,KLOR-CON) 20 MEQ tablet 40 mEq, Oral, Daily PRN    sacubitriL-valsartan (ENTRESTO)  mg per tablet 1 tablet, Oral, 2 times daily    spironolactone (ALDACTONE) 25 mg, Oral, Daily    timolol maleate 0.5% (TIMOPTIC) 0.5 % Drop INSTILL 1 DROP INTO BOTH EYES EVERY 12 HOURS        Vitals:    01/03/24 1332   BP: (!) 110/52   Pulse: (!) 59        Wt Readings from Last 3 Encounters:   01/03/24 123.1 kg (271 lb 6.2 oz)   10/24/23 116.1 kg (256 lb)   10/17/23 121.7 kg (268 lb 6.6 oz)     Temp Readings from Last 3 Encounters:   10/24/23 98.3 °F (36.8 °C) (Oral)   10/17/23 97.7 °F (36.5 °C) (Oral)   08/22/23 97.8 °F (36.6 °C) (Oral)     BP Readings from Last 3 Encounters:   01/03/24 (!) 110/52   10/24/23 (!) 111/57   10/17/23 110/64     Pulse Readings from Last 3 Encounters:   01/03/24 (!) 59   10/24/23 (!) 59   10/17/23 70        Body mass index is 40.08 kg/m². Estimated body surface area is 2.45 meters squared as calculated from the following:    Height as of this encounter: 5' 9" (1.753 m).    Weight as of this encounter: 123.1 kg (271 lb 6.2 oz).     Physical Exam  Vitals and nursing note reviewed.   Constitutional:       Appearance: She is well-developed.   HENT:      Head: Normocephalic and atraumatic.      Right Ear: External ear normal.      Left Ear: External ear normal.   Eyes:      Conjunctiva/sclera: Conjunctivae normal.      Pupils: Pupils are equal, round, and " reactive to light.   Neck:      Vascular: No JVD.   Cardiovascular:      Rate and Rhythm: Normal rate and regular rhythm.      Pulses: Intact distal pulses.      Heart sounds: S1 normal and S2 normal. No murmur heard.     No friction rub. No gallop.   Pulmonary:      Effort: Pulmonary effort is normal.      Breath sounds: Normal breath sounds.   Abdominal:      General: Bowel sounds are normal. There is no distension.      Palpations: Abdomen is soft.      Tenderness: There is no abdominal tenderness. There is no guarding or rebound.   Musculoskeletal:      Cervical back: Normal range of motion and neck supple.      Right lower leg: No edema.      Left lower leg: No edema.   Neurological:      Mental Status: She is alert and oriented to person, place, and time.          Lab Results   Component Value Date    BNP 68 02/20/2023     01/03/2024    K 3.1 (L) 01/03/2024    MG 1.9 08/22/2023     01/03/2024    CO2 29 01/03/2024    BUN 14 01/03/2024    BUN 12 06/19/2015    CREATININE 1.0 01/03/2024    GLU 94 01/03/2024    HGBA1C 4.6 08/21/2023    AST 14 01/03/2024    ALT 16 01/03/2024    ALBUMIN 3.5 01/03/2024    PROT 7.2 01/03/2024    BILITOT 1.5 (H) 01/03/2024    WBC 5.98 01/03/2024    HGB 11.7 (L) 01/03/2024    HCT 36.0 (L) 01/03/2024    HCT 32 (L) 07/20/2022     01/03/2024    INR 1.0 08/22/2023     01/12/2018    TSH 0.267 (L) 08/21/2023    CHOL 126 08/21/2023    HDL 42 08/21/2023    LDLCALC 73.6 08/21/2023    TRIG 52 08/21/2023    Z4QLHDI 14.9 (H) 09/16/2019    FREET4 1.36 08/21/2023         Results for orders placed during the hospital encounter of 04/24/23    Echo    Interpretation Summary  · The left ventricle is severely enlarged with eccentric hypertrophy and severely decreased systolic function. The estimated ejection fraction is 25%.  · Normal right ventricular size with normal right ventricular systolic function.  · Severe left atrial enlargement.  · Severe functional mitral  regurgitation.  · The estimated PA systolic pressure is 52 mmHg.  · Intermediate central venous pressure (8 mmHg).        Results for orders placed during the hospital encounter of 10/22/21    Cardiac catheterization    Conclusion  · The estimated blood loss was <50 mL.  · RHC performed via the right CFV. Patient tolerated the procedure well. Elevated left and right side filling pressures (RA= 14, PCWP=23 mm of Hg). Severe pulmonary HTN (PA=78/30 mm of Hg, PA mean=52 mm of Hg) in the setting of elevated left sided filling pressures. Normal cardiac index and output (CI=2.5 L/min/m2, CO=5.7 L/min ). Selective PA angiogram was performed in the left PA and the CardioMEMS device was successfully deployed under fluoroscopy without complications. Calibration was performed int  cathlab.  · Bed rest for 3 hours  · Patient is enrolled in the GUIDE HF study    The procedure log was documented by Documenter: Camille Wilkes RN; RT Mario and verified by Erika Hurd MD.    Date: 10/22/2021  Time: 11:15 AM         Assessment and Plan:  Chronic combined systolic and diastolic congestive heart failure  -     bumetanide (BUMEX) 1 MG tablet; TAKE 2 TABLETS BY MOUTH EVERY MORNING AND 1 TABLET BY MOUTH EVERY EVENING.  Dispense: 270 tablet; Refill: 3  -     carvediloL (COREG) 25 MG tablet; Take 1 tablet (25 mg total) by mouth 2 (two) times daily.  Dispense: 180 tablet; Refill: 3  -     spironolactone (ALDACTONE) 25 MG tablet; Take 1 tablet (25 mg total) by mouth once daily.  Dispense: 90 tablet; Refill: 4    Nonischemic dilated cardiomyopathy    Pulmonary hypertension    Severe mitral regurgitation    ICD (implantable cardioverter-defibrillator) in place 12/01/15    Other orders  -     amiodarone (PACERONE) 200 MG Tab; Take 1 tablet (200 mg total) by mouth once daily.  Dispense: 90 tablet; Refill: 3  -     atorvastatin (LIPITOR) 40 MG tablet; Take 1 tablet (40 mg total) by mouth once daily.  Dispense: 90 tablet;  Refill: 3  -     sacubitriL-valsartan (ENTRESTO)  mg per tablet; Take 1 tablet by mouth 2 (two) times daily.  Dispense: 180 tablet; Refill: 11           Chronic systolic HF, NYHA class III, stage C.  Etiology: NICM.  Devices: ICD.  Medications: high dose sacubitril-valsartan, farxiga, spironolactone, carvedilol.  Hemodynamic status: warm, normotensive, euvolemic on exam.  Plan:  No changes on meds.  -continue monitoring with cardiomems program. Did reach out to cmems team to check in with her and make sure she lays on pillow has not been compliant with this either.     RTC 3 months, going to work on meds, cmems, diet/exercise.   F/u in 3 months

## 2024-02-28 ENCOUNTER — CLINICAL SUPPORT (OUTPATIENT)
Dept: TRANSPLANT | Facility: CLINIC | Age: 49
End: 2024-02-28
Payer: MEDICARE

## 2024-02-28 DIAGNOSIS — I50.42 CHRONIC COMBINED SYSTOLIC AND DIASTOLIC CHF, NYHA CLASS 3: Primary | ICD-10-CM

## 2024-02-28 PROCEDURE — 99213 OFFICE O/P EST LOW 20 MIN: CPT | Mod: S$GLB,,, | Performed by: INTERNAL MEDICINE

## 2024-02-28 PROCEDURE — 93264 REM MNTR WRLS P-ART PRS SNR: CPT | Mod: S$GLB,,, | Performed by: NURSE PRACTITIONER

## 2024-03-02 NOTE — PROGRESS NOTES
Pt CardioMems transmission received and reviewed.          2/6/2024     2:33 PM 2/6/2024     2:32 PM 2/6/2024     2:30 PM 1/31/2024     9:52 AM 1/31/2024     9:51 AM 1/31/2024     9:50 AM 1/17/2024    10:24 AM   CardioMEMS Transmission    Transmission Date 2/6/2024 2/2/2024 1/30/2024 1/23/2024 1/16/2024 1/16/2024   Transmission Type Maintenance Maintenance Maintenance Maintenance Maintenance Maintenance Maintenance   PAS 57 61  58 62 43 43   LEELA 39 41  45 45 29 29   PAD  24 26  28 28 18 18   Medication Adjustments  No No No No No No No         Cardiomems waveform interpretations, trends, and reports are monitored weekly via [email or web address]. Adjustments made per documentation. Will continue to monitor.

## 2024-03-12 ENCOUNTER — TELEPHONE (OUTPATIENT)
Dept: TRANSPLANT | Facility: CLINIC | Age: 49
End: 2024-03-12
Payer: MEDICARE

## 2024-03-12 ENCOUNTER — DOCUMENTATION ONLY (OUTPATIENT)
Dept: TRANSPLANT | Facility: CLINIC | Age: 49
End: 2024-03-12
Payer: MEDICARE

## 2024-03-12 NOTE — TELEPHONE ENCOUNTER
Pt CardioMems transmission received and reviewed with CHRISTOPH Hurd.          3/12/2024     1:24 PM 3/12/2024     1:23 PM 3/12/2024     1:20 PM 2/6/2024     2:33 PM 2/6/2024     2:32 PM 2/6/2024     2:30 PM 1/31/2024     9:52 AM   CardioMEMS Transmission    Transmission Date 3/12/2024 2/28/2024 2/19/2024 2/6/2024 2/2/2024  1/30/2024   Transmission Type Maintenance Maintenance Maintenance Maintenance Maintenance Maintenance Maintenance   PAS 68 60 64 57 61  58   LEELA 49 41 44 39 41  45   PAD  31 26 28 24 26  28   Medication Adjustments  No No No No No No No         Pressures are stable.    Medication Interventions? Yes, Lets increase her bumex to 2mg twice daily for 4 days.    Lab Interventions? No    Patient contacted? Yes, Pt reports SOB, no swelling and her scale is broken so she is unable to weigh herself. Bumex 2mg am, 1 mg pm. She reports taking bumex 1/1 on yesterday. potassium 40 MEQ PRN. 1/3/24 her potassium was 3.1.     Patient informed of the above medication dose adjustment. All questions answered and patient verbalized understanding.     Any medication and/or lab instructions are ordered by the provider that reviewed and assessed these numbers and the instructions have been communicated to the patient.    Will continue to monitor.

## 2024-03-15 ENCOUNTER — DOCUMENTATION ONLY (OUTPATIENT)
Dept: TRANSPLANT | Facility: CLINIC | Age: 49
End: 2024-03-15
Payer: MEDICARE

## 2024-03-15 LAB
OHS CV DC REMOTE DEVICE TYPE: NORMAL
OHS CV RV PACING PERCENT: 1 %

## 2024-03-15 NOTE — PROGRESS NOTES
Pt CardioMems transmission received and review.          3/15/2024    12:19 PM 3/12/2024     1:24 PM 3/12/2024     1:23 PM 3/12/2024     1:20 PM 2/6/2024     2:33 PM 2/6/2024     2:32 PM 2/6/2024     2:30 PM   CardioMEMS Transmission    Transmission Date 3/15/2024 3/12/2024 2/28/2024 2/19/2024 2/6/2024 2/2/2024    Transmission Type Maintenance Maintenance Maintenance Maintenance Maintenance Maintenance Maintenance   PAS 64 68 60 64 57 61    LEELA 44 49 41 44 39 41    PAD  29 31 26 28 24 26    Medication Adjustments  No No No No No No No         Pressures are slowly trending down. Monitoring closely.    Medications changed? No    Patient contacted? No    Will continue to monitor.

## 2024-03-27 ENCOUNTER — CLINICAL SUPPORT (OUTPATIENT)
Dept: TRANSPLANT | Facility: CLINIC | Age: 49
End: 2024-03-27
Payer: MEDICARE

## 2024-03-27 DIAGNOSIS — I50.42 CHRONIC COMBINED SYSTOLIC AND DIASTOLIC CHF, NYHA CLASS 3: Primary | ICD-10-CM

## 2024-03-27 PROCEDURE — 93264 REM MNTR WRLS P-ART PRS SNR: CPT | Mod: S$GLB,,, | Performed by: NURSE PRACTITIONER

## 2024-03-27 PROCEDURE — 99213 OFFICE O/P EST LOW 20 MIN: CPT | Mod: S$GLB,,, | Performed by: INTERNAL MEDICINE

## 2024-03-28 RX ORDER — SEMAGLUTIDE 1.34 MG/ML
1 INJECTION, SOLUTION SUBCUTANEOUS
Qty: 3 ML | Refills: 2 | Status: SHIPPED | OUTPATIENT
Start: 2024-03-28

## 2024-04-03 ENCOUNTER — TELEPHONE (OUTPATIENT)
Dept: TRANSPLANT | Facility: CLINIC | Age: 49
End: 2024-04-03
Payer: MEDICARE

## 2024-04-04 NOTE — PROGRESS NOTES
Pt CardioMems transmission received and reviewed.          3/15/2024    12:19 PM 3/12/2024     1:24 PM 3/12/2024     1:23 PM 3/12/2024     1:20 PM 2/6/2024     2:33 PM 2/6/2024     2:32 PM 2/6/2024     2:30 PM   CardioMEMS Transmission    Transmission Date 3/15/2024 3/12/2024 2/28/2024 2/19/2024 2/6/2024 2/2/2024    Transmission Type Maintenance Maintenance Maintenance Maintenance Maintenance Maintenance Maintenance   PAS 64 68 60 64 57 61    LEELA 44 49 41 44 39 41    PAD  29 31 26 28 24 26    Medication Adjustments  No No No No No No No         Cardiomems waveform interpretations, trends, and reports are monitored weekly via [email or web address]. Adjustments made per documentation. Will continue to monitor.

## 2024-04-17 ENCOUNTER — DOCUMENTATION ONLY (OUTPATIENT)
Dept: TRANSPLANT | Facility: CLINIC | Age: 49
End: 2024-04-17
Payer: MEDICARE

## 2024-04-17 NOTE — PROGRESS NOTES
Pt CardioMems transmission received and review.          4/17/2024    11:47 AM 4/17/2024    11:46 AM 3/15/2024    12:19 PM 3/12/2024     1:24 PM 3/12/2024     1:23 PM 3/12/2024     1:20 PM 2/6/2024     2:33 PM   CardioMEMS Transmission    Transmission Date 4/17/2024 4/3/2024 3/15/2024 3/12/2024 2/28/2024 2/19/2024 2/6/2024   Transmission Type Maintenance Maintenance Maintenance Maintenance Maintenance Maintenance Maintenance   PAS 59 72 64 68 60 64 57   LEELA 41 52 44 49 41 44 39   PAD  25 32 29 31 26 28 24   Medication Adjustments  No No No No No No No         Pressures are stable.    Medications changed? No    Patient contacted? No    Will continue to monitor.

## 2024-04-22 ENCOUNTER — OFFICE VISIT (OUTPATIENT)
Dept: FAMILY MEDICINE | Facility: CLINIC | Age: 49
End: 2024-04-22
Payer: MEDICARE

## 2024-04-22 VITALS
TEMPERATURE: 98 F | DIASTOLIC BLOOD PRESSURE: 74 MMHG | SYSTOLIC BLOOD PRESSURE: 114 MMHG | WEIGHT: 274.06 LBS | HEIGHT: 69 IN | BODY MASS INDEX: 40.59 KG/M2 | OXYGEN SATURATION: 98 % | HEART RATE: 61 BPM

## 2024-04-22 DIAGNOSIS — J30.1 ALLERGIC RHINITIS DUE TO POLLEN, UNSPECIFIED SEASONALITY: ICD-10-CM

## 2024-04-22 DIAGNOSIS — E66.01 CLASS 2 SEVERE OBESITY DUE TO EXCESS CALORIES WITH SERIOUS COMORBIDITY AND BODY MASS INDEX (BMI) OF 38.0 TO 38.9 IN ADULT: ICD-10-CM

## 2024-04-22 PROCEDURE — 1159F MED LIST DOCD IN RCRD: CPT | Mod: CPTII,S$GLB,, | Performed by: FAMILY MEDICINE

## 2024-04-22 PROCEDURE — 4010F ACE/ARB THERAPY RXD/TAKEN: CPT | Mod: CPTII,S$GLB,, | Performed by: FAMILY MEDICINE

## 2024-04-22 PROCEDURE — 99213 OFFICE O/P EST LOW 20 MIN: CPT | Mod: S$GLB,,, | Performed by: FAMILY MEDICINE

## 2024-04-22 PROCEDURE — 3078F DIAST BP <80 MM HG: CPT | Mod: CPTII,S$GLB,, | Performed by: FAMILY MEDICINE

## 2024-04-22 PROCEDURE — 1160F RVW MEDS BY RX/DR IN RCRD: CPT | Mod: CPTII,S$GLB,, | Performed by: FAMILY MEDICINE

## 2024-04-22 PROCEDURE — 3074F SYST BP LT 130 MM HG: CPT | Mod: CPTII,S$GLB,, | Performed by: FAMILY MEDICINE

## 2024-04-22 PROCEDURE — 3008F BODY MASS INDEX DOCD: CPT | Mod: CPTII,S$GLB,, | Performed by: FAMILY MEDICINE

## 2024-04-22 RX ORDER — AMOXICILLIN 875 MG/1
875 TABLET, FILM COATED ORAL 2 TIMES DAILY
COMMUNITY
Start: 2024-04-15 | End: 2024-05-13

## 2024-04-22 RX ORDER — MONTELUKAST SODIUM 10 MG/1
10 TABLET ORAL NIGHTLY
Qty: 30 TABLET | Refills: 0 | Status: SHIPPED | OUTPATIENT
Start: 2024-04-22 | End: 2024-05-22

## 2024-04-22 RX ORDER — PREDNISONE 20 MG/1
TABLET ORAL
Qty: 5 TABLET | Refills: 0 | Status: SHIPPED | OUTPATIENT
Start: 2024-04-22 | End: 2024-05-13

## 2024-04-23 ENCOUNTER — TELEPHONE (OUTPATIENT)
Dept: TRANSPLANT | Facility: CLINIC | Age: 49
End: 2024-04-23
Payer: MEDICARE

## 2024-04-23 NOTE — PROGRESS NOTES
" Patient ID: Mario Mahajan is a 48 y.o. female.    Chief Complaint: Follow-up    HPI    Mario Mahajan is a 48 y.o. female.  with several day hx of mild to moderate nasal congestion, post nasal drip and non productive cough.  Over the counter meds have not resolved symptoms.  Even was seen at urgent care given antibiotics continues have nasal drip and congestion.  No fever chills nausea vomiting or diarrhea.         Review of Symptoms    Constitutional  No change in activity, No chills/ fever   Resp  Neg hemoptysis, stridor, choking  CVS  Neg chest pain, palpitations    Physical Exam    Vitals:    04/22/24 1544   BP: 114/74   BP Location: Left arm   Patient Position: Sitting   Pulse: 61   Temp: 98.1 °F (36.7 °C)   TempSrc: Oral   SpO2: 98%   Weight: 124.3 kg (274 lb 0.5 oz)   Height: 5' 9" (1.753 m)       Constitutional:   Oriented to person, place, and time.appears well-developed and well-nourished.   No distress.     HENT:   Head: Normocephalic and atraumatic.     Right Ear: Tympanic membrane, ear canal and External ear normal      Left Ear: Tympanic membrane, ear canal and External ear normal     Nose: External Normal. Normal turbinates, No rhinorrhea (Unless checked)  [x] Edematous Turbinates   NO Purulent Discharge  NO Evidence of Bleeding   [x] Clear Discharge    Mouth/Throat: Uvula is midline, oropharynx is clear and moist and mucous membranes are normal.     Neck: supple no anterior cervical adenopathy    Eyes:   Conjunctivae are normal. Right eye exhibits no discharge. Left eye exhibits no discharge. No scleral icterus. No periorbital edema     Cardiovascular:  Regular rate and rhythm with normal S1 and S2     Pulmonary/Chest:   Clear to auscultation bilaterally without wheezes, rhonchi or rales    Musculoskeletal:   No edema. No obvious deformity No wasting   Neurological:   Alert and oriented to person, place, and time. Coordination normal.     Skin:   Skin is warm and dry. No rash noted.  No " diaphoresis.     Psychiatric: Normal mood and affect. Behavior is normal. Judgment and thought content normal.       Assessment / Plan:      ICD-10-CM ICD-9-CM   1. Allergic rhinitis due to pollen, unspecified seasonality  J30.1 477.0   2. Class 2 severe obesity due to excess calories with serious comorbidity and body mass index (BMI) of 38.0 to 38.9 in adult  E66.01 278.01    Z68.38 V85.38     Allergic rhinitis due to pollen, unspecified seasonality    Class 2 severe obesity due to excess calories with serious comorbidity and body mass index (BMI) of 38.0 to 38.9 in adult    Other orders  -     predniSONE (DELTASONE) 20 MG tablet; One tablet daily by mouth for five days  Dispense: 5 tablet; Refill: 0  -     montelukast (SINGULAIR) 10 mg tablet; Take 1 tablet (10 mg total) by mouth every evening. For allergies drip  Dispense: 30 tablet; Refill: 0    Ozempic should have been called in by bariatric-gave pronounced of prescription sent to [facility].    Over-the-counter medication including Zyrtec she should continue Zyrtec and Robitussin-does not like nasal sprays  Small course of steroids

## 2024-04-25 ENCOUNTER — CLINICAL SUPPORT (OUTPATIENT)
Dept: TRANSPLANT | Facility: CLINIC | Age: 49
End: 2024-04-25
Payer: MEDICARE

## 2024-04-25 DIAGNOSIS — Z95.810 PRESENCE OF AUTOMATIC (IMPLANTABLE) CARDIAC DEFIBRILLATOR: ICD-10-CM

## 2024-04-25 DIAGNOSIS — I42.8 OTHER CARDIOMYOPATHIES: ICD-10-CM

## 2024-04-25 DIAGNOSIS — I50.42 CHRONIC COMBINED SYSTOLIC AND DIASTOLIC CHF, NYHA CLASS 3: Primary | ICD-10-CM

## 2024-04-25 PROCEDURE — 93264 REM MNTR WRLS P-ART PRS SNR: CPT | Mod: S$GLB,,, | Performed by: NURSE PRACTITIONER

## 2024-04-25 PROCEDURE — 99213 OFFICE O/P EST LOW 20 MIN: CPT | Mod: S$GLB,,, | Performed by: INTERNAL MEDICINE

## 2024-04-25 NOTE — PROGRESS NOTES
Pt CardioMems transmission received and reviewed.          4/17/2024    11:47 AM 4/17/2024    11:46 AM 3/15/2024    12:19 PM 3/12/2024     1:24 PM 3/12/2024     1:23 PM 3/12/2024     1:20 PM 2/6/2024     2:33 PM   CardioMEMS Transmission    Transmission Date 4/17/2024 4/3/2024 3/15/2024 3/12/2024 2/28/2024 2/19/2024 2/6/2024   Transmission Type Maintenance Maintenance Maintenance Maintenance Maintenance Maintenance Maintenance   PAS 59 72 64 68 60 64 57   LEELA 41 52 44 49 41 44 39   PAD  25 32 29 31 26 28 24   Medication Adjustments  No No No No No No No         Cardiomems waveform interpretations, trends, and reports are monitored weekly via [email or web address]. Adjustments made per documentation. Will continue to monitor.

## 2024-04-29 ENCOUNTER — TELEPHONE (OUTPATIENT)
Dept: TRANSPLANT | Facility: CLINIC | Age: 49
End: 2024-04-29
Payer: MEDICARE

## 2024-04-29 NOTE — TELEPHONE ENCOUNTER
Pt CardioMems transmission received and reviewed with Dr. CHRISTOPH Hurd .          4/29/2024     4:25 PM 4/17/2024    11:47 AM 4/17/2024    11:46 AM 3/15/2024    12:19 PM 3/12/2024     1:24 PM 3/12/2024     1:23 PM 3/12/2024     1:20 PM   CardioMEMS Transmission    Transmission Date 4/28/2024 4/17/2024 4/3/2024 3/15/2024 3/12/2024 2/28/2024 2/19/2024   Transmission Type Maintenance Maintenance Maintenance Maintenance Maintenance Maintenance Maintenance   PAS 68 59 72 64 68 60 64   LEELA 47 41 52 44 49 41 44   PAD  30 25 32 29 31 26 28   Medication Adjustments  No No No No No No No         Pressures are elevated.    Medication Interventions? Yes, Bumex 2 mg BID X 3 days.     Lab Interventions? No    Patient contacted? Yes, Bumex 2 mg am 1 mg pm. Patient reports abdominal swelling, no SOB. Wan went to [facility] over the weekend and had a hard time walking due to extra fluid. Patient informed of the above medication dose adjustments. She reports taking  Bumex 2mg BID yesterday. Patient advised to continue Bumex 2mg BID today and tomorrow and  submit cardioMEMS daily so we can make sure the bumex is working. Patient verbalized understanding and all questions answered.      Any medication and/or lab instructions are ordered by the provider that reviewed and assessed these numbers and the instructions have been communicated to the patient.    Will continue to monitor.

## 2024-05-13 ENCOUNTER — OFFICE VISIT (OUTPATIENT)
Dept: TRANSPLANT | Facility: CLINIC | Age: 49
End: 2024-05-13
Payer: MEDICARE

## 2024-05-13 ENCOUNTER — DOCUMENTATION ONLY (OUTPATIENT)
Dept: TRANSPLANT | Facility: CLINIC | Age: 49
End: 2024-05-13
Payer: MEDICARE

## 2024-05-13 ENCOUNTER — PATIENT MESSAGE (OUTPATIENT)
Dept: TRANSPLANT | Facility: CLINIC | Age: 49
End: 2024-05-13

## 2024-05-13 VITALS — WEIGHT: 268 LBS | BODY MASS INDEX: 39.69 KG/M2 | HEIGHT: 69 IN

## 2024-05-13 DIAGNOSIS — I50.42 CHRONIC COMBINED SYSTOLIC AND DIASTOLIC CHF, NYHA CLASS 3: Primary | ICD-10-CM

## 2024-05-13 DIAGNOSIS — I47.29 POLYMORPHIC VENTRICULAR TACHYCARDIA: Primary | ICD-10-CM

## 2024-05-13 DIAGNOSIS — I50.42 CHRONIC COMBINED SYSTOLIC AND DIASTOLIC CONGESTIVE HEART FAILURE: ICD-10-CM

## 2024-05-13 DIAGNOSIS — I42.0 NONISCHEMIC DILATED CARDIOMYOPATHY: Chronic | ICD-10-CM

## 2024-05-13 DIAGNOSIS — E66.01 CLASS 3 SEVERE OBESITY DUE TO EXCESS CALORIES WITH SERIOUS COMORBIDITY AND BODY MASS INDEX (BMI) OF 40.0 TO 44.9 IN ADULT: ICD-10-CM

## 2024-05-13 PROCEDURE — 99214 OFFICE O/P EST MOD 30 MIN: CPT | Mod: 95,,, | Performed by: INTERNAL MEDICINE

## 2024-05-13 PROCEDURE — 3008F BODY MASS INDEX DOCD: CPT | Mod: CPTII,95,, | Performed by: INTERNAL MEDICINE

## 2024-05-13 PROCEDURE — 1159F MED LIST DOCD IN RCRD: CPT | Mod: CPTII,95,, | Performed by: INTERNAL MEDICINE

## 2024-05-13 PROCEDURE — 4010F ACE/ARB THERAPY RXD/TAKEN: CPT | Mod: CPTII,95,, | Performed by: INTERNAL MEDICINE

## 2024-05-13 RX ORDER — METOLAZONE 2.5 MG/1
2.5 TABLET ORAL
Qty: 15 TABLET | Refills: 0 | Status: SHIPPED | OUTPATIENT
Start: 2024-05-13 | End: 2025-05-13

## 2024-05-13 NOTE — LETTER
June 16, 2024        Juanjose Nuñez  [address]  [facility] [address]  Phone: [phone]  Fax: [phone]             [id number]  [address]  [facility] [address]  Phone: [phone]   Patient: Mario Mahajan   MR Number: [id number]   YOB: 1975   Date of Visit: 5/13/2024       Dear Dr. Juanjose Nuñez    Thank you for referring Mario Mahajan to me for evaluation. Attached you will find relevant portions of my assessment and plan of care.    If you have questions, please do not hesitate to call me. I look forward to following Mario Mahajan along with you.    Sincerely,    Jeimy Srivastava MD    Enclosure    If you would like to receive this communication electronically, please contact [email or web address] or [phone] to request [employer] access.    [employer] is a tool which provides read-only access to select patient information with whom you have a relationship. Its easy to use and provides real time access to review your patients record including encounter summaries, notes, results, and demographic information.    If you feel you have received this communication in error or would no longer like to receive these types of communications, please e-mail [email or web address]

## 2024-05-13 NOTE — TELEPHONE ENCOUNTER
Pt CardioMems transmission received and reviewed with CHRISTOPH Hurd.          5/13/2024    12:12 PM 5/13/2024    12:11 PM 5/13/2024    12:09 PM 4/29/2024     4:25 PM 4/17/2024    11:47 AM 4/17/2024    11:46 AM 3/15/2024    12:19 PM   CardioMEMS Transmission    Transmission Date 5/12/2024 5/8/2024 5/3/2024 4/28/2024 4/17/2024 4/3/2024 3/15/2024   Transmission Type Maintenance Maintenance Maintenance Maintenance Maintenance Maintenance Maintenance   PAS 69 62 68 68 59 72 64   LEELA 51 42 48 47 41 52 44   PAD  36 27 31 30 25 32 29   Medication Adjustments  No No No No No No No         Pressures are elevated.    Medication Interventions? Yes, Bumex 2 m,g PO BID for now and also give metolazone 2.5 mg once 30 min before your pm dose of bumex. Potassium 40 meq with Metolazone and with next dose of bumex.     Lab Interventions? Yes, BMP in one week.    Patient contacted? Yes, Bumex 2 mg am 1 mg pm. Potassium 40 mEq PRN. Pt reports abdominal swelling no SOB.     Any medication and/or lab instructions are ordered by the provider that reviewed and assessed these numbers and the instructions have been communicated to the patient.    Will continue to monitor.    Yes

## 2024-05-16 ENCOUNTER — DOCUMENTATION ONLY (OUTPATIENT)
Dept: TRANSPLANT | Facility: CLINIC | Age: 49
End: 2024-05-16
Payer: MEDICARE

## 2024-05-16 NOTE — PROGRESS NOTES
Pt CardioMems transmission received and review.          5/16/2024    11:12 AM 5/13/2024    12:12 PM 5/13/2024    12:11 PM 5/13/2024    12:09 PM 4/29/2024     4:25 PM 4/17/2024    11:47 AM   CardioMEMS Transmission    Transmission Date 5/14/2024 5/12/2024 5/8/2024 5/3/2024 4/28/2024 4/17/2024   Transmission Type Maintenance Maintenance Maintenance Maintenance Maintenance Maintenance   PAS 69 69 62 68 68 59   LEELA 49 51 42 48 47 41   PAD  31 36 27 31 30 25   Medication Adjustments  No No No No No No         Pressures are stable.    Medications changed? No    Patient contacted? No    Will continue to monitor.

## 2024-05-20 ENCOUNTER — LAB VISIT (OUTPATIENT)
Dept: LAB | Facility: HOSPITAL | Age: 49
End: 2024-05-20
Attending: STUDENT IN AN ORGANIZED HEALTH CARE EDUCATION/TRAINING PROGRAM
Payer: MEDICARE

## 2024-05-20 DIAGNOSIS — I50.42 CHRONIC COMBINED SYSTOLIC AND DIASTOLIC CHF, NYHA CLASS 3: ICD-10-CM

## 2024-05-20 LAB
ANION GAP SERPL CALC-SCNC: 9 MMOL/L (ref 8–16)
CALCIUM SERPL-MCNC: 9.1 MG/DL (ref 8.7–10.5)
CHLORIDE SERPL-SCNC: 103 MMOL/L (ref 95–110)
CO2 SERPL-SCNC: 32 MMOL/L (ref 23–29)
CREAT SERPL-MCNC: 1.27 MG/DL (ref 0.5–1.4)
EST. GFR  (NO RACE VARIABLE): 52.8 ML/MIN/1.73 M^2
GLUCOSE SERPL-MCNC: 93 MG/DL (ref 70–110)
POTASSIUM SERPL-SCNC: 3.6 MMOL/L (ref 3.5–5.1)
SODIUM SERPL-SCNC: 144 MMOL/L (ref 136–145)
UUN UR-MCNC: 17 MG/DL (ref 7–17)

## 2024-05-20 PROCEDURE — 36415 COLL VENOUS BLD VENIPUNCTURE: CPT | Mod: PN | Performed by: INTERNAL MEDICINE

## 2024-05-20 PROCEDURE — 80048 BASIC METABOLIC PNL TOTAL CA: CPT | Mod: PN | Performed by: INTERNAL MEDICINE

## 2024-05-22 ENCOUNTER — CLINICAL SUPPORT (OUTPATIENT)
Dept: CARDIOLOGY | Facility: HOSPITAL | Age: 49
End: 2024-05-22
Attending: INTERNAL MEDICINE
Payer: MEDICARE

## 2024-05-22 DIAGNOSIS — I42.8 OTHER CARDIOMYOPATHIES: ICD-10-CM

## 2024-05-22 DIAGNOSIS — Z95.810 PRESENCE OF AUTOMATIC (IMPLANTABLE) CARDIAC DEFIBRILLATOR: ICD-10-CM

## 2024-05-22 PROCEDURE — 93296 REM INTERROG EVL PM/IDS: CPT | Performed by: INTERNAL MEDICINE

## 2024-05-22 PROCEDURE — 93295 DEV INTERROG REMOTE 1/2/MLT: CPT | Mod: ,,, | Performed by: INTERNAL MEDICINE

## 2024-05-24 ENCOUNTER — CLINICAL SUPPORT (OUTPATIENT)
Dept: TRANSPLANT | Facility: CLINIC | Age: 49
End: 2024-05-24
Payer: MEDICARE

## 2024-05-24 DIAGNOSIS — I50.42 CHRONIC COMBINED SYSTOLIC AND DIASTOLIC CHF, NYHA CLASS 3: Primary | ICD-10-CM

## 2024-05-24 PROCEDURE — 99213 OFFICE O/P EST LOW 20 MIN: CPT | Mod: S$GLB,,, | Performed by: INTERNAL MEDICINE

## 2024-05-24 PROCEDURE — 93264 REM MNTR WRLS P-ART PRS SNR: CPT | Mod: S$GLB,,, | Performed by: NURSE PRACTITIONER

## 2024-05-30 ENCOUNTER — DOCUMENTATION ONLY (OUTPATIENT)
Dept: TRANSPLANT | Facility: CLINIC | Age: 49
End: 2024-05-30
Payer: MEDICARE

## 2024-05-30 ENCOUNTER — TELEPHONE (OUTPATIENT)
Dept: TRANSPLANT | Facility: CLINIC | Age: 49
End: 2024-05-30
Payer: MEDICARE

## 2024-05-30 ENCOUNTER — PATIENT MESSAGE (OUTPATIENT)
Dept: TRANSPLANT | Facility: CLINIC | Age: 49
End: 2024-05-30
Payer: MEDICARE

## 2024-05-30 LAB
OHS CV DC REMOTE DEVICE TYPE: NORMAL
OHS CV RV PACING PERCENT: 1 %

## 2024-05-30 NOTE — TELEPHONE ENCOUNTER
Pt CardioMems transmission received and reviewed with CHRISTOPH Edge.          5/30/2024     1:13 PM 5/30/2024     1:12 PM 5/16/2024    11:12 AM 5/16/2024    11:11 AM 5/13/2024    12:12 PM 5/13/2024    12:11 PM 5/13/2024    12:09 PM   CardioMEMS Transmission    Transmission Date 5/29/2024 5/20/2024 5/14/2024  5/12/2024 5/8/2024 5/3/2024   Transmission Type Maintenance Maintenance Maintenance Maintenance Maintenance Maintenance Maintenance   PAS 69 69 69  69 62 68   LEELA 49 47 49  51 42 48   PAD  32 30 31  36 27 31   Medication Adjustments  No No No No No No No         Pressures are elevated.    Medication Interventions? Yes,  Metolazone 2.5 mg with Potassium 40 meq Once 30 minutes before next dose of bumex.    Lab Interventions? No    Patient contacted? Yes, Bumex 2 mg BID. Potassium 40 mEq as needed. Metolazone 2.5 PRN. Mychart message sent to the patient with the above medication dose adjustment.     Any medication and/or lab instructions are ordered by the provider that reviewed and assessed these numbers and the instructions have been communicated to the patient.    Will continue to monitor.

## 2024-05-30 NOTE — PROGRESS NOTES
Pt CardioMems transmission received and reviewed.          5/16/2024    11:12 AM 5/16/2024    11:11 AM 5/13/2024    12:12 PM 5/13/2024    12:11 PM 5/13/2024    12:09 PM 4/29/2024     4:25 PM 4/17/2024    11:47 AM   CardioMEMS Transmission    Transmission Date 5/14/2024  5/12/2024 5/8/2024 5/3/2024 4/28/2024 4/17/2024   Transmission Type Maintenance Maintenance Maintenance Maintenance Maintenance Maintenance Maintenance   PAS 69  69 62 68 68 59   LEELA 49  51 42 48 47 41   PAD  31  36 27 31 30 25   Medication Adjustments  No No No No No No No         Cardiomems waveform interpretations, trends, and reports are monitored weekly via [email or web address]. Adjustments made per documentation. Will continue to monitor.

## 2024-06-04 ENCOUNTER — TELEPHONE (OUTPATIENT)
Dept: TRANSPLANT | Facility: CLINIC | Age: 49
End: 2024-06-04
Payer: MEDICARE

## 2024-06-05 ENCOUNTER — PATIENT MESSAGE (OUTPATIENT)
Dept: TRANSPLANT | Facility: CLINIC | Age: 49
End: 2024-06-05
Payer: MEDICARE

## 2024-06-16 PROBLEM — I47.29 POLYMORPHIC VENTRICULAR TACHYCARDIA: Status: ACTIVE | Noted: 2024-06-16

## 2024-06-17 NOTE — PROGRESS NOTES
The patient location is: home  The chief complaint leading to consultation is: chf    Visit type: audiovisual    Face to Face time with patient: 15  25 minutes of total time spent on the encounter, which includes face to face time and non-face to face time preparing to see the patient (eg, review of tests), Obtaining and/or reviewing separately obtained history, Documenting clinical information in the electronic or other health record, Independently interpreting results (not separately reported) and communicating results to the patient/family/caregiver, or Care coordination (not separately reported).         Each patient to whom he or she provides medical services by telemedicine is:  (1) informed of the relationship between the physician and patient and the respective role of any other health care provider with respect to management of the patient; and (2) notified that he or she may decline to receive medical services by telemedicine and may withdraw from such care at any time.    Notes:      HPI.  Mrs. Mahajan is a very pleasant  48 y.o. female with stage C HFrEF, NICMP,  With a Hx of VF in  2017 (no events since), s/p CardioMEMS.  07/21/22 had fractured ICD lead therefore had to get device extracted and have ICD single chamber replaced.      Patient last seen about 3 months ago, since then feeling okay, describes NYHA FC III symptoms. Not sure how compliant she is with diet/exercise, here with her good friend who implies she may not be as compliant as she should/could be. Patient denies N/V/F/C, lightheadedness, dizziness, PND, orthopnea, LE edema, abdominal pain, abdominal pressure, chest pain, chest pressure, syncope, pre-syncope. + DELGADO, abdominal swelling.      Review of Systems   Constitutional:  Negative for activity change, appetite change, chills, diaphoresis and fever.   HENT:  Negative for nasal congestion, rhinorrhea and sore throat.    Eyes:  Negative for visual disturbance.   Respiratory:  Negative  for shortness of breath and stridor.    Cardiovascular:  Negative for chest pain.   Gastrointestinal:  Negative for abdominal pain, diarrhea, nausea and vomiting.   Genitourinary:  Negative for difficulty urinating, dysuria and hematuria.   Integumentary:  Negative for rash.   Neurological:  Negative for seizures, syncope and light-headedness.   Psychiatric/Behavioral:  Negative for agitation and hallucinations.         Past Medical History:   Diagnosis Date    Asthma     Chronic back pain 2014    Chronic combined systolic and diastolic congestive heart failure 2015     2-10-17   1 - Severely depressed left ventricular systolic function (EF 20-25%).    2 - Severe left ventricular enlargement.    3 - Severe left atrial enlargement.    4 - Left ventricular diastolic dysfunction.    5 - The estimated PA systolic pressure is 18 mmHg.    6 - Mild mitral regurgitation.     Encounter for blood transfusion     ICD (implantable cardioverter-defibrillator) in place 12/01/15 3/3/2016    Menorrhagia, premenopausal 2/10/2017    Microcytic anemia 2015    Non-rheumatic mitral regurgitation 3/5/2015    Nonischemic dilated cardiomyopathy 2015    Polymorphic ventricular tachycardia 2024    Sleep apnea     Syncope and collapse 2017    Ventricular tachycardia, polymorphic         Past Surgical History:   Procedure Laterality Date    CARDIAC DEFIBRILLATOR PLACEMENT  2015     SECTION      INSERTION, WIRELESS SENSOR, FOR PULMONARY ARTERIAL PRESSURE MONITORING N/A 10/22/2021    Procedure: INSERTION, WIRELESS SENSOR, FOR PULMONARY ARTERIAL PRESSURE MONITORING;  Surgeon: Erika Hurd MD;  Location: [facility] CATH LAB;  Service: Cardiology;  Laterality: N/A;    OOPHORECTOMY      PERICARDIOCENTESIS N/A 2020    Procedure: Pericardiocentesis;  Surgeon: Memo Luis MD;  Location: [facility] CATH LAB;  Service: Cardiology;  Laterality: N/A;    PULMONARY ANGIOGRAPHY N/A 10/22/2021    Procedure: ANGIOGRAM,  PULMONARY;  Surgeon: Erika Hurd MD;  Location: [facility] CATH LAB;  Service: Cardiology;  Laterality: N/A;    RIGHT HEART CATHETERIZATION      TOTAL ABDOMINAL HYSTERECTOMY N/A 3/26/2019    Procedure: HYSTERECTOMY, TOTAL, ABDOMINAL;  Surgeon: Victorino Rose MD;  Location: [facility] OR;  Service: OB/GYN;  Laterality: N/A;    TRANSESOPHAGEAL ECHOCARDIOGRAPHY N/A 7/20/2022    Procedure: ECHOCARDIOGRAM, TRANSESOPHAGEAL;  Surgeon: Phan Powell MD;  Location: [facility] EP LAB;  Service: Cardiology;  Laterality: N/A;    TUBAL LIGATION          Family History   Problem Relation Name Age of Onset    Diabetes Father Jack santiago     Pancreatic cancer Father Jack santiago     Heart failure Father Jack santiago     Heart failure Brother      No Known Problems Daughter      No Known Problems Son      Lung cancer Paternal Grandmother      Heart disease Brother          Review of patient's allergies indicates:   Allergen Reactions    Ace inhibitors      Cough        Current Outpatient Medications   Medication Instructions    albuterol (PROVENTIL/VENTOLIN HFA) 90 mcg/actuation inhaler 2 puffs, Inhalation, Every 6 hours PRN    amiodarone (PACERONE) 200 mg, Oral, Daily    aspirin (ECOTRIN) 81 mg, Oral, Daily    atorvastatin (LIPITOR) 40 mg, Oral, Daily    bumetanide (BUMEX) 1 MG tablet TAKE 2 TABLETS BY MOUTH EVERY MORNING AND 1 TABLET BY MOUTH EVERY EVENING.    carvediloL (COREG) 25 mg, Oral, 2 times daily    cetirizine (ZYRTEC) 10 mg, Oral, Daily PRN    cyanocobalamin (VITAMIN B-12) 1,000 mcg, Oral, Daily    hydrOXYzine HCL (ATARAX) 25 mg, Oral, 3 times daily PRN    metOLazone (ZAROXOLYN) 2.5 mg, Oral, As needed (PRN)    OZEMPIC 1 mg, Subcutaneous, Every 7 days    sacubitriL-valsartan (ENTRESTO)  mg per tablet 1 tablet, Oral, 2 times daily    spironolactone (ALDACTONE) 25 mg, Oral, Daily    timolol maleate 0.5% (TIMOPTIC) 0.5 % Drop INSTILL 1 DROP INTO BOTH EYES EVERY 12 HOURS        There were no vitals filed for this visit.    "    Wt Readings from Last 3 Encounters:   05/13/24 121.6 kg (268 lb)   04/22/24 124.3 kg (274 lb 0.5 oz)   01/03/24 123.1 kg (271 lb 6.2 oz)     Temp Readings from Last 3 Encounters:   04/22/24 98.1 °F (36.7 °C) (Oral)   10/24/23 98.3 °F (36.8 °C) (Oral)   10/17/23 97.7 °F (36.5 °C) (Oral)     BP Readings from Last 3 Encounters:   04/22/24 114/74   01/03/24 (!) 110/52   10/24/23 (!) 111/57     Pulse Readings from Last 3 Encounters:   04/22/24 61   01/03/24 (!) 59   10/24/23 (!) 59        Body mass index is 39.58 kg/m². Estimated body surface area is 2.43 meters squared as calculated from the following:    Height as of this encounter: 5' 9" (1.753 m).    Weight as of this encounter: 121.6 kg (268 lb).     Physical Exam     Lab Results   Component Value Date    BNP 68 02/20/2023     05/20/2024    K 3.6 05/20/2024    MG 1.9 08/22/2023     05/20/2024    CO2 32 (H) 05/20/2024    BUN 17 05/20/2024    BUN 12 06/19/2015    CREATININE 1.27 05/20/2024    GLU 93 05/20/2024    HGBA1C 4.6 08/21/2023    AST 14 01/03/2024    ALT 16 01/03/2024    ALBUMIN 3.5 01/03/2024    PROT 7.2 01/03/2024    BILITOT 1.5 (H) 01/03/2024    WBC 5.98 01/03/2024    HGB 11.7 (L) 01/03/2024    HCT 36.0 (L) 01/03/2024    HCT 32 (L) 07/20/2022     01/03/2024    INR 1.0 08/22/2023     01/12/2018    TSH 0.267 (L) 08/21/2023    CHOL 126 08/21/2023    HDL 42 08/21/2023    LDLCALC 73.6 08/21/2023    TRIG 52 08/21/2023    B2AOCVM 14.9 (H) 09/16/2019    FREET4 1.36 08/21/2023         Results for orders placed during the hospital encounter of 04/24/23    Echo    Interpretation Summary  · The left ventricle is severely enlarged with eccentric hypertrophy and severely decreased systolic function. The estimated ejection fraction is 25%.  · Normal right ventricular size with normal right ventricular systolic function.  · Severe left atrial enlargement.  · Severe functional mitral regurgitation.  · The estimated PA systolic pressure is 52 " mmHg.  · Intermediate central venous pressure (8 mmHg).        Results for orders placed during the hospital encounter of 10/22/21    Cardiac catheterization    Conclusion  · The estimated blood loss was <50 mL.  · RHC performed via the right CFV. Patient tolerated the procedure well. Elevated left and right side filling pressures (RA= 14, PCWP=23 mm of Hg). Severe pulmonary HTN (PA=78/30 mm of Hg, PA mean=52 mm of Hg) in the setting of elevated left sided filling pressures. Normal cardiac index and output (CI=2.5 L/min/m2, CO=5.7 L/min ). Selective PA angiogram was performed in the left PA and the CardioMEMS device was successfully deployed under fluoroscopy without complications. Calibration was performed int he cathlab.  · Bed rest for 3 hours  · Patient is enrolled in the GUIDE HF study    The procedure log was documented by Documenter: Camille Wilkes RN; Elizabet Phillips RT and verified by Erika Hurd MD.    Date: 10/22/2021  Time: 11:15 AM         Assessment and Plan:  Polymorphic ventricular tachycardia    Nonischemic dilated cardiomyopathy    Chronic combined systolic and diastolic congestive heart failure    Class 3 severe obesity due to excess calories with serious comorbidity and body mass index (BMI) of 40.0 to 44.9 in adult           Chronic systolic HF, NYHA class III, stage C.  Etiology: NICM.  Devices: ICD.  Medications: high dose sacubitril-valsartan, farxiga, spironolactone, carvedilol.  Hemodynamic status: warm, normotensive, euvolemic on exam.  Plan:  No changes on meds.  -continue monitoring with cardiomems program. Did reach out to cmems team to check in with her and make sure she lays on pillow has not been compliant with this either.   Discussed being better with laying on CMEMS pillow and following diet/exercise. She is going to work on this further. Worry about weight being a barrier to advanced options if she continues to progress from CHF standpoint.   RTC in person in 3  months hopefully see improved volume, cmems use, and exercise tolerance. Consider cpx at that time based on how she is doing.

## 2024-06-27 ENCOUNTER — TELEPHONE (OUTPATIENT)
Dept: TRANSPLANT | Facility: CLINIC | Age: 49
End: 2024-06-27
Payer: MEDICARE

## 2024-06-27 ENCOUNTER — PATIENT MESSAGE (OUTPATIENT)
Dept: TRANSPLANT | Facility: CLINIC | Age: 49
End: 2024-06-27
Payer: MEDICARE

## 2024-06-27 NOTE — TELEPHONE ENCOUNTER
Pt CardioMems transmission received and reviewed with CHRISTOPH Edge.          6/27/2024     9:25 AM 6/27/2024     9:24 AM 6/27/2024     9:23 AM 6/27/2024     9:22 AM 5/30/2024     1:13 PM 5/30/2024     1:12 PM 5/16/2024    11:12 AM   CardioMEMS Transmission    Transmission Date 6/25/2024 6/18/2024 6/11/2024 6/5/2024 5/29/2024 5/20/2024 5/14/2024   Transmission Type Maintenance Maintenance Maintenance Maintenance Maintenance Maintenance Maintenance   PAS 64 63 62 57 69 69 69   LEELA 43 44 45 41 49 47 49   PAD  29 28 31 28 32 30 31   Medication Adjustments  No No No No No No No         Pressures are elevated.    Medication Interventions? Yes, Metolazone 2.5 mg with Potassium 40 mEq ONCE 30 minutes before your next dose of bumex.     Lab Interventions? No    Patient contacted? Yes, Bumex 2 mg BID. Potassium 40 mEq as needed. Metolazone 2.5 PRN. Mychart message sent with the above medication dose adjustment. Attempt made to contact patient. No answer. Message left with the above medication dose adjustment. Callback name and number provided.     Any medication and/or lab instructions are ordered by the provider that reviewed and assessed these numbers and the instructions have been communicated to the patient.    Will continue to monitor.

## 2024-07-11 ENCOUNTER — TELEPHONE (OUTPATIENT)
Dept: TRANSPLANT | Facility: CLINIC | Age: 49
End: 2024-07-11
Payer: MEDICARE

## 2024-07-11 ENCOUNTER — PATIENT MESSAGE (OUTPATIENT)
Dept: TRANSPLANT | Facility: CLINIC | Age: 49
End: 2024-07-11
Payer: MEDICARE

## 2024-08-17 ENCOUNTER — PATIENT MESSAGE (OUTPATIENT)
Dept: TRANSPLANT | Facility: CLINIC | Age: 49
End: 2024-08-17
Payer: MEDICARE

## 2024-08-19 ENCOUNTER — PATIENT MESSAGE (OUTPATIENT)
Dept: FAMILY MEDICINE | Facility: CLINIC | Age: 49
End: 2024-08-19
Payer: MEDICARE

## 2024-08-21 ENCOUNTER — CLINICAL SUPPORT (OUTPATIENT)
Dept: CARDIOLOGY | Facility: HOSPITAL | Age: 49
End: 2024-08-21
Payer: MEDICARE

## 2024-08-21 ENCOUNTER — CLINICAL SUPPORT (OUTPATIENT)
Dept: CARDIOLOGY | Facility: HOSPITAL | Age: 49
End: 2024-08-21
Attending: INTERNAL MEDICINE
Payer: MEDICARE

## 2024-08-21 DIAGNOSIS — Z95.810 PRESENCE OF AUTOMATIC (IMPLANTABLE) CARDIAC DEFIBRILLATOR: ICD-10-CM

## 2024-08-21 DIAGNOSIS — I42.8 OTHER CARDIOMYOPATHIES: ICD-10-CM

## 2024-08-21 PROCEDURE — 93296 REM INTERROG EVL PM/IDS: CPT | Performed by: INTERNAL MEDICINE

## 2024-08-21 PROCEDURE — 93295 DEV INTERROG REMOTE 1/2/MLT: CPT | Mod: ,,, | Performed by: INTERNAL MEDICINE

## 2024-08-23 ENCOUNTER — CLINICAL SUPPORT (OUTPATIENT)
Dept: TRANSPLANT | Facility: CLINIC | Age: 49
End: 2024-08-23
Payer: MEDICARE

## 2024-08-23 DIAGNOSIS — I50.42 CHRONIC COMBINED SYSTOLIC AND DIASTOLIC CHF, NYHA CLASS 3: Primary | ICD-10-CM

## 2024-08-23 PROCEDURE — 93264 REM MNTR WRLS P-ART PRS SNR: CPT | Mod: S$GLB,,, | Performed by: NURSE PRACTITIONER

## 2024-08-23 PROCEDURE — 99213 OFFICE O/P EST LOW 20 MIN: CPT | Mod: S$GLB,,, | Performed by: INTERNAL MEDICINE

## 2024-08-27 LAB
OHS CV DC REMOTE DEVICE TYPE: NORMAL
OHS CV RV PACING PERCENT: 1 %

## 2024-08-29 ENCOUNTER — TELEPHONE (OUTPATIENT)
Dept: TRANSPLANT | Facility: CLINIC | Age: 49
End: 2024-08-29
Payer: MEDICARE

## 2024-08-30 NOTE — PROGRESS NOTES
Pt CardioMems transmission received and reviewed.          7/11/2024     9:19 AM 7/11/2024     9:18 AM 6/27/2024     9:25 AM 6/27/2024     9:24 AM 6/27/2024     9:23 AM 6/27/2024     9:22 AM 5/30/2024     1:13 PM   CardioMEMS Transmission    Transmission Date 7/10/2024 7/3/2024 6/25/2024 6/18/2024 6/11/2024 6/5/2024 5/29/2024   Transmission Type Maintenance Maintenance Maintenance Maintenance Maintenance Maintenance Maintenance   PAS 67 67 64 63 62 57 69   LEELA 46 45 43 44 45 41 49   PAD  30 28 29 28 31 28 32   Medication Adjustments  No No No No No No No         Cardiomems waveform interpretations, trends, and reports are monitored weekly via [email or web address]. Adjustments made per documentation. Will continue to monitor.

## 2024-09-04 ENCOUNTER — HOSPITAL ENCOUNTER (EMERGENCY)
Facility: HOSPITAL | Age: 49
Discharge: HOME | End: 2024-09-04
Attending: EMERGENCY MEDICINE
Payer: MEDICARE

## 2024-09-04 ENCOUNTER — TELEPHONE (OUTPATIENT)
Dept: TRANSPLANT | Facility: CLINIC | Age: 49
End: 2024-09-04
Payer: MEDICARE

## 2024-09-04 ENCOUNTER — PATIENT MESSAGE (OUTPATIENT)
Dept: TRANSPLANT | Facility: CLINIC | Age: 49
End: 2024-09-04
Payer: MEDICARE

## 2024-09-04 VITALS
DIASTOLIC BLOOD PRESSURE: 52 MMHG | HEIGHT: 69 IN | TEMPERATURE: 98 F | BODY MASS INDEX: 37.47 KG/M2 | HEART RATE: 57 BPM | RESPIRATION RATE: 18 BRPM | SYSTOLIC BLOOD PRESSURE: 91 MMHG | OXYGEN SATURATION: 100 % | WEIGHT: 253 LBS

## 2024-09-04 DIAGNOSIS — M25.512 ACUTE PAIN OF LEFT SHOULDER: Primary | ICD-10-CM

## 2024-09-04 DIAGNOSIS — R07.89 LEFT-SIDED CHEST WALL PAIN: ICD-10-CM

## 2024-09-04 DIAGNOSIS — R07.9 CHEST PAIN: ICD-10-CM

## 2024-09-04 LAB
ALBUMIN SERPL BCP-MCNC: 3.7 G/DL (ref 3.5–5.2)
ALP SERPL-CCNC: 45 U/L (ref 38–126)
ALT SERPL W/O P-5'-P-CCNC: 15 U/L (ref 10–44)
ANION GAP SERPL CALC-SCNC: 10 MMOL/L (ref 8–16)
AST SERPL-CCNC: 18 U/L (ref 15–46)
BASOPHILS # BLD AUTO: 0.02 K/UL (ref 0–0.2)
BASOPHILS NFR BLD: 0.3 % (ref 0–1.9)
BILIRUB SERPL-MCNC: 2 MG/DL (ref 0.1–1)
CALCIUM SERPL-MCNC: 9 MG/DL (ref 8.7–10.5)
CHLORIDE SERPL-SCNC: 107 MMOL/L (ref 95–110)
CO2 SERPL-SCNC: 25 MMOL/L (ref 23–29)
CREAT SERPL-MCNC: 1.13 MG/DL (ref 0.5–1.4)
DIFFERENTIAL METHOD BLD: ABNORMAL
EOSINOPHIL # BLD AUTO: 0.2 K/UL (ref 0–0.5)
EOSINOPHIL NFR BLD: 2.6 % (ref 0–8)
ERYTHROCYTE [DISTWIDTH] IN BLOOD BY AUTOMATED COUNT: 13.1 % (ref 11.5–14.5)
EST. GFR  (NO RACE VARIABLE): >60 ML/MIN/1.73 M^2
GLUCOSE SERPL-MCNC: 101 MG/DL (ref 70–110)
HCT VFR BLD AUTO: 33 % (ref 37–48.5)
HGB BLD-MCNC: 10.9 G/DL (ref 12–16)
IMM GRANULOCYTES # BLD AUTO: 0.01 K/UL (ref 0–0.04)
IMM GRANULOCYTES NFR BLD AUTO: 0.2 % (ref 0–0.5)
LYMPHOCYTES # BLD AUTO: 1.3 K/UL (ref 1–4.8)
LYMPHOCYTES NFR BLD: 20.8 % (ref 18–48)
MCH RBC QN AUTO: 32 PG (ref 27–31)
MCHC RBC AUTO-ENTMCNC: 33 G/DL (ref 32–36)
MCV RBC AUTO: 97 FL (ref 82–98)
MONOCYTES # BLD AUTO: 0.4 K/UL (ref 0.3–1)
MONOCYTES NFR BLD: 6.6 % (ref 4–15)
NEUTROPHILS # BLD AUTO: 4.3 K/UL (ref 1.8–7.7)
NEUTROPHILS NFR BLD: 69.5 % (ref 38–73)
NRBC BLD-RTO: 0 /100 WBC
NT-PROBNP SERPL-MCNC: 889 PG/ML (ref 5–450)
OHS QRS DURATION: 178 MS
OHS QTC CALCULATION: 517 MS
PLATELET # BLD AUTO: 230 K/UL (ref 150–450)
PMV BLD AUTO: 11.1 FL (ref 9.2–12.9)
POTASSIUM SERPL-SCNC: 3.5 MMOL/L (ref 3.5–5.1)
PROT SERPL-MCNC: 6.9 G/DL (ref 6–8.4)
RBC # BLD AUTO: 3.41 M/UL (ref 4–5.4)
SODIUM SERPL-SCNC: 142 MMOL/L (ref 136–145)
TROPONIN I SERPL-MCNC: 0.08 NG/ML (ref 0.01–0.03)
TROPONIN I SERPL-MCNC: 0.09 NG/ML (ref 0.01–0.03)
UUN UR-MCNC: 14 MG/DL (ref 7–17)
WBC # BLD AUTO: 6.24 K/UL (ref 3.9–12.7)

## 2024-09-04 PROCEDURE — 85025 COMPLETE CBC W/AUTO DIFF WBC: CPT | Mod: ER

## 2024-09-04 PROCEDURE — 84484 ASSAY OF TROPONIN QUANT: CPT | Mod: 91,ER

## 2024-09-04 PROCEDURE — 63600175 PHARM REV CODE 636 W HCPCS: Mod: ER

## 2024-09-04 PROCEDURE — 80053 COMPREHEN METABOLIC PANEL: CPT | Mod: ER

## 2024-09-04 PROCEDURE — 99285 EMERGENCY DEPT VISIT HI MDM: CPT | Mod: 25,ER

## 2024-09-04 PROCEDURE — 93010 ELECTROCARDIOGRAM REPORT: CPT | Mod: ,,, | Performed by: INTERNAL MEDICINE

## 2024-09-04 PROCEDURE — 83880 ASSAY OF NATRIURETIC PEPTIDE: CPT | Mod: ER

## 2024-09-04 PROCEDURE — 93005 ELECTROCARDIOGRAM TRACING: CPT | Mod: ER

## 2024-09-04 PROCEDURE — 96374 THER/PROPH/DIAG INJ IV PUSH: CPT | Mod: ER

## 2024-09-04 RX ORDER — METHOCARBAMOL 500 MG/1
500 TABLET, FILM COATED ORAL 3 TIMES DAILY
Qty: 9 TABLET | Refills: 0 | Status: SHIPPED | OUTPATIENT
Start: 2024-09-04 | End: 2024-09-07

## 2024-09-04 RX ORDER — ACETAMINOPHEN 500 MG
1000 TABLET ORAL
Status: DISCONTINUED | OUTPATIENT
Start: 2024-09-04 | End: 2024-09-04 | Stop reason: HOSPADM

## 2024-09-04 RX ORDER — KETOROLAC TROMETHAMINE 30 MG/ML
15 INJECTION, SOLUTION INTRAMUSCULAR; INTRAVENOUS
Status: COMPLETED | OUTPATIENT
Start: 2024-09-04 | End: 2024-09-04

## 2024-09-04 RX ORDER — DICLOFENAC SODIUM 10 MG/G
2 GEL TOPICAL 4 TIMES DAILY
Qty: 150 G | Refills: 0 | Status: SHIPPED | OUTPATIENT
Start: 2024-09-04

## 2024-09-04 RX ORDER — KETOROLAC TROMETHAMINE 10 MG/1
10 TABLET, FILM COATED ORAL EVERY 6 HOURS
Qty: 20 TABLET | Refills: 0 | Status: SHIPPED | OUTPATIENT
Start: 2024-09-04 | End: 2024-09-09

## 2024-09-04 RX ADMIN — KETOROLAC TROMETHAMINE 15 MG: 30 INJECTION, SOLUTION INTRAMUSCULAR at 12:09

## 2024-09-04 NOTE — TELEPHONE ENCOUNTER
Pt CardioMems transmission received and reviewed with Dr. CHRISTOPH Hurd.          9/4/2024     1:26 PM 9/4/2024     1:25 PM 9/4/2024     1:24 PM 9/4/2024     1:23 PM 7/11/2024     9:19 AM 7/11/2024     9:18 AM 6/27/2024     9:25 AM   CardioMEMS Transmission    Transmission Date 8/30/2024 8/29/2024 7/23/2024 7/17/2024 7/10/2024 7/3/2024 6/25/2024   Transmission Type Maintenance Maintenance Maintenance Maintenance Maintenance Maintenance Maintenance   PAS 63 70 60 61 67 67 64   LEELA 43 46 39 41 46 45 43   PAD  28 28 23 25 30 28 29   Medication Adjustments  No No No No No No No         Pressures are elevated.    Medication Interventions? Yes, Potassium 40 with Metolazone 2.5 ONLY ONCE 30 minutes before your next dose of Bumex.     Lab Interventions? No    Patient contacted? Yes, Bumex 2 mg BID. Potassium 40 mEq as needed. Metolazone 2.5 PRN.     Mychart message sent to the patient.      Any medication and/or lab instructions are ordered by the provider that reviewed and assessed these numbers and the instructions have been communicated to the patient.    Will continue to monitor.

## 2024-09-04 NOTE — ED NOTES
Pt BP 91/52 she states the bp usually run low. Elicia GONZALEZ made aware. No new orders noted at this time.

## 2024-09-04 NOTE — ED PROVIDER NOTES
Encounter Date: 9/4/2024       History     Chief Complaint   Patient presents with    Shoulder Pain     Pt rpts she has a pain to her left shoulder that radiates up to her neck. the patient states the pain started last night, pt denies injury or heavy lifting.      Patient is a 48 y.o. female who presents to the ED for evaluation of left shoulder pain that radiates up the left side of her neck that began yesterday. Patient has a PMH of chronic combined systolic and diastolic congestive heart failure, ICD, mitral regurgitation, nonischemic dilated cardiomyopathy, polymorphic V-tach, asthma.     Denies any recent physical activity could caused her pain, but states that she sometimes sleeps with her left arm in a strange position and believes this may have caused her pain. States that she took a Tylenol and aspirin, which seemed to improve her pain.    Patient denies the following symptoms: fever, chills, chest pain, shortness of breath, chest pressure/discomfort, claudication, dyspnea, exertional chest pressure/discomfort, fatigue, irregular heart beat, lower extremity edema, near-syncope, orthopnea, palpitations, paroxysmal nocturnal dyspnea, syncope, tachypnea, nausea, vomiting, abdominal pain, neck pain, back pain, numbness, tingling, focal weakness.     Patient's denies the following risk factors for DVT/PE: calf pain, estrogen replacement therapy, history of deep venous thrombosis, history of pulmonary embolus, history of malignancy, long duration of automobile or plane travel, oral contraceptive use, prolonged stay in bed, recent limb injury or fracture, recent pregnancy, recent surgery, and varicose veins.          The history is provided by the patient and a relative.     Review of patient's allergies indicates:   Allergen Reactions    Ace inhibitors      Cough     Past Medical History:   Diagnosis Date    Asthma     Chronic back pain 7/1/2014    Chronic combined systolic and diastolic congestive heart failure  2015     2-10-17   1 - Severely depressed left ventricular systolic function (EF 20-25%).    2 - Severe left ventricular enlargement.    3 - Severe left atrial enlargement.    4 - Left ventricular diastolic dysfunction.    5 - The estimated PA systolic pressure is 18 mmHg.    6 - Mild mitral regurgitation.     Encounter for blood transfusion     ICD (implantable cardioverter-defibrillator) in place 12/01/15 3/3/2016    Menorrhagia, premenopausal 2/10/2017    Microcytic anemia 2015    Non-rheumatic mitral regurgitation 3/5/2015    Nonischemic dilated cardiomyopathy 2015    Polymorphic ventricular tachycardia 2024    Sleep apnea     Syncope and collapse 2017    Ventricular tachycardia, polymorphic      Past Surgical History:   Procedure Laterality Date    CARDIAC DEFIBRILLATOR PLACEMENT  2015     SECTION      INSERTION, WIRELESS SENSOR, FOR PULMONARY ARTERIAL PRESSURE MONITORING N/A 10/22/2021    Procedure: INSERTION, WIRELESS SENSOR, FOR PULMONARY ARTERIAL PRESSURE MONITORING;  Surgeon: Erika Hurd MD;  Location: [facility] CATH LAB;  Service: Cardiology;  Laterality: N/A;    OOPHORECTOMY      PERICARDIOCENTESIS N/A 2020    Procedure: Pericardiocentesis;  Surgeon: Memo Luis MD;  Location: [facility] CATH LAB;  Service: Cardiology;  Laterality: N/A;    PULMONARY ANGIOGRAPHY N/A 10/22/2021    Procedure: ANGIOGRAM, PULMONARY;  Surgeon: Erika Hurd MD;  Location: [facility] CATH LAB;  Service: Cardiology;  Laterality: N/A;    RIGHT HEART CATHETERIZATION      TOTAL ABDOMINAL HYSTERECTOMY N/A 3/26/2019    Procedure: HYSTERECTOMY, TOTAL, ABDOMINAL;  Surgeon: Victorino Rose MD;  Location: [facility] OR;  Service: OB/GYN;  Laterality: N/A;    TRANSESOPHAGEAL ECHOCARDIOGRAPHY N/A 2022    Procedure: ECHOCARDIOGRAM, TRANSESOPHAGEAL;  Surgeon: Phan Powell MD;  Location: [facility] EP LAB;  Service: Cardiology;  Laterality: N/A;    TUBAL LIGATION       Family History   Problem Relation Name  Age of Onset    Diabetes Father Jack santiago     Pancreatic cancer Father Jack santiago     Heart failure Father Jack santiago     Heart failure Brother      No Known Problems Daughter      No Known Problems Son      Lung cancer Paternal Grandmother      Heart disease Brother       Social History     Tobacco Use    Smoking status: Never     Passive exposure: Never    Smokeless tobacco: Never   Substance Use Topics    Alcohol use: Not Currently     Comment: 1 glass per 3 months    Drug use: No     Review of Systems   Constitutional:  Negative for chills and fever.   Respiratory:  Negative for cough, choking, chest tightness, shortness of breath, wheezing and stridor.    Cardiovascular:  Negative for chest pain, palpitations and leg swelling.   Gastrointestinal:  Negative for abdominal pain, nausea and vomiting.   Genitourinary:  Negative for dysuria.   Musculoskeletal:  Positive for arthralgias (left shoulder pain) and neck pain. Negative for back pain, gait problem, myalgias and neck stiffness.   Skin:  Negative for pallor and rash.   Neurological: Negative.  Negative for weakness.   Hematological:  Does not bruise/bleed easily.   All other systems reviewed and are negative.      Physical Exam     Initial Vitals [09/04/24 1034]   BP Pulse Resp Temp SpO2   135/62 69 18 97.6 °F (36.4 °C) 98 %      MAP       --         Physical Exam    Vitals reviewed.  Constitutional: She appears well-developed and well-nourished.   HENT:   Head: Normocephalic and atraumatic.   Neck: No hepatojugular reflux and no JVD present.       Cardiovascular:  Normal rate.           Pulmonary/Chest: Effort normal and breath sounds normal. No accessory muscle usage. No respiratory distress. She has no decreased breath sounds. She has no wheezes. She has no rhonchi. She has no rales. She exhibits tenderness.     Musculoskeletal:      Cervical back: Muscular tenderness present. Normal range of motion.      Right lower leg: No swelling. No edema.       Left lower leg: No swelling. No edema.     Neurological: She is alert.         ED Course   Procedures  Labs Reviewed   CBC W/ AUTO DIFFERENTIAL - Abnormal       Result Value    WBC 6.24      RBC 3.41 (*)     Hemoglobin 10.9 (*)     Hematocrit 33.0 (*)     MCV 97      MCH 32.0 (*)     MCHC 33.0      RDW 13.1      Platelets 230      MPV 11.1      Immature Granulocytes 0.2      Gran # (ANC) 4.3      Immature Grans (Abs) 0.01      Lymph # 1.3      Mono # 0.4      Eos # 0.2      Baso # 0.02      nRBC 0      Gran % 69.5      Lymph % 20.8      Mono % 6.6      Eosinophil % 2.6      Basophil % 0.3      Differential Method Automated     COMPREHENSIVE METABOLIC PANEL - Abnormal    Sodium 142      Potassium 3.5      Chloride 107      CO2 25      Glucose 101      BUN 14      Creatinine 1.13      Calcium 9.0      Total Protein 6.9      Albumin 3.7      Total Bilirubin 2.0 (*)     Alkaline Phosphatase 45      AST 18      ALT 15      Anion Gap 10      eGFR >60.0     TROPONIN I - Abnormal    Troponin I 0.080 (*)    TROPONIN I - Abnormal    Troponin I 0.087 (*)    NT-PRO NATRIURETIC PEPTIDE - Abnormal    NT-proBNP 889 (*)         ECG Results              EKG 12-lead (Final result)        Collection Time Result Time QRS Duration OHS QTC Calculation    09/04/24 11:17:48 09/04/24 15:30:26 178 517                     Final result by Interface, Lab In [facility] (09/04/24 15:30:30)                   Narrative:    Test Reason : R07.9,    Vent. Rate : 056 BPM     Atrial Rate : 056 BPM     P-R Int : 198 ms          QRS Dur : 178 ms      QT Int : 536 ms       P-R-T Axes : 052 043 -35 degrees     QTc Int : 517 ms    Sinus bradycardia  Nonspecific intraventricular block  Abnormal ECG  When compared with ECG of 24-OCT-2023 05:44,  No significant change was found  Confirmed by Antonia WINN, Jack VIEYRA ([id number]) on 9/4/2024 3:30:21 PM    Referred By: System System           Confirmed By:Jack Knight MD                                  Imaging Results               X-Ray Chest AP Portable (Final result)  Result time 09/04/24 11:03:07      Final result by Chay Holley MD (09/04/24 11:03:07)                   Impression:      Cardiomegaly and mild pulmonary vascular congestion.      Electronically signed by: Chay Holley MD  Date:    09/04/2024  Time:    11:03               Narrative:    EXAMINATION:  XR CHEST AP PORTABLE    CLINICAL HISTORY:  Chest Pain;    FINDINGS:  Single view of the chest.  Comparison 10/24/2023    Cardiac silhouette is enlarged but stable.  Mild pulmonary vascular congestion.  The lungs demonstrate no evidence of active disease.  No evidence of pleural effusion or pneumothorax.  Bones appear intact.  Moderate degenerative changes and moderate atherosclerotic disease.  Cardiac conduction wires are noted.                                       Medications   ketorolac injection 15 mg (15 mg Intravenous Given 9/4/24 1216)     Medical Decision Making  Patient is an afebrile well-appearing 48 y.o. female who presents for evaluation of left shoulder and left neck pain. Patient has a complex cardiac hx. Denies chest pain or shortness of breath. No evidence of fluid overload on exam. Reports compliance with her cardiac medication and has close follow up with her cardiologist. Reproducible tenderness over left upper chest, to left deltoid, and to left trapezius.   Patient has no known cardiac history.  He denies shortness of breath.  Patient states that he lifts heavy objects often in his work.  Chest pain is reproducible with palpation.    Differential diagnosis includes but is not limited to:  Costochondritis, pleurisy, ACS, cervical strain, acute on chronic heart failure, muscle strain, ligament/tendon/rotator cuff injury    After complete evaluation, including thorough history and physical exam, I do not believe the patient has ACS or any major cardiac condition at this time. The patient's symptoms are most consistent with musculoskeletal  shoulder pain and cervical muscle strain. Pain is reproducible with palpation shoulder and trapezius. Patient's EKG revealed no evidence of acute ischemia. Troponin slightly elevated, but patient has elevated troponin baseline. BNP elevated, but is elevated at baseline. Patient does not appear to be acutely fluid overloaded on exam. Low suspicion for acute PE, pneumothorax, thoracic aortic dissection, cardiac effusion / tamponade. CXR without acute disease including on my independent interpretation. Patient's pain completely resolved with toradol. Discussed with patient's cardiologist (Dr. Srivastava), who is comfortable with patient being discharged with outpatient f/u. I agree with this, as it seems that patient's pain is musculoskeletal in nature.     Patient has been counseled regarding the need for follow-up as well as the indication to return to the emergency room should new or worrisome developments occur. The patient verbalized an understanding. The patient is asked if there are any questions or concerns. Patient given discharge instructions. We discuss the case, until all issues are addressed to the patient's satisfaction. Patient understands and is agreeable to the plan. Case discussed with Supervision Physician, who is in agreement with plan.       Amount and/or Complexity of Data Reviewed  Labs: ordered. Decision-making details documented in ED Course.  Radiology: ordered and independent interpretation performed. Decision-making details documented in ED Course.    Risk  Prescription drug management.               ED Course as of 24 1559   Wed Sep 04, 2024   1054 Patient examined and assessed. Patient answering questions appropriately, speaking in complete sentences, respirations are even and unlabored.  AAO x 4.     Labs, chest x-ray, EKG ordered. [OB]   1120 I independently reviewed the EK beats per minute, sinus bradycardia, nonspecific intraventricular block, similar to previous 10/24/2023.     Minerva Gregory MD [JA]   1121 Chest x-ray independently reviewed: Cardiomegaly and mild pulmonary vascular congestion. [OB]   1145 WBC: 6.24  No leukocytosis [OB]   1145 Hemoglobin(!): 10.9 [OB]   1145 Hematocrit(!): 33.0 [OB]   1212 NT-proBNP(!): 889 [OB]   1216 Troponin I(!): 0.080 [OB]   1306 Messaged patient's Cardiologist (Dr. Srivastava), who is comfortable with patient being discharged pending second troponin.  [OB]      ED Course User Index  [JA] Minerva Gregory MD  [OB] Elicia Martinez PA-C                           Clinical Impression:  Final diagnoses:  [R07.9] Chest pain  [M25.512] Acute pain of left shoulder (Primary)  [R07.89] Left-sided chest wall pain          ED Disposition Condition    Discharge           ED Prescriptions       Medication Sig Dispense Start Date End Date Auth. Provider    diclofenac sodium (VOLTAREN ARTHRITIS PAIN) 1 % Gel Apply 2 g topically 4 (four) times daily. 150 g 9/4/2024 -- Elicia Martinez PA-C    ketorolac (TORADOL) 10 mg tablet Take 1 tablet (10 mg total) by mouth every 6 (six) hours. for 5 days 20 tablet 9/4/2024 9/9/2024 Elicia Martinez PA-C    methocarbamoL (ROBAXIN) 500 MG Tab Take 1 tablet (500 mg total) by mouth 3 (three) times daily. for 3 days 9 tablet 9/4/2024 9/7/2024 Elicia Martinez PA-C          Follow-up Information       Follow up With Specialties Details Why Contact Info    Jeimy Srivastava MD Cardiology, Transplant   [address] [facility] [id number]  [phone]               Elicia Martinez PA-C  09/05/24 8338

## 2024-09-04 NOTE — DISCHARGE INSTRUCTIONS
Thank you for letting me care for you today - it was nice to meet you and I hope you feel better soon. Please follow up with your primary care provider within the next week and with Dr. Srivastava for follow up care. Alejandrosngozi will call you within 48 hours to make an appointment, or you can call [phone] ([phone]) to schedule.     I sent in the following medications:  Robaxin. You can take this 3 times per day, but it can make you sleepy. So I recommend only taking this at night. Please do note drive or operate heavy machinery while taking.    Toradol:  Up to every 6 hours.  Please do not take ibuprofen at the same time, however, Tylenol is safe to take at the same time.  Voltaren Gel. You can rub this on the area that is causing you pain 4 times per day.      Our goal at [facility] is to always give you outstanding care and exceptional service. You may receive a survey by mail or email in the next week about your experience in our ED. We would greatly appreciate you completing and returning the survey. Your feedback provides us with a way to recognize our staff who give very good care and it helps us learn how to improve when your experience was below our aspiration of excellence.     All the best,     Elicia Martinez, MPH, PA-C  Emergency Department Physician Assistant  [facility], [facility]

## 2024-09-23 ENCOUNTER — CLINICAL SUPPORT (OUTPATIENT)
Dept: TRANSPLANT | Facility: CLINIC | Age: 49
End: 2024-09-23
Payer: MEDICARE

## 2024-09-23 DIAGNOSIS — I50.42 CHRONIC COMBINED SYSTOLIC AND DIASTOLIC CHF, NYHA CLASS 3: Primary | ICD-10-CM

## 2024-09-23 PROCEDURE — 99213 OFFICE O/P EST LOW 20 MIN: CPT | Mod: S$GLB,,, | Performed by: INTERNAL MEDICINE

## 2024-09-23 PROCEDURE — 93264 REM MNTR WRLS P-ART PRS SNR: CPT | Mod: S$GLB,,, | Performed by: NURSE PRACTITIONER

## 2024-09-25 NOTE — PROGRESS NOTES
Pt CardioMems transmission received and reviewed.          9/4/2024     1:26 PM 9/4/2024     1:25 PM 9/4/2024     1:24 PM 9/4/2024     1:23 PM 7/11/2024     9:19 AM 7/11/2024     9:18 AM 6/27/2024     9:25 AM   CardioMEMS Transmission    Transmission Date 8/30/2024 8/29/2024 7/23/2024 7/17/2024 7/10/2024 7/3/2024 6/25/2024   Transmission Type Maintenance Maintenance Maintenance Maintenance Maintenance Maintenance Maintenance   PAS 63 70 60 61 67 67 64   LEELA 43 46 39 41 46 45 43   PAD  28 28 23 25 30 28 29   Medication Adjustments  No No No No No No No         Cardiomems waveform interpretations, trends, and reports are monitored weekly via [email or web address]. Adjustments made per documentation. Will continue to monitor.

## 2024-10-18 ENCOUNTER — PATIENT MESSAGE (OUTPATIENT)
Dept: TRANSPLANT | Facility: CLINIC | Age: 49
End: 2024-10-18
Payer: MEDICARE

## 2024-10-18 ENCOUNTER — TELEPHONE (OUTPATIENT)
Dept: TRANSPLANT | Facility: CLINIC | Age: 49
End: 2024-10-18
Payer: MEDICARE

## 2024-10-18 NOTE — TELEPHONE ENCOUNTER
Pt CardioMems transmission received and reviewed with CHRISTOPH Edge.          10/18/2024     9:20 AM 10/18/2024     9:19 AM 9/4/2024     1:26 PM 9/4/2024     1:25 PM 9/4/2024     1:24 PM 9/4/2024     1:23 PM 7/11/2024     9:19 AM   CardioMEMS Transmission    Transmission Date 10/15/2024 10/4/2024 8/30/2024 8/29/2024 7/23/2024 7/17/2024 7/10/2024   Transmission Type Maintenance Maintenance Maintenance Maintenance Maintenance Maintenance Maintenance   PAS 66 68 63 70 60 61 67   LEELA 47 46 43 46 39 41 46   PAD  30 28 28 28 23 25 30   Medication Adjustments  No No No No No No No         Pressures are elevated.    Medication Interventions? Yes, Potassium 40 mEq with Metolazone 2.5 ONLY ONCE 30 minutes before your next dose of Bumex.     Lab Interventions? No    Patient contacted? Yes, Bumex 2 mg BID. Potassium 40 mEq as needed. Metolazone 2.5 PRN. Mychart message sent with the above medication dose adjustment.     Any medication and/or lab instructions are ordered by the provider that reviewed and assessed these numbers and the instructions have been communicated to the patient.    Will continue to monitor.

## 2024-10-23 ENCOUNTER — CLINICAL SUPPORT (OUTPATIENT)
Dept: TRANSPLANT | Facility: CLINIC | Age: 49
End: 2024-10-23
Payer: MEDICARE

## 2024-10-23 DIAGNOSIS — I50.42 CHRONIC COMBINED SYSTOLIC AND DIASTOLIC CHF, NYHA CLASS 3: Primary | ICD-10-CM

## 2024-10-23 PROCEDURE — 99213 OFFICE O/P EST LOW 20 MIN: CPT | Mod: S$GLB,,, | Performed by: INTERNAL MEDICINE

## 2024-11-16 RX ORDER — AMIODARONE HYDROCHLORIDE 200 MG/1
200 TABLET ORAL
Qty: 30 TABLET | Refills: 0 | Status: SHIPPED | OUTPATIENT
Start: 2024-11-16

## 2024-11-20 ENCOUNTER — CLINICAL SUPPORT (OUTPATIENT)
Dept: CARDIOLOGY | Facility: HOSPITAL | Age: 49
End: 2024-11-20
Attending: INTERNAL MEDICINE
Payer: MEDICARE

## 2024-11-20 DIAGNOSIS — I42.8 OTHER CARDIOMYOPATHIES: ICD-10-CM

## 2024-11-20 DIAGNOSIS — Z95.810 PRESENCE OF AUTOMATIC (IMPLANTABLE) CARDIAC DEFIBRILLATOR: ICD-10-CM

## 2024-11-20 PROCEDURE — 93296 REM INTERROG EVL PM/IDS: CPT | Performed by: INTERNAL MEDICINE

## 2024-11-20 PROCEDURE — 93295 DEV INTERROG REMOTE 1/2/MLT: CPT | Mod: ,,, | Performed by: INTERNAL MEDICINE

## 2024-11-27 ENCOUNTER — OFFICE VISIT (OUTPATIENT)
Dept: FAMILY MEDICINE | Facility: CLINIC | Age: 49
End: 2024-11-27
Payer: MEDICARE

## 2024-11-27 VITALS
HEART RATE: 73 BPM | SYSTOLIC BLOOD PRESSURE: 120 MMHG | OXYGEN SATURATION: 98 % | TEMPERATURE: 98 F | DIASTOLIC BLOOD PRESSURE: 60 MMHG | HEIGHT: 67 IN | BODY MASS INDEX: 42.02 KG/M2 | WEIGHT: 267.75 LBS

## 2024-11-27 DIAGNOSIS — E66.01 CLASS 2 SEVERE OBESITY DUE TO EXCESS CALORIES WITH SERIOUS COMORBIDITY AND BODY MASS INDEX (BMI) OF 38.0 TO 38.9 IN ADULT: ICD-10-CM

## 2024-11-27 DIAGNOSIS — I42.0 NONISCHEMIC DILATED CARDIOMYOPATHY: Primary | ICD-10-CM

## 2024-11-27 DIAGNOSIS — E66.812 CLASS 2 SEVERE OBESITY DUE TO EXCESS CALORIES WITH SERIOUS COMORBIDITY AND BODY MASS INDEX (BMI) OF 38.0 TO 38.9 IN ADULT: ICD-10-CM

## 2024-11-27 DIAGNOSIS — Z12.31 ENCOUNTER FOR SCREENING MAMMOGRAM FOR BREAST CANCER: ICD-10-CM

## 2024-11-27 PROCEDURE — 4010F ACE/ARB THERAPY RXD/TAKEN: CPT | Mod: CPTII,S$GLB,, | Performed by: FAMILY MEDICINE

## 2024-11-27 PROCEDURE — 1159F MED LIST DOCD IN RCRD: CPT | Mod: CPTII,S$GLB,, | Performed by: FAMILY MEDICINE

## 2024-11-27 PROCEDURE — 3078F DIAST BP <80 MM HG: CPT | Mod: CPTII,S$GLB,, | Performed by: FAMILY MEDICINE

## 2024-11-27 PROCEDURE — 1160F RVW MEDS BY RX/DR IN RCRD: CPT | Mod: CPTII,S$GLB,, | Performed by: FAMILY MEDICINE

## 2024-11-27 PROCEDURE — 3074F SYST BP LT 130 MM HG: CPT | Mod: CPTII,S$GLB,, | Performed by: FAMILY MEDICINE

## 2024-11-27 PROCEDURE — 99213 OFFICE O/P EST LOW 20 MIN: CPT | Mod: S$GLB,,, | Performed by: FAMILY MEDICINE

## 2024-11-27 PROCEDURE — 3008F BODY MASS INDEX DOCD: CPT | Mod: CPTII,S$GLB,, | Performed by: FAMILY MEDICINE

## 2024-11-27 RX ORDER — TRETINOIN 1 MG/G
CREAM TOPICAL NIGHTLY
Qty: 20 G | Refills: 1 | Status: SHIPPED | OUTPATIENT
Start: 2024-11-27

## 2024-12-02 NOTE — PROGRESS NOTES
Patient ID: Mario Mahajan is a 49 y.o. female.    Chief Complaint: Weight Check and check potassium    HPI    Mario Mahajan is a 49 y.o. female     History of Present Illness    CHIEF COMPLAINT:  Patient presents today to discuss weight management and related health concerns.    WEIGHT MANAGEMENT:  She reports struggling with weight management despite efforts to increase physical activity. She is walking as much as possible but finds it challenging due to cardiac issues and occasional fluid retention. She expresses frustration with the difficulty of weight loss given her current health limitations. She previously received weight loss medication through a bariatric program but is uncertain why it was discontinued. She has two prescriptions in the system that she is unable to obtain. She is experiencing issues with insurance coverage for weight loss medications, including options like Ozempic, and is concerned about potential costs.    FLUID RETENTION:  She reports experiencing fluid retention. She uses Lasix and Bumex for management, occasionally using extra Lasix when needed. She expresses uncertainty about continuing a previously mentioned shot for fluid management.    MEDICATIONS:  She takes metazolam as a booster pill and possibly spironolactone, but expresses uncertainty about her current medication regimen.    ELECTROLYTE CONCERNS:  She expresses concern about her potassium levels, recalling a past episode where her potassium was found to be very low during a stroke. She is uncertain about the need for additional potassium supplementation, as she is aware that her current medication may help maintain her potassium balance.    RECENT LAB RESULTS:  Her last labs was performed in September, which showed glucose levels ranging from 101 to 104. She denies any specific symptoms related to these glucose levels and reports feeling fine overall.    DERMATOLOGICAL CONCERN:  She presents with a rash or skin  "issue on her face. She reports using an unspecified topical cream for treatment but is unsure of its effectiveness. She denies the presence of facial hair in the affected area and seeks medical advice for treatment options.         Vitals:    11/27/24 1423   BP: 120/60   BP Location: Right arm   Patient Position: Sitting   Pulse: 73   Temp: 98 °F (36.7 °C)   TempSrc: Oral   SpO2: 98%   Weight: 121.5 kg (267 lb 12 oz)   Height: 5' 6.5" (1.689 m)            Review of Symptoms      Physical Exam    Constitutional:  Oriented to person, place, and time.appears well-developed and well-nourished.  No distress.      HENT  Head: Normocephalic and atraumatic  Right Ear: External ear normal.   Left Ear: External ear normal.   Nose: External nose normal.   Mouth:  Moist mucus membranes.    Eyes:  Conjunctivae are normal. Right eye exhibits no discharge.  Left eye exhibits no discharge. No scleral icterus.  No periorbital edema    Cardiovascular:  Regular rate and rhythm with normal S1 and S2     Pulmonary/Chest:   Clear to auscultation bilaterally without wheezes, rhonchi or rales      Musculoskeletal:  No edema. No obvious deformity No wasting       Neurological:  Alert and oriented to person, place, and time.   Coordination normal.     Skin:   Skin is warm and dry.  No diaphoresis.   No rash noted.     Psychiatric: Normal mood and affect. Behavior is normal.  Judgment and thought content normal.     Physical Exam              Complete Blood Count  Lab Results   Component Value Date    RBC 3.41 (L) 09/04/2024    HGB 10.9 (L) 09/04/2024    HCT 33.0 (L) 09/04/2024    MCV 97 09/04/2024    MCH 32.0 (H) 09/04/2024    MCHC 33.0 09/04/2024    RDW 13.1 09/04/2024     09/04/2024    MPV 11.1 09/04/2024    GRAN 4.3 09/04/2024    GRAN 69.5 09/04/2024    LYMPH 1.3 09/04/2024    LYMPH 20.8 09/04/2024    MONO 0.4 09/04/2024    MONO 6.6 09/04/2024    EOS 0.2 09/04/2024    BASO 0.02 09/04/2024    EOSINOPHIL 2.6 09/04/2024    BASOPHIL " "0.3 09/04/2024    DIFFMETHOD Automated 09/04/2024       Comprehensive Metabolic Panel  Lab Results   Component Value Date     09/04/2024    BUN 14 09/04/2024    CREATININE 1.13 09/04/2024     09/04/2024    K 3.5 09/04/2024     09/04/2024    PROT 6.9 09/04/2024    ALBUMIN 3.7 09/04/2024    BILITOT 2.0 (H) 09/04/2024    AST 18 09/04/2024    ALKPHOS 45 09/04/2024    CO2 25 09/04/2024    ALT 15 09/04/2024    ANIONGAP 10 09/04/2024       TSH  No results found for: "TSH"    Assessment / Plan:      ICD-10-CM ICD-9-CM   1. Encounter for screening mammogram for breast cancer  Z12.31 V76.12   2. Class 2 severe obesity due to excess calories with serious comorbidity and body mass index (BMI) of 38.0 to 38.9 in adult  E66.812 278.01    E66.01 V85.38    Z68.38    3. Nonischemic dilated cardiomyopathy  I42.0 425.4     Encounter for screening mammogram for breast cancer  -     Mammo Digital Screening Bilat w/ [facility]; Future; Expected date: 11/27/2025    Class 2 severe obesity due to excess calories with serious comorbidity and body mass index (BMI) of 38.0 to 38.9 in adult  -     Comprehensive Metabolic Panel; Future; Expected date: 11/27/2024  -     CBC Auto Differential; Future; Expected date: 11/27/2024    Nonischemic dilated cardiomyopathy  -     Comprehensive Metabolic Panel; Future; Expected date: 11/27/2024  -     CBC Auto Differential; Future; Expected date: 11/27/2024    Other orders  -     tretinoin (RETIN-A) 0.1 % cream; Apply topically every evening.  Dispense: 20 g; Refill: 1        Assessment & Plan    - Assessed patient's current weight management efforts and limitations due to heart condition  - Evaluated current medication regimen, including potassium-sparing diuretics (spironolactone)  - Reviewed recent lab results, noting normal glucose levels and good kidney function  - Will check complete metabolic panel and CBC to reassess potassium levels and overall health status    HEART FAILURE:  - " Provided information on the benefits of weight loss for heart failure management and fluid balance.  - Continued Bumex, metazolam, and spironolactone.  - Educated on the potassium-sparing effects of current medications (spironolactone).  - Follow up with heart specialists as scheduled.    OBESITY:  - Explained insurance limitations for weight loss medications, particularly for Medicare/Medicaid patients.  - Discussed the long-term efficacy concerns of weight loss medications and the importance of lifestyle changes.  - Referred to bariatric team for potential video visits and weight management options.    GENERAL HEALTH MONITORING:  - Ordered complete metabolic panel and CBC.  - Instructed patient to get lab work done in the next few days at the facility near [facility].         This note was generated with the assistance of ambient listening technology. Verbal consent was obtained by the patient and accompanying visitor(s) for the recording of patient appointment to facilitate this note. I attest to having reviewed and edited the generated note for accuracy, though some syntax or spelling errors may persist. Please contact the author of this note for any clarification.

## 2024-12-12 LAB
OHS CV DC REMOTE DEVICE TYPE: NORMAL
OHS CV RV PACING PERCENT: 1 %

## 2024-12-16 ENCOUNTER — HOSPITAL ENCOUNTER (EMERGENCY)
Facility: HOSPITAL | Age: 49
Discharge: HOME | End: 2024-12-16
Attending: EMERGENCY MEDICINE
Payer: MEDICARE

## 2024-12-16 VITALS
RESPIRATION RATE: 30 BRPM | BODY MASS INDEX: 42.45 KG/M2 | DIASTOLIC BLOOD PRESSURE: 59 MMHG | TEMPERATURE: 98 F | SYSTOLIC BLOOD PRESSURE: 108 MMHG | WEIGHT: 267 LBS | OXYGEN SATURATION: 96 % | HEART RATE: 52 BPM

## 2024-12-16 DIAGNOSIS — M25.512 ACUTE PAIN OF LEFT SHOULDER: Primary | ICD-10-CM

## 2024-12-16 DIAGNOSIS — M54.2 NECK PAIN: ICD-10-CM

## 2024-12-16 LAB
ANION GAP SERPL CALC-SCNC: 8 MMOL/L (ref 8–16)
CALCIUM SERPL-MCNC: 8.7 MG/DL (ref 8.7–10.5)
CHLORIDE SERPL-SCNC: 105 MMOL/L (ref 95–110)
CO2 SERPL-SCNC: 26 MMOL/L (ref 23–29)
CREAT SERPL-MCNC: 1.13 MG/DL (ref 0.5–1.4)
EST. GFR  (NO RACE VARIABLE): >60 ML/MIN/1.73 M^2
GLUCOSE SERPL-MCNC: 98 MG/DL (ref 70–110)
OHS QRS DURATION: 176 MS
OHS QTC CALCULATION: 518 MS
POTASSIUM SERPL-SCNC: 3.5 MMOL/L (ref 3.5–5.1)
SODIUM SERPL-SCNC: 139 MMOL/L (ref 136–145)
TROPONIN I SERPL-MCNC: 0.04 NG/ML (ref 0.01–0.03)
UUN UR-MCNC: 21 MG/DL (ref 7–17)

## 2024-12-16 PROCEDURE — 63600175 PHARM REV CODE 636 W HCPCS: Mod: ER | Performed by: EMERGENCY MEDICINE

## 2024-12-16 PROCEDURE — 93010 ELECTROCARDIOGRAM REPORT: CPT | Mod: ,,, | Performed by: STUDENT IN AN ORGANIZED HEALTH CARE EDUCATION/TRAINING PROGRAM

## 2024-12-16 PROCEDURE — 99284 EMERGENCY DEPT VISIT MOD MDM: CPT | Mod: 25,ER

## 2024-12-16 PROCEDURE — 96374 THER/PROPH/DIAG INJ IV PUSH: CPT | Mod: ER

## 2024-12-16 PROCEDURE — 93005 ELECTROCARDIOGRAM TRACING: CPT | Mod: ER

## 2024-12-16 PROCEDURE — 84484 ASSAY OF TROPONIN QUANT: CPT | Mod: ER | Performed by: EMERGENCY MEDICINE

## 2024-12-16 PROCEDURE — 99900035 HC TECH TIME PER 15 MIN (STAT): Mod: ER

## 2024-12-16 PROCEDURE — 80048 BASIC METABOLIC PNL TOTAL CA: CPT | Mod: ER | Performed by: EMERGENCY MEDICINE

## 2024-12-16 RX ORDER — KETOROLAC TROMETHAMINE 30 MG/ML
15 INJECTION, SOLUTION INTRAMUSCULAR; INTRAVENOUS
Status: COMPLETED | OUTPATIENT
Start: 2024-12-16 | End: 2024-12-16

## 2024-12-16 RX ORDER — KETOROLAC TROMETHAMINE 30 MG/ML
15 INJECTION, SOLUTION INTRAMUSCULAR; INTRAVENOUS
Status: DISCONTINUED | OUTPATIENT
Start: 2024-12-16 | End: 2024-12-16

## 2024-12-16 RX ORDER — METHOCARBAMOL 500 MG/1
1000 TABLET, FILM COATED ORAL 3 TIMES DAILY
Qty: 30 TABLET | Refills: 0 | Status: SHIPPED | OUTPATIENT
Start: 2024-12-16 | End: 2024-12-21

## 2024-12-16 RX ORDER — METHYLPREDNISOLONE 4 MG/1
TABLET ORAL
Qty: 1 EACH | Refills: 0 | Status: SHIPPED | OUTPATIENT
Start: 2024-12-16

## 2024-12-16 RX ADMIN — KETOROLAC TROMETHAMINE 15 MG: 30 INJECTION, SOLUTION INTRAMUSCULAR; INTRAVENOUS at 12:12

## 2024-12-16 NOTE — ED PROVIDER NOTES
Encounter Date: 2024       History     Chief Complaint   Patient presents with    Neck Pain    Shoulder Pain     Left side of neck pain that is radiating to left shoulder since last night. Denies injury. Denies chest pain or SOB     49-year-old female with a history of asthma, cardiomyopathy status post ICD placement, valvular heart disease, V-tach presents with left-sided neck pain and left shoulder pain since yesterday.  No trauma.  No aggravating or alleviating factors.  Denies chest pain or shortness of breath.      Review of patient's allergies indicates:   Allergen Reactions    Ace inhibitors      Cough     Past Medical History:   Diagnosis Date    Asthma     Chronic back pain 2014    Chronic combined systolic and diastolic congestive heart failure 2015     2-10-17   1 - Severely depressed left ventricular systolic function (EF 20-25%).    2 - Severe left ventricular enlargement.    3 - Severe left atrial enlargement.    4 - Left ventricular diastolic dysfunction.    5 - The estimated PA systolic pressure is 18 mmHg.    6 - Mild mitral regurgitation.     Encounter for blood transfusion     ICD (implantable cardioverter-defibrillator) in place 12/01/15 3/3/2016    Menorrhagia, premenopausal 2/10/2017    Microcytic anemia 2015    Non-rheumatic mitral regurgitation 3/5/2015    Nonischemic dilated cardiomyopathy 2015    Polymorphic ventricular tachycardia 2024    Sleep apnea     Syncope and collapse 2017    Ventricular tachycardia, polymorphic      Past Surgical History:   Procedure Laterality Date    CARDIAC DEFIBRILLATOR PLACEMENT  2015     SECTION      INSERTION, WIRELESS SENSOR, FOR PULMONARY ARTERIAL PRESSURE MONITORING N/A 10/22/2021    Procedure: INSERTION, WIRELESS SENSOR, FOR PULMONARY ARTERIAL PRESSURE MONITORING;  Surgeon: Erika Hurd MD;  Location: [facility] CATH LAB;  Service: Cardiology;  Laterality: N/A;    OOPHORECTOMY      PERICARDIOCENTESIS N/A  6/17/2020    Procedure: Pericardiocentesis;  Surgeon: Memo Luis MD;  Location: [facility] CATH LAB;  Service: Cardiology;  Laterality: N/A;    PULMONARY ANGIOGRAPHY N/A 10/22/2021    Procedure: ANGIOGRAM, PULMONARY;  Surgeon: Erika Hurd MD;  Location: [facility] CATH LAB;  Service: Cardiology;  Laterality: N/A;    RIGHT HEART CATHETERIZATION      TOTAL ABDOMINAL HYSTERECTOMY N/A 3/26/2019    Procedure: HYSTERECTOMY, TOTAL, ABDOMINAL;  Surgeon: Victorino Rose MD;  Location: [facility] OR;  Service: OB/GYN;  Laterality: N/A;    TRANSESOPHAGEAL ECHOCARDIOGRAPHY N/A 7/20/2022    Procedure: ECHOCARDIOGRAM, TRANSESOPHAGEAL;  Surgeon: Phan Powell MD;  Location: [facility] EP LAB;  Service: Cardiology;  Laterality: N/A;    TUBAL LIGATION       Family History   Problem Relation Name Age of Onset    Diabetes Father Jack santiago     Pancreatic cancer Father Jack santiago     Heart failure Father Jack santiago     Heart failure Brother      No Known Problems Daughter      No Known Problems Son      Lung cancer Paternal Grandmother      Heart disease Brother       Social History     Tobacco Use    Smoking status: Never     Passive exposure: Never    Smokeless tobacco: Never   Substance Use Topics    Alcohol use: Not Currently     Comment: 1 glass per 3 months    Drug use: No     Review of Systems   Constitutional:  Negative for fever.   Eyes:  Negative for pain.   Respiratory:  Negative for shortness of breath.    Cardiovascular:  Negative for chest pain.   Gastrointestinal:  Negative for abdominal pain, nausea and vomiting.   Genitourinary:  Negative for difficulty urinating.   Musculoskeletal:  Positive for arthralgias. Negative for back pain and neck pain.   Neurological:  Negative for headaches.   Psychiatric/Behavioral:  Negative for confusion.        Physical Exam     Initial Vitals [12/16/24 1017]   BP Pulse Resp Temp SpO2   (!) 108/59 67 18 97.9 °F (36.6 °C) 98 %      MAP       --         Physical Exam    Nursing note and  vitals reviewed.  HENT:   Head: Atraumatic.   Eyes: Conjunctivae and EOM are normal.   Neck:   Normal range of motion.  Cardiovascular:  Intact distal pulses.     Exam reveals no gallop and no friction rub.       No murmur heard.  Pulmonary/Chest: Breath sounds normal. No respiratory distress.   Abdominal: Abdomen is soft. There is no abdominal tenderness.   Musculoskeletal:      Cervical back: Normal range of motion.      Comments: No reproducible tenderness.     Neurological: She is alert and oriented to person, place, and time.   Psychiatric: She has a normal mood and affect.         ED Course   Procedures  Labs Reviewed   TROPONIN I - Abnormal       Result Value    Troponin I 0.045 (*)    BASIC METABOLIC PANEL - Abnormal    Sodium 139      Potassium 3.5      Chloride 105      CO2 26      Glucose 98      BUN 21 (*)     Creatinine 1.13      Calcium 8.7      Anion Gap 8      eGFR >60.0       EKG Readings: (Independently Interpreted)   Initial Reading: No STEMI. Rhythm: Normal Sinus Rhythm. Ectopy: No Ectopy. Conduction: Intraventricular conduction delay.       Imaging Results    None          Medications   ketorolac injection 15 mg (15 mg Intravenous Given 12/16/24 1213)     Medical Decision Making  49-year-old female with a history cardiomyopathy and V-tach status post ICD placement presenting with left neck pain into the left shoulder since yesterday.  Vital signs unremarkable.  Patient in no distress.  No reproducible tenderness.  Pulses intact.               ED Course as of 12/16/24 1337   Mon Dec 16, 2024   1128 Basic metabolic panel(!) [SN]   1337 Troponin I(!): 0.045  Patient's troponin is at baseline.  Suspect symptoms secondary to musculoskeletal cause.  Will discharge home with Medrol Dosepak, Robaxin and refer to spine clinic.  Return instructions given. [SN]      ED Course User Index  [SN] Shravan Day MD                           Clinical Impression:  Final diagnoses:  [M54.2] Neck pain  [M25.512]  Acute pain of left shoulder (Primary)          ED Disposition Condition    Discharge Stable          ED Prescriptions       Medication Sig Dispense Start Date End Date Auth. Provider    methylPREDNISolone (MEDROL DOSEPACK) 4 mg tablet Take as directed 1 each 12/16/2024 -- Shravan Day MD    methocarbamoL (ROBAXIN) 500 MG Tab Take 2 tablets (1,000 mg total) by mouth 3 (three) times daily. for 5 days 30 tablet 12/16/2024 12/21/2024 Shravan Day MD          Follow-up Information       Follow up With Specialties Details Why Contact Info    Tejinder Bowling MD Internal Medicine Schedule an appointment as soon as possible for a visit in 3 days  [address] [address] [phone]      Spine clinic  Schedule an appointment as soon as possible for a visit in 3 days               Shravan Day MD  12/16/24 0689

## 2024-12-19 ENCOUNTER — TELEPHONE (OUTPATIENT)
Dept: TRANSPLANT | Facility: CLINIC | Age: 49
End: 2024-12-19
Payer: MEDICARE

## 2024-12-19 ENCOUNTER — PATIENT MESSAGE (OUTPATIENT)
Dept: TRANSPLANT | Facility: CLINIC | Age: 49
End: 2024-12-19
Payer: MEDICARE

## 2024-12-23 ENCOUNTER — CLINICAL SUPPORT (OUTPATIENT)
Dept: TRANSPLANT | Facility: CLINIC | Age: 49
End: 2024-12-23
Payer: MEDICARE

## 2024-12-23 DIAGNOSIS — I50.42 CHRONIC COMBINED SYSTOLIC AND DIASTOLIC CHF, NYHA CLASS 3: Primary | ICD-10-CM

## 2024-12-23 PROCEDURE — 99213 OFFICE O/P EST LOW 20 MIN: CPT | Mod: S$GLB,,, | Performed by: INTERNAL MEDICINE

## 2024-12-24 NOTE — PROGRESS NOTES
Pt CardioMems transmission received and reviewed.          12/19/2024     2:45 PM 12/19/2024     2:44 PM 10/18/2024     9:20 AM 10/18/2024     9:19 AM 9/4/2024     1:26 PM 9/4/2024     1:25 PM 9/4/2024     1:24 PM   CardioMEMS Transmission    Transmission Date 12/18/2024 11/25/2024 10/15/2024 10/4/2024 8/30/2024 8/29/2024 7/23/2024   Transmission Type Maintenance Maintenance Maintenance Maintenance Maintenance Maintenance Maintenance   PAS 72 65 66 68 63 70 60   LEELA 50 45 47 46 [phone] 39   PAD  30 28 30 28 28 28 23   Medication Adjustments  No No No No No No No         Cardiomems waveform interpretations, trends, and reports are monitored weekly via [email or web address]. Adjustments made per documentation. Will continue to monitor.

## 2025-01-08 ENCOUNTER — DOCUMENTATION ONLY (OUTPATIENT)
Dept: TRANSPLANT | Facility: CLINIC | Age: 50
End: 2025-01-08
Payer: MEDICARE

## 2025-01-08 NOTE — PROGRESS NOTES
Pt CardioMems transmission received and review.          1/8/2025     8:38 AM 1/8/2025     8:37 AM 12/19/2024     2:45 PM 12/19/2024     2:44 PM 10/18/2024     9:20 AM 10/18/2024     9:19 AM 9/4/2024     1:26 PM   CardioMEMS Transmission    Transmission Date 1/2/2025 12/20/2024 12/18/2024 11/25/2024 10/15/2024 10/4/2024 8/30/2024   Transmission Type Maintenance Maintenance Maintenance Maintenance Maintenance Maintenance Maintenance   PAS 54 71 72 65 66 68 63   LEELA 35 49 50 45 47 46 43   PAD  22 30 30 28 30 28 28   Medication Adjustments  No No No No No No No         Pressures are trending down. Monitoring closely.    Medications changed? No    Patient contacted? No    Will continue to monitor.

## 2025-01-13 ENCOUNTER — TELEPHONE (OUTPATIENT)
Dept: ORTHOPEDICS | Facility: CLINIC | Age: 50
End: 2025-01-13
Payer: MEDICARE

## 2025-01-13 DIAGNOSIS — M50.30 DDD (DEGENERATIVE DISC DISEASE), CERVICAL: Primary | ICD-10-CM

## 2025-01-13 DIAGNOSIS — M51.362 DEGENERATION OF INTERVERTEBRAL DISC OF LUMBAR REGION WITH DISCOGENIC BACK PAIN AND LOWER EXTREMITY PAIN: ICD-10-CM

## 2025-01-13 NOTE — TELEPHONE ENCOUNTER
Spoke to patient regarding x-ray. Patient is aware of x-ray scheduled for 2:30 pm at the [facility] on 01/17/2025. Patient stated thank you. Thanks.

## 2025-01-14 NOTE — PROGRESS NOTES
DATE: 1/17/2025  PATIENT: Mario Mahajan    Supervising Physician: Vinnie Gardner M.D.    CHIEF COMPLAINT: neck and back pain    HISTORY:  Mario Mahajan is a 49 y.o. female here for initial evaluation of low back and intermittent buttock pain (Back - 6, Leg - 0). The pain has been present for years. The patient describes the pain as aching.  The pain is worse with nothing in particular and improved by massage. There is no associated numbness and tingling. There is no subjective weakness.   The patient also has complaints of neck pain (Neck - 1, Arm - 0).  The pain has been present for few months. The patient describes the pain as spasms. The pain is worse with rest and improved by massage. There is no associated numbness and tingling. There is no subjective weakness. Prior treatments have included Tylenol, Robaxin, but no BETTY or surgery.   The patient denies myelopathic symptoms such as handwriting changes or difficulty with buttons/coins/keys. Denies perineal paresthesias, bowel/bladder dysfunction.  The patient has failed the [facility] spine conditioning program. Exercises include head rolls, kneeling back extension, sitting rotation stretch, modified seated side straddle, knee to chest, bird dog, plank, modified seated plank, hip bridges, abdominal bracing, and abdominal crunch. Pt completed each exercise 5 times daily for 6-8 weeks.    PAST MEDICAL/SURGICAL HISTORY:  Past Medical History:   Diagnosis Date    Asthma     Chronic back pain 7/1/2014    Chronic combined systolic and diastolic congestive heart failure 4/28/2015     2-10-17   1 - Severely depressed left ventricular systolic function (EF 20-25%).    2 - Severe left ventricular enlargement.    3 - Severe left atrial enlargement.    4 - Left ventricular diastolic dysfunction.    5 - The estimated PA systolic pressure is 18 mmHg.    6 - Mild mitral regurgitation.     Encounter for blood transfusion     ICD (implantable cardioverter-defibrillator) in  place 12/01/15 3/3/2016    Menorrhagia, premenopausal 2/10/2017    Microcytic anemia 2015    Non-rheumatic mitral regurgitation 3/5/2015    Nonischemic dilated cardiomyopathy 2015    Polymorphic ventricular tachycardia 2024    Sleep apnea     Syncope and collapse 2017    Ventricular tachycardia, polymorphic      Past Surgical History:   Procedure Laterality Date    CARDIAC DEFIBRILLATOR PLACEMENT  2015     SECTION      INSERTION, WIRELESS SENSOR, FOR PULMONARY ARTERIAL PRESSURE MONITORING N/A 10/22/2021    Procedure: INSERTION, WIRELESS SENSOR, FOR PULMONARY ARTERIAL PRESSURE MONITORING;  Surgeon: Erika Hurd MD;  Location: [facility] CATH LAB;  Service: Cardiology;  Laterality: N/A;    OOPHORECTOMY      PERICARDIOCENTESIS N/A 2020    Procedure: Pericardiocentesis;  Surgeon: Memo Luis MD;  Location: [facility] CATH LAB;  Service: Cardiology;  Laterality: N/A;    PULMONARY ANGIOGRAPHY N/A 10/22/2021    Procedure: ANGIOGRAM, PULMONARY;  Surgeon: Erika Hurd MD;  Location: [facility] CATH LAB;  Service: Cardiology;  Laterality: N/A;    RIGHT HEART CATHETERIZATION      TOTAL ABDOMINAL HYSTERECTOMY N/A 3/26/2019    Procedure: HYSTERECTOMY, TOTAL, ABDOMINAL;  Surgeon: Victorino Rose MD;  Location: [facility] OR;  Service: OB/GYN;  Laterality: N/A;    TRANSESOPHAGEAL ECHOCARDIOGRAPHY N/A 2022    Procedure: ECHOCARDIOGRAM, TRANSESOPHAGEAL;  Surgeon: Phan Powell MD;  Location: [facility] EP LAB;  Service: Cardiology;  Laterality: N/A;    TUBAL LIGATION         Medications:   Current Outpatient Medications on File Prior to Visit   Medication Sig Dispense Refill    albuterol (PROVENTIL/VENTOLIN HFA) 90 mcg/actuation inhaler Inhale 2 puffs into the lungs every 6 (six) hours as needed for Wheezing. 18 g 6    amiodarone (PACERONE) 200 MG Tab Take 1 tablet by mouth once daily 30 tablet 0    aspirin (ECOTRIN) 81 MG EC tablet Take 1 tablet (81 mg total) by mouth once daily. 90 tablet 3     bumetanide (BUMEX) 1 MG tablet TAKE 2 TABLETS BY MOUTH ONCE DAILY IN THE MORNING AND 1 ONCE DAILY IN THE EVENING 270 tablet 2    carvediloL (COREG) 25 MG tablet Take 1 tablet (25 mg total) by mouth 2 (two) times daily. 180 tablet 3    cetirizine (ZYRTEC) 10 MG tablet Take 10 mg by mouth daily as needed.      cyanocobalamin (VITAMIN B-12) 1000 MCG tablet Take 1 tablet (1,000 mcg total) by mouth once daily. 100 tablet 3    hydrOXYzine HCL (ATARAX) 25 MG tablet Take 1 tablet (25 mg total) by mouth 3 (three) times daily as needed for Anxiety. 45 tablet 1    metOLazone (ZAROXOLYN) 2.5 MG tablet Take 1 tablet (2.5 mg total) by mouth as needed (Only take when advised by the heart failure/Cardiomems team). 15 tablet 0    sacubitriL-valsartan (ENTRESTO)  mg per tablet Take 1 tablet by mouth 2 (two) times daily. 180 tablet 11    semaglutide (OZEMPIC) 1 mg/dose (4 mg/3 mL) Inject 1 mg into the skin every 7 days. 3 mL 2    tretinoin (RETIN-A) 0.1 % cream Apply topically every evening. 20 g 1    [DISCONTINUED] methylPREDNISolone (MEDROL DOSEPACK) 4 mg tablet Take as directed 1 each 0    atorvastatin (LIPITOR) 40 MG tablet Take 1 tablet (40 mg total) by mouth once daily. 90 tablet 3    spironolactone (ALDACTONE) 25 MG tablet Take 1 tablet (25 mg total) by mouth once daily. 90 tablet 4     No current facility-administered medications on file prior to visit.       Social History:   Social History     Socioeconomic History    Marital status: Single    Number of children: 2   Occupational History    Occupation: Unemployed     Employer: [employer]   Tobacco Use    Smoking status: Never     Passive exposure: Never    Smokeless tobacco: Never   Substance and Sexual Activity    Alcohol use: Not Currently     Comment: 1 glass per 3 months    Drug use: No    Sexual activity: Yes     Partners: Male     Comment: one male partner (boyfriend)     Social Drivers of Health     Financial Resource Strain: Medium Risk (1/3/2024)     "Overall Financial Resource Strain (CARDIA)     Difficulty of Paying Living Expenses: Somewhat hard   Food Insecurity: Food Insecurity Present (1/3/2024)    Hunger Vital Sign     Worried About Running Out of Food in the Last Year: Sometimes true     Ran Out of Food in the Last Year: Sometimes true   Transportation Needs: No Transportation Needs (1/3/2024)    PRAPARE - Transportation     Lack of Transportation (Medical): No     Lack of Transportation (Non-Medical): No   Physical Activity: Insufficiently Active (1/3/2024)    Exercise Vital Sign     Days of Exercise per Week: 3 days     Minutes of Exercise per Session: 20 min   Stress: No Stress Concern Present (1/3/2024)    [employer] [place] [employer] - Occupational Stress Questionnaire     Feeling of Stress : Only a little   Housing Stability: Low Risk  (1/3/2024)    Housing Stability Vital Sign     Unable to Pay for Housing in the Last Year: No     Number of Places Lived in the Last Year: 1     Unstable Housing in the Last Year: No       REVIEW OF SYSTEMS:  Constitution: Negative. Negative for chills, fever and night sweats.   Cardiovascular: Negative for chest pain and syncope.   Respiratory: Negative for cough and shortness of breath.   Gastrointestinal: See HPI. Negative for nausea/vomiting. Negative for abdominal pain.  Genitourinary: See HPI. Negative for discoloration or dysuria.  Skin: Negative for dry skin, itching and rash.   Hematologic/Lymphatic: Negative for bleeding problem. Does not bruise/bleed easily.   Musculoskeletal: Negative for falls and muscle weakness.   Neurological: See HPI. No seizures.   Endocrine: Negative for polydipsia, polyphagia and polyuria.   Allergic/Immunologic: Negative for hives and persistent infections.     EXAM:  Ht 5' 6.5" (1.689 m)   Wt 120.1 kg (264 lb 12.4 oz)   LMP 03/01/2019   BMI 42.10 kg/m²     PHYSICAL EXAMINATION:    General: The patient is a 49 y.o. female in no apparent distress, the patient is " oriented to person, place and time.  Psych: Normal mood and affect  HEENT: Vision grossly intact, hearing intact to the spoken word.  Lungs: Respirations unlabored.  Gait: Normal station and gait, no difficulty with toe or heel walk.   Skin: Cervical skin and dorsal lumbar skin negative for rashes, lesions, hairy patches and surgical scars.    Range of motion: Cervical and lumbar range of motion is acceptable. There is mild tenderness to palpation of the paracervical muscles.  There is mild lumbar tenderness to palpation.  Spinal Balance: Global saggital and coronal spinal balance acceptable, no significant for scoliosis and kyphosis.  Musculoskeletal: No pain with the range of motion of the bilateral shoulders and elbows. Normal bulk and contour of the bilateral hands.  No pain with the range of motion of the bilateral hips. Mild bilateral trochanteric tenderness to palpation.  Vascular: Bilateral upper and lower extremities warm and well perfused, radial pulses 2+ bilaterally, dorsalis pedis pulses 2+ bilaterally.  Neurological: Normal strength and tone in all major motor groups in the bilateral upper and lower extremities. Normal sensation to light touch in the C5-T1 and L2-S1 dermatomes bilaterally.  Deep tendon reflexes symmetric 2+ in the bilateral upper and lower extremities.  Negative Inverted Radial Reflex and Billingsley's bilaterally. Negative Babinski bilaterally. Negative straight leg raise bilaterally.     IMAGING:   Today I personally reviewed AP, Lat and Flex/Ex  upright C-spine films that demonstrate mild DDD.    AP, Lat and Flex/Ex upright lumbar spine films demonstrate scoliosis with moderate DDD.     Body mass index is 42.1 kg/m².    Hemoglobin A1C   Date Value Ref Range Status   08/21/2023 4.6 4.0 - 5.6 % Final     Comment:     ADA Screening Guidelines:  5.7-6.4%  Consistent with prediabetes  >or=6.5%  Consistent with diabetes    High levels of fetal hemoglobin interfere with the HbA1C  assay.  Heterozygous hemoglobin variants (HbS, HgC, etc)do  not significantly interfere with this assay.   However, presence of multiple variants may affect accuracy.     09/16/2019 4.8 4.0 - 5.6 % Final     Comment:     ADA Screening Guidelines:  5.7-6.4%  Consistent with prediabetes  >or=6.5%  Consistent with diabetes  High levels of fetal hemoglobin interfere with the HbA1C  assay. Heterozygous hemoglobin variants (HbS, HgC, etc)do  not significantly interfere with this assay.   However, presence of multiple variants may affect accuracy.     03/21/2017 4.4 (L) 4.5 - 6.2 % Final     Comment:     According to ADA guidelines, hemoglobin A1C <7.0% represents  optimal control in non-pregnant diabetic patients.  Different  metrics may apply to specific populations.   Standards of Medical Care in Diabetes - 2016.  For the purpose of screening for the presence of diabetes:  <5.7%     Consistent with the absence of diabetes  5.7-6.4%  Consistent with increasing risk for diabetes   (prediabetes)  >or=6.5%  Consistent with diabetes  Currently no consensus exists for use of hemoglobin A1C  for diagnosis of diabetes for children.           ASSESSMENT/PLAN:    Mario was seen today for neck pain.    Diagnoses and all orders for this visit:    Juvenile idiopathic scoliosis of thoracolumbar region  -     MRI Lumbar Spine Without Contrast; Future  -     gabapentin (NEURONTIN) 300 MG capsule; Take 1-2 capsules (300-600 mg total) by mouth every evening.  -     tiZANidine (ZANAFLEX) 4 MG tablet; Take 1-2 tablets (4-8 mg total) by mouth every 8 (eight) hours as needed (muscle tension).  -     Ambulatory referral/consult to [facility]; Future    DDD (degenerative disc disease), cervical  -     gabapentin (NEURONTIN) 300 MG capsule; Take 1-2 capsules (300-600 mg total) by mouth every evening.  -     tiZANidine (ZANAFLEX) 4 MG tablet; Take 1-2 tablets (4-8 mg total) by mouth every 8 (eight) hours as needed (muscle tension).  -      [facility]; Future    Acute pain of left shoulder  -     Ambulatory referral[facility] to [facility]    Class 3 severe obesity due to excess calories with serious comorbidity and body mass index (BMI) of 40.0 to 44.9 in adult  -     Ambulatory referral/consult to Bariatric/Obesity Medicine; Future    Dorsalgia, unspecified  -     MRI Lumbar Spine Without Contrast; Future    Claustrophobia  -     diazePAM (VALIUM) 5 MG tablet; Take 1 tablet (5 mg total) by mouth On call Procedure for Anxiety. Take one 30 min prior and the other once at the imaging center if needed.      Today we discussed at length all of the different treatment options including anti-inflammatories, acetaminophen, rest, ice, heat, physical therapy including strengthening and stretching exercises, home exercises, ROM, aerobic conditioning, aqua therapy, other modalities including ultrasound, massage, and dry needling, epidural steroid injections and finally surgical intervention.  MRI ordered, I will call with results and order an BETTY

## 2025-01-15 DIAGNOSIS — I50.42 CHRONIC COMBINED SYSTOLIC AND DIASTOLIC CONGESTIVE HEART FAILURE: ICD-10-CM

## 2025-01-15 RX ORDER — BUMETANIDE 1 MG/1
TABLET ORAL
Qty: 270 TABLET | Refills: 2 | Status: SHIPPED | OUTPATIENT
Start: 2025-01-15

## 2025-01-17 ENCOUNTER — HOSPITAL ENCOUNTER (OUTPATIENT)
Dept: RADIOLOGY | Facility: HOSPITAL | Age: 50
Discharge: HOME | End: 2025-01-17
Attending: REGISTERED NURSE
Payer: MEDICARE

## 2025-01-17 ENCOUNTER — OFFICE VISIT (OUTPATIENT)
Dept: ORTHOPEDICS | Facility: CLINIC | Age: 50
End: 2025-01-17
Payer: MEDICARE

## 2025-01-17 VITALS — HEIGHT: 67 IN | BODY MASS INDEX: 41.55 KG/M2 | WEIGHT: 264.75 LBS

## 2025-01-17 DIAGNOSIS — M51.362 DEGENERATION OF INTERVERTEBRAL DISC OF LUMBAR REGION WITH DISCOGENIC BACK PAIN AND LOWER EXTREMITY PAIN: ICD-10-CM

## 2025-01-17 DIAGNOSIS — M50.30 DDD (DEGENERATIVE DISC DISEASE), CERVICAL: ICD-10-CM

## 2025-01-17 DIAGNOSIS — E66.813 CLASS 3 SEVERE OBESITY DUE TO EXCESS CALORIES WITH SERIOUS COMORBIDITY AND BODY MASS INDEX (BMI) OF 40.0 TO 44.9 IN ADULT: ICD-10-CM

## 2025-01-17 DIAGNOSIS — E66.01 CLASS 3 SEVERE OBESITY DUE TO EXCESS CALORIES WITH SERIOUS COMORBIDITY AND BODY MASS INDEX (BMI) OF 40.0 TO 44.9 IN ADULT: ICD-10-CM

## 2025-01-17 DIAGNOSIS — M25.512 ACUTE PAIN OF LEFT SHOULDER: ICD-10-CM

## 2025-01-17 DIAGNOSIS — F40.240 CLAUSTROPHOBIA: ICD-10-CM

## 2025-01-17 DIAGNOSIS — M41.115 JUVENILE IDIOPATHIC SCOLIOSIS OF THORACOLUMBAR REGION: Primary | ICD-10-CM

## 2025-01-17 DIAGNOSIS — M54.9 DORSALGIA, UNSPECIFIED: ICD-10-CM

## 2025-01-17 PROCEDURE — 99999 PR PBB SHADOW E&M-EST. PATIENT-LVL IV: CPT | Mod: PBBFAC,,, | Performed by: REGISTERED NURSE

## 2025-01-17 PROCEDURE — 72110 X-RAY EXAM L-2 SPINE 4/>VWS: CPT | Mod: TC

## 2025-01-17 PROCEDURE — 72050 X-RAY EXAM NECK SPINE 4/5VWS: CPT | Mod: 26,,, | Performed by: INTERNAL MEDICINE

## 2025-01-17 PROCEDURE — 1159F MED LIST DOCD IN RCRD: CPT | Mod: CPTII,S$GLB,, | Performed by: REGISTERED NURSE

## 2025-01-17 PROCEDURE — 3008F BODY MASS INDEX DOCD: CPT | Mod: CPTII,S$GLB,, | Performed by: REGISTERED NURSE

## 2025-01-17 PROCEDURE — 99214 OFFICE O/P EST MOD 30 MIN: CPT | Mod: S$GLB,,, | Performed by: REGISTERED NURSE

## 2025-01-17 PROCEDURE — 72110 X-RAY EXAM L-2 SPINE 4/>VWS: CPT | Mod: 26,,, | Performed by: INTERNAL MEDICINE

## 2025-01-17 PROCEDURE — 72050 X-RAY EXAM NECK SPINE 4/5VWS: CPT | Mod: TC

## 2025-01-17 RX ORDER — DIAZEPAM 5 MG/1
5 TABLET ORAL
Qty: 2 TABLET | Refills: 0 | Status: SHIPPED | OUTPATIENT
Start: 2025-01-17 | End: 2025-02-16

## 2025-01-17 RX ORDER — GABAPENTIN 300 MG/1
300-600 CAPSULE ORAL NIGHTLY
Qty: 60 CAPSULE | Refills: 11 | Status: SHIPPED | OUTPATIENT
Start: 2025-01-17 | End: 2026-01-17

## 2025-01-17 RX ORDER — TIZANIDINE 4 MG/1
4-8 TABLET ORAL EVERY 8 HOURS PRN
Qty: 60 TABLET | Refills: 2 | Status: SHIPPED | OUTPATIENT
Start: 2025-01-17

## 2025-01-24 ENCOUNTER — DOCUMENTATION ONLY (OUTPATIENT)
Dept: TRANSPLANT | Facility: CLINIC | Age: 50
End: 2025-01-24
Payer: MEDICARE

## 2025-01-24 NOTE — PROGRESS NOTES
Pt CardioMems transmission received and review.          1/24/2025    12:27 PM 1/8/2025     8:38 AM 1/8/2025     8:37 AM 12/19/2024     2:45 PM 12/19/2024     2:44 PM 10/18/2024     9:20 AM 10/18/2024     9:19 AM   CardioMEMS Transmission    Transmission Date 1/24/2025 1/2/2025 12/20/2024 12/18/2024 11/25/2024 10/15/2024 10/4/2024   Transmission Type Maintenance Maintenance Maintenance Maintenance Maintenance Maintenance Maintenance   PAS 41 54 71 72 65 66 68   LEELA 25 35 49 50 45 47 46   PAD  15 22 30 30 28 30 28   Medication Adjustments  No No No No No No No         Pressures are stable.    Medications changed? No    Patient contacted? No    Will continue to monitor.

## 2025-01-28 ENCOUNTER — HOSPITAL ENCOUNTER (OUTPATIENT)
Dept: RADIOLOGY | Facility: HOSPITAL | Age: 50
Discharge: HOME | End: 2025-01-28
Attending: REGISTERED NURSE
Payer: MEDICARE

## 2025-01-28 DIAGNOSIS — M41.115 JUVENILE IDIOPATHIC SCOLIOSIS OF THORACOLUMBAR REGION: ICD-10-CM

## 2025-01-28 DIAGNOSIS — M54.9 DORSALGIA, UNSPECIFIED: ICD-10-CM

## 2025-01-30 ENCOUNTER — PATIENT MESSAGE (OUTPATIENT)
Dept: ORTHOPEDICS | Facility: CLINIC | Age: 50
End: 2025-01-30
Payer: MEDICARE

## 2025-01-30 DIAGNOSIS — Z95.810 ICD (IMPLANTABLE CARDIOVERTER-DEFIBRILLATOR) IN PLACE: Primary | Chronic | ICD-10-CM

## 2025-02-11 RX ORDER — SACUBITRIL AND VALSARTAN 97; 103 MG/1; MG/1
1 TABLET, FILM COATED ORAL 2 TIMES DAILY
Qty: 180 TABLET | Refills: 0 | Status: SHIPPED | OUTPATIENT
Start: 2025-02-11

## 2025-02-13 RX ORDER — AMIODARONE HYDROCHLORIDE 200 MG/1
200 TABLET ORAL
Qty: 30 TABLET | Refills: 5 | Status: SHIPPED | OUTPATIENT
Start: 2025-02-13

## 2025-02-19 ENCOUNTER — CLINICAL SUPPORT (OUTPATIENT)
Dept: CARDIOLOGY | Facility: HOSPITAL | Age: 50
End: 2025-02-19
Attending: INTERNAL MEDICINE
Payer: MEDICARE

## 2025-02-19 ENCOUNTER — CLINICAL SUPPORT (OUTPATIENT)
Dept: CARDIOLOGY | Facility: HOSPITAL | Age: 50
End: 2025-02-19
Payer: MEDICARE

## 2025-02-19 DIAGNOSIS — Z95.810 PRESENCE OF AUTOMATIC (IMPLANTABLE) CARDIAC DEFIBRILLATOR: ICD-10-CM

## 2025-02-19 DIAGNOSIS — I42.8 OTHER CARDIOMYOPATHIES: ICD-10-CM

## 2025-02-19 PROCEDURE — 93295 DEV INTERROG REMOTE 1/2/MLT: CPT | Mod: ,,, | Performed by: INTERNAL MEDICINE

## 2025-02-19 PROCEDURE — 93296 REM INTERROG EVL PM/IDS: CPT | Performed by: INTERNAL MEDICINE

## 2025-02-20 ENCOUNTER — CLINICAL SUPPORT (OUTPATIENT)
Dept: TRANSPLANT | Facility: CLINIC | Age: 50
End: 2025-02-20
Payer: MEDICARE

## 2025-02-20 DIAGNOSIS — I50.42 CHRONIC COMBINED SYSTOLIC AND DIASTOLIC CHF, NYHA CLASS 3: Primary | ICD-10-CM

## 2025-02-21 NOTE — PROGRESS NOTES
Pt CardioMems transmission received and reviewed.          1/24/2025    12:27 PM 1/8/2025     8:38 AM 1/8/2025     8:37 AM 12/19/2024     2:45 PM 12/19/2024     2:44 PM 10/18/2024     9:20 AM 10/18/2024     9:19 AM   CardioMEMS Transmission    Transmission Date 1/24/2025 1/2/2025 12/20/2024 12/18/2024 11/25/2024 10/15/2024 10/4/2024   Transmission Type Maintenance Maintenance Maintenance Maintenance Maintenance Maintenance Maintenance   PAS 41 54 71 72 65 66 68   LEELA 25 35 49 50 45 47 46   PAD  15 22 30 30 28 30 28   Medication Adjustments  No No No No No No No         Card[facility]ems waveform interpretations, trends, and reports are monitored weekly via [email or web address]. Adjustments made per documentation. Will continue to monitor.

## 2025-02-26 ENCOUNTER — TELEPHONE (OUTPATIENT)
Dept: TRANSPLANT | Facility: CLINIC | Age: 50
End: 2025-02-26
Payer: MEDICARE

## 2025-02-26 ENCOUNTER — PATIENT MESSAGE (OUTPATIENT)
Dept: TRANSPLANT | Facility: CLINIC | Age: 50
End: 2025-02-26
Payer: MEDICARE

## 2025-02-26 NOTE — TELEPHONE ENCOUNTER
Pt CardioMems transmission received and reviewed with CHRISTOPH Edge.          2/26/2025     2:43 PM 2/26/2025     2:42 PM 1/24/2025    12:27 PM 1/8/2025     8:38 AM 1/8/2025     8:37 AM 12/19/2024     2:45 PM 12/19/2024     2:44 PM   CardioMEMS Transmission    Transmission Date 2/25/2025 2/10/2025 1/24/2025 1/2/2025 12/20/2024 12/18/2024 11/25/2024   Transmission Type Maintenance Maintenance Maintenance Maintenance Maintenance Maintenance Maintenance   PAS 58 56 41 54 71 72 65   LEELA 39 36 25 35 49 50 45   PAD  24 22 15 22 30 30 28   Medication Adjustments  No No No No No No No         Pressures are elevated.    Medication Interventions? Yes, Potassium 40 mEq with Metolazone 2.5 mg ONLY ONCE 30 minutes before your next dose of Bumex.     Lab Interventions? No    Patient contacted? Yes,Bumex 2 mg BID. Potassium 40 mEq as needed. Metolazone 2.5 PRN. Mychart message sent to the patient with the above mediation dose adjustment.         Any medication and/or lab instructions are ordered by the provider that reviewed and assessed these numbers and the instructions have been communicated to the patient.    Will continue to monitor.

## 2025-03-03 ENCOUNTER — CLINICAL SUPPORT (OUTPATIENT)
Dept: TRANSPLANT | Facility: CLINIC | Age: 50
End: 2025-03-03
Payer: MEDICARE

## 2025-03-03 DIAGNOSIS — I50.42 CHRONIC COMBINED SYSTOLIC AND DIASTOLIC CHF, NYHA CLASS 3: Primary | ICD-10-CM

## 2025-03-03 PROCEDURE — 99213 OFFICE O/P EST LOW 20 MIN: CPT | Mod: S$GLB,,, | Performed by: INTERNAL MEDICINE

## 2025-03-12 NOTE — PROGRESS NOTES
Pt CardioMems transmission received and reviewed.          2/26/2025     2:43 PM 2/26/2025     2:42 PM 1/24/2025    12:27 PM 1/8/2025     8:38 AM 1/8/2025     8:37 AM 12/19/2024     2:45 PM 12/19/2024     2:44 PM   CardioMEMS Transmission    Transmission Date 2/25/2025 2/10/2025 1/24/2025 1/2/2025 12/20/2024 12/18/2024 11/25/2024   Transmission Type Maintenance Maintenance Maintenance Maintenance Maintenance Maintenance Maintenance   PAS 58 56 41 54 71 72 65   LEELA 39 36 25 35 49 50 45   PAD  24 22 15 22 30 30 28   Medication Adjustments  No No No No No No No         [facility] waveform interpretations, trends, and reports are monitored weekly via [email or web address]. Adjustments made per documentation. Will continue to monitor.

## 2025-03-14 LAB
OHS CV DC REMOTE DEVICE TYPE: NORMAL
OHS CV RV PACING PERCENT: 1 %

## 2025-03-19 ENCOUNTER — HOSPITAL ENCOUNTER (OUTPATIENT)
Dept: RADIOLOGY | Facility: HOSPITAL | Age: 50
Discharge: HOME | End: 2025-03-19
Attending: STUDENT IN AN ORGANIZED HEALTH CARE EDUCATION/TRAINING PROGRAM
Payer: MEDICARE

## 2025-03-19 DIAGNOSIS — Z12.31 ENCOUNTER FOR SCREENING MAMMOGRAM FOR BREAST CANCER: ICD-10-CM

## 2025-03-19 PROCEDURE — 77063 BREAST TOMOSYNTHESIS BI: CPT | Mod: 26,,, | Performed by: RADIOLOGY

## 2025-03-19 PROCEDURE — 77067 SCR MAMMO BI INCL CAD: CPT | Mod: 26,,, | Performed by: RADIOLOGY

## 2025-03-19 PROCEDURE — 77063 BREAST TOMOSYNTHESIS BI: CPT | Mod: TC,PN

## 2025-03-31 ENCOUNTER — RESULTS FOLLOW-UP (OUTPATIENT)
Dept: FAMILY MEDICINE | Facility: CLINIC | Age: 50
End: 2025-03-31

## 2025-03-31 ENCOUNTER — HOSPITAL ENCOUNTER (OUTPATIENT)
Dept: RADIOLOGY | Facility: HOSPITAL | Age: 50
Discharge: HOME | End: 2025-03-31
Attending: STUDENT IN AN ORGANIZED HEALTH CARE EDUCATION/TRAINING PROGRAM
Payer: MEDICARE

## 2025-03-31 DIAGNOSIS — R92.8 ABNORMAL MAMMOGRAM: ICD-10-CM

## 2025-03-31 PROCEDURE — 77065 DX MAMMO INCL CAD UNI: CPT | Mod: 26,LT,, | Performed by: RADIOLOGY

## 2025-03-31 PROCEDURE — 77061 BREAST TOMOSYNTHESIS UNI: CPT | Mod: 26,,, | Performed by: RADIOLOGY

## 2025-03-31 PROCEDURE — 77065 DX MAMMO INCL CAD UNI: CPT | Mod: TC,PN,LT

## 2025-04-02 ENCOUNTER — TELEPHONE (OUTPATIENT)
Dept: FAMILY MEDICINE | Facility: CLINIC | Age: 50
End: 2025-04-02
Payer: MEDICARE

## 2025-04-02 NOTE — TELEPHONE ENCOUNTER
----- Message from Tejinder Bowling MD sent at 3/31/2025  9:54 AM CDT -----  Repeat mammogram in 6 months.   ----- Message -----  From: Giovani Umana MD  Sent: 3/31/2025   9:40 AM CDT  To: Tejinder Bowling MD

## 2025-04-07 ENCOUNTER — CLINICAL SUPPORT (OUTPATIENT)
Dept: TRANSPLANT | Facility: CLINIC | Age: 50
End: 2025-04-07
Payer: MEDICARE

## 2025-04-07 DIAGNOSIS — I50.42 CHRONIC COMBINED SYSTOLIC AND DIASTOLIC CHF, NYHA CLASS 3: Primary | ICD-10-CM

## 2025-04-07 PROCEDURE — 99499 UNLISTED E&M SERVICE: CPT | Mod: S$GLB,,, | Performed by: INTERNAL MEDICINE

## 2025-04-09 DIAGNOSIS — I50.42 CHRONIC COMBINED SYSTOLIC AND DIASTOLIC CONGESTIVE HEART FAILURE: ICD-10-CM

## 2025-04-09 RX ORDER — CARVEDILOL 25 MG/1
25 TABLET ORAL 2 TIMES DAILY
Qty: 180 TABLET | Refills: 0 | Status: SHIPPED | OUTPATIENT
Start: 2025-04-09

## 2025-04-09 RX ORDER — SPIRONOLACTONE 25 MG/1
25 TABLET ORAL
Qty: 90 TABLET | Refills: 0 | Status: SHIPPED | OUTPATIENT
Start: 2025-04-09

## 2025-04-16 ENCOUNTER — TELEPHONE (OUTPATIENT)
Dept: FAMILY MEDICINE | Facility: CLINIC | Age: 50
End: 2025-04-16
Payer: MEDICARE

## 2025-04-16 DIAGNOSIS — R92.8 ABNORMAL MAMMOGRAM: Primary | ICD-10-CM

## 2025-04-28 ENCOUNTER — PATIENT MESSAGE (OUTPATIENT)
Dept: FAMILY MEDICINE | Facility: CLINIC | Age: 50
End: 2025-04-28
Payer: MEDICARE

## 2025-05-05 ENCOUNTER — TELEPHONE (OUTPATIENT)
Dept: TRANSPLANT | Facility: CLINIC | Age: 50
End: 2025-05-05
Payer: MEDICARE

## 2025-05-05 ENCOUNTER — PATIENT MESSAGE (OUTPATIENT)
Dept: TRANSPLANT | Facility: CLINIC | Age: 50
End: 2025-05-05
Payer: MEDICARE

## 2025-05-05 ENCOUNTER — CLINICAL SUPPORT (OUTPATIENT)
Dept: TRANSPLANT | Facility: CLINIC | Age: 50
End: 2025-05-05
Payer: MEDICARE

## 2025-05-05 DIAGNOSIS — I50.42 CHRONIC COMBINED SYSTOLIC AND DIASTOLIC CHF, NYHA CLASS 3: ICD-10-CM

## 2025-05-05 DIAGNOSIS — I50.42 CHRONIC COMBINED SYSTOLIC AND DIASTOLIC CHF, NYHA CLASS 3: Primary | ICD-10-CM

## 2025-05-05 RX ORDER — METOLAZONE 2.5 MG/1
2.5 TABLET ORAL
Qty: 15 TABLET | Refills: 0 | Status: SHIPPED | OUTPATIENT
Start: 2025-05-05 | End: 2026-05-05

## 2025-05-05 NOTE — TELEPHONE ENCOUNTER
Pt CardioMems transmission received and reviewed with CHRISTOPH Hurd .          5/5/2025    10:11 AM 5/5/2025    10:10 AM 2/26/2025     2:43 PM 2/26/2025     2:42 PM 1/24/2025    12:27 PM 1/8/2025     8:38 AM 1/8/2025     8:37 AM   CardioMEMS Transmission    Transmission Date 4/29/2025 4/27/2025 2/25/2025 2/10/2025 1/24/2025 1/2/2025 12/20/2024   Transmission Type Maintenance Maintenance Maintenance Maintenance Maintenance Maintenance Maintenance   PAS 68 67 58 56 41 54 71   LEELA 47 45 39 36 25 35 49   PAD  30 27 24 22 15 22 30   Medication Adjustments  No No No No No No No         Pressures are elevated.    Medication Interventions? Yes, Potassium 40 mEq with Metolazone 2.5 mg ONLY ONCE 30 minutes before your next dose of Bumex.     Lab Interventions? No    Patient contacted? Yes, Bumex 2 mg BID. Potassium 40 mEq as needed. Metolazone 2.5 PRN   Mychart message sent to the patient with the above medication dose adjustment.     Any medication and/or lab instructions are ordered by the provider that reviewed and assessed these numbers and the instructions have been communicated to the patient.    Will continue to monitor.

## 2025-05-09 NOTE — PROGRESS NOTES
Pt CardioMems transmission received and reviewed.          5/5/2025    10:11 AM 5/5/2025    10:10 AM 2/26/2025     2:43 PM 2/26/2025     2:42 PM 1/24/2025    12:27 PM 1/8/2025     8:38 AM 1/8/2025     8:37 AM   CardioMEMS Transmission    Transmission Date 4/29/2025 4/27/2025 2/25/2025 2/10/2025 1/24/2025 1/2/2025 12/20/2024   Transmission Type Maintenance Maintenance Maintenance Maintenance Maintenance Maintenance Maintenance   PAS 68 67 58 56 41 54 71   LEELA 47 45 39 36 25 35 49   PAD  30 27 24 22 15 22 30   Medication Adjustments  No No No No No No No         Card[facility]ems waveform interpretations, trends, and reports are monitored weekly via [email or web address]. Adjustments made per documentation. Will continue to monitor.

## 2025-05-10 ENCOUNTER — TELEPHONE (OUTPATIENT)
Dept: BARIATRICS | Facility: CLINIC | Age: 50
End: 2025-05-10
Payer: MEDICARE

## 2025-05-10 NOTE — TELEPHONE ENCOUNTER
I called and left a phone message.  I explained that I was calling to follow up to see if she is still interested in Medical Weight Loss & if she spoke with Cardiology to ensure that it was safe for her to use Weight Loss medications.  I also sent this information via the Patient Portal.

## 2025-05-15 RX ORDER — SACUBITRIL AND VALSARTAN 97; 103 MG/1; MG/1
1 TABLET, FILM COATED ORAL 2 TIMES DAILY
Qty: 180 TABLET | Refills: 1 | Status: SHIPPED | OUTPATIENT
Start: 2025-05-15

## 2025-05-21 ENCOUNTER — CLINICAL SUPPORT (OUTPATIENT)
Dept: CARDIOLOGY | Facility: HOSPITAL | Age: 50
End: 2025-05-21
Attending: INTERNAL MEDICINE
Payer: MEDICARE

## 2025-05-21 ENCOUNTER — CLINICAL SUPPORT (OUTPATIENT)
Dept: CARDIOLOGY | Facility: HOSPITAL | Age: 50
End: 2025-05-21
Payer: MEDICARE

## 2025-05-21 DIAGNOSIS — Z95.810 PRESENCE OF AUTOMATIC (IMPLANTABLE) CARDIAC DEFIBRILLATOR: ICD-10-CM

## 2025-05-21 DIAGNOSIS — I42.8 OTHER CARDIOMYOPATHIES: ICD-10-CM

## 2025-05-21 PROCEDURE — 93296 REM INTERROG EVL PM/IDS: CPT | Performed by: INTERNAL MEDICINE

## 2025-05-21 PROCEDURE — 93295 DEV INTERROG REMOTE 1/2/MLT: CPT | Mod: ,,, | Performed by: INTERNAL MEDICINE

## 2025-05-29 LAB
OHS CV DC REMOTE DEVICE TYPE: NORMAL
OHS CV RV PACING PERCENT: 1 %

## 2025-05-31 ENCOUNTER — HOSPITAL ENCOUNTER (INPATIENT)
Facility: HOSPITAL | Age: 50
LOS: 2 days | Discharge: HOME | DRG: 287 | End: 2025-06-03
Attending: EMERGENCY MEDICINE | Admitting: FAMILY MEDICINE
Payer: MEDICARE

## 2025-05-31 DIAGNOSIS — J45.901 MILD ASTHMA EXACERBATION: ICD-10-CM

## 2025-05-31 DIAGNOSIS — R79.89 ELEVATED TROPONIN: ICD-10-CM

## 2025-05-31 DIAGNOSIS — I50.9 ACUTE ON CHRONIC CONGESTIVE HEART FAILURE, UNSPECIFIED HEART FAILURE TYPE: Primary | ICD-10-CM

## 2025-05-31 DIAGNOSIS — R00.1 BRADYCARDIA: ICD-10-CM

## 2025-05-31 DIAGNOSIS — I42.0 NONISCHEMIC DILATED CARDIOMYOPATHY: Chronic | ICD-10-CM

## 2025-05-31 DIAGNOSIS — R05.1 ACUTE COUGH: ICD-10-CM

## 2025-05-31 DIAGNOSIS — R07.9 CHEST PAIN: ICD-10-CM

## 2025-05-31 DIAGNOSIS — I34.0 SEVERE MITRAL REGURGITATION: ICD-10-CM

## 2025-05-31 DIAGNOSIS — I50.42 CHRONIC COMBINED SYSTOLIC AND DIASTOLIC CONGESTIVE HEART FAILURE: ICD-10-CM

## 2025-05-31 DIAGNOSIS — I21.4 NSTEMI (NON-ST ELEVATED MYOCARDIAL INFARCTION): ICD-10-CM

## 2025-05-31 LAB
ABSOLUTE EOSINOPHIL (OHS): 0.15 K/UL
ABSOLUTE MONOCYTE (OHS): 0.32 K/UL (ref 0.3–1)
ABSOLUTE NEUTROPHIL COUNT (OHS): 2.52 K/UL (ref 1.8–7.7)
ALBUMIN SERPL BCP-MCNC: 3.8 G/DL (ref 3.5–5.2)
ALP SERPL-CCNC: 47 UNIT/L (ref 40–150)
ALT SERPL W/O P-5'-P-CCNC: 11 UNIT/L (ref 10–44)
ANION GAP (OHS): 10 MMOL/L (ref 8–16)
AST SERPL-CCNC: 16 UNIT/L (ref 11–45)
BASOPHILS # BLD AUTO: 0.01 K/UL
BASOPHILS NFR BLD AUTO: 0.2 %
BILIRUB SERPL-MCNC: 1.8 MG/DL (ref 0.1–1)
BNP SERPL-MCNC: 313 PG/ML (ref 0–99)
BUN SERPL-MCNC: 22 MG/DL (ref 6–20)
CALCIUM SERPL-MCNC: 8.9 MG/DL (ref 8.7–10.5)
CHLORIDE SERPL-SCNC: 107 MMOL/L (ref 95–110)
CO2 SERPL-SCNC: 22 MMOL/L (ref 23–29)
CREAT SERPL-MCNC: 1.3 MG/DL (ref 0.5–1.4)
CTP QC/QA: YES
CTP QC/QA: YES
ERYTHROCYTE [DISTWIDTH] IN BLOOD BY AUTOMATED COUNT: 11.9 % (ref 11.5–14.5)
GFR SERPLBLD CREATININE-BSD FMLA CKD-EPI: 51 ML/MIN/1.73/M2
GLUCOSE SERPL-MCNC: 87 MG/DL (ref 70–110)
HCT VFR BLD AUTO: 37.4 % (ref 37–48.5)
HGB BLD-MCNC: 12.5 GM/DL (ref 12–16)
IMM GRANULOCYTES # BLD AUTO: 0.01 K/UL (ref 0–0.04)
IMM GRANULOCYTES NFR BLD AUTO: 0.2 % (ref 0–0.5)
LYMPHOCYTES # BLD AUTO: 1.66 K/UL (ref 1–4.8)
MAGNESIUM SERPL-MCNC: 1.9 MG/DL (ref 1.6–2.6)
MCH RBC QN AUTO: 32.5 PG (ref 27–31)
MCHC RBC AUTO-ENTMCNC: 33.4 G/DL (ref 32–36)
MCV RBC AUTO: 97 FL (ref 82–98)
NUCLEATED RBC (/100WBC) (OHS): 0 /100 WBC
PLATELET # BLD AUTO: 193 K/UL (ref 150–450)
PMV BLD AUTO: 11.2 FL (ref 9.2–12.9)
POC MOLECULAR INFLUENZA A AGN: NEGATIVE
POC MOLECULAR INFLUENZA B AGN: NEGATIVE
POTASSIUM SERPL-SCNC: 3.4 MMOL/L (ref 3.5–5.1)
PROT SERPL-MCNC: 8 GM/DL (ref 6–8.4)
RBC # BLD AUTO: 3.85 M/UL (ref 4–5.4)
RELATIVE EOSINOPHIL (OHS): 3.2 %
RELATIVE LYMPHOCYTE (OHS): 35.5 % (ref 18–48)
RELATIVE MONOCYTE (OHS): 6.9 % (ref 4–15)
RELATIVE NEUTROPHIL (OHS): 54 % (ref 38–73)
SARS-COV-2 RDRP RESP QL NAA+PROBE: NEGATIVE
SODIUM SERPL-SCNC: 139 MMOL/L (ref 136–145)
TROPONIN I SERPL DL<=0.01 NG/ML-MCNC: 0.21 NG/ML
TROPONIN I SERPL DL<=0.01 NG/ML-MCNC: 0.22 NG/ML
WBC # BLD AUTO: 4.67 K/UL (ref 3.9–12.7)

## 2025-05-31 PROCEDURE — 87635 SARS-COV-2 COVID-19 AMP PRB: CPT

## 2025-05-31 PROCEDURE — 83880 ASSAY OF NATRIURETIC PEPTIDE: CPT

## 2025-05-31 PROCEDURE — 83735 ASSAY OF MAGNESIUM: CPT | Performed by: HOSPITALIST

## 2025-05-31 PROCEDURE — 93005 ELECTROCARDIOGRAM TRACING: CPT

## 2025-05-31 PROCEDURE — 25000242 PHARM REV CODE 250 ALT 637 W/ HCPCS: Performed by: NURSE PRACTITIONER

## 2025-05-31 PROCEDURE — 93010 ELECTROCARDIOGRAM REPORT: CPT | Mod: ,,, | Performed by: STUDENT IN AN ORGANIZED HEALTH CARE EDUCATION/TRAINING PROGRAM

## 2025-05-31 PROCEDURE — G0378 HOSPITAL OBSERVATION PER HR: HCPCS

## 2025-05-31 PROCEDURE — 84484 ASSAY OF TROPONIN QUANT: CPT | Mod: 91 | Performed by: NURSE PRACTITIONER

## 2025-05-31 PROCEDURE — 87070 CULTURE OTHR SPECIMN AEROBIC: CPT | Performed by: NURSE PRACTITIONER

## 2025-05-31 PROCEDURE — 36415 COLL VENOUS BLD VENIPUNCTURE: CPT | Performed by: HOSPITALIST

## 2025-05-31 PROCEDURE — 87502 INFLUENZA DNA AMP PROBE: CPT

## 2025-05-31 PROCEDURE — 96372 THER/PROPH/DIAG INJ SC/IM: CPT | Performed by: NURSE PRACTITIONER

## 2025-05-31 PROCEDURE — 25000003 PHARM REV CODE 250: Performed by: NURSE PRACTITIONER

## 2025-05-31 PROCEDURE — 85025 COMPLETE CBC W/AUTO DIFF WBC: CPT

## 2025-05-31 PROCEDURE — 94640 AIRWAY INHALATION TREATMENT: CPT

## 2025-05-31 PROCEDURE — 99285 EMERGENCY DEPT VISIT HI MDM: CPT | Mod: 25

## 2025-05-31 PROCEDURE — 84484 ASSAY OF TROPONIN QUANT: CPT

## 2025-05-31 PROCEDURE — 99900035 HC TECH TIME PER 15 MIN (STAT)

## 2025-05-31 PROCEDURE — 94761 N-INVAS EAR/PLS OXIMETRY MLT: CPT

## 2025-05-31 PROCEDURE — 80053 COMPREHEN METABOLIC PANEL: CPT

## 2025-05-31 PROCEDURE — 63600175 PHARM REV CODE 636 W HCPCS: Performed by: NURSE PRACTITIONER

## 2025-05-31 RX ORDER — SODIUM CHLORIDE 0.9 % (FLUSH) 0.9 %
10 SYRINGE (ML) INJECTION EVERY 12 HOURS PRN
Status: DISCONTINUED | OUTPATIENT
Start: 2025-05-31 | End: 2025-06-03 | Stop reason: HOSPADM

## 2025-05-31 RX ORDER — DOCUSATE SODIUM 100 MG/1
100 CAPSULE, LIQUID FILLED ORAL DAILY
Status: DISCONTINUED | OUTPATIENT
Start: 2025-06-01 | End: 2025-06-03 | Stop reason: HOSPADM

## 2025-05-31 RX ORDER — CETIRIZINE HYDROCHLORIDE 10 MG/1
10 TABLET ORAL DAILY
Status: DISCONTINUED | OUTPATIENT
Start: 2025-06-01 | End: 2025-06-03 | Stop reason: HOSPADM

## 2025-05-31 RX ORDER — POTASSIUM CHLORIDE 20 MEQ/1
40 TABLET, EXTENDED RELEASE ORAL ONCE
Status: DISCONTINUED | OUTPATIENT
Start: 2025-05-31 | End: 2025-05-31

## 2025-05-31 RX ORDER — ACETAMINOPHEN 325 MG/1
650 TABLET ORAL EVERY 4 HOURS PRN
Status: DISCONTINUED | OUTPATIENT
Start: 2025-05-31 | End: 2025-06-03 | Stop reason: HOSPADM

## 2025-05-31 RX ORDER — ATORVASTATIN CALCIUM 40 MG/1
40 TABLET, FILM COATED ORAL DAILY
Status: DISCONTINUED | OUTPATIENT
Start: 2025-06-01 | End: 2025-06-03 | Stop reason: HOSPADM

## 2025-05-31 RX ORDER — ASPIRIN 325 MG
325 TABLET ORAL
Status: DISCONTINUED | OUTPATIENT
Start: 2025-05-31 | End: 2025-05-31

## 2025-05-31 RX ORDER — FUROSEMIDE 10 MG/ML
40 INJECTION INTRAMUSCULAR; INTRAVENOUS EVERY 12 HOURS
Status: DISCONTINUED | OUTPATIENT
Start: 2025-05-31 | End: 2025-06-02

## 2025-05-31 RX ORDER — NALOXONE HCL 0.4 MG/ML
0.02 VIAL (ML) INJECTION
Status: DISCONTINUED | OUTPATIENT
Start: 2025-05-31 | End: 2025-06-03 | Stop reason: HOSPADM

## 2025-05-31 RX ORDER — AMIODARONE HYDROCHLORIDE 200 MG/1
200 TABLET ORAL DAILY
Status: DISCONTINUED | OUTPATIENT
Start: 2025-06-01 | End: 2025-06-03 | Stop reason: HOSPADM

## 2025-05-31 RX ORDER — TALC
6 POWDER (GRAM) TOPICAL NIGHTLY PRN
Status: DISCONTINUED | OUTPATIENT
Start: 2025-05-31 | End: 2025-06-03 | Stop reason: HOSPADM

## 2025-05-31 RX ORDER — BENZONATATE 100 MG/1
200 CAPSULE ORAL 3 TIMES DAILY PRN
Status: DISCONTINUED | OUTPATIENT
Start: 2025-06-01 | End: 2025-06-03 | Stop reason: HOSPADM

## 2025-05-31 RX ORDER — CODEINE PHOSPHATE AND GUAIFENESIN 10; 100 MG/5ML; MG/5ML
5 SOLUTION ORAL EVERY 4 HOURS PRN
Status: DISCONTINUED | OUTPATIENT
Start: 2025-05-31 | End: 2025-06-01

## 2025-05-31 RX ORDER — IPRATROPIUM BROMIDE AND ALBUTEROL SULFATE 2.5; .5 MG/3ML; MG/3ML
3 SOLUTION RESPIRATORY (INHALATION) EVERY 6 HOURS
Status: DISCONTINUED | OUTPATIENT
Start: 2025-05-31 | End: 2025-06-03 | Stop reason: HOSPADM

## 2025-05-31 RX ORDER — LANOLIN ALCOHOL/MO/W.PET/CERES
1000 CREAM (GRAM) TOPICAL DAILY
Status: DISCONTINUED | OUTPATIENT
Start: 2025-06-01 | End: 2025-06-03 | Stop reason: HOSPADM

## 2025-05-31 RX ORDER — ENOXAPARIN SODIUM 100 MG/ML
40 INJECTION SUBCUTANEOUS EVERY 24 HOURS
Status: DISCONTINUED | OUTPATIENT
Start: 2025-05-31 | End: 2025-06-03 | Stop reason: HOSPADM

## 2025-05-31 RX ORDER — SPIRONOLACTONE 25 MG/1
25 TABLET ORAL DAILY
Status: DISCONTINUED | OUTPATIENT
Start: 2025-06-01 | End: 2025-06-03 | Stop reason: HOSPADM

## 2025-05-31 RX ORDER — ONDANSETRON HYDROCHLORIDE 2 MG/ML
4 INJECTION, SOLUTION INTRAVENOUS EVERY 8 HOURS PRN
Status: DISCONTINUED | OUTPATIENT
Start: 2025-05-31 | End: 2025-06-03 | Stop reason: HOSPADM

## 2025-05-31 RX ORDER — GABAPENTIN 300 MG/1
300 CAPSULE ORAL NIGHTLY
Status: DISCONTINUED | OUTPATIENT
Start: 2025-05-31 | End: 2025-06-03 | Stop reason: HOSPADM

## 2025-05-31 RX ORDER — IBUPROFEN 200 MG
16 TABLET ORAL
Status: DISCONTINUED | OUTPATIENT
Start: 2025-05-31 | End: 2025-06-03 | Stop reason: HOSPADM

## 2025-05-31 RX ORDER — CARVEDILOL 25 MG/1
25 TABLET ORAL 2 TIMES DAILY
Status: DISCONTINUED | OUTPATIENT
Start: 2025-05-31 | End: 2025-06-01

## 2025-05-31 RX ORDER — IBUPROFEN 200 MG
24 TABLET ORAL
Status: DISCONTINUED | OUTPATIENT
Start: 2025-05-31 | End: 2025-06-03 | Stop reason: HOSPADM

## 2025-05-31 RX ORDER — GLUCAGON 1 MG
1 KIT INJECTION
Status: DISCONTINUED | OUTPATIENT
Start: 2025-05-31 | End: 2025-06-03 | Stop reason: HOSPADM

## 2025-05-31 RX ORDER — SIMETHICONE 80 MG
1 TABLET,CHEWABLE ORAL 4 TIMES DAILY PRN
Status: DISCONTINUED | OUTPATIENT
Start: 2025-05-31 | End: 2025-06-03 | Stop reason: HOSPADM

## 2025-05-31 RX ORDER — HYDROCODONE BITARTRATE AND ACETAMINOPHEN 5; 325 MG/1; MG/1
1 TABLET ORAL EVERY 6 HOURS PRN
Refills: 0 | Status: DISCONTINUED | OUTPATIENT
Start: 2025-05-31 | End: 2025-06-03 | Stop reason: HOSPADM

## 2025-05-31 RX ORDER — ASPIRIN 81 MG/1
81 TABLET ORAL DAILY
Status: DISCONTINUED | OUTPATIENT
Start: 2025-06-01 | End: 2025-06-03 | Stop reason: HOSPADM

## 2025-05-31 RX ADMIN — BENZONATATE 200 MG: 100 CAPSULE ORAL at 11:05

## 2025-05-31 RX ADMIN — ENOXAPARIN SODIUM 40 MG: 40 INJECTION SUBCUTANEOUS at 05:05

## 2025-05-31 RX ADMIN — ACETAMINOPHEN 650 MG: 325 TABLET ORAL at 11:05

## 2025-05-31 RX ADMIN — IPRATROPIUM BROMIDE AND ALBUTEROL SULFATE 3 ML: 2.5; .5 SOLUTION RESPIRATORY (INHALATION) at 06:05

## 2025-05-31 RX ADMIN — POTASSIUM BICARBONATE 40 MEQ: 391 TABLET, EFFERVESCENT ORAL at 06:05

## 2025-05-31 NOTE — Clinical Note
The site was marked. The right groin, right radial and right brachial was prepped. The site was prepped with ChloraPrep. The site was clipped. The patient was draped.

## 2025-05-31 NOTE — ASSESSMENT & PLAN NOTE
Body mass index is 40.72 kg/m². Morbid obesity complicates all aspects of disease management from diagnostic modalities to treatment. Weight loss encouraged and health benefits explained to patient.

## 2025-05-31 NOTE — SUBJECTIVE & OBJECTIVE
Past Medical History:   Diagnosis Date    Asthma     Chronic back pain 2014    Chronic combined systolic and diastolic congestive heart failure 2015     2-10-17   1 - Severely depressed left ventricular systolic function (EF 20-25%).    2 - Severe left ventricular enlargement.    3 - Severe left atrial enlargement.    4 - Left ventricular diastolic dysfunction.    5 - The estimated PA systolic pressure is 18 mmHg.    6 - Mild mitral regurgitation.     Encounter for blood transfusion     ICD (implantable cardioverter-defibrillator) in place 12/01/15 3/3/2016    Menorrhagia, premenopausal 2/10/2017    Microcytic anemia 2015    Non-rheumatic mitral regurgitation 3/5/2015    Nonischemic dilated cardiomyopathy 2015    Polymorphic ventricular tachycardia 2024    Sleep apnea     Syncope and collapse 2017    Ventricular tachycardia, polymorphic        Past Surgical History:   Procedure Laterality Date    CARDIAC DEFIBRILLATOR PLACEMENT  2015     SECTION      INSERTION, WIRELESS SENSOR, FOR PULMONARY ARTERIAL PRESSURE MONITORING N/A 10/22/2021    Procedure: INSERTION, WIRELESS SENSOR, FOR PULMONARY ARTERIAL PRESSURE MONITORING;  Surgeon: Erika Hurd MD;  Location: [facility] CATH LAB;  Service: Cardiology;  Laterality: N/A;    OOPHORECTOMY      PERICARDIOCENTESIS N/A 2020    Procedure: Pericardiocentesis;  Surgeon: Memo Luis MD;  Location: [facility] CATH LAB;  Service: Cardiology;  Laterality: N/A;    PULMONARY ANGIOGRAPHY N/A 10/22/2021    Procedure: ANGIOGRAM, PULMONARY;  Surgeon: Erika Hurd MD;  Location: [facility] CATH LAB;  Service: Cardiology;  Laterality: N/A;    RIGHT HEART CATHETERIZATION      TOTAL ABDOMINAL HYSTERECTOMY N/A 3/26/2019    Procedure: HYSTERECTOMY, TOTAL, ABDOMINAL;  Surgeon: Victorino Rose MD;  Location: [facility] OR;  Service: OB/GYN;  Laterality: N/A;    TRANSESOPHAGEAL ECHOCARDIOGRAPHY N/A 2022    Procedure: ECHOCARDIOGRAM, TRANSESOPHAGEAL;  Surgeon:  Phan Powell MD;  Location: [facility] EP LAB;  Service: Cardiology;  Laterality: N/A;    TUBAL LIGATION         Review of patient's allergies indicates:   Allergen Reactions    Ace inhibitors      Cough       No current facility-administered medications on file prior to encounter.     Current Outpatient Medications on File Prior to Encounter   Medication Sig    albuterol (PROVENTIL/VENTOLIN HFA) 90 mcg/actuation inhaler Inhale 2 puffs into the lungs every 6 (six) hours as needed for Wheezing.    amiodarone (PACERONE) 200 MG Tab Take 1 tablet by mouth once daily    aspirin (ECOTRIN) 81 MG EC tablet Take 1 tablet (81 mg total) by mouth once daily.    atorvastatin (LIPITOR) 40 MG tablet Take 1 tablet (40 mg total) by mouth once daily.    bumetanide (BUMEX) 1 MG tablet TAKE 2 TABLETS BY MOUTH ONCE DAILY IN THE MORNING AND 1 ONCE DAILY IN THE EVENING    carvediloL (COREG) 25 MG tablet Take 1 tablet by mouth twice daily    cetirizine (ZYRTEC) 10 MG tablet Take 10 mg by mouth daily as needed.    cyanocobalamin (VITAMIN B-12) 1000 MCG tablet Take 1 tablet (1,000 mcg total) by mouth once daily.    diazePAM (VALIUM) 5 MG tablet Take 1 tablet (5 mg total) by mouth On call Procedure for Anxiety. Take one 30 min prior and the other once at the imaging center if needed.    ENTRESTO  mg per tablet Take 1 tablet by mouth twice daily    gabapentin (NEURONTIN) 300 MG capsule Take 1-2 capsules (300-600 mg total) by mouth every evening.    hydrOXYzine HCL (ATARAX) 25 MG tablet Take 1 tablet (25 mg total) by mouth 3 (three) times daily as needed for Anxiety.    metOLazone (ZAROXOLYN) 2.5 MG tablet Take 1 tablet (2.5 mg total) by mouth as needed (Only take when advised by the heart failure/[facility] team).    semaglutide (OZEMPIC) 1 mg/dose (4 mg/3 mL) Inject 1 mg into the skin every 7 days.    spironolactone (ALDACTONE) 25 MG tablet Take 1 tablet by mouth once daily    tiZANidine (ZANAFLEX) 4 MG tablet Take 1-2 tablets (4-8 mg  total) by mouth every 8 (eight) hours as needed (muscle tension).    tretinoin (RETIN-A) 0.1 % cream Apply topically every evening.     Family History       Problem Relation (Age of Onset)    Diabetes Father    Heart disease Brother    Heart failure Father, Brother    Lung cancer Paternal Grandmother    No Known Problems Daughter, Son    Pancreatic cancer Father          Tobacco Use    Smoking status: Never     Passive exposure: Never    Smokeless tobacco: Never   Substance and Sexual Activity    Alcohol use: Not Currently     Comment: 1 glass per 3 months    Drug use: No    Sexual activity: Yes     Partners: Male     Comment: one male partner (boyfriend)     Review of Systems   Constitutional:  Positive for fatigue.   HENT: Negative.     Eyes: Negative.    Respiratory:  Positive for shortness of breath and wheezing.    Gastrointestinal:  Positive for abdominal distention.     Objective:     Vital Signs (Most Recent):  Temp: 98.3 °F (36.8 °C) (05/31/25 1314)  Pulse: 65 (05/31/25 1314)  Resp: 20 (05/31/25 1314)  BP: (!) 124/57 (05/31/25 1314)  SpO2: 98 % (05/31/25 1314) Vital Signs (24h Range):  Temp:  [98.3 °F (36.8 °C)] 98.3 °F (36.8 °C)  Pulse:  [65] 65  Resp:  [20] 20  SpO2:  [98 %] 98 %  BP: (124)/(57) 124/57     Weight: 117.9 kg (260 lb)  Body mass index is 40.72 kg/m².     Physical Exam  Constitutional:       Appearance: She is obese. She is ill-appearing.   HENT:      Head: Normocephalic and atraumatic.      Right Ear: Tympanic membrane normal.      Left Ear: Tympanic membrane normal.      Nose: Nose normal.      Mouth/Throat:      Pharynx: Oropharynx is clear.   Eyes:      Extraocular Movements: Extraocular movements intact.      Pupils: Pupils are equal, round, and reactive to light.   Cardiovascular:      Rate and Rhythm: Bradycardia present.      Pulses: Normal pulses.   Pulmonary:      Breath sounds: Wheezing present.   Abdominal:      General: Bowel sounds are normal. There is distension.    Musculoskeletal:         General: Normal range of motion.      Cervical back: Normal range of motion.   Skin:     General: Skin is warm and dry.      Capillary Refill: Capillary refill takes less than 2 seconds.   Neurological:      General: No focal deficit present.      Mental Status: She is oriented to person, place, and time.   Psychiatric:         Mood and Affect: Mood normal.         Behavior: Behavior normal.         Thought Content: Thought content normal.         Judgment: Judgment normal.              CRANIAL NERVES     CN III, IV, VI   Pupils are equal, round, and reactive to light.       Significant Labs: All pertinent labs within the past 24 hours have been reviewed.  Recent Lab Results         05/31/25  1436   05/31/25  1353        POC Molecular Influenza A Ag Negative         POC Molecular Influenza B Ag Negative         Albumin   3.8       ALP   47       ALT   11       Anion Gap   10       AST   16       Baso #   0.01       Basophil %   0.2       BILIRUBIN TOTAL   1.8  Comment: For infants and newborns, interpretation of results should be based   on gestational age, weight and in agreement with clinical   observations.    Premature Infant recommended reference ranges:   0-24 hours:  <8.0 mg/dL   24-48 hours: <12.0 mg/dL   3-5 days:    <15.0 mg/dL   6-29 days:   <15.0 mg/dL       BNP   313  Comment: Values of less than 100 pg/ml are consistent with non-CHF populations.        BUN   22       Calcium   8.9       Chloride   107       CO2   22       Creatinine   1.3       eGFR   51  Comment: Estimated GFR calculated using the CKD-EPI creatinine (2021) equation.       Eos #   0.15       Eos %   3.2       Glucose   87       Gran # (ANC)   2.52       Hematocrit   37.4       Hemoglobin   12.5       Immature Grans (Abs)   0.01  Comment: Mild elevation in immature granulocytes is non specific and can be seen in a variety of conditions including stress response, acute inflammation, trauma and pregnancy.  Correlation with other laboratory and clinical findings is essential.       Immature Granulocytes   0.2       Lymph #   1.66       Lymph %   35.5       MCH   32.5       MCHC   33.4       MCV   97       Mono #   0.32       Mono %   6.9       MPV   11.2       Neut %   54.0       nRBC   0       Platelet Count   193       Potassium   3.4       PROTEIN TOTAL   8.0        Acceptable Yes          Yes         RBC   3.85       RDW   11.9       SARS-CoV-2 RNA, Amplification, Qual Negative         Sodium   139       Troponin I   0.224  Comment: The reference interval for Troponin I represents the 99th percentile cutoff for our facility and is consistent with 3rd generation assay performance.       WBC   4.67               Significant Imaging: I have reviewed all pertinent imaging results/findings within the past 24 hours.  I have reviewed and interpreted all pertinent imaging results/findings within the past 24 hours.

## 2025-05-31 NOTE — ASSESSMENT & PLAN NOTE
-In the setting of upper respiratory infection and acute CHF exacerbation  -Trend serial troponin levels x3  -NPO after midnight  -Cardiology to evaluate in a.m.

## 2025-05-31 NOTE — ED PROVIDER NOTES
"Encounter Date: 5/31/2025       History     Chief Complaint   Patient presents with    Cough     C/o productive cough x4-5d. +wheezing PTA +abdominal edema. Pt has hx of CHF. -fevers -CP -cold symptoms     Patient is a 47-year-old female with PMH of CHF, EF 5-10%, status post AICD, anemia, sleep apnea, cardiomyopathy status post ICD placement, valvular heart disease, V-tach, and asthma presents to ED for evaluation of productive cough X 5 days.  Patient says that she has been around family members who were sick with similar symptoms.  Patient reports that yesterday she felt wheezy and slightly short of breath during coughing fits.  Patient denies any significant chest pain, but says that she feels a bit of "heaviness" in her epigastric area in generalized abdominal "swelling".  Patient denies fever, chills, shortness of breath,  lower extremity edema, palpitations, nausea, vomiting, diarrhea, or any other complaints at this time.      The history is provided by the patient.     Review of patient's allergies indicates:   Allergen Reactions    Ace inhibitors      Cough     Past Medical History:   Diagnosis Date    Asthma     Chronic back pain 7/1/2014    Chronic combined systolic and diastolic congestive heart failure 4/28/2015     2-10-17   1 - Severely depressed left ventricular systolic function (EF 20-25%).    2 - Severe left ventricular enlargement.    3 - Severe left atrial enlargement.    4 - Left ventricular diastolic dysfunction.    5 - The estimated PA systolic pressure is 18 mmHg.    6 - Mild mitral regurgitation.     Encounter for blood transfusion     ICD (implantable cardioverter-defibrillator) in place 12/01/15 3/3/2016    Menorrhagia, premenopausal 2/10/2017    Microcytic anemia 2/9/2015    Non-rheumatic mitral regurgitation 3/5/2015    Nonischemic dilated cardiomyopathy 12/1/2015    Polymorphic ventricular tachycardia 6/16/2024    Sleep apnea     Syncope and collapse 2/9/2017    Ventricular " tachycardia, polymorphic      Past Surgical History:   Procedure Laterality Date    CARDIAC DEFIBRILLATOR PLACEMENT  2015     SECTION      INSERTION, WIRELESS SENSOR, FOR PULMONARY ARTERIAL PRESSURE MONITORING N/A 10/22/2021    Procedure: INSERTION, WIRELESS SENSOR, FOR PULMONARY ARTERIAL PRESSURE MONITORING;  Surgeon: Erika Hurd MD;  Location: [facility] CATH LAB;  Service: Cardiology;  Laterality: N/A;    OOPHORECTOMY      PERICARDIOCENTESIS N/A 2020    Procedure: Pericardiocentesis;  Surgeon: Memo Luis MD;  Location: [facility] CATH LAB;  Service: Cardiology;  Laterality: N/A;    PULMONARY ANGIOGRAPHY N/A 10/22/2021    Procedure: ANGIOGRAM, PULMONARY;  Surgeon: Erika Hurd MD;  Location: [facility] CATH LAB;  Service: Cardiology;  Laterality: N/A;    RIGHT HEART CATHETERIZATION      TOTAL ABDOMINAL HYSTERECTOMY N/A 3/26/2019    Procedure: HYSTERECTOMY, TOTAL, ABDOMINAL;  Surgeon: Victorino Rose MD;  Location: [facility] OR;  Service: OB/GYN;  Laterality: N/A;    TRANSESOPHAGEAL ECHOCARDIOGRAPHY N/A 2022    Procedure: ECHOCARDIOGRAM, TRANSESOPHAGEAL;  Surgeon: Phan Powell MD;  Location: [facility] EP LAB;  Service: Cardiology;  Laterality: N/A;    TUBAL LIGATION       Family History   Problem Relation Name Age of Onset    Diabetes Father Jack santiago     Pancreatic cancer Father Jack santiago     Heart failure Father Jack santiago     Heart failure Brother      No Known Problems Daughter      No Known Problems Son      Lung cancer Paternal Grandmother      Heart disease Brother       Social History[1]  Review of Systems   Constitutional:  Negative for chills and fever.   Respiratory:  Positive for cough, shortness of breath and wheezing. Negative for choking, chest tightness and stridor.    Cardiovascular:  Negative for chest pain, palpitations and leg swelling.   Gastrointestinal:  Positive for abdominal pain. Negative for abdominal distention, diarrhea, nausea and vomiting.       Physical Exam      Initial Vitals [05/31/25 1314]   BP Pulse Resp Temp SpO2   (!) 124/57 65 20 98.3 °F (36.8 °C) 98 %      MAP       --         Physical Exam    Constitutional: She appears well-developed and well-nourished.   HENT:   Head: Normocephalic and atraumatic.   Cardiovascular:  Normal rate and regular rhythm.           Pulmonary/Chest: No respiratory distress. She has no wheezes. She has no rhonchi. She has no rales. She exhibits no tenderness.   No significant wheezing on exam   Abdominal: Abdomen is soft. Bowel sounds are normal. She exhibits distension (Mild abdominal distention). There is no abdominal tenderness. There is no rebound and no guarding.   Musculoskeletal:      Right lower leg: No swelling. No edema.      Left lower leg: No swelling. No edema.     Neurological: She is alert.         ED Course   Procedures  Labs Reviewed   COMPREHENSIVE METABOLIC PANEL - Abnormal       Result Value    Sodium 139      Potassium 3.4 (*)     Chloride 107      CO2 22 (*)     Glucose 87      BUN 22 (*)     Creatinine 1.3      Calcium 8.9      Protein Total 8.0      Albumin 3.8      Bilirubin Total 1.8 (*)     ALP 47      AST 16      ALT 11      Anion Gap 10      eGFR 51 (*)    TROPONIN I - Abnormal    Troponin-I 0.224 (*)    B-TYPE NATRIURETIC PEPTIDE - Abnormal     (*)    CBC WITH DIFFERENTIAL - Abnormal    WBC 4.67      RBC 3.85 (*)     HGB 12.5      HCT 37.4      MCV 97      MCH 32.5 (*)     MCHC 33.4      RDW 11.9      Platelet Count 193      MPV 11.2      Nucleated RBC 0      Neut % 54.0      Lymph % 35.5      Mono % 6.9      Eos % 3.2      Basophil % 0.2      Imm Grans % 0.2      Neut # 2.52      Lymph # 1.66      Mono # 0.32      Eos # 0.15      Baso # 0.01      Imm Grans # 0.01     CULTURE, RESPIRATORY   CBC W/ AUTO DIFFERENTIAL    Narrative:     The following orders were created for panel order CBC auto differential.  Procedure                               Abnormality         Status                      ---------                               -----------         ------                     CBC with Differential[[id number]]       Abnormal            Final result                 Please view results for these tests on the individual orders.   POCT INFLUENZA A/B MOLECULAR    POC Molecular Influenza A Ag Negative      POC Molecular Influenza B Ag Negative       Acceptable Yes     SARS-COV-2 RDRP GENE    POC Rapid COVID Negative       Acceptable Yes            Imaging Results              X-Ray Chest AP Portable (Final result)  Result time 05/31/25 14:17:21      Final result by Allen Méndez DO (05/31/25 14:17:21)                   Impression:      No acute cardiopulmonary process.      Electronically signed by: Allen Méndez DO  Date:    05/31/2025  Time:    14:17               Narrative:    EXAMINATION:  XR CHEST AP PORTABLE    CLINICAL HISTORY:  Chest Pain;    TECHNIQUE:  Single frontal view of the chest was performed.    COMPARISON:  09/04/2024    FINDINGS:  No infiltrates or effusions are identified.  The heart is enlarged.  A single lead pacing device is present.                                       Medications   amiodarone tablet 200 mg (has no administration in time range)   aspirin EC tablet 81 mg (has no administration in time range)   atorvastatin tablet 40 mg (has no administration in time range)   furosemide injection 40 mg (0 mg Intravenous Hold 5/31/25 2100)   carvediloL tablet 25 mg (0 mg Oral Hold 5/31/25 2100)   cetirizine tablet 10 mg (has no administration in time range)   cyanocobalamin tablet 1,000 mcg (has no administration in time range)   sacubitriL-valsartan  mg per tablet 1 tablet (0 tablets Oral Hold 5/31/25 2100)   gabapentin capsule 300 mg (0 mg Oral Hold 5/31/25 2100)   spironolactone tablet 25 mg (has no administration in time range)   albuterol-ipratropium 2.5 mg-0.5 mg/3 mL nebulizer solution 3 mL (3 mLs Nebulization Given 5/31/25 1854)    sodium chloride 0.9% flush 10 mL (has no administration in time range)   naloxone 0.4 mg/mL injection 0.02 mg (has no administration in time range)   glucose chewable tablet 16 g (has no administration in time range)   glucose chewable tablet 24 g (has no administration in time range)   dextrose 50% injection 12.5 g (has no administration in time range)   dextrose 50% injection 25 g (has no administration in time range)   glucagon (human recombinant) injection 1 mg (has no administration in time range)   enoxaparin injection 40 mg (40 mg Subcutaneous Given 5/31/25 1754)   acetaminophen tablet 650 mg (has no administration in time range)   HYDROcodone-acetaminophen 5-325 mg per tablet 1 tablet (has no administration in time range)   docusate sodium capsule 100 mg (has no administration in time range)   ondansetron injection 4 mg (has no administration in time range)   melatonin tablet 6 mg (has no administration in time range)   simethicone chewable tablet 80 mg (has no administration in time range)   guaiFENesin-codeine 100-10 mg/5 ml syrup 5 mL (has no administration in time range)   potassium bicarbonate disintegrating tablet 40 mEq (40 mEq Oral Given 5/31/25 1816)     Medical Decision Making  Patient is an afebrile 49 y.o. female with extensive cardiac history presents for evaluation of cough, shortness of breath. . VSS and do not suggest sepsis. A&Ox4. The patient remained comfortable and stable during their visit in the ED. Details of ED course documented in ED workup.     Differential diagnosis includes, but is not limited to:  COVID, flu, viral URI, pneumonia, ACS, PE, arrhythmia, CHF exacerbation, GERD, gastroenteritis, cholecystitis, cholelithiasis, cholangitis, pancreatitis    After review of the patients physical exam, ED testing, and history/symptoms, the patient will be admitted.  Details of ED Course in ED workup.  Initial troponin noted to be elevated, BNP elevated.  Chest x-ray without acute  abnormalities, COVID and flu were negative.  [facility] called and case was discussed. Dr. Rodriguez will accept the admission .  Admit orders will be completed. The diagnosis, treatment and plan for admission were discussed with the patient and understanding was verbalized. All questions or concerns have been addressed. This case was discussed with Dr. Maddox who is in agreement with my assessment and plan.      Amount and/or Complexity of Data Reviewed  Labs: ordered. Decision-making details documented in ED Course.  Radiology: ordered and independent interpretation performed. Decision-making details documented in ED Course.               ED Course as of 05/31/25 2101   Sat May 31, 2025   1355 Troponin I(!): 0.224  Patient's troponin is elevated at baseline, but troponin today is significantly higher than baseline.  Ordered aspirin 325.  Called [facility] [OB]   1355 BNP(!): 313  BNP elevated [OB]   1427 Chest x-ray independently reviewed: No acute abnormalities noted. [OB]   1427 WBC: 4.67  No leukocytosis [OB]   1428 Hemoglobin: 12.5 [OB]   1428 Hematocrit: 37.4  H&H stable [OB]   1444 POC Molecular Influenza A Ag: Negative [OB]   1444 POC Molecular Influenza B Ag: Negative [OB]   1444 SARS-CoV-2 RNA, Amplification, Qual: Negative [OB]   1523 Discussed with [facility], accepts admission. [OB]      ED Course User Index  [OB] Elicia Martinez PA-C                           Clinical Impression:  Final diagnoses:  [R07.9] Chest pain  [I50.9] Acute on chronic congestive heart failure, unspecified heart failure type (Primary)  [R79.89] Elevated troponin  [J45.901] Mild asthma exacerbation          ED Disposition Condition    Observation                       [1]   Social History  Tobacco Use    Smoking status: Never     Passive exposure: Never    Smokeless tobacco: Never   Substance Use Topics    Alcohol use: Not Currently     Comment: 1 glass per 3 months    Drug use: No         Elicia Martinez PA-C  05/31/25 [id number]

## 2025-05-31 NOTE — H&P
"[facility] - Emergency Dept  [facility]  History & Physical    Patient Name: Mario Mahajan  MRN: [id number]  Patient Class: OP- Observation  Admission Date: 5/31/2025  Attending Physician: Aric Rodriguez MD   Primary Care Provider: Tejinder Bowling MD         Patient information was obtained from patient and ER records.     Subjective:     Principal Problem:<principal problem not specified>    Chief Complaint:   Chief Complaint   Patient presents with    Cough     C/o productive cough x4-5d. +wheezing PTA +abdominal edema. Pt has hx of CHF. -fevers -CP -cold symptoms        HPI: Mario Márquez is a very pleasant 49-year-old female that presented to the emergency department at [facility] for the evaluation of cough".  The patient presented to the emergency department reporting a 5 day history of cough.  She reports that she had been recently around some toddlers that was sick and attributed her symptoms to the recent sickness.  On today, she reported that she started to experience intermittent wheezing and noted that her abdominal seemed as though if it was swelling.  When her symptoms worsened, this prompted her to present to the emergency department for further evaluation.    Past Medical History:   Diagnosis Date    Asthma     Chronic back pain 7/1/2014    Chronic combined systolic and diastolic congestive heart failure 4/28/2015     2-10-17   1 - Severely depressed left ventricular systolic function (EF 20-25%).    2 - Severe left ventricular enlargement.    3 - Severe left atrial enlargement.    4 - Left ventricular diastolic dysfunction.    5 - The estimated PA systolic pressure is 18 mmHg.    6 - Mild mitral regurgitation.     Encounter for blood transfusion     ICD (implantable cardioverter-defibrillator) in place 12/01/15 3/3/2016    Menorrhagia, premenopausal 2/10/2017    Microcytic anemia 2/9/2015    Non-rheumatic mitral regurgitation 3/5/2015    Nonischemic dilated cardiomyopathy 12/1/2015    " Polymorphic ventricular tachycardia 2024    Sleep apnea     Syncope and collapse 2017    Ventricular tachycardia, polymorphic        Past Surgical History:   Procedure Laterality Date    CARDIAC DEFIBRILLATOR PLACEMENT  2015     SECTION      INSERTION, WIRELESS SENSOR, FOR PULMONARY ARTERIAL PRESSURE MONITORING N/A 10/22/2021    Procedure: INSERTION, WIRELESS SENSOR, FOR PULMONARY ARTERIAL PRESSURE MONITORING;  Surgeon: Erika Hurd MD;  Location: [facility] CATH LAB;  Service: Cardiology;  Laterality: N/A;    OOPHORECTOMY      PERICARDIOCENTESIS N/A 2020    Procedure: Pericardiocentesis;  Surgeon: Memo Luis MD;  Location: [facility] CATH LAB;  Service: Cardiology;  Laterality: N/A;    PULMONARY ANGIOGRAPHY N/A 10/22/2021    Procedure: ANGIOGRAM, PULMONARY;  Surgeon: Erika Hurd MD;  Location: [facility] CATH LAB;  Service: Cardiology;  Laterality: N/A;    RIGHT HEART CATHETERIZATION      TOTAL ABDOMINAL HYSTERECTOMY N/A 3/26/2019    Procedure: HYSTERECTOMY, TOTAL, ABDOMINAL;  Surgeon: Victorino Rose MD;  Location: [facility] OR;  Service: OB/GYN;  Laterality: N/A;    TRANSESOPHAGEAL ECHOCARDIOGRAPHY N/A 2022    Procedure: ECHOCARDIOGRAM, TRANSESOPHAGEAL;  Surgeon: Phan Powell MD;  Location: [facility] EP LAB;  Service: Cardiology;  Laterality: N/A;    TUBAL LIGATION         Review of patient's allergies indicates:   Allergen Reactions    Ace inhibitors      Cough       No current facility-administered medications on file prior to encounter.     Current Outpatient Medications on File Prior to Encounter   Medication Sig    albuterol (PROVENTIL/VENTOLIN HFA) 90 mcg/actuation inhaler Inhale 2 puffs into the lungs every 6 (six) hours as needed for Wheezing.    amiodarone (PACERONE) 200 MG Tab Take 1 tablet by mouth once daily    aspirin (ECOTRIN) 81 MG EC tablet Take 1 tablet (81 mg total) by mouth once daily.    atorvastatin (LIPITOR) 40 MG tablet Take 1 tablet (40 mg total) by mouth once  daily.    bumetanide (BUMEX) 1 MG tablet TAKE 2 TABLETS BY MOUTH ONCE DAILY IN THE MORNING AND 1 ONCE DAILY IN THE EVENING    carvediloL (COREG) 25 MG tablet Take 1 tablet by mouth twice daily    cetirizine (ZYRTEC) 10 MG tablet Take 10 mg by mouth daily as needed.    cyanocobalamin (VITAMIN B-12) 1000 MCG tablet Take 1 tablet (1,000 mcg total) by mouth once daily.    diazePAM (VALIUM) 5 MG tablet Take 1 tablet (5 mg total) by mouth On call Procedure for Anxiety. Take one 30 min prior and the other once at the imaging center if needed.    ENTRESTO  mg per tablet Take 1 tablet by mouth twice daily    gabapentin (NEURONTIN) 300 MG capsule Take 1-2 capsules (300-600 mg total) by mouth every evening.    hydrOXYzine HCL (ATARAX) 25 MG tablet Take 1 tablet (25 mg total) by mouth 3 (three) times daily as needed for Anxiety.    metOLazone (ZAROXOLYN) 2.5 MG tablet Take 1 tablet (2.5 mg total) by mouth as needed (Only take when advised by the heart failure/Cardiomems team).    semaglutide (OZEMPIC) 1 mg/dose (4 mg/3 mL) Inject 1 mg into the skin every 7 days.    spironolactone (ALDACTONE) 25 MG tablet Take 1 tablet by mouth once daily    tiZANidine (ZANAFLEX) 4 MG tablet Take 1-2 tablets (4-8 mg total) by mouth every 8 (eight) hours as needed (muscle tension).    tretinoin (RETIN-A) 0.1 % cream Apply topically every evening.     Family History       Problem Relation (Age of Onset)    Diabetes Father    Heart disease Brother    Heart failure Father, Brother    Lung cancer Paternal Grandmother    No Known Problems Daughter, Son    Pancreatic cancer Father          Tobacco Use    Smoking status: Never     Passive exposure: Never    Smokeless tobacco: Never   Substance and Sexual Activity    Alcohol use: Not Currently     Comment: 1 glass per 3 months    Drug use: No    Sexual activity: Yes     Partners: Male     Comment: one male partner (boyfriend)     Review of Systems   Constitutional:  Positive for fatigue.   HENT:  Negative.     Eyes: Negative.    Respiratory:  Positive for shortness of breath and wheezing.    Gastrointestinal:  Positive for abdominal distention.     Objective:     Vital Signs (Most Recent):  Temp: 98.3 °F (36.8 °C) (05/31/25 1314)  Pulse: 65 (05/31/25 1314)  Resp: 20 (05/31/25 1314)  BP: (!) 124/57 (05/31/25 1314)  SpO2: 98 % (05/31/25 1314) Vital Signs (24h Range):  Temp:  [98.3 °F (36.8 °C)] 98.3 °F (36.8 °C)  Pulse:  [65] 65  Resp:  [20] 20  SpO2:  [98 %] 98 %  BP: (124)/(57) 124/57     Weight: 117.9 kg (260 lb)  Body mass index is 40.72 kg/m².     Physical Exam  Constitutional:       Appearance: She is obese. She is ill-appearing.   HENT:      Head: Normocephalic and atraumatic.      Right Ear: Tympanic membrane normal.      Left Ear: Tympanic membrane normal.      Nose: Nose normal.      Mouth/Throat:      Pharynx: Oropharynx is clear.   Eyes:      Extraocular Movements: Extraocular movements intact.      Pupils: Pupils are equal, round, and reactive to light.   Cardiovascular:      Rate and Rhythm: Bradycardia present.      Pulses: Normal pulses.   Pulmonary:      Breath sounds: Wheezing present.   Abdominal:      General: Bowel sounds are normal. There is distension.   Musculoskeletal:         General: Normal range of motion.      Cervical back: Normal range of motion.   Skin:     General: Skin is warm and dry.      Capillary Refill: Capillary refill takes less than 2 seconds.   Neurological:      General: No focal deficit present.      Mental Status: She is oriented to person, place, and time.   Psychiatric:         Mood and Affect: Mood normal.         Behavior: Behavior normal.         Thought Content: Thought content normal.         Judgment: Judgment normal.              CRANIAL NERVES     CN III, IV, VI   Pupils are equal, round, and reactive to light.       Significant Labs: All pertinent labs within the past 24 hours have been reviewed.  Recent Lab Results         05/31/25  8261    05/31/25  1353        POC Molecular Influenza A Ag Negative         POC Molecular Influenza B Ag Negative         Albumin   3.8       ALP   47       ALT   11       Anion Gap   10       AST   16       Baso #   0.01       Basophil %   0.2       BILIRUBIN TOTAL   1.8  Comment: For infants and newborns, interpretation of results should be based   on gestational age, weight and in agreement with clinical   observations.    Premature Infant recommended reference ranges:   0-24 hours:  <8.0 mg/dL   24-48 hours: <12.0 mg/dL   3-5 days:    <15.0 mg/dL   6-29 days:   <15.0 mg/dL       BNP   313  Comment: Values of less than 100 pg/ml are consistent with non-CHF populations.        BUN   22       Calcium   8.9       Chloride   107       CO2   22       Creatinine   1.3       eGFR   51  Comment: Estimated GFR calculated using the CKD-EPI creatinine (2021) equation.       Eos #   0.15       Eos %   3.2       Glucose   87       Gran # (ANC)   2.52       Hematocrit   37.4       Hemoglobin   12.5       Immature Grans (Abs)   0.01  Comment: Mild elevation in immature granulocytes is non specific and can be seen in a variety of conditions including stress response, acute inflammation, trauma and pregnancy. Correlation with other laboratory and clinical findings is essential.       Immature Granulocytes   0.2       Lymph #   1.66       Lymph %   35.5       MCH   32.5       MCHC   33.4       MCV   97       Mono #   0.32       Mono %   6.9       MPV   11.2       Neut %   54.0       nRBC   0       Platelet Count   193       Potassium   3.4       PROTEIN TOTAL   8.0        Acceptable Yes          Yes         RBC   3.85       RDW   11.9       SARS-CoV-2 RNA, Amplification, Qual Negative         Sodium   139       Troponin I   0.224  Comment: The reference interval for Troponin I represents the 99th percentile cutoff for our facility and is consistent with 3rd generation assay performance.       WBC   4.67                Significant Imaging: I have reviewed all pertinent imaging results/findings within the past 24 hours.  I have reviewed and interpreted all pertinent imaging results/findings within the past 24 hours.  Assessment/Plan:     Assessment & Plan  Other chest pain  -Admit to telemetry  -Trend serial troponin levels  -NPO after midnight  -Consult cardiology to evaluate and offer recommendations    Nonischemic dilated cardiomyopathy  -Resume home medications  -Consult Cardiology    On amiodarone therapy  -Resume per home dose    Mild intermittent asthma without complication  -Start scheduled Duoneb treatments    Class 3 severe obesity due to excess calories with serious comorbidity and body mass index (BMI) of 40.0 to 44.9 in adult  Body mass index is 40.72 kg/m². Morbid obesity complicates all aspects of disease management from diagnostic modalities to treatment. Weight loss encouraged and health benefits explained to patient.       Elevated troponin  -In the setting of upper respiratory infection and acute CHF exacerbation  -Trend serial troponin levels x3  -NPO after midnight  -Cardiology to evaluate in a.m.    Acute on chronic combined systolic (congestive) and diastolic (congestive) heart failure (Resolved: 2/20/2023)  Patient has Combined Systolic and Diastolic heart failure that is Acute on chronic. On presentation their CHF was decompensated. Evidence of decompensated CHF on presentation includes: edema, weight gain, dyspnea on exertion (DELGADO), and shortness of breath. The etiology of their decompensation is likely dietary indiscretion and increased fluid intake. Most recent BNP and echo results are listed below.  Recent Labs     05/31/25  1353   *     Latest ECHO  Results for orders placed during the hospital encounter of 08/21/23    Echo Saline Bubble? Yes    Interpretation Summary    Left Ventricle: The left ventricle is severely dilated. There is severely reduced systolic function with a visually  estimated ejection fraction of 5 - 10%.    Left Atrium: Left atrium is severely dilated.    Right Ventricle: Normal right ventricular cavity size. Systolic function is normal. Catheter present in the ventricle.    Aortic Valve: The aortic valve is a trileaflet valve. There is mild aortic regurgitation with a centrally directed jet.    Mitral Valve: There is moderate to severe regurgitation with a posterolateral eccentriccally directed jet.    Tricuspid Valve: There is mild regurgitation with a centrally directed jet.    IVC/SVC: Normal venous pressure at 3 mmHg.    Bubble study was negative for intracardiac shunt    Current Heart Failure Medications  furosemide injection 40 mg, Every 12 hours, Intravenous  carvediloL tablet 25 mg, 2 times daily, Oral  sacubitriL-valsartan  mg per tablet 1 tablet, 2 times daily, Oral  spironolactone tablet 25 mg, Daily, Oral    Plan  - Monitor strict I&Os and daily weights.    - Place on telemetry  - Low sodium diet  - Place on fluid restriction of 1 L.   - Cardiology has been consulted  - The patient's volume status is worsening as indicated by edema, shortness of breath, and worsening in abdominal girth. Will adjust treatment as follows:    - Diurese as tolerated to achieve negative fluid balance; plan for Lasix 40 mg IV every 12 hours      VTE Risk Mitigation (From admission, onward)           Ordered     enoxaparin injection 40 mg  Daily         05/31/25 1552     IP VTE HIGH RISK PATIENT  Once         05/31/25 1552     Place sequential compression device  Until discontinued         05/31/25 1552                         On 05/31/2025, patient should be placed in hospital observation services under my care in collaboration with Dr. Aric Rodriguez MD.           Tawanda Guidry NP  Department of Hospital Medicine  [facility] Emergency Dept

## 2025-05-31 NOTE — HPI
"Mario Márquez is a very pleasant 49-year-old female that presented to the emergency department at [facility] for the evaluation of cough".  The patient presented to the emergency department reporting a 5 day history of cough.  She reports that she had been recently around some toddlers that was sick and attributed her symptoms to the recent sickness.  On today, she reported that she started to experience intermittent wheezing and noted that her abdominal seemed as though if it was swelling.  When her symptoms worsened, this prompted her to present to the emergency department for further evaluation.  "

## 2025-05-31 NOTE — ED NOTES
Patient transported to floor on the telemetry monitor, awake, alert, oriented, in stable condition, with belongings, with family

## 2025-05-31 NOTE — ASSESSMENT & PLAN NOTE
-Admit to telemetry  -Trend serial troponin levels  -NPO after midnight  -Consult cardiology to evaluate and offer recommendations

## 2025-05-31 NOTE — ASSESSMENT & PLAN NOTE
Patient has Combined Systolic and Diastolic heart failure that is Acute on chronic. On presentation their CHF was decompensated. Evidence of decompensated CHF on presentation includes: edema, weight gain, dyspnea on exertion (DELGADO), and shortness of breath. The etiology of their decompensation is likely dietary indiscretion and increased fluid intake. Most recent BNP and echo results are listed below.  Recent Labs     05/31/25  1353   *     Latest ECHO  Results for orders placed during the hospital encounter of 08/21/23    Echo Saline Bubble? Yes    Interpretation Summary    Left Ventricle: The left ventricle is severely dilated. There is severely reduced systolic function with a visually estimated ejection fraction of 5 - 10%.    Left Atrium: Left atrium is severely dilated.    Right Ventricle: Normal right ventricular cavity size. Systolic function is normal. Catheter present in the ventricle.    Aortic Valve: The aortic valve is a trileaflet valve. There is mild aortic regurgitation with a centrally directed jet.    Mitral Valve: There is moderate to severe regurgitation with a posterolateral eccentriccally directed jet.    Tricuspid Valve: There is mild regurgitation with a centrally directed jet.    IVC/SVC: Normal venous pressure at 3 mmHg.    Bubble study was negative for intracardiac shunt    Current Heart Failure Medications  furosemide injection 40 mg, Every 12 hours, Intravenous  carvediloL tablet 25 mg, 2 times daily, Oral  sacubitriL-valsartan  mg per tablet 1 tablet, 2 times daily, Oral  spironolactone tablet 25 mg, Daily, Oral    Plan  - Monitor strict I&Os and daily weights.    - Place on telemetry  - Low sodium diet  - Place on fluid restriction of 1 L.   - Cardiology has been consulted  - The patient's volume status is worsening as indicated by edema, shortness of breath, and worsening in abdominal girth. Will adjust treatment as follows:    - Diurese as tolerated to achieve negative  fluid balance; plan for Lasix 40 mg IV every 12 hours

## 2025-06-01 LAB
ABSOLUTE EOSINOPHIL (OHS): 0.2 K/UL
ABSOLUTE MONOCYTE (OHS): 0.42 K/UL (ref 0.3–1)
ABSOLUTE NEUTROPHIL COUNT (OHS): 2.43 K/UL (ref 1.8–7.7)
ANION GAP (OHS): 9 MMOL/L (ref 8–16)
BASOPHILS # BLD AUTO: 0.01 K/UL
BASOPHILS NFR BLD AUTO: 0.2 %
BUN SERPL-MCNC: 24 MG/DL (ref 6–20)
CALCIUM SERPL-MCNC: 8.5 MG/DL (ref 8.7–10.5)
CHLORIDE SERPL-SCNC: 105 MMOL/L (ref 95–110)
CO2 SERPL-SCNC: 23 MMOL/L (ref 23–29)
CREAT SERPL-MCNC: 1.3 MG/DL (ref 0.5–1.4)
ERYTHROCYTE [DISTWIDTH] IN BLOOD BY AUTOMATED COUNT: 11.9 % (ref 11.5–14.5)
GFR SERPLBLD CREATININE-BSD FMLA CKD-EPI: 51 ML/MIN/1.73/M2
GLUCOSE SERPL-MCNC: 92 MG/DL (ref 70–110)
HCT VFR BLD AUTO: 34.1 % (ref 37–48.5)
HGB BLD-MCNC: 11.3 GM/DL (ref 12–16)
IMM GRANULOCYTES # BLD AUTO: 0.01 K/UL (ref 0–0.04)
IMM GRANULOCYTES NFR BLD AUTO: 0.2 % (ref 0–0.5)
LYMPHOCYTES # BLD AUTO: 2.27 K/UL (ref 1–4.8)
MCH RBC QN AUTO: 32.6 PG (ref 27–31)
MCHC RBC AUTO-ENTMCNC: 33.1 G/DL (ref 32–36)
MCV RBC AUTO: 98 FL (ref 82–98)
NUCLEATED RBC (/100WBC) (OHS): 0 /100 WBC
PLATELET # BLD AUTO: 186 K/UL (ref 150–450)
PMV BLD AUTO: 11.8 FL (ref 9.2–12.9)
POTASSIUM SERPL-SCNC: 3.4 MMOL/L (ref 3.5–5.1)
RBC # BLD AUTO: 3.47 M/UL (ref 4–5.4)
RELATIVE EOSINOPHIL (OHS): 3.7 %
RELATIVE LYMPHOCYTE (OHS): 42.5 % (ref 18–48)
RELATIVE MONOCYTE (OHS): 7.9 % (ref 4–15)
RELATIVE NEUTROPHIL (OHS): 45.5 % (ref 38–73)
SODIUM SERPL-SCNC: 137 MMOL/L (ref 136–145)
WBC # BLD AUTO: 5.34 K/UL (ref 3.9–12.7)

## 2025-06-01 PROCEDURE — 25000003 PHARM REV CODE 250: Performed by: REGISTERED NURSE

## 2025-06-01 PROCEDURE — 94640 AIRWAY INHALATION TREATMENT: CPT | Mod: XB

## 2025-06-01 PROCEDURE — 25000003 PHARM REV CODE 250: Performed by: FAMILY MEDICINE

## 2025-06-01 PROCEDURE — 99900035 HC TECH TIME PER 15 MIN (STAT)

## 2025-06-01 PROCEDURE — 25000003 PHARM REV CODE 250: Performed by: NURSE PRACTITIONER

## 2025-06-01 PROCEDURE — 85025 COMPLETE CBC W/AUTO DIFF WBC: CPT | Performed by: NURSE PRACTITIONER

## 2025-06-01 PROCEDURE — 21400001 HC TELEMETRY ROOM

## 2025-06-01 PROCEDURE — 99223 1ST HOSP IP/OBS HIGH 75: CPT | Mod: ,,, | Performed by: INTERNAL MEDICINE

## 2025-06-01 PROCEDURE — 25000003 PHARM REV CODE 250: Performed by: INTERNAL MEDICINE

## 2025-06-01 PROCEDURE — 25000242 PHARM REV CODE 250 ALT 637 W/ HCPCS: Performed by: NURSE PRACTITIONER

## 2025-06-01 PROCEDURE — 94761 N-INVAS EAR/PLS OXIMETRY MLT: CPT

## 2025-06-01 PROCEDURE — 36415 COLL VENOUS BLD VENIPUNCTURE: CPT | Performed by: NURSE PRACTITIONER

## 2025-06-01 PROCEDURE — 80048 BASIC METABOLIC PNL TOTAL CA: CPT | Performed by: NURSE PRACTITIONER

## 2025-06-01 PROCEDURE — 63600175 PHARM REV CODE 636 W HCPCS: Performed by: NURSE PRACTITIONER

## 2025-06-01 PROCEDURE — 96374 THER/PROPH/DIAG INJ IV PUSH: CPT

## 2025-06-01 RX ORDER — CODEINE PHOSPHATE AND GUAIFENESIN 10; 100 MG/5ML; MG/5ML
5 SOLUTION ORAL EVERY 8 HOURS PRN
Status: DISCONTINUED | OUTPATIENT
Start: 2025-06-01 | End: 2025-06-03 | Stop reason: HOSPADM

## 2025-06-01 RX ORDER — CARVEDILOL 6.25 MG/1
6.25 TABLET ORAL 2 TIMES DAILY
Status: DISCONTINUED | OUTPATIENT
Start: 2025-06-01 | End: 2025-06-03 | Stop reason: HOSPADM

## 2025-06-01 RX ADMIN — IPRATROPIUM BROMIDE AND ALBUTEROL SULFATE 3 ML: 2.5; .5 SOLUTION RESPIRATORY (INHALATION) at 07:06

## 2025-06-01 RX ADMIN — ASPIRIN 81 MG: 81 TABLET, COATED ORAL at 11:06

## 2025-06-01 RX ADMIN — GUAIFENESIN AND CODEINE PHOSPHATE 5 ML: 100; 10 SOLUTION ORAL at 03:06

## 2025-06-01 RX ADMIN — FUROSEMIDE 40 MG: 10 INJECTION, SOLUTION INTRAVENOUS at 10:06

## 2025-06-01 RX ADMIN — ENOXAPARIN SODIUM 40 MG: 40 INJECTION SUBCUTANEOUS at 05:06

## 2025-06-01 RX ADMIN — IPRATROPIUM BROMIDE AND ALBUTEROL SULFATE 3 ML: 2.5; .5 SOLUTION RESPIRATORY (INHALATION) at 08:06

## 2025-06-01 RX ADMIN — ACETAMINOPHEN 650 MG: 325 TABLET ORAL at 03:06

## 2025-06-01 RX ADMIN — IPRATROPIUM BROMIDE AND ALBUTEROL SULFATE 3 ML: 2.5; .5 SOLUTION RESPIRATORY (INHALATION) at 12:06

## 2025-06-01 RX ADMIN — FUROSEMIDE 40 MG: 10 INJECTION, SOLUTION INTRAVENOUS at 11:06

## 2025-06-01 RX ADMIN — DOCUSATE SODIUM 100 MG: 100 CAPSULE, LIQUID FILLED ORAL at 11:06

## 2025-06-01 RX ADMIN — CARVEDILOL 6.25 MG: 6.25 TABLET, FILM COATED ORAL at 08:06

## 2025-06-01 RX ADMIN — ATORVASTATIN CALCIUM 40 MG: 40 TABLET, FILM COATED ORAL at 11:06

## 2025-06-01 RX ADMIN — CETIRIZINE HYDROCHLORIDE 10 MG: 10 TABLET, FILM COATED ORAL at 11:06

## 2025-06-01 RX ADMIN — CYANOCOBALAMIN TAB 1000 MCG 1000 MCG: 1000 TAB at 11:06

## 2025-06-01 NOTE — ASSESSMENT & PLAN NOTE
Lexiscan nuclear stress test for further evaluation.  -  NPO after midnight.    Appreciate cardiology assistance

## 2025-06-01 NOTE — PLAN OF CARE
Inpatient Upgrade Note    Mario Mahajan has warranted treatment spanning two or more midnights of hospital level care for the management of cardiomyopathy. She continues to require further testing/imaging and further evaluation by consultants. Her condition is also complicated by the following comorbidities: Obesity and Asthma.

## 2025-06-01 NOTE — NURSING
Pt's HR sustaining in the 40s. Dr. Callahan notified, MD stated to hold night BP meds and ordered STAT EKG.

## 2025-06-01 NOTE — PROGRESS NOTES
"[facility] Medicine  Progress Note    Patient Name: Mario Mahajan  MRN: [id number]  Patient Class: IP- Inpatient   Admission Date: 5/31/2025  Length of Stay: 0 days  Attending Physician: Emma Valdez MD  Primary Care Provider: Tejinder Bowling MD        Subjective     Principal Problem:Elevated troponin        HPI:  Mario Márquez is a very pleasant 49-year-old female that presented to the emergency department at [facility] for the evaluation of cough".  The patient presented to the emergency department reporting a 5 day history of cough.  She reports that she had been recently around some toddlers that was sick and attributed her symptoms to the recent sickness.  On today, she reported that she started to experience intermittent wheezing and noted that her abdominal seemed as though if it was swelling.  When her symptoms worsened, this prompted her to present to the emergency department for further evaluation.    Overview/Hospital Course:  No notes on file    Interval History: NAEON. Has been having a cough for 5d. Her nephew and friend have been sick. Had brown sputum yesterday (first time).     Review of Systems   Constitutional:  Negative for chills and fever.   Respiratory:  Positive for cough. Negative for shortness of breath.    Cardiovascular:  Negative for chest pain.   Gastrointestinal:  Negative for abdominal pain.   Genitourinary:  Negative for dysuria.   Neurological:  Negative for headaches.   Psychiatric/Behavioral:  Negative for confusion.      Objective:     Vital Signs (Most Recent):  Temp: 98.2 °F (36.8 °C) (06/01/25 0724)  Pulse: 73 (06/01/25 1234)  Resp: 18 (06/01/25 1234)  BP: (!) 109/52 (06/01/25 1118)  SpO2: 99 % (06/01/25 1234) Vital Signs (24h Range):  Temp:  [97.7 °F (36.5 °C)-98.3 °F (36.8 °C)] 98.2 °F (36.8 °C)  Pulse:  [47-73] 73  Resp:  [16-25] 18  SpO2:  [92 %-100 %] 99 %  BP: ()/(48-60) 109/52     Weight: 118.5 kg (261 lb 3.9 oz)  Body mass index is " 40.92 kg/m².  No intake or output data in the 24 hours ending 06/01/25 1330      Physical Exam  Vitals and nursing note reviewed.   Constitutional:       General: She is not in acute distress.     Appearance: She is well-developed. She is morbidly obese.   HENT:      Head: Normocephalic and atraumatic.   Eyes:      Conjunctiva/sclera: Conjunctivae normal.   Neck:      Vascular: No JVD.   Cardiovascular:      Rate and Rhythm: Normal rate and regular rhythm.      Heart sounds: Normal heart sounds.   Pulmonary:      Effort: Pulmonary effort is normal.      Breath sounds: Normal breath sounds.   Abdominal:      General: Bowel sounds are normal. There is no distension.      Palpations: Abdomen is soft.      Tenderness: There is no abdominal tenderness.   Musculoskeletal:      Cervical back: Neck supple.      Right lower leg: No edema.      Left lower leg: No edema.   Neurological:      Mental Status: She is alert.   Psychiatric:         Behavior: Behavior normal.               Significant Labs: All pertinent labs within the past 24 hours have been reviewed.  CBC:   Recent Labs   Lab 05/31/25  1353 06/01/25  0228   WBC 4.67 5.34   HGB 12.5 11.3*   HCT 37.4 34.1*    186     CMP:   Recent Labs   Lab 05/31/25  1353 06/01/25  0228    137   K 3.4* 3.4*    105   CO2 22* 23   GLU 87 92   BUN 22* 24*   CREATININE 1.3 1.3   CALCIUM 8.9 8.5*   PROT 8.0  --    ALBUMIN 3.8  --    BILITOT 1.8*  --    ALKPHOS 47  --    AST 16  --    ALT 11  --    ANIONGAP 10 9     Troponin:   Recent Labs   Lab 05/31/25  1353 05/31/25  1643   TROPONINI 0.224* 0.207*       Significant Imaging: I have reviewed all pertinent imaging results/findings within the past 24 hours.      Assessment & Plan  Other chest pain  C/o post tussive CP at this time  CXR/ labs not suggestive of PNA  Supportive care  Nonischemic dilated cardiomyopathy  Continue GDMT  Cards recs:  Echocardiogram to assess for intracardiac filling pressures   Lexiscan  nuclear stress test for further evaluation.  -  NPO after midnight.  Continue beta blocker therapy. Holding low-dose amiodarone while inpatient.  On amiodarone therapy  Hold per cards    Mild intermittent asthma without complication  -continue scheduled Duoneb treatments    Class 3 severe obesity due to excess calories with serious comorbidity and body mass index (BMI) of 40.0 to 44.9 in adult  Body mass index is 40.92 kg/m². Morbid obesity complicates all aspects of disease management from diagnostic modalities to treatment. Weight loss encouraged and health benefits explained to patient.       Elevated troponin   Lexiscan nuclear stress test for further evaluation.  -  NPO after midnight.    Appreciate cardiology assistance    VTE Risk Mitigation (From admission, onward)           Ordered     enoxaparin injection 40 mg  Daily         05/31/25 1552     IP VTE HIGH RISK PATIENT  Once         05/31/25 1552     Place sequential compression device  Until discontinued         05/31/25 1552                    Discharge Planning   ANUPAMA:      Code Status: Full Code   Medical Readiness for Discharge Date:              Emma Valdez MD  Department of Hospital Medicine   [facility]

## 2025-06-01 NOTE — PLAN OF CARE
Problem: Adult Inpatient Plan of Care  Goal: Patient-Specific Goal (Individualized)  Outcome: Progressing     Problem: Bariatric Environmental Safety  Goal: Safety Maintained with Care  Outcome: Progressing     Problem: Fall Injury Risk  Goal: Absence of Fall and Fall-Related Injury  Outcome: Progressing     Problem: Comorbidity Management  Goal: Maintenance of Heart Failure Symptom Control  Outcome: Progressing     Problem: Chest Pain  Goal: Resolution of Chest Pain Symptoms  Outcome: Progressing     AAOx4. Medications administered per MAR. Safety precautions maintained. NPO midnight. Tele monitored. Call bell within reach.    [phone]

## 2025-06-01 NOTE — ASSESSMENT & PLAN NOTE
Body mass index is 40.92 kg/m². Morbid obesity complicates all aspects of disease management from diagnostic modalities to treatment. Weight loss encouraged and health benefits explained to patient.

## 2025-06-01 NOTE — PROGRESS NOTES
05/31/25 1923   Admission   Initial VN Admission Questions Complete   Shift   Pain Management Interventions pain management plan reviewed with patient/caregiver   Virtual Nurse - Patient Verbalized Approval Of Camera Use;VN Rounding   Safety/Activity   Patient Rounds bed in low position;call light in patient/parent reach;clutter free environment maintained;visualized patient;placement of personal items at bedside   Safety Promotion/Fall Prevention assistive device/personal item within reach;side rails raised x 2   Positioning   Body Position supine   Head of Bed (HOB) Positioning HOB at 30-45 degrees     VN cued in to pt's room with permission. Pt's daughter at bedside. Admission questions completed with pt. Plan of care reviewed with pt and daughter. All questions answered. Call bell w/in reach. Instructed to call for needs/assist.     Pt noted to have intermittent cough. Informed pt of prn cough/congestion medication that is ordered and offered. Pt requesting to have with pm medications. Bedside nurseMaia, notified.     Care plan initiated

## 2025-06-01 NOTE — ASSESSMENT & PLAN NOTE
Continue GDMT  Cards recs:  Echocardiogram to assess for intracardiac filling pressures   Lexiscan nuclear stress test for further evaluation.  -  NPO after midnight.  Continue beta blocker therapy. Holding low-dose amiodarone while inpatient.

## 2025-06-01 NOTE — CONSULTS
[facility]  Cardiology  Consult Note    Patient Name: Mario Mahajan  MRN: [id number]  Admission Date: 5/31/2025  Hospital Length of Stay: 0 days  Code Status: Full Code   Attending Provider: Joshua Callahan MD  Consulting Provider: Joshua Callahan MD  Primary Care Physician: Tejinder Bowling MD  Principal Problem:<principal problem not specified>    Patient information was obtained from patient, past medical records, and ER records.     Inpatient consult to [facility]  Consult performed by: Joshua Callahan MD  Consult ordered by: Tawanda Guidry NP  Reason for consult: CHF /shortness of breath.        Subjective:     Chief Complaint:   shortness of breath/cough     HPI:         Patient is a 49-year-old female with past medical history of nonischemic cardiomyopathy, as per the patient left heart catheterization done around 5 years ago went out any significant obstructive coronary artery disease, follows up with Dr. Srivastava in [facility] on goal-directed medical therapy with Coreg, Entresto, Aldactone, history of Vfib status post ICD placement, remote interrogation on 05/21/2025 showed no significant arrhythmias, stable RV lead impedance, hypertension, has CardioMEMS device in place,  admitted to the hospital with cough, wheezing, shortness of breath.        Recently knees had some URI symptoms.  Patient has noticed intermittent wheezing, abdominal swelling.   Chest x-ray concerning for pulmonary venous congestion.  Cardiology consulted for CHF/troponin elevation.    Past Medical History:   Diagnosis Date    Asthma     Chronic back pain 7/1/2014    Chronic combined systolic and diastolic congestive heart failure 4/28/2015     2-10-17   1 - Severely depressed left ventricular systolic function (EF 20-25%).    2 - Severe left ventricular enlargement.    3 - Severe left atrial enlargement.    4 - Left ventricular diastolic dysfunction.    5 - The estimated PA systolic pressure is 18 mmHg.     6 - Mild mitral regurgitation.     Encounter for blood transfusion     ICD (implantable cardioverter-defibrillator) in place 12/01/15 3/3/2016    Menorrhagia, premenopausal 2/10/2017    Microcytic anemia 2015    Non-rheumatic mitral regurgitation 3/5/2015    Nonischemic dilated cardiomyopathy 2015    Polymorphic ventricular tachycardia 2024    Sleep apnea     Syncope and collapse 2017    Ventricular tachycardia, polymorphic        Past Surgical History:   Procedure Laterality Date    CARDIAC DEFIBRILLATOR PLACEMENT  2015     SECTION      INSERTION, WIRELESS SENSOR, FOR PULMONARY ARTERIAL PRESSURE MONITORING N/A 10/22/2021    Procedure: INSERTION, WIRELESS SENSOR, FOR PULMONARY ARTERIAL PRESSURE MONITORING;  Surgeon: Erika Hurd MD;  Location: [facility] CATH LAB;  Service: Cardiology;  Laterality: N/A;    OOPHORECTOMY      PERICARDIOCENTESIS N/A 2020    Procedure: Pericardiocentesis;  Surgeon: Memo Luis MD;  Location: [facility] CATH LAB;  Service: Cardiology;  Laterality: N/A;    PULMONARY ANGIOGRAPHY N/A 10/22/2021    Procedure: ANGIOGRAM, PULMONARY;  Surgeon: Erika Hurd MD;  Location: [facility] CATH LAB;  Service: Cardiology;  Laterality: N/A;    RIGHT HEART CATHETERIZATION      TOTAL ABDOMINAL HYSTERECTOMY N/A 3/26/2019    Procedure: HYSTERECTOMY, TOTAL, ABDOMINAL;  Surgeon: Victorino Rose MD;  Location: [facility] OR;  Service: OB/GYN;  Laterality: N/A;    TRANSESOPHAGEAL ECHOCARDIOGRAPHY N/A 2022    Procedure: ECHOCARDIOGRAM, TRANSESOPHAGEAL;  Surgeon: Phan Powell MD;  Location: [facility] EP LAB;  Service: Cardiology;  Laterality: N/A;    TUBAL LIGATION         Review of patient's allergies indicates:   Allergen Reactions    Ace inhibitors      Cough       No current facility-administered medications on file prior to encounter.     Current Outpatient Medications on File Prior to Encounter   Medication Sig    albuterol (PROVENTIL/VENTOLIN HFA) 90 mcg/actuation inhaler  Inhale 2 puffs into the lungs every 6 (six) hours as needed for Wheezing.    amiodarone (PACERONE) 200 MG Tab Take 1 tablet by mouth once daily    aspirin (ECOTRIN) 81 MG EC tablet Take 1 tablet (81 mg total) by mouth once daily.    atorvastatin (LIPITOR) 40 MG tablet Take 1 tablet (40 mg total) by mouth once daily.    bumetanide (BUMEX) 1 MG tablet TAKE 2 TABLETS BY MOUTH ONCE DAILY IN THE MORNING AND 1 ONCE DAILY IN THE EVENING    carvediloL (COREG) 25 MG tablet Take 1 tablet by mouth twice daily    cetirizine (ZYRTEC) 10 MG tablet Take 10 mg by mouth daily as needed.    cyanocobalamin (VITAMIN B-12) 1000 MCG tablet Take 1 tablet (1,000 mcg total) by mouth once daily.    ENTRESTO  mg per tablet Take 1 tablet by mouth twice daily    gabapentin (NEURONTIN) 300 MG capsule Take 1-2 capsules (300-600 mg total) by mouth every evening. (Patient taking differently: Take 300-600 mg by mouth nightly as needed.)    hydrOXYzine HCL (ATARAX) 25 MG tablet Take 1 tablet (25 mg total) by mouth 3 (three) times daily as needed for Anxiety.    metOLazone (ZAROXOLYN) 2.5 MG tablet Take 1 tablet (2.5 mg total) by mouth as needed (Only take when advised by the heart failure/Cardiomems team).    spironolactone (ALDACTONE) 25 MG tablet Take 1 tablet by mouth once daily    diazePAM (VALIUM) 5 MG tablet Take 1 tablet (5 mg total) by mouth On call Procedure for Anxiety. Take one 30 min prior and the other once at the imaging center if needed.    semaglutide (OZEMPIC) 1 mg/dose (4 mg/3 mL) Inject 1 mg into the skin every 7 days.    tiZANidine (ZANAFLEX) 4 MG tablet Take 1-2 tablets (4-8 mg total) by mouth every 8 (eight) hours as needed (muscle tension).    tretinoin (RETIN-A) 0.1 % cream Apply topically every evening. (Patient taking differently: Apply topically nightly as needed.)     Family History       Problem Relation (Age of Onset)    Diabetes Father    Heart disease Brother    Heart failure Father, Brother    Lung cancer  Paternal Grandmother    No Known Problems Daughter, Son    Pancreatic cancer Father          Tobacco Use    Smoking status: Never     Passive exposure: Never    Smokeless tobacco: Never   Substance and Sexual Activity    Alcohol use: Not Currently     Comment: 1 glass per 3 months    Drug use: No    Sexual activity: Yes     Partners: Male     Comment: one male partner (boyfriend)     ROS  Objective:     Vital Signs (Most Recent):  Temp: 98.2 °F (36.8 °C) (06/01/25 0724)  Pulse: (!) 49 (06/01/25 1118)  Resp: 20 (06/01/25 1118)  BP: (!) 109/52 (06/01/25 1118)  SpO2: 100 % (06/01/25 1118) Vital Signs (24h Range):  Temp:  [97.7 °F (36.5 °C)-98.3 °F (36.8 °C)] 98.2 °F (36.8 °C)  Pulse:  [47-65] 49  Resp:  [16-25] 20  SpO2:  [92 %-100 %] 100 %  BP: ()/(48-60) 109/52     Weight: 118.5 kg (261 lb 3.9 oz)  Body mass index is 40.92 kg/m².    SpO2: 100 %       No intake or output data in the 24 hours ending 06/01/25 1143    Lines/Drains/Airways       Peripheral Intravenous Line  Duration                  Peripheral IV - Single Lumen 05/31/25 1345 20 G Right Forearm <1 day                    Physical Exam    Significant Labs: All pertinent lab results from the last 24 hours have been reviewed. and   Recent Lab Results  (Last 5 results in the past 24 hours)        06/01/25  0228   05/31/25 2014 05/31/25  1643   05/31/25  1436   05/31/25  1353        POC Molecular Influenza A Ag       Negative         POC Molecular Influenza B Ag       Negative         Albumin         3.8       ALP         47       ALT         11       Anion Gap 9         10       AST         16       Baso # 0.01         0.01       Basophil % 0.2         0.2       BILIRUBIN TOTAL         1.8  Comment: For infants and newborns, interpretation of results should be based   on gestational age, weight and in agreement with clinical   observations.    Premature Infant recommended reference ranges:   0-24 hours:  <8.0 mg/dL   24-48 hours: <12.0 mg/dL   3-5  days:    <15.0 mg/dL   6-29 days:   <15.0 mg/dL       BNP         313  Comment: Values of less than 100 pg/ml are consistent with non-CHF populations.        BUN 24         22       Calcium 8.5         8.9       Chloride 105         107       CO2 23         22       Creatinine 1.3         1.3       eGFR 51  Comment: Estimated GFR calculated using the CKD-EPI creatinine (2021) equation.         51  Comment: Estimated GFR calculated using the CKD-EPI creatinine (2021) equation.       Eos # 0.20         0.15       Eos % 3.7         3.2       Glucose 92         87       GRAM STAIN   <10 Epithelial Cells/LPF  [P]                Few WBC seen  [P]                Many Gram positive cocci  [P]             Gran # (ANC) 2.43         2.52       Hematocrit 34.1         37.4       Hemoglobin 11.3         12.5       Immature Grans (Abs) 0.01  Comment: Mild elevation in immature granulocytes is non specific and can be seen in a variety of conditions including stress response, acute inflammation, trauma and pregnancy. Correlation with other laboratory and clinical findings is essential.         0.01  Comment: Mild elevation in immature granulocytes is non specific and can be seen in a variety of conditions including stress response, acute inflammation, trauma and pregnancy. Correlation with other laboratory and clinical findings is essential.       Immature Granulocytes 0.2         0.2       Lymph # 2.27         1.66       Lymph % 42.5         35.5       Magnesium      1.9  Comment: STAT, if not done in ED, then at 2nd and 6th hour           MCH 32.6         32.5       MCHC 33.1         33.4       MCV 98         97       Mono # 0.42         0.32       Mono % 7.9         6.9       MPV 11.8         11.2       Neut % 45.5         54.0       nRBC 0         0       Platelet Count 186         193       Potassium 3.4         3.4       PROTEIN TOTAL         8.0        Acceptable       Yes                Yes         RBC 3.47    "      3.85       RDW 11.9         11.9       SARS-CoV-2 RNA, Amplification, Qual       Negative         Sodium 137         139       Troponin I     0.207  Comment: STAT, if not done in ED, then at 2nd and 6th hour  The reference interval for Troponin I represents the 99th percentile cutoff for our facility and is consistent with 3rd generation assay performance.     0.224  Comment: The reference interval for Troponin I represents the 99th percentile cutoff for our facility and is consistent with 3rd generation assay performance.       WBC 5.34         4.67                               [P] - Preliminary Result               Significant Imaging: Echocardiogram: Transthoracic echo (TTE) complete (Cupid Only):   Results for orders placed or performed during the hospital encounter of 08/21/23   Echo Saline Bubble? Yes   Result Value Ref Range    BSA 2.41 m2    Walters's Biplane MOD Ejection Fraction 22 %    LVOT stroke volume 72.47 cm3    LVIDd 7.60 (A) 3.5 - 6.0 cm    LV Systolic Volume 248.16 mL    LV Systolic Volume Index 107.0 mL/m2    LVIDs 6.91 (A) 2.1 - 4.0 cm    LV Diastolic Volume 307.02 mL    LV Diastolic Volume Index 132.34 mL/m2    IVS 0.83 0.6 - 1.1 cm    LVOT diameter 2.31 cm    LVOT area 4.2 cm2    FS 9 (A) 28 - 44 %    Left Ventricle Relative Wall Thickness 0.31 cm    PW 1.19 (A) 0.6 - 1.1 cm    LV mass 376.08 g    LV Mass Index 162 g/m2    MV Peak E Jae 1.01 m/s    TDI LATERAL 0.04 m/s    TDI SEPTAL 0.06 m/s    E/E' ratio 20.20 m/s    MV Peak A Jae 0.76 m/s    TR Max Jae 2.85 m/s    E/A ratio 1.33     E wave deceleration time 226.09 msec    MV "A" wave duration [id number] msec    LV SEPTAL E/E' RATIO 16.83 m/s    LV LATERAL E/E' RATIO 25.25 m/s    LVOT peak jae 0.84 m/s    Left Ventricular Outflow Tract Mean Velocity 0.61 cm/s    Left Ventricular Outflow Tract Mean Gradient 1.68 mmHg    LA Vol (MOD) 136.53 cm3    ANTOLIN (MOD) 58.8 mL/m2    LA size 6.38 cm    Left Atrium Major Axis 6.51 cm    Left " Atrium Minor Axis 6.54 cm    RV S' 10.78 cm/s    TAPSE 2.70 cm    RA Major Axis 4.23 cm    RA Width 3.67 cm    AV mean gradient 4 mmHg    AV peak gradient 7 mmHg    Ao peak boone 1.31 m/s    Ao VTI 27.30 cm    LVOT peak VTI 17.30 cm    AV valve area 2.65 cm²    AV Velocity Ratio 0.64     AV index (prosthetic) 0.63     RAVI by Velocity Ratio 2.69 cm²    Mr max boone 5.31 m/s    MV peak gradient 4 mmHg    MV stenosis pressure 1/2 time 65.57 ms    MV valve area p 1/2 method 3.36 cm2    MV valve area by continuity eq 2.17 cm2    MV VTI 33.4 cm    Triscuspid Valve Regurgitation Peak Gradient 32 mmHg    STJ 1.85 cm    Ascending aorta 2.31 cm    IVC diameter 1.31 cm    Mean e' 0.05 m/s    ZLVIDS 1.69     ZLVIDD -1.97     ANTOLIN 80.8 mL/m2    LA Vol 187.54 cm3    LA WIDTH 5.3 cm    TV resting pulmonary artery pressure 35 mmHg    RV TB RVSP 6 mmHg    Est. RA pres 3 mmHg    Narrative      Left Ventricle: The left ventricle is severely dilated. There is   severely reduced systolic function with a visually estimated ejection   fraction of 5 - 10%.    Left Atrium: Left atrium is severely dilated.    Right Ventricle: Normal right ventricular cavity size. Systolic   function is normal. Catheter present in the ventricle.    Aortic Valve: The aortic valve is a trileaflet valve. There is mild   aortic regurgitation with a centrally directed jet.    Mitral Valve: There is moderate to severe regurgitation with a   posterolateral eccentriccally directed jet.    Tricuspid Valve: There is mild regurgitation with a centrally directed   jet.    IVC/SVC: Normal venous pressure at 3 mmHg.    Bubble study was negative for intracardiac shunt       Assessment and Plan:     Active Diagnoses:    Diagnosis Date Noted POA    Elevated troponin [R79.89] 02/15/2023 Yes    Class 3 severe obesity due to excess calories with serious comorbidity and body mass index (BMI) of 40.0 to 44.9 in adult [E66.813, E66.01, Z68.41]  Not Applicable    Mild intermittent  asthma without complication [J45.20] 01/23/2018 Yes    On amiodarone therapy [Z79.899] 06/28/2017 Not Applicable    Nonischemic dilated cardiomyopathy [I42.0] 12/01/2015 Yes     Chronic    Other chest pain [R07.89]  Yes      Problems Resolved During this Admission:    Diagnosis Date Noted Date Resolved POA    Acute on chronic combined systolic (congestive) and diastolic (congestive) heart failure [I50.43] 10/22/2021 02/20/2023 Yes     -  I agree with continuing goal-directed medical therapy and oral Lasix.  Does have mild rhonchi on examination.  Echocardiogram to assess for intracardiac filling pressures.  -  Troponin mildly elevated at 0.2 with baseline left bundle-branch block.  Currently chest pain-free.  Recommend Lexiscan nuclear stress test for further evaluation.  -  NPO after midnight.  -  Goal-directed medical therapy to continue as blood pressure allows.  =  Sinus bradycardia at baseline.  Minimal pacing via ICD on remote monitoring.  Continue beta blocker therapy.  Holding low-dose amiodarone while inpatient.    VTE Risk Mitigation (From admission, onward)           Ordered     enoxaparin injection 40 mg  Daily         05/31/25 1552     IP VTE HIGH RISK PATIENT  Once         05/31/25 1552     Place sequential compression device  Until discontinued         05/31/25 1552                    Thank you for your consult. I will follow-up with patient. Please contact us if you have any additional questions.    Joshua Callahan MD  Cardiology   [facility]

## 2025-06-01 NOTE — PLAN OF CARE
Problem: Adult Inpatient Plan of Care  Goal: Plan of Care Review  Outcome: Progressing     Problem: Bariatric Environmental Safety  Goal: Safety Maintained with Care  Outcome: Progressing     Problem: Fall Injury Risk  Goal: Absence of Fall and Fall-Related Injury  Outcome: Progressing     Problem: Heart Failure  Goal: Optimal Coping  Outcome: Progressing     Problem: Comorbidity Management  Goal: Maintenance of Asthma Control  Outcome: Progressing     Problem: Chest Pain  Goal: Resolution of Chest Pain Symptoms  Outcome: Progressing   AAOX4. Pt ambulates independently to bathroom. Prn tylenol given for headache. NPO at midnight for nuclear stress test tomorrow. Family at bedside, safety maintained.

## 2025-06-01 NOTE — SUBJECTIVE & OBJECTIVE
Interval History: KURTIS. Has been having a cough for 5d. Her nephew and friend have been sick. Had brown sputum yesterday (first time).     Review of Systems   Constitutional:  Negative for chills and fever.   Respiratory:  Positive for cough. Negative for shortness of breath.    Cardiovascular:  Negative for chest pain.   Gastrointestinal:  Negative for abdominal pain.   Genitourinary:  Negative for dysuria.   Neurological:  Negative for headaches.   Psychiatric/Behavioral:  Negative for confusion.      Objective:     Vital Signs (Most Recent):  Temp: 98.2 °F (36.8 °C) (06/01/25 0724)  Pulse: 73 (06/01/25 1234)  Resp: 18 (06/01/25 1234)  BP: (!) 109/52 (06/01/25 1118)  SpO2: 99 % (06/01/25 1234) Vital Signs (24h Range):  Temp:  [97.7 °F (36.5 °C)-98.3 °F (36.8 °C)] 98.2 °F (36.8 °C)  Pulse:  [47-73] 73  Resp:  [16-25] 18  SpO2:  [92 %-100 %] 99 %  BP: ()/(48-60) 109/52     Weight: 118.5 kg (261 lb 3.9 oz)  Body mass index is 40.92 kg/m².  No intake or output data in the 24 hours ending 06/01/25 1330      Physical Exam  Vitals and nursing note reviewed.   Constitutional:       General: She is not in acute distress.     Appearance: She is well-developed. She is morbidly obese.   HENT:      Head: Normocephalic and atraumatic.   Eyes:      Conjunctiva/sclera: Conjunctivae normal.   Neck:      Vascular: No JVD.   Cardiovascular:      Rate and Rhythm: Normal rate and regular rhythm.      Heart sounds: Normal heart sounds.   Pulmonary:      Effort: Pulmonary effort is normal.      Breath sounds: Normal breath sounds.   Abdominal:      General: Bowel sounds are normal. There is no distension.      Palpations: Abdomen is soft.      Tenderness: There is no abdominal tenderness.   Musculoskeletal:      Cervical back: Neck supple.      Right lower leg: No edema.      Left lower leg: No edema.   Neurological:      Mental Status: She is alert.   Psychiatric:         Behavior: Behavior normal.               Significant  Labs: All pertinent labs within the past 24 hours have been reviewed.  CBC:   Recent Labs   Lab 05/31/25  1353 06/01/25  0228   WBC 4.67 5.34   HGB 12.5 11.3*   HCT 37.4 34.1*    186     CMP:   Recent Labs   Lab 05/31/25  1353 06/01/25  0228    137   K 3.4* 3.4*    105   CO2 22* 23   GLU 87 92   BUN 22* 24*   CREATININE 1.3 1.3   CALCIUM 8.9 8.5*   PROT 8.0  --    ALBUMIN 3.8  --    BILITOT 1.8*  --    ALKPHOS 47  --    AST 16  --    ALT 11  --    ANIONGAP 10 9     Troponin:   Recent Labs   Lab 05/31/25  1353 05/31/25  1643   TROPONINI 0.224* 0.207*       Significant Imaging: I have reviewed all pertinent imaging results/findings within the past 24 hours.

## 2025-06-01 NOTE — H&P (VIEW-ONLY)
[place] [facility]  Cardiology  Consult Note    Patient Name: Mario Mahajan  MRN: [id number]  Admission Date: 5/31/2025  Hospital Length of Stay: 0 days  Code Status: Full Code   Attending Provider: Joshua Callahan MD  Consulting Provider: Joshua Callahan MD  Primary Care Physician: Tejinder Bowling MD  Principal Problem:<principal problem not specified>    Patient information was obtained from patient, past medical records, and ER records.     Inpatient consult to [facility]  Consult performed by: Joshua Callahan MD  Consult ordered by: Tawanda Guidry NP  Reason for consult: CHF /shortness of breath.        Subjective:     Chief Complaint:   shortness of breath/cough     HPI:         Patient is a 49-year-old female with past medical history of nonischemic cardiomyopathy, as per the patient left heart catheterization done around 5 years ago went out any significant obstructive coronary artery disease, follows up with Dr. Srivastava in [facility] on goal-directed medical therapy with Coreg, Entresto, Aldactone, history of Vfib status post ICD placement, remote interrogation on 05/21/2025 showed no significant arrhythmias, stable RV lead impedance, hypertension, has CardioMEMS device in place,  admitted to the hospital with cough, wheezing, shortness of breath.        Recently knees had some URI symptoms.  Patient has noticed intermittent wheezing, abdominal swelling.   Chest x-ray concerning for pulmonary venous congestion.  Cardiology consulted for CHF/troponin elevation.    Past Medical History:   Diagnosis Date    Asthma     Chronic back pain 7/1/2014    Chronic combined systolic and diastolic congestive heart failure 4/28/2015     2-10-17   1 - Severely depressed left ventricular systolic function (EF 20-25%).    2 - Severe left ventricular enlargement.    3 - Severe left atrial enlargement.    4 - Left ventricular diastolic dysfunction.    5 - The estimated PA systolic pressure is 18 mmHg.     6 - Mild mitral regurgitation.     Encounter for blood transfusion     ICD (implantable cardioverter-defibrillator) in place 12/01/15 3/3/2016    Menorrhagia, premenopausal 2/10/2017    Microcytic anemia 2015    Non-rheumatic mitral regurgitation 3/5/2015    Nonischemic dilated cardiomyopathy 2015    Polymorphic ventricular tachycardia 2024    Sleep apnea     Syncope and collapse 2017    Ventricular tachycardia, polymorphic        Past Surgical History:   Procedure Laterality Date    CARDIAC DEFIBRILLATOR PLACEMENT  2015     SECTION      INSERTION, WIRELESS SENSOR, FOR PULMONARY ARTERIAL PRESSURE MONITORING N/A 10/22/2021    Procedure: INSERTION, WIRELESS SENSOR, FOR PULMONARY ARTERIAL PRESSURE MONITORING;  Surgeon: Erika Hurd MD;  Location: [facility] CATH LAB;  Service: Cardiology;  Laterality: N/A;    OOPHORECTOMY      PERICARDIOCENTESIS N/A 2020    Procedure: Pericardiocentesis;  Surgeon: Memo Luis MD;  Location: [facility] CATH LAB;  Service: Cardiology;  Laterality: N/A;    PULMONARY ANGIOGRAPHY N/A 10/22/2021    Procedure: ANGIOGRAM, PULMONARY;  Surgeon: Erika Hurd MD;  Location: [facility] CATH LAB;  Service: Cardiology;  Laterality: N/A;    RIGHT HEART CATHETERIZATION      TOTAL ABDOMINAL HYSTERECTOMY N/A 3/26/2019    Procedure: HYSTERECTOMY, TOTAL, ABDOMINAL;  Surgeon: Victorino Rose MD;  Location: [facility] OR;  Service: OB/GYN;  Laterality: N/A;    TRANSESOPHAGEAL ECHOCARDIOGRAPHY N/A 2022    Procedure: ECHOCARDIOGRAM, TRANSESOPHAGEAL;  Surgeon: Phan Powell MD;  Location: [facility] EP LAB;  Service: Cardiology;  Laterality: N/A;    TUBAL LIGATION         Review of patient's allergies indicates:   Allergen Reactions    Ace inhibitors      Cough       No current facility-administered medications on file prior to encounter.     Current Outpatient Medications on File Prior to Encounter   Medication Sig    albuterol (PROVENTIL/VENTOLIN HFA) 90 mcg/actuation inhaler  Inhale 2 puffs into the lungs every 6 (six) hours as needed for Wheezing.    amiodarone (PACERONE) 200 MG Tab Take 1 tablet by mouth once daily    aspirin (ECOTRIN) 81 MG EC tablet Take 1 tablet (81 mg total) by mouth once daily.    atorvastatin (LIPITOR) 40 MG tablet Take 1 tablet (40 mg total) by mouth once daily.    bumetanide (BUMEX) 1 MG tablet TAKE 2 TABLETS BY MOUTH ONCE DAILY IN THE MORNING AND 1 ONCE DAILY IN THE EVENING    carvediloL (COREG) 25 MG tablet Take 1 tablet by mouth twice daily    cetirizine (ZYRTEC) 10 MG tablet Take 10 mg by mouth daily as needed.    cyanocobalamin (VITAMIN B-12) 1000 MCG tablet Take 1 tablet (1,000 mcg total) by mouth once daily.    ENTRESTO  mg per tablet Take 1 tablet by mouth twice daily    gabapentin (NEURONTIN) 300 MG capsule Take 1-2 capsules (300-600 mg total) by mouth every evening. (Patient taking differently: Take 300-600 mg by mouth nightly as needed.)    hydrOXYzine HCL (ATARAX) 25 MG tablet Take 1 tablet (25 mg total) by mouth 3 (three) times daily as needed for Anxiety.    metOLazone (ZAROXOLYN) 2.5 MG tablet Take 1 tablet (2.5 mg total) by mouth as needed (Only take when advised by the heart failure/Cardiomems team).    spironolactone (ALDACTONE) 25 MG tablet Take 1 tablet by mouth once daily    diazePAM (VALIUM) 5 MG tablet Take 1 tablet (5 mg total) by mouth On call Procedure for Anxiety. Take one 30 min prior and the other once at the imaging center if needed.    semaglutide (OZEMPIC) 1 mg/dose (4 mg/3 mL) Inject 1 mg into the skin every 7 days.    tiZANidine (ZANAFLEX) 4 MG tablet Take 1-2 tablets (4-8 mg total) by mouth every 8 (eight) hours as needed (muscle tension).    tretinoin (RETIN-A) 0.1 % cream Apply topically every evening. (Patient taking differently: Apply topically nightly as needed.)     Family History       Problem Relation (Age of Onset)    Diabetes Father    Heart disease Brother    Heart failure Father, Brother    Lung cancer  Paternal Grandmother    No Known Problems Daughter, Son    Pancreatic cancer Father          Tobacco Use    Smoking status: Never     Passive exposure: Never    Smokeless tobacco: Never   Substance and Sexual Activity    Alcohol use: Not Currently     Comment: 1 glass per 3 months    Drug use: No    Sexual activity: Yes     Partners: Male     Comment: one male partner (boyfriend)     ROS  Objective:     Vital Signs (Most Recent):  Temp: 98.2 °F (36.8 °C) (06/01/25 0724)  Pulse: (!) 49 (06/01/25 1118)  Resp: 20 (06/01/25 1118)  BP: (!) 109/52 (06/01/25 1118)  SpO2: 100 % (06/01/25 1118) Vital Signs (24h Range):  Temp:  [97.7 °F (36.5 °C)-98.3 °F (36.8 °C)] 98.2 °F (36.8 °C)  Pulse:  [47-65] 49  Resp:  [16-25] 20  SpO2:  [92 %-100 %] 100 %  BP: ()/(48-60) 109/52     Weight: 118.5 kg (261 lb 3.9 oz)  Body mass index is 40.92 kg/m².    SpO2: 100 %       No intake or output data in the 24 hours ending 06/01/25 1143    Lines/Drains/Airways       Peripheral Intravenous Line  Duration                  Peripheral IV - Single Lumen 05/31/25 1345 20 G Right Forearm <1 day                    Physical Exam    Significant Labs: All pertinent lab results from the last 24 hours have been reviewed. and   Recent Lab Results  (Last 5 results in the past 24 hours)        06/01/25  0228   05/31/25 2014 05/31/25  1643   05/31/25  1436   05/31/25  1353        POC Molecular Influenza A Ag       Negative         POC Molecular Influenza B Ag       Negative         Albumin         3.8       ALP         47       ALT         11       Anion Gap 9         10       AST         16       Baso # 0.01         0.01       Basophil % 0.2         0.2       BILIRUBIN TOTAL         1.8  Comment: For infants and newborns, interpretation of results should be based   on gestational age, weight and in agreement with clinical   observations.    Premature Infant recommended reference ranges:   0-24 hours:  <8.0 mg/dL   24-48 hours: <12.0 mg/dL   3-5  days:    <15.0 mg/dL   6-29 days:   <15.0 mg/dL       BNP         313  Comment: Values of less than 100 pg/ml are consistent with non-CHF populations.        BUN 24         22       Calcium 8.5         8.9       Chloride 105         107       CO2 23         22       Creatinine 1.3         1.3       eGFR 51  Comment: Estimated GFR calculated using the CKD-EPI creatinine (2021) equation.         51  Comment: Estimated GFR calculated using the CKD-EPI creatinine (2021) equation.       Eos # 0.20         0.15       Eos % 3.7         3.2       Glucose 92         87       GRAM STAIN   <10 Epithelial Cells/LPF  [P]                Few WBC seen  [P]                Many Gram positive cocci  [P]             Gran # (ANC) 2.43         2.52       Hematocrit 34.1         37.4       Hemoglobin 11.3         12.5       Immature Grans (Abs) 0.01  Comment: Mild elevation in immature granulocytes is non specific and can be seen in a variety of conditions including stress response, acute inflammation, trauma and pregnancy. Correlation with other laboratory and clinical findings is essential.         0.01  Comment: Mild elevation in immature granulocytes is non specific and can be seen in a variety of conditions including stress response, acute inflammation, trauma and pregnancy. Correlation with other laboratory and clinical findings is essential.       Immature Granulocytes 0.2         0.2       Lymph # 2.27         1.66       Lymph % 42.5         35.5       Magnesium      1.9  Comment: STAT, if not done in ED, then at 2nd and 6th hour           MCH 32.6         32.5       MCHC 33.1         33.4       MCV 98         97       Mono # 0.42         0.32       Mono % 7.9         6.9       MPV 11.8         11.2       Neut % 45.5         54.0       nRBC 0         0       Platelet Count 186         193       Potassium 3.4         3.4       PROTEIN TOTAL         8.0        Acceptable       Yes                Yes         RBC 3.47    "      3.85       RDW 11.9         11.9       SARS-CoV-2 RNA, Amplification, Qual       Negative         Sodium 137         139       Troponin I     0.207  Comment: STAT, if not done in ED, then at 2nd and 6th hour  The reference interval for Troponin I represents the 99th percentile cutoff for our facility and is consistent with 3rd generation assay performance.     0.224  Comment: The reference interval for Troponin I represents the 99th percentile cutoff for our facility and is consistent with 3rd generation assay performance.       WBC 5.34         4.67                               [P] - Preliminary Result               Significant Imaging: Echocardiogram: Transthoracic echo (TTE) complete (Cupid Only):   Results for orders placed or performed during the hospital encounter of 08/21/23   Echo Saline Bubble? Yes   Result Value Ref Range    BSA 2.41 m2    Walters's Biplane MOD Ejection Fraction 22 %    LVOT stroke volume 72.47 cm3    LVIDd 7.60 (A) 3.5 - 6.0 cm    LV Systolic Volume 248.16 mL    LV Systolic Volume Index 107.0 mL/m2    LVIDs 6.91 (A) 2.1 - 4.0 cm    LV Diastolic Volume 307.02 mL    LV Diastolic Volume Index 132.34 mL/m2    IVS 0.83 0.6 - 1.1 cm    LVOT diameter 2.31 cm    LVOT area 4.2 cm2    FS 9 (A) 28 - 44 %    Left Ventricle Relative Wall Thickness 0.31 cm    PW 1.19 (A) 0.6 - 1.1 cm    LV mass 376.08 g    LV Mass Index 162 g/m2    MV Peak E Jae 1.01 m/s    TDI LATERAL 0.04 m/s    TDI SEPTAL 0.06 m/s    E/E' ratio 20.20 m/s    MV Peak A Jae 0.76 m/s    TR Max Jae 2.85 m/s    E/A ratio 1.33     E wave deceleration time 226.09 msec    MV "A" wave duration [id number] msec    LV SEPTAL E/E' RATIO 16.83 m/s    LV LATERAL E/E' RATIO 25.25 m/s    LVOT peak jae 0.84 m/s    Left Ventricular Outflow Tract Mean Velocity 0.61 cm/s    Left Ventricular Outflow Tract Mean Gradient 1.68 mmHg    LA Vol (MOD) 136.53 cm3    ANTOLIN (MOD) 58.8 mL/m2    LA size 6.38 cm    Left Atrium Major Axis 6.51 cm    Left " Atrium Minor Axis 6.54 cm    RV S' 10.78 cm/s    TAPSE 2.70 cm    RA Major Axis 4.23 cm    RA Width 3.67 cm    AV mean gradient 4 mmHg    AV peak gradient 7 mmHg    Ao peak boone 1.31 m/s    Ao VTI 27.30 cm    LVOT peak VTI 17.30 cm    AV valve area 2.65 cm²    AV Velocity Ratio 0.64     AV index (prosthetic) 0.63     RAVI by Velocity Ratio 2.69 cm²    Mr max boone 5.31 m/s    MV peak gradient 4 mmHg    MV stenosis pressure 1/2 time 65.57 ms    MV valve area p 1/2 method 3.36 cm2    MV valve area by continuity eq 2.17 cm2    MV VTI 33.4 cm    Triscuspid Valve Regurgitation Peak Gradient 32 mmHg    STJ 1.85 cm    Ascending aorta 2.31 cm    IVC diameter 1.31 cm    Mean e' 0.05 m/s    ZLVIDS 1.69     ZLVIDD -1.97     ANTOLIN 80.8 mL/m2    LA Vol 187.54 cm3    LA WIDTH 5.3 cm    TV resting pulmonary artery pressure 35 mmHg    RV TB RVSP 6 mmHg    Est. RA pres 3 mmHg    Narrative      Left Ventricle: The left ventricle is severely dilated. There is   severely reduced systolic function with a visually estimated ejection   fraction of 5 - 10%.    Left Atrium: Left atrium is severely dilated.    Right Ventricle: Normal right ventricular cavity size. Systolic   function is normal. Catheter present in the ventricle.    Aortic Valve: The aortic valve is a trileaflet valve. There is mild   aortic regurgitation with a centrally directed jet.    Mitral Valve: There is moderate to severe regurgitation with a   posterolateral eccentriccally directed jet.    Tricuspid Valve: There is mild regurgitation with a centrally directed   jet.    IVC/SVC: Normal venous pressure at 3 mmHg.    Bubble study was negative for intracardiac shunt       Assessment and Plan:     Active Diagnoses:    Diagnosis Date Noted POA    Elevated troponin [R79.89] 02/15/2023 Yes    Class 3 severe obesity due to excess calories with serious comorbidity and body mass index (BMI) of 40.0 to 44.9 in adult [E66.813, E66.01, Z68.41]  Not Applicable    Mild intermittent  asthma without complication [J45.20] 01/23/2018 Yes    On amiodarone therapy [Z79.899] 06/28/2017 Not Applicable    Nonischemic dilated cardiomyopathy [I42.0] 12/01/2015 Yes     Chronic    Other chest pain [R07.89]  Yes      Problems Resolved During this Admission:    Diagnosis Date Noted Date Resolved POA    Acute on chronic combined systolic (congestive) and diastolic (congestive) heart failure [I50.43] 10/22/2021 02/20/2023 Yes     -  I agree with continuing goal-directed medical therapy and oral Lasix.  Does have mild rhonchi on examination.  Echocardiogram to assess for intracardiac filling pressures.  -  Troponin mildly elevated at 0.2 with baseline left bundle-branch block.  Currently chest pain-free.  Recommend Lexiscan nuclear stress test for further evaluation.  -  NPO after midnight.  -  Goal-directed medical therapy to continue as blood pressure allows.  =  Sinus bradycardia at baseline.  Minimal pacing via ICD on remote monitoring.  Continue beta blocker therapy.  Holding low-dose amiodarone while inpatient.    VTE Risk Mitigation (From admission, onward)           Ordered     enoxaparin injection 40 mg  Daily         05/31/25 1552     IP VTE HIGH RISK PATIENT  Once         05/31/25 1552     Place sequential compression device  Until discontinued         05/31/25 1552                    Thank you for your consult. I will follow-up with patient. Please contact us if you have any additional questions.    Joshua Callahan MD  Cardiology   [facility]

## 2025-06-02 ENCOUNTER — CLINICAL SUPPORT (OUTPATIENT)
Dept: TRANSPLANT | Facility: CLINIC | Age: 50
End: 2025-06-02
Payer: MEDICARE

## 2025-06-02 ENCOUNTER — TELEPHONE (OUTPATIENT)
Dept: CARDIOLOGY | Facility: CLINIC | Age: 50
End: 2025-06-02

## 2025-06-02 DIAGNOSIS — I50.42 CHRONIC COMBINED SYSTOLIC AND DIASTOLIC CHF, NYHA CLASS 3: Primary | ICD-10-CM

## 2025-06-02 LAB
AORTIC SIZE INDEX (SOV): 1.2 CM/M2
AORTIC SIZE INDEX: 1.3 CM/M2
APICAL FOUR CHAMBER EJECTION FRACTION: 22 %
APICAL TWO CHAMBER EJECTION FRACTION: 22 %
ASCENDING AORTA: 2.9 CM
AV INDEX (PROSTH): 0.3
AV MEAN GRADIENT: 3 MMHG
AV PEAK GRADIENT: 6 MMHG
AV VALVE AREA BY VELOCITY RATIO: 0.8 CM²
AV VALVE AREA: 0.8 CM²
AV VELOCITY RATIO: 0.33
BSA FOR ECHO PROCEDURE: 2.36 M2
CV ECHO LV RWT: 0.21 CM
DOP CALC AO PEAK VEL: 1.2 M/S
DOP CALC AO VTI: 20.8 CM
DOP CALC LVOT AREA: 2.5 CM2
DOP CALC LVOT DIAMETER: 1.8 CM
DOP CALC LVOT PEAK VEL: 0.4 M/S
DOP CALC LVOT STROKE VOLUME: 15.8 CM3
DOP CALC MV VTI: 59.6 CM
DOP CALCLVOT PEAK VEL VTI: 6.2 CM
E WAVE DECELERATION TIME: 237 MSEC
E/A RATIO: 1.76
E/E' RATIO: 21 M/S
ECHO LV POSTERIOR WALL: 0.9 CM (ref 0.6–1.1)
FRACTIONAL SHORTENING: 12.6 % (ref 28–44)
INTERVENTRICULAR SEPTUM: 0.7 CM (ref 0.6–1.1)
IVC DIAMETER: 1.77 CM
LEFT ATRIUM AREA SYSTOLIC (APICAL 2 CHAMBER): 47.86 CM2
LEFT ATRIUM AREA SYSTOLIC (APICAL 4 CHAMBER): 55.99 CM2
LEFT ATRIUM VOLUME INDEX MOD: 112 ML/M2
LEFT ATRIUM VOLUME MOD: 254 ML
LEFT INTERNAL DIMENSION IN SYSTOLE: 7.6 CM (ref 2.1–4)
LEFT VENTRICLE DIASTOLIC VOLUME INDEX: 184.07 ML/M2
LEFT VENTRICLE DIASTOLIC VOLUME: 416 ML
LEFT VENTRICLE END DIASTOLIC VOLUME APICAL 2 CHAMBER: 365.52 ML
LEFT VENTRICLE END DIASTOLIC VOLUME APICAL 4 CHAMBER: 463.28 ML
LEFT VENTRICLE END SYSTOLIC VOLUME APICAL 2 CHAMBER: 213.94 ML
LEFT VENTRICLE END SYSTOLIC VOLUME APICAL 4 CHAMBER: 280.81 ML
LEFT VENTRICLE MASS INDEX: 160.1 G/M2
LEFT VENTRICLE SYSTOLIC VOLUME INDEX: 135.4 ML/M2
LEFT VENTRICLE SYSTOLIC VOLUME: 306 ML
LEFT VENTRICULAR INTERNAL DIMENSION IN DIASTOLE: 8.7 CM (ref 3.5–6)
LEFT VENTRICULAR MASS: 361.9 G
LV LATERAL E/E' RATIO: 15 M/S
LV SEPTAL E/E' RATIO: 32.5 M/S
LVED V (TEICH): 415.96 ML
LVES V (TEICH): 305.68 ML
LVOT MG: 0.32 MMHG
LVOT MV: 0.27 CM/S
MV MEAN GRADIENT: 7 MMHG
MV PEAK A VEL: 1.11 M/S
MV PEAK E VEL: 1.95 M/S
MV PEAK GRADIENT: 20 MMHG
MV STENOSIS PRESSURE HALF TIME: 72.29 MS
MV VALVE AREA BY CONTINUITY EQUATION: 0.26 CM2
MV VALVE AREA P 1/2 METHOD: 3.04 CM2
OHS CV RV/LV RATIO: 0.39 CM
OHS LV EJECTION FRACTION SIMPSONS BIPLANE MOD: 24 %
OHS QRS DURATION: 180 MS
OHS QRS DURATION: 192 MS
OHS QTC CALCULATION: 503 MS
OHS QTC CALCULATION: 544 MS
PISA MRMAX VEL: 5.23 M/S
PISA TR MAX VEL: 3.2 M/S
RA PRESSURE ESTIMATED: 3 MMHG
RA VOL SYS: 34.72 ML
RIGHT ATRIAL AREA: 13.9 CM2
RIGHT ATRIUM END SYSTOLIC VOLUME APICAL 4 CHAMBER INDEX BSA: 15.33 ML/M2
RIGHT ATRIUM VOLUME AREA LENGTH APICAL 4 CHAMBER: 34.65 ML
RIGHT VENTRICLE DIASTOLIC BASEL DIMENSION: 3.4 CM
RV TB RVSP: 6 MMHG
RV TISSUE DOPPLER FREE WALL SYSTOLIC VELOCITY 1 (APICAL 4 CHAMBER VIEW): 8.99 CM/S
SINUS: 2.6 CM
STJ: 2.3 CM
TDI LATERAL: 0.13 M/S
TDI SEPTAL: 0.06 M/S
TDI: 0.1 M/S
TR MAX PG: 40 MMHG
TRICUSPID ANNULAR PLANE SYSTOLIC EXCURSION: 2.5 CM
TV REST PULMONARY ARTERY PRESSURE: 44 MMHG
Z-SCORE OF LEFT VENTRICULAR DIMENSION IN END DIASTOLE: 0.48
Z-SCORE OF LEFT VENTRICULAR DIMENSION IN END SYSTOLE: 3.28

## 2025-06-02 PROCEDURE — C1760 CLOSURE DEV, VASC: HCPCS | Performed by: INTERNAL MEDICINE

## 2025-06-02 PROCEDURE — 25000003 PHARM REV CODE 250: Performed by: NURSE PRACTITIONER

## 2025-06-02 PROCEDURE — 99153 MOD SED SAME PHYS/QHP EA: CPT | Performed by: INTERNAL MEDICINE

## 2025-06-02 PROCEDURE — 99152 MOD SED SAME PHYS/QHP 5/>YRS: CPT | Mod: ,,, | Performed by: INTERNAL MEDICINE

## 2025-06-02 PROCEDURE — 93010 ELECTROCARDIOGRAM REPORT: CPT | Mod: ,,, | Performed by: INTERNAL MEDICINE

## 2025-06-02 PROCEDURE — C1751 CATH, INF, PER/CENT/MIDLINE: HCPCS | Performed by: INTERNAL MEDICINE

## 2025-06-02 PROCEDURE — 99152 MOD SED SAME PHYS/QHP 5/>YRS: CPT | Performed by: INTERNAL MEDICINE

## 2025-06-02 PROCEDURE — 82465 ASSAY BLD/SERUM CHOLESTEROL: CPT | Performed by: INTERNAL MEDICINE

## 2025-06-02 PROCEDURE — 63600175 PHARM REV CODE 636 W HCPCS: Performed by: INTERNAL MEDICINE

## 2025-06-02 PROCEDURE — 21400001 HC TELEMETRY ROOM

## 2025-06-02 PROCEDURE — 25000242 PHARM REV CODE 250 ALT 637 W/ HCPCS: Performed by: NURSE PRACTITIONER

## 2025-06-02 PROCEDURE — 25500020 PHARM REV CODE 255: Performed by: FAMILY MEDICINE

## 2025-06-02 PROCEDURE — 94640 AIRWAY INHALATION TREATMENT: CPT

## 2025-06-02 PROCEDURE — 99213 OFFICE O/P EST LOW 20 MIN: CPT | Mod: S$GLB,,, | Performed by: NURSE PRACTITIONER

## 2025-06-02 PROCEDURE — C1769 GUIDE WIRE: HCPCS | Performed by: INTERNAL MEDICINE

## 2025-06-02 PROCEDURE — C1894 INTRO/SHEATH, NON-LASER: HCPCS | Performed by: INTERNAL MEDICINE

## 2025-06-02 PROCEDURE — 36415 COLL VENOUS BLD VENIPUNCTURE: CPT | Performed by: INTERNAL MEDICINE

## 2025-06-02 PROCEDURE — 94761 N-INVAS EAR/PLS OXIMETRY MLT: CPT

## 2025-06-02 PROCEDURE — 25000003 PHARM REV CODE 250: Performed by: FAMILY MEDICINE

## 2025-06-02 PROCEDURE — C1887 CATHETER, GUIDING: HCPCS | Performed by: INTERNAL MEDICINE

## 2025-06-02 PROCEDURE — B211YZZ FLUOROSCOPY OF MULTIPLE CORONARY ARTERIES USING OTHER CONTRAST: ICD-10-PCS | Performed by: INTERNAL MEDICINE

## 2025-06-02 PROCEDURE — 4A023N8 MEASUREMENT OF CARDIAC SAMPLING AND PRESSURE, BILATERAL, PERCUTANEOUS APPROACH: ICD-10-PCS | Performed by: INTERNAL MEDICINE

## 2025-06-02 PROCEDURE — 93458 L HRT ARTERY/VENTRICLE ANGIO: CPT | Performed by: INTERNAL MEDICINE

## 2025-06-02 PROCEDURE — 25000003 PHARM REV CODE 250: Performed by: INTERNAL MEDICINE

## 2025-06-02 PROCEDURE — 99900035 HC TECH TIME PER 15 MIN (STAT)

## 2025-06-02 PROCEDURE — 93458 L HRT ARTERY/VENTRICLE ANGIO: CPT | Mod: 26,,, | Performed by: INTERNAL MEDICINE

## 2025-06-02 PROCEDURE — 25500020 PHARM REV CODE 255: Performed by: INTERNAL MEDICINE

## 2025-06-02 PROCEDURE — 93005 ELECTROCARDIOGRAM TRACING: CPT

## 2025-06-02 PROCEDURE — 63600175 PHARM REV CODE 636 W HCPCS: Performed by: NURSE PRACTITIONER

## 2025-06-02 RX ORDER — POTASSIUM CHLORIDE 20 MEQ/1
40 TABLET, EXTENDED RELEASE ORAL ONCE
Status: DISCONTINUED | OUTPATIENT
Start: 2025-06-02 | End: 2025-06-03 | Stop reason: HOSPADM

## 2025-06-02 RX ORDER — MIDAZOLAM HYDROCHLORIDE 1 MG/ML
INJECTION, SOLUTION INTRAMUSCULAR; INTRAVENOUS
Status: DISCONTINUED | OUTPATIENT
Start: 2025-06-02 | End: 2025-06-02 | Stop reason: HOSPADM

## 2025-06-02 RX ORDER — FENTANYL CITRATE 50 UG/ML
INJECTION, SOLUTION INTRAMUSCULAR; INTRAVENOUS
Status: DISCONTINUED | OUTPATIENT
Start: 2025-06-02 | End: 2025-06-02 | Stop reason: HOSPADM

## 2025-06-02 RX ORDER — VERAPAMIL HYDROCHLORIDE 2.5 MG/ML
INJECTION INTRAVENOUS
Status: DISCONTINUED | OUTPATIENT
Start: 2025-06-02 | End: 2025-06-02 | Stop reason: HOSPADM

## 2025-06-02 RX ORDER — FUROSEMIDE 10 MG/ML
40 INJECTION INTRAMUSCULAR; INTRAVENOUS EVERY 12 HOURS
Status: DISCONTINUED | OUTPATIENT
Start: 2025-06-02 | End: 2025-06-03 | Stop reason: HOSPADM

## 2025-06-02 RX ORDER — CLOPIDOGREL BISULFATE 75 MG/1
75 TABLET ORAL DAILY
Status: DISCONTINUED | OUTPATIENT
Start: 2025-06-03 | End: 2025-06-03 | Stop reason: HOSPADM

## 2025-06-02 RX ORDER — IODIXANOL 320 MG/ML
INJECTION, SOLUTION INTRAVASCULAR
Status: DISCONTINUED | OUTPATIENT
Start: 2025-06-02 | End: 2025-06-02 | Stop reason: HOSPADM

## 2025-06-02 RX ORDER — LIDOCAINE HYDROCHLORIDE 10 MG/ML
INJECTION, SOLUTION EPIDURAL; INFILTRATION; INTRACAUDAL; PERINEURAL
Status: DISCONTINUED | OUTPATIENT
Start: 2025-06-02 | End: 2025-06-02 | Stop reason: HOSPADM

## 2025-06-02 RX ORDER — HEPARIN SODIUM 200 [USP'U]/100ML
INJECTION, SOLUTION INTRAVENOUS
Status: DISCONTINUED | OUTPATIENT
Start: 2025-06-02 | End: 2025-06-03 | Stop reason: HOSPADM

## 2025-06-02 RX ORDER — CEFAZOLIN SODIUM 1 G/3ML
INJECTION, POWDER, FOR SOLUTION INTRAMUSCULAR; INTRAVENOUS
Status: DISCONTINUED | OUTPATIENT
Start: 2025-06-02 | End: 2025-06-02 | Stop reason: HOSPADM

## 2025-06-02 RX ADMIN — DOCUSATE SODIUM 100 MG: 100 CAPSULE, LIQUID FILLED ORAL at 10:06

## 2025-06-02 RX ADMIN — GUAIFENESIN AND CODEINE PHOSPHATE 5 ML: 100; 10 SOLUTION ORAL at 10:06

## 2025-06-02 RX ADMIN — HUMAN ALBUMIN MICROSPHERES AND PERFLUTREN 0.11 MG: 10; .22 INJECTION, SOLUTION INTRAVENOUS at 10:06

## 2025-06-02 RX ADMIN — CARVEDILOL 6.25 MG: 6.25 TABLET, FILM COATED ORAL at 09:06

## 2025-06-02 RX ADMIN — AMIODARONE HYDROCHLORIDE 200 MG: 200 TABLET ORAL at 10:06

## 2025-06-02 RX ADMIN — IPRATROPIUM BROMIDE AND ALBUTEROL SULFATE 3 ML: 2.5; .5 SOLUTION RESPIRATORY (INHALATION) at 07:06

## 2025-06-02 RX ADMIN — FUROSEMIDE 40 MG: 10 INJECTION, SOLUTION INTRAVENOUS at 09:06

## 2025-06-02 RX ADMIN — FUROSEMIDE 40 MG: 10 INJECTION, SOLUTION INTRAVENOUS at 10:06

## 2025-06-02 RX ADMIN — ASPIRIN 81 MG: 81 TABLET, COATED ORAL at 10:06

## 2025-06-02 RX ADMIN — CARVEDILOL 6.25 MG: 6.25 TABLET, FILM COATED ORAL at 10:06

## 2025-06-02 RX ADMIN — ATORVASTATIN CALCIUM 40 MG: 40 TABLET, FILM COATED ORAL at 10:06

## 2025-06-02 RX ADMIN — SACUBITRIL AND VALSARTAN 1 TABLET: 97; 103 TABLET, FILM COATED ORAL at 09:06

## 2025-06-02 RX ADMIN — HYDROCODONE BITARTRATE AND ACETAMINOPHEN 1 TABLET: 5; 325 TABLET ORAL at 06:06

## 2025-06-02 RX ADMIN — SPIRONOLACTONE 25 MG: 25 TABLET, FILM COATED ORAL at 10:06

## 2025-06-02 RX ADMIN — CYANOCOBALAMIN TAB 1000 MCG 1000 MCG: 1000 TAB at 10:06

## 2025-06-02 NOTE — INTERVAL H&P NOTE
The patient has been examined and the H&P has been reviewed:    I concur with the findings and no changes have occurred since H&P was written.    Anesthesia/Surgery risks, benefits and alternative options discussed and understood by patient/family.     Patient with a history of nonischemic cardiomyopathy.  One episode of chest pain this morning with troponin of 0.2.  Discussed risks and benefits of the procedure.  Instead of stress testing I recommend proceeding with right and left heart catheterization considering rest pain concerning for angina with troponin elevation.      Active Hospital Problems    Diagnosis  POA    *Elevated troponin [R79.89]  Yes    Class 3 severe obesity due to excess calories with serious comorbidity and body mass index (BMI) of 40.0 to 44.9 in adult [E66.813, E66.01, Z68.41]  Not Applicable    Mild intermittent asthma without complication [J45.20]  Yes    On amiodarone therapy [Z79.899]  Not Applicable    Nonischemic dilated cardiomyopathy [I42.0]  Yes     Chronic    Other chest pain [R07.89]  Yes      Resolved Hospital Problems    Diagnosis Date Resolved POA    Acute on chronic combined systolic (congestive) and diastolic (congestive) heart failure [I50.43] 02/20/2023 Yes

## 2025-06-02 NOTE — PLAN OF CARE
06/02/25 0950   Rounds   Attendance Provider;Nurse    Discharge Plan A Home   Why the patient remains in the hospital Requires continued medical care       0950  CM was informed by Dr Valdez that the pt is not medically stable to discharge & will have a TTE and LHC done today by Dr Callahan.     Pt was admitted with an elevated troponin (0.224) & is being followed by [facility]za.       [facility] - Cath Lab (Hospital)  Initial Discharge Assessment       Primary Care Provider: Tejinder Bowling MD    Admission Diagnosis: Chest pain [R07.9]    Admission Date: 5/31/2025  Expected Discharge Date: 6/3/2025    Consult: mirza    Payor: [employer] [facility] / Plan: [employer] / Product Type: Medicare Advantage /     Extended Emergency Contact Information  Primary Emergency Contact: Mario Wade   [facility]  Home Phone: [phone]  Mobile Phone: [phone]  Relation: Mother   needed? No  Secondary Emergency Contact: DylonabebeMillie  Mobile Phone: [phone]  Relation: Daughter    Discharge Plan A: (P) Home  Discharge Plan B: (P) Home Health      [facility] - [place] - [address]  [address] [facility]  [place] [address]  Phone: [phone] Fax: [phone]      Initial Assessment (most recent)       Adult Discharge Assessment - 06/02/25 1155          Discharge Assessment    Assessment Type Discharge Planning Assessment (P)      Confirmed/corrected address, phone number and insurance Yes (P)      Confirmed Demographics Correct on Facesheet (P)      Source of Information patient;family (P)    Millie ramos ([phone])    Communicated ANUPAMA with patient/caregiver Date not available/Unable to determine (P)      People in Home alone (P)      Do you expect to return to your current living situation? Yes (P)      Do you have help at home or someone to help you manage your care at home? Yes (P)      Prior to hospitilization cognitive status:  Alert/Oriented (P)      Current cognitive status: Alert/Oriented (P)      Equipment Currently Used at Home blood pressure machine (P)      Readmission within 30 days? No (P)      Patient currently being followed by outpatient case management? No (P)      Do you currently have service(s) that help you manage your care at home? No (P)      Do you take prescription medications? Yes (P)      Do you have prescription coverage? Yes (P)      Do you have any problems affording any of your prescribed medications? No (P)      Is the patient taking medications as prescribed? yes (P)      How do you get to doctors appointments? family or friend will provide (P)      Are you on dialysis? No (P)      Do you take coumadin? No (P)      Discharge Plan A Home (P)      Discharge Plan B Home Health (P)      DME Needed Upon Discharge  none (P)      Discharge Plan discussed with: Patient;Adult children (P)         Physical Activity    On average, how many days per week do you engage in moderate to strenuous exercise (like a brisk walk)? 2 days (P)      On average, how many minutes do you engage in exercise at this level? 20 min (P)         Financial Resource Strain    How hard is it for you to pay for the very basics like food, housing, medical care, and heating? Not hard at all (P)         Housing Stability    In the last 12 months, was there a time when you were not able to pay the mortgage or rent on time? No (P)      At any time in the past 12 months, were you homeless or living in a shelter (including now)? No (P)         Transportation Needs    In the past 12 months, has lack of transportation kept you from medical appointments or from getting medications? No (P)      In the past 12 months, has lack of transportation kept you from meetings, work, or from getting things needed for daily living? No (P)         Food Insecurity    Within the past 12 months, you worried that your food would run out before you got the money to buy more.  Never true (P)      Within the past 12 months, the food you bought just didn't last and you didn't have money to get more. Never true (P)         Stress    Do you feel stress - tense, restless, nervous, or anxious, or unable to sleep at night because your mind is troubled all the time - these days? To some extent (P)         Social Isolation    How often do you feel lonely or isolated from those around you?  Never (P)         Alcohol Use    Q1: How often do you have a drink containing alcohol? Monthly or less (P)      Q2: How many drinks containing alcohol do you have on a typical day when you are drinking? 1 or 2 (P)      Q3: How often do you have six or more drinks on one occasion? Monthly (P)         Utilities    In the past 12 months has the electric, gas, oil, or water company threatened to shut off services in your home? No (P)         Health Literacy    How often do you need to have someone help you when you read instructions, pamphlets, or other written material from your doctor or pharmacy? Rarely (P)    pt wears glasses                  1155  Patient resting quietly in bed with her daughter, Millie Mahajan, at the bedside when CM rounded.    Patient lives alone, is independent of all ADLs, & denied the need for assistance with transportation at time of discharge.     CM updated patient's whiteboard with CM name & contact information.     1435  Hospital follow up appointment scheduled for the pt with LISA Patel on 6/5/2025 at 1120. Information added to the pt's discharge paperwork.    1510  Message sent to Dr Callahan's (cards)  requesting that the pt be notified of a hospfu appt. Information added to the pt's discharge paperwork.        Will continue to follow.

## 2025-06-02 NOTE — SUBJECTIVE & OBJECTIVE
Interval History: NAEON. Awaiting heart procedure this morning.     Review of Systems   Constitutional:  Negative for chills and fever.   Respiratory:  Positive for cough. Negative for shortness of breath.    Cardiovascular:  Negative for chest pain.   Gastrointestinal:  Negative for abdominal pain.   Genitourinary:  Negative for dysuria.   Neurological:  Negative for headaches.   Psychiatric/Behavioral:  Negative for confusion.      Objective:     Vital Signs (Most Recent):  Temp: 97.6 °F (36.4 °C) (06/02/25 0751)  Pulse: (!) 58 (06/02/25 1600)  Resp: 17 (06/02/25 1600)  BP: (!) 118/56 (06/02/25 1600)  SpO2: 99 % (06/02/25 1600) Vital Signs (24h Range):  Temp:  [97.6 °F (36.4 °C)-97.7 °F (36.5 °C)] 97.6 °F (36.4 °C)  Pulse:  [54-69] 58  Resp:  [16-20] 17  SpO2:  [97 %-99 %] 99 %  BP: (102-125)/(49-73) 118/56     Weight: 117.9 kg (260 lb)  Body mass index is 40.72 kg/m².    Intake/Output Summary (Last 24 hours) at 6/2/2025 1639  Last data filed at 6/2/2025 1032  Gross per 24 hour   Intake 240 ml   Output --   Net 240 ml         Physical Exam  Vitals and nursing note reviewed.   Constitutional:       General: She is not in acute distress.     Appearance: She is well-developed. She is morbidly obese.   HENT:      Head: Normocephalic and atraumatic.   Eyes:      Conjunctiva/sclera: Conjunctivae normal.   Neck:      Vascular: No JVD.   Cardiovascular:      Rate and Rhythm: Normal rate and regular rhythm.      Heart sounds: Normal heart sounds.   Pulmonary:      Effort: Pulmonary effort is normal.      Breath sounds: Normal breath sounds.   Abdominal:      General: Bowel sounds are normal. There is no distension.      Palpations: Abdomen is soft.      Tenderness: There is no abdominal tenderness.   Musculoskeletal:      Cervical back: Neck supple.      Right lower leg: No edema.      Left lower leg: No edema.   Neurological:      Mental Status: She is alert.   Psychiatric:         Behavior: Behavior normal.                Significant Labs: All pertinent labs within the past 24 hours have been reviewed.  CBC:   Recent Labs   Lab 06/01/25 0228   WBC 5.34   HGB 11.3*   HCT 34.1*        CMP:   Recent Labs   Lab 06/01/25 0228      K 3.4*      CO2 23   GLU 92   BUN 24*   CREATININE 1.3   CALCIUM 8.5*   ANIONGAP 9     Troponin:   Recent Labs   Lab 05/31/25  1643   TROPONINI 0.207*       Significant Imaging: I have reviewed all pertinent imaging results/findings within the past 24 hours.

## 2025-06-02 NOTE — PLAN OF CARE
Problem: Adult Inpatient Plan of Care  Goal: Patient-Specific Goal (Individualized)  Outcome: Progressing     Problem: Bariatric Environmental Safety  Goal: Safety Maintained with Care  Outcome: Progressing     Problem: Fall Injury Risk  Goal: Absence of Fall and Fall-Related Injury  Outcome: Progressing     Problem: Chest Pain  Goal: Resolution of Chest Pain Symptoms  Outcome: Progressing     AAOx4. Medications administered per MAR. Tele monitored. Safety precautions maintained. Call bell within reach. NPO midnight.

## 2025-06-02 NOTE — PLAN OF CARE
06/01/25 1946   Patient Assessment/Suction   Level of Consciousness (AVPU) alert   Respiratory Effort Normal;Unlabored   Expansion/Accessory Muscles/Retractions no retractions;no use of accessory muscles   All Lung Fields Breath Sounds Anterior:;Lateral:   GODFREY Breath Sounds clear   LLL Breath Sounds diminished   RUL Breath Sounds clear   RML Breath Sounds clear   RLL Breath Sounds diminished   Rhythm/Pattern, Respiratory depth regular;pattern regular;unlabored   Cough Frequency no cough   PRE-TX-O2   Device (Oxygen Therapy) room air   SpO2 99 %   Pulse Oximetry Type Intermittent   $ Pulse Oximetry - Multiple Charge Pulse Oximetry - Multiple   Pulse (!) 54   Resp 18   Aerosol Therapy   $ Aerosol Therapy Charges Aerosol Treatment   Daily Review of Necessity (SVN) completed   Respiratory Treatment Status (SVN) given   Treatment Route (SVN) mask   Patient Position HOB elevated   Post Treatment Assessment (SVN) breath sounds unchanged   Signs of Intolerance (SVN) none   Breath Sounds Post-Respiratory Treatment   Throughout All Fields Post-Treatment All Fields   Throughout All Fields Post-Treatment no change   Post-treatment Heart Rate (beats/min) 60   Post-treatment Resp Rate (breaths/min) 18   Respiratory Evaluation   $ Care Plan Tech Time 15 min

## 2025-06-02 NOTE — BRIEF OP NOTE
Procedure performed:   Left heart catheterization   Coronary angiogram    Right common femoral artery angiogram   Perclose  closure of the right common femoral artery     Indication for the procedure:   Chest pain, troponin elevation, history of nonischemic cardiomyopathy with severely reduced left ventricular ejection fraction.     Description of the procedure:   Patient was brought to the cath lab and given sedation using Versed and fentanyl.   Prior to sedating the patient, risks and benefits of the procedure discussed with the patient, written consent obtained before proceeding with invasive angiogram.    Very small right radial artery noted on ultrasound.  Right common femoral artery access was achieved using micropuncture technique and ultrasound guidance.  JL4 diagnostic catheter used to engage the left main coronary artery.  JR4 diagnostic catheter used to engage the right coronary artery.  Four Cymraes pigtail used to cross the aortic valve into the left ventricle.  LVEDP was recorded.  LV-gram not performed as a part of this procedure.  A pullback across the aortic valve was performed.  Once all the images were acquired the pigtail catheter was removed.     Perclose closure device was used for closure of the right common femoral artery.     Findings:     Right dominant coronary circulation   No significant obstructive coronary artery disease of left main coronary artery, left circumflex artery, lad, right coronary artery     LVEDP of 25 mm Hg.  No significant gradient across the aortic valve.  LV-gram not performed as a part of the study.         Recommendations:     Continue dual antiplatelet therapy for at least 1 year.    High-intensity statin therapy     Groin access site precautions.    Goal-directed medical therapy for nonischemic cardiomyopathy.  Follow up in heart transplant clinic after discharge   Switch to IV diuretic therapy while inpatient.

## 2025-06-02 NOTE — PLAN OF CARE
Care assumed.  Awake and alert.  VSS on cardiac monitor.  Right wrist gauze and tegaderm dressing CDI with no bleeding or swelling noted to site.  Right groin gauze and tegaderm dressing CDI with no bleeding or swelling noted to site.  Palpated bilateral DP pulses and doppler bilateral PT pulses.  No c/o of pain at this time.  Juice and crackers offered at this time.

## 2025-06-02 NOTE — ASSESSMENT & PLAN NOTE
Continue GDMT     Results for orders placed during the hospital encounter of 05/31/25    Echo    Interpretation Summary    Left Ventricle: The left ventricle is severely dilated. Normal wall thickness. There is eccentric hypertrophy. Global hypokinesis present. Septal motion is consistent with bundle branch block. There is severely reduced systolic function with a visually estimated ejection fraction of 15 - 20%. Grade II diastolic dysfunction. Elevated left ventricular filling pressure. LVIDD 8.7 cm. LVIDS 7.6 cm.    Right Ventricle: The right ventricle is normal in size Systolic function is normal. TAPSE is 2.5 cm.    Left Atrium: The left atrium is severely dilated measuring 112 mL/m2.    Mitral Valve: There is severe regurgitation.    Tricuspid Valve: There is mild regurgitation.    Pulmonary Artery: The estimated pulmonary artery systolic pressure is 44 mmHg.    IVC/SVC: Normal venous pressure at 3 mmHg.    S/p LHC  Findings:      Right dominant coronary circulation   No significant obstructive coronary artery disease of left main coronary artery, left circumflex artery, lad, right coronary artery     LVEDP of 25 mm Hg.  No significant gradient across the aortic valve.  LV-gram not performed as a part of the study.         Recommendations:      Continue dual antiplatelet therapy for at least 1 year.    High-intensity statin therapy     Groin access site precautions.    Goal-directed medical therapy for nonischemic cardiomyopathy.  Follow up in heart transplant clinic after discharge       Discussed findings with Dr. Price. IV diuresis for high LVEDP. Possible DC tomorrow.

## 2025-06-02 NOTE — PLAN OF CARE
Patient transferred via stretcher to Room 504 5th floor medsur on assigned cardiac tele monitor. VSS, AAOx4. No c/o pain.   Right groin and wrist gauze/tegaderm site CDI. No bleeding no hematoma noted. Site and surrounding area soft.

## 2025-06-02 NOTE — PROGRESS NOTES
"[facility] Hospital Medicine  Progress Note    Patient Name: Mario Mahajan  MRN: [id number]  Patient Class: IP- Inpatient   Admission Date: 5/31/2025  Length of Stay: 1 days  Attending Physician: Emma Valdez MD  Primary Care Provider: Tejinder Bowling MD        Subjective     Principal Problem:Elevated troponin        HPI:  Mario Márquez is a very pleasant 49-year-old female that presented to the emergency department at [facility] for the evaluation of cough".  The patient presented to the emergency department reporting a 5 day history of cough.  She reports that she had been recently around some toddlers that was sick and attributed her symptoms to the recent sickness.  On today, she reported that she started to experience intermittent wheezing and noted that her abdominal seemed as though if it was swelling.  When her symptoms worsened, this prompted her to present to the emergency department for further evaluation.    Overview/Hospital Course:  No notes on file    Interval History: NAEON. Awaiting heart procedure this morning.     Review of Systems   Constitutional:  Negative for chills and fever.   Respiratory:  Positive for cough. Negative for shortness of breath.    Cardiovascular:  Negative for chest pain.   Gastrointestinal:  Negative for abdominal pain.   Genitourinary:  Negative for dysuria.   Neurological:  Negative for headaches.   Psychiatric/Behavioral:  Negative for confusion.      Objective:     Vital Signs (Most Recent):  Temp: 97.6 °F (36.4 °C) (06/02/25 0751)  Pulse: (!) 58 (06/02/25 1600)  Resp: 17 (06/02/25 1600)  BP: (!) 118/56 (06/02/25 1600)  SpO2: 99 % (06/02/25 1600) Vital Signs (24h Range):  Temp:  [97.6 °F (36.4 °C)-97.7 °F (36.5 °C)] 97.6 °F (36.4 °C)  Pulse:  [54-69] 58  Resp:  [16-20] 17  SpO2:  [97 %-99 %] 99 %  BP: (102-125)/(49-73) 118/56     Weight: 117.9 kg (260 lb)  Body mass index is 40.72 kg/m².    Intake/Output Summary (Last 24 hours) at 6/2/2025 1639  Last " data filed at 6/2/2025 1032  Gross per 24 hour   Intake 240 ml   Output --   Net 240 ml         Physical Exam  Vitals and nursing note reviewed.   Constitutional:       General: She is not in acute distress.     Appearance: She is well-developed. She is morbidly obese.   HENT:      Head: Normocephalic and atraumatic.   Eyes:      Conjunctiva/sclera: Conjunctivae normal.   Neck:      Vascular: No JVD.   Cardiovascular:      Rate and Rhythm: Normal rate and regular rhythm.      Heart sounds: Normal heart sounds.   Pulmonary:      Effort: Pulmonary effort is normal.      Breath sounds: Normal breath sounds.   Abdominal:      General: Bowel sounds are normal. There is no distension.      Palpations: Abdomen is soft.      Tenderness: There is no abdominal tenderness.   Musculoskeletal:      Cervical back: Neck supple.      Right lower leg: No edema.      Left lower leg: No edema.   Neurological:      Mental Status: She is alert.   Psychiatric:         Behavior: Behavior normal.               Significant Labs: All pertinent labs within the past 24 hours have been reviewed.  CBC:   Recent Labs   Lab 06/01/25 0228   WBC 5.34   HGB 11.3*   HCT 34.1*        CMP:   Recent Labs   Lab 06/01/25 0228      K 3.4*      CO2 23   GLU 92   BUN 24*   CREATININE 1.3   CALCIUM 8.5*   ANIONGAP 9     Troponin:   Recent Labs   Lab 05/31/25  1643   TROPONINI 0.207*       Significant Imaging: I have reviewed all pertinent imaging results/findings within the past 24 hours.      Assessment & Plan  Other chest pain  C/o post tussive CP at this time  CXR/ labs not suggestive of PNA  Supportive care  Nonischemic dilated cardiomyopathy  Continue GDMT     Results for orders placed during the hospital encounter of 05/31/25    Echo    Interpretation Summary    Left Ventricle: The left ventricle is severely dilated. Normal wall thickness. There is eccentric hypertrophy. Global hypokinesis present. Septal motion is consistent with  bundle branch block. There is severely reduced systolic function with a visually estimated ejection fraction of 15 - 20%. Grade II diastolic dysfunction. Elevated left ventricular filling pressure. LVIDD 8.7 cm. LVIDS 7.6 cm.    Right Ventricle: The right ventricle is normal in size Systolic function is normal. TAPSE is 2.5 cm.    Left Atrium: The left atrium is severely dilated measuring 112 mL/m2.    Mitral Valve: There is severe regurgitation.    Tricuspid Valve: There is mild regurgitation.    Pulmonary Artery: The estimated pulmonary artery systolic pressure is 44 mmHg.    IVC/SVC: Normal venous pressure at 3 mmHg.    S/p LHC  Findings:      Right dominant coronary circulation   No significant obstructive coronary artery disease of left main coronary artery, left circumflex artery, lad, right coronary artery     LVEDP of 25 mm Hg.  No significant gradient across the aortic valve.  LV-gram not performed as a part of the study.         Recommendations:      Continue dual antiplatelet therapy for at least 1 year.    High-intensity statin therapy     Groin access site precautions.    Goal-directed medical therapy for nonischemic cardiomyopathy.  Follow up in heart transplant clinic after discharge       Discussed findings with Dr. Price. IV diuresis for high LVEDP. Possible DC tomorrow.  On amiodarone therapy  Hold per cards    Mild intermittent asthma without complication  -continue scheduled Duoneb treatments    Class 3 severe obesity due to excess calories with serious comorbidity and body mass index (BMI) of 40.0 to 44.9 in adult  Body mass index is 40.72 kg/m². Morbid obesity complicates all aspects of disease management from diagnostic modalities to treatment. Weight loss encouraged and health benefits explained to patient.       Elevated troponin  Appreciate cardiology assistance  S/p LHC- non obstructive CAD  Continue medical management    VTE Risk Mitigation (From admission, onward)           Ordered      heparin infusion 1,000 units/500 ml in 0.9% NaCl (pressure line flush)  Intra-op continuous PRN         06/02/25 1437     enoxaparin injection 40 mg  Daily         05/31/25 1552     IP VTE HIGH RISK PATIENT  Once         05/31/25 1552     Place sequential compression device  Until discontinued         05/31/25 1552                    Discharge Planning   ANUPAMA: 6/3/2025     Code Status: Full Code   Medical Readiness for Discharge Date:   Discharge Plan A: Home          Emma Valdez MD  Department of Hospital Medicine   [facility]

## 2025-06-03 VITALS
HEART RATE: 61 BPM | SYSTOLIC BLOOD PRESSURE: 100 MMHG | DIASTOLIC BLOOD PRESSURE: 53 MMHG | BODY MASS INDEX: 40.81 KG/M2 | RESPIRATION RATE: 17 BRPM | HEIGHT: 67 IN | WEIGHT: 260 LBS | TEMPERATURE: 97 F | OXYGEN SATURATION: 98 %

## 2025-06-03 LAB
ANION GAP (OHS): 9 MMOL/L (ref 8–16)
BACTERIA SPEC CULT: NORMAL
BUN SERPL-MCNC: 23 MG/DL (ref 6–20)
CALCIUM SERPL-MCNC: 8.8 MG/DL (ref 8.7–10.5)
CHLORIDE SERPL-SCNC: 104 MMOL/L (ref 95–110)
CHOLEST SERPL-MCNC: 127 MG/DL (ref 120–199)
CHOLEST/HDLC SERPL: 2.8 {RATIO} (ref 2–5)
CO2 SERPL-SCNC: 25 MMOL/L (ref 23–29)
CREAT SERPL-MCNC: 1.2 MG/DL (ref 0.5–1.4)
GFR SERPLBLD CREATININE-BSD FMLA CKD-EPI: 56 ML/MIN/1.73/M2
GLUCOSE SERPL-MCNC: 94 MG/DL (ref 70–110)
GRAM STN SPEC: NORMAL
HDLC SERPL-MCNC: 46 MG/DL (ref 40–75)
HDLC SERPL: 36.2 % (ref 20–50)
LDLC SERPL CALC-MCNC: 69.6 MG/DL (ref 63–159)
MAGNESIUM SERPL-MCNC: 2 MG/DL (ref 1.6–2.6)
NONHDLC SERPL-MCNC: 81 MG/DL
POTASSIUM SERPL-SCNC: 3.7 MMOL/L (ref 3.5–5.1)
SODIUM SERPL-SCNC: 138 MMOL/L (ref 136–145)
TRIGL SERPL-MCNC: 57 MG/DL (ref 30–150)

## 2025-06-03 PROCEDURE — 99900035 HC TECH TIME PER 15 MIN (STAT)

## 2025-06-03 PROCEDURE — 83735 ASSAY OF MAGNESIUM: CPT | Performed by: STUDENT IN AN ORGANIZED HEALTH CARE EDUCATION/TRAINING PROGRAM

## 2025-06-03 PROCEDURE — 25000003 PHARM REV CODE 250: Performed by: FAMILY MEDICINE

## 2025-06-03 PROCEDURE — 25000003 PHARM REV CODE 250: Performed by: NURSE PRACTITIONER

## 2025-06-03 PROCEDURE — 25000003 PHARM REV CODE 250: Performed by: INTERNAL MEDICINE

## 2025-06-03 PROCEDURE — 82310 ASSAY OF CALCIUM: CPT | Performed by: STUDENT IN AN ORGANIZED HEALTH CARE EDUCATION/TRAINING PROGRAM

## 2025-06-03 PROCEDURE — 25000003 PHARM REV CODE 250: Performed by: REGISTERED NURSE

## 2025-06-03 PROCEDURE — 63600175 PHARM REV CODE 636 W HCPCS: Performed by: INTERNAL MEDICINE

## 2025-06-03 PROCEDURE — 94761 N-INVAS EAR/PLS OXIMETRY MLT: CPT

## 2025-06-03 PROCEDURE — 99233 SBSQ HOSP IP/OBS HIGH 50: CPT | Mod: ,,, | Performed by: NURSE PRACTITIONER

## 2025-06-03 PROCEDURE — 36415 COLL VENOUS BLD VENIPUNCTURE: CPT | Performed by: STUDENT IN AN ORGANIZED HEALTH CARE EDUCATION/TRAINING PROGRAM

## 2025-06-03 RX ORDER — SPIRONOLACTONE 25 MG/1
25 TABLET ORAL DAILY
Qty: 90 TABLET | Refills: 0 | Status: SHIPPED | OUTPATIENT
Start: 2025-06-03

## 2025-06-03 RX ORDER — BENZONATATE 200 MG/1
200 CAPSULE ORAL 3 TIMES DAILY PRN
Qty: 30 CAPSULE | Refills: 0 | Status: SHIPPED | OUTPATIENT
Start: 2025-06-03 | End: 2025-06-13

## 2025-06-03 RX ORDER — CODEINE PHOSPHATE AND GUAIFENESIN 10; 100 MG/5ML; MG/5ML
5 SOLUTION ORAL EVERY 8 HOURS PRN
Qty: 75 ML | Refills: 0 | Status: SHIPPED | OUTPATIENT
Start: 2025-06-03 | End: 2025-06-08

## 2025-06-03 RX ORDER — CLOPIDOGREL BISULFATE 75 MG/1
75 TABLET ORAL DAILY
Qty: 30 TABLET | Refills: 11 | Status: SHIPPED | OUTPATIENT
Start: 2025-06-04 | End: 2026-06-04

## 2025-06-03 RX ORDER — CARVEDILOL 6.25 MG/1
6.25 TABLET ORAL 2 TIMES DAILY
Qty: 180 TABLET | Refills: 3 | Status: SHIPPED | OUTPATIENT
Start: 2025-06-03 | End: 2026-06-03

## 2025-06-03 RX ADMIN — ASPIRIN 81 MG: 81 TABLET, COATED ORAL at 10:06

## 2025-06-03 RX ADMIN — GUAIFENESIN AND CODEINE PHOSPHATE 5 ML: 100; 10 SOLUTION ORAL at 10:06

## 2025-06-03 RX ADMIN — ATORVASTATIN CALCIUM 40 MG: 40 TABLET, FILM COATED ORAL at 10:06

## 2025-06-03 RX ADMIN — CLOPIDOGREL 75 MG: 75 TABLET ORAL at 10:06

## 2025-06-03 RX ADMIN — AMIODARONE HYDROCHLORIDE 200 MG: 200 TABLET ORAL at 10:06

## 2025-06-03 RX ADMIN — SACUBITRIL AND VALSARTAN 1 TABLET: 97; 103 TABLET, FILM COATED ORAL at 10:06

## 2025-06-03 RX ADMIN — CARVEDILOL 6.25 MG: 6.25 TABLET, FILM COATED ORAL at 10:06

## 2025-06-03 RX ADMIN — FUROSEMIDE 40 MG: 10 INJECTION, SOLUTION INTRAVENOUS at 10:06

## 2025-06-03 RX ADMIN — CETIRIZINE HYDROCHLORIDE 10 MG: 10 TABLET, FILM COATED ORAL at 10:06

## 2025-06-03 RX ADMIN — SPIRONOLACTONE 25 MG: 25 TABLET, FILM COATED ORAL at 10:06

## 2025-06-03 RX ADMIN — BENZONATATE 200 MG: 100 CAPSULE ORAL at 10:06

## 2025-06-03 RX ADMIN — CYANOCOBALAMIN TAB 1000 MCG 1000 MCG: 1000 TAB at 10:06

## 2025-06-03 NOTE — ASSESSMENT & PLAN NOTE
Continue GDMT     Results for orders placed during the hospital encounter of 05/31/25    Echo    Interpretation Summary    Left Ventricle: The left ventricle is severely dilated. Normal wall thickness. There is eccentric hypertrophy. Global hypokinesis present. Septal motion is consistent with bundle branch block. There is severely reduced systolic function with a visually estimated ejection fraction of 15 - 20%. Grade II diastolic dysfunction. Elevated left ventricular filling pressure. LVIDD 8.7 cm. LVIDS 7.6 cm.    Right Ventricle: The right ventricle is normal in size Systolic function is normal. TAPSE is 2.5 cm.    Left Atrium: The left atrium is severely dilated measuring 112 mL/m2.    Mitral Valve: There is severe regurgitation.    Tricuspid Valve: There is mild regurgitation.    Pulmonary Artery: The estimated pulmonary artery systolic pressure is 44 mmHg.    IVC/SVC: Normal venous pressure at 3 mmHg.    S/p LHC  Findings:      Right dominant coronary circulation   No significant obstructive coronary artery disease of left main coronary artery, left circumflex artery, lad, right coronary artery     LVEDP of 25 mm Hg.  No significant gradient across the aortic valve.  LV-gram not performed as a part of the study.         Recommendations:      Continue dual antiplatelet therapy for at least 1 year.    High-intensity statin therapy     Groin access site precautions.    Goal-directed medical therapy for nonischemic cardiomyopathy.  Follow up in heart transplant clinic after discharge       Cardio recommended switching back to her oral diuretics and discharge with close outpatient cardio follow up

## 2025-06-03 NOTE — PLAN OF CARE
Problem: Adult Inpatient Plan of Care  Goal: Plan of Care Review  Outcome: Adequate for Care Transition  Goal: Patient-Specific Goal (Individualized)  Outcome: Adequate for Care Transition  Goal: Absence of Hospital-Acquired Illness or Injury  Outcome: Adequate for Care Transition  Goal: Optimal Comfort and Wellbeing  Outcome: Adequate for Care Transition  Goal: Readiness for Transition of Care  Outcome: Adequate for Care Transition     Problem: Bariatric Environmental Safety  Goal: Safety Maintained with Care  Outcome: Adequate for Care Transition     Problem: Fall Injury Risk  Goal: Absence of Fall and Fall-Related Injury  Outcome: Adequate for Care Transition     Problem: Heart Failure  Goal: Optimal Coping  Outcome: Adequate for Care Transition  Goal: Optimal Cardiac Output  Outcome: Adequate for Care Transition  Goal: Stable Heart Rate and Rhythm  Outcome: Adequate for Care Transition  Goal: Optimal Functional Ability  Outcome: Adequate for Care Transition  Goal: Fluid and Electrolyte Balance  Outcome: Adequate for Care Transition  Goal: Improved Oral Intake  Outcome: Adequate for Care Transition  Goal: Effective Oxygenation and Ventilation  Outcome: Adequate for Care Transition  Goal: Effective Breathing Pattern During Sleep  Outcome: Adequate for Care Transition     Problem: Comorbidity Management  Goal: Maintenance of Asthma Control  Outcome: Adequate for Care Transition  Goal: Maintenance of Heart Failure Symptom Control  Outcome: Adequate for Care Transition     Problem: Chest Pain  Goal: Resolution of Chest Pain Symptoms  Outcome: Adequate for Care Transition     Problem: Wound  Goal: Optimal Coping  Outcome: Adequate for Care Transition  Goal: Optimal Functional Ability  Outcome: Adequate for Care Transition  Goal: Absence of Infection Signs and Symptoms  Outcome: Adequate for Care Transition  Goal: Improved Oral Intake  Outcome: Adequate for Care Transition  Goal: Optimal Pain Control and  Function  Outcome: Adequate for Care Transition  Goal: Skin Health and Integrity  Outcome: Adequate for Care Transition  Goal: Optimal Wound Healing  Outcome: Adequate for Care Transition

## 2025-06-03 NOTE — HOSPITAL COURSE
06/02/2025    Findings:      Right dominant coronary circulation   No significant obstructive coronary artery disease of left main coronary artery, left circumflex artery, lad, right coronary artery     LVEDP of 25 mm Hg.  No significant gradient across the aortic valve.  LV-gram not performed as a part of the study.            Recommendations:      Continue dual antiplatelet therapy for at least 1 year.    High-intensity statin therapy     Groin access site precautions.    Goal-directed medical therapy for nonischemic cardiomyopathy.  Follow up in heart transplant clinic after discharge   Switch to IV diuretic therapy while inpatient.    06/03/2025 No cath related complications. Hemodynamically stable.

## 2025-06-03 NOTE — DISCHARGE SUMMARY
"[facility] Medicine  Discharge Summary      Patient Name: Mario Mahajan  MRN: [id number]  RANDY: [id number]  Patient Class: IP- Inpatient  Admission Date: 5/31/2025  Hospital Length of Stay: 2 days  Discharge Date and Time: 06/03/2025 3:29 PM  Attending Physician: Evelin Rojas MD   Discharging Provider: Evelin Rojas MD  Primary Care Provider: Tejinder Bowling MD    Primary Care Team: Networked reference to record PCT     HPI:   Mario Márquez is a very pleasant 49-year-old female that presented to the emergency department at [facility] for the evaluation of cough".  The patient presented to the emergency department reporting a 5 day history of cough.  She reports that she had been recently around some toddlers that was sick and attributed her symptoms to the recent sickness.  On today, she reported that she started to experience intermittent wheezing and noted that her abdominal seemed as though if it was swelling.  When her symptoms worsened, this prompted her to present to the emergency department for further evaluation.    Procedure(s) (LRB):  Left heart cath (Left)  INSERTION, CATHETER, RIGHT HEART (Right)      Hospital Course:   Patient was admitted to the hospital for shortness and abdominal pain, she was evaluated by Cardiology and had left heart catheterization done on 6 /2 showing   Right dominant coronary circulation   No significant obstructive coronary artery disease of left main coronary artery, left circumflex artery, lad, right coronary artery     LVEDP of 25 mm Hg.  No significant gradient across the aortic valve.  LV-gram not performed as a part of the study.    Patient was diuresed with IV Lasix, with the improvement in her symptoms, cardio recommended outpatient follow up with the heart failure Clinic    As for her cough, chest x-ray has no infiltrate, patient has been afebrile and no leukocytosis, respiratory culture showed normal oral trupti, clinically improving " , no wheezing today        Goals of Care Treatment Preferences:  Code Status: Full Code      SDOH Screening:  The patient was screened for utility difficulties, food insecurity, transport difficulties, housing insecurity, and interpersonal safety and there were no concerns identified this admission.     Consults:   Consults (From admission, onward)          Status Ordering Provider     Inpatient consult to Social Work/Case Management  Once        Provider:  (Not yet assigned)    Completed ORDERS, INSTANT     Inpatient consult to Cardiology-[facility]  Once        Provider:  (Not yet assigned)    Completed SAM CRAFT            Assessment & Plan  Other chest pain  C/o post tussive CP at this time  CXR/ labs not suggestive of PNA  Supportive care  Nonischemic dilated cardiomyopathy  Continue GDMT     Results for orders placed during the hospital encounter of 05/31/25    Echo    Interpretation Summary    Left Ventricle: The left ventricle is severely dilated. Normal wall thickness. There is eccentric hypertrophy. Global hypokinesis present. Septal motion is consistent with bundle branch block. There is severely reduced systolic function with a visually estimated ejection fraction of 15 - 20%. Grade II diastolic dysfunction. Elevated left ventricular filling pressure. LVIDD 8.7 cm. LVIDS 7.6 cm.    Right Ventricle: The right ventricle is normal in size Systolic function is normal. TAPSE is 2.5 cm.    Left Atrium: The left atrium is severely dilated measuring 112 mL/m2.    Mitral Valve: There is severe regurgitation.    Tricuspid Valve: There is mild regurgitation.    Pulmonary Artery: The estimated pulmonary artery systolic pressure is 44 mmHg.    IVC/SVC: Normal venous pressure at 3 mmHg.    S/p LHC  Findings:      Right dominant coronary circulation   No significant obstructive coronary artery disease of left main coronary artery, left circumflex artery, lad, right coronary artery     LVEDP of 25 mm Hg.  No  significant gradient across the aortic valve.  LV-gram not performed as a part of the study.         Recommendations:      Continue dual antiplatelet therapy for at least 1 year.    High-intensity statin therapy     Groin access site precautions.    Goal-directed medical therapy for nonischemic cardiomyopathy.  Follow up in heart transplant clinic after discharge       Cardio recommended switching back to her oral diuretics and discharge with close outpatient cardio follow up  On amiodarone therapy      Mild intermittent asthma without complication  -continue scheduled Duoneb treatments    Class 3 severe obesity due to excess calories with serious comorbidity and body mass index (BMI) of 40.0 to 44.9 in adult  Body mass index is 40.72 kg/m². Morbid obesity complicates all aspects of disease management from diagnostic modalities to treatment. Weight loss encouraged and health benefits explained to patient.       Elevated troponin  Appreciate cardiology assistance  S/p LHC- non obstructive CAD  Continue medical management  DAPT and statin  History of ventricular tachycardia      Final Active Diagnoses:    Diagnosis Date Noted POA    PRINCIPAL PROBLEM:  Elevated troponin [R79.89] 02/15/2023 Yes    History of ventricular tachycardia [Z86.79] 11/20/2022 Not Applicable    Class 3 severe obesity due to excess calories with serious comorbidity and body mass index (BMI) of 40.0 to 44.9 in adult [E66.813, E66.01, Z68.41]  Not Applicable    Mild intermittent asthma without complication [J45.20] 01/23/2018 Yes    On amiodarone therapy [Z79.899] 06/28/2017 Not Applicable    Nonischemic dilated cardiomyopathy [I42.0] 12/01/2015 Yes     Chronic    Other chest pain [R07.89]  Yes      Problems Resolved During this Admission:    Diagnosis Date Noted Date Resolved POA    Acute on chronic combined systolic (congestive) and diastolic (congestive) heart failure [I50.43] 10/22/2021 02/20/2023 Yes       Discharged Condition:  good    Disposition:     Follow Up:   Follow-up Information       Kayy Patel PA-C Follow up on 6/5/2025.    Specialty: Family Medicine  Why: at 11:20 AM; hospital follow up appointment  Contact information:  [address] [place], [place] [address]ce [place] [id number] [phone]               Jeimy Srivastava MD Follow up on 8/15/2025.    Specialties: Cardiology, Transplant  Why: at 1:30pm; previously scheduled advanced heart failure clinic appointment; patient will be notified of a sooner appointment  Contact information:  [address]  [facility] [address]  [phone]                           Patient Instructions:   No discharge procedures on file.    Significant Diagnostic Studies: N/A    Pending Diagnostic Studies:       None           Medications:  Reconciled Home Medications:      Medication List        START taking these medications      benzonatate 200 MG capsule  Commonly known as: TESSALON  Take 1 capsule (200 mg total) by mouth 3 (three) times daily as needed for Cough.     clopidogreL 75 mg tablet  Commonly known as: PLAVIX  Take 1 tablet (75 mg total) by mouth once daily.  Start taking on: June 4, 2025            CHANGE how you take these medications      carvediloL 6.25 MG tablet  Commonly known as: COREG  Take 1 tablet (6.25 mg total) by mouth 2 (two) times daily.  What changed:   medication strength  how much to take     spironolactone 25 MG tablet  Commonly known as: ALDACTONE  Take 1 tablet (25 mg total) by mouth once daily.  What changed: when to take this            CONTINUE taking these medications      albuterol 90 mcg/actuation inhaler  Commonly known as: PROVENTIL/VENTOLIN HFA  Inhale 2 puffs into the lungs every 6 (six) hours as needed for Wheezing.     amiodarone 200 MG Tab  Commonly known as: PACERONE  Take 1 tablet by mouth once daily     aspirin 81 MG EC tablet  Commonly known as: ECOTRIN  Take 1 tablet (81 mg total) by mouth once daily.     atorvastatin 40 MG tablet  Commonly known as:  LIPITOR  Take 1 tablet (40 mg total) by mouth once daily.     bumetanide 1 MG tablet  Commonly known as: BUMEX  TAKE 2 TABLETS BY MOUTH ONCE DAILY IN THE MORNING AND 1 ONCE DAILY IN THE EVENING     cetirizine 10 MG tablet  Commonly known as: ZYRTEC  Take 10 mg by mouth daily as needed.     cyanocobalamin 1000 MCG tablet  Commonly known as: VITAMIN B-12  Take 1 tablet (1,000 mcg total) by mouth once daily.     diazePAM 5 MG tablet  Commonly known as: VALIUM  Take 1 tablet (5 mg total) by mouth On call Procedure for Anxiety. Take one 30 min prior and the other once at the imaging center if needed.     ENTRESTO  mg per tablet  Generic drug: sacubitriL-valsartan  Take 1 tablet by mouth twice daily     gabapentin 300 MG capsule  Commonly known as: NEURONTIN  Take 1-2 capsules (300-600 mg total) by mouth every evening.     hydrOXYzine HCL 25 MG tablet  Commonly known as: ATARAX  Take 1 tablet (25 mg total) by mouth 3 (three) times daily as needed for Anxiety.     metOLazone 2.5 MG tablet  Commonly known as: ZAROXOLYN  Take 1 tablet (2.5 mg total) by mouth as needed (Only take when advised by the heart failure/Cardiomems team).     OZEMPIC 1 mg/dose (4 mg/3 mL)  Generic drug: semaglutide  Inject 1 mg into the skin every 7 days.     tiZANidine 4 MG tablet  Commonly known as: ZANAFLEX  Take 1-2 tablets (4-8 mg total) by mouth every 8 (eight) hours as needed (muscle tension).     tretinoin 0.1 % cream  Commonly known as: RETIN-A  Apply topically every evening.              Indwelling Lines/Drains at time of discharge:   Lines/Drains/Airways       None                   Time spent on the discharge of patient: 35 minutes         Evelin Rojas MD  Department of Hospital Medicine  [facility] Surg

## 2025-06-03 NOTE — PLAN OF CARE
"0830  Per card note, "Follow up in heart transplant clinic after discharge". Previously scheduled appt with Dr Srivastava ([facility] advanced heart failure) on 8/15/2025 at 1330 noted. Message sent to Dr Srivastava's  requesting a sooner appt. Pt will be notified of appt date & time.        06/03/25 1015   Rounds   Attendance Provider;Nurse    Discharge Plan A Home     1015  Patient resting quietly in bed with family at the bedside when CM participated in SIBR with Dr Montalvo & nurse Nobles. Pt was admitted with an elevated troponin, is being followed by cards, & is s/p LHC done by Dr Callahan yesterday.     MD informed the pt that she is medically stable to dc home today. Patient in agreement with the discharge plan, denied the need for assistance with transportation at time of discharge, verbalized understanding regarding the hospital follow up appointments with LISA Patel on 6/5/2025 at 1120 & will be notified if a sooner appt with Dr Srivastava is available.     1430  Message sent to Dr Rojas questioning the pt's discharge status. Awaiting response.     1430  CM was informed by Dr Rojas that the pt's discharge is pending final cards recs.     1530  DC order noted. Message sent to nurse Nobles & virtual nurse Maxine informing that the pt is cleared to discharge.       Will continue to follow.   "

## 2025-06-03 NOTE — PROGRESS NOTES
[facility]  Cardiology  Progress Note    Patient Name: Mario Mahajan  MRN: [id number]  Admission Date: 5/31/2025  Hospital Length of Stay: 2 days  Code Status: Full Code   Attending Physician: Evelin Rojas MD   Primary Care Physician: Tejinder Bowling MD  Expected Discharge Date: 6/3/2025  Principal Problem:Elevated troponin    Subjective:     Hospital Course:   06/02/2025    Findings:      Right dominant coronary circulation   No significant obstructive coronary artery disease of left main coronary artery, left circumflex artery, lad, right coronary artery     LVEDP of 25 mm Hg.  No significant gradient across the aortic valve.  LV-gram not performed as a part of the study.            Recommendations:      Continue dual antiplatelet therapy for at least 1 year.    High-intensity statin therapy     Groin access site precautions.    Goal-directed medical therapy for nonischemic cardiomyopathy.  Follow up in heart transplant clinic after discharge   Switch to IV diuretic therapy while inpatient.    06/03/2025 No cath related complications. Hemodynamically stable.         Review of Systems   Constitutional: Negative.   HENT: Negative.     Cardiovascular:  Negative for chest pain.   Respiratory: Negative.  Negative for cough and shortness of breath.    Gastrointestinal: Negative.    Genitourinary: Negative.    Psychiatric/Behavioral: Negative.       Objective:     Vital Signs (Most Recent):  Temp: 97.5 °F (36.4 °C) (06/03/25 1113)  Pulse: (!) 54 (06/03/25 1220)  Resp: 18 (06/03/25 1113)  BP: 110/61 (06/03/25 1113)  SpO2: 98 % (06/03/25 1113) Vital Signs (24h Range):  Temp:  [97.5 °F (36.4 °C)-97.9 °F (36.6 °C)] 97.5 °F (36.4 °C)  Pulse:  [] 54  Resp:  [15-20] 18  SpO2:  [97 %-100 %] 98 %  BP: ()/(50-74) 110/61     Weight: 117.9 kg (260 lb)  Body mass index is 40.72 kg/m².     SpO2: 98 %         Intake/Output Summary (Last 24 hours) at 6/3/2025 1412  Last data filed at 6/3/2025 1028  Gross per  "24 hour   Intake --   Output 1500 ml   Net -1500 ml       Lines/Drains/Airways       Peripheral Intravenous Line  Duration                  Peripheral IV - Single Lumen 05/31/25 1345 20 G Right Forearm 3 days                       Physical Exam  Constitutional:       General: She is not in acute distress.     Appearance: She is not diaphoretic.   HENT:      Head: Atraumatic.   Eyes:      General:         Right eye: No discharge.         Left eye: No discharge.   Cardiovascular:      Rate and Rhythm: Bradycardia present.      Heart sounds: Murmur heard.   Abdominal:      General: Bowel sounds are normal.      Palpations: Abdomen is soft.   Skin:     General: Skin is warm and dry.   Neurological:      Mental Status: She is alert. Mental status is at baseline.            Significant Labs: BMP:   Recent Labs   Lab 06/03/25  0853   GLU 94      K 3.7      CO2 25   BUN 23*   CREATININE 1.2   CALCIUM 8.8   MG 2.0   , CMP   Recent Labs   Lab 06/03/25  0853      K 3.7      CO2 25   GLU 94   BUN 23*   CREATININE 1.2   CALCIUM 8.8   ANIONGAP 9   , CBC No results for input(s): "WBC", "HGB", "HCT", "PLT" in the last 48 hours., INR No results for input(s): "INR", "PROTIME" in the last 48 hours., Lipid Panel   Recent Labs   Lab 06/02/25  2341   CHOL 127   HDL 46   LDLCALC 69.6   TRIG 57   CHOLHDL 36.2   , Troponin No results for input(s): "TROPONINIHS" in the last 48 hours., and All pertinent lab results from the last 24 hours have been reviewed.    Significant Imaging: Echocardiogram: Transthoracic echo (TTE) complete (Cupid Only):   Results for orders placed or performed during the hospital encounter of 05/31/25   Echo   Result Value Ref Range    BSA 2.36 m2    Walters's Biplane MOD Ejection Fraction 24 %    A2C EF 22 %    A4C EF 22 %    LVOT stroke volume 15.8 cm3    LVIDd 8.7 (A) 3.5 - 6.0 cm    LV Systolic Volume 306 mL    LV Systolic Volume Index 135.4 mL/m2    LVIDs 7.6 (A) 2.1 - 4.0 cm    LV ESV A2C " 213.94 mL    LV Diastolic Volume 416 mL    LV ESV A4C 280.81 mL    LV Diastolic Volume Index 184.07 mL/m2    LV EDV A2C 365.81073274841477 mL    LV EDV A4C 463.28 mL    Left Ventricular End Systolic Volume by Teichholz Method 305.68 mL    Left Ventricular End Diastolic Volume by Teichholz Method 415.96 mL    IVS 0.7 0.6 - 1.1 cm    LVOT diameter 1.8 cm    LVOT area 2.5 cm2    FS 12.6 (A) 28 - 44 %    Left Ventricle Relative Wall Thickness 0.21 cm    PW 0.9 0.6 - 1.1 cm    LV mass 361.9 g    LV Mass Index 160.1 g/m2    MV Peak E Jae 1.95 m/s    TDI LATERAL 0.13 m/s    TDI SEPTAL 0.06 m/s    E/E' ratio 21 m/s    MV Peak A Jae 1.11 m/s    TR Max Jae 3.2 m/s    E/A ratio 1.76     E wave deceleration time 237 msec    LV SEPTAL E/E' RATIO 32.5 m/s    LV LATERAL E/E' RATIO 15.0 m/s    LVOT peak jae 0.4 m/s    Left Ventricular Outflow Tract Mean Velocity 0.27 cm/s    Left Ventricular Outflow Tract Mean Gradient 0.32 mmHg    RV- felton basal diam 3.4 cm    RV S' 8.99 cm/s    TAPSE 2.5 cm    RV/LV Ratio 0.39 cm    LA Vol (MOD) 254 mL    ANTOLIN (MOD) 112 mL/m2    RA area length vol 34.65 mL    RA Area 13.9 cm2    RA vol index 15.33 mL/m2    RA Vol 34.72 mL    AV mean gradient 3 mmHg    AV peak gradient 6 mmHg    Ao peak jae 1.2 m/s    Ao VTI 20.8 cm    LVOT peak VTI 6.2 cm    AV valve area 0.8 cm²    AV Velocity Ratio 0.33     AV index (prosthetic) 0.30     RAVI by Velocity Ratio 0.8 cm²    Mr max jae 5.23 m/s    MV mean gradient 7 mmHg    MV peak gradient 20 mmHg    MV stenosis pressure 1/2 time 72.29 ms    MV valve area p 1/2 method 3.04 cm2    MV valve area by continuity eq 0.26 cm2    MV VTI 59.6 cm    Triscuspid Valve Regurgitation Peak Gradient 40 mmHg    Sinus 2.60 cm    ASI 1.2 cm/m2    STJ 2.3 cm    Ascending aorta 2.9 cm    ASI 1.3 cm/m2    IVC diameter 1.77 cm    Mean e' 0.10 m/s    ZLVIDS 3.28     ZLVIDD 0.48     LA area A4C 55.99 cm2    LA area A2C 47.86 cm2    TV resting pulmonary artery pressure 44 mmHg    RV TB RVSP  6 mmHg    Est. RA pres 3 mmHg    Narrative      Left Ventricle: The left ventricle is severely dilated. Normal wall   thickness. There is eccentric hypertrophy. Global hypokinesis present.   Septal motion is consistent with bundle branch block. There is severely   reduced systolic function with a visually estimated ejection fraction of   15 - 20%. Grade II diastolic dysfunction. Elevated left ventricular   filling pressure. LVIDD 8.7 cm. LVIDS 7.6 cm.    Right Ventricle: The right ventricle is normal in size Systolic   function is normal. TAPSE is 2.5 cm.    Left Atrium: The left atrium is severely dilated measuring 112 mL/m2.    Mitral Valve: There is severe regurgitation.    Tricuspid Valve: There is mild regurgitation.    Pulmonary Artery: The estimated pulmonary artery systolic pressure is   44 mmHg.    IVC/SVC: Normal venous pressure at 3 mmHg.       Assessment and Plan:     Brief HPI: Patient seen this morning on rounds without cardiac complaint. Hemodynamically stable.     History of ventricular tachycardia  Currently SB  Continue Amiodarone     Class 3 severe obesity due to excess calories with serious comorbidity and body mass index (BMI) of 40.0 to 44.9 in adult  Body mass index is 40.72 kg/m². Morbid obesity complicates all aspects of disease management from diagnostic modalities to treatment. Weight loss encouraged and health benefits explained to patient.         Nonischemic dilated cardiomyopathy  Follow up with advanced HF as OP  TTE  Left Ventricle: The left ventricle is severely dilated. Normal wall   thickness. There is eccentric hypertrophy. Global hypokinesis present.   Septal motion is consistent with bundle branch block. There is severely   reduced systolic function with a visually estimated ejection fraction of   15 - 20%. Grade II diastolic dysfunction. Elevated left ventricular   filling pressure. LVIDD 8.7 cm. LVIDS 7.6 cm.    Right Ventricle: The right ventricle is normal in size Systolic    function is normal. TAPSE is 2.5 cm.    Left Atrium: The left atrium is severely dilated measuring 112 mL/m2.    Mitral Valve: There is severe regurgitation.    Tricuspid Valve: There is mild regurgitation.    Pulmonary Artery: The estimated pulmonary artery systolic pressure is   44 mmHg.    IVC/SVC: Normal venous pressure at 3 mmHg  Continue BB, Aldactone, Entresto, convert back to Metolazone and Bumex   Accurate intake and output  Daily weights           VTE Risk Mitigation (From admission, onward)           Ordered     heparin infusion 1,000 units/500 ml in 0.9% NaCl (pressure line flush)  Intra-op continuous PRN         06/02/25 1437     enoxaparin injection 40 mg  Daily         05/31/25 1552     IP VTE HIGH RISK PATIENT  Once         05/31/25 1552     Place sequential compression device  Until discontinued         05/31/25 1552                    Rodo Willis NP  Cardiology  [facility]

## 2025-06-03 NOTE — HOSPITAL COURSE
Patient was admitted to the hospital for shortness and abdominal pain, she was evaluated by Cardiology and had left heart catheterization done on 6 /2 showing   Right dominant coronary circulation   No significant obstructive coronary artery disease of left main coronary artery, left circumflex artery, lad, right coronary artery     LVEDP of 25 mm Hg.  No significant gradient across the aortic valve.  LV-gram not performed as a part of the study.    Patient was diuresed with IV Lasix, with the improvement in her symptoms, cardio recommended outpatient follow up with the heart failure Clinic    As for her cough, chest x-ray has no infiltrate, patient has been afebrile and no leukocytosis, respiratory culture showed normal oral trupti, clinically improving , no wheezing today

## 2025-06-03 NOTE — ASSESSMENT & PLAN NOTE
Follow up with advanced HF as OP  TTE  Left Ventricle: The left ventricle is severely dilated. Normal wall   thickness. There is eccentric hypertrophy. Global hypokinesis present.   Septal motion is consistent with bundle branch block. There is severely   reduced systolic function with a visually estimated ejection fraction of   15 - 20%. Grade II diastolic dysfunction. Elevated left ventricular   filling pressure. LVIDD 8.7 cm. LVIDS 7.6 cm.    Right Ventricle: The right ventricle is normal in size Systolic   function is normal. TAPSE is 2.5 cm.    Left Atrium: The left atrium is severely dilated measuring 112 mL/m2.    Mitral Valve: There is severe regurgitation.    Tricuspid Valve: There is mild regurgitation.    Pulmonary Artery: The estimated pulmonary artery systolic pressure is   44 mmHg.    IVC/SVC: Normal venous pressure at 3 mmHg  Continue BB, Aldactone, Entresto, convert back to Metolazone and Bumex   Accurate intake and output  Daily weights

## 2025-06-03 NOTE — SUBJECTIVE & OBJECTIVE
Review of Systems   Constitutional: Negative.   HENT: Negative.     Cardiovascular:  Negative for chest pain.   Respiratory: Negative.  Negative for cough and shortness of breath.    Gastrointestinal: Negative.    Genitourinary: Negative.    Psychiatric/Behavioral: Negative.       Objective:     Vital Signs (Most Recent):  Temp: 97.5 °F (36.4 °C) (06/03/25 1113)  Pulse: (!) 54 (06/03/25 1220)  Resp: 18 (06/03/25 1113)  BP: 110/61 (06/03/25 1113)  SpO2: 98 % (06/03/25 1113) Vital Signs (24h Range):  Temp:  [97.5 °F (36.4 °C)-97.9 °F (36.6 °C)] 97.5 °F (36.4 °C)  Pulse:  [] 54  Resp:  [15-20] 18  SpO2:  [97 %-100 %] 98 %  BP: ()/(50-74) 110/61     Weight: 117.9 kg (260 lb)  Body mass index is 40.72 kg/m².     SpO2: 98 %         Intake/Output Summary (Last 24 hours) at 6/3/2025 1412  Last data filed at 6/3/2025 1028  Gross per 24 hour   Intake --   Output 1500 ml   Net -1500 ml       Lines/Drains/Airways       Peripheral Intravenous Line  Duration                  Peripheral IV - Single Lumen 05/31/25 1345 20 G Right Forearm 3 days                       Physical Exam  Constitutional:       General: She is not in acute distress.     Appearance: She is not diaphoretic.   HENT:      Head: Atraumatic.   Eyes:      General:         Right eye: No discharge.         Left eye: No discharge.   Cardiovascular:      Rate and Rhythm: Bradycardia present.      Heart sounds: Murmur heard.   Abdominal:      General: Bowel sounds are normal.      Palpations: Abdomen is soft.   Skin:     General: Skin is warm and dry.   Neurological:      Mental Status: She is alert. Mental status is at baseline.            Significant Labs: BMP:   Recent Labs   Lab 06/03/25  0853   GLU 94      K 3.7      CO2 25   BUN 23*   CREATININE 1.2   CALCIUM 8.8   MG 2.0   , CMP   Recent Labs   Lab 06/03/25  0853      K 3.7      CO2 25   GLU 94   BUN 23*   CREATININE 1.2   CALCIUM 8.8   ANIONGAP 9   , CBC No results for  "input(s): "WBC", "HGB", "HCT", "PLT" in the last 48 hours., INR No results for input(s): "INR", "PROTIME" in the last 48 hours., Lipid Panel   Recent Labs   Lab 06/02/25  2341   CHOL 127   HDL 46   LDLCALC 69.6   TRIG 57   CHOLHDL 36.2   , Troponin No results for input(s): "TROPONINIHS" in the last 48 hours., and All pertinent lab results from the last 24 hours have been reviewed.    Significant Imaging: Echocardiogram: Transthoracic echo (TTE) complete (Cupid Only):   Results for orders placed or performed during the hospital encounter of 05/31/25   Echo   Result Value Ref Range    BSA 2.36 m2    Walters's Biplane MOD Ejection Fraction 24 %    A2C EF 22 %    A4C EF 22 %    LVOT stroke volume 15.8 cm3    LVIDd 8.7 (A) 3.5 - 6.0 cm    LV Systolic Volume 306 mL    LV Systolic Volume Index 135.4 mL/m2    LVIDs 7.6 (A) 2.1 - 4.0 cm    LV ESV A2C 213.94 mL    LV Diastolic Volume 416 mL    LV ESV A4C 280.81 mL    LV Diastolic Volume Index 184.07 mL/m2    LV EDV A2C 365.96494112585878 mL    LV EDV A4C 463.28 mL    Left Ventricular End Systolic Volume by Teichholz Method 305.68 mL    Left Ventricular End Diastolic Volume by Teichholz Method 415.96 mL    IVS 0.7 0.6 - 1.1 cm    LVOT diameter 1.8 cm    LVOT area 2.5 cm2    FS 12.6 (A) 28 - 44 %    Left Ventricle Relative Wall Thickness 0.21 cm    PW 0.9 0.6 - 1.1 cm    LV mass 361.9 g    LV Mass Index 160.1 g/m2    MV Peak E Jae 1.95 m/s    TDI LATERAL 0.13 m/s    TDI SEPTAL 0.06 m/s    E/E' ratio 21 m/s    MV Peak A Jae 1.11 m/s    TR Max Jae 3.2 m/s    E/A ratio 1.76     E wave deceleration time 237 msec    LV SEPTAL E/E' RATIO 32.5 m/s    LV LATERAL E/E' RATIO 15.0 m/s    LVOT peak jae 0.4 m/s    Left Ventricular Outflow Tract Mean Velocity 0.27 cm/s    Left Ventricular Outflow Tract Mean Gradient 0.32 mmHg    RV- felton basal diam 3.4 cm    RV S' 8.99 cm/s    TAPSE 2.5 cm    RV/LV Ratio 0.39 cm    LA Vol (MOD) 254 mL    ANTOLIN (MOD) 112 mL/m2    RA area length vol 34.65 mL    " RA Area 13.9 cm2    RA vol index 15.33 mL/m2    RA Vol 34.72 mL    AV mean gradient 3 mmHg    AV peak gradient 6 mmHg    Ao peak boone 1.2 m/s    Ao VTI 20.8 cm    LVOT peak VTI 6.2 cm    AV valve area 0.8 cm²    AV Velocity Ratio 0.33     AV index (prosthetic) 0.30     RAVI by Velocity Ratio 0.8 cm²    Mr max boone 5.23 m/s    MV mean gradient 7 mmHg    MV peak gradient 20 mmHg    MV stenosis pressure 1/2 time 72.29 ms    MV valve area p 1/2 method 3.04 cm2    MV valve area by continuity eq 0.26 cm2    MV VTI 59.6 cm    Triscuspid Valve Regurgitation Peak Gradient 40 mmHg    Sinus 2.60 cm    ASI 1.2 cm/m2    STJ 2.3 cm    Ascending aorta 2.9 cm    ASI 1.3 cm/m2    IVC diameter 1.77 cm    Mean e' 0.10 m/s    ZLVIDS 3.28     ZLVIDD 0.48     LA area A4C 55.99 cm2    LA area A2C 47.86 cm2    TV resting pulmonary artery pressure 44 mmHg    RV TB RVSP 6 mmHg    Est. RA pres 3 mmHg    Narrative      Left Ventricle: The left ventricle is severely dilated. Normal wall   thickness. There is eccentric hypertrophy. Global hypokinesis present.   Septal motion is consistent with bundle branch block. There is severely   reduced systolic function with a visually estimated ejection fraction of   15 - 20%. Grade II diastolic dysfunction. Elevated left ventricular   filling pressure. LVIDD 8.7 cm. LVIDS 7.6 cm.    Right Ventricle: The right ventricle is normal in size Systolic   function is normal. TAPSE is 2.5 cm.    Left Atrium: The left atrium is severely dilated measuring 112 mL/m2.    Mitral Valve: There is severe regurgitation.    Tricuspid Valve: There is mild regurgitation.    Pulmonary Artery: The estimated pulmonary artery systolic pressure is   44 mmHg.    IVC/SVC: Normal venous pressure at 3 mmHg.

## 2025-06-03 NOTE — PLAN OF CARE
[facility] - Med Surg  Discharge Final Note    Primary Care Provider: Tejinder Bowling MD    Expected Discharge Date: 6/3/2025    Final Discharge Note (most recent)       Final Note - 06/03/25 1537          Final Note    Assessment Type Final Discharge Note (P)      Anticipated Discharge Disposition Home or Self Care (P)      Hospital Resources/Appts/Education Provided Appointments scheduled and added to AVS (P)                      Contact Info       Kayy Patel PA-C   Specialty: Family Medicine    [address] [place] [id number]   Phone: [phone]       Next Steps: Follow up on 6/5/2025    Instructions: at 11:20 AM; hospital follow up appointment    Jeimy Srivastava MD   Specialty: Cardiology, Transplant    [address] [place] [address]   Phone: [phone]       Next Steps: Follow up on 8/15/2025    Instructions: at 1:30pm; previously scheduled advanced heart failure clinic appointment; patient will be notified of a sooner appointment

## 2025-06-03 NOTE — ASSESSMENT & PLAN NOTE
Appreciate cardiology assistance  S/p LHC- non obstructive CAD  Continue medical management  DAPT and statin

## 2025-06-03 NOTE — PLAN OF CARE
Problem: Adult Inpatient Plan of Care  Goal: Plan of Care Review  Outcome: Progressing     Problem: Bariatric Environmental Safety  Goal: Safety Maintained with Care  Outcome: Progressing     Problem: Fall Injury Risk  Goal: Absence of Fall and Fall-Related Injury  Outcome: Progressing     Problem: Heart Failure  Goal: Optimal Coping  Outcome: Progressing     Problem: Comorbidity Management  Goal: Maintenance of Asthma Control  Outcome: Progressing     Problem: Chest Pain  Goal: Resolution of Chest Pain Symptoms  Outcome: Progressing     Problem: Wound  Goal: Optimal Coping  Outcome: Progressing   AAOX4. Angiogram done today. Prn [place] given for pain. Family at bedside, safety maintained.

## 2025-06-04 ENCOUNTER — TELEPHONE (OUTPATIENT)
Dept: TRANSPLANT | Facility: CLINIC | Age: 50
End: 2025-06-04
Payer: MEDICARE

## 2025-06-04 ENCOUNTER — PATIENT MESSAGE (OUTPATIENT)
Dept: CARDIOLOGY | Facility: CLINIC | Age: 50
End: 2025-06-04
Payer: MEDICARE

## 2025-06-04 DIAGNOSIS — I50.42 CHRONIC COMBINED SYSTOLIC AND DIASTOLIC CHF, NYHA CLASS 3: Primary | ICD-10-CM

## 2025-06-04 LAB
OHS QRS DURATION: 182 MS
OHS QTC CALCULATION: 499 MS

## 2025-06-05 ENCOUNTER — DOCUMENTATION ONLY (OUTPATIENT)
Dept: CARDIOLOGY | Facility: HOSPITAL | Age: 50
End: 2025-06-05
Payer: MEDICARE

## 2025-06-05 ENCOUNTER — OFFICE VISIT (OUTPATIENT)
Dept: FAMILY MEDICINE | Facility: CLINIC | Age: 50
End: 2025-06-05
Payer: MEDICARE

## 2025-06-05 VITALS
BODY MASS INDEX: 41.39 KG/M2 | WEIGHT: 263.69 LBS | SYSTOLIC BLOOD PRESSURE: 112 MMHG | TEMPERATURE: 98 F | HEIGHT: 67 IN | OXYGEN SATURATION: 98 % | DIASTOLIC BLOOD PRESSURE: 62 MMHG | HEART RATE: 77 BPM

## 2025-06-05 DIAGNOSIS — I42.0 NONISCHEMIC DILATED CARDIOMYOPATHY: Chronic | ICD-10-CM

## 2025-06-05 DIAGNOSIS — I47.29 POLYMORPHIC VENTRICULAR TACHYCARDIA: ICD-10-CM

## 2025-06-05 DIAGNOSIS — R05.1 ACUTE COUGH: ICD-10-CM

## 2025-06-05 DIAGNOSIS — J45.20 MILD INTERMITTENT ASTHMA WITHOUT COMPLICATION: ICD-10-CM

## 2025-06-05 DIAGNOSIS — I50.42 CHRONIC COMBINED SYSTOLIC AND DIASTOLIC CONGESTIVE HEART FAILURE: ICD-10-CM

## 2025-06-05 DIAGNOSIS — E66.813 CLASS 3 SEVERE OBESITY DUE TO EXCESS CALORIES WITH SERIOUS COMORBIDITY AND BODY MASS INDEX (BMI) OF 40.0 TO 44.9 IN ADULT: ICD-10-CM

## 2025-06-05 DIAGNOSIS — E66.01 CLASS 3 SEVERE OBESITY DUE TO EXCESS CALORIES WITH SERIOUS COMORBIDITY AND BODY MASS INDEX (BMI) OF 40.0 TO 44.9 IN ADULT: ICD-10-CM

## 2025-06-05 DIAGNOSIS — Z12.4 SCREENING FOR CERVICAL CANCER: ICD-10-CM

## 2025-06-05 DIAGNOSIS — Z09 HOSPITAL DISCHARGE FOLLOW-UP: Primary | ICD-10-CM

## 2025-06-05 DIAGNOSIS — N18.32 CHRONIC KIDNEY DISEASE, STAGE 3B: ICD-10-CM

## 2025-06-05 PROCEDURE — 4010F ACE/ARB THERAPY RXD/TAKEN: CPT | Mod: CPTII,S$GLB,, | Performed by: PHYSICIAN ASSISTANT

## 2025-06-05 PROCEDURE — 3074F SYST BP LT 130 MM HG: CPT | Mod: CPTII,S$GLB,, | Performed by: PHYSICIAN ASSISTANT

## 2025-06-05 PROCEDURE — 1111F DSCHRG MED/CURRENT MED MERGE: CPT | Mod: CPTII,S$GLB,, | Performed by: PHYSICIAN ASSISTANT

## 2025-06-05 PROCEDURE — 3078F DIAST BP <80 MM HG: CPT | Mod: CPTII,S$GLB,, | Performed by: PHYSICIAN ASSISTANT

## 2025-06-05 PROCEDURE — 1160F RVW MEDS BY RX/DR IN RCRD: CPT | Mod: CPTII,S$GLB,, | Performed by: PHYSICIAN ASSISTANT

## 2025-06-05 PROCEDURE — 1159F MED LIST DOCD IN RCRD: CPT | Mod: CPTII,S$GLB,, | Performed by: PHYSICIAN ASSISTANT

## 2025-06-05 PROCEDURE — 99496 TRANSJ CARE MGMT HIGH F2F 7D: CPT | Mod: S$GLB,,, | Performed by: PHYSICIAN ASSISTANT

## 2025-06-05 RX ORDER — SEMAGLUTIDE 1.34 MG/ML
1 INJECTION, SOLUTION SUBCUTANEOUS
Qty: 3 ML | Refills: 2 | Status: CANCELLED | OUTPATIENT
Start: 2025-06-05

## 2025-06-05 RX ORDER — BUMETANIDE 1 MG/1
TABLET ORAL
Qty: 270 TABLET | Refills: 2 | Status: SHIPPED | OUTPATIENT
Start: 2025-06-05

## 2025-06-05 RX ORDER — ALBUTEROL SULFATE 90 UG/1
2 INHALANT RESPIRATORY (INHALATION) EVERY 6 HOURS PRN
Qty: 18 G | Refills: 6 | Status: SHIPPED | OUTPATIENT
Start: 2025-06-05 | End: 2026-06-05

## 2025-06-05 RX ORDER — SEMAGLUTIDE 0.25 MG/.5ML
0.25 INJECTION, SOLUTION SUBCUTANEOUS
Qty: 2 ML | Refills: 0 | Status: SHIPPED | OUTPATIENT
Start: 2025-06-05 | End: 2025-06-05

## 2025-06-05 RX ORDER — METHYLPREDNISOLONE 4 MG/1
TABLET ORAL
Qty: 21 EACH | Refills: 0 | Status: SHIPPED | OUTPATIENT
Start: 2025-06-05 | End: 2025-06-26

## 2025-06-05 RX ORDER — SEMAGLUTIDE 0.25 MG/.5ML
0.25 INJECTION, SOLUTION SUBCUTANEOUS
Qty: 2 ML | Refills: 0 | Status: SHIPPED | OUTPATIENT
Start: 2025-06-05

## 2025-06-05 RX ORDER — ATORVASTATIN CALCIUM 40 MG/1
40 TABLET, FILM COATED ORAL DAILY
Qty: 90 TABLET | Refills: 3 | Status: SHIPPED | OUTPATIENT
Start: 2025-06-05 | End: 2026-06-05

## 2025-06-05 NOTE — PROGRESS NOTES
Patient ID: Mario Mahajan is a 49 y.o. female.     Chief Complaint: Follow-up (Hospital follow-up) and Fatigue    Follow-up  Associated symptoms include coughing and fatigue. Pertinent negatives include no chest pain, fever, headaches, myalgias, nausea, rash, vomiting or weakness.   Fatigue  Associated symptoms include coughing and fatigue. Pertinent negatives include no chest pain, fever, headaches, myalgias, nausea, rash, vomiting or weakness.     History of Present Illness    Ms. Mahajan presents today for follow up after recent hospitalization    She experienced persistent cough, wheezing, abdominal pain with associated swelling, and hypotension. She denies any prior similar exacerbations requiring hospitalization.    She has a history of heart failure diagnosed in 2015 when she experienced significant shortness of breath while walking that did not respond to asthma inhaler. She has history of cardiac arrest requiring defibrillator placement after an episode where she lost consciousness and fell face-down. The defibrillator has a history of malfunction with inappropriate shock delivery without clinical indication.    She has a history of asthma but currently has no asthma medications at home.    She takes amiodarone, Entresto, and metoprolol. She is unable to obtain Ozempic from the pharmacy.    She is not currently working due to disability.         Review of Systems  Review of Systems   Constitutional:  Positive for fatigue. Negative for fever.   HENT:  Negative for ear pain and sinus pain.    Eyes:  Negative for discharge.   Respiratory:  Positive for cough. Negative for wheezing.    Cardiovascular:  Negative for chest pain and leg swelling.   Gastrointestinal:  Negative for diarrhea, nausea and vomiting.   Genitourinary:  Negative for urgency.   Musculoskeletal:  Negative for myalgias.   Skin:  Negative for rash.   Neurological:  Negative for weakness and headaches.   Psychiatric/Behavioral:  Positive  "for depression.        Currently Medications  Medications Ordered Prior to Encounter[1]    Physical  Exam  Vitals:    06/05/25 1136   BP: 112/62   BP Location: Right arm   Patient Position: Sitting   Pulse: 77   Temp: 98 °F (36.7 °C)   SpO2: 98%   Weight: 119.6 kg (263 lb 10.7 oz)   Height: 5' 7" (1.702 m)      Body mass index is 41.3 kg/m².  Wt Readings from Last 3 Encounters:   06/05/25 119.6 kg (263 lb 10.7 oz)   06/02/25 117.9 kg (260 lb)   01/17/25 120.1 kg (264 lb 12.4 oz)       Physical Exam  Vitals and nursing note reviewed.   Constitutional:       General: She is not in acute distress.     Appearance: She is obese. She is not ill-appearing.   HENT:      Head: Normocephalic and atraumatic.      Right Ear: External ear normal.      Left Ear: External ear normal.      Nose: Nose normal.      Mouth/Throat:      Mouth: Mucous membranes are moist.   Eyes:      Extraocular Movements: Extraocular movements intact.      Conjunctiva/sclera: Conjunctivae normal.   Cardiovascular:      Rate and Rhythm: Normal rate and regular rhythm.      Pulses: Normal pulses.      Heart sounds: No murmur heard.  Pulmonary:      Effort: Pulmonary effort is normal. No respiratory distress.      Breath sounds: No wheezing.   Abdominal:      General: There is no distension.      Palpations: Abdomen is soft. There is no mass.      Tenderness: There is no abdominal tenderness.   Musculoskeletal:         General: No swelling.      Cervical back: Normal range of motion.   Skin:     Coloration: Skin is not jaundiced.      Findings: No rash.   Neurological:      General: No focal deficit present.      Mental Status: She is alert and oriented to person, place, and time.   Psychiatric:         Mood and Affect: Mood normal.         Thought Content: Thought content normal.         Labs:    Complete Blood Count  Lab Results   Component Value Date    RBC 3.47 (L) 06/01/2025    HGB 11.3 (L) 06/01/2025    HCT 34.1 (L) 06/01/2025    MCV 98 06/01/2025 " "   MCH 32.6 (H) 06/01/2025    MCHC 33.1 06/01/2025    RDW 11.9 06/01/2025     06/01/2025    MPV 11.8 06/01/2025    GRAN 4.3 09/04/2024    GRAN 69.5 09/04/2024    LYMPH 42.5 06/01/2025    LYMPH 2.27 06/01/2025    MONO 7.9 06/01/2025    MONO 0.42 06/01/2025    EOS 3.7 06/01/2025    EOS 0.20 06/01/2025    BASO 0.02 09/04/2024    EOSINOPHIL 2.6 09/04/2024    BASOPHIL 0.2 06/01/2025    BASOPHIL 0.01 06/01/2025    DIFFMETHOD Automated 09/04/2024       Comprehensive Metabolic Panel  Lab Results   Component Value Date    GLU 94 06/03/2025    BUN 23 (H) 06/03/2025    CREATININE 1.2 06/03/2025     06/03/2025    K 3.7 06/03/2025     06/03/2025    PROT 8.0 05/31/2025    ALBUMIN 3.8 05/31/2025    BILITOT 1.8 (H) 05/31/2025    AST 16 05/31/2025    ALKPHOS 47 05/31/2025    CO2 25 06/03/2025    ALT 11 05/31/2025    ANIONGAP 9 06/03/2025       TSH  No results found for: "TSH"    Imaging:  Cardiac catheterization  Procedure performed:   Left heart catheterization   Coronary angiogram    Right common femoral artery angiogram   Perclose  closure of the right common femoral artery     Indication for the procedure:   Chest pain, troponin elevation, history of nonischemic cardiomyopathy with   severely reduced left ventricular ejection fraction.     Description of the procedure:   Patient was brought to the cath lab and given sedation using Versed and   fentanyl.   Prior to sedating the patient, risks and benefits of the   procedure discussed with the patient, written consent obtained before   proceeding with invasive angiogram.    Very small right radial artery   noted on ultrasound.  Right common femoral artery access was achieved   using micropuncture technique and ultrasound guidance.  JL4 diagnostic   catheter used to engage the left main coronary artery.  JR4 diagnostic   catheter used to engage the right coronary artery.  Four Latvian pigtail   used to cross the aortic valve into the left ventricle.  LVEDP was "   recorded.  LV-gram not performed as a part of this procedure.  A pullback   across the aortic valve was performed.  Once all the images were acquired   the pigtail catheter was removed.     Perclose closure device was used for closure of the right common femoral   artery.     Findings:     Right dominant coronary circulation   No significant obstructive coronary artery disease of left main coronary   artery, left circumflex artery, lad, right coronary artery     LVEDP of 25 mm Hg.  No significant gradient across the aortic valve.    LV-gram not performed as a part of the study.     Recommendations:     Continue dual antiplatelet therapy for at least 1 year.    High-intensity statin therapy     Groin access site precautions.    Goal-directed medical therapy for nonischemic cardiomyopathy.  Follow up   in heart transplant clinic after discharge    The procedure log was documented by Documenter: Reyes Be RN and   verified by Joshua Callahan MD.    Date: 6/2/2025  Time: 4:32 PM      Assessment/Plan:    1. Hospital discharge follow-up    2. Acute cough  -     methylPREDNISolone (MEDROL DOSEPACK) 4 mg tablet; use as directed  Dispense: 21 each; Refill: 0    3. Chronic combined systolic and diastolic congestive heart failure  Overview:  Severely decreased left ventricular systolic function. The estimated ejection fraction is 20-25%  Severe left ventricular enlargement.  Moderate-to-severe mitral regurgitation. Appears to be functional  Severe left atrial enlargement.  Local segmental wall motion abnormalities.  Grade I (mild) grade II (moderate) left ventricular diastolic dysfunction consistent with pseudonormalization.         Orders:  -     Discontinue: semaglutide, weight loss, (WEGOVY) 0.25 mg/0.5 mL PnIj; Inject 0.25 mg into the skin every 7 days.  Dispense: 2 mL; Refill: 0  -     semaglutide, weight loss, (WEGOVY) 0.25 mg/0.5 mL PnIj; Inject 0.25 mg into the skin every 7 days.  Dispense: 2 mL; Refill: 0  -      bumetanide (BUMEX) 1 MG tablet; TAKE 2 TABLETS BY MOUTH ONCE DAILY IN THE MORNING AND 1 ONCE DAILY IN THE EVENING  Dispense: 270 tablet; Refill: 2    4. Nonischemic dilated cardiomyopathy  -     atorvastatin (LIPITOR) 40 MG tablet; Take 1 tablet (40 mg total) by mouth once daily.  Dispense: 90 tablet; Refill: 3  -     Discontinue: semaglutide, weight loss, (WEGOVY) 0.25 mg/0.5 mL PnIj; Inject 0.25 mg into the skin every 7 days.  Dispense: 2 mL; Refill: 0  -     semaglutide, weight loss, (WEGOVY) 0.25 mg/0.5 mL PnIj; Inject 0.25 mg into the skin every 7 days.  Dispense: 2 mL; Refill: 0    5. Mild intermittent asthma without complication  -     albuterol (PROVENTIL/VENTOLIN HFA) 90 mcg/actuation inhaler; Inhale 2 puffs into the lungs every 6 (six) hours as needed for Wheezing.  Dispense: 18 g; Refill: 6  -     methylPREDNISolone (MEDROL DOSEPACK) 4 mg tablet; use as directed  Dispense: 21 each; Refill: 0    6. Chronic kidney disease, stage 3b    7. Polymorphic ventricular tachycardia    8. Screening for cervical cancer  -     Ambulatory referral/consult to Obstetrics / Gynecology; Future; Expected date: 06/12/2025    9. Class 3 severe obesity due to excess calories with serious comorbidity and body mass index (BMI) of 40.0 to 44.9 in adult  -     Discontinue: semaglutide, weight loss, (WEGOVY) 0.25 mg/0.5 mL PnIj; Inject 0.25 mg into the skin every 7 days.  Dispense: 2 mL; Refill: 0  -     semaglutide, weight loss, (WEGOVY) 0.25 mg/0.5 mL PnIj; Inject 0.25 mg into the skin every 7 days.  Dispense: 2 mL; Refill: 0       Assessment & Plan    I47.29 Polymorphic ventricular tachycardia  I50.22 Chronic systolic (congestive) heart failure  I46.2 Cardiac arrest due to underlying cardiac condition  Z95.810 Presence of automatic (implantable) cardiac defibrillator  T82.118A Breakdown (mechanical) of other cardiac electronic device, initial encounter  J45.909 Unspecified asthma, uncomplicated  G47.33 Obstructive sleep apnea  (adult) (pediatric)  E66.9 Obesity, unspecified  Z73.6 Limitation of activities due to disability    POLYMORPHIC VENTRICULAR TACHYCARDIA:  - Monitored the patient with history of heart failure and ventricular tachycardia who has experienced dizziness and fainting episodes.  - Ms. Mahajan's EF is severely reduced at 15-20%.  - Due to a faulty defibrillator, we have re-implanted a new device to manage the ventricular tachycardia and prevent sudden cardiac death.    CHRONIC SYSTOLIC HEART FAILURE:  - Monitored patient's history of heart failure with recent hospitalization due to low pressure and cardiac issues.  - Left ventricle is dilated with EF at 15-20%, indicating some improvement from previous 5%.  - Assessed cough, suspecting it may be heart failure-related rather than asthma.  - Explained EF as a measure of heart function.  - Advised patient to discuss current status with cardiologist, including inquiry about GLP-1 agonists (Ozempic, Mounjaro, Wegovy, Zepbound) for heart failure management.  - Recommend follow up with cardiologist every 6 months to manage heart failure and prevent hospitalizations.  - Instructed patient to inquire about which specific cardiac specialists to follow up with regularly.    CARDIAC ARREST AND IMPLANTABLE CARDIAC DEFIBRILLATOR:  - Noted patient experienced cardiac arrest and was resuscitated by the implantable cardiac defibrillator.  - Documented that the defibrillator was previously faulty and caused unnecessary shocks, resulting in a traumatic experience for the patient.    ASTHMA:  - Monitored patient's history of asthma and noted currently has no asthma medication at home.  - Assessed cough, which may be heart failure-related rather than asthma.  - Prescribed albuterol and a steroid Dosepak for asthma management.    OBSTRUCTIVE SLEEP APNEA:  - Reviewed sleep apnea diagnosis, considering its potential impact on heart failure management.  - Noted Mounjaro is first line indicated  for sleep apnea, but not yet approved for the patient.    OBESITY MANAGEMENT:  - Discussed GLP-1 agonists in obesity management, clarifying differences between Ozempic, Mounjaro, Wegovy, and Zepbound and their indications.  - Prescribed Wegovy (semaglutide) injection for weekly administration to address both obesity and heart failure management.  - Sent prescription to [facility] pharmacy for approval.    DISABILITY MANAGEMENT:  - Noted patient is unable to work and is on disability due to health conditions.  - Referred to community care worker to assist with Medicaid application process.    FOLLOW-UP APPOINTMENTS:  - Scheduled follow up with Dr. Jose Ferrer on the 16th and with Dr. Srivastava as planned.          Discussed how to stay healthy including: diet, exercise, refraining from smoking and discussed screening exams / tests needed for age, sex and family Hx.    This note was generated with the assistance of ambient listening technology. Verbal consent was obtained by the patient and accompanying visitor(s) for the recording of patient appointment to facilitate this note. I attest to having reviewed and edited the generated note for accuracy, though some syntax or spelling errors may persist. Please contact the author of this note for any clarification.       RTC every 3-6 months with PCP, or PRN      Kayy Patel PA-C           [1]   Current Outpatient Medications on File Prior to Visit   Medication Sig Dispense Refill    amiodarone (PACERONE) 200 MG Tab Take 1 tablet by mouth once daily 30 tablet 5    aspirin (ECOTRIN) 81 MG EC tablet Take 1 tablet (81 mg total) by mouth once daily. 90 tablet 3    benzonatate (TESSALON) 200 MG capsule Take 1 capsule (200 mg total) by mouth 3 (three) times daily as needed for Cough. 30 capsule 0    carvediloL (COREG) 6.25 MG tablet Take 1 tablet (6.25 mg total) by mouth 2 (two) times daily. 180 tablet 3    cetirizine (ZYRTEC) 10 MG tablet Take 10 mg by mouth daily as needed.       clopidogreL (PLAVIX) 75 mg tablet Take 1 tablet (75 mg total) by mouth once daily. 30 tablet 11    cyanocobalamin (VITAMIN B-12) 1000 MCG tablet Take 1 tablet (1,000 mcg total) by mouth once daily. 100 tablet 3    diazePAM (VALIUM) 5 MG tablet Take 1 tablet (5 mg total) by mouth On call Procedure for Anxiety. Take one 30 min prior and the other once at the imaging center if needed. 2 tablet 0    ENTRESTO  mg per tablet Take 1 tablet by mouth twice daily 180 tablet 1    gabapentin (NEURONTIN) 300 MG capsule Take 1-2 capsules (300-600 mg total) by mouth every evening. (Patient taking differently: Take 300-600 mg by mouth nightly as needed.) 60 capsule 11    [] guaiFENesin-codeine 100-10 mg/5 ml (TUSSI-ORGANIDIN NR)  mg/5 mL syrup Take 5 mLs by mouth every 8 (eight) hours as needed for Cough or Congestion. 75 mL 0    hydrOXYzine HCL (ATARAX) 25 MG tablet Take 1 tablet (25 mg total) by mouth 3 (three) times daily as needed for Anxiety. 45 tablet 1    metOLazone (ZAROXOLYN) 2.5 MG tablet Take 1 tablet (2.5 mg total) by mouth as needed (Only take when advised by the heart failure/Cardiomems team). 15 tablet 0    spironolactone (ALDACTONE) 25 MG tablet Take 1 tablet (25 mg total) by mouth once daily. 90 tablet 0    tiZANidine (ZANAFLEX) 4 MG tablet Take 1-2 tablets (4-8 mg total) by mouth every 8 (eight) hours as needed (muscle tension). 60 tablet 2    tretinoin (RETIN-A) 0.1 % cream Apply topically every evening. (Patient taking differently: Apply topically nightly as needed.) 20 g 1     No current facility-administered medications on file prior to visit.

## 2025-06-06 ENCOUNTER — TELEPHONE (OUTPATIENT)
Dept: TRANSPLANT | Facility: CLINIC | Age: 50
End: 2025-06-06
Payer: MEDICARE

## 2025-06-09 ENCOUNTER — TELEPHONE (OUTPATIENT)
Dept: FAMILY MEDICINE | Facility: CLINIC | Age: 50
End: 2025-06-09
Payer: MEDICARE

## 2025-06-09 ENCOUNTER — TELEPHONE (OUTPATIENT)
Dept: TRANSPLANT | Facility: CLINIC | Age: 50
End: 2025-06-09
Payer: MEDICARE

## 2025-06-09 DIAGNOSIS — N18.32 CHRONIC KIDNEY DISEASE, STAGE 3B: Primary | ICD-10-CM

## 2025-06-09 NOTE — TELEPHONE ENCOUNTER
6/9/25  0900 am:   Called and spoke w/ pt  Informed pt of the need to change her appt times and provider, by moving it up 2 hours, but will be the same day Monday June 16th     Told pt her non fasting lab appt will need to be at 1000 am and visit w/ now will be with Dr. Srivastava @ 1100 am  Apologized for any inconvenience and asked pt if this would be ok  Pt is fine with this change of time and plan           ----- Message from ARCHANA Chávez sent at 6/6/2025  1:04 PM CDT -----  Regarding: appointment move  Hello,I had to move Ms. Mahajan's appointment earlier on Monday 6/16. At 11. Can yall make sure she is ok with that. Erika was double booked twice that afternoon so I put her with Jeimy.   Social Work/Discharge Planning:  Chart reviewed.  Patient had an EGD 3/3.  Met with patient and her  and confirmed plan remains home at discharge with no needs.  Will continue to follow.  Electronically signed by LISSETH Yoder on 3/4/2025 at 2:40 PM

## 2025-06-09 NOTE — TELEPHONE ENCOUNTER
----- Message from Kayy Patel PA-C sent at 6/9/2025  6:08 AM CDT -----  F/u Dr. Bowling 3 months. Thanks!

## 2025-06-10 ENCOUNTER — DOCUMENTATION ONLY (OUTPATIENT)
Dept: ELECTROPHYSIOLOGY | Facility: CLINIC | Age: 50
End: 2025-06-10
Payer: MEDICARE

## 2025-06-10 NOTE — PROGRESS NOTES
[facility] EP Checklist    An MRI has been ordered on this patient with a [employer] implanted cardiac device.    The device system, including pulse generator and leads, has been confirmed as MR conditional.    There are no known lead extenders, lead adaptors, or abandoned leads in place.    Lead impedance measurements are within the programmed lead impedance limits.    The pulse generator is implanted in the pectoral region.    The pulse generator has sufficient battery and is not deemed to be at end of life.    The pacing capture thresholds, when tested by the industry representative just prior to the MRI, should be < 2.5V at a pulse width of 0.50ms for RA and RV leads with devices programmed to an asynchronous pacing mode.    The LV pacing capture threshold, when tested by the industry representative just prior to the MRI, should be < 2.0V at a pulse width of 0.50ms.    Parameters should be programmed to MRI appropriate settings and pacing mode should programmed as below:    ___ OOO: (Pacing off) to be used ONLY for a patient demonstrating normal sinus rhythm with intact conduction, atrial fibrillation with ventricular rates of >/= 75 bpm.    After the scan, the industry representative must perform reprogramming to original settings and verify that programmed pacing amplitudes provide adequate safety margins.

## 2025-06-12 ENCOUNTER — PATIENT OUTREACH (OUTPATIENT)
Dept: ADMINISTRATIVE | Facility: OTHER | Age: 50
End: 2025-06-12
Payer: MEDICARE

## 2025-06-12 ENCOUNTER — TELEPHONE (OUTPATIENT)
Dept: ADMINISTRATIVE | Facility: CLINIC | Age: 50
End: 2025-06-12
Payer: MEDICARE

## 2025-06-12 NOTE — PROGRESS NOTES
CHW - Initial Contact    This Community Health Worker completed OR updated the Social Determinant of Health questionnaire with patient via telephone today.    Pt identified barriers of most importance are: Pt requesting help with financial, food and utility assistance. Sending pt resources for food and utilities in my chart portal and mailing financial assistance application.    Referrals to community agencies completed with patient/caregiver consent outside of [facility] include: yes  Referrals were put through [facility] - no:   Support and Services: Financial Aid/Education, Food Pantry, Supportive Housing  Other information discussed the patient needs / wants help with: SDOH   Follow up required: yes  Follow-up Outreach - Due: 7/2/2025

## 2025-06-12 NOTE — PROGRESS NOTES
Pt requesting help with financial, food and utility assistance. Sending pt resources for food and utilities in my chart portal and mailing financial assistance application. I will continue to follow on [facility] platform.    [facility]:     [place], Dept. of   Health & Human Services   [address] [address]   [address] [place] [address]   NOTE: Send all mail to [address]  [phone] FAX  Prince Ro, Director   [email or web address],  [email or web address]   KIRAN Navarro Director [email or web address]   [email or web address]   [email or web address]  JHONATHANP: [place]   Hours of Operation:   M-F 8:00 am - 4:30 pm  [id number]  [id number]  [id number]  [id number]  [id number]  [phone]  [id number]          Pantry Food Locations:       Select the program Food Bank  Food Bank  by  [employer]  Skip to next program    Reviewed on: 03/29/2025     The [employer] provides food bank services and transportation to individuals age 60+.This program provides:-food bank services-meals on wheels-transportation  Main Services   food delivery      food pantry      meals     Other Services   transportation     Serving   seniors     Next Steps:  Call [phone]    [employer] ( serves your local area)  [address], [place] [address]   Open Now : 8:00 AM - 4:30 PM CDT       []Select the program [facility] - Food Bank  [facility] - Food Bank  by  [facility]  Skip to next program    Reviewed on: 03/29/2025     The Food Bank Program provide frozen and non-perishable food boxes to those who qualify according to [place] Food Bank Guidelines and Helps to defray food costs. This program provides:-...  Main Services   food pantry      meals     Serving   all ages     individuals     families     low-income     Next  Steps:  Call [phone]    [employer] ( serves your local area)  [address] [place] [address]   Open Now : 8:00 AM - 5:00 PM CDT      Mailing [facility] Financial assistance application. Please email back.   [phone]

## 2025-06-16 ENCOUNTER — DOCUMENTATION ONLY (OUTPATIENT)
Dept: CARDIOLOGY | Facility: HOSPITAL | Age: 50
End: 2025-06-16
Payer: MEDICARE

## 2025-06-16 ENCOUNTER — LAB VISIT (OUTPATIENT)
Dept: LAB | Facility: HOSPITAL | Age: 50
End: 2025-06-16
Attending: INTERNAL MEDICINE
Payer: MEDICARE

## 2025-06-16 ENCOUNTER — OFFICE VISIT (OUTPATIENT)
Dept: TRANSPLANT | Facility: CLINIC | Age: 50
End: 2025-06-16
Payer: MEDICARE

## 2025-06-16 VITALS
WEIGHT: 269.19 LBS | DIASTOLIC BLOOD PRESSURE: 66 MMHG | HEIGHT: 67 IN | BODY MASS INDEX: 42.25 KG/M2 | HEART RATE: 64 BPM | SYSTOLIC BLOOD PRESSURE: 104 MMHG

## 2025-06-16 DIAGNOSIS — Z86.79 HISTORY OF VENTRICULAR TACHYCARDIA: ICD-10-CM

## 2025-06-16 DIAGNOSIS — I50.42 CHRONIC COMBINED SYSTOLIC AND DIASTOLIC CONGESTIVE HEART FAILURE: ICD-10-CM

## 2025-06-16 DIAGNOSIS — N18.32 CHRONIC KIDNEY DISEASE, STAGE 3B: ICD-10-CM

## 2025-06-16 DIAGNOSIS — E03.9 ACQUIRED HYPOTHYROIDISM: Primary | ICD-10-CM

## 2025-06-16 DIAGNOSIS — Z95.810 ICD (IMPLANTABLE CARDIOVERTER-DEFIBRILLATOR) IN PLACE: Chronic | ICD-10-CM

## 2025-06-16 DIAGNOSIS — Z78.0 MENOPAUSE: ICD-10-CM

## 2025-06-16 DIAGNOSIS — I42.0 NONISCHEMIC DILATED CARDIOMYOPATHY: Chronic | ICD-10-CM

## 2025-06-16 DIAGNOSIS — I50.42 CHRONIC COMBINED SYSTOLIC AND DIASTOLIC CHF, NYHA CLASS 3: ICD-10-CM

## 2025-06-16 DIAGNOSIS — E66.01 CLASS 3 SEVERE OBESITY DUE TO EXCESS CALORIES WITH SERIOUS COMORBIDITY AND BODY MASS INDEX (BMI) OF 40.0 TO 44.9 IN ADULT: ICD-10-CM

## 2025-06-16 DIAGNOSIS — E66.813 CLASS 3 SEVERE OBESITY DUE TO EXCESS CALORIES WITH SERIOUS COMORBIDITY AND BODY MASS INDEX (BMI) OF 40.0 TO 44.9 IN ADULT: ICD-10-CM

## 2025-06-16 LAB
ANION GAP (OHS): 10 MMOL/L (ref 8–16)
BNP SERPL-MCNC: 212 PG/ML (ref 0–99)
BUN SERPL-MCNC: 18 MG/DL (ref 6–20)
CALCIUM SERPL-MCNC: 9.1 MG/DL (ref 8.7–10.5)
CHLORIDE SERPL-SCNC: 101 MMOL/L (ref 95–110)
CO2 SERPL-SCNC: 27 MMOL/L (ref 23–29)
CREAT SERPL-MCNC: 1.2 MG/DL (ref 0.5–1.4)
GFR SERPLBLD CREATININE-BSD FMLA CKD-EPI: 56 ML/MIN/1.73/M2
GLUCOSE SERPL-MCNC: 96 MG/DL (ref 70–110)
POTASSIUM SERPL-SCNC: 3.9 MMOL/L (ref 3.5–5.1)
SODIUM SERPL-SCNC: 138 MMOL/L (ref 136–145)

## 2025-06-16 PROCEDURE — 3078F DIAST BP <80 MM HG: CPT | Mod: CPTII,S$GLB,, | Performed by: INTERNAL MEDICINE

## 2025-06-16 PROCEDURE — 36415 COLL VENOUS BLD VENIPUNCTURE: CPT

## 2025-06-16 PROCEDURE — 1111F DSCHRG MED/CURRENT MED MERGE: CPT | Mod: CPTII,S$GLB,, | Performed by: INTERNAL MEDICINE

## 2025-06-16 PROCEDURE — 1159F MED LIST DOCD IN RCRD: CPT | Mod: CPTII,S$GLB,, | Performed by: INTERNAL MEDICINE

## 2025-06-16 PROCEDURE — 83880 ASSAY OF NATRIURETIC PEPTIDE: CPT

## 2025-06-16 PROCEDURE — 99215 OFFICE O/P EST HI 40 MIN: CPT | Mod: S$GLB,,, | Performed by: INTERNAL MEDICINE

## 2025-06-16 PROCEDURE — 4010F ACE/ARB THERAPY RXD/TAKEN: CPT | Mod: CPTII,S$GLB,, | Performed by: INTERNAL MEDICINE

## 2025-06-16 PROCEDURE — 99999 PR PBB SHADOW E&M-EST. PATIENT-LVL IV: CPT | Mod: PBBFAC,,, | Performed by: INTERNAL MEDICINE

## 2025-06-16 PROCEDURE — 3074F SYST BP LT 130 MM HG: CPT | Mod: CPTII,S$GLB,, | Performed by: INTERNAL MEDICINE

## 2025-06-16 PROCEDURE — 3008F BODY MASS INDEX DOCD: CPT | Mod: CPTII,S$GLB,, | Performed by: INTERNAL MEDICINE

## 2025-06-16 PROCEDURE — 82310 ASSAY OF CALCIUM: CPT

## 2025-06-16 NOTE — PROGRESS NOTES
[facility] EP Checklist    An MRI has been ordered on this patient with a [employer] implanted cardiac device.    The device system, including pulse generator and leads, has been confirmed as MR conditional.    There are no known lead extenders, lead adaptors, or abandoned leads in place.    Lead impedance measurements are within the programmed lead impedance limits.    The pulse generator is implanted in the pectoral region.    The pulse generator has sufficient battery and is not deemed to be at end of life.    The pacing capture thresholds, when tested by the industry representative just prior to the MRI, should be < 2.5V at a pulse width of 0.50ms for RA and RV leads with devices programmed to an asynchronous pacing mode.    The LV pacing capture threshold, when tested by the industry representative just prior to the MRI, should be < 2.0V at a pulse width of 0.50ms.    Parameters should be programmed to MRI appropriate settings and pacing mode should programmed as below:    OOO: (Pacing off) to be used ONLY for a patient demonstrating normal sinus rhythm with intact conduction, atrial fibrillation with ventricular rates of >/= 75 bpm.    After the scan, the industry representative must perform reprogramming to original settings and verify that programmed pacing amplitudes provide adequate safety margins.

## 2025-06-16 NOTE — LETTER
June 21, 2025        Juanjose Nuñez  [address]  [facility] [address]  Phone: [phone]  Fax: [phone]             [facility]  [address]  [facility] [address]  Phone: [phone]   Patient: Mario Mahajan   MR Number: [id number]   YOB: 1975   Date of Visit: 6/16/2025       Dear Dr. Juanjose Nuñez    Thank you for referring Mario Mahajan to me for evaluation. Attached you will find relevant portions of my assessment and plan of care.    If you have questions, please do not hesitate to call me. I look forward to following Mario Mahajan along with you.    Sincerely,    Jeimy Srivastava MD    Enclosure    If you would like to receive this communication electronically, please contact [email or web address] or [phone] to request [employer] access.    [employer] Link is a tool which provides read-only access to select patient information with whom you have a relationship. Its easy to use and provides real time access to review your patients record including encounter summaries, notes, results, and demographic information.    If you feel you have received this communication in error or would no longer like to receive these types of communications, please e-mail [email or web address]

## 2025-06-18 DIAGNOSIS — Z95.810 ICD (IMPLANTABLE CARDIOVERTER-DEFIBRILLATOR) IN PLACE: Primary | ICD-10-CM

## 2025-06-19 ENCOUNTER — TELEPHONE (OUTPATIENT)
Dept: TRANSPLANT | Facility: CLINIC | Age: 50
End: 2025-06-19
Payer: MEDICARE

## 2025-06-19 NOTE — TELEPHONE ENCOUNTER
6/19/25 - Received below messages from Dr. Srivastava and Dr. Callahan.    Received VORB: CHRISTOPH Srivastava M.D./LATESHA Bess RN: Stop Plavix.  Attempted to call patient, N/A, left v-mail with my name, return call number and the message she should stop her Plavix along with instructions for her to return call.    Will ask other HF nurse, FLORENTINO Holman RN to follow up with tomorrow.    Plavix removed from Med Card.    ----- Message from Joshua Callahan MD sent at 6/19/2025 10:37 AM CDT -----  Regarding: RE: Dual antiplatelet therapy   I agree with your assessment.  Thank you for reaching out.    ----- Message -----  From: Jeimy Srivastava MD  Sent: 6/16/2025  11:41 AM CDT  To: Joshua Callahan MD; [facility]#  Subject: Dual antiplatelet therapy                        Ilene Lewis, hope you are doing well. Wanted to reach out to you regarding Miss Martin. She was recently in the hospital and had a C. It does not look like she had an NSTEMI or another indication for dual anti platelet therapy. I would like to stop it, but just wanted to make sure I was not missing anything. Thank you so much.    Thank you,    Jeimy

## 2025-06-20 ENCOUNTER — TELEPHONE (OUTPATIENT)
Dept: TRANSPLANT | Facility: CLINIC | Age: 50
End: 2025-06-20
Payer: MEDICARE

## 2025-06-20 NOTE — TELEPHONE ENCOUNTER
6/20/25  1135 am:  See NN by ARCHANA Montemayor from yesterday  I just called and spoke w/ pt : pt did not listen to VM left by Nicci, I instructed pt as per Dr. Morales order in ARCHANA Mckeon note: she can stop taking Plavix    Pt voiced understanding and agreement

## 2025-06-21 NOTE — PROGRESS NOTES
HPI.  Mrs. Mahajan is a very pleasant  47 y.o. black female with stage C HFrEF, NICMP,  With a Hx of VF in  2017 (no events since), s/p CardioMEMS.  07/21/22 had fractured ICD lead therefore had to get device extracted and have ICD single chamber replaced.      Patient last seen for virtual clinic visit in January. Since then was admitted at [place], had gone for a cough with congestion but sounds as though they became concerned about heart failure worsening and CAD therefore underwent LHC and some other test. Did not have significant volume on board per patient. LHC with increased LVEDP but no significant CAD. States she still has some nasal congestion, cough but does not have volume issues. Has not been laying on her cardiomems pillow!     LHC 06/02/25:   Right dominant coronary circulation   No significant obstructive coronary artery disease of left main coronary artery, left circumflex artery, lad, right coronary artery     LVEDP of 25 mm Hg.  No significant gradient across the aortic valve.  LV-gram not performed as a part of the study.    TTE 06/02/25: LVEDD 8.7cm      Left Ventricle: The left ventricle is severely dilated. Normal wall thickness. There is eccentric hypertrophy. Global hypokinesis present. Septal motion is consistent with bundle branch block. There is severely reduced systolic function with a visually estimated ejection fraction of 15 - 20%. Grade II diastolic dysfunction. Elevated left ventricular filling pressure. LVIDD 8.7 cm. LVIDS 7.6 cm.    Right Ventricle: The right ventricle is normal in size Systolic function is normal. TAPSE is 2.5 cm.    Left Atrium: The left atrium is severely dilated measuring 112 mL/m2.    Mitral Valve: There is severe regurgitation.    Tricuspid Valve: There is mild regurgitation.    Pulmonary Artery: The estimated pulmonary artery systolic pressure is 44 mmHg.    IVC/SVC: Normal venous pressure at 3 mmHg.    Review of Systems   Constitutional:  Negative for  activity change, appetite change, chills, diaphoresis and fever.   HENT:  Negative for nasal congestion, rhinorrhea and sore throat.    Eyes:  Negative for visual disturbance.   Respiratory:  Negative for shortness of breath and stridor.    Cardiovascular:  Negative for chest pain.   Gastrointestinal:  Negative for abdominal pain, diarrhea, nausea and vomiting.   Genitourinary:  Negative for difficulty urinating, dysuria and hematuria.   Integumentary:  Negative for rash.   Neurological:  Negative for seizures, syncope and light-headedness.   Psychiatric/Behavioral:  Negative for agitation and hallucinations.         Past Medical History:   Diagnosis Date    Asthma     Chronic back pain 2014    Chronic combined systolic and diastolic congestive heart failure 2015     2-10-17   1 - Severely depressed left ventricular systolic function (EF 20-25%).    2 - Severe left ventricular enlargement.    3 - Severe left atrial enlargement.    4 - Left ventricular diastolic dysfunction.    5 - The estimated PA systolic pressure is 18 mmHg.    6 - Mild mitral regurgitation.     Encounter for blood transfusion     ICD (implantable cardioverter-defibrillator) in place 12/01/15 3/3/2016    Menorrhagia, premenopausal 2/10/2017    Microcytic anemia 2015    Non-rheumatic mitral regurgitation 3/5/2015    Nonischemic dilated cardiomyopathy 2015    Polymorphic ventricular tachycardia 2024    Sleep apnea     Syncope and collapse 2017    Ventricular tachycardia, polymorphic         Past Surgical History:   Procedure Laterality Date    CARDIAC DEFIBRILLATOR PLACEMENT  2015     SECTION      INSERTION, WIRELESS SENSOR, FOR PULMONARY ARTERIAL PRESSURE MONITORING N/A 10/22/2021    Procedure: INSERTION, WIRELESS SENSOR, FOR PULMONARY ARTERIAL PRESSURE MONITORING;  Surgeon: Erika Hurd MD;  Location: [facility] CATH LAB;  Service: Cardiology;  Laterality: N/A;    LEFT HEART CATHETERIZATION Left 2025     Procedure: Left heart cath;  Surgeon: Joshua Callahan MD;  Location: [facility] CATH LAB/EP;  Service: Cardiology;  Laterality: Left;    OOPHORECTOMY      PERICARDIOCENTESIS N/A 6/17/2020    Procedure: Pericardiocentesis;  Surgeon: Memo Luis MD;  Location: [facility] CATH LAB;  Service: Cardiology;  Laterality: N/A;    PULMONARY ANGIOGRAPHY N/A 10/22/2021    Procedure: ANGIOGRAM, PULMONARY;  Surgeon: Erika Hurd MD;  Location: [facility] CATH LAB;  Service: Cardiology;  Laterality: N/A;    RIGHT HEART CATHETERIZATION      RIGHT HEART CATHETERIZATION Right 6/2/2025    Procedure: INSERTION, CATHETER, RIGHT HEART;  Surgeon: Joshua Callahan MD;  Location: [facility] CATH LAB/EP;  Service: Cardiology;  Laterality: Right;    TOTAL ABDOMINAL HYSTERECTOMY N/A 3/26/2019    Procedure: HYSTERECTOMY, TOTAL, ABDOMINAL;  Surgeon: Victorino Rose MD;  Location: [facility] OR;  Service: OB/GYN;  Laterality: N/A;    TRANSESOPHAGEAL ECHOCARDIOGRAPHY N/A 7/20/2022    Procedure: ECHOCARDIOGRAM, TRANSESOPHAGEAL;  Surgeon: Phan Powell MD;  Location: [facility] EP LAB;  Service: Cardiology;  Laterality: N/A;    TUBAL LIGATION          Family History   Problem Relation Name Age of Onset    Diabetes Father Jack santiago     Pancreatic cancer Father Jack santiago     Heart failure Father Jack santiago     Heart failure Brother      No Known Problems Daughter      No Known Problems Son      Lung cancer Paternal Grandmother      Heart disease Brother          Review of patient's allergies indicates:   Allergen Reactions    Ace inhibitors      Cough        Current Outpatient Medications   Medication Instructions    albuterol (PROVENTIL/VENTOLIN HFA) 90 mcg/actuation inhaler 2 puffs, Inhalation, Every 6 hours PRN    amiodarone (PACERONE) 200 mg, Oral    aspirin (ECOTRIN) 81 mg, Oral, Daily    atorvastatin (LIPITOR) 40 mg, Oral, Daily    bumetanide (BUMEX) 1 MG tablet TAKE 2 TABLETS BY MOUTH ONCE DAILY IN THE MORNING AND 1 ONCE DAILY IN THE EVENING    carvediloL  "(COREG) 6.25 mg, Oral, 2 times daily    cetirizine (ZYRTEC) 10 mg, Daily PRN    cyanocobalamin (VITAMIN B-12) 1,000 mcg, Oral, Daily    diazePAM (VALIUM) 5 mg, Oral, On Call Procedure, Take one 30 min prior and the other once at the imaging center if needed.    ENTRESTO  mg per tablet 1 tablet, Oral, 2 times daily    gabapentin (NEURONTIN) 300-600 mg, Oral, Nightly    hydrOXYzine HCL (ATARAX) 25 mg, Oral, 3 times daily PRN    methylPREDNISolone (MEDROL DOSEPACK) 4 mg tablet use as directed    metOLazone (ZAROXOLYN) 2.5 mg, Oral, As needed (PRN)    spironolactone (ALDACTONE) 25 mg, Oral, Daily    tiZANidine (ZANAFLEX) 4-8 mg, Oral, Every 8 hours PRN    tretinoin (RETIN-A) 0.1 % cream Topical (Top), Nightly    WEGOVY 0.25 mg, Subcutaneous, Every 7 days        Vitals:    06/16/25 1039   BP: 104/66   Pulse: 64        Wt Readings from Last 3 Encounters:   06/16/25 122.1 kg (269 lb 2.9 oz)   06/05/25 119.6 kg (263 lb 10.7 oz)   06/02/25 117.9 kg (260 lb)     Temp Readings from Last 3 Encounters:   06/05/25 98 °F (36.7 °C)   06/03/25 97.3 °F (36.3 °C) (Oral)   12/16/24 97.9 °F (36.6 °C)     BP Readings from Last 3 Encounters:   06/16/25 104/66   06/05/25 112/62   06/03/25 (!) 100/53     Pulse Readings from Last 3 Encounters:   06/16/25 64   06/05/25 77   06/03/25 61        Body mass index is 42.16 kg/m². Estimated body surface area is 2.4 meters squared as calculated from the following:    Height as of this encounter: 5' 7" (1.702 m).    Weight as of this encounter: 122.1 kg (269 lb 2.9 oz).     Physical Exam  Vitals and nursing note reviewed.   Constitutional:       Appearance: She is well-developed.   HENT:      Head: Normocephalic and atraumatic.      Right Ear: External ear normal.      Left Ear: External ear normal.   Eyes:      Conjunctiva/sclera: Conjunctivae normal.      Pupils: Pupils are equal, round, and reactive to light.   Neck:      Vascular: No JVD.   Cardiovascular:      Rate and Rhythm: Normal rate " and regular rhythm.      Pulses: Intact distal pulses.      Heart sounds: S1 normal and S2 normal. No murmur heard.     No friction rub. No gallop.   Pulmonary:      Effort: Pulmonary effort is normal.      Breath sounds: Normal breath sounds.   Abdominal:      General: Bowel sounds are normal. There is no distension.      Palpations: Abdomen is soft.      Tenderness: There is no abdominal tenderness. There is no guarding or rebound.   Musculoskeletal:      Cervical back: Normal range of motion and neck supple.      Right lower leg: No edema.      Left lower leg: No edema.   Neurological:      Mental Status: She is alert and oriented to person, place, and time.          Lab Results   Component Value Date     (H) 06/16/2025     06/16/2025     12/16/2024    K 3.9 06/16/2025    K 3.5 12/16/2024    MG 2.0 06/03/2025     06/16/2025     12/16/2024    CO2 27 06/16/2025    CO2 26 12/16/2024    BUN 18 06/16/2025    BUN 12 06/19/2015    CREATININE 1.2 06/16/2025    GLU 96 06/16/2025    GLU 98 12/16/2024    HGBA1C 4.6 08/21/2023    AST 16 05/31/2025    AST 18 09/04/2024    ALT 11 05/31/2025    ALT 15 09/04/2024    ALBUMIN 3.8 05/31/2025    ALBUMIN 3.7 09/04/2024    PROT 8.0 05/31/2025    PROT 6.9 09/04/2024    BILITOT 1.8 (H) 05/31/2025    BILITOT 2.0 (H) 09/04/2024    WBC 5.34 06/01/2025    HGB 11.3 (L) 06/01/2025    HGB 10.9 (L) 09/04/2024    HCT 34.1 (L) 06/01/2025    HCT 33.0 (L) 09/04/2024    HCT 32 (L) 07/20/2022     06/01/2025     09/04/2024    INR 1.0 08/22/2023     01/12/2018    TSH 0.267 (L) 08/21/2023    CHOL 127 06/02/2025    CHOL 126 08/21/2023    HDL 46 06/02/2025    LDLCALC 69.6 06/02/2025    TRIG 57 06/02/2025    TRIG 52 08/21/2023    J5ZYXAN 14.9 (H) 09/16/2019    FREET4 1.36 08/21/2023         Results for orders placed during the hospital encounter of 04/24/23    Echo    Interpretation Summary  · The left ventricle is severely enlarged with eccentric  hypertrophy and severely decreased systolic function. The estimated ejection fraction is 25%.  · Normal right ventricular size with normal right ventricular systolic function.  · Severe left atrial enlargement.  · Severe functional mitral regurgitation.  · The estimated PA systolic pressure is 52 mmHg.  · Intermediate central venous pressure (8 mmHg).        Results for orders placed during the hospital encounter of 10/22/21    Cardiac catheterization    Conclusion  · The estimated blood loss was <50 mL.  · RHC performed via the right CFV. Patient tolerated the procedure well. Elevated left and right side filling pressures (RA= 14, PCWP=23 mm of Hg). Severe pulmonary HTN (PA=78/30 mm of Hg, PA mean=52 mm of Hg) in the setting of elevated left sided filling pressures. Normal cardiac index and output (CI=2.5 L/min/m2, CO=5.7 L/min ). Selective PA angiogram was performed in the left PA and the CardioMEMS device was successfully deployed under fluoroscopy without complications. Calibration was performed int he cathlab.  · Bed rest for 3 hours  · Patient is enrolled in the GUIDE HF study    The procedure log was documented by Documenter: Camille Wilkes RN; RT Mario and verified by Erika Hurd MD.    Date: 10/22/2021  Time: 11:15 AM         Assessment and Plan:  Acquired hypothyroidism  -     T4, Free; Future; Expected date: 06/16/2025    Menopause  -     Ambulatory referral/consult to Gynecology; Future; Expected date: 06/23/2025    Chronic combined systolic and diastolic CHF, NYHA class 3  -     CPX Study; Future    Nonischemic dilated cardiomyopathy    ICD (implantable cardioverter-defibrillator) in place 12/01/15    Chronic combined systolic and diastolic congestive heart failure    History of ventricular tachycardia    Chronic kidney disease, stage 3b    Class 3 severe obesity due to excess calories with serious comorbidity and body mass index (BMI) of 40.0 to 44.9 in adult           Chronic  systolic HF, NYHA class III, stage C.  Etiology: NICM.  Devices: ICD.  Medications: high dose sacubitril-valsartan, farxiga, spironolactone, carvedilol.  Hemodynamic status: warm, normotensive, euvolemic on exam.  Plan:  Have continued to talk to her about how concerning her LVEDD and periodic symptoms are. Strongly requesting she lay on her cardiomems, hoping she will do this more regularly.    Obesity- do believe she needs to consider weight loss drugs, she states she has bariatric visit ocming up soon in July hopefully will be able to get prescription then for sglp1.     VT- stable issue it sees, no CAD. Per EP. No ICD shocks    Will proceed with risk stratification again in a few months. Asked her to increase activity, she started walking after her admission and sees she is doing better functionally. Would like to see her lose weight however concerned with high risk fetaures of uncreased LVEDD and intermittent volume issues/admits that we may need to consider LVAD.     Will get CPX and see if repeat RHC is needed after CPX.     Of note, has severe MR- but she was a few yrs ago going for clip, had to get intubated, ? Arrest, she states she is never going to do it again.     RTC 3 months, going to work on meds, cmems, diet/exercise with CPX.   F/u in 3 months

## 2025-06-23 ENCOUNTER — OFFICE VISIT (OUTPATIENT)
Dept: BARIATRICS | Facility: CLINIC | Age: 50
End: 2025-06-23
Payer: MEDICARE

## 2025-06-23 VITALS
HEIGHT: 67 IN | WEIGHT: 269.19 LBS | BODY MASS INDEX: 42.25 KG/M2 | DIASTOLIC BLOOD PRESSURE: 62 MMHG | HEART RATE: 65 BPM | SYSTOLIC BLOOD PRESSURE: 95 MMHG | OXYGEN SATURATION: 99 %

## 2025-06-23 DIAGNOSIS — E66.01 CLASS 3 SEVERE OBESITY DUE TO EXCESS CALORIES WITH SERIOUS COMORBIDITY AND BODY MASS INDEX (BMI) OF 40.0 TO 44.9 IN ADULT: Primary | ICD-10-CM

## 2025-06-23 DIAGNOSIS — I42.0 NONISCHEMIC DILATED CARDIOMYOPATHY: Chronic | ICD-10-CM

## 2025-06-23 DIAGNOSIS — Z71.3 ENCOUNTER FOR WEIGHT LOSS COUNSELING: ICD-10-CM

## 2025-06-23 DIAGNOSIS — E66.813 CLASS 3 SEVERE OBESITY DUE TO EXCESS CALORIES WITH SERIOUS COMORBIDITY AND BODY MASS INDEX (BMI) OF 40.0 TO 44.9 IN ADULT: Primary | ICD-10-CM

## 2025-06-23 DIAGNOSIS — G43.409 HEMIPLEGIC MIGRAINE WITHOUT STATUS MIGRAINOSUS, NOT INTRACTABLE: ICD-10-CM

## 2025-06-23 DIAGNOSIS — I50.42 CHRONIC COMBINED SYSTOLIC AND DIASTOLIC CONGESTIVE HEART FAILURE: ICD-10-CM

## 2025-06-23 PROCEDURE — 3074F SYST BP LT 130 MM HG: CPT | Mod: CPTII,S$GLB,, | Performed by: STUDENT IN AN ORGANIZED HEALTH CARE EDUCATION/TRAINING PROGRAM

## 2025-06-23 PROCEDURE — 99999 PR PBB SHADOW E&M-EST. PATIENT-LVL III: CPT | Mod: PBBFAC,,, | Performed by: STUDENT IN AN ORGANIZED HEALTH CARE EDUCATION/TRAINING PROGRAM

## 2025-06-23 PROCEDURE — 4010F ACE/ARB THERAPY RXD/TAKEN: CPT | Mod: CPTII,S$GLB,, | Performed by: STUDENT IN AN ORGANIZED HEALTH CARE EDUCATION/TRAINING PROGRAM

## 2025-06-23 PROCEDURE — 3078F DIAST BP <80 MM HG: CPT | Mod: CPTII,S$GLB,, | Performed by: STUDENT IN AN ORGANIZED HEALTH CARE EDUCATION/TRAINING PROGRAM

## 2025-06-23 PROCEDURE — 99213 OFFICE O/P EST LOW 20 MIN: CPT | Mod: S$GLB,,, | Performed by: STUDENT IN AN ORGANIZED HEALTH CARE EDUCATION/TRAINING PROGRAM

## 2025-06-23 PROCEDURE — 1111F DSCHRG MED/CURRENT MED MERGE: CPT | Mod: CPTII,S$GLB,, | Performed by: STUDENT IN AN ORGANIZED HEALTH CARE EDUCATION/TRAINING PROGRAM

## 2025-06-23 PROCEDURE — 3008F BODY MASS INDEX DOCD: CPT | Mod: CPTII,S$GLB,, | Performed by: STUDENT IN AN ORGANIZED HEALTH CARE EDUCATION/TRAINING PROGRAM

## 2025-06-23 PROCEDURE — 1159F MED LIST DOCD IN RCRD: CPT | Mod: CPTII,S$GLB,, | Performed by: STUDENT IN AN ORGANIZED HEALTH CARE EDUCATION/TRAINING PROGRAM

## 2025-06-23 PROCEDURE — 1160F RVW MEDS BY RX/DR IN RCRD: CPT | Mod: CPTII,S$GLB,, | Performed by: STUDENT IN AN ORGANIZED HEALTH CARE EDUCATION/TRAINING PROGRAM

## 2025-06-23 RX ORDER — TOPIRAMATE 25 MG/1
25 TABLET, FILM COATED ORAL 2 TIMES DAILY
Qty: 180 TABLET | Refills: 0 | Status: SHIPPED | OUTPATIENT
Start: 2025-06-23 | End: 2025-09-21

## 2025-06-23 NOTE — PROGRESS NOTES
Subjective     Patient ID: Mario Mahajan is a 49 y.o. female.    Chief Complaint: Follow-up, Obesity, and Weight Check    Patient presents for treatment of obesity.     Co-morbidities  Migraines  Nonischemic dilated cardiomyopathy with ICD    Negative for thyroid cancer    Weight History  Lowest adult weight: 190 lbs  Highest adult weight: 280 lbs       Current Physical Activity  Gym - walking, stationary bike    Current Eating Habits  Breakfast - skips  Lunch - sandwich, chicken wings  Dinner - salmon, green beans, starch  Snacks - chips, microwave popcorn  Beverages - no soft drinks, juice    Medical Weight Loss - has ICD, no InBody  4/20/2023: 280 lbs 3.3 oz, BMI 41.38  9/14/2023: 263 lbs 7.2 oz, BMI 38.40  6/23/2025: 269 lbs 2.9 oz, BMI 42.16      Review of Systems   Constitutional:  Negative for chills and fever.   Respiratory:  Negative for shortness of breath.    Cardiovascular:  Negative for chest pain.   Gastrointestinal:  Negative for abdominal pain, nausea and vomiting.   Neurological:  Negative for dizziness and light-headedness.          Objective    Latest Reference Range & Units 09/29/22 14:33 02/15/23 09:39 02/15/23 12:14 02/17/23 14:34 02/20/23 07:36 02/23/23 14:21   Sodium 136 - 145 mmol/L 139 140  138 137 138   Potassium 3.5 - 5.1 mmol/L 3.7 3.9  3.2 (L) 4.6 4.3   Chloride 95 - 110 mmol/L 103 105  100 100 103   CO2 23 - 29 mmol/L 24 26  30 (H) 29 29   Anion Gap 8 - 16 mmol/L 12 9  8 8 6 (L)   BUN 7 - 17 mg/dL 19 14  18 (H) 43 (H) 34 (H)   Creatinine 0.50 - 1.40 mg/dL 1.3 1.0  1.40 2.1 (H) 1.55 (H)   eGFR >60 mL/min/1.73 m^2 52.0 ! >60.0  47.3 ! 29.1 ! 41.8 !   Glucose 70 - 110 mg/dL 59 (L) 93  91 109 102   Calcium 8.7 - 10.5 mg/dL 9.4 8.8  9.5 9.6 8.9   Alkaline Phosphatase 55 - 135 U/L 43 (L) 44 (L)       PROTEIN TOTAL 6.0 - 8.4 g/dL 7.3 6.9       Albumin 3.5 - 5.2 g/dL 3.8 3.6       BILIRUBIN TOTAL 0.1 - 1.0 mg/dL 1.6 (H) 1.0       AST 10 - 40 U/L 13 14       ALT 10 - 44 U/L 10 12       BNP  0 - 99 pg/mL 139 (H) 436 (H)   68    NT-proBNP 5 - 450 pg/mL    249     Troponin I 0.000 - 0.026 ng/mL  0.222 (H) 0.206 (H)      (L): Data is abnormally low  (H): Data is abnormally high  !: Data is abnormal    Vitals:    06/23/25 1026   BP: 95/62   Pulse: 65         Physical Exam  Vitals reviewed.   Constitutional:       General: She is not in acute distress.     Appearance: Normal appearance. She is obese. She is not ill-appearing, toxic-appearing or diaphoretic.   HENT:      Head: Normocephalic and atraumatic.   Cardiovascular:      Rate and Rhythm: Normal rate.   Pulmonary:      Effort: Pulmonary effort is normal. No respiratory distress.   Skin:     General: Skin is warm and dry.   Neurological:      Mental Status: She is alert and oriented to person, place, and time.            Assessment and Plan     Problem List Items Addressed This Visit       Nonischemic dilated cardiomyopathy (Chronic)    Relevant Medications    topiramate (TOPAMAX) 25 MG tablet    Chronic combined systolic and diastolic congestive heart failure    Relevant Medications    topiramate (TOPAMAX) 25 MG tablet    Class 3 severe obesity due to excess calories with serious comorbidity and body mass index (BMI) of 40.0 to 44.9 in adult - Primary    Relevant Medications    topiramate (TOPAMAX) 25 MG tablet    Hemiplegic migraine without status migrainosus, not intractable    Relevant Medications    topiramate (TOPAMAX) 25 MG tablet     Other Visit Diagnoses         Encounter for weight loss counseling        Relevant Medications    topiramate (TOPAMAX) 25 MG tablet            - Topiramate 25 mg twice daily    - Log all food and beverage intake with a daily calorie goal of 6903-3641 calories per day    - Activity as tolerated

## 2025-07-07 ENCOUNTER — PATIENT MESSAGE (OUTPATIENT)
Dept: OBSTETRICS AND GYNECOLOGY | Facility: CLINIC | Age: 50
End: 2025-07-07
Payer: MEDICARE

## 2025-07-11 DIAGNOSIS — I50.42 CHRONIC COMBINED SYSTOLIC AND DIASTOLIC CONGESTIVE HEART FAILURE: ICD-10-CM

## 2025-07-11 RX ORDER — SPIRONOLACTONE 25 MG/1
25 TABLET ORAL
Qty: 90 TABLET | Refills: 1 | Status: SHIPPED | OUTPATIENT
Start: 2025-07-11

## 2025-07-17 ENCOUNTER — CLINICAL SUPPORT (OUTPATIENT)
Dept: TRANSPLANT | Facility: CLINIC | Age: 50
End: 2025-07-17
Payer: MEDICARE

## 2025-07-17 DIAGNOSIS — I50.42 CHRONIC COMBINED SYSTOLIC AND DIASTOLIC CHF, NYHA CLASS 3: Primary | ICD-10-CM

## 2025-07-17 PROCEDURE — 99499 UNLISTED E&M SERVICE: CPT | Mod: S$GLB,,, | Performed by: NURSE PRACTITIONER

## 2025-07-22 NOTE — PROGRESS NOTES
Pt [facility] transmission received and reviewed.          5/5/2025    10:11 AM 5/5/2025    10:10 AM 2/26/2025     2:43 PM 2/26/2025     2:42 PM 1/24/2025    12:27 PM 1/8/2025     8:38 AM 1/8/2025     8:37 AM   [facility] Transmission    Transmission Date 4/29/2025 4/27/2025 2/25/2025 2/10/2025 1/24/2025 1/2/2025 12/20/2024   Transmission Type Maintenance Maintenance Maintenance Maintenance Maintenance Maintenance Maintenance   PAS 68 67 58 56 41 54 71   LEELA 47 45 39 36 25 35 49   PAD  30 27 24 22 15 22 30   Medication Adjustments  No No No No No No No         Pt noncompliant with remote transmissions. Will continue to reach out regarding disruption/continuation of readings.  [facility] waveform interpretations, trends, and reports are monitored weekly via [email or web address]. Adjustments made per documentation. Will continue to monitor.

## 2025-08-06 ENCOUNTER — CLINICAL SUPPORT (OUTPATIENT)
Dept: TRANSPLANT | Facility: CLINIC | Age: 50
End: 2025-08-06
Payer: MEDICARE

## 2025-08-06 DIAGNOSIS — I50.42 CHRONIC COMBINED SYSTOLIC AND DIASTOLIC CHF, NYHA CLASS 3: Primary | ICD-10-CM

## 2025-08-06 PROCEDURE — 99499 UNLISTED E&M SERVICE: CPT | Mod: S$PBB,,, | Performed by: NURSE PRACTITIONER

## 2025-08-13 ENCOUNTER — TELEPHONE (OUTPATIENT)
Dept: TRANSPLANT | Facility: CLINIC | Age: 50
End: 2025-08-13
Payer: MEDICARE

## 2025-08-20 ENCOUNTER — CLINICAL SUPPORT (OUTPATIENT)
Dept: CARDIOLOGY | Facility: HOSPITAL | Age: 50
End: 2025-08-20
Attending: INTERNAL MEDICINE
Payer: MEDICARE

## 2025-08-20 ENCOUNTER — CLINICAL SUPPORT (OUTPATIENT)
Dept: CARDIOLOGY | Facility: HOSPITAL | Age: 50
End: 2025-08-20
Payer: MEDICARE

## 2025-08-20 DIAGNOSIS — Z95.810 PRESENCE OF AUTOMATIC (IMPLANTABLE) CARDIAC DEFIBRILLATOR: ICD-10-CM

## 2025-08-20 DIAGNOSIS — I42.8 OTHER CARDIOMYOPATHIES: ICD-10-CM

## 2025-08-20 PROCEDURE — 93295 DEV INTERROG REMOTE 1/2/MLT: CPT | Mod: ,,, | Performed by: INTERNAL MEDICINE

## 2025-08-27 LAB
OHS CV DC REMOTE DEVICE TYPE: NORMAL
OHS CV RV PACING PERCENT: 1 %

## 2025-08-28 DIAGNOSIS — I50.42 CHRONIC COMBINED SYSTOLIC AND DIASTOLIC CONGESTIVE HEART FAILURE: ICD-10-CM

## 2025-08-29 RX ORDER — SPIRONOLACTONE 25 MG/1
25 TABLET ORAL DAILY
Qty: 90 TABLET | Refills: 1 | Status: SHIPPED | OUTPATIENT
Start: 2025-08-29

## 2025-09-03 ENCOUNTER — OFFICE VISIT (OUTPATIENT)
Dept: OBSTETRICS AND GYNECOLOGY | Facility: CLINIC | Age: 50
End: 2025-09-03
Payer: MEDICARE

## 2025-09-03 VITALS — SYSTOLIC BLOOD PRESSURE: 91 MMHG | WEIGHT: 276.44 LBS | BODY MASS INDEX: 43.3 KG/M2 | DIASTOLIC BLOOD PRESSURE: 60 MMHG

## 2025-09-03 DIAGNOSIS — Z01.419 WELL WOMAN EXAM WITH ROUTINE GYNECOLOGICAL EXAM: Primary | ICD-10-CM

## 2025-09-03 PROCEDURE — 99213 OFFICE O/P EST LOW 20 MIN: CPT | Mod: PBBFAC,PN | Performed by: OBSTETRICS & GYNECOLOGY

## 2025-09-03 PROCEDURE — 99999 PR PBB SHADOW E&M-EST. PATIENT-LVL III: CPT | Mod: PBBFAC,,, | Performed by: OBSTETRICS & GYNECOLOGY

## (undated) DEVICE — GUIDEWIRE FLEX 5 .018X295CM

## (undated) DEVICE — GUIDEWIRE EMERALD .035IN 260CM

## (undated) DEVICE — SPIKE CONTRAST CONTROLLER

## (undated) DEVICE — SUT PLN GUT 2-0 CT 27 INCH

## (undated) DEVICE — CATH PULMONARY WEDGE PRESS MON

## (undated) DEVICE — KIT SHEATH GLIDELIGHT LSR 14FR

## (undated) DEVICE — APPLICATOR CHLORAPREP ORN 26ML

## (undated) DEVICE — PACK PACER PERMANENT

## (undated) DEVICE — GUIDEWIRE AMPLATZ .035X260

## (undated) DEVICE — COVER OVERHEAD SURG LT BLUE

## (undated) DEVICE — SEE MEDLINE ITEM 154981

## (undated) DEVICE — CATH IMPULSE FL4 5FR 100CM

## (undated) DEVICE — Device

## (undated) DEVICE — KIT CUSTOM MANIFOLD

## (undated) DEVICE — KIT GLIDESHEATH SLEND 6FR 10CM

## (undated) DEVICE — SHEATH PINNACLE INTRO 11FR

## (undated) DEVICE — GUIDEWIRE TORAY INOUE

## (undated) DEVICE — SYS MITRACLIP GUIDE CATH 1CLIP
Type: IMPLANTABLE DEVICE | Site: HEART | Status: NON-FUNCTIONAL
Removed: 2020-06-17

## (undated) DEVICE — PROTECTION STATION PLUS

## (undated) DEVICE — LINE PRESSURE MONITORING 96IN

## (undated) DEVICE — SUT CTD VICRYL 1 UND BR CT

## (undated) DEVICE — COVER BAND BAG 40 X 40

## (undated) DEVICE — SUT CHROMIC GUT8-18 CR/MO-5

## (undated) DEVICE — KIT LEAD LOCKING DEVICE ACC

## (undated) DEVICE — DRAPE LAP TIBURON 77X122IN

## (undated) DEVICE — SEE MEDLINE ITEM 156955

## (undated) DEVICE — COVER PROBE US 5.5X58L NON LTX

## (undated) DEVICE — KIT BRIDGE ACCESSORY

## (undated) DEVICE — SHEATH INTRODUCER 6FR 11CM

## (undated) DEVICE — SUT 1 36IN GUT CHROMIC CT

## (undated) DEVICE — SAFESHEATH II 8FR 13CM

## (undated) DEVICE — CATH SWAN GANZ STND 7FR

## (undated) DEVICE — DEVICE PERCLOSE SUT CLSR 6FR

## (undated) DEVICE — ELECTRODE REM PLYHSV RETURN 9

## (undated) DEVICE — BLADE SURG CARBON STEEL #10

## (undated) DEVICE — CATH IMPULSE 5F 100CM FR4

## (undated) DEVICE — OMNIPAQUE 350 200ML

## (undated) DEVICE — SHEATH INTRODUCER 4FR 11CM

## (undated) DEVICE — PAD PREP 50/CA

## (undated) DEVICE — HOLDER SHARPS SHORTSTOP

## (undated) DEVICE — SPONGE LAP 18X18 PREWASHED

## (undated) DEVICE — SEE MEDLINE ITEM 107746

## (undated) DEVICE — KIT LEFT HEART MANIFOLD CUSTOM

## (undated) DEVICE — PACK BASIC

## (undated) DEVICE — SET MICRO PUNCT 4FR/MPIS-401

## (undated) DEVICE — ADHESIVE DERMABOND ADVANCED

## (undated) DEVICE — NDL PERCUTANEOUS ENTRYBSDN 18

## (undated) DEVICE — DILATOR VESSEL COONS 22FR 20CM

## (undated) DEVICE — SUT MONOCRYL 3-0 KS UND MON

## (undated) DEVICE — SHEATH INTRODUCER 8FR 11CM

## (undated) DEVICE — CATH BRIDGE OCCL BLLN 80CM

## (undated) DEVICE — SEE MEDLINE ITEM 152487

## (undated) DEVICE — CUTTER LEAD

## (undated) DEVICE — SUT CTD VICRYL 1 VIL BR CT

## (undated) DEVICE — KIT EVACUATOR FULL PERF 100CC

## (undated) DEVICE — DRAPE FLUID WARMER ORS 44X44IN

## (undated) DEVICE — BLADE PLASMA WIDE SPATULA TIP

## (undated) DEVICE — SET BASIN 48X48IN 6000ML RING

## (undated) DEVICE — KIT MICROINTRO 4F .018X40X7CM

## (undated) DEVICE — GLOVE PROTEXIS HYDROGEL SZ8

## (undated) DEVICE — SUT PERCLOSE PROSTYLE MEDIATE

## (undated) DEVICE — SUT CTD VICRYL 0 UND BR CT

## (undated) DEVICE — CATH ANG PIGTAIL 4FR INFINITY

## (undated) DEVICE — MANIFOLD 4 PORT

## (undated) DEVICE — TUBING HPCIL ROT M/F ADPT 48IN

## (undated) DEVICE — NDL TRANSEPTAL ADULT 71.0

## (undated) DEVICE — SHEATH INTRODUCER 9FR 11CM

## (undated) DEVICE — DRESSING COVER AQUACEL AG SURG

## (undated) DEVICE — TUBING HPCIL ROT M/F ADPT 10IN

## (undated) DEVICE — DEVICE LEAD EXTRACTION 1 65CM

## (undated) DEVICE — KIT WRENCH

## (undated) DEVICE — DRESSING AQUACEL AG ADV 3.5X6

## (undated) DEVICE — DRAPE ANGIO BRACH 38X44IN

## (undated) DEVICE — KIT PROBE COVER WITH GEL

## (undated) DEVICE — STOPCOCK 3-WAY

## (undated) DEVICE — CONTAINER PATHOLOGY W/LID 32OZ

## (undated) DEVICE — SLING SWATHE UNIVERSAL FOAM

## (undated) DEVICE — POWDER ARISTA AH 3G

## (undated) DEVICE — CATH OPTITORQUE TIGER 5F 100CM

## (undated) DEVICE — LINE 60IN PRESSURE MON.

## (undated) DEVICE — VISIPAQUE 320 200ML +PK

## (undated) DEVICE — SEE MEDLINE ITEM 156894

## (undated) DEVICE — KIT GLIDESHEATH SLEND 7FR 10CM

## (undated) DEVICE — CLOSURE SKIN STERI STRIP 1/2X4

## (undated) DEVICE — PAD DEFIB CADENCE ADULT R2

## (undated) DEVICE — SUT 0 VICRYL / CT-1

## (undated) DEVICE — KIT MICROINTRODUCE MINI 5X10CM

## (undated) DEVICE — GUIDEWIRE AMPLATZ STF .032X260

## (undated) DEVICE — DRESSING AQUACEL AG 3.5X10IN

## (undated) DEVICE — FLUID DELIVERY SET WITH FILTER

## (undated) DEVICE — STYLET 65CM

## (undated) DEVICE — DRAPE ANGIOGRAPHY

## (undated) DEVICE — TRAY FOLEY 16FR INFECTION CONT

## (undated) DEVICE — SEE MEDLINE ITEM 157116

## (undated) DEVICE — KIT PERICARDIOCENTESIS

## (undated) DEVICE — TRAY CATH FOL SIL URIMTR 16FR

## (undated) DEVICE — CATH SUPER TORQUE MP 4FRX80CM

## (undated) DEVICE — SHEATH 8FR MULLINS TRANS

## (undated) DEVICE — SEE MEDLINE ITEM 146355

## (undated) DEVICE — SEE MEDLINE ITEM 152622

## (undated) DEVICE — SHEATH DRYSEAL 18FR 33CM

## (undated) DEVICE — KIT SHEATH 9FRX11CM

## (undated) DEVICE — SHEATH HEMOSTASIS 10FR 12CM

## (undated) DEVICE — SHEATH AGA TREVISIO 9FR 80CM

## (undated) DEVICE — WIRE GUIDE WHOLEY MOD J .035